# Patient Record
Sex: FEMALE | Race: WHITE | NOT HISPANIC OR LATINO | Employment: OTHER | URBAN - NONMETROPOLITAN AREA
[De-identification: names, ages, dates, MRNs, and addresses within clinical notes are randomized per-mention and may not be internally consistent; named-entity substitution may affect disease eponyms.]

---

## 2017-01-11 PROBLEM — F06.4 ANXIETY DISORDER DUE TO GENERAL MEDICAL CONDITION: Status: ACTIVE | Noted: 2017-01-11

## 2017-01-11 PROBLEM — I48.91 ATRIAL FIBRILLATION: Status: ACTIVE | Noted: 2017-01-11

## 2017-01-11 PROBLEM — F32.A DEPRESSION: Status: ACTIVE | Noted: 2017-01-11

## 2017-01-11 PROBLEM — E78.5 HYPERLIPIDEMIA: Status: ACTIVE | Noted: 2017-01-11

## 2017-01-11 PROBLEM — K44.9 HIATAL HERNIA: Status: ACTIVE | Noted: 2017-01-11

## 2017-01-11 PROBLEM — I10 HYPERTENSION: Status: ACTIVE | Noted: 2017-01-11

## 2017-01-11 PROBLEM — J42 CHRONIC BRONCHITIS: Status: ACTIVE | Noted: 2017-01-11

## 2017-01-11 PROBLEM — R00.2 PALPITATIONS: Status: ACTIVE | Noted: 2017-01-11

## 2017-01-11 PROBLEM — I07.9 TRICUSPID VALVE DISORDER: Status: ACTIVE | Noted: 2017-01-11

## 2017-01-11 PROBLEM — H26.9 CATARACT: Status: ACTIVE | Noted: 2017-01-11

## 2017-01-11 PROBLEM — R06.02 SHORTNESS OF BREATH: Status: ACTIVE | Noted: 2017-01-11

## 2017-01-11 PROBLEM — E66.9 OBESITY: Status: ACTIVE | Noted: 2017-01-11

## 2017-01-11 PROBLEM — K21.9 ESOPHAGEAL REFLUX: Status: ACTIVE | Noted: 2017-01-11

## 2017-01-11 PROBLEM — G47.30 SLEEP APNEA: Status: ACTIVE | Noted: 2017-01-11

## 2017-07-18 ENCOUNTER — TRANSCRIBE ORDERS (OUTPATIENT)
Dept: GENERAL RADIOLOGY | Facility: HOSPITAL | Age: 77
End: 2017-07-18

## 2017-07-18 DIAGNOSIS — Z78.0 POSTMENOPAUSAL: Primary | ICD-10-CM

## 2017-07-21 ENCOUNTER — TRANSCRIBE ORDERS (OUTPATIENT)
Dept: MAMMOGRAPHY | Facility: HOSPITAL | Age: 77
End: 2017-07-21

## 2017-07-21 ENCOUNTER — APPOINTMENT (OUTPATIENT)
Dept: BONE DENSITY | Facility: HOSPITAL | Age: 77
End: 2017-07-21

## 2017-07-21 DIAGNOSIS — Z13.9 SCREENING: Primary | ICD-10-CM

## 2017-07-21 DIAGNOSIS — Z78.0 POSTMENOPAUSAL: ICD-10-CM

## 2017-07-21 PROCEDURE — 77080 DXA BONE DENSITY AXIAL: CPT

## 2017-08-11 ENCOUNTER — HOSPITAL ENCOUNTER (OUTPATIENT)
Dept: MAMMOGRAPHY | Facility: HOSPITAL | Age: 77
Discharge: HOME | End: 2017-08-11
Admitting: FAMILY MEDICINE

## 2017-08-11 DIAGNOSIS — Z13.9 SCREENING: ICD-10-CM

## 2017-08-11 PROCEDURE — G0202 SCR MAMMO BI INCL CAD: HCPCS

## 2017-08-11 PROCEDURE — 77063 BREAST TOMOSYNTHESIS BI: CPT

## 2017-08-24 DIAGNOSIS — M25.562 LEFT KNEE PAIN, UNSPECIFIED CHRONICITY: Primary | ICD-10-CM

## 2017-08-28 ENCOUNTER — OFFICE VISIT (OUTPATIENT)
Dept: ORTHOPEDIC SURGERY | Facility: CLINIC | Age: 77
End: 2017-08-28

## 2017-08-28 VITALS — BODY MASS INDEX: 32.25 KG/M2 | RESPIRATION RATE: 16 BRPM | HEIGHT: 63 IN | WEIGHT: 182 LBS

## 2017-08-28 DIAGNOSIS — M25.562 ARTHRALGIA OF LEFT KNEE: Primary | ICD-10-CM

## 2017-08-28 DIAGNOSIS — M17.0 PRIMARY OSTEOARTHRITIS OF BOTH KNEES: ICD-10-CM

## 2017-08-28 PROCEDURE — 99202 OFFICE O/P NEW SF 15 MIN: CPT | Performed by: ORTHOPAEDIC SURGERY

## 2017-08-28 PROCEDURE — 73560 X-RAY EXAM OF KNEE 1 OR 2: CPT | Performed by: ORTHOPAEDIC SURGERY

## 2017-08-28 RX ORDER — PANTOPRAZOLE SODIUM 40 MG/1
40 TABLET, DELAYED RELEASE ORAL DAILY
COMMUNITY
End: 2018-04-27 | Stop reason: CLARIF

## 2017-08-28 NOTE — PROGRESS NOTES
Subjective   Patient ID: Elsa Alcaraz is a 77 y.o. right hand dominant female is being seen for orthopaedic evaluation today for left knee pain and is here today for a treatment planning discussion. Pain of the Left Knee and Pain of the Right Knee         Lower Extremity Issue   This is a chronic (left greater than right knee joint pain) problem. The current episode started more than 1 year ago (in terms of chronic knee pain, several years.  Also a falling episode 2 weeks ago aggravated the left knee.  She was seen in our clinic 5 years ago and had left knee Supartz viscosupplementation therapy with longstanding relief until recent months. ). The problem occurs intermittently. The problem has been gradually worsening. Associated symptoms include arthralgias. Pertinent negatives include no abdominal pain, chest pain, fever, joint swelling or rash. The symptoms are aggravated by walking (crouching). She has tried rest and ice (She also uses a left knee support sleeve on occasion.  Wants to avoid medication for condition.  Also noted is adverse reaction to prior cortisone.) for the symptoms. The treatment provided mild relief.     Pain Score: 1  Pain Location: Knee  Pain Orientation: Left, Right  Pain Descriptors: Aching  Pain Frequency: Intermittent  Pain Onset: Gradual  Date Pain First Started:  (a year)  Aggravating Factors: Walking, Squatting  Pain Intervention(s): Rest, Cold applied  Result of Injury: Yes (jumped off a fence and it started hurting afterwards)  Work-Related Injury: No     Patient noted pain is worse in left than right  Patient noted that she did fall 2 weeks ago and it feels like her left knee is hurting more since that time.      Past Medical History:   Diagnosis Date   • Anxiety disorder due to general medical condition 1/11/2017   • Arthritis    • Atrial fibrillation 1/11/2017   • Breast cancer 2003    right   • Cataract 1/11/2017   • Chronic bronchitis 1/11/2017   • Colon cancer    • COPD  (chronic obstructive pulmonary disease)    • Depression 2017   • Diverticulosis    • Esophageal reflux 2017   • Hiatal hernia 2017   • History of bladder infections    • Hyperlipidemia 2017   • Hypertension 2017   • Osteoporosis    • Palpitations 2017   • Shortness of breath 2017   • Sleep apnea 2017   • Tricuspid valve disorder 2017   • Ulcer         Past Surgical History:   Procedure Laterality Date   • ANAL FISTULOTOMY     • BREAST LUMPECTOMY  2006   •  SECTION  1981   • CHOLECYSTECTOMY  2009   • COLECTOMY PARTIAL / TOTAL  1998   • VENTRAL HERNIA REPAIR  10/28/2012       Family History   Problem Relation Age of Onset   • Hypertension Mother    • Asthma Mother    • COPD Mother    • Osteoarthritis Mother    • Cancer Father    • Cancer Sister    • Diabetes Maternal Grandmother        Social History     Social History   • Marital status:      Spouse name: N/A   • Number of children: N/A   • Years of education: N/A     Occupational History   • Not on file.     Social History Main Topics   • Smoking status: Never Smoker   • Smokeless tobacco: Not on file   • Alcohol use No   • Drug use: No   • Sexual activity: Defer     Other Topics Concern   • Not on file     Social History Narrative       No Known Allergies    Review of Systems   Constitutional: Negative for fever.   HENT: Negative for voice change.    Eyes: Negative for visual disturbance.   Respiratory: Negative for shortness of breath.    Cardiovascular: Negative for chest pain.   Gastrointestinal: Negative for abdominal distention and abdominal pain.   Genitourinary: Negative for dysuria.   Musculoskeletal: Positive for arthralgias. Negative for gait problem and joint swelling.   Skin: Negative for rash.   Neurological: Negative for speech difficulty.   Hematological: Does not bruise/bleed easily.   Psychiatric/Behavioral: Negative for confusion.   All other systems reviewed and  "are negative.      Objective   Resp 16  Ht 63\" (160 cm)  Wt 182 lb (82.6 kg)  BMI 32.24 kg/m2   Physical Exam   Constitutional: She appears well-developed. No distress.   Musculoskeletal: She exhibits deformity (mild varus both knees).        Right knee: She exhibits no effusion.        Left knee: She exhibits effusion (1+).   Neurological: She is alert.   Skin: Skin is warm and dry. No rash noted. No erythema.   Psychiatric: She has a normal mood and affect. Her speech is normal and behavior is normal. Judgment and thought content normal.   Vitals reviewed.    Right Knee Exam     Tenderness   The patient is experiencing tenderness in the medial retinaculum and patella.    Range of Motion   Extension: 0   Flexion: 120     Tests   El[place]:  Medial - negative   Drawer:       Anterior - 1+    Posterior - 1+  Varus: negative  Valgus: negative  Patellar Apprehension: negative    Other   Erythema: absent  Sensation: normal  Pulse: present  Swelling: none  Other tests: no effusion present      Left Knee Exam     Tenderness   The patient is experiencing tenderness in the medial retinaculum, patella and medial joint line.    Range of Motion   Extension: 0   Flexion: 120     Tests   [place]:  Medial - negative   Drawer:       Anterior - 1+     Posterior - 1+  Varus: negative  Valgus: negative  Patellar Apprehension: negative    Other   Erythema: absent  Sensation: normal  Pulse: present  Swelling: mild  Effusion: effusion (1+) present      Right Hip Exam     Tenderness   The patient is experiencing no tenderness.         Range of Motion   The patient has normal right hip ROM.    Muscle Strength   The patient has normal right hip strength.    Tests   MARGARET: negative      Left Hip Exam     Tenderness   The patient is experiencing no tenderness.         Range of Motion   The patient has normal left hip ROM.    Muscle Strength   The patient has normal left hip strength.     Tests   MARGARET: negative      Back Exam "     Tenderness   The patient is experiencing no tenderness (no symptoms today though she does report a 'herniated disc' in her back.).     Muscle Strength   Right Quadriceps:  5/5   Left Quadriceps:  5/5   Right Hamstrings:  5/5   Left Hamstrings:  5/5     Tests   Straight leg raise right: negative  Straight leg raise left: negative        Extremity DVT signs are Negative on physical exam with negative Ari sign, with no calf pain, with no palpable cords and with no skin tone change   Neurologic Exam     Mental Status   Attention: normal.   Speech: speech is normal   Level of consciousness: alert  Knowledge: good.     Motor Exam   Overall muscle tone: normal    Strength   Right quadriceps: 5/5  Left quadriceps: 5/5  Right hamstrin/5  Left hamstrin/5    Gait, Coordination, and Reflexes     Gait  Gait: (occasional left knee limp)     Left Hip Exam     Range of Motion   Normal left hip ROM    Muscle Strength   Normal left hip strength    Right Hip Exam     Range of Motion   Normal right hip ROM    Muscle Strength   Normal right hip strength        Assessment/Plan   Independent Review of Radiographic Studies:    Indication to evaluate left greater than right knee pain, no comparison views, shows no acute fracture or dislocation evident.  Indication to evaluate knee joint condition, no comparison views available, shows evident chronic moderate right and advanced left knee anteromedial osteoarthritis.  Near bone-on-bone medial compartment of left knee.  Mild varus both knees.  Laboratory and Other Studies:  No new results reviewed today.   Medical Decision Making:    Ongoing with residual symptoms.  Continue current management and any additional treatments and workup as outlined in plan.    Procedures  [x] No procedures were performed in office today.     Elsa was seen today for pain and pain.    Diagnoses and all orders for this visit:    Arthralgia of left knee    Primary osteoarthritis of both knees        Regular exercise as tolerated  Orthopedic activities reviewed and patient expressed appreciation  Discussion of orthopedic goals  Risk, benefits, and merits of treatment alternatives reviewed with the patient and questions answered  Ice, heat, and/or modalities as beneficial  Guided on proper techniques for mobility, strength, agility and/or conditioning exercises  Discussed injection therapy for left knee.  Advised on use of a cane for safety and support.    Orthopaedic knee osteoarthritis treatment options reviewed including:  Arthiritis exercises and activity modifications  Knee brace  Anti-inflammatory medication or topicals, if no contraindications (PATIENT PREFERS TO AVOID MEDICATION)  Corticosteroid injection (PATIENT NOT A CANDIDATE FOR CORTISONE THERAPY)  Visco supplementation injections  Elective knee arthroscopy or arthroplasty as appropriate  Orthopaedic surgery limitations and risks reviewed    Recommendations/Plan:  Exercise, medications, injections, other patient advice, and return appointment as noted.  Brace: No brace was given at today's visit  Referral: No referrals made at today's visit  Test/Studies: No additional studies ordered.  Surgery: No surgery proposed at this visit.  Work/Activity Status: May perform usual activities as tolerated  Patient is encouraged to call or return for any issues or concerns.    Return in about 2 weeks (around 9/11/2017) for For L knee viscosupplementation injections, Recheck.  Patient agreeable to call or return sooner for any concerns.

## 2017-09-15 ENCOUNTER — OFFICE VISIT (OUTPATIENT)
Dept: ORTHOPEDIC SURGERY | Facility: CLINIC | Age: 77
End: 2017-09-15

## 2017-09-15 VITALS — HEIGHT: 63 IN | WEIGHT: 182 LBS | BODY MASS INDEX: 32.25 KG/M2 | RESPIRATION RATE: 18 BRPM

## 2017-09-15 DIAGNOSIS — M25.562 ARTHRALGIA OF LEFT KNEE: Primary | ICD-10-CM

## 2017-09-15 DIAGNOSIS — M17.12 PRIMARY OSTEOARTHRITIS OF LEFT KNEE: ICD-10-CM

## 2017-09-15 PROCEDURE — 20610 DRAIN/INJ JOINT/BURSA W/O US: CPT | Performed by: PHYSICIAN ASSISTANT

## 2017-09-15 NOTE — PROGRESS NOTES
"Afua Alcaraz is a 77 y.o.  female is here today for injection therapy.  Follow-up, Pain, and Injections of the Left Knee      History of Present Illness   Patient is here for first gel one injection to the left knee secondary to OA.  Past Medical History:   Diagnosis Date   • Anxiety disorder due to general medical condition 2017   • Arthritis    • Atrial fibrillation 2017   • Breast cancer 2003    right   • Cataract 2017   • Chronic bronchitis 2017   • Colon cancer    • COPD (chronic obstructive pulmonary disease)    • Depression 2017   • Diverticulosis    • Esophageal reflux 2017   • Hiatal hernia 2017   • History of bladder infections    • Hyperlipidemia 2017   • Hypertension 2017   • Osteoporosis    • Palpitations 2017   • Shortness of breath 2017   • Sleep apnea 2017   • Tricuspid valve disorder 2017   • Ulcer         Past Surgical History:   Procedure Laterality Date   • ANAL FISTULOTOMY     • BREAST LUMPECTOMY  2006   •  SECTION  1981   • CHOLECYSTECTOMY  2009   • COLECTOMY PARTIAL / TOTAL  1998   • VENTRAL HERNIA REPAIR  10/28/2012       No Known Allergies    Review of Systems   Constitutional: Negative for fever.   HENT: Negative for voice change.    Eyes: Negative for visual disturbance.   Respiratory: Negative for shortness of breath.    Cardiovascular: Negative for chest pain.   Gastrointestinal: Negative for abdominal distention and abdominal pain.   Genitourinary: Negative for dysuria.   Musculoskeletal: Positive for arthralgias. Negative for gait problem and joint swelling.   Skin: Negative for rash.   Neurological: Negative for speech difficulty.   Hematological: Does not bruise/bleed easily.   Psychiatric/Behavioral: Negative for confusion.       Objective   Resp 18  Ht 63\" (160 cm)  Wt 182 lb (82.6 kg)  BMI 32.24 kg/m2   Physical Exam  Skin exam stable with no erythema, ecchymosis or " rash.  No new swelling.  No motor or sensory changes.  Distal pulse intact.    Large Joint Arthrocentesis  Date/Time: 9/15/2017 12:48 PM  Consent given by: patient  Site marked: site marked  Timeout: Immediately prior to procedure a time out was called to verify the correct patient, procedure, equipment, support staff and site/side marked as required   Supporting Documentation  Indications: pain   Procedure Details  Location: knee - L knee  Preparation: Patient was prepped and draped in the usual sterile fashion  Needle size: 22 G  Approach: anterolateral  Medication group details: Gel ONe NDC# [address]-12 Lot # [id number] EXP -2018-02-21.  Patient tolerance: patient tolerated the procedure well with no immediate complications          Assessment/Plan   Independent Review of Radiographic Studies:    No new imaging done today.  Laboratory and Other Studies:  No new results reviewed today.       Elsa was seen today for follow-up, pain and injections.    Diagnoses and all orders for this visit:    Arthralgia of left knee  -     Large Joint Arthrocentesis    Primary osteoarthritis of left knee      Orthopedic activities reviewed and patient expressed appreciation  Discussion of orthopedic goals  Risk, benefits, and merits of treatment alternatives reviewed with the patient and questions answered  Call or notify for any adverse effect from injection therapy    Recommendations/Plan:  Exercise, medications, injections, other patient advice, and return appointment as noted.  Patient is encouraged to call or return for any issues or concerns.    FU in 3 months or as needed  Patient agreeable to call or return sooner for any concerns.

## 2018-03-24 ENCOUNTER — APPOINTMENT (OUTPATIENT)
Dept: GENERAL RADIOLOGY | Facility: HOSPITAL | Age: 78
End: 2018-03-24

## 2018-03-24 ENCOUNTER — HOSPITAL ENCOUNTER (EMERGENCY)
Facility: HOSPITAL | Age: 78
Discharge: HOME | End: 2018-03-24
Attending: EMERGENCY MEDICINE | Admitting: EMERGENCY MEDICINE

## 2018-03-24 VITALS
HEIGHT: 63 IN | OXYGEN SATURATION: 94 % | RESPIRATION RATE: 18 BRPM | BODY MASS INDEX: 33.13 KG/M2 | DIASTOLIC BLOOD PRESSURE: 71 MMHG | TEMPERATURE: 99.3 F | WEIGHT: 187 LBS | SYSTOLIC BLOOD PRESSURE: 120 MMHG | HEART RATE: 113 BPM

## 2018-03-24 DIAGNOSIS — M79.601 PAIN OF RIGHT UPPER EXTREMITY: Primary | ICD-10-CM
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
## 2018-03-24 PROCEDURE — 99283 EMERGENCY DEPT VISIT LOW MDM: CPT

## 2018-03-24 PROCEDURE — 73110 X-RAY EXAM OF WRIST: CPT

## 2018-03-24 PROCEDURE — 73030 X-RAY EXAM OF SHOULDER: CPT

## 2018-03-24 PROCEDURE — 73060 X-RAY EXAM OF HUMERUS: CPT

## 2018-03-24 RX ORDER — AMOXICILLIN AND CLAVULANATE POTASSIUM 250; 125 MG/1; MG/1
TABLET, FILM COATED ORAL 3 TIMES DAILY
COMMUNITY
End: 2018-04-16 | Stop reason: HOSPADM

## 2018-03-24 RX ORDER — METOPROLOL TARTRATE AND HYDROCHLOROTHIAZIDE 100; 25 MG/1; MG/1
1 TABLET ORAL DAILY
COMMUNITY
End: 2019-12-16 | Stop reason: HOSPADM

## 2018-03-24 RX ORDER — MULTIVIT-MIN/IRON/FOLIC ACID/K 18-600-40
CAPSULE ORAL
COMMUNITY
End: 2019-07-29

## 2018-03-24 RX ORDER — TRIAMCINOLONE ACETONIDE 55 UG/1
2 SPRAY, METERED NASAL DAILY
COMMUNITY
End: 2019-07-29

## 2018-03-24 NOTE — ED PROVIDER NOTES
TRIAGE CHIEF COMPLAINT:     Nursing and triage notes reviewed    Chief Complaint   Patient presents with   • Fall      HPI: Elsa Alcaraz is a 77 y.o. female who presents to the emergency department complaining of Right upper extremity discomfort after suffering a fall early this morning.  Patient states she got up and accidentally tripped over a laundry basket that she had left in the floor.  She states she landed on her right arm.  She denies hitting her head or losing consciousness.  She denied any lightheadedness or dizziness prior and states she simply tripped on the basket.  She denies any numbness or tingling in her right upper extremity but does complain of discomfort at the right wrist as well as the right shoulder.  She states she can still move it around without difficulty but it feels very sore when she does so.     REVIEW OF SYSTEMS: All other systems reviewed and are negative     PAST MEDICAL HISTORY:   Past Medical History:   Diagnosis Date   • Anxiety disorder due to general medical condition 1/11/2017   • Arthritis    • Atrial fibrillation 1/11/2017   • Breast cancer 2003    right   • Cataract 1/11/2017   • Chronic bronchitis 1/11/2017   • Colon cancer    • COPD (chronic obstructive pulmonary disease)    • Depression 1/11/2017   • Diverticulosis    • Esophageal reflux 1/11/2017   • Hiatal hernia 1/11/2017   • History of bladder infections    • Hyperlipidemia 1/11/2017   • Hypertension 1/11/2017   • Osteoporosis    • Palpitations 1/11/2017   • Shortness of breath 1/11/2017   • Sleep apnea 1/11/2017   • Tricuspid valve disorder 1/11/2017   • Ulcer         FAMILY HISTORY:   Family History   Problem Relation Age of Onset   • Hypertension Mother    • Asthma Mother    • COPD Mother    • Osteoarthritis Mother    • Cancer Father    • Cancer Sister    • Diabetes Maternal Grandmother         SOCIAL HISTORY:   Social History     Social History   • Marital status:      Spouse name: N/A   • Number of  children: N/A   • Years of education: N/A     Occupational History   • Not on file.     Social History Main Topics   • Smoking status: Never Smoker   • Smokeless tobacco: Not on file   • Alcohol use No   • Drug use: No   • Sexual activity: Defer     Other Topics Concern   • Not on file     Social History Narrative   • No narrative on file        SURGICAL HISTORY:   Past Surgical History:   Procedure Laterality Date   • ANAL FISTULOTOMY     • BREAST LUMPECTOMY  2006   •  SECTION  1981   • CHOLECYSTECTOMY  2009   • COLECTOMY PARTIAL / TOTAL  1998   • VENTRAL HERNIA REPAIR  10/28/2012        CURRENT MEDICATIONS:      Medication List      ASK your doctor about these medications    amoxicillin-clavulanate 250-125 MG per tablet  Commonly known as:  AUGMENTIN     atenolol 25 MG tablet  Commonly known as:  TENORMIN     brompheniramine-pseudoephedrine 1-15 MG/5ML elixir  Commonly known as:  DIMETAPP     citalopram 20 MG tablet  Commonly known as:  CeleXA     losartan-hydrochlorothiazide 100-25 MG per tablet  Commonly known as:  HYZAAR     metoprolol-hydrochlorothiazide 100-25 MG per tablet  Commonly known as:  LOPRESSOR HCT     montelukast 10 MG tablet  Commonly known as:  SINGULAIR     pantoprazole 40 MG EC tablet  Commonly known as:  PROTONIX     SYMBICORT 80-4.5 MCG/ACT inhaler  Generic drug:  budesonide-formoterol     Triamcinolone Acetonide 55 MCG/ACT nasal inhaler  Commonly known as:  NASACORT     Vitamin D 2000 units capsule     warfarin 4 MG tablet  Commonly known as:  COUMADIN           ALLERGIES: Review of patient's allergies indicates no known allergies.     PHYSICAL EXAM:   VITAL SIGNS:   Vitals:    18 0910   BP: 120/71   Pulse: 113   Resp: 18   Temp: 99.3 °F (37.4 °C)   SpO2: 94%      CONSTITUTIONAL: Awake, oriented, appears non-toxic   HENT: Atraumatic, normocephalic, oral mucosa pink and moist, airway patent.   EYES: Conjunctiva clear  NECK: Trachea midline, non-tender,  supple   CARDIOVASCULAR: Normal heart rate, Normal rhythm, No murmurs, rubs, gallops   PULMONARY/CHEST: Clear to auscultation, no rhonchi, wheezes, or rales. Symmetrical breath sounds   ABDOMINAL: Non-distended, soft, non-tender - no rebound or guarding.  NEUROLOGIC: Non-focal, moving all four extremities, no gross sensory or motor deficits.   EXTREMITIES: No clubbing, cyanosis.  There is some mild swelling at the right wrist.  Patient has full range of motion with only minimal discomfort.  There is no obvious bony tenderness to palpation.  There is no discomfort with range of motion or palpation at the right elbow.  There is some mild discomfort with palpation and range of motion at the right shoulder.  There is no obvious deformity of the extremity.  Distal pulses and sensation are intact.  She has full sensory, motor function in the radial, ulnar, and median nerve distributions.   SKIN: Warm, Dry, No erythema, No rash     ED COURSE / MEDICAL DECISION MAKING:   Elsa Alcaraz is a 77 y.o. female who presents to the emergency department for evaluation of right upper extremity discomfort after suffering a fall.  Patient is nondistressed on arrival was stable vital signs.  Physical examination reveals mild swelling at the right wrist and some mild discomfort at the wrist and the shoulder.  The limb is neurovascularly intact.  I will obtain x-rays for further evaluation.  Patient declined any pain medication in the emergency department.  X-rays of the shoulder and humerus per my interpretation did not reveal any obvious acute fracture or dislocation, there were some degenerative changes.  Per the radiologist there were no acute fracture dislocation of the wrist.  Patient was placed in a wrist brace for comfort.  We'll continue symptomatic care.  Return precautions were discussed.    DECISION TO DISCHARGE/ADMIT: see ED care timeline     FINAL IMPRESSION:   1 -- arm pain   2 -- fall  3 --     Electronically signed by:  Brianna Rodriguez MD, 3/24/2018 9:26 AM       Brianna Rodriguez MD  03/24/18 [id number]

## 2018-03-24 NOTE — ED NOTES
Went into patient's room and was not in there for discharge.  Patient eloped.      Radha Samuel RN  03/24/18 [id number]

## 2018-04-13 PROBLEM — D75.839 THROMBOCYTOSIS: Status: ACTIVE | Noted: 2018-04-13

## 2018-04-13 PROBLEM — D50.9 MICROCYTIC HYPOCHROMIC ANEMIA: Status: ACTIVE | Noted: 2018-04-13

## 2018-04-16 ENCOUNTER — CONSULT (OUTPATIENT)
Dept: ONCOLOGY | Facility: CLINIC | Age: 78
End: 2018-04-16

## 2018-04-16 VITALS
WEIGHT: 183 LBS | HEART RATE: 75 BPM | RESPIRATION RATE: 18 BRPM | TEMPERATURE: 98.1 F | HEIGHT: 63 IN | DIASTOLIC BLOOD PRESSURE: 88 MMHG | BODY MASS INDEX: 32.43 KG/M2 | SYSTOLIC BLOOD PRESSURE: 134 MMHG

## 2018-04-16 DIAGNOSIS — D50.9 IRON DEFICIENCY ANEMIA, UNSPECIFIED IRON DEFICIENCY ANEMIA TYPE: Primary | ICD-10-CM

## 2018-04-16 PROCEDURE — 99205 OFFICE O/P NEW HI 60 MIN: CPT | Performed by: INTERNAL MEDICINE

## 2018-04-16 NOTE — PROGRESS NOTES
CHIEF COMPLAINT: Iron deficiency anemia    REASON FOR REFERRAL: Same      RECORDS OBTAINED  Records of the patients history including those obtained from  Kemi Hdz were reviewed and summarized in detail.       Iron deficiency anemia    4/13/2018 Initial Diagnosis     Iron deficiency anemia:  Labs done by primary care March 23, 2018 hemoglobin 8.6 MCV 70.  March 27 hemoglobin 9.2 MCV 71.  With atrial fib on Coumadin INR 2.8 with PTT 26.9.  Reticulocyte count 15%.  Iron saturation low at 4% with an iron low at 17 and a ferritin low at 13.  Transferrin of 369 and a normal total iron binding capacity of 448 at the upper limits of normal.  Fully casted 12 with a B12 1075 and a TSH of 1.34.              HISTORY OF PRESENT ILLNESS:  The patient is a 78 y.o.  female, referred for Iron deficiency anemia.  Previous EGD and colonoscopy 2016 at [facility].  Been seeing Dr. Radford in the past for history of colon cancer last decade.  No change in color caliber consistency of her stools.  Does not tolerate ferrous sulfate well and comes to me for IV iron.  She had IV iron without allergic incident us far..    REVIEW OF SYSTEMS:  A 14 point review of systems was performed and is negative except as noted above.    Past Medical History:   Diagnosis Date   • Anxiety disorder due to general medical condition 1/11/2017   • Arthritis    • Atrial fibrillation 1/11/2017   • Breast cancer 2003    right   • Cataract 1/11/2017   • Chronic bronchitis 1/11/2017   • Colon cancer    • COPD (chronic obstructive pulmonary disease)    • Depression 1/11/2017   • Diverticulosis    • Esophageal reflux 1/11/2017   • Hiatal hernia 1/11/2017   • History of bladder infections    • Hyperlipidemia 1/11/2017   • Hypertension 1/11/2017   • Osteoporosis    • Palpitations 1/11/2017   • Shortness of breath 1/11/2017   • Sleep apnea 1/11/2017   • Tricuspid valve disorder 1/11/2017   • Ulcer      Past Surgical History:   Procedure Laterality Date    • ANAL FISTULOTOMY     • BREAST LUMPECTOMY  2006   •  SECTION  1981   • CHOLECYSTECTOMY  2009   • COLECTOMY PARTIAL / TOTAL  1998   • VENTRAL HERNIA REPAIR  10/28/2012       Current Outpatient Prescriptions on File Prior to Visit   Medication Sig Dispense Refill   • atenolol (TENORMIN) 25 MG tablet Take  by mouth 2 (Two) Times a Day.     • budesonide-formoterol (SYMBICORT) 80-4.5 MCG/ACT inhaler 2 (Two) Times a Day.     • Cholecalciferol (VITAMIN D) 2000 units capsule Take  by mouth.     • citalopram (CeleXA) 20 MG tablet Take  by mouth Daily.     • losartan-hydrochlorothiazide (HYZAAR) 100-25 MG per tablet Take  by mouth Daily.     • metoprolol-hydrochlorothiazide (LOPRESSOR HCT) 100-25 MG per tablet Take 1 tablet by mouth Daily.     • montelukast (SINGULAIR) 10 MG tablet Take  by mouth Daily.     • pantoprazole (PROTONIX) 40 MG EC tablet Take 40 mg by mouth Daily.     • Triamcinolone Acetonide (NASACORT) 55 MCG/ACT nasal inhaler 2 sprays into each nostril Daily.     • warfarin (COUMADIN) 4 MG tablet Take  by mouth Daily.     • [DISCONTINUED] amoxicillin-clavulanate (AUGMENTIN) 250-125 MG per tablet 3 (Three) Times a Day. Pt unsure of dose at this time     • [DISCONTINUED] brompheniramine-pseudoephedrine (DIMETAPP) 1-15 MG/5ML elixir Take  by mouth Every 6 (Six) Hours As Needed for Allergies.       No current facility-administered medications on file prior to visit.        No Known Allergies    Social History     Social History   • Marital status:      Social History Main Topics   • Smoking status: Never Smoker   • Alcohol use No   • Drug use: No   • Sexual activity: Defer     Other Topics Concern   • Not on file       Family History   Problem Relation Age of Onset   • Hypertension Mother    • Asthma Mother    • COPD Mother    • Osteoarthritis Mother    • Cancer Father    • Cancer Sister    • Diabetes Maternal Grandmother        PHYSICAL EXAM:    /88   Pulse 75    "Temp 98.1 °F (36.7 °C) (Temporal Artery )   Resp 18   Ht 160 cm (63\")   Wt 83 kg (183 lb)   BMI 32.42 kg/m²     ECOG: (1) Restricted in physically strenuous activity, ambulatory and able to do work of light nature  General: well appearing female in no acute distress  HEENT: sclera anicteric, oropharynx clear  Lymphatics: no cervical, supraclavicular, inguinal, or axillary adenopathy  Cardiovascular: regular rate and rhythm, no murmurs  Neck: Supple; No thyromegaly  Lungs: clear to auscultation bilaterally. No respiratory distress.   Abdomen: soft, nontender, nondistended.  No palpable organomegaly  Extremities: no cyanosis, clubbing, edema, or cords  Skin: no rashes, lesions, bruising, or petechiae  Neuro: Alert and oriented x 4; Moving all extremities.  Psych: No anxiety or depression    No visits with results within 6 Week(s) from this visit.   Latest known visit with results is:   No results found for any previous visit.       Assessment/Plan     1. Iron deficiency anemia  2. History of colon cancer 1998  3. History of breast cancer 2006 treated by Dr. Fagan  4. Atrial fibrillation on Coumadin    Discussion: according to the laboratories that she had prior to coming to me she clearly has iron deficiency anemia.  We will repeat those indices today and I'm going to try her on ferrous gluconate which is a milder form of oral iron.  If she ends up on acid blocking medications and she will also take vitamin C with the oral iron to improve its absorption.  I'll see her back with my nurse practitioner in 3 months in the [facility] and if the oral iron was intolerable or not effective then we will give her IV iron.  In the meantime we'll get her back to Dr. Fagan for EGD and colonoscopy and ask her to get to her cardiologist to determine whether Coumadin on an ongoing basis is mandatory for her atrial fibrillation as I suspect that is complicating this picture and causing some subclinical but important GI " losses.  Discussed with patient 60 minutes face-to-face greater than 50% counseling regarding the complexity of this process.      Curtis Harding MD    4/16/2018

## 2018-04-19 RX ORDER — DOXYCYCLINE HYCLATE 50 MG/1
648 CAPSULE, GELATIN COATED ORAL 2 TIMES DAILY
Qty: 120 TABLET | Refills: 11 | Status: SHIPPED | OUTPATIENT
Start: 2018-04-19 | End: 2019-07-29

## 2018-04-27 ENCOUNTER — OFFICE VISIT (OUTPATIENT)
Dept: SURGERY | Facility: CLINIC | Age: 78
End: 2018-04-27

## 2018-04-27 ENCOUNTER — APPOINTMENT (OUTPATIENT)
Dept: PREADMISSION TESTING | Facility: HOSPITAL | Age: 78
End: 2018-04-27

## 2018-04-27 VITALS
TEMPERATURE: 98.2 F | OXYGEN SATURATION: 99 % | HEART RATE: 82 BPM | WEIGHT: 180 LBS | BODY MASS INDEX: 31.89 KG/M2 | SYSTOLIC BLOOD PRESSURE: 132 MMHG | DIASTOLIC BLOOD PRESSURE: 76 MMHG | HEIGHT: 63 IN

## 2018-04-27 VITALS — BODY MASS INDEX: 31.89 KG/M2 | WEIGHT: 180 LBS | HEIGHT: 63 IN

## 2018-04-27 DIAGNOSIS — K44.9 HIATAL HERNIA WITH GASTROESOPHAGEAL REFLUX: ICD-10-CM

## 2018-04-27 DIAGNOSIS — D50.8 IRON DEFICIENCY ANEMIA SECONDARY TO INADEQUATE DIETARY IRON INTAKE: ICD-10-CM

## 2018-04-27 DIAGNOSIS — K21.00 GASTROESOPHAGEAL REFLUX DISEASE WITH ESOPHAGITIS: Primary | ICD-10-CM

## 2018-04-27 DIAGNOSIS — K21.9 HIATAL HERNIA WITH GASTROESOPHAGEAL REFLUX: ICD-10-CM

## 2018-04-27 PROCEDURE — 99203 OFFICE O/P NEW LOW 30 MIN: CPT | Performed by: SURGERY

## 2018-04-27 RX ORDER — WARFARIN SODIUM 5 MG/1
5 TABLET ORAL
COMMUNITY
End: 2020-03-16

## 2018-04-27 RX ORDER — OMEPRAZOLE 40 MG/1
40 CAPSULE, DELAYED RELEASE ORAL DAILY
Qty: 30 CAPSULE | Refills: 11 | Status: SHIPPED | OUTPATIENT
Start: 2018-04-27 | End: 2019-04-27

## 2018-04-27 NOTE — PROGRESS NOTES
Patient: Elsa Alcaraz    YOB: 1940    Date: 04/27/2018    Primary Care Provider: Kemi Hdz MD    Chief Complaint   Patient presents with   • Anemia       Subjective .     History of present illness:  I saw the patient in the office today as a consultation for evaluation and treatment of anemia. Lab work from 03/27/18 showed a hematocrit of 31.5 and a hemoglobin of 9.2. She complains of feeling like food is getting stuck in her chest for the past two months. She complains of an ache in her LUQ pain, does not radiate, worse after meals. She complains of associated diarrhea, worse after meals. She denies constipation. She states she does have hemorrhoids and has associated rectal bleeding. Patient stopped taking her PPI medication for heartburn.  Patient has a large hiatal hernia and significant esophagitis.  She is recommended by gastroenterology 2 years ago to undergo a Nissen fundoplication.  Patient had a negative colonoscopy 2 years ago.    The following portions of the patient's history were reviewed and updated as appropriate: allergies, current medications, past family history, past medical history, past social history, past surgical history and problem list.      Review of Systems   Constitutional: Negative for chills, fever and unexpected weight change.   HENT: Positive for trouble swallowing. Negative for hearing loss and voice change.    Eyes: Negative for visual disturbance.   Respiratory: Negative for apnea, cough, chest tightness, shortness of breath and wheezing.    Cardiovascular: Negative for chest pain, palpitations and leg swelling.   Gastrointestinal: Positive for abdominal pain, anal bleeding and diarrhea. Negative for abdominal distention, blood in stool, constipation, nausea, rectal pain and vomiting.   Endocrine: Negative for cold intolerance and heat intolerance.   Genitourinary: Negative for difficulty urinating, dysuria and flank pain.   Musculoskeletal:  Negative for back pain and gait problem.   Skin: Negative for color change, rash and wound.   Neurological: Negative for dizziness, syncope, speech difficulty, weakness, light-headedness, numbness and headaches.   Hematological: Negative for adenopathy. Does not bruise/bleed easily.   Psychiatric/Behavioral: Negative for confusion. The patient is not nervous/anxious.        History:  Past Medical History:   Diagnosis Date   • Anxiety disorder due to general medical condition 2017   • Arthritis    • Atrial fibrillation 2017   • Breast cancer 2003    right   • Cataract 2017   • Chronic bronchitis 2017   • Colon cancer    • COPD (chronic obstructive pulmonary disease)    • Depression 2017   • Diverticulosis    • Esophageal reflux 2017   • Hiatal hernia 2017   • History of bladder infections    • Hyperlipidemia 2017   • Hypertension 2017   • Osteoporosis    • Palpitations 2017   • Shortness of breath 2017   • Sleep apnea 2017   • Tricuspid valve disorder 2017   • Ulcer        Past Surgical History:   Procedure Laterality Date   • ANAL FISTULOTOMY     • BREAST LUMPECTOMY  2006   •  SECTION  1981   • CHOLECYSTECTOMY  2009   • COLECTOMY PARTIAL / TOTAL  1998   • VENTRAL HERNIA REPAIR  10/28/2012       Family History   Problem Relation Age of Onset   • Hypertension Mother    • Asthma Mother    • COPD Mother    • Osteoarthritis Mother    • Cancer Father    • Cancer Sister    • Diabetes Maternal Grandmother        Social History   Substance Use Topics   • Smoking status: Never Smoker   • Smokeless tobacco: Not on file   • Alcohol use No       Allergies:  No Known Allergies    Medications:    Current Outpatient Prescriptions:   •  atenolol (TENORMIN) 25 MG tablet, Take  by mouth 2 (Two) Times a Day., Disp: , Rfl:   •  budesonide-formoterol (SYMBICORT) 80-4.5 MCG/ACT inhaler, 2 (Two) Times a Day., Disp: , Rfl:   •  Cholecalciferol  (VITAMIN D) 2000 units capsule, Take  by mouth., Disp: , Rfl:   •  citalopram (CeleXA) 20 MG tablet, Take  by mouth Daily., Disp: , Rfl:   •  ferrous gluconate (FERGON) 324 MG tablet, Take 2 tablets by mouth 2 (Two) Times a Day., Disp: 120 tablet, Rfl: 11  •  losartan-hydrochlorothiazide (HYZAAR) 100-25 MG per tablet, Take  by mouth Daily., Disp: , Rfl:   •  metoprolol-hydrochlorothiazide (LOPRESSOR HCT) 100-25 MG per tablet, Take 1 tablet by mouth Daily., Disp: , Rfl:   •  montelukast (SINGULAIR) 10 MG tablet, Take  by mouth Daily., Disp: , Rfl:   •  pantoprazole (PROTONIX) 40 MG EC tablet, Take 40 mg by mouth Daily., Disp: , Rfl:   •  Triamcinolone Acetonide (NASACORT) 55 MCG/ACT nasal inhaler, 2 sprays into each nostril Daily., Disp: , Rfl:   •  warfarin (COUMADIN) 4 MG tablet, Take  by mouth Daily., Disp: , Rfl:     Objective     Vital Signs:   Temp:  [98.2 °F (36.8 °C)] 98.2 °F (36.8 °C)  Heart Rate:  [82] 82  BP: (132)/(76) 132/76    Physical Exam:   General Appearance:    Alert, cooperative, in no acute distress   Head:    Normocephalic, without obvious abnormality, atraumatic   Eyes:            Lids and lashes normal, conjunctivae and sclerae normal, no   icterus, no pallor, corneas clear, PERRL   Ears:    Ears appear intact with no abnormalities noted   Throat:   No oral lesions, no thrush, oral mucosa moist   Neck:   No adenopathy, supple, trachea midline, no thyromegaly,  no JVD   Lungs:     Clear to auscultation,respirations regular, even and                  unlabored    Heart:    Regular rhythm and normal rate, normal S1 and S2, no            murmur   Abdomen:     no masses, no organomegaly, soft non-tender, non-distended, no guarding, there is no evidence of tenderness   Extremities:   Moves all extremities well, no edema, no cyanosis, no             redness   Pulses:   Pulses palpable and equal bilaterally   Skin:   No bleeding, bruising or rash   Lymph nodes:   No palpable adenopathy   Neurologic:    Cranial nerves 2 - 12 grossly intact, sensation intact      Results Review:   I reviewed the patient's new clinical results.    Review of Systems was reviewed and confirmed as accurate today.    Assessment/Plan :    1. Gastroesophageal reflux disease with esophagitis    2. Hiatal hernia with gastroesophageal reflux    3. Iron deficiency anemia secondary to inadequate dietary iron intake        I recommend an EGD for further evaluation. The procedure was explained as well as the risks which include but are not limited to bleeding, infection, perforation, abdominal pain etc. The patient understands these risks and the procedure and wishes to proceed.  Patient may require a laparoscopic Nissen fundoplication.  Patient also restarted on Prilosec daily.    Electronically signed by Vasquez Fagan MD  04/27/18 12:54 PM  Scribed for Vasquez Fagan MD by Elyssa Loyola. 4/27/2018  1:32 PM

## 2018-05-04 ENCOUNTER — ANESTHESIA (OUTPATIENT)
Dept: GASTROENTEROLOGY | Facility: HOSPITAL | Age: 78
End: 2018-05-04

## 2018-05-04 ENCOUNTER — ANESTHESIA EVENT (OUTPATIENT)
Dept: GASTROENTEROLOGY | Facility: HOSPITAL | Age: 78
End: 2018-05-04

## 2018-05-04 ENCOUNTER — HOSPITAL ENCOUNTER (OUTPATIENT)
Facility: HOSPITAL | Age: 78
Setting detail: HOSPITAL OUTPATIENT SURGERY
Discharge: HOME | End: 2018-05-04
Attending: SURGERY | Admitting: SURGERY

## 2018-05-04 VITALS
OXYGEN SATURATION: 95 % | TEMPERATURE: 97.6 F | DIASTOLIC BLOOD PRESSURE: 61 MMHG | HEART RATE: 88 BPM | RESPIRATION RATE: 18 BRPM | SYSTOLIC BLOOD PRESSURE: 126 MMHG

## 2018-05-04 DIAGNOSIS — K21.00 GASTROESOPHAGEAL REFLUX DISEASE WITH ESOPHAGITIS: ICD-10-CM

## 2018-05-04 DIAGNOSIS — D50.8 IRON DEFICIENCY ANEMIA SECONDARY TO INADEQUATE DIETARY IRON INTAKE: ICD-10-CM

## 2018-05-04 DIAGNOSIS — K44.9 HIATAL HERNIA WITH GASTROESOPHAGEAL REFLUX: ICD-10-CM

## 2018-05-04 DIAGNOSIS — K21.9 HIATAL HERNIA WITH GASTROESOPHAGEAL REFLUX: ICD-10-CM

## 2018-05-04 PROCEDURE — 25010000002 MIDAZOLAM PER 1 MG: Performed by: NURSE ANESTHETIST, CERTIFIED REGISTERED

## 2018-05-04 PROCEDURE — 25010000002 PROPOFOL 200 MG/20ML EMULSION: Performed by: NURSE ANESTHETIST, CERTIFIED REGISTERED

## 2018-05-04 RX ORDER — MIDAZOLAM HYDROCHLORIDE 1 MG/ML
1 INJECTION INTRAMUSCULAR; INTRAVENOUS
Status: DISCONTINUED | OUTPATIENT
Start: 2018-05-04 | End: 2018-05-04 | Stop reason: HOSPADM

## 2018-05-04 RX ORDER — ONDANSETRON 2 MG/ML
4 INJECTION INTRAMUSCULAR; INTRAVENOUS ONCE AS NEEDED
Status: DISCONTINUED | OUTPATIENT
Start: 2018-05-04 | End: 2018-05-04 | Stop reason: HOSPADM

## 2018-05-04 RX ORDER — MAGNESIUM HYDROXIDE 1200 MG/15ML
LIQUID ORAL AS NEEDED
Status: DISCONTINUED | OUTPATIENT
Start: 2018-05-04 | End: 2018-05-04 | Stop reason: HOSPADM

## 2018-05-04 RX ORDER — PROPOFOL 10 MG/ML
INJECTION, EMULSION INTRAVENOUS AS NEEDED
Status: DISCONTINUED | OUTPATIENT
Start: 2018-05-04 | End: 2018-05-04 | Stop reason: SURG

## 2018-05-04 RX ORDER — SODIUM CHLORIDE, SODIUM LACTATE, POTASSIUM CHLORIDE, CALCIUM CHLORIDE 600; 310; 30; 20 MG/100ML; MG/100ML; MG/100ML; MG/100ML
1000 INJECTION, SOLUTION INTRAVENOUS CONTINUOUS
Status: DISCONTINUED | OUTPATIENT
Start: 2018-05-04 | End: 2018-05-04 | Stop reason: HOSPADM

## 2018-05-04 RX ORDER — MIDAZOLAM HYDROCHLORIDE 1 MG/ML
2 INJECTION INTRAMUSCULAR; INTRAVENOUS
Status: DISCONTINUED | OUTPATIENT
Start: 2018-05-04 | End: 2018-05-04 | Stop reason: HOSPADM

## 2018-05-04 RX ADMIN — PROPOFOL 50 MG: 10 INJECTION, EMULSION INTRAVENOUS at 10:49

## 2018-05-04 RX ADMIN — PROPOFOL 50 MG: 10 INJECTION, EMULSION INTRAVENOUS at 10:37

## 2018-05-04 RX ADMIN — MIDAZOLAM HYDROCHLORIDE 1 MG: 1 INJECTION, SOLUTION INTRAMUSCULAR; INTRAVENOUS at 10:22

## 2018-05-04 RX ADMIN — PROPOFOL 50 MG: 10 INJECTION, EMULSION INTRAVENOUS at 10:43

## 2018-05-04 RX ADMIN — SODIUM CHLORIDE, POTASSIUM CHLORIDE, SODIUM LACTATE AND CALCIUM CHLORIDE 1000 ML: 600; 310; 30; 20 INJECTION, SOLUTION INTRAVENOUS at 10:19

## 2018-05-04 NOTE — ANESTHESIA POSTPROCEDURE EVALUATION
Patient: Elsa Alcaraz    Procedure Summary     Date:  05/04/18 Room / Location:  [facility] ENDOSCOPY 1 / [facility] ENDOSCOPY    Anesthesia Start:  1037 Anesthesia Stop:  1052    Procedure:  ESOPHAGOGASTRODUODENOSCOPY WITH COLD FORCEP BIOPSY (N/A Esophagus) Diagnosis:       Iron deficiency anemia secondary to inadequate dietary iron intake      Hiatal hernia with gastroesophageal reflux      Gastroesophageal reflux disease with esophagitis      (Iron deficiency anemia secondary to inadequate dietary iron intake [D50.8])      (Hiatal hernia with gastroesophageal reflux [K21.9, K44.9])      (Gastroesophageal reflux disease with esophagitis [K21.0])    Surgeon:  Vasquez Fagan MD Provider:  Rai Patterson CRNA    Anesthesia Type:  MAC ASA Status:  3          Anesthesia Type: MAC  Last vitals  BP   128/64 (05/04/18 1012)   Temp   98.1 °F (36.7 °C) (05/04/18 1012)   Pulse   100 (05/04/18 1012)   Resp   18 (05/04/18 1012)     SpO2   96 % (05/04/18 1012)     Post Anesthesia Care and Evaluation    Patient location during evaluation: bedside  Patient participation: complete - patient participated  Level of consciousness: awake and alert  Pain score: 0  Pain management: adequate  Airway patency: patent  Anesthetic complications: No anesthetic complications  PONV Status: none  Cardiovascular status: acceptable  Respiratory status: acceptable  Hydration status: acceptable

## 2018-05-04 NOTE — DISCHARGE INSTRUCTIONS
No pushing, pulling, tugging,  heavy lifting, or strenuous activity.  No major decision making, driving, or drinking alcoholic beverages for 24 hours. ( due to the medications you have  received)  Always use good hand hygiene/washing techniques.    NO driving while taking pain medications.To assist you in voiding:  Drink plenty of fluids  Listen to running water while attempting to void.    If you are unable to urinate and you have an uncomfortable urge to void or it has been   6 hours since you were discharged, return to the Emergency Room

## 2018-05-04 NOTE — ANESTHESIA POSTPROCEDURE EVALUATION
Patient: Elsa Alcaraz    Procedure Summary     Date:  05/04/18 Room / Location:  [facility] ENDOSCOPY 1 / [facility] ENDOSCOPY    Anesthesia Start:  1037 Anesthesia Stop:  1052    Procedure:  ESOPHAGOGASTRODUODENOSCOPY WITH COLD FORCEP BIOPSY (N/A Esophagus) Diagnosis:       Iron deficiency anemia secondary to inadequate dietary iron intake      Hiatal hernia with gastroesophageal reflux      Gastroesophageal reflux disease with esophagitis      (Iron deficiency anemia secondary to inadequate dietary iron intake [D50.8])      (Hiatal hernia with gastroesophageal reflux [K21.9, K44.9])      (Gastroesophageal reflux disease with esophagitis [K21.0])    Surgeon:  Vasquez Fagan MD Provider:  Rai Patterson CRNA    Anesthesia Type:  MAC ASA Status:  3          Anesthesia Type: MAC  Last vitals  BP   126/61 (05/04/18 1130)   Temp   97.6 °F (36.4 °C) (05/04/18 1056)   Pulse   88 (05/04/18 1130)   Resp   18 (05/04/18 1130)     SpO2   95 % (05/04/18 1130)     Anesthesia Post Evaluation

## 2018-05-04 NOTE — ANESTHESIA PREPROCEDURE EVALUATION
Anesthesia Evaluation     Patient summary reviewed and Nursing notes reviewed   NPO Solid Status: > 8 hours  NPO Liquid Status: > 8 hours           Airway   Mallampati: I  TM distance: >3 FB  Neck ROM: full  No difficulty expected  Dental    (+) poor dentation    Pulmonary - normal exam    breath sounds clear to auscultation  (+) COPD, shortness of breath, sleep apnea,   Cardiovascular - normal exam    Rhythm: regular  Rate: normal    (+) hypertension, dysrhythmias, hyperlipidemia,       Neuro/Psych  (+) psychiatric history Anxiety and Depression,     GI/Hepatic/Renal/Endo    (+) obesity,  hiatal hernia, GERD, PUD,      Musculoskeletal     Abdominal  - normal exam   Substance History      OB/GYN          Other   (+) arthritis   history of cancer                    Anesthesia Plan    ASA 3     MAC   (Versed 1mg)  intravenous induction   Anesthetic plan and risks discussed with patient.

## 2018-05-07 ENCOUNTER — OFFICE VISIT (OUTPATIENT)
Dept: CARDIOLOGY | Facility: CLINIC | Age: 78
End: 2018-05-07

## 2018-05-07 VITALS
SYSTOLIC BLOOD PRESSURE: 126 MMHG | HEIGHT: 63 IN | BODY MASS INDEX: 31.57 KG/M2 | WEIGHT: 178.2 LBS | DIASTOLIC BLOOD PRESSURE: 84 MMHG | HEART RATE: 86 BPM

## 2018-05-07 DIAGNOSIS — E78.2 MIXED HYPERLIPIDEMIA: ICD-10-CM

## 2018-05-07 DIAGNOSIS — I48.20 CHRONIC ATRIAL FIBRILLATION (HCC): Primary | ICD-10-CM

## 2018-05-07 DIAGNOSIS — I10 ESSENTIAL HYPERTENSION: ICD-10-CM

## 2018-05-07 PROCEDURE — 99204 OFFICE O/P NEW MOD 45 MIN: CPT | Performed by: INTERNAL MEDICINE

## 2018-05-07 PROCEDURE — 93000 ELECTROCARDIOGRAM COMPLETE: CPT | Performed by: INTERNAL MEDICINE

## 2018-05-08 LAB
LAB AP CASE REPORT: NORMAL
Lab: NORMAL
PATH REPORT.FINAL DX SPEC: NORMAL

## 2018-05-11 ENCOUNTER — OFFICE VISIT (OUTPATIENT)
Dept: SURGERY | Facility: CLINIC | Age: 78
End: 2018-05-11

## 2018-05-11 DIAGNOSIS — K44.9 HIATAL HERNIA WITH GASTROESOPHAGEAL REFLUX: ICD-10-CM

## 2018-05-11 DIAGNOSIS — K21.9 HIATAL HERNIA WITH GASTROESOPHAGEAL REFLUX: ICD-10-CM

## 2018-05-11 DIAGNOSIS — K21.00 GASTROESOPHAGEAL REFLUX DISEASE WITH ESOPHAGITIS: Primary | ICD-10-CM

## 2018-05-11 PROCEDURE — 99213 OFFICE O/P EST LOW 20 MIN: CPT | Performed by: SURGERY

## 2018-05-11 NOTE — PROGRESS NOTES
Patient: Elsa Alcaraz    YOB: 1940    Date: 05/11/2018    Primary Care Provider: Kemi Hdz MD    Reason for Consultation: Follow-up EGD    Chief Complaint   Patient presents with   • Follow-up     egd with biopsy.       History of present illness:  I saw the patient in the office today as a followup from their recent EGD with biopsy which was done on 05/04/2018, the pathology report did show reactive gastropathy with mild chronic gastritis and reflux esophagitis..  They state that they have done well.Patient has a very large hilar hernia and significant esophagitis.  She has started taking her PPI medication daily in the last week.    The following portions of the patient's history were reviewed and updated as appropriate: allergies, current medications, past family history, past medical history, past social history, past surgical history and problem list.      Review of Systems   Constitutional: Negative for chills, fever and unexpected weight change.   HENT: Negative for hearing loss, trouble swallowing and voice change.    Eyes: Negative for visual disturbance.   Respiratory: Negative for apnea, cough, chest tightness, shortness of breath and wheezing.    Cardiovascular: Negative for chest pain, palpitations and leg swelling.   Gastrointestinal: Negative for abdominal distention, abdominal pain, anal bleeding, blood in stool, constipation, diarrhea, nausea, rectal pain and vomiting.   Endocrine: Negative for cold intolerance and heat intolerance.   Genitourinary: Negative for difficulty urinating, dysuria and flank pain.   Musculoskeletal: Negative for back pain and gait problem.   Skin: Negative for color change, rash and wound.   Neurological: Negative for dizziness, syncope, speech difficulty, weakness, light-headedness, numbness and headaches.   Hematological: Negative for adenopathy. Does not bruise/bleed easily.   Psychiatric/Behavioral: Negative for confusion. The patient is  not nervous/anxious.        Vital Signs:        Allergies:  No Known Allergies    Medications:    Current Outpatient Prescriptions:   •  atenolol (TENORMIN) 25 MG tablet, Take 25 mg by mouth Daily., Disp: , Rfl:   •  budesonide-formoterol (SYMBICORT) 80-4.5 MCG/ACT inhaler, 2 (Two) Times a Day., Disp: , Rfl:   •  calcium citrate-vitamin d (CALCITRATE) 315-250 MG-UNIT tablet tablet, Take 1 tablet by mouth Daily., Disp: , Rfl:   •  Cholecalciferol (VITAMIN D) 2000 units capsule, Take  by mouth., Disp: , Rfl:   •  citalopram (CeleXA) 20 MG tablet, Take 20 mg by mouth Daily., Disp: , Rfl:   •  ferrous gluconate (FERGON) 324 MG tablet, Take 2 tablets by mouth 2 (Two) Times a Day., Disp: 120 tablet, Rfl: 11  •  losartan-hydrochlorothiazide (HYZAAR) 100-25 MG per tablet, Take  by mouth Daily., Disp: , Rfl:   •  metoprolol-hydrochlorothiazide (LOPRESSOR HCT) 100-25 MG per tablet, Take 1 tablet by mouth Daily., Disp: , Rfl:   •  omeprazole (PRILOSEC) 40 MG capsule, Take 1 capsule by mouth Daily., Disp: 30 capsule, Rfl: 11  •  Triamcinolone Acetonide (NASACORT) 55 MCG/ACT nasal inhaler, 2 sprays into each nostril Daily., Disp: , Rfl:   •  warfarin (COUMADIN) 5 MG tablet, Take 5 mg by mouth Daily., Disp: , Rfl:     Physical Exam:   General Appearance:    Alert, cooperative, in no acute distress   Abdomen:     no masses, no organomegaly, soft non-tender, non-distended, no guarding, wounds are well healed, no evidence of recurrent hernia   Chest:      Clear toausculation            Cor:  Regular rate and rhythm      Results Review:   I reviewed the patient's new clinical results.    Assessment / Plan:    1. Gastroesophageal reflux disease with esophagitis    2. Hiatal hernia with gastroesophageal reflux        I did discuss the situation with the patient today in the office and they have done well from their recent EGD with biopsy. I have told the patient She will need to continue daily PPI medication and repeat EGD in 1 year.   If no improvement, may consider a laparoscopic Nissen fundoplication.  Also instructed patient to avoid caffeine and late night meals.  She is agreeable and has no questions..    Electronically signed by Vasquez Fagan MD  05/11/18        Scribed for Vasquez Fagan MD by Alyssa Isaac. 5/11/2018  2:15 PM

## 2018-11-06 ENCOUNTER — HOSPITAL ENCOUNTER (EMERGENCY)
Facility: HOSPITAL | Age: 78
Discharge: HOME | End: 2018-11-06
Attending: EMERGENCY MEDICINE | Admitting: EMERGENCY MEDICINE

## 2018-11-06 ENCOUNTER — APPOINTMENT (OUTPATIENT)
Dept: GENERAL RADIOLOGY | Facility: HOSPITAL | Age: 78
End: 2018-11-06

## 2018-11-06 VITALS
TEMPERATURE: 98 F | DIASTOLIC BLOOD PRESSURE: 69 MMHG | HEART RATE: 78 BPM | SYSTOLIC BLOOD PRESSURE: 123 MMHG | WEIGHT: 179 LBS | OXYGEN SATURATION: 94 % | RESPIRATION RATE: 18 BRPM | BODY MASS INDEX: 31.71 KG/M2 | HEIGHT: 63 IN

## 2018-11-06 DIAGNOSIS — R42 EPISODIC LIGHTHEADEDNESS: Primary | ICD-10-CM

## 2018-11-06 LAB
ALBUMIN SERPL-MCNC: 3.9 G/DL (ref 3.5–5)
ALBUMIN/GLOB SERPL: 1.3 G/DL (ref 1–2)
ALP SERPL-CCNC: 98 U/L (ref 38–126)
ALT SERPL W P-5'-P-CCNC: 34 U/L (ref 13–69)
ANION GAP SERPL CALCULATED.3IONS-SCNC: 11.7 MMOL/L (ref 10–20)
ANISOCYTOSIS BLD QL: ABNORMAL
AST SERPL-CCNC: 23 U/L (ref 15–46)
BILIRUB SERPL-MCNC: 0.9 MG/DL (ref 0.2–1.3)
BUN BLD-MCNC: 16 MG/DL (ref 7–20)
BUN/CREAT SERPL: 22.9 (ref 7.1–23.5)
CALCIUM SPEC-SCNC: 8.9 MG/DL (ref 8.4–10.2)
CHLORIDE SERPL-SCNC: 99 MMOL/L (ref 98–107)
CO2 SERPL-SCNC: 29 MMOL/L (ref 26–30)
CREAT BLD-MCNC: 0.7 MG/DL (ref 0.6–1.3)
DEPRECATED RDW RBC AUTO: 43.8 FL (ref 37–54)
EOSINOPHIL # BLD MANUAL: 0.09 10*3/MM3 (ref 0–0.7)
EOSINOPHIL NFR BLD MANUAL: 1 % (ref 0–7)
ERYTHROCYTE [DISTWIDTH] IN BLOOD BY AUTOMATED COUNT: 15.9 % (ref 11.5–14.5)
GFR SERPL CREATININE-BSD FRML MDRD: 81 ML/MIN/1.73
GLOBULIN UR ELPH-MCNC: 3 GM/DL
GLUCOSE BLD-MCNC: 103 MG/DL (ref 74–98)
HCT VFR BLD AUTO: 34.6 % (ref 37–47)
HGB BLD-MCNC: 10.2 G/DL (ref 12–16)
HYPOCHROMIA BLD QL: ABNORMAL
INR PPP: 1.93 (ref 0.9–1.1)
LARGE PLATELETS: ABNORMAL
LYMPHOCYTES # BLD MANUAL: 1.3 10*3/MM3 (ref 0.6–3.4)
LYMPHOCYTES NFR BLD MANUAL: 14 % (ref 10–50)
LYMPHOCYTES NFR BLD MANUAL: 9 % (ref 0–12)
MCH RBC QN AUTO: 22.5 PG (ref 27–31)
MCHC RBC AUTO-ENTMCNC: 29.5 G/DL (ref 30–37)
MCV RBC AUTO: 76.2 FL (ref 81–99)
MICROCYTES BLD QL: ABNORMAL
MONOCYTES # BLD AUTO: 0.84 10*3/MM3 (ref 0–0.9)
NEUTROPHILS # BLD AUTO: 7.05 10*3/MM3 (ref 2–6.9)
NEUTROPHILS NFR BLD MANUAL: 74 % (ref 37–80)
NEUTS BAND NFR BLD MANUAL: 2 % (ref 0–6)
PLATELET # BLD AUTO: 359 10*3/MM3 (ref 130–400)
PMV BLD AUTO: 8.8 FL (ref 6–12)
POIKILOCYTOSIS BLD QL SMEAR: ABNORMAL
POTASSIUM BLD-SCNC: 3.7 MMOL/L (ref 3.5–5.1)
PROT SERPL-MCNC: 6.9 G/DL (ref 6.3–8.2)
PROTHROMBIN TIME: 21.3 SECONDS (ref 9.3–12.1)
RBC # BLD AUTO: 4.54 10*6/MM3 (ref 4.2–5.4)
SCAN SLIDE: NORMAL
SMALL PLATELETS BLD QL SMEAR: ADEQUATE
SODIUM BLD-SCNC: 136 MMOL/L (ref 137–145)
TROPONIN I SERPL-MCNC: 0.01 NG/ML (ref 0–0.03)
TROPONIN I SERPL-MCNC: <0.012 NG/ML (ref 0–0.03)
WBC MORPH BLD: NORMAL
WBC NRBC COR # BLD: 9.28 10*3/MM3 (ref 4.8–10.8)

## 2018-11-06 PROCEDURE — 85610 PROTHROMBIN TIME: CPT | Performed by: EMERGENCY MEDICINE

## 2018-11-06 PROCEDURE — 25010000002 ONDANSETRON PER 1 MG: Performed by: EMERGENCY MEDICINE

## 2018-11-06 PROCEDURE — 93005 ELECTROCARDIOGRAM TRACING: CPT | Performed by: EMERGENCY MEDICINE

## 2018-11-06 PROCEDURE — 80053 COMPREHEN METABOLIC PANEL: CPT | Performed by: EMERGENCY MEDICINE

## 2018-11-06 PROCEDURE — 71045 X-RAY EXAM CHEST 1 VIEW: CPT

## 2018-11-06 PROCEDURE — 99284 EMERGENCY DEPT VISIT MOD MDM: CPT

## 2018-11-06 PROCEDURE — 96374 THER/PROPH/DIAG INJ IV PUSH: CPT

## 2018-11-06 PROCEDURE — 84484 ASSAY OF TROPONIN QUANT: CPT | Performed by: EMERGENCY MEDICINE

## 2018-11-06 PROCEDURE — 85007 BL SMEAR W/DIFF WBC COUNT: CPT | Performed by: EMERGENCY MEDICINE

## 2018-11-06 PROCEDURE — 85025 COMPLETE CBC W/AUTO DIFF WBC: CPT | Performed by: EMERGENCY MEDICINE

## 2018-11-06 RX ORDER — ONDANSETRON 2 MG/ML
4 INJECTION INTRAMUSCULAR; INTRAVENOUS ONCE
Status: COMPLETED | OUTPATIENT
Start: 2018-11-06 | End: 2018-11-06

## 2018-11-06 RX ADMIN — ONDANSETRON 4 MG: 2 INJECTION INTRAMUSCULAR; INTRAVENOUS at 14:45

## 2018-11-06 NOTE — ED PROVIDER NOTES
"Subjective   History of Present Illness   78-year-old female with a history of A. fib on warfarin presenting after episode of nausea, lightheadedness, diaphoresis.  States it happened around 12:30 PM just before starting a meal.  It lasted about 45 minutes and has since improved.  Denies any current symptoms.  Denies any chest pain or other specific complaints.    Review of Systems   Constitutional: Positive for diaphoresis.   Gastrointestinal: Positive for nausea.   Neurological: Positive for light-headedness.   All other systems reviewed and are negative.      Past Medical History:   Diagnosis Date   • Anxiety disorder due to general medical condition 2017   • Arthritis    • Atrial fibrillation (CMS/HCC) 2017   • Body piercing     BOTH EARS   • Breast cancer (CMS/HCC) 2003    right   • Cataract 2017    BILATERAL   • Chronic bronchitis (CMS/HCC) 2017   • Colon cancer (CMS/HCC)    • COPD (chronic obstructive pulmonary disease) (CMS/[facility])    • Depression 2017   • Diverticulosis    • Esophageal reflux 2017   • Hiatal hernia 2017   • Hiatal hernia    • History of bladder infections    • Hx of exercise stress test     \"SEVERAL YEARS AGO\".     • Hyperlipidemia 2017    DENIES HAVING ANY NOW.  STATES HX.   • Hypertension 2017   • Osteoporosis    • Palpitations 2017   • Shortness of breath     WITH EXERTION    • Sleep apnea 2017    NO CPAP   • Tricuspid valve disorder 2017   • Ulcer    • Wears glasses        No Known Allergies    Past Surgical History:   Procedure Laterality Date   • ANAL FISTULOTOMY     • BREAST LUMPECTOMY Right 2006   •  SECTION  1981   • CHOLECYSTECTOMY  2009   • COLECTOMY PARTIAL / TOTAL  1998    COLON CANCER   • COLONOSCOPY  2016   • ENDOSCOPY  2016   • ENDOSCOPY N/A 2018    Procedure: ESOPHAGOGASTRODUODENOSCOPY WITH COLD FORCEP BIOPSY;  Surgeon: Vasquez Fagan MD;  Location: [facility] ENDOSCOPY;  Service: " Gastroenterology   • VENTRAL HERNIA REPAIR  10/28/2012       Family History   Problem Relation Age of Onset   • Hypertension Mother    • Asthma Mother    • COPD Mother    • Osteoarthritis Mother    • Cancer Father    • Cancer Sister    • Diabetes Maternal Grandmother        Social History     Social History   • Marital status:      Social History Main Topics   • Smoking status: Never Smoker   • Smokeless tobacco: Never Used   • Alcohol use 0.6 - 1.2 oz/week     1 - 2 Glasses of wine per week      Comment: occas   • Drug use: No   • Sexual activity: Defer     Other Topics Concern   • Not on file           Objective   Physical Exam   Constitutional: She is oriented to person, place, and time. She appears well-developed and well-nourished. No distress.   HENT:   Head: Normocephalic.   Mouth/Throat: Oropharynx is clear and moist. No oropharyngeal exudate.   Eyes: No scleral icterus.   Neck: Neck supple. No tracheal deviation present.   Cardiovascular: Normal heart sounds and intact distal pulses.  Exam reveals no gallop and no friction rub.    No murmur heard.  Irregularly irregular   Pulmonary/Chest: Effort normal and breath sounds normal. No stridor. No respiratory distress. She has no wheezes. She has no rales.   Abdominal: Soft. She exhibits no distension and no mass. There is no tenderness. There is no rebound and no guarding.   Musculoskeletal: She exhibits no edema or deformity.   Neurological: She is alert and oriented to person, place, and time.   Skin: Skin is warm and dry. She is not diaphoretic. No erythema. No pallor.   Psychiatric: She has a normal mood and affect. Her behavior is normal.   Nursing note and vitals reviewed.      Procedures           ED Course  ED Course as of Nov 06 1656   Tue Nov 06, 2018   1439 EKG interpreted by me: Atrial fibrillation, RBBB, left axis, no acute ST/T changes, this is an abnormal EKG, this appears similar to previous  [MP]      ED Course User Index  [MP] Perry  Luke Russo MD                  MDM  78F here after episode of nausea, lightheadedness and diaphoresis now resolved. AFVSS. Concern for atypical ACS, near-syncope, other. Plan for labs, further monitoring and reassessment.     4:56 PM Labs, ekg, cxr reassuring. Will discuss return to care precautions. Of note, O2 sat briefly low, but pt asymptomatic and this resolved.     Final diagnoses:   Episodic lightheadedness            Julien Jones MD  11/06/18 7009

## 2019-06-13 ENCOUNTER — TELEPHONE (OUTPATIENT)
Dept: SURGERY | Facility: CLINIC | Age: 79
End: 2019-06-13

## 2019-07-29 ENCOUNTER — OFFICE VISIT (OUTPATIENT)
Dept: CARDIOLOGY | Facility: CLINIC | Age: 79
End: 2019-07-29

## 2019-07-29 VITALS
SYSTOLIC BLOOD PRESSURE: 128 MMHG | HEIGHT: 63 IN | HEART RATE: 85 BPM | WEIGHT: 183.4 LBS | DIASTOLIC BLOOD PRESSURE: 78 MMHG | OXYGEN SATURATION: 96 % | BODY MASS INDEX: 32.5 KG/M2

## 2019-07-29 DIAGNOSIS — E78.2 MIXED HYPERLIPIDEMIA: ICD-10-CM

## 2019-07-29 DIAGNOSIS — I10 ESSENTIAL HYPERTENSION: ICD-10-CM

## 2019-07-29 DIAGNOSIS — I48.20 CHRONIC ATRIAL FIBRILLATION (HCC): Primary | ICD-10-CM

## 2019-07-29 PROCEDURE — 99213 OFFICE O/P EST LOW 20 MIN: CPT | Performed by: INTERNAL MEDICINE

## 2019-07-31 ENCOUNTER — OFFICE VISIT (OUTPATIENT)
Dept: SURGERY | Facility: CLINIC | Age: 79
End: 2019-07-31

## 2019-07-31 VITALS
WEIGHT: 177.2 LBS | BODY MASS INDEX: 31.4 KG/M2 | HEART RATE: 110 BPM | SYSTOLIC BLOOD PRESSURE: 138 MMHG | DIASTOLIC BLOOD PRESSURE: 90 MMHG | TEMPERATURE: 98.7 F | HEIGHT: 63 IN | OXYGEN SATURATION: 96 %

## 2019-07-31 DIAGNOSIS — K21.00 GASTROESOPHAGEAL REFLUX DISEASE WITH ESOPHAGITIS: Primary | ICD-10-CM

## 2019-07-31 DIAGNOSIS — R10.13 EPIGASTRIC PAIN: ICD-10-CM

## 2019-07-31 PROCEDURE — 99213 OFFICE O/P EST LOW 20 MIN: CPT | Performed by: SURGERY

## 2019-07-31 NOTE — PROGRESS NOTES
Patient: Elsa Alcaraz    YOB: 1940    Date: 07/31/2019    Primary Care Provider: Kemi Hdz MD    Reason for Consultation:Epigastric pain, GERD    Chief Complaint   Patient presents with   • Abdominal Pain       SUBJECTIVE:    History of present illness:  I saw the patient in the office  today as a consultation for evaluation and treatment of acid reflux.  She had an EGD last year that showed reactive gastritis.  She complains of epigastric pain that radiates into her chest.  She has nausea but denies vomiting.  She is unable to eat spicy foods because it makes the symptoms worse.  She has intermittent diarrhea but denies constipation or rectal bleeding.  Patient has been on iron therapy recently has developed increased pain and discomfort.  No relief despite being on PPI medication which she sometimes forgets to take.    The following portions of the patient's history were reviewed and updated as appropriate: allergies, current medications, past family history, past medical history, past social history, past surgical history and problem list.      Review of Systems   Constitutional: Negative for chills, fever and unexpected weight change.   HENT: Negative for hearing loss, trouble swallowing and voice change.    Eyes: Negative for visual disturbance.   Respiratory: Negative for apnea, cough, chest tightness, shortness of breath and wheezing.    Cardiovascular: Negative for chest pain, palpitations and leg swelling.   Gastrointestinal: Positive for abdominal pain, diarrhea and nausea. Negative for abdominal distention, anal bleeding, blood in stool, constipation, rectal pain and vomiting.   Endocrine: Negative for cold intolerance and heat intolerance.   Genitourinary: Negative for difficulty urinating, dysuria and flank pain.   Musculoskeletal: Negative for back pain and gait problem.   Skin: Negative for color change, rash and wound.   Neurological: Negative for dizziness, syncope,  "speech difficulty, weakness, light-headedness, numbness and headaches.   Hematological: Negative for adenopathy. Does not bruise/bleed easily.   Psychiatric/Behavioral: Negative for confusion. The patient is not nervous/anxious.        History:  Past Medical History:   Diagnosis Date   • Anxiety disorder due to general medical condition 2017   • Arthritis    • Atrial fibrillation (CMS/HCC) 2017   • Body piercing     BOTH EARS   • Breast cancer (CMS/HCC) 2003    right   • Cataract 2017    BILATERAL   • Chronic bronchitis (CMS/HCC) 2017   • Colon cancer (CMS/HCC)    • COPD (chronic obstructive pulmonary disease) (CMS/[facility])    • Depression 2017   • Diverticulosis    • Esophageal reflux 2017   • Hiatal hernia 2017   • Hiatal hernia    • History of bladder infections    • Hx of exercise stress test     \"SEVERAL YEARS AGO\".     • Hyperlipidemia 2017    DENIES HAVING ANY NOW.  STATES HX.   • Hypertension 2017   • Osteoporosis    • Palpitations 2017   • Shortness of breath     WITH EXERTION    • Sleep apnea 2017    NO CPAP   • Tricuspid valve disorder 2017   • Ulcer    • Wears glasses        Past Surgical History:   Procedure Laterality Date   • ANAL FISTULOTOMY     • BREAST LUMPECTOMY Right 2006   •  SECTION  1981   • CHOLECYSTECTOMY  2009   • COLECTOMY PARTIAL / TOTAL  1998    COLON CANCER   • COLONOSCOPY  2016   • ENDOSCOPY  2016   • ENDOSCOPY N/A 2018    Procedure: ESOPHAGOGASTRODUODENOSCOPY WITH COLD FORCEP BIOPSY;  Surgeon: Vasquez Fagan MD;  Location: [facility] ENDOSCOPY;  Service: Gastroenterology   • VENTRAL HERNIA REPAIR  10/28/2012       Family History   Problem Relation Age of Onset   • Hypertension Mother    • Asthma Mother    • COPD Mother    • Osteoarthritis Mother    • Cancer Father    • Cancer Sister    • Diabetes Maternal Grandmother        Social History     Tobacco Use   • Smoking status: Never Smoker   • " "Smokeless tobacco: Never Used   Substance Use Topics   • Alcohol use: Yes     Alcohol/week: 0.6 - 1.2 oz     Types: 1 - 2 Glasses of wine per week     Comment: occas   • Drug use: No       Allergies:  No Known Allergies    Medications:    Current Outpatient Medications:   •  citalopram (CeleXA) 20 MG tablet, Take 20 mg by mouth Daily., Disp: , Rfl:   •  losartan-hydrochlorothiazide (HYZAAR) 100-25 MG per tablet, Take  by mouth Daily., Disp: , Rfl:   •  metoprolol-hydrochlorothiazide (LOPRESSOR HCT) 100-25 MG per tablet, Take 1 tablet by mouth Daily., Disp: , Rfl:   •  warfarin (COUMADIN) 5 MG tablet, Take 5 mg by mouth Daily., Disp: , Rfl:     OBJECTIVE:    Vital Signs:   Vitals:    07/31/19 1032   BP: 138/90   Pulse: 110   Temp: 98.7 °F (37.1 °C)   TempSrc: Temporal   SpO2: 96%   Weight: 80.4 kg (177 lb 3.2 oz)   Height: 160 cm (63\")       Physical Exam:   General Appearance:    Alert, cooperative, in no acute distress   Head:    Normocephalic, without obvious abnormality, atraumatic   Eyes:            Lids and lashes normal, conjunctivae and sclerae normal, no   icterus, no pallor, corneas clear, PERRLA   Ears:    Ears appear intact with no abnormalities noted   Throat:   No oral lesions, no thrush, oral mucosa moist   Neck:   No adenopathy, supple, trachea midline, no thyromegaly, no   carotid bruit, no JVD   Lungs:     Clear to auscultation,respirations regular, even and                  unlabored    Heart:    Regular rhythm and normal rate, normal S1 and S2, no            murmur, no gallop, no rub, no click   Chest Wall:    No abnormalities observed   Abdomen:     Normal bowel sounds, no masses, no organomegaly, soft and tender in epigastric region with mild guarding   Extremities:   Moves all extremities well, no edema, no cyanosis, no             redness   Pulses:   Pulses palpable and equal bilaterally   Skin:   No bleeding, bruising or rash   Lymph nodes:   No palpable adenopathy   Neurologic:   Cranial " nerves 2 - 12 grossly intact, sensation intact     Results Review:   I reviewed the patient's new clinical results.    Review of Systems was reviewed and confirmed as accurate today.    ASSESSMENT/PLAN:    1. Gastroesophageal reflux disease with esophagitis    2. Epigastric pain        I did have a detailed and extensive discussion with the patient in the office and they understand that they need to undergo upper endoscopy. Full risks and benefits of operative versus nonoperative intervention were discussed with the patient and these include bleeding and esophageal injury. The patient understands, agrees, and wishes to proceed with the surgical treatment plan as mentioned above. The patient had no questions for me at the end of the discussion.  Patient also told to avoid nonsteroidals and Motrin.  Also limit caffeine intake.  Continue Prilosec daily and try to not miss doses.     I discussed the patients findings and my recommendations with patient.     Electronically signed by Vasquez Fagan MD  07/31/19 10:32 AM    Portions of this note have been scribed for Vasquez Fagan MD by Michelle Milligan. 7/31/2019  10:57 AM

## 2019-08-01 PROBLEM — R10.13 EPIGASTRIC PAIN: Status: ACTIVE | Noted: 2019-08-01

## 2019-08-07 ENCOUNTER — TRANSCRIBE ORDERS (OUTPATIENT)
Dept: ADMINISTRATIVE | Facility: HOSPITAL | Age: 79
End: 2019-08-07

## 2019-08-07 DIAGNOSIS — Z12.39 ENCOUNTER FOR SCREENING FOR MALIGNANT NEOPLASM OF BREAST: Primary | ICD-10-CM

## 2019-08-09 ENCOUNTER — TELEPHONE (OUTPATIENT)
Dept: SURGERY | Facility: CLINIC | Age: 79
End: 2019-08-09

## 2019-08-09 RX ORDER — OMEPRAZOLE 40 MG/1
40 CAPSULE, DELAYED RELEASE ORAL DAILY
COMMUNITY
End: 2019-12-16 | Stop reason: HOSPADM

## 2019-08-09 RX ORDER — METOPROLOL SUCCINATE 50 MG/1
50 TABLET, EXTENDED RELEASE ORAL DAILY
COMMUNITY

## 2019-08-09 RX ORDER — HYDROCHLOROTHIAZIDE 25 MG/1
25 TABLET ORAL DAILY
COMMUNITY
End: 2019-12-16 | Stop reason: HOSPADM

## 2019-08-09 RX ORDER — LOSARTAN POTASSIUM 100 MG/1
100 TABLET ORAL DAILY
COMMUNITY
End: 2021-06-05 | Stop reason: HOSPADM

## 2019-08-12 ENCOUNTER — ANESTHESIA EVENT (OUTPATIENT)
Dept: GASTROENTEROLOGY | Facility: HOSPITAL | Age: 79
End: 2019-08-12

## 2019-08-12 ENCOUNTER — HOSPITAL ENCOUNTER (OUTPATIENT)
Facility: HOSPITAL | Age: 79
Setting detail: HOSPITAL OUTPATIENT SURGERY
Discharge: HOME | End: 2019-08-12
Attending: SURGERY | Admitting: SURGERY

## 2019-08-12 ENCOUNTER — ANESTHESIA (OUTPATIENT)
Dept: GASTROENTEROLOGY | Facility: HOSPITAL | Age: 79
End: 2019-08-12

## 2019-08-12 VITALS
BODY MASS INDEX: 32.25 KG/M2 | DIASTOLIC BLOOD PRESSURE: 87 MMHG | SYSTOLIC BLOOD PRESSURE: 144 MMHG | TEMPERATURE: 97.2 F | HEART RATE: 84 BPM | HEIGHT: 63 IN | RESPIRATION RATE: 18 BRPM | OXYGEN SATURATION: 96 % | WEIGHT: 182 LBS

## 2019-08-12 DIAGNOSIS — R10.13 EPIGASTRIC PAIN: ICD-10-CM

## 2019-08-12 DIAGNOSIS — K21.00 GASTROESOPHAGEAL REFLUX DISEASE WITH ESOPHAGITIS: ICD-10-CM

## 2019-08-12 PROCEDURE — 25010000002 PROPOFOL 200 MG/20ML EMULSION: Performed by: NURSE ANESTHETIST, CERTIFIED REGISTERED

## 2019-08-12 RX ORDER — SODIUM CHLORIDE 0.9 % (FLUSH) 0.9 %
3 SYRINGE (ML) INJECTION AS NEEDED
Status: DISCONTINUED | OUTPATIENT
Start: 2019-08-12 | End: 2019-08-12 | Stop reason: HOSPADM

## 2019-08-12 RX ORDER — SODIUM CHLORIDE, SODIUM LACTATE, POTASSIUM CHLORIDE, CALCIUM CHLORIDE 600; 310; 30; 20 MG/100ML; MG/100ML; MG/100ML; MG/100ML
1000 INJECTION, SOLUTION INTRAVENOUS CONTINUOUS
Status: DISCONTINUED | OUTPATIENT
Start: 2019-08-12 | End: 2019-08-12 | Stop reason: HOSPADM

## 2019-08-12 RX ORDER — MAGNESIUM HYDROXIDE 1200 MG/15ML
LIQUID ORAL AS NEEDED
Status: DISCONTINUED | OUTPATIENT
Start: 2019-08-12 | End: 2019-08-12 | Stop reason: HOSPADM

## 2019-08-12 RX ORDER — LIDOCAINE HYDROCHLORIDE 20 MG/ML
INJECTION, SOLUTION INTRAVENOUS AS NEEDED
Status: DISCONTINUED | OUTPATIENT
Start: 2019-08-12 | End: 2019-08-12 | Stop reason: SURG

## 2019-08-12 RX ORDER — PROPOFOL 10 MG/ML
INJECTION, EMULSION INTRAVENOUS AS NEEDED
Status: DISCONTINUED | OUTPATIENT
Start: 2019-08-12 | End: 2019-08-12 | Stop reason: SURG

## 2019-08-12 RX ORDER — ONDANSETRON 2 MG/ML
4 INJECTION INTRAMUSCULAR; INTRAVENOUS ONCE AS NEEDED
Status: DISCONTINUED | OUTPATIENT
Start: 2019-08-12 | End: 2019-08-12 | Stop reason: HOSPADM

## 2019-08-12 RX ADMIN — PROPOFOL 100 MG: 10 INJECTION, EMULSION INTRAVENOUS at 07:23

## 2019-08-12 RX ADMIN — SODIUM CHLORIDE, POTASSIUM CHLORIDE, SODIUM LACTATE AND CALCIUM CHLORIDE 1000 ML: 600; 310; 30; 20 INJECTION, SOLUTION INTRAVENOUS at 06:53

## 2019-08-12 RX ADMIN — LIDOCAINE HYDROCHLORIDE 60 MG: 20 INJECTION, SOLUTION INTRAVENOUS at 07:23

## 2019-08-12 RX ADMIN — PROPOFOL 100 MG: 10 INJECTION, EMULSION INTRAVENOUS at 07:21

## 2019-08-12 NOTE — ANESTHESIA PREPROCEDURE EVALUATION
Anesthesia Evaluation     Patient summary reviewed and Nursing notes reviewed   NPO Solid Status: > 8 hours  NPO Liquid Status: > 8 hours           Airway   Mallampati: I  TM distance: >3 FB  Neck ROM: full  No difficulty expected  Dental - normal exam   (+) poor dentation    Pulmonary - normal exam    breath sounds clear to auscultation  (+) pneumonia resolved , COPD, shortness of breath, sleep apnea,   Cardiovascular - normal exam  Exercise tolerance: good (4-7 METS)    ECG reviewed  PT is on anticoagulation therapy  Patient on routine beta blocker and Beta blocker given within 24 hours of surgery  Rhythm: regular  Rate: normal    (+) hypertension well controlled 2 medications or greater, dysrhythmias Atrial Fib, hyperlipidemia,     ROS comment: ECG 2018 A-FIB with LAD  ECHO 2015  Conclusions  1. The estimated ejection fraction is 55-60%.   2. There is mild to moderate mitral regurgitation.   3. There is mild tricuspid regurgitation.  4. The right ventricular systolic pressure is calculated at 35 mmHg.   5. Biatrial enlargement.    Neuro/Psych  (+) psychiatric history Anxiety and Depression,     GI/Hepatic/Renal/Endo    (+) obesity,  hiatal hernia, GERD, PUD,      Musculoskeletal     Abdominal  - normal exam   Substance History - negative use     OB/GYN negative ob/gyn ROS         Other   (+) arthritis   history of cancer                      Anesthesia Plan    ASA 3     MAC   (Patient advised that intravenous sedation would be utilized as primary anesthetic technique. Every effort will be made to make sure patient is comfortable. Patient advised that they may experience recall of events of the procedure. Patient verbalized understanding and agreed to plan. )  intravenous induction   Anesthetic plan, all risks, benefits, and alternatives have been provided, discussed and informed consent has been obtained with: patient.

## 2019-08-12 NOTE — ANESTHESIA POSTPROCEDURE EVALUATION
Patient: Elsa Alcaraz    Procedure Summary     Date:  08/12/19 Room / Location:  [facility] ENDOSCOPY 3 / [facility] ENDOSCOPY    Anesthesia Start:  0718 Anesthesia Stop:  0726    Procedure:  ESOPHAGOGASTRODUODENOSCOPY WITH BIOPSY (N/A Esophagus) Diagnosis:       Gastroesophageal reflux disease with esophagitis      Epigastric pain      (Gastroesophageal reflux disease with esophagitis [K21.0])      (Epigastric pain [R10.13])    Surgeon:  Vasquez Fagan MD Provider:  Deep Dillard CRNA    Anesthesia Type:  MAC ASA Status:  3          Anesthesia Type: MAC  Last vitals  BP   144/87 (08/12/19 0756)   Temp   97.2 °F (36.2 °C) (08/12/19 0730)   Pulse   84 (08/12/19 0756)   Resp   18 (08/12/19 0756)     SpO2   96 % (08/12/19 0756)     Post Anesthesia Care and Evaluation    Patient location during evaluation: bedside  Patient participation: complete - patient participated  Level of consciousness: awake and sleepy but conscious  Pain management: adequate  Airway patency: patent  Anesthetic complications: No anesthetic complications  PONV Status: none  Cardiovascular status: acceptable and hemodynamically stable  Respiratory status: acceptable, room air, nonlabored ventilation and spontaneous ventilation  Hydration status: acceptable

## 2019-08-12 NOTE — ANESTHESIA POSTPROCEDURE EVALUATION
Patient: Elsa Alcaraz    Procedure Summary     Date:  08/12/19 Room / Location:  [facility] ENDOSCOPY 3 / [facility] ENDOSCOPY    Anesthesia Start:  0718 Anesthesia Stop:  0726    Procedure:  ESOPHAGOGASTRODUODENOSCOPY WITH BIOPSY (N/A Esophagus) Diagnosis:       Gastroesophageal reflux disease with esophagitis      Epigastric pain      (Gastroesophageal reflux disease with esophagitis [K21.0])      (Epigastric pain [R10.13])    Surgeon:  Vasquez Fagan MD Provider:  Deep Dillard CRNA    Anesthesia Type:  MAC ASA Status:  3          Anesthesia Type: MAC  Last vitals  BP   144/87 (08/12/19 0756)   Temp   97.2 °F (36.2 °C) (08/12/19 0730)   Pulse   84 (08/12/19 0756)   Resp   18 (08/12/19 0756)     SpO2   96 % (08/12/19 0756)     Post Anesthesia Care and Evaluation    Patient location during evaluation: bedside  Patient participation: complete - patient participated  Level of consciousness: awake  Pain score: 0  Pain management: adequate  Airway patency: patent  Anesthetic complications: No anesthetic complications  PONV Status: controlled  Cardiovascular status: acceptable and stable  Respiratory status: acceptable and room air  Hydration status: acceptable

## 2019-08-15 LAB
LAB AP CASE REPORT: NORMAL
PATH REPORT.FINAL DX SPEC: NORMAL

## 2019-08-26 ENCOUNTER — OFFICE VISIT (OUTPATIENT)
Dept: SURGERY | Facility: CLINIC | Age: 79
End: 2019-08-26

## 2019-08-26 VITALS
TEMPERATURE: 98.5 F | WEIGHT: 182 LBS | SYSTOLIC BLOOD PRESSURE: 136 MMHG | HEIGHT: 63 IN | HEART RATE: 99 BPM | DIASTOLIC BLOOD PRESSURE: 88 MMHG | BODY MASS INDEX: 32.25 KG/M2 | OXYGEN SATURATION: 99 %

## 2019-08-26 DIAGNOSIS — K21.00 GASTROESOPHAGEAL REFLUX DISEASE WITH ESOPHAGITIS: Primary | ICD-10-CM

## 2019-08-26 PROCEDURE — 99212 OFFICE O/P EST SF 10 MIN: CPT | Performed by: SURGERY

## 2019-08-26 NOTE — PROGRESS NOTES
"Patient: Elsa Alcaraz    YOB: 1940    Date: 08/26/2019    Primary Care Provider: Kemi Hdz MD    Reason for Consultation: Follow-up EGD    Chief Complaint   Patient presents with   • Post-op Follow-up     EGD       History of present illness:  I saw the patient in the office today as a followup from their recent EGD with biopsy, the pathology report did show reactive gastropathy.  They state that they have done well and have no complaints at this time.  Patient reflux symptoms are controlled with PPI medication.  No evidence of Moreland's esophagitis today.    The following portions of the patient's history were reviewed and updated as appropriate: allergies, current medications, past family history, past medical history, past social history, past surgical history and problem list.      Review of Systems    Vital Signs:   Vitals:    08/26/19 1232   BP: 136/88   Pulse: 99   Temp: 98.5 °F (36.9 °C)   SpO2: 99%   Weight: 82.6 kg (182 lb)   Height: 160 cm (63\")       Allergies:  No Known Allergies    Medications:    Current Outpatient Medications:   •  citalopram (CeleXA) 20 MG tablet, Take 20 mg by mouth Daily., Disp: , Rfl:   •  hydrochlorothiazide (HYDRODIURIL) 25 MG tablet, Take 25 mg by mouth Daily., Disp: , Rfl:   •  losartan (COZAAR) 100 MG tablet, Take 100 mg by mouth Daily., Disp: , Rfl:   •  losartan-hydrochlorothiazide (HYZAAR) 100-25 MG per tablet, Take  by mouth Daily., Disp: , Rfl:   •  metoprolol succinate XL (TOPROL-XL) 50 MG 24 hr tablet, Take 50 mg by mouth Daily., Disp: , Rfl:   •  metoprolol-hydrochlorothiazide (LOPRESSOR HCT) 100-25 MG per tablet, Take 1 tablet by mouth Daily., Disp: , Rfl:   •  omeprazole (priLOSEC) 40 MG capsule, Take 40 mg by mouth Daily., Disp: , Rfl:   •  VITAMINS B1 B6 B12 IJ, Inject 1 dose as directed Every 30 (Thirty) Days., Disp: , Rfl:   •  warfarin (COUMADIN) 5 MG tablet, Take 5 mg by mouth Daily., Disp: , Rfl:     Physical Exam:   General " Appearance:    Alert, cooperative, in no acute distress   Abdomen:     no masses, no organomegaly, soft and tender in epigastric region without rebound or guarding.   Chest:      Clear toausculation            Cor:  Regular rate and rhythm      Results Review:   I reviewed the patient's new clinical results.    Assessment / Plan:    1. Gastroesophageal reflux disease with esophagitis        I did discuss the situation with the patient today in the office and they have done well from their recent EGD with biopsy. I have told the patient to continue PPI medication daily.  Repeat EGD in 5 years..    Electronically signed by Vasquez Fagan MD  08/26/19

## 2019-10-07 ENCOUNTER — HOSPITAL ENCOUNTER (OUTPATIENT)
Dept: MAMMOGRAPHY | Facility: HOSPITAL | Age: 79
Discharge: HOME | End: 2019-10-07
Admitting: INTERNAL MEDICINE

## 2019-10-07 DIAGNOSIS — Z12.39 ENCOUNTER FOR SCREENING FOR MALIGNANT NEOPLASM OF BREAST: ICD-10-CM

## 2019-10-07 PROCEDURE — 77067 SCR MAMMO BI INCL CAD: CPT

## 2019-10-07 PROCEDURE — 77063 BREAST TOMOSYNTHESIS BI: CPT

## 2019-10-30 ENCOUNTER — TRANSCRIBE ORDERS (OUTPATIENT)
Dept: LAB | Facility: HOSPITAL | Age: 79
End: 2019-10-30

## 2019-10-30 ENCOUNTER — LAB (OUTPATIENT)
Dept: LAB | Facility: HOSPITAL | Age: 79
End: 2019-10-30

## 2019-10-30 DIAGNOSIS — D60.9 ERYTHROID APLASIA (HCC): Primary | ICD-10-CM

## 2019-10-30 DIAGNOSIS — D60.9 ERYTHROID APLASIA (HCC): ICD-10-CM

## 2019-10-30 LAB
ALBUMIN SERPL-MCNC: 3.5 G/DL (ref 3.5–5.2)
ALBUMIN/GLOB SERPL: 1.1 G/DL
ALP SERPL-CCNC: 108 U/L (ref 39–117)
ALT SERPL W P-5'-P-CCNC: 15 U/L (ref 1–33)
ANION GAP SERPL CALCULATED.3IONS-SCNC: 12.3 MMOL/L (ref 5–15)
AST SERPL-CCNC: 20 U/L (ref 1–32)
BASOPHILS # BLD AUTO: 0.06 10*3/MM3 (ref 0–0.2)
BASOPHILS NFR BLD AUTO: 0.6 % (ref 0–1.5)
BILIRUB SERPL-MCNC: 0.5 MG/DL (ref 0.2–1.2)
BUN BLD-MCNC: 13 MG/DL (ref 8–23)
BUN/CREAT SERPL: 19.7 (ref 7–25)
CALCIUM SPEC-SCNC: 9 MG/DL (ref 8.6–10.5)
CHLORIDE SERPL-SCNC: 99 MMOL/L (ref 98–107)
CO2 SERPL-SCNC: 26.7 MMOL/L (ref 22–29)
CREAT BLD-MCNC: 0.66 MG/DL (ref 0.57–1)
DEPRECATED RDW RBC AUTO: 71.3 FL (ref 37–54)
EOSINOPHIL # BLD AUTO: 0.23 10*3/MM3 (ref 0–0.4)
EOSINOPHIL NFR BLD AUTO: 2.1 % (ref 0.3–6.2)
ERYTHROCYTE [DISTWIDTH] IN BLOOD BY AUTOMATED COUNT: 23.1 % (ref 12.3–15.4)
GFR SERPL CREATININE-BSD FRML MDRD: 87 ML/MIN/1.73
GLOBULIN UR ELPH-MCNC: 3.3 GM/DL
GLUCOSE BLD-MCNC: 104 MG/DL (ref 65–99)
HCT VFR BLD AUTO: 43.2 % (ref 34–46.6)
HGB BLD-MCNC: 13.1 G/DL (ref 12–15.9)
IMM GRANULOCYTES # BLD AUTO: 0.03 10*3/MM3 (ref 0–0.05)
IMM GRANULOCYTES NFR BLD AUTO: 0.3 % (ref 0–0.5)
LYMPHOCYTES # BLD AUTO: 1.27 10*3/MM3 (ref 0.7–3.1)
LYMPHOCYTES NFR BLD AUTO: 11.9 % (ref 19.6–45.3)
MCH RBC QN AUTO: 25.9 PG (ref 26.6–33)
MCHC RBC AUTO-ENTMCNC: 30.3 G/DL (ref 31.5–35.7)
MCV RBC AUTO: 85.5 FL (ref 79–97)
MONOCYTES # BLD AUTO: 1.5 10*3/MM3 (ref 0.1–0.9)
MONOCYTES NFR BLD AUTO: 14 % (ref 5–12)
NEUTROPHILS # BLD AUTO: 7.62 10*3/MM3 (ref 1.7–7)
NEUTROPHILS NFR BLD AUTO: 71.1 % (ref 42.7–76)
NRBC BLD AUTO-RTO: 0 /100 WBC (ref 0–0.2)
PLATELET # BLD AUTO: 293 10*3/MM3 (ref 140–450)
PMV BLD AUTO: 9.7 FL (ref 6–12)
POTASSIUM BLD-SCNC: 3.4 MMOL/L (ref 3.5–5.2)
PROT SERPL-MCNC: 6.8 G/DL (ref 6–8.5)
RBC # BLD AUTO: 5.05 10*6/MM3 (ref 3.77–5.28)
SODIUM BLD-SCNC: 138 MMOL/L (ref 136–145)
VIT B12 BLD-MCNC: 559 PG/ML (ref 211–946)
WBC NRBC COR # BLD: 10.71 10*3/MM3 (ref 3.4–10.8)

## 2019-10-30 PROCEDURE — 80053 COMPREHEN METABOLIC PANEL: CPT

## 2019-10-30 PROCEDURE — 85025 COMPLETE CBC W/AUTO DIFF WBC: CPT

## 2019-10-30 PROCEDURE — 36415 COLL VENOUS BLD VENIPUNCTURE: CPT

## 2019-10-30 PROCEDURE — 82607 VITAMIN B-12: CPT

## 2019-11-15 RX ORDER — SODIUM CHLORIDE 0.9 % (FLUSH) 0.9 %
10 SYRINGE (ML) INJECTION AS NEEDED
Status: CANCELLED | OUTPATIENT
Start: 2019-11-18

## 2019-11-18 ENCOUNTER — ANESTHESIA EVENT (OUTPATIENT)
Dept: PERIOP | Facility: HOSPITAL | Age: 79
End: 2019-11-18

## 2019-11-18 ENCOUNTER — HOSPITAL ENCOUNTER (OUTPATIENT)
Facility: HOSPITAL | Age: 79
Setting detail: HOSPITAL OUTPATIENT SURGERY
Discharge: HOME | End: 2019-11-18
Attending: OPHTHALMOLOGY | Admitting: OPHTHALMOLOGY

## 2019-11-18 ENCOUNTER — ANESTHESIA (OUTPATIENT)
Dept: PERIOP | Facility: HOSPITAL | Age: 79
End: 2019-11-18

## 2019-11-18 VITALS
HEIGHT: 63 IN | OXYGEN SATURATION: 95 % | RESPIRATION RATE: 16 BRPM | HEART RATE: 83 BPM | BODY MASS INDEX: 32.25 KG/M2 | SYSTOLIC BLOOD PRESSURE: 145 MMHG | DIASTOLIC BLOOD PRESSURE: 91 MMHG | WEIGHT: 182 LBS | TEMPERATURE: 97.4 F

## 2019-11-18 PROBLEM — H25.12 AGE-RELATED NUCLEAR CATARACT OF LEFT EYE: Status: ACTIVE | Noted: 2019-11-18

## 2019-11-18 PROCEDURE — 25010000002 PROPOFOL 10 MG/ML EMULSION: Performed by: NURSE ANESTHETIST, CERTIFIED REGISTERED

## 2019-11-18 PROCEDURE — V2632 POST CHMBR INTRAOCULAR LENS: HCPCS | Performed by: OPHTHALMOLOGY

## 2019-11-18 DEVICE — LENS ACRYSOF IQ SA60WF W/ULTRASERT 6X13MM ACU0T0 19: Type: IMPLANTABLE DEVICE | Status: FUNCTIONAL

## 2019-11-18 RX ORDER — CYCLOPENTOLATE HYDROCHLORIDE 20 MG/ML
1 SOLUTION/ DROPS OPHTHALMIC
Status: COMPLETED | OUTPATIENT
Start: 2019-11-18 | End: 2019-11-18

## 2019-11-18 RX ORDER — KETAMINE HYDROCHLORIDE 50 MG/ML
INJECTION, SOLUTION, CONCENTRATE INTRAMUSCULAR; INTRAVENOUS AS NEEDED
Status: DISCONTINUED | OUTPATIENT
Start: 2019-11-18 | End: 2019-11-18 | Stop reason: SURG

## 2019-11-18 RX ORDER — PROPOFOL 10 MG/ML
VIAL (ML) INTRAVENOUS AS NEEDED
Status: DISCONTINUED | OUTPATIENT
Start: 2019-11-18 | End: 2019-11-18 | Stop reason: SURG

## 2019-11-18 RX ORDER — BALANCED SALT SOLUTION 6.4; .75; .48; .3; 3.9; 1.7 MG/ML; MG/ML; MG/ML; MG/ML; MG/ML; MG/ML
SOLUTION OPHTHALMIC AS NEEDED
Status: DISCONTINUED | OUTPATIENT
Start: 2019-11-18 | End: 2019-11-18 | Stop reason: HOSPADM

## 2019-11-18 RX ORDER — SODIUM CHLORIDE 0.9 % (FLUSH) 0.9 %
3 SYRINGE (ML) INJECTION EVERY 12 HOURS SCHEDULED
Status: DISCONTINUED | OUTPATIENT
Start: 2019-11-18 | End: 2019-11-18 | Stop reason: HOSPADM

## 2019-11-18 RX ORDER — TETRACAINE HYDROCHLORIDE 5 MG/ML
SOLUTION OPHTHALMIC AS NEEDED
Status: DISCONTINUED | OUTPATIENT
Start: 2019-11-18 | End: 2019-11-18 | Stop reason: HOSPADM

## 2019-11-18 RX ORDER — SODIUM CHLORIDE, SODIUM LACTATE, POTASSIUM CHLORIDE, CALCIUM CHLORIDE 600; 310; 30; 20 MG/100ML; MG/100ML; MG/100ML; MG/100ML
1000 INJECTION, SOLUTION INTRAVENOUS CONTINUOUS
Status: DISCONTINUED | OUTPATIENT
Start: 2019-11-18 | End: 2019-11-18 | Stop reason: HOSPADM

## 2019-11-18 RX ORDER — PREDNISOLONE ACETATE 10 MG/ML
SUSPENSION/ DROPS OPHTHALMIC AS NEEDED
Status: DISCONTINUED | OUTPATIENT
Start: 2019-11-18 | End: 2019-11-18 | Stop reason: HOSPADM

## 2019-11-18 RX ORDER — ACETAZOLAMIDE 500 MG/1
500 CAPSULE, EXTENDED RELEASE ORAL ONCE
Status: COMPLETED | OUTPATIENT
Start: 2019-11-18 | End: 2019-11-18

## 2019-11-18 RX ORDER — PREDNISOLONE ACETATE 10 MG/ML
1 SUSPENSION/ DROPS OPHTHALMIC SEE ADMIN INSTRUCTIONS
Status: DISCONTINUED | OUTPATIENT
Start: 2019-11-18 | End: 2019-11-18 | Stop reason: HOSPADM

## 2019-11-18 RX ORDER — LIDOCAINE HYDROCHLORIDE 40 MG/ML
INJECTION, SOLUTION RETROBULBAR; TOPICAL AS NEEDED
Status: DISCONTINUED | OUTPATIENT
Start: 2019-11-18 | End: 2019-11-18 | Stop reason: HOSPADM

## 2019-11-18 RX ORDER — TETRACAINE HYDROCHLORIDE 5 MG/ML
1 SOLUTION OPHTHALMIC SEE ADMIN INSTRUCTIONS
Status: COMPLETED | OUTPATIENT
Start: 2019-11-18 | End: 2019-11-18

## 2019-11-18 RX ORDER — PHENYLEPHRINE HYDROCHLORIDE 100 MG/ML
1 SOLUTION/ DROPS OPHTHALMIC
Status: COMPLETED | OUTPATIENT
Start: 2019-11-18 | End: 2019-11-18

## 2019-11-18 RX ORDER — LIDOCAINE HYDROCHLORIDE 20 MG/ML
INJECTION, SOLUTION INTRAVENOUS AS NEEDED
Status: DISCONTINUED | OUTPATIENT
Start: 2019-11-18 | End: 2019-11-18 | Stop reason: SURG

## 2019-11-18 RX ORDER — SODIUM CHLORIDE 0.9 % (FLUSH) 0.9 %
1-10 SYRINGE (ML) INJECTION AS NEEDED
Status: DISCONTINUED | OUTPATIENT
Start: 2019-11-18 | End: 2019-11-18 | Stop reason: HOSPADM

## 2019-11-18 RX ORDER — PREDNISOLONE ACETATE 10 MG/ML
SUSPENSION/ DROPS OPHTHALMIC
Qty: 2 ML | Refills: 0
Start: 2019-11-18 | End: 2019-12-16 | Stop reason: HOSPADM

## 2019-11-18 RX ADMIN — PHENYLEPHRINE HYDROCHLORIDE 1 DROP: 100 SOLUTION/ DROPS OPHTHALMIC at 12:29

## 2019-11-18 RX ADMIN — CYCLOPENTOLATE HYDROCHLORIDE 1 DROP: 20 SOLUTION/ DROPS OPHTHALMIC at 12:24

## 2019-11-18 RX ADMIN — ACETAZOLAMIDE 500 MG: 500 CAPSULE, EXTENDED RELEASE ORAL at 14:26

## 2019-11-18 RX ADMIN — PROPOFOL 50 MG: 10 INJECTION, EMULSION INTRAVENOUS at 13:52

## 2019-11-18 RX ADMIN — TETRACAINE HYDROCHLORIDE 1 DROP: 5 SOLUTION OPHTHALMIC at 12:23

## 2019-11-18 RX ADMIN — CYCLOPENTOLATE HYDROCHLORIDE 1 DROP: 20 SOLUTION/ DROPS OPHTHALMIC at 12:29

## 2019-11-18 RX ADMIN — PHENYLEPHRINE HYDROCHLORIDE 1 DROP: 100 SOLUTION/ DROPS OPHTHALMIC at 12:34

## 2019-11-18 RX ADMIN — PHENYLEPHRINE HYDROCHLORIDE 1 DROP: 100 SOLUTION/ DROPS OPHTHALMIC at 12:24

## 2019-11-18 RX ADMIN — PROPOFOL 100 MCG/KG/MIN: 10 INJECTION, EMULSION INTRAVENOUS at 13:52

## 2019-11-18 RX ADMIN — SODIUM CHLORIDE, POTASSIUM CHLORIDE, SODIUM LACTATE AND CALCIUM CHLORIDE 1000 ML: 600; 310; 30; 20 INJECTION, SOLUTION INTRAVENOUS at 12:30

## 2019-11-18 RX ADMIN — CYCLOPENTOLATE HYDROCHLORIDE 1 DROP: 20 SOLUTION/ DROPS OPHTHALMIC at 12:34

## 2019-11-18 RX ADMIN — KETAMINE HYDROCHLORIDE 20 MG: 50 INJECTION, SOLUTION INTRAMUSCULAR; INTRAVENOUS at 13:52

## 2019-11-18 RX ADMIN — LIDOCAINE HYDROCHLORIDE 60 MG: 20 INJECTION, SOLUTION INTRAVENOUS at 13:53

## 2019-11-18 RX ADMIN — TETRACAINE HYDROCHLORIDE 1 DROP: 5 SOLUTION OPHTHALMIC at 12:22

## 2019-11-18 NOTE — ANESTHESIA POSTPROCEDURE EVALUATION
Patient: Elsa Alcaraz    Procedure Summary     Date:  11/18/19 Room / Location:  [facility] OR 2 /  [facility]    Anesthesia Start:  1351 Anesthesia Stop:  1403    Procedure:  CATARACT PHACO EXTRACTION WITH INTRAOCULAR LENS IMPLANT LEFT (Left Eye) Diagnosis:       Age-related nuclear cataract of left eye      (Age-related nuclear cataract of left eye [H25.12])    Surgeon:  Masoud David MD Provider:  Deep Dillard CRNA    Anesthesia Type:  MAC ASA Status:  3          Anesthesia Type: MAC  Last vitals  BP   145/91 (11/18/19 1430)   Temp   97.4 °F (36.3 °C) (11/18/19 1409)   Pulse   83 (11/18/19 1430)   Resp   16 (11/18/19 1430)     SpO2   95 % (11/18/19 1430)     Post Anesthesia Care and Evaluation    Patient location during evaluation: bedside  Patient participation: complete - patient participated  Level of consciousness: awake  Pain score: 0  Pain management: adequate  Airway patency: patent  Anesthetic complications: No anesthetic complications  PONV Status: controlled  Cardiovascular status: acceptable and stable  Respiratory status: acceptable and room air  Hydration status: acceptable

## 2019-11-18 NOTE — ANESTHESIA PREPROCEDURE EVALUATION
Anesthesia Evaluation     Patient summary reviewed and Nursing notes reviewed   no history of anesthetic complications:  NPO Solid Status: > 8 hours  NPO Liquid Status: > 8 hours           Airway   Mallampati: I  TM distance: >3 FB  Neck ROM: full  no difficulty expected  Dental - normal exam     Pulmonary - normal exam   (+) COPD, sleep apnea,   Cardiovascular - normal exam    (+) hypertension, dysrhythmias, hyperlipidemia,       Neuro/Psych- negative ROS  GI/Hepatic/Renal/Endo    (+) obesity, morbid obesity, hiatal hernia, GERD, PUD,      Musculoskeletal     Abdominal    Substance History - negative use     OB/GYN negative ob/gyn ROS         Other   arthritis,                      Anesthesia Plan    ASA 3     MAC     intravenous induction     Anesthetic plan, all risks, benefits, and alternatives have been provided, discussed and informed consent has been obtained with: patient.

## 2019-12-13 RX ORDER — MELATONIN
1000 DAILY
COMMUNITY

## 2019-12-13 RX ORDER — BUDESONIDE AND FORMOTEROL FUMARATE DIHYDRATE 80; 4.5 UG/1; UG/1
2 AEROSOL RESPIRATORY (INHALATION)
COMMUNITY
End: 2022-08-22

## 2019-12-15 ENCOUNTER — ANESTHESIA EVENT (OUTPATIENT)
Dept: PERIOP | Facility: HOSPITAL | Age: 79
End: 2019-12-15

## 2019-12-16 ENCOUNTER — HOSPITAL ENCOUNTER (OUTPATIENT)
Facility: HOSPITAL | Age: 79
Setting detail: HOSPITAL OUTPATIENT SURGERY
Discharge: HOME | End: 2019-12-16
Attending: OPHTHALMOLOGY | Admitting: OPHTHALMOLOGY

## 2019-12-16 ENCOUNTER — ANESTHESIA (OUTPATIENT)
Dept: PERIOP | Facility: HOSPITAL | Age: 79
End: 2019-12-16

## 2019-12-16 VITALS
HEART RATE: 83 BPM | OXYGEN SATURATION: 96 % | BODY MASS INDEX: 32.94 KG/M2 | TEMPERATURE: 98 F | SYSTOLIC BLOOD PRESSURE: 146 MMHG | RESPIRATION RATE: 18 BRPM | HEIGHT: 62 IN | DIASTOLIC BLOOD PRESSURE: 83 MMHG | WEIGHT: 179 LBS

## 2019-12-16 PROBLEM — H25.11 AGE-RELATED NUCLEAR CATARACT OF RIGHT EYE: Status: ACTIVE | Noted: 2019-12-16

## 2019-12-16 PROCEDURE — 25010000002 MIDAZOLAM PER 1MG: Performed by: NURSE ANESTHETIST, CERTIFIED REGISTERED

## 2019-12-16 PROCEDURE — 94799 UNLISTED PULMONARY SVC/PX: CPT

## 2019-12-16 PROCEDURE — V2632 POST CHMBR INTRAOCULAR LENS: HCPCS | Performed by: OPHTHALMOLOGY

## 2019-12-16 PROCEDURE — 94640 AIRWAY INHALATION TREATMENT: CPT

## 2019-12-16 PROCEDURE — 25010000002 PROPOFOL 10 MG/ML EMULSION: Performed by: NURSE ANESTHETIST, CERTIFIED REGISTERED

## 2019-12-16 DEVICE — LENS ACRYSOF IQ SA60WF W/ULTRASERT 6X13MM ACU0T0 19.5: Type: IMPLANTABLE DEVICE | Site: POSTERIOR CHAMBER | Status: FUNCTIONAL

## 2019-12-16 RX ORDER — ACETAZOLAMIDE 500 MG/1
500 CAPSULE, EXTENDED RELEASE ORAL ONCE
Status: COMPLETED | OUTPATIENT
Start: 2019-12-16 | End: 2019-12-16

## 2019-12-16 RX ORDER — MIDAZOLAM HYDROCHLORIDE 2 MG/2ML
INJECTION, SOLUTION INTRAMUSCULAR; INTRAVENOUS AS NEEDED
Status: DISCONTINUED | OUTPATIENT
Start: 2019-12-16 | End: 2019-12-16 | Stop reason: SURG

## 2019-12-16 RX ORDER — LIDOCAINE HYDROCHLORIDE 40 MG/ML
INJECTION, SOLUTION RETROBULBAR; TOPICAL AS NEEDED
Status: DISCONTINUED | OUTPATIENT
Start: 2019-12-16 | End: 2019-12-16 | Stop reason: HOSPADM

## 2019-12-16 RX ORDER — PROPOFOL 10 MG/ML
VIAL (ML) INTRAVENOUS AS NEEDED
Status: DISCONTINUED | OUTPATIENT
Start: 2019-12-16 | End: 2019-12-16 | Stop reason: SURG

## 2019-12-16 RX ORDER — TETRACAINE HYDROCHLORIDE 5 MG/ML
1 SOLUTION OPHTHALMIC SEE ADMIN INSTRUCTIONS
Status: DISCONTINUED | OUTPATIENT
Start: 2019-12-16 | End: 2019-12-16 | Stop reason: HOSPADM

## 2019-12-16 RX ORDER — KETOROLAC TROMETHAMINE 30 MG/ML
INJECTION, SOLUTION INTRAMUSCULAR; INTRAVENOUS AS NEEDED
Status: DISCONTINUED | OUTPATIENT
Start: 2019-12-16 | End: 2019-12-16

## 2019-12-16 RX ORDER — KETAMINE HCL IN NACL, ISO-OSM 100MG/10ML
SYRINGE (ML) INJECTION AS NEEDED
Status: DISCONTINUED | OUTPATIENT
Start: 2019-12-16 | End: 2019-12-16 | Stop reason: SURG

## 2019-12-16 RX ORDER — PREDNISOLONE ACETATE 10 MG/ML
SUSPENSION/ DROPS OPHTHALMIC AS NEEDED
Status: DISCONTINUED | OUTPATIENT
Start: 2019-12-16 | End: 2019-12-16 | Stop reason: HOSPADM

## 2019-12-16 RX ORDER — PREDNISOLONE ACETATE 10 MG/ML
SUSPENSION/ DROPS OPHTHALMIC
Qty: 2 ML | Refills: 0
Start: 2019-12-16 | End: 2020-02-07

## 2019-12-16 RX ORDER — SODIUM CHLORIDE 0.9 % (FLUSH) 0.9 %
1-10 SYRINGE (ML) INJECTION AS NEEDED
Status: DISCONTINUED | OUTPATIENT
Start: 2019-12-16 | End: 2019-12-16 | Stop reason: HOSPADM

## 2019-12-16 RX ORDER — IPRATROPIUM BROMIDE AND ALBUTEROL SULFATE 2.5; .5 MG/3ML; MG/3ML
3 SOLUTION RESPIRATORY (INHALATION)
Status: DISCONTINUED | OUTPATIENT
Start: 2019-12-16 | End: 2019-12-16 | Stop reason: HOSPADM

## 2019-12-16 RX ORDER — BALANCED SALT SOLUTION 6.4; .75; .48; .3; 3.9; 1.7 MG/ML; MG/ML; MG/ML; MG/ML; MG/ML; MG/ML
SOLUTION OPHTHALMIC AS NEEDED
Status: DISCONTINUED | OUTPATIENT
Start: 2019-12-16 | End: 2019-12-16 | Stop reason: HOSPADM

## 2019-12-16 RX ORDER — TETRACAINE HYDROCHLORIDE 5 MG/ML
SOLUTION OPHTHALMIC AS NEEDED
Status: DISCONTINUED | OUTPATIENT
Start: 2019-12-16 | End: 2019-12-16 | Stop reason: HOSPADM

## 2019-12-16 RX ORDER — PHENYLEPHRINE HYDROCHLORIDE 100 MG/ML
1 SOLUTION/ DROPS OPHTHALMIC
Status: DISPENSED | OUTPATIENT
Start: 2019-12-16 | End: 2019-12-16

## 2019-12-16 RX ORDER — SODIUM CHLORIDE 0.9 % (FLUSH) 0.9 %
3 SYRINGE (ML) INJECTION EVERY 12 HOURS SCHEDULED
Status: DISCONTINUED | OUTPATIENT
Start: 2019-12-16 | End: 2019-12-16 | Stop reason: HOSPADM

## 2019-12-16 RX ORDER — CYCLOPENTOLATE HYDROCHLORIDE 20 MG/ML
1 SOLUTION/ DROPS OPHTHALMIC
Status: DISPENSED | OUTPATIENT
Start: 2019-12-16 | End: 2019-12-16

## 2019-12-16 RX ORDER — IPRATROPIUM BROMIDE AND ALBUTEROL SULFATE 2.5; .5 MG/3ML; MG/3ML
SOLUTION RESPIRATORY (INHALATION)
Status: COMPLETED
Start: 2019-12-16 | End: 2019-12-16

## 2019-12-16 RX ORDER — PREDNISOLONE ACETATE 10 MG/ML
1 SUSPENSION/ DROPS OPHTHALMIC SEE ADMIN INSTRUCTIONS
Status: DISCONTINUED | OUTPATIENT
Start: 2019-12-16 | End: 2019-12-16 | Stop reason: HOSPADM

## 2019-12-16 RX ORDER — SODIUM CHLORIDE, SODIUM LACTATE, POTASSIUM CHLORIDE, CALCIUM CHLORIDE 600; 310; 30; 20 MG/100ML; MG/100ML; MG/100ML; MG/100ML
1000 INJECTION, SOLUTION INTRAVENOUS CONTINUOUS
Status: DISCONTINUED | OUTPATIENT
Start: 2019-12-16 | End: 2019-12-16 | Stop reason: HOSPADM

## 2019-12-16 RX ADMIN — MIDAZOLAM HYDROCHLORIDE 1 MG: 1 INJECTION, SOLUTION INTRAMUSCULAR; INTRAVENOUS at 10:19

## 2019-12-16 RX ADMIN — PROPOFOL 10 MG: 10 INJECTION, EMULSION INTRAVENOUS at 10:33

## 2019-12-16 RX ADMIN — IPRATROPIUM BROMIDE AND ALBUTEROL SULFATE 3 ML: 2.5; .5 SOLUTION RESPIRATORY (INHALATION) at 09:12

## 2019-12-16 RX ADMIN — IPRATROPIUM BROMIDE AND ALBUTEROL SULFATE 3 ML: .5; 3 SOLUTION RESPIRATORY (INHALATION) at 09:48

## 2019-12-16 RX ADMIN — SODIUM CHLORIDE, POTASSIUM CHLORIDE, SODIUM LACTATE AND CALCIUM CHLORIDE 1000 ML: 600; 310; 30; 20 INJECTION, SOLUTION INTRAVENOUS at 10:04

## 2019-12-16 RX ADMIN — Medication 25 MG: at 10:29

## 2019-12-16 RX ADMIN — PROPOFOL 10 MG: 10 INJECTION, EMULSION INTRAVENOUS at 10:34

## 2019-12-16 RX ADMIN — IPRATROPIUM BROMIDE AND ALBUTEROL SULFATE 3 ML: .5; 3 SOLUTION RESPIRATORY (INHALATION) at 09:12

## 2019-12-16 RX ADMIN — PROPOFOL 10 MG: 10 INJECTION, EMULSION INTRAVENOUS at 10:29

## 2019-12-16 RX ADMIN — PROPOFOL 10 MG: 10 INJECTION, EMULSION INTRAVENOUS at 10:35

## 2019-12-16 RX ADMIN — PROPOFOL 10 MG: 10 INJECTION, EMULSION INTRAVENOUS at 10:30

## 2019-12-16 RX ADMIN — PROPOFOL 10 MG: 10 INJECTION, EMULSION INTRAVENOUS at 10:36

## 2019-12-16 RX ADMIN — PROPOFOL 10 MG: 10 INJECTION, EMULSION INTRAVENOUS at 10:31

## 2019-12-16 RX ADMIN — IPRATROPIUM BROMIDE AND ALBUTEROL SULFATE 3 ML: 2.5; .5 SOLUTION RESPIRATORY (INHALATION) at 09:48

## 2019-12-16 RX ADMIN — PROPOFOL 10 MG: 10 INJECTION, EMULSION INTRAVENOUS at 10:32

## 2019-12-16 RX ADMIN — PROPOFOL 10 MG: 10 INJECTION, EMULSION INTRAVENOUS at 10:28

## 2019-12-16 RX ADMIN — MIDAZOLAM HYDROCHLORIDE 1 MG: 1 INJECTION, SOLUTION INTRAMUSCULAR; INTRAVENOUS at 10:24

## 2019-12-16 RX ADMIN — ACETAZOLAMIDE 500 MG: 500 CAPSULE, EXTENDED RELEASE ORAL at 10:54

## 2019-12-16 NOTE — ANESTHESIA POSTPROCEDURE EVALUATION
Patient: Elsa Alcaraz    Procedure Summary     Date:  12/16/19 Room / Location:  [facility] OR 1 / [facility] OR    Anesthesia Start:  1026 Anesthesia Stop:      Procedure:  CATARACT PHACO EXTRACTION WITH INTRAOCULAR LENS IMPLANT RIGHT (Right Eye) Diagnosis:       Age-related nuclear cataract of right eye      (Age-related nuclear cataract of right eye [H25.11])    Surgeon:  Masoud David MD Provider:  Logan Arizmendi CRNA    Anesthesia Type:  MAC ASA Status:  3          Anesthesia Type: MAC    Vitals  No vitals data found for the desired time range.          Post Anesthesia Care and Evaluation    Patient location during evaluation: PHASE II  Patient participation: complete - patient participated  Level of consciousness: awake  Pain score: 0  Pain management: adequate  Airway patency: patent  Anesthetic complications: No anesthetic complications  PONV Status: none  Cardiovascular status: acceptable  Respiratory status: acceptable and nasal cannula  Hydration status: acceptable    Comments: vsss resp spont, reflexes intact, responsive, report given to pacu nurse

## 2019-12-16 NOTE — ANESTHESIA PREPROCEDURE EVALUATION
Anesthesia Evaluation     Patient summary reviewed and Nursing notes reviewed   no history of anesthetic complications:  NPO Solid Status: > 8 hours  NPO Liquid Status: > 8 hours           Airway   Mallampati: I  TM distance: >3 FB  Neck ROM: full  no difficulty expected  Dental - normal exam     Pulmonary - normal exam   (+) COPD, shortness of breath, sleep apnea,   (-) not a smoker  Cardiovascular - normal exam    (+) hypertension, valvular problems/murmurs TI, dysrhythmias Atrial Fib, LLAMAS, PVD, hyperlipidemia,       Neuro/Psych  (+) psychiatric history,     GI/Hepatic/Renal/Endo    (+) obesity, morbid obesity, hiatal hernia, GERD, PUD,      Musculoskeletal     (+) arthralgias, myalgias,   Abdominal    Substance History - negative use     OB/GYN negative ob/gyn ROS         Other   arthritis,    history of cancer                      Anesthesia Plan    ASA 3     MAC     intravenous induction     Anesthetic plan, all risks, benefits, and alternatives have been provided, discussed and informed consent has been obtained with: patient.

## 2020-01-06 ENCOUNTER — HOSPITAL ENCOUNTER (EMERGENCY)
Facility: HOSPITAL | Age: 80
Discharge: HOME | End: 2020-01-06
Attending: EMERGENCY MEDICINE | Admitting: EMERGENCY MEDICINE

## 2020-01-06 ENCOUNTER — APPOINTMENT (OUTPATIENT)
Dept: GENERAL RADIOLOGY | Facility: HOSPITAL | Age: 80
End: 2020-01-06

## 2020-01-06 VITALS
TEMPERATURE: 98.5 F | SYSTOLIC BLOOD PRESSURE: 130 MMHG | RESPIRATION RATE: 16 BRPM | HEART RATE: 99 BPM | DIASTOLIC BLOOD PRESSURE: 83 MMHG | BODY MASS INDEX: 33.17 KG/M2 | OXYGEN SATURATION: 95 % | WEIGHT: 187.2 LBS | HEIGHT: 63 IN

## 2020-01-06 DIAGNOSIS — I48.20 CHRONIC ATRIAL FIBRILLATION (HCC): ICD-10-CM

## 2020-01-06 DIAGNOSIS — I50.9 ACUTE CONGESTIVE HEART FAILURE, UNSPECIFIED HEART FAILURE TYPE (HCC): Primary | ICD-10-CM

## 2020-01-06 LAB
ALBUMIN SERPL-MCNC: 3.6 G/DL (ref 3.5–5.2)
ALBUMIN/GLOB SERPL: 1.2 G/DL
ALP SERPL-CCNC: 101 U/L (ref 39–117)
ALT SERPL W P-5'-P-CCNC: 11 U/L (ref 1–33)
ANION GAP SERPL CALCULATED.3IONS-SCNC: 13.1 MMOL/L (ref 5–15)
AST SERPL-CCNC: 13 U/L (ref 1–32)
BASOPHILS # BLD AUTO: 0.06 10*3/MM3 (ref 0–0.2)
BASOPHILS NFR BLD AUTO: 0.6 % (ref 0–1.5)
BILIRUB SERPL-MCNC: 1.6 MG/DL (ref 0.2–1.2)
BUN BLD-MCNC: 13 MG/DL (ref 8–23)
BUN/CREAT SERPL: 19.1 (ref 7–25)
CALCIUM SPEC-SCNC: 8.8 MG/DL (ref 8.6–10.5)
CHLORIDE SERPL-SCNC: 98 MMOL/L (ref 98–107)
CO2 SERPL-SCNC: 25.9 MMOL/L (ref 22–29)
CREAT BLD-MCNC: 0.68 MG/DL (ref 0.57–1)
DEPRECATED RDW RBC AUTO: 49.6 FL (ref 37–54)
EOSINOPHIL # BLD AUTO: 0.25 10*3/MM3 (ref 0–0.4)
EOSINOPHIL NFR BLD AUTO: 2.6 % (ref 0.3–6.2)
ERYTHROCYTE [DISTWIDTH] IN BLOOD BY AUTOMATED COUNT: 15.4 % (ref 12.3–15.4)
GFR SERPL CREATININE-BSD FRML MDRD: 84 ML/MIN/1.73
GLOBULIN UR ELPH-MCNC: 3 GM/DL
GLUCOSE BLD-MCNC: 122 MG/DL (ref 65–99)
HCT VFR BLD AUTO: 37.7 % (ref 34–46.6)
HGB BLD-MCNC: 11.8 G/DL (ref 12–15.9)
HOLD SPECIMEN: NORMAL
HOLD SPECIMEN: NORMAL
IMM GRANULOCYTES # BLD AUTO: 0.04 10*3/MM3 (ref 0–0.05)
IMM GRANULOCYTES NFR BLD AUTO: 0.4 % (ref 0–0.5)
INR PPP: 2.14 (ref 0.9–1.1)
LYMPHOCYTES # BLD AUTO: 0.91 10*3/MM3 (ref 0.7–3.1)
LYMPHOCYTES NFR BLD AUTO: 9.4 % (ref 19.6–45.3)
MCH RBC QN AUTO: 27.4 PG (ref 26.6–33)
MCHC RBC AUTO-ENTMCNC: 31.3 G/DL (ref 31.5–35.7)
MCV RBC AUTO: 87.5 FL (ref 79–97)
MONOCYTES # BLD AUTO: 1.02 10*3/MM3 (ref 0.1–0.9)
MONOCYTES NFR BLD AUTO: 10.5 % (ref 5–12)
NEUTROPHILS # BLD AUTO: 7.4 10*3/MM3 (ref 1.7–7)
NEUTROPHILS NFR BLD AUTO: 76.5 % (ref 42.7–76)
NRBC BLD AUTO-RTO: 0 /100 WBC (ref 0–0.2)
NT-PROBNP SERPL-MCNC: 1864 PG/ML (ref 5–1800)
PLATELET # BLD AUTO: 321 10*3/MM3 (ref 140–450)
PMV BLD AUTO: 9.2 FL (ref 6–12)
POTASSIUM BLD-SCNC: 3.7 MMOL/L (ref 3.5–5.2)
PROT SERPL-MCNC: 6.6 G/DL (ref 6–8.5)
PROTHROMBIN TIME: 24.5 SECONDS (ref 12–15.1)
RBC # BLD AUTO: 4.31 10*6/MM3 (ref 3.77–5.28)
SODIUM BLD-SCNC: 137 MMOL/L (ref 136–145)
TROPONIN T SERPL-MCNC: <0.01 NG/ML (ref 0–0.03)
WBC NRBC COR # BLD: 9.68 10*3/MM3 (ref 3.4–10.8)
WHOLE BLOOD HOLD SPECIMEN: NORMAL
WHOLE BLOOD HOLD SPECIMEN: NORMAL

## 2020-01-06 PROCEDURE — 96374 THER/PROPH/DIAG INJ IV PUSH: CPT

## 2020-01-06 PROCEDURE — 99284 EMERGENCY DEPT VISIT MOD MDM: CPT

## 2020-01-06 PROCEDURE — 84484 ASSAY OF TROPONIN QUANT: CPT | Performed by: EMERGENCY MEDICINE

## 2020-01-06 PROCEDURE — 93005 ELECTROCARDIOGRAM TRACING: CPT | Performed by: EMERGENCY MEDICINE

## 2020-01-06 PROCEDURE — 96375 TX/PRO/DX INJ NEW DRUG ADDON: CPT

## 2020-01-06 PROCEDURE — 25010000002 FUROSEMIDE PER 20 MG: Performed by: EMERGENCY MEDICINE

## 2020-01-06 PROCEDURE — 83880 ASSAY OF NATRIURETIC PEPTIDE: CPT | Performed by: EMERGENCY MEDICINE

## 2020-01-06 PROCEDURE — 85025 COMPLETE CBC W/AUTO DIFF WBC: CPT | Performed by: EMERGENCY MEDICINE

## 2020-01-06 PROCEDURE — 71046 X-RAY EXAM CHEST 2 VIEWS: CPT

## 2020-01-06 PROCEDURE — 25010000002 DEXAMETHASONE PER 1 MG: Performed by: EMERGENCY MEDICINE

## 2020-01-06 PROCEDURE — 80053 COMPREHEN METABOLIC PANEL: CPT | Performed by: EMERGENCY MEDICINE

## 2020-01-06 PROCEDURE — 94640 AIRWAY INHALATION TREATMENT: CPT

## 2020-01-06 PROCEDURE — 85610 PROTHROMBIN TIME: CPT | Performed by: EMERGENCY MEDICINE

## 2020-01-06 PROCEDURE — 94799 UNLISTED PULMONARY SVC/PX: CPT

## 2020-01-06 RX ORDER — FUROSEMIDE 10 MG/ML
40 INJECTION INTRAMUSCULAR; INTRAVENOUS ONCE
Status: COMPLETED | OUTPATIENT
Start: 2020-01-06 | End: 2020-01-06

## 2020-01-06 RX ORDER — DEXAMETHASONE SODIUM PHOSPHATE 10 MG/ML
10 INJECTION INTRAMUSCULAR; INTRAVENOUS ONCE
Status: COMPLETED | OUTPATIENT
Start: 2020-01-06 | End: 2020-01-06

## 2020-01-06 RX ORDER — FUROSEMIDE 20 MG/1
20 TABLET ORAL 2 TIMES DAILY
Qty: 6 TABLET | Refills: 0 | OUTPATIENT
Start: 2020-01-06 | End: 2020-12-06

## 2020-01-06 RX ORDER — METOPROLOL TARTRATE 5 MG/5ML
5 INJECTION INTRAVENOUS ONCE
Status: DISCONTINUED | OUTPATIENT
Start: 2020-01-06 | End: 2020-01-06 | Stop reason: HOSPADM

## 2020-01-06 RX ORDER — POTASSIUM CHLORIDE 750 MG/1
10 TABLET, FILM COATED, EXTENDED RELEASE ORAL 2 TIMES DAILY
Qty: 6 TABLET | Refills: 0 | OUTPATIENT
Start: 2020-01-06 | End: 2020-12-06

## 2020-01-06 RX ORDER — IPRATROPIUM BROMIDE AND ALBUTEROL SULFATE 2.5; .5 MG/3ML; MG/3ML
3 SOLUTION RESPIRATORY (INHALATION) ONCE
Status: COMPLETED | OUTPATIENT
Start: 2020-01-06 | End: 2020-01-06

## 2020-01-06 RX ORDER — SODIUM CHLORIDE 0.9 % (FLUSH) 0.9 %
10 SYRINGE (ML) INJECTION AS NEEDED
Status: DISCONTINUED | OUTPATIENT
Start: 2020-01-06 | End: 2020-01-06 | Stop reason: HOSPADM

## 2020-01-06 RX ADMIN — DEXAMETHASONE SODIUM PHOSPHATE 10 MG: 10 INJECTION INTRAMUSCULAR; INTRAVENOUS at 09:46

## 2020-01-06 RX ADMIN — IPRATROPIUM BROMIDE AND ALBUTEROL SULFATE 3 ML: .5; 3 SOLUTION RESPIRATORY (INHALATION) at 09:27

## 2020-01-06 RX ADMIN — FUROSEMIDE 40 MG: 10 INJECTION, SOLUTION INTRAMUSCULAR; INTRAVENOUS at 10:26

## 2020-01-06 NOTE — ED PROVIDER NOTES
"Subjective   History of Present Illness    Chief Complaint: Shortness of breath  History of Present Illness: 72-year-old female history of COPD atrial fibrillation on Coumadin, presents with shortness of breath and wheezing no edema.  Dry cough, states symptoms worse with lying flat  Onset: Last 2 to 3 days  Duration: Persistent  Exacerbating / Alleviating factors: Use of home medications some improvement  Associated symptoms: None      Nurses Notes reviewed and agree, including vitals, allergies, social history and prior medical history.     REVIEW OF SYSTEMS: All systems reviewed and not pertinent unless noted.    Positive for: Shortness of breath wheezing    Negative for: Fever hemoptysis syncope  Review of Systems    Past Medical History:   Diagnosis Date   • Anxiety disorder due to general medical condition 1/11/2017   • Arthritis    • Atrial fibrillation (CMS/HCC) 1/11/2017   • Body piercing     BOTH EARS   • Borderline diabetes    • Breast cancer (CMS/HCC) 2003    right-radiation and a lumpectomy   • Cataract 01/11/2017    Left eye surgery 11/19   • Chronic bronchitis (CMS/HCC) 1/11/2017   • Colon cancer (CMS/HCC) 11/30/1998    had surgery and chemo   • COPD (chronic obstructive pulmonary disease) (CMS/[facility])    • Cyanocobalamine deficiency (non anemic)    • Depression 1/11/2017   • Diverticulosis    • Esophageal reflux 1/11/2017   • Hiatal hernia 1/11/2017   • History of bladder infections    • Hx of exercise stress test     \"several years ago by Dr. Mercado\".     • Hx of radiation therapy    • Hypertension 1/11/2017   • Osteoporosis    • Palpitations 1/11/2017   • Problems with swallowing     meat at times per pt report   • Shortness of breath     WITH EXERTION    • Sleep apnea 01/11/2017    NO CPAP   • Tricuspid valve disorder 01/11/2017    Patient doesn't know anything about this   • Wears glasses        No Known Allergies    Past Surgical History:   Procedure Laterality Date   • ANAL FISTULOTOMY     • " APPENDECTOMY         • BREAST BIOPSY     • BREAST LUMPECTOMY Right 2006   • CATARACT EXTRACTION W/ INTRAOCULAR LENS IMPLANT Left 2019    Procedure: CATARACT PHACO EXTRACTION WITH INTRAOCULAR LENS IMPLANT LEFT;  Surgeon: Masoud David MD;  Location: [facility] OR;  Service: Ophthalmology   • CATARACT EXTRACTION W/ INTRAOCULAR LENS IMPLANT Right 2019    Procedure: CATARACT PHACO EXTRACTION WITH INTRAOCULAR LENS IMPLANT RIGHT;  Surgeon: Masoud David MD;  Location: [facility] OR;  Service: Ophthalmology   •  SECTION  1981   • CHOLECYSTECTOMY  2009   • COLECTOMY PARTIAL / TOTAL  1998    COLON CANCER   • COLONOSCOPY     • ENDOSCOPY     • ENDOSCOPY N/A 2018    Procedure: ESOPHAGOGASTRODUODENOSCOPY WITH COLD FORCEP BIOPSY;  Surgeon: Vasquez Fagan MD;  Location: [facility] ENDOSCOPY;  Service: Gastroenterology   • ENDOSCOPY N/A 2019    Procedure: ESOPHAGOGASTRODUODENOSCOPY WITH BIOPSY;  Surgeon: Vasquez Fagan MD;  Location: [facility] ENDOSCOPY;  Service: Gastroenterology   • HYSTERECTOMY         • VENTRAL HERNIA REPAIR  10/28/2012       Family History   Problem Relation Age of Onset   • Hypertension Mother    • Asthma Mother    • COPD Mother    • Osteoarthritis Mother    • Cancer Father    • Cancer Sister    • Diabetes Maternal Grandmother        Social History     Socioeconomic History   • Marital status:      Spouse name: Not on file   • Number of children: Not on file   • Years of education: Not on file   • Highest education level: Not on file   Tobacco Use   • Smoking status: Never Smoker   • Smokeless tobacco: Never Used   Substance and Sexual Activity   • Alcohol use: Yes     Alcohol/week: 1.0 - 2.0 standard drinks     Types: 1 - 2 Glasses of wine per week     Frequency: Never     Comment: occas   • Drug use: No           Objective   Physical Exam      GENERAL APPEARANCE: Well developed, well nourished, in no acute distress.  VITAL SIGNS:  per nursing, reviewed and noted  SKIN: no rashes, ulcerations or petechiae.  Head: Normocephalic, atraumatic.   EYES: perrla. EOMI.  ENT:  TM clear, posterior pharynx patent.  LUNGS:  normal breath sounds. No retractions.   CARDIOVASCULAR:  regular rate and rhythm, no murmurs.  Good Peripheral pulses.  ABDOMEN: Soft, nontender, normal bowel sounds. No hernia. No ascites.  MUSCULOSKELETAL:  No tenderness. Full ROM. Strength and tone normal.  NEUROLOGIC: Alert, oriented x 3. No gross deficits.   NECK: Supple, symmetric. No tenderness, no masses. Full ROM  Back: full rom, no paraspinal spasm. No CVA tenderness.   PSYCH: appropriate affect, no suicidal or homicidal ideation.  : no bladder tenderness or distention, no CVA tenderness      Procedures           ED Course  ED Course as of Jan 06 1256   Mon Jan 06, 2020   0952 FINDINGS: The heart is enlarged. There is a large hiatal hernia. There  is interstitial pulmonary edema with small bilateral pleural effusions.  There is no pneumothorax. There are no acute osseous abnormalities.     IMPRESSION:  Cardiomegaly with interstitial pulmonary edema and small  effusions.    [PF]   0957 proBNP(!): 1,864.0 [PF]   0957 WBC: 9.68 [PF]   0957 Hemoglobin(!): 11.8 [PF]   0957 Hematocrit: 37.7 [PF]   0957 Troponin T: <0.010 [PF]   0957 Glucose(!): 122 [PF]   0957 BUN: 13 [PF]   0957 Creatinine: 0.68 [PF]   0957 Sodium: 137 [PF]   0957 Potassium: 3.7 [PF]   0957 Chloride: 98 [PF]   0957 CO2: 25.9 [PF]   0957 Calcium: 8.8 [PF]   0957 Protime(!): 24.5 [PF]   0957 INR(!): 2.14 [PF]   0957 ALT (SGPT): 11 [PF]   0957 AST (SGOT): 13 [PF]   0957 Alkaline Phosphatase: 101 [PF]   0957 EKG interpreted by me revealed atrial fibrillation at a rate of 111.  Nonspecific T wave changes.   Total Bilirubin(!): 1.6 [PF]      ED Course User Index  [PF] Tariq Mora,       Patient arrived hypertensive and chronic atrial fibrillation with RVR.  Patient appears to have mild volume overload and  potential COPD exacerbation.  Treated both.  Suspect her hypertension and rapid rate contributed  to her heart failure.  Dosed with Lasix, Decadron, DuoNeb,                                         MDM  Patient resting room air sats are 94%,  ambulating at a brisk pace tolerated well.  We will add Lasix and potassium, advised outpatient follow-up this week and return sooner if progressive symptoms  Final diagnoses:   Acute congestive heart failure, unspecified heart failure type (CMS/[facility])   Chronic atrial fibrillation            Tariq Mora, DO  01/06/20 1258

## 2020-01-19 ENCOUNTER — HOSPITAL ENCOUNTER (EMERGENCY)
Facility: HOSPITAL | Age: 80
Discharge: HOME | End: 2020-01-19
Attending: EMERGENCY MEDICINE | Admitting: EMERGENCY MEDICINE

## 2020-01-19 ENCOUNTER — APPOINTMENT (OUTPATIENT)
Dept: GENERAL RADIOLOGY | Facility: HOSPITAL | Age: 80
End: 2020-01-19

## 2020-01-19 VITALS
WEIGHT: 186.8 LBS | DIASTOLIC BLOOD PRESSURE: 90 MMHG | SYSTOLIC BLOOD PRESSURE: 120 MMHG | TEMPERATURE: 98 F | BODY MASS INDEX: 33.1 KG/M2 | OXYGEN SATURATION: 93 % | HEART RATE: 77 BPM | HEIGHT: 63 IN | RESPIRATION RATE: 20 BRPM

## 2020-01-19 DIAGNOSIS — R06.02 SHORTNESS OF BREATH: ICD-10-CM

## 2020-01-19 DIAGNOSIS — I48.91 ATRIAL FIBRILLATION, UNSPECIFIED TYPE (HCC): ICD-10-CM

## 2020-01-19 DIAGNOSIS — I50.9 CONGESTIVE HEART FAILURE, UNSPECIFIED HF CHRONICITY, UNSPECIFIED HEART FAILURE TYPE (HCC): Primary | ICD-10-CM

## 2020-01-19 LAB
ALBUMIN SERPL-MCNC: 3.6 G/DL (ref 3.5–5.2)
ALBUMIN/GLOB SERPL: 1.3 G/DL
ALP SERPL-CCNC: 91 U/L (ref 39–117)
ALT SERPL W P-5'-P-CCNC: 15 U/L (ref 1–33)
ANION GAP SERPL CALCULATED.3IONS-SCNC: 13.1 MMOL/L (ref 5–15)
AST SERPL-CCNC: 21 U/L (ref 1–32)
BASOPHILS # BLD AUTO: 0.07 10*3/MM3 (ref 0–0.2)
BASOPHILS NFR BLD AUTO: 0.7 % (ref 0–1.5)
BILIRUB SERPL-MCNC: 1.8 MG/DL (ref 0.2–1.2)
BUN BLD-MCNC: 12 MG/DL (ref 8–23)
BUN/CREAT SERPL: 16.2 (ref 7–25)
CALCIUM SPEC-SCNC: 8.9 MG/DL (ref 8.6–10.5)
CHLORIDE SERPL-SCNC: 99 MMOL/L (ref 98–107)
CO2 SERPL-SCNC: 26.9 MMOL/L (ref 22–29)
CREAT BLD-MCNC: 0.74 MG/DL (ref 0.57–1)
DEPRECATED RDW RBC AUTO: 49.4 FL (ref 37–54)
EOSINOPHIL # BLD AUTO: 0.24 10*3/MM3 (ref 0–0.4)
EOSINOPHIL NFR BLD AUTO: 2.4 % (ref 0.3–6.2)
ERYTHROCYTE [DISTWIDTH] IN BLOOD BY AUTOMATED COUNT: 15.6 % (ref 12.3–15.4)
GFR SERPL CREATININE-BSD FRML MDRD: 76 ML/MIN/1.73
GLOBULIN UR ELPH-MCNC: 2.8 GM/DL
GLUCOSE BLD-MCNC: 110 MG/DL (ref 65–99)
HCT VFR BLD AUTO: 37.8 % (ref 34–46.6)
HGB BLD-MCNC: 11.7 G/DL (ref 12–15.9)
HOLD SPECIMEN: NORMAL
IMM GRANULOCYTES # BLD AUTO: 0.03 10*3/MM3 (ref 0–0.05)
IMM GRANULOCYTES NFR BLD AUTO: 0.3 % (ref 0–0.5)
INR PPP: 1.68 (ref 0.9–1.1)
LYMPHOCYTES # BLD AUTO: 1.13 10*3/MM3 (ref 0.7–3.1)
LYMPHOCYTES NFR BLD AUTO: 11.4 % (ref 19.6–45.3)
MCH RBC QN AUTO: 27.1 PG (ref 26.6–33)
MCHC RBC AUTO-ENTMCNC: 31 G/DL (ref 31.5–35.7)
MCV RBC AUTO: 87.5 FL (ref 79–97)
MONOCYTES # BLD AUTO: 1.15 10*3/MM3 (ref 0.1–0.9)
MONOCYTES NFR BLD AUTO: 11.6 % (ref 5–12)
NEUTROPHILS # BLD AUTO: 7.33 10*3/MM3 (ref 1.7–7)
NEUTROPHILS NFR BLD AUTO: 73.6 % (ref 42.7–76)
NRBC BLD AUTO-RTO: 0 /100 WBC (ref 0–0.2)
NT-PROBNP SERPL-MCNC: 1939 PG/ML (ref 5–1800)
PLATELET # BLD AUTO: 349 10*3/MM3 (ref 140–450)
PMV BLD AUTO: 9.6 FL (ref 6–12)
POTASSIUM BLD-SCNC: 4.1 MMOL/L (ref 3.5–5.2)
PROT SERPL-MCNC: 6.4 G/DL (ref 6–8.5)
PROTHROMBIN TIME: 20.2 SECONDS (ref 12–15.1)
RBC # BLD AUTO: 4.32 10*6/MM3 (ref 3.77–5.28)
SODIUM BLD-SCNC: 139 MMOL/L (ref 136–145)
TROPONIN T SERPL-MCNC: <0.01 NG/ML (ref 0–0.03)
WBC NRBC COR # BLD: 9.95 10*3/MM3 (ref 3.4–10.8)
WHOLE BLOOD HOLD SPECIMEN: NORMAL
WHOLE BLOOD HOLD SPECIMEN: NORMAL

## 2020-01-19 PROCEDURE — 84484 ASSAY OF TROPONIN QUANT: CPT | Performed by: EMERGENCY MEDICINE

## 2020-01-19 PROCEDURE — 25010000002 FUROSEMIDE PER 20 MG: Performed by: PHYSICIAN ASSISTANT

## 2020-01-19 PROCEDURE — 80053 COMPREHEN METABOLIC PANEL: CPT | Performed by: EMERGENCY MEDICINE

## 2020-01-19 PROCEDURE — 71046 X-RAY EXAM CHEST 2 VIEWS: CPT

## 2020-01-19 PROCEDURE — 83880 ASSAY OF NATRIURETIC PEPTIDE: CPT | Performed by: EMERGENCY MEDICINE

## 2020-01-19 PROCEDURE — 96374 THER/PROPH/DIAG INJ IV PUSH: CPT

## 2020-01-19 PROCEDURE — 93005 ELECTROCARDIOGRAM TRACING: CPT | Performed by: EMERGENCY MEDICINE

## 2020-01-19 PROCEDURE — 94640 AIRWAY INHALATION TREATMENT: CPT

## 2020-01-19 PROCEDURE — 99283 EMERGENCY DEPT VISIT LOW MDM: CPT

## 2020-01-19 PROCEDURE — 85610 PROTHROMBIN TIME: CPT | Performed by: PHYSICIAN ASSISTANT

## 2020-01-19 PROCEDURE — 85025 COMPLETE CBC W/AUTO DIFF WBC: CPT | Performed by: EMERGENCY MEDICINE

## 2020-01-19 PROCEDURE — 25010000002 METHYLPREDNISOLONE PER 125 MG: Performed by: PHYSICIAN ASSISTANT

## 2020-01-19 PROCEDURE — 96375 TX/PRO/DX INJ NEW DRUG ADDON: CPT

## 2020-01-19 RX ORDER — METHYLPREDNISOLONE SODIUM SUCCINATE 125 MG/2ML
125 INJECTION, POWDER, LYOPHILIZED, FOR SOLUTION INTRAMUSCULAR; INTRAVENOUS ONCE
Status: COMPLETED | OUTPATIENT
Start: 2020-01-19 | End: 2020-01-19

## 2020-01-19 RX ORDER — IPRATROPIUM BROMIDE AND ALBUTEROL SULFATE 2.5; .5 MG/3ML; MG/3ML
6 SOLUTION RESPIRATORY (INHALATION) ONCE
Status: COMPLETED | OUTPATIENT
Start: 2020-01-19 | End: 2020-01-19

## 2020-01-19 RX ORDER — FUROSEMIDE 10 MG/ML
40 INJECTION INTRAMUSCULAR; INTRAVENOUS ONCE
Status: COMPLETED | OUTPATIENT
Start: 2020-01-19 | End: 2020-01-19

## 2020-01-19 RX ORDER — FUROSEMIDE 20 MG/1
20 TABLET ORAL DAILY
Qty: 34 TABLET | Refills: 0 | Status: SHIPPED | OUTPATIENT
Start: 2020-01-19 | End: 2020-02-22

## 2020-01-19 RX ORDER — SODIUM CHLORIDE 0.9 % (FLUSH) 0.9 %
10 SYRINGE (ML) INJECTION AS NEEDED
Status: DISCONTINUED | OUTPATIENT
Start: 2020-01-19 | End: 2020-01-19 | Stop reason: HOSPADM

## 2020-01-19 RX ORDER — POTASSIUM CHLORIDE 750 MG/1
10 TABLET, FILM COATED, EXTENDED RELEASE ORAL DAILY
Qty: 20 TABLET | Refills: 0 | Status: SHIPPED | OUTPATIENT
Start: 2020-01-19 | End: 2020-02-07 | Stop reason: SDUPTHER

## 2020-01-19 RX ADMIN — IPRATROPIUM BROMIDE AND ALBUTEROL SULFATE 6 ML: .5; 3 SOLUTION RESPIRATORY (INHALATION) at 10:19

## 2020-01-19 RX ADMIN — FUROSEMIDE 40 MG: 10 INJECTION, SOLUTION INTRAMUSCULAR; INTRAVENOUS at 11:39

## 2020-01-19 RX ADMIN — METHYLPREDNISOLONE SODIUM SUCCINATE 125 MG: 125 INJECTION, POWDER, FOR SOLUTION INTRAMUSCULAR; INTRAVENOUS at 10:37

## 2020-01-19 NOTE — DISCHARGE INSTRUCTIONS
You likely have some congestive heart failure which is causing fluid overload and shortness of breath.  Take Lasix as directed-take 1 tablet twice daily for 3 days, then 1 tablet daily.  Take potassium pill with each dose of Lasix.  Take all of her home medications as directed.  You will need to call your cardiologist first thing tomorrow to establish earliest available appointment.  You will likely need a repeat echo and/or other evaluation.  Return to the ER for any change, worsening of symptoms, or any additional concerns include but limited to worsening shortness of breath, chest pain, dizziness or syncope.

## 2020-01-19 NOTE — ED PROVIDER NOTES
"Subjective   Patient is a 79-year-old female history of anxiety, arthritis, atrial fibrillation on metoprolol and Coumadin, breast cancer status post radiation and lumpectomy, chronic bronchitis, COPD anemia, hypertension, palpitations, osteoporosis, sleep apnea and tricuspid valve disorder presenting to the ER for evaluation of worsening shortness of breath.  Patient states she has been feeling more short of breath since Christmas 2019 she states she was seen here in early January and was \"even worse then\" per chart review, patient was treated as CHF exacerbation and was placed on Lasix.  She states that she felt better after taking this medicine but ran out of it.  She states for the past few days she feels as if she is gained some weight, approximately 3 pounds, has also noted some swelling in her lower extremities. She states that she is more short of breath and is having trouble sleeping.  She states that she does have a mild cough but it is better than it was, productive in nature.  She states that she has a follow-up scheduled with her cardiologist soon.  She denies any headache, dizziness, chest pain, nausea, emesis, abdominal pain, or any other symptoms          Review of Systems   Constitutional: Negative for chills and fever.   HENT: Negative.    Eyes: Negative.    Respiratory: Positive for cough and shortness of breath.    Cardiovascular: Positive for leg swelling. Negative for chest pain.   Gastrointestinal: Negative.    Genitourinary: Negative.    Musculoskeletal: Negative.    Skin: Negative.    Allergic/Immunologic: Negative for immunocompromised state.   Neurological: Negative.    Hematological: Bruises/bleeds easily.   Psychiatric/Behavioral: Negative.        Past Medical History:   Diagnosis Date   • Anxiety disorder due to general medical condition 1/11/2017   • Arthritis    • Atrial fibrillation (CMS/HCC) 1/11/2017   • Body piercing     BOTH EARS   • Borderline diabetes    • Breast cancer " "(CMS/[facility])     right-radiation and a lumpectomy   • Cataract 2017    Left eye surgery    • Chronic bronchitis (CMS/HCC) 2017   • Colon cancer (CMS/[facility]) 1998    had surgery and chemo   • COPD (chronic obstructive pulmonary disease) (CMS/[facility])    • Cyanocobalamine deficiency (non anemic)    • Depression 2017   • Diverticulosis    • Esophageal reflux 2017   • Hiatal hernia 2017   • History of bladder infections    • Hx of exercise stress test     \"several years ago by Dr. Mercado\".     • Hx of radiation therapy    • Hypertension 2017   • Osteoporosis    • Palpitations 2017   • Problems with swallowing     meat at times per pt report   • Shortness of breath     WITH EXERTION    • Sleep apnea 2017    NO CPAP   • Tricuspid valve disorder 2017    Patient doesn't know anything about this   • Wears glasses        No Known Allergies    Past Surgical History:   Procedure Laterality Date   • ANAL FISTULOTOMY     • APPENDECTOMY         • BREAST BIOPSY     • BREAST LUMPECTOMY Right 2006   • CATARACT EXTRACTION W/ INTRAOCULAR LENS IMPLANT Left 2019    Procedure: CATARACT PHACO EXTRACTION WITH INTRAOCULAR LENS IMPLANT LEFT;  Surgeon: Masoud David MD;  Location: [facility] OR;  Service: Ophthalmology   • CATARACT EXTRACTION W/ INTRAOCULAR LENS IMPLANT Right 2019    Procedure: CATARACT PHACO EXTRACTION WITH INTRAOCULAR LENS IMPLANT RIGHT;  Surgeon: Masoud David MD;  Location: [facility] OR;  Service: Ophthalmology   •  SECTION  1981   • CHOLECYSTECTOMY  2009   • COLECTOMY PARTIAL / TOTAL  1998    COLON CANCER   • COLONOSCOPY     • ENDOSCOPY  2016   • ENDOSCOPY N/A 2018    Procedure: ESOPHAGOGASTRODUODENOSCOPY WITH COLD FORCEP BIOPSY;  Surgeon: Vasquez Fagan MD;  Location: [facility] ENDOSCOPY;  Service: Gastroenterology   • ENDOSCOPY N/A 2019    Procedure: ESOPHAGOGASTRODUODENOSCOPY WITH BIOPSY;  Surgeon: " "Vasquez Fagan MD;  Location: [facility] ENDOSCOPY;  Service: Gastroenterology   • HYSTERECTOMY      1998   • VENTRAL HERNIA REPAIR  10/28/2012       Family History   Problem Relation Age of Onset   • Hypertension Mother    • Asthma Mother    • COPD Mother    • Osteoarthritis Mother    • Cancer Father    • Cancer Sister    • Diabetes Maternal Grandmother        Social History     Socioeconomic History   • Marital status:      Spouse name: Not on file   • Number of children: Not on file   • Years of education: Not on file   • Highest education level: Not on file   Tobacco Use   • Smoking status: Never Smoker   • Smokeless tobacco: Never Used   Substance and Sexual Activity   • Alcohol use: Yes     Alcohol/week: 1.0 - 2.0 standard drinks     Types: 1 - 2 Glasses of wine per week     Frequency: Never     Comment: occas   • Drug use: No           Objective   Physical Exam   Nursing note and vitals reviewed.  /90   Pulse 77   Temp 98 °F (36.7 °C) (Oral)   Resp 20   Ht 160 cm (63\")   Wt 84.7 kg (186 lb 12.8 oz)   SpO2 93%   BMI 33.09 kg/m²     GEN: No acute distress, sitting upright in stretcher. Awake and alert, speaking in full sentences.   Head: Normocephalic, atraumatic.  Eyes: Pupils equal round reactive to light, EOM intact  ENT: Posterior pharynx normal in appearance, oral mucosa is moist, tongue dry and midline.  Cardiovascular: Regular rate  Lungs: Breathing is even and unlabored.  Patient does have diffuse expiratory wheezes throughout with respirations, no obvious rales or crackles  Abdomen: Soft, nontender, nondistended, no peritoneal signs or guarding.   Extremities: Full range of motion.  Patient does have 1+ pitting edema in her lower extremities bilaterally.  Radial and dorsalis pedis pulses are 2+ and equal  Neuro: GCS 15  Psych: Mood and affect are appropriate    Procedures           ED Course  ED Course as of Jan 19 1734   Sun Jan 19, 2020   1010 EKG interpreted by me reveals atrial " fibrillation at a rate of 94.  Nonspecific T wave changes.    [PF]   1107 Troponin T: <0.010 [LA]   1107 Glucose(!): 110 [LA]   1107 Creatinine: 0.74 [LA]   1107 Potassium: 4.1 [LA]   1107 Sodium: 139 [LA]   1107 Chloride: 99 [LA]   1107 ALT (SGPT): 15 [LA]   1107 AST (SGOT): 21 [LA]   1107 Alkaline Phosphatase: 91 [LA]   1107 Total Bilirubin(!): 1.8 [LA]   1108 WBC: 9.95 [LA]   1108 Hemoglobin(!): 11.7 [LA]   1108 Platelets: 349 [LA]   1116 Discussed case with Dr. Mora. Will give her lasix here. She likely will need to be on it chronically and will need a repeat ECHO from her cardiologist.    [LA]   1143 EKG interpreted by me reveals atrial fibrillation rate of 94.  Nonspecific T wave changes.    [PF]   1153 INR(!): 1.68 [LA]   1203 PROCEDURE: XR CHEST 2 VW-   1/19/2020 10:49 AM     HISTORY: SOA Triage Protocol     COMPARISON:  01/06/2020     FINDINGS: The cardiac silhouette is mildly enlarged, stable. Aortic  mural calcifications noted. The mediastinal and hilar structures are  unremarkable. Stable, mild bilateral interstitial disease noted. There  is stable blunting of the costophrenic angles bilaterally. Stable, mild  scarring noted left lung base. There is no pneumothorax.     IMPRESSION:  Stable cardiomegaly and bilateral interstitial disease from  prior           This report was finalized on 1/19/2020 11:58 AM by Susan Obregon MD.    [LA]   1234 Discussed medications with Dr. Mora.  We will give her a supply of Lasix and potassium have her follow-up with her cardiologist    [LA]   1257 Patient is sitting on the bedside commode at this time.  Discussed everything with the patient she is comfortable with this plan.    [LA]      ED Course User Index  [LA] Susan Ac PA-C  [PF] Tariq Mora,                                                MDM  Number of Diagnoses or Management Options  Atrial fibrillation, unspecified type (CMS/HCC):   Congestive heart failure, unspecified HF chronicity, unspecified  heart failure type (CMS/[facility]):   Shortness of breath:   Diagnosis management comments: Arrival, patient is stable, afebrile, normotensive.  She is not tachycardic.  Her oxygen saturation is in the low 90s on room air.  She is in no respiratory distress.  Differential includes COPD exacerbation, CHF exacerbation, pulmonary edema, pneumonia, ACS, and other concerns.  Will obtain basic labs including a troponin and BNP, chest x-ray and EKG.  Will give DuoNeb's, steroids.  Will likely give a dose of IV Lasix.    Lab work stable.  Troponin was not elevated.  EKG was interpreted by Dr. Mora.  Patient did have a proBNP of 1939.  INR of 1.68.  Dr. Mora reviewed labs and medicines and spoke with the patient as well.  Chest x-ray revealed stable cardiomegaly and bilateral interstitial disease.  She felt better after the neb treatment and medications.  We did give her a dose of IV Lasix here.  Discussed that she likely needs to be on these long-term and will need a repeat echocardiogram with her cardiologist.  Her oxygen saturation stayed in the low to mid 90s, no distress.  We gave her Lasix and potassium to take at home with her other medications.  Discuss strict return precautions.  She verbalized understanding was in agreement with this plan of care.       Amount and/or Complexity of Data Reviewed  Clinical lab tests: reviewed and ordered  Tests in the radiology section of CPT®: reviewed and ordered  Discussion of test results with the performing providers: yes  Decide to obtain previous medical records or to obtain history from someone other than the patient: yes  Review and summarize past medical records: yes  Discuss the patient with other providers: yes    Risk of Complications, Morbidity, and/or Mortality  Presenting problems: moderate  Diagnostic procedures: moderate  Management options: low    Patient Progress  Patient progress: improved      Final diagnoses:   Congestive heart failure, unspecified HF chronicity,  unspecified heart failure type (CMS/HCC)   Shortness of breath   Atrial fibrillation, unspecified type (CMS/HCC)            Susan Ac PA-C  01/19/20 3502

## 2020-02-07 ENCOUNTER — OFFICE VISIT (OUTPATIENT)
Dept: CARDIOLOGY | Facility: CLINIC | Age: 80
End: 2020-02-07

## 2020-02-07 VITALS
SYSTOLIC BLOOD PRESSURE: 130 MMHG | HEIGHT: 63 IN | BODY MASS INDEX: 32.43 KG/M2 | OXYGEN SATURATION: 93 % | DIASTOLIC BLOOD PRESSURE: 70 MMHG | HEART RATE: 96 BPM | WEIGHT: 183 LBS

## 2020-02-07 DIAGNOSIS — I50.9 CONGESTIVE HEART FAILURE, UNSPECIFIED HF CHRONICITY, UNSPECIFIED HEART FAILURE TYPE (HCC): Primary | ICD-10-CM

## 2020-02-07 DIAGNOSIS — I10 ESSENTIAL HYPERTENSION: ICD-10-CM

## 2020-02-07 DIAGNOSIS — I48.20 CHRONIC ATRIAL FIBRILLATION (HCC): ICD-10-CM

## 2020-02-07 DIAGNOSIS — I34.0 NONRHEUMATIC MITRAL VALVE REGURGITATION: ICD-10-CM

## 2020-02-07 PROCEDURE — 99214 OFFICE O/P EST MOD 30 MIN: CPT | Performed by: INTERNAL MEDICINE

## 2020-02-07 RX ORDER — PANTOPRAZOLE SODIUM 40 MG/1
40 TABLET, DELAYED RELEASE ORAL DAILY
COMMUNITY
End: 2022-01-13

## 2020-02-07 NOTE — PROGRESS NOTES
[facility]  Office Progress Note  Elsa Alcaraz  1940      Visit Date: 20    PCP: Kemi Hdz MD  [id number] [facility] [id number]    IDENTIFICATION: A 79 y.o. female from [place], [place]  [employer]     PROBLEM LIST:  1. A. fib  1.  echo MVP, mild TR, normal LVEF  2. 15 echo EF 55-60%, mild to moderate MR w no evidence of MVP, mild TR, RVSP 35 mmHg, biatrial enlargement  3. [id number] 4, a/c w Coumadin   2. Dyspnea  1.  MPS WNL  3. HTN  4. HLD  1.   TG 1:30 HDL 60 LDL 17  2. 3/27/18  TG 80 HDL 49 LDL 73  5. DAVID  6. COPD  7. Iron deficiency anemia/bone marrow deficiency   1. Per Dr. Harding, Dr. Fagan  8. GERD  9. Hiatal hernia  10. Obesity  11. Depression/anxiety  12. Surgical history  1. Anal fistulotomy  2. Breast lumpectomy  3.  next line cholecystectomy  4. Partial colectomy  5. Ventral hernia repair      Chief Complaint   Patient presents with   • Atrial Fibrillation   • Shortness of Breath       Allergies  No Known Allergies    Current Medications    Current Outpatient Medications:   •  budesonide-formoterol (SYMBICORT) 80-4.5 MCG/ACT inhaler, Inhale 2 puffs 2 (Two) Times a Day., Disp: , Rfl:   •  cholecalciferol (VITAMIN D3) 25 MCG (1000 UT) tablet, Take 1,000 Units by mouth Daily., Disp: , Rfl:   •  Cyanocobalamin (VITAMIN B-12 IJ), Inject  as directed Every 30 (Thirty) Days., Disp: , Rfl:   •  furosemide (LASIX) 20 MG tablet, Take 1 tablet by mouth 2 (Two) Times a Day., Disp: 6 tablet, Rfl: 0  •  furosemide (LASIX) 20 MG tablet, Take 1 tablet by mouth Daily for 34 doses. Take 1 tablet twice daily for 3 days, then take 1 tablet once daily., Disp: 34 tablet, Rfl: 0  •  losartan (COZAAR) 100 MG tablet, Take 100 mg by mouth Daily., Disp: , Rfl:   •  metoprolol succinate XL (TOPROL-XL) 50 MG 24 hr tablet, Take 50 mg by mouth Daily., Disp: , Rfl:   •  pantoprazole (PROTONIX) 40 MG EC tablet, Take 40 mg by  "mouth Daily., Disp: , Rfl:   •  potassium chloride (K-DUR) 10 MEQ CR tablet, Take 1 tablet by mouth 2 (Two) Times a Day. (Patient taking differently: Take 10 mEq by mouth Daily.), Disp: 6 tablet, Rfl: 0  •  warfarin (COUMADIN) 5 MG tablet, Take 5 mg by mouth Daily., Disp: , Rfl:       History of Present Illness   Elsa Alcaraz is a 79 y.o. year old female here for follow up.  Recently worsened phipps and presentation to ED 1/20 w dyspnea , noted w elevated bnp.  Diuresed w lasix IV then po.  Some improvement since then .  Interested in transition from warfarin to NOAC        OBJECTIVE:  Vitals:    02/07/20 1526   BP: 130/70   BP Location: Right arm   Patient Position: Sitting   Pulse: 96   SpO2: 93%   Weight: 83 kg (183 lb)   Height: 160 cm (63\")     Physical Exam   Constitutional: She appears well-developed and well-nourished.   Neck: Normal range of motion. Neck supple. No hepatojugular reflux and no JVD present. Carotid bruit is not present. No tracheal deviation present. No thyromegaly present.   Cardiovascular: Normal rate, S1 normal, S2 normal, intact distal pulses and normal pulses. An irregularly irregular rhythm present. PMI is not displaced. Exam reveals no gallop, no distant heart sounds, no friction rub, no midsystolic click and no opening snap.   Murmur heard.  Pulses:       Radial pulses are 2+ on the right side, and 2+ on the left side.        Dorsalis pedis pulses are 2+ on the right side, and 2+ on the left side.        Posterior tibial pulses are 2+ on the right side, and 2+ on the left side.   Pulmonary/Chest: Effort normal and breath sounds normal. She has no wheezes. She has no rales.   Abdominal: Soft. Bowel sounds are normal. She exhibits no mass. There is no tenderness. There is no guarding.       Diagnostic Data:  Procedures      ASSESSMENT:   Diagnosis Plan   1. Congestive heart failure, unspecified HF chronicity, unspecified heart failure type (CMS/HCC)  Adult Transthoracic Echo Complete W/ " Cont if Necessary Per Protocol   2. Chronic atrial fibrillation  Adult Transthoracic Echo Complete W/ Cont if Necessary Per Protocol   3. Nonrheumatic mitral valve regurgitation     4. Essential hypertension         PLAN:  CHF/MR -reassess MV w echo Monday in [place]    Chronic afib- transition to Xarelto if not cost prohibitive    Htn controlled losartan    Kemi Hdz MD, thank you for referring Ms. Alcaraz for evaluation.  I have forwarded my electronically generated recommendations to you for review.  Please do not hesitate to call with any questions.      Fidencio Germain MD, FACC

## 2020-02-10 ENCOUNTER — HOSPITAL ENCOUNTER (OUTPATIENT)
Dept: CARDIOLOGY | Facility: HOSPITAL | Age: 80
Discharge: HOME | End: 2020-02-10
Admitting: INTERNAL MEDICINE

## 2020-02-10 DIAGNOSIS — I50.9 CONGESTIVE HEART FAILURE, UNSPECIFIED HF CHRONICITY, UNSPECIFIED HEART FAILURE TYPE (HCC): ICD-10-CM

## 2020-02-10 DIAGNOSIS — I48.20 CHRONIC ATRIAL FIBRILLATION (HCC): ICD-10-CM

## 2020-02-10 PROCEDURE — 93306 TTE W/DOPPLER COMPLETE: CPT

## 2020-02-10 PROCEDURE — 93306 TTE W/DOPPLER COMPLETE: CPT | Performed by: INTERNAL MEDICINE

## 2020-02-11 LAB
BH CV ECHO MEAS - AO MAX PG (FULL): 11.6 MMHG
BH CV ECHO MEAS - AO MAX PG: 14 MMHG
BH CV ECHO MEAS - AO MEAN PG (FULL): 6 MMHG
BH CV ECHO MEAS - AO MEAN PG: 7 MMHG
BH CV ECHO MEAS - AO ROOT AREA (BSA CORRECTED): 1.8
BH CV ECHO MEAS - AO ROOT AREA: 8.6 CM^2
BH CV ECHO MEAS - AO ROOT DIAM: 3.3 CM
BH CV ECHO MEAS - AO V2 MAX: 185 CM/SEC
BH CV ECHO MEAS - AO V2 MEAN: 125 CM/SEC
BH CV ECHO MEAS - AO V2 VTI: 37.2 CM
BH CV ECHO MEAS - ASC AORTA: 3.1 CM
BH CV ECHO MEAS - AVA(I,A): 1.2 CM^2
BH CV ECHO MEAS - AVA(I,D): 1.2 CM^2
BH CV ECHO MEAS - AVA(V,A): 1.5 CM^2
BH CV ECHO MEAS - AVA(V,D): 1.5 CM^2
BH CV ECHO MEAS - BSA(HAYCOCK): 2 M^2
BH CV ECHO MEAS - BSA: 1.9 M^2
BH CV ECHO MEAS - BZI_BMI: 32.4 KILOGRAMS/M^2
BH CV ECHO MEAS - BZI_METRIC_HEIGHT: 160 CM
BH CV ECHO MEAS - BZI_METRIC_WEIGHT: 83 KG
BH CV ECHO MEAS - EDV(CUBED): 251.2 ML
BH CV ECHO MEAS - EDV(TEICH): 201.9 ML
BH CV ECHO MEAS - EF(CUBED): 46.9 %
BH CV ECHO MEAS - EF(MOD-BP): 40 %
BH CV ECHO MEAS - EF(TEICH): 38.4 %
BH CV ECHO MEAS - ESV(CUBED): 133.4 ML
BH CV ECHO MEAS - ESV(TEICH): 124.4 ML
BH CV ECHO MEAS - FS: 19 %
BH CV ECHO MEAS - IVS/LVPW: 0.89
BH CV ECHO MEAS - IVSD: 1 CM
BH CV ECHO MEAS - LA DIMENSION: 5.1 CM
BH CV ECHO MEAS - LA/AO: 1.5
BH CV ECHO MEAS - LAD MAJOR: 6.8 CM
BH CV ECHO MEAS - LAT PEAK E' VEL: 9.8 CM/SEC
BH CV ECHO MEAS - LATERAL E/E' RATIO: 13.2
BH CV ECHO MEAS - LV MASS(C)D: 302.5 GRAMS
BH CV ECHO MEAS - LV MASS(C)DI: 162.5 GRAMS/M^2
BH CV ECHO MEAS - LV MAX PG: 2.4 MMHG
BH CV ECHO MEAS - LV MEAN PG: 1 MMHG
BH CV ECHO MEAS - LV V1 MAX: 78.2 CM/SEC
BH CV ECHO MEAS - LV V1 MEAN: 46.3 CM/SEC
BH CV ECHO MEAS - LV V1 VTI: 13.1 CM
BH CV ECHO MEAS - LVIDD: 6.3 CM
BH CV ECHO MEAS - LVIDS: 5.1 CM
BH CV ECHO MEAS - LVOT AREA (M): 3.5 CM^2
BH CV ECHO MEAS - LVOT AREA: 3.5 CM^2
BH CV ECHO MEAS - LVOT DIAM: 2.1 CM
BH CV ECHO MEAS - LVPWD: 1.2 CM
BH CV ECHO MEAS - MED PEAK E' VEL: 5.8 CM/SEC
BH CV ECHO MEAS - MEDIAL E/E' RATIO: 22.3
BH CV ECHO MEAS - MV DEC SLOPE: 369 CM/SEC^2
BH CV ECHO MEAS - MV DEC TIME: 0.26 SEC
BH CV ECHO MEAS - MV E MAX VEL: 129 CM/SEC
BH CV ECHO MEAS - MV P1/2T MAX VEL: 140 CM/SEC
BH CV ECHO MEAS - MV P1/2T: 111.1 MSEC
BH CV ECHO MEAS - MVA P1/2T LCG: 1.6 CM^2
BH CV ECHO MEAS - MVA(P1/2T): 2 CM^2
BH CV ECHO MEAS - PA ACC SLOPE: 764 CM/SEC^2
BH CV ECHO MEAS - PA ACC TIME: 0.12 SEC
BH CV ECHO MEAS - PA PR(ACCEL): 25.5 MMHG
BH CV ECHO MEAS - PI END-D VEL: 246 CM/SEC
BH CV ECHO MEAS - RAP SYSTOLE: 3 MMHG
BH CV ECHO MEAS - RVSP: 23.6 MMHG
BH CV ECHO MEAS - SI(AO): 170.9 ML/M^2
BH CV ECHO MEAS - SI(CUBED): 63.3 ML/M^2
BH CV ECHO MEAS - SI(LVOT): 24.4 ML/M^2
BH CV ECHO MEAS - SI(TEICH): 41.6 ML/M^2
BH CV ECHO MEAS - SV(AO): 318.2 ML
BH CV ECHO MEAS - SV(CUBED): 117.8 ML
BH CV ECHO MEAS - SV(LVOT): 45.4 ML
BH CV ECHO MEAS - SV(TEICH): 77.5 ML
BH CV ECHO MEAS - TR MAX PG: 20.6 MMHG
BH CV ECHO MEAS - TR MAX VEL: 227 CM/SEC
BH CV ECHO MEASUREMENTS AVERAGE E/E' RATIO: 16.54
LEFT ATRIUM VOLUME INDEX: 59.1 ML/M^2
LEFT ATRIUM VOLUME: 110 ML
LV EF 2D ECHO EST: 45 %

## 2020-02-12 ENCOUNTER — TELEPHONE (OUTPATIENT)
Dept: CARDIOLOGY | Facility: CLINIC | Age: 80
End: 2020-02-12

## 2020-02-12 NOTE — TELEPHONE ENCOUNTER
Spoke with pt regarding echo results. Per [facility] pumping function mildly decreased from echo in 2015. Pt requests I send results to her PCP.   Faxed echo results to Kemi Hdz [phone]    Advised pt to continue current medications and f/u with [facility] as scheduled. Reminded of apt date and time. Pt verbalized understanding and agreeable to plan.

## 2020-03-04 ENCOUNTER — TELEPHONE (OUTPATIENT)
Dept: CARDIOLOGY | Facility: CLINIC | Age: 80
End: 2020-03-04

## 2020-03-04 NOTE — TELEPHONE ENCOUNTER
Patient called wanting to know the results of her echo and that she is having issues with leg swelling SOA on exertion.  Informed the patient that we had spoke to her on 02/12/20 regarding her results.  RN asked patient if she had been checking daily weights patient states yes they keep going up. RN advised patient she could an extra lasix to equal 40 mg a day per 2.2lb weight gain and to follow up with [facility]. Pt verbalized understanding and will call us back with any further questions.

## 2020-03-11 ENCOUNTER — LAB (OUTPATIENT)
Dept: LAB | Facility: HOSPITAL | Age: 80
End: 2020-03-11

## 2020-03-11 ENCOUNTER — TRANSCRIBE ORDERS (OUTPATIENT)
Dept: LAB | Facility: HOSPITAL | Age: 80
End: 2020-03-11

## 2020-03-11 DIAGNOSIS — D50.9 IRON DEFICIENCY ANEMIA, UNSPECIFIED IRON DEFICIENCY ANEMIA TYPE: ICD-10-CM

## 2020-03-11 DIAGNOSIS — D50.9 IRON DEFICIENCY ANEMIA, UNSPECIFIED IRON DEFICIENCY ANEMIA TYPE: Primary | ICD-10-CM

## 2020-03-11 LAB
ALBUMIN SERPL-MCNC: 4 G/DL (ref 3.5–5.2)
ALBUMIN/GLOB SERPL: 1.4 G/DL
ALP SERPL-CCNC: 100 U/L (ref 39–117)
ALT SERPL W P-5'-P-CCNC: 11 U/L (ref 1–33)
ANION GAP SERPL CALCULATED.3IONS-SCNC: 10.5 MMOL/L (ref 5–15)
AST SERPL-CCNC: 16 U/L (ref 1–32)
BASOPHILS # BLD AUTO: 0.1 10*3/MM3 (ref 0–0.2)
BASOPHILS NFR BLD AUTO: 0.9 % (ref 0–1.5)
BILIRUB SERPL-MCNC: 1.1 MG/DL (ref 0.2–1.2)
BUN BLD-MCNC: 21 MG/DL (ref 8–23)
BUN/CREAT SERPL: 29.2 (ref 7–25)
CALCIUM SPEC-SCNC: 9 MG/DL (ref 8.6–10.5)
CHLORIDE SERPL-SCNC: 100 MMOL/L (ref 98–107)
CO2 SERPL-SCNC: 28.5 MMOL/L (ref 22–29)
CREAT BLD-MCNC: 0.72 MG/DL (ref 0.57–1)
DEPRECATED RDW RBC AUTO: 45.4 FL (ref 37–54)
EOSINOPHIL # BLD AUTO: 0.32 10*3/MM3 (ref 0–0.4)
EOSINOPHIL NFR BLD AUTO: 2.9 % (ref 0.3–6.2)
ERYTHROCYTE [DISTWIDTH] IN BLOOD BY AUTOMATED COUNT: 14.8 % (ref 12.3–15.4)
FERRITIN SERPL-MCNC: 23 NG/ML (ref 13–150)
GFR SERPL CREATININE-BSD FRML MDRD: 78 ML/MIN/1.73
GLOBULIN UR ELPH-MCNC: 2.8 GM/DL
GLUCOSE BLD-MCNC: 98 MG/DL (ref 65–99)
HCT VFR BLD AUTO: 36.5 % (ref 34–46.6)
HGB BLD-MCNC: 10.8 G/DL (ref 12–15.9)
HYPOCHROMIA BLD QL: NORMAL
IMM GRANULOCYTES # BLD AUTO: 0.05 10*3/MM3 (ref 0–0.05)
IMM GRANULOCYTES NFR BLD AUTO: 0.5 % (ref 0–0.5)
IRON 24H UR-MRATE: 30 MCG/DL (ref 37–145)
LYMPHOCYTES # BLD AUTO: 1.37 10*3/MM3 (ref 0.7–3.1)
LYMPHOCYTES NFR BLD AUTO: 12.6 % (ref 19.6–45.3)
MCH RBC QN AUTO: 25 PG (ref 26.6–33)
MCHC RBC AUTO-ENTMCNC: 29.6 G/DL (ref 31.5–35.7)
MCV RBC AUTO: 84.5 FL (ref 79–97)
MONOCYTES # BLD AUTO: 1.57 10*3/MM3 (ref 0.1–0.9)
MONOCYTES NFR BLD AUTO: 14.4 % (ref 5–12)
NEUTROPHILS # BLD AUTO: 7.48 10*3/MM3 (ref 1.7–7)
NEUTROPHILS NFR BLD AUTO: 68.7 % (ref 42.7–76)
NRBC BLD AUTO-RTO: 0 /100 WBC (ref 0–0.2)
PLATELET # BLD AUTO: 366 10*3/MM3 (ref 140–450)
PMV BLD AUTO: 9.7 FL (ref 6–12)
POTASSIUM BLD-SCNC: 3.8 MMOL/L (ref 3.5–5.2)
PROT SERPL-MCNC: 6.8 G/DL (ref 6–8.5)
RBC # BLD AUTO: 4.32 10*6/MM3 (ref 3.77–5.28)
SMALL PLATELETS BLD QL SMEAR: ADEQUATE
SODIUM BLD-SCNC: 139 MMOL/L (ref 136–145)
WBC MORPH BLD: NORMAL
WBC NRBC COR # BLD: 10.89 10*3/MM3 (ref 3.4–10.8)

## 2020-03-11 PROCEDURE — 85007 BL SMEAR W/DIFF WBC COUNT: CPT | Performed by: INTERNAL MEDICINE

## 2020-03-11 PROCEDURE — 82728 ASSAY OF FERRITIN: CPT

## 2020-03-11 PROCEDURE — 36415 COLL VENOUS BLD VENIPUNCTURE: CPT | Performed by: INTERNAL MEDICINE

## 2020-03-11 PROCEDURE — 83540 ASSAY OF IRON: CPT

## 2020-03-11 PROCEDURE — 85025 COMPLETE CBC W/AUTO DIFF WBC: CPT | Performed by: INTERNAL MEDICINE

## 2020-03-11 PROCEDURE — 80053 COMPREHEN METABOLIC PANEL: CPT | Performed by: INTERNAL MEDICINE

## 2020-03-12 ENCOUNTER — TRANSCRIBE ORDERS (OUTPATIENT)
Dept: ADMINISTRATIVE | Facility: HOSPITAL | Age: 80
End: 2020-03-12

## 2020-03-12 DIAGNOSIS — R06.02 SOB (SHORTNESS OF BREATH): Primary | ICD-10-CM

## 2020-03-12 DIAGNOSIS — I26.99 RECURRENT PULMONARY EMBOLI (HCC): ICD-10-CM

## 2020-03-12 NOTE — PROGRESS NOTES
[facility] at [facility]  Office Progress Note  Elsa Alcaraz  1940  [address] [place] [address]       Visit Date: 20    PCP: Kemi Hdz MD  [address] [facility] [place] [address]    IDENTIFICATION: A 79 y.o. female from [place], [place]  [employer]     PROBLEM LIST:  1. A. fib  1.  echo MVP, mild TR, normal LVEF  2. 15 echo EF 55-60%, mild to moderate MR w no evidence of MVP, mild TR, RVSP 35 mmHg, biatrial enlargement  3. [id number] 4, a/c w Coumadin   4. 2020 echo: EF 45%, AV calcification, AV max PG 14 mmHg, mod MR, mild TR, RVSP 23.6 mmHg, incidental afib noted  2. Dyspnea  1.  MPS WNL  3. HTN  4. HLD  1.   TG 1:30 HDL 60 LDL 17  2. 3/27/18  TG 80 HDL 49 LDL 73  5. DAVID  6. COPD  7. Iron deficiency anemia/bone marrow deficiency   1. Per Dr. Harding, Dr. Fagan  8. GERD  9. Hiatal hernia  10. Obesity  11. Depression/anxiety  12. Surgical history  1. Anal fistulotomy  2. Breast lumpectomy  3.  next line cholecystectomy  4. Partial colectomy  5. Ventral hernia repair        Chief Complaint   Patient presents with   • Congestive Heart Failure   • Shortness of Breath   • Edema     in ankles and feet       Allergies  No Known Allergies    Current Medications    Current Outpatient Medications:   •  budesonide-formoterol (SYMBICORT) 80-4.5 MCG/ACT inhaler, Inhale 2 puffs 2 (Two) Times a Day., Disp: , Rfl:   •  cholecalciferol (VITAMIN D3) 25 MCG (1000 UT) tablet, Take 1,000 Units by mouth Daily., Disp: , Rfl:   •  citalopram (CeleXA) 20 MG tablet, Take 20 mg by mouth Daily., Disp: , Rfl:   •  furosemide (LASIX) 20 MG tablet, Take 1 tablet by mouth 2 (Two) Times a Day. (Patient taking differently: Take 20 mg by mouth Daily.), Disp: 6 tablet, Rfl: 0  •  losartan (COZAAR) 100 MG tablet, Take 100 mg by mouth Daily., Disp: , Rfl:   •  metoprolol succinate XL (TOPROL-XL) 50 MG 24 hr tablet, Take 50 mg by mouth Daily., Disp: ,  "Rfl:   •  pantoprazole (PROTONIX) 40 MG EC tablet, Take 40 mg by mouth Daily., Disp: , Rfl:   •  XARELTO 20 MG tablet, Take 20 mg by mouth Daily., Disp: , Rfl:   •  Cyanocobalamin (VITAMIN B-12 IJ), Inject  as directed Every 30 (Thirty) Days., Disp: , Rfl:   •  potassium chloride (K-DUR) 10 MEQ CR tablet, Take 1 tablet by mouth 2 (Two) Times a Day. (Patient taking differently: Take 10 mEq by mouth Daily.), Disp: 6 tablet, Rfl: 0      History of Present Illness   Elsa Alcaraz is a 79 y.o. year old female here for follow up.  Mildly decreased functional capacity worsening shortness of breath and diffuse swelling has become somewhat more problematic for her        OBJECTIVE:  Vitals:    03/16/20 1302   BP: 132/78   BP Location: Right arm   Patient Position: Sitting   Pulse: 79   SpO2: 97%   Weight: 84.8 kg (187 lb)   Height: 160 cm (63\")     Physical Exam   Constitutional: She appears well-developed and well-nourished.   Neck: Normal range of motion. Neck supple. No hepatojugular reflux and no JVD present. Carotid bruit is not present. No tracheal deviation present. No thyromegaly present.   Cardiovascular: Normal rate, S1 normal, S2 normal, intact distal pulses and normal pulses. An irregularly irregular rhythm present. PMI is not displaced. Exam reveals no gallop, no distant heart sounds, no friction rub, no midsystolic click and no opening snap.   No murmur heard.  Pulses:       Radial pulses are 2+ on the right side, and 2+ on the left side.        Dorsalis pedis pulses are 2+ on the right side, and 2+ on the left side.        Posterior tibial pulses are 2+ on the right side, and 2+ on the left side.   Pulmonary/Chest: Effort normal and breath sounds normal. She has no wheezes. She has no rales.   Abdominal: Soft. Bowel sounds are normal. She exhibits no mass. There is no tenderness. There is no guarding.       Diagnostic Data:  Procedures      ASSESSMENT:   Diagnosis Plan   1. Chronic systolic congestive heart " failure (CMS/[facility])     2. Chronic atrial fibrillation     3. Essential hypertension         PLAN:  Chronic systolic congestive heart failure continued angiotensin blocker beta-blockade titration of diuretic as weight requires    Chronic A. fib rate controlled anticoagulated    Hypertension controlled on losartan metoprolol    Lower extremity edema secondary to venous insufficiency and CHF recommended zip up compression hose elevation and liberalization of diuretic as necessary      Kemi Hdz MD, thank you for referring Ms. Alcaraz for evaluation.  I have forwarded my electronically generated recommendations to you for review.  Please do not hesitate to call with any questions.      Fidencio Germain MD, FACC

## 2020-03-16 ENCOUNTER — OFFICE VISIT (OUTPATIENT)
Dept: CARDIOLOGY | Facility: CLINIC | Age: 80
End: 2020-03-16

## 2020-03-16 VITALS
SYSTOLIC BLOOD PRESSURE: 132 MMHG | HEART RATE: 79 BPM | DIASTOLIC BLOOD PRESSURE: 78 MMHG | HEIGHT: 63 IN | BODY MASS INDEX: 33.13 KG/M2 | WEIGHT: 187 LBS | OXYGEN SATURATION: 97 %

## 2020-03-16 DIAGNOSIS — I10 ESSENTIAL HYPERTENSION: ICD-10-CM

## 2020-03-16 DIAGNOSIS — I48.20 CHRONIC ATRIAL FIBRILLATION (HCC): ICD-10-CM

## 2020-03-16 DIAGNOSIS — I50.22 CHRONIC SYSTOLIC CONGESTIVE HEART FAILURE (HCC): Primary | ICD-10-CM

## 2020-03-16 PROCEDURE — 99214 OFFICE O/P EST MOD 30 MIN: CPT | Performed by: INTERNAL MEDICINE

## 2020-03-16 RX ORDER — CITALOPRAM 20 MG/1
20 TABLET ORAL DAILY
COMMUNITY
Start: 2020-02-14

## 2020-03-16 RX ORDER — RIVAROXABAN 20 MG/1
20 TABLET, FILM COATED ORAL DAILY
COMMUNITY
Start: 2020-02-24 | End: 2021-03-22 | Stop reason: SDUPTHER

## 2020-03-20 ENCOUNTER — HOSPITAL ENCOUNTER (OUTPATIENT)
Dept: GENERAL RADIOLOGY | Facility: HOSPITAL | Age: 80
Discharge: HOME | End: 2020-03-20
Admitting: INTERNAL MEDICINE

## 2020-03-20 ENCOUNTER — HOSPITAL ENCOUNTER (OUTPATIENT)
Dept: NUCLEAR MEDICINE | Facility: HOSPITAL | Age: 80
Discharge: HOME | End: 2020-03-20

## 2020-03-20 DIAGNOSIS — R06.02 SOB (SHORTNESS OF BREATH): ICD-10-CM

## 2020-03-20 DIAGNOSIS — I26.99 RECURRENT PULMONARY EMBOLI (HCC): ICD-10-CM

## 2020-03-20 PROCEDURE — A9540 TC99M MAA: HCPCS | Performed by: INTERNAL MEDICINE

## 2020-03-20 PROCEDURE — 0 TECHNETIUM ALBUMIN AGGREGATED: Performed by: INTERNAL MEDICINE

## 2020-03-20 PROCEDURE — A9539 TC99M PENTETATE: HCPCS | Performed by: INTERNAL MEDICINE

## 2020-03-20 PROCEDURE — 0 TECHNETIUM TC 99M PENTETATE KIT: Performed by: INTERNAL MEDICINE

## 2020-03-20 PROCEDURE — 78582 LUNG VENTILAT&PERFUS IMAGING: CPT

## 2020-03-20 PROCEDURE — 71046 X-RAY EXAM CHEST 2 VIEWS: CPT

## 2020-03-20 RX ADMIN — KIT FOR THE PREPARATION OF TECHNETIUM TC 99M PENTETATE 1 DOSE: 20 INJECTION, POWDER, LYOPHILIZED, FOR SOLUTION INTRAVENOUS; RESPIRATORY (INHALATION) at 09:35

## 2020-03-20 RX ADMIN — Medication 1 DOSE: at 09:55

## 2020-09-06 ENCOUNTER — APPOINTMENT (OUTPATIENT)
Dept: CT IMAGING | Facility: HOSPITAL | Age: 80
End: 2020-09-06

## 2020-09-06 ENCOUNTER — HOSPITAL ENCOUNTER (EMERGENCY)
Facility: HOSPITAL | Age: 80
Discharge: HOME | End: 2020-09-06
Attending: STUDENT IN AN ORGANIZED HEALTH CARE EDUCATION/TRAINING PROGRAM | Admitting: EMERGENCY MEDICINE

## 2020-09-06 ENCOUNTER — APPOINTMENT (OUTPATIENT)
Dept: GENERAL RADIOLOGY | Facility: HOSPITAL | Age: 80
End: 2020-09-06

## 2020-09-06 VITALS
TEMPERATURE: 98.1 F | HEIGHT: 63 IN | WEIGHT: 170 LBS | BODY MASS INDEX: 30.12 KG/M2 | SYSTOLIC BLOOD PRESSURE: 123 MMHG | OXYGEN SATURATION: 96 % | DIASTOLIC BLOOD PRESSURE: 79 MMHG | HEART RATE: 101 BPM | RESPIRATION RATE: 18 BRPM

## 2020-09-06 DIAGNOSIS — Z79.01 CHRONIC ANTICOAGULATION: ICD-10-CM

## 2020-09-06 DIAGNOSIS — I50.9 ACUTE ON CHRONIC CONGESTIVE HEART FAILURE, UNSPECIFIED HEART FAILURE TYPE (HCC): ICD-10-CM
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
## 2020-09-06 DIAGNOSIS — M25.551 HIP PAIN, RIGHT: ICD-10-CM

## 2020-09-06 DIAGNOSIS — I48.11 LONGSTANDING PERSISTENT ATRIAL FIBRILLATION (HCC): ICD-10-CM

## 2020-09-06 LAB
ALBUMIN SERPL-MCNC: 3.9 G/DL (ref 3.5–5.2)
ALBUMIN/GLOB SERPL: 1.3 G/DL
ALP SERPL-CCNC: 123 U/L (ref 39–117)
ALT SERPL W P-5'-P-CCNC: 22 U/L (ref 1–33)
ANION GAP SERPL CALCULATED.3IONS-SCNC: 12.2 MMOL/L (ref 5–15)
AST SERPL-CCNC: 25 U/L (ref 1–32)
BACTERIA UR QL AUTO: ABNORMAL /HPF
BASOPHILS # BLD AUTO: 0.08 10*3/MM3 (ref 0–0.2)
BASOPHILS NFR BLD AUTO: 0.5 % (ref 0–1.5)
BILIRUB SERPL-MCNC: 1.9 MG/DL (ref 0–1.2)
BILIRUB UR QL STRIP: NEGATIVE
BUN SERPL-MCNC: 15 MG/DL (ref 8–23)
BUN/CREAT SERPL: 17 (ref 7–25)
CALCIUM SPEC-SCNC: 8.9 MG/DL (ref 8.6–10.5)
CHLORIDE SERPL-SCNC: 96 MMOL/L (ref 98–107)
CLARITY UR: CLEAR
CO2 SERPL-SCNC: 26.8 MMOL/L (ref 22–29)
COLOR UR: YELLOW
CREAT SERPL-MCNC: 0.88 MG/DL (ref 0.57–1)
DEPRECATED RDW RBC AUTO: 45.4 FL (ref 37–54)
EOSINOPHIL # BLD AUTO: 0.05 10*3/MM3 (ref 0–0.4)
EOSINOPHIL NFR BLD AUTO: 0.3 % (ref 0.3–6.2)
ERYTHROCYTE [DISTWIDTH] IN BLOOD BY AUTOMATED COUNT: 17.7 % (ref 12.3–15.4)
GFR SERPL CREATININE-BSD FRML MDRD: 62 ML/MIN/1.73
GLOBULIN UR ELPH-MCNC: 2.9 GM/DL
GLUCOSE SERPL-MCNC: 131 MG/DL (ref 65–99)
GLUCOSE UR STRIP-MCNC: NEGATIVE MG/DL
HCT VFR BLD AUTO: 35.3 % (ref 34–46.6)
HGB BLD-MCNC: 10.6 G/DL (ref 12–15.9)
HGB UR QL STRIP.AUTO: ABNORMAL
IMM GRANULOCYTES # BLD AUTO: 0.11 10*3/MM3 (ref 0–0.05)
IMM GRANULOCYTES NFR BLD AUTO: 0.7 % (ref 0–0.5)
KETONES UR QL STRIP: NEGATIVE
LEUKOCYTE ESTERASE UR QL STRIP.AUTO: NEGATIVE
LYMPHOCYTES # BLD AUTO: 0.61 10*3/MM3 (ref 0.7–3.1)
LYMPHOCYTES NFR BLD AUTO: 4.1 % (ref 19.6–45.3)
MCH RBC QN AUTO: 21.7 PG (ref 26.6–33)
MCHC RBC AUTO-ENTMCNC: 30 G/DL (ref 31.5–35.7)
MCV RBC AUTO: 72.2 FL (ref 79–97)
MICROCYTES BLD QL: NORMAL
MONOCYTES # BLD AUTO: 1.3 10*3/MM3 (ref 0.1–0.9)
MONOCYTES NFR BLD AUTO: 8.7 % (ref 5–12)
NEUTROPHILS NFR BLD AUTO: 12.85 10*3/MM3 (ref 1.7–7)
NEUTROPHILS NFR BLD AUTO: 85.7 % (ref 42.7–76)
NITRITE UR QL STRIP: NEGATIVE
NRBC BLD AUTO-RTO: 0 /100 WBC (ref 0–0.2)
NT-PROBNP SERPL-MCNC: 4289 PG/ML (ref 0–1800)
PH UR STRIP.AUTO: <=5 [PH] (ref 5–8)
PLATELET # BLD AUTO: 418 10*3/MM3 (ref 140–450)
PMV BLD AUTO: 9.4 FL (ref 6–12)
POTASSIUM SERPL-SCNC: 3.3 MMOL/L (ref 3.5–5.2)
PROT SERPL-MCNC: 6.8 G/DL (ref 6–8.5)
PROT UR QL STRIP: NEGATIVE
RBC # BLD AUTO: 4.89 10*6/MM3 (ref 3.77–5.28)
RBC # UR: ABNORMAL /HPF
REF LAB TEST METHOD: ABNORMAL
SMALL PLATELETS BLD QL SMEAR: ADEQUATE
SODIUM SERPL-SCNC: 135 MMOL/L (ref 136–145)
SP GR UR STRIP: 1.01 (ref 1–1.03)
SQUAMOUS #/AREA URNS HPF: ABNORMAL /HPF
UROBILINOGEN UR QL STRIP: ABNORMAL
WBC # BLD AUTO: 15 10*3/MM3 (ref 3.4–10.8)
WBC MORPH BLD: NORMAL
WBC UR QL AUTO: ABNORMAL /HPF

## 2020-09-06 PROCEDURE — 25010000002 MORPHINE SULFATE (PF) 2 MG/ML SOLUTION: Performed by: EMERGENCY MEDICINE

## 2020-09-06 PROCEDURE — 83880 ASSAY OF NATRIURETIC PEPTIDE: CPT | Performed by: STUDENT IN AN ORGANIZED HEALTH CARE EDUCATION/TRAINING PROGRAM

## 2020-09-06 PROCEDURE — 25010000002 FUROSEMIDE PER 20 MG: Performed by: EMERGENCY MEDICINE

## 2020-09-06 PROCEDURE — 96374 THER/PROPH/DIAG INJ IV PUSH: CPT

## 2020-09-06 PROCEDURE — P9612 CATHETERIZE FOR URINE SPEC: HCPCS

## 2020-09-06 PROCEDURE — 72192 CT PELVIS W/O DYE: CPT

## 2020-09-06 PROCEDURE — 80053 COMPREHEN METABOLIC PANEL: CPT | Performed by: STUDENT IN AN ORGANIZED HEALTH CARE EDUCATION/TRAINING PROGRAM

## 2020-09-06 PROCEDURE — 81001 URINALYSIS AUTO W/SCOPE: CPT | Performed by: STUDENT IN AN ORGANIZED HEALTH CARE EDUCATION/TRAINING PROGRAM

## 2020-09-06 PROCEDURE — 85025 COMPLETE CBC W/AUTO DIFF WBC: CPT | Performed by: STUDENT IN AN ORGANIZED HEALTH CARE EDUCATION/TRAINING PROGRAM

## 2020-09-06 PROCEDURE — 96375 TX/PRO/DX INJ NEW DRUG ADDON: CPT

## 2020-09-06 PROCEDURE — 70450 CT HEAD/BRAIN W/O DYE: CPT

## 2020-09-06 PROCEDURE — 85007 BL SMEAR W/DIFF WBC COUNT: CPT | Performed by: STUDENT IN AN ORGANIZED HEALTH CARE EDUCATION/TRAINING PROGRAM

## 2020-09-06 PROCEDURE — 99284 EMERGENCY DEPT VISIT MOD MDM: CPT

## 2020-09-06 PROCEDURE — 72170 X-RAY EXAM OF PELVIS: CPT

## 2020-09-06 PROCEDURE — 93005 ELECTROCARDIOGRAM TRACING: CPT | Performed by: STUDENT IN AN ORGANIZED HEALTH CARE EDUCATION/TRAINING PROGRAM

## 2020-09-06 PROCEDURE — 71045 X-RAY EXAM CHEST 1 VIEW: CPT

## 2020-09-06 RX ORDER — TRAMADOL HYDROCHLORIDE 50 MG/1
50 TABLET ORAL EVERY 8 HOURS PRN
Qty: 9 TABLET | Refills: 0 | Status: SHIPPED | OUTPATIENT
Start: 2020-09-06 | End: 2021-04-05 | Stop reason: HOSPADM

## 2020-09-06 RX ORDER — HYDROCODONE BITARTRATE AND ACETAMINOPHEN 5; 325 MG/1; MG/1
1 TABLET ORAL ONCE
Status: DISCONTINUED | OUTPATIENT
Start: 2020-09-06 | End: 2020-09-06

## 2020-09-06 RX ORDER — MORPHINE SULFATE 2 MG/ML
2 INJECTION, SOLUTION INTRAMUSCULAR; INTRAVENOUS ONCE
Status: COMPLETED | OUTPATIENT
Start: 2020-09-06 | End: 2020-09-06

## 2020-09-06 RX ORDER — FUROSEMIDE 10 MG/ML
40 INJECTION INTRAMUSCULAR; INTRAVENOUS ONCE
Status: COMPLETED | OUTPATIENT
Start: 2020-09-06 | End: 2020-09-06

## 2020-09-06 RX ADMIN — MORPHINE SULFATE 2 MG: 2 INJECTION, SOLUTION INTRAMUSCULAR; INTRAVENOUS at 08:43

## 2020-09-06 RX ADMIN — FUROSEMIDE 40 MG: 10 INJECTION, SOLUTION INTRAMUSCULAR; INTRAVENOUS at 10:12

## 2020-09-06 NOTE — ED PROVIDER NOTES
"Subjective   80-year-old female who presents to the emergency department after having a fall while sitting on the toilet today.  The patient states she has been unsteady and had multiple falls over the past few months.  She reports that she was diagnosed with heart failure back in November and is been a water pill since.  She states that today she got to the toilet and when she stood up to sit back on the toilet she felt like she briefly blacked out.  She did strike her head and states that her right hip hurts.  Patient is able to ambulate at this time.          Review of Systems   All other systems reviewed and are negative.      Past Medical History:   Diagnosis Date   • Anxiety disorder due to general medical condition 1/11/2017   • Arthritis    • Atrial fibrillation (CMS/HCC) 1/11/2017   • Body piercing     BOTH EARS   • Borderline diabetes    • Breast cancer (CMS/HCC) 2003    right-radiation and a lumpectomy   • Cataract 01/11/2017    Left eye surgery 11/19   • Chronic bronchitis (CMS/HCC) 1/11/2017   • Colon cancer (CMS/HCC) 11/30/1998    had surgery and chemo   • COPD (chronic obstructive pulmonary disease) (CMS/[facility])    • Cyanocobalamine deficiency (non anemic)    • Depression 1/11/2017   • Diverticulosis    • Esophageal reflux 1/11/2017   • Hiatal hernia 1/11/2017   • History of bladder infections    • Hx of exercise stress test     \"several years ago by Dr. Mercado\".     • Hx of radiation therapy    • Hypertension 1/11/2017   • Osteoporosis    • Palpitations 1/11/2017   • Problems with swallowing     meat at times per pt report   • Shortness of breath     WITH EXERTION    • Sleep apnea 01/11/2017    NO CPAP   • Tricuspid valve disorder 01/11/2017    Patient doesn't know anything about this   • Wears glasses        No Known Allergies    Past Surgical History:   Procedure Laterality Date   • ANAL FISTULOTOMY     • APPENDECTOMY      1998   • BREAST BIOPSY     • BREAST LUMPECTOMY Right 03/06/2006   • CATARACT " EXTRACTION  2019    both eyes    • CATARACT EXTRACTION W/ INTRAOCULAR LENS IMPLANT Left 2019    Procedure: CATARACT PHACO EXTRACTION WITH INTRAOCULAR LENS IMPLANT LEFT;  Surgeon: Masoud David MD;  Location: [facility] OR;  Service: Ophthalmology   • CATARACT EXTRACTION W/ INTRAOCULAR LENS IMPLANT Right 2019    Procedure: CATARACT PHACO EXTRACTION WITH INTRAOCULAR LENS IMPLANT RIGHT;  Surgeon: Masoud David MD;  Location: [facility] OR;  Service: Ophthalmology   •  SECTION  1981   • CHOLECYSTECTOMY  2009   • COLECTOMY PARTIAL / TOTAL  1998    COLON CANCER   • COLONOSCOPY     • ENDOSCOPY     • ENDOSCOPY N/A 2018    Procedure: ESOPHAGOGASTRODUODENOSCOPY WITH COLD FORCEP BIOPSY;  Surgeon: Vasquez Fagan MD;  Location: [facility] ENDOSCOPY;  Service: Gastroenterology   • ENDOSCOPY N/A 2019    Procedure: ESOPHAGOGASTRODUODENOSCOPY WITH BIOPSY;  Surgeon: Vasquez Fagan MD;  Location: [facility] ENDOSCOPY;  Service: Gastroenterology   • HYSTERECTOMY         • VENTRAL HERNIA REPAIR  10/28/2012       Family History   Problem Relation Age of Onset   • Hypertension Mother    • Asthma Mother    • COPD Mother    • Osteoarthritis Mother    • Cancer Father    • Cancer Sister    • Diabetes Maternal Grandmother        Social History     Socioeconomic History   • Marital status:      Spouse name: Not on file   • Number of children: Not on file   • Years of education: Not on file   • Highest education level: Not on file   Tobacco Use   • Smoking status: Never Smoker   • Smokeless tobacco: Never Used   Substance and Sexual Activity   • Alcohol use: Yes     Alcohol/week: 1.0 - 2.0 standard drinks     Types: 1 - 2 Glasses of wine per week     Frequency: Never     Comment: occas   • Drug use: No           Objective   Physical Exam   HENT:   Head:       Nursing note and vitals reviewed.      GEN: No acute distress  Head: See graphical documentation  Eyes: Pupils equal round  reactive to light  ENT: Posterior pharynx normal in appearance, oral mucosa is moist  Chest: Nontender to palpation  Cardiovascular: Irregularly irregular   lungs: Clear to auscultation bilaterally  Abdomen: Soft, nontender, nondistended, no peritoneal signs  Extremities: Acceptable range of motion of bilateral hips with flexion, extension, inversion, and eversion.  No gross deformity  Neuro: GCS 15  Psych: Mood and affect are appropriate      Procedures           ED Course  ED Course as of Sep 06 1202   Sun Sep 06, 2020   0708 EKG shows atrial fibrillation with aberrant conduction.  Rate of 99.  Nonspecific ST segments.  Abnormal EKG.  Interpreted by me.    [DT]   0751 Narrative & Impression    PROCEDURE: XR PELVIS 1 OR 2 VW-     HISTORY: fall, trauma, pain left hip     COMPARISON: None.     FINDINGS: An AP view of the pelvis was obtained.  There is irregularity  of the left sacral ala. Age-indeterminate fracture not excluded. No  other fracture is visualized.  There is degenerative joint disease of  the hips bilaterally.  The soft tissues are without acute abnormality.      IMPRESSION:  Possible age-indeterminate left sacral ala fracture.  Consider CT if indicated.                 This report was finalized on 9/6/2020 7:41 AM by Mery Ruth MD.        [PF]   0751 INDICATION: head trauma     TECHNIQUE: Multiple axial CT images were performed from the foramen  magnum to the vertex without contrast.   Coronal reconstruction images  were obtained from the axial data.     COMPARISON: None.     FINDINGS: There is no mass effect or midline shift.  The ventricles are  symmetric in size and configuration.  There is no hydrocephalus.  There  are no extraaxial fluid collections.  There is no intraventricular or  intraparenchymal hemorrhage.  The posterior fossa has a normal CT  appearance.  There is soft tissue edema of the left frontal scalp.  Remaining soft tissues are without acute abnormality. No acute  osseous  abnormalities are present.     IMPRESSION:  No mass effect, midline shift, or intracranial hemorrhage.  Soft tissue edema of the left frontal scalp.                 This study was performed with techniques to keep radiation doses as low  as reasonably achievable (ALARA). Individualized dose reduction  techniques using automated exposure control or adjustment of mA and/or  kV according to the patient size were employed.      This report was finalized on 9/6/2020 7:02 AM by Mery Ruth MD.    [PF]   0904 PROCEDURE: XR CHEST 1 VW-     INDICATION:  fall.     FINDINGS:  A portable view of the chest was obtained.  Comparison is  made to a prior exam dated 03/20/2020.   The heart is enlarged.  Increased interstitial markings have worsened. Left basilar opacity may  represent atelectasis. The lungs are otherwise clear. A small left  pleural effusion is not excluded. There is no pneumothorax. Surgical  clips are noted in the right axilla.     IMPRESSION:  1. Worsening interstitial opacities. This may represent mild  interstitial edema.  2. Left basilar opacity, favor atelectasis.     This report was finalized on 9/6/2020 8:59 AM by Mery Ruth MD.    [PF]   0905    INDICATION:  pelvic pain fall. Abnormal x-ray.     TECHNIQUE:  Thin section axial images were obtained through the pelvis  without contrast.  Coronal and sagittal reconstruction images were  obtained from the axial data.     COMPARISON:  Plain radiographs dated earlier the same day     FINDINGS:  There is no acute fracture of the pelvis or either hip.  Specifically, no left sacral ala fracture is visualized.  There is  degenerative joint disease of the hips and SI joints bilaterally.  There  is no CT evidence of femoral head AVN.     No acute soft tissue abnormality is identified.       IMPRESSION:  No acute pelvic fracture.        This study was performed with techniques to keep radiation doses as low  as reasonably achievable (ALARA).  Individualized dose reduction  techniques using automated exposure control or adjustment of mA and/or  kV according to the patient size were employed.      This report was finalized on 9/6/2020 8:56 AM by Mery Ruth MD.          [PF]   1156 proBNP(!): 4,289.0 [PF]      ED Course User Index  [DT] Yunior Patricia MD  [PF] Tariq Mora W, DO                                           MDM  Number of Diagnoses or Management Options     Amount and/or Complexity of Data Reviewed  Clinical lab tests: reviewed  Tests in the radiology section of CPT®: reviewed  Decide to obtain previous medical records or to obtain history from someone other than the patient: yes  Obtain history from someone other than the patient: yes  Review and summarize past medical records: yes    12:02  I received care from Dr. Patricia, in regards to imaging and lab follow-up.  There was some concern for possible sacral oneida fracture on x-ray with recommendation for CT scan from radiologist.  CT scan revealed no evidence of fracture or dislocation.  Chest x-ray did show some mild CHF, patient has been using her Lasix 20 mg as needed, instead of daily.  Advised her to increase her Lasix and potassium to twice daily for the next 3 days.  Patient received 40 mg of IV Lasix here with significant diuresis.  Patient did have resting sat at 88% prior to diuresis but felt significant improvement after diuresis.  No evidence of UTI.  BNP is 4289.  Leukocytosis 15.  Labs otherwise unremarkable.  Patient is rate controlled A. fib with chronic anticoagulation.  No evidence of acute intracranial findings on head CT.  No evidence of pneumonia.  Patient will subsequently be discharged home in stable condition with outpatient follow-up return precautions were discussed.  Patient did request pain medication advised to start with Tylenol but will write for short-term tramadol advised of risk of falls and medication complications.  Patient understanding.  Final  diagnoses:   Fall, initial encounter   Hip pain, right   Acute on chronic congestive heart failure, unspecified heart failure type (CMS/[facility])   Chronic anticoagulation   Longstanding persistent atrial fibrillation (CMS/[facility])            Tariq Mora DO  09/06/20 6039

## 2020-09-06 NOTE — DISCHARGE INSTRUCTIONS
Double your potassium and Lasix for the next 2 to 3 days then resume your normal dosing.  If you find you do not have an off of the medication come back to the ER and I can call that into your pharmacy.  Try Tylenol first for your pain if that does not work then you can take the prescribed tramadol.

## 2020-09-09 ENCOUNTER — TRANSCRIBE ORDERS (OUTPATIENT)
Dept: LAB | Facility: HOSPITAL | Age: 80
End: 2020-09-09

## 2020-09-09 ENCOUNTER — LAB (OUTPATIENT)
Dept: LAB | Facility: HOSPITAL | Age: 80
End: 2020-09-09

## 2020-09-09 DIAGNOSIS — D50.9 IRON DEFICIENCY ANEMIA, UNSPECIFIED IRON DEFICIENCY ANEMIA TYPE: Primary | ICD-10-CM

## 2020-09-09 LAB
ALBUMIN SERPL-MCNC: 3.8 G/DL (ref 3.5–5.2)
ALBUMIN/GLOB SERPL: 1.4 G/DL
ALP SERPL-CCNC: 107 U/L (ref 39–117)
ALT SERPL W P-5'-P-CCNC: 19 U/L (ref 1–33)
ANION GAP SERPL CALCULATED.3IONS-SCNC: 9.9 MMOL/L (ref 5–15)
AST SERPL-CCNC: 18 U/L (ref 1–32)
BILIRUB SERPL-MCNC: 1.6 MG/DL (ref 0–1.2)
BUN SERPL-MCNC: 12 MG/DL (ref 8–23)
BUN/CREAT SERPL: 14.8 (ref 7–25)
CALCIUM SPEC-SCNC: 8.9 MG/DL (ref 8.6–10.5)
CHLORIDE SERPL-SCNC: 95 MMOL/L (ref 98–107)
CO2 SERPL-SCNC: 32.1 MMOL/L (ref 22–29)
CREAT SERPL-MCNC: 0.81 MG/DL (ref 0.57–1)
DEPRECATED RDW RBC AUTO: 47.2 FL (ref 37–54)
ERYTHROCYTE [DISTWIDTH] IN BLOOD BY AUTOMATED COUNT: 18.1 % (ref 12.3–15.4)
FERRITIN SERPL-MCNC: 28.78 NG/ML (ref 13–150)
GFR SERPL CREATININE-BSD FRML MDRD: 68 ML/MIN/1.73
GLOBULIN UR ELPH-MCNC: 2.8 GM/DL
GLUCOSE SERPL-MCNC: 105 MG/DL (ref 65–99)
HCT VFR BLD AUTO: 34.5 % (ref 34–46.6)
HGB BLD-MCNC: 10.1 G/DL (ref 12–15.9)
IRON 24H UR-MRATE: 21 MCG/DL (ref 37–145)
IRON 24H UR-MRATE: 22 MCG/DL (ref 37–145)
IRON SATN MFR SERPL: 4 % (ref 20–50)
MCH RBC QN AUTO: 21.5 PG (ref 26.6–33)
MCHC RBC AUTO-ENTMCNC: 29.3 G/DL (ref 31.5–35.7)
MCV RBC AUTO: 73.4 FL (ref 79–97)
PLATELET # BLD AUTO: 419 10*3/MM3 (ref 140–450)
PMV BLD AUTO: 8.9 FL (ref 6–12)
POTASSIUM SERPL-SCNC: 3.6 MMOL/L (ref 3.5–5.2)
PROT SERPL-MCNC: 6.6 G/DL (ref 6–8.5)
RBC # BLD AUTO: 4.7 10*6/MM3 (ref 3.77–5.28)
SODIUM SERPL-SCNC: 137 MMOL/L (ref 136–145)
TIBC SERPL-MCNC: 544 MCG/DL (ref 298–536)
TRANSFERRIN SERPL-MCNC: 365 MG/DL (ref 200–360)
WBC # BLD AUTO: 8.85 10*3/MM3 (ref 3.4–10.8)

## 2020-09-09 PROCEDURE — 80053 COMPREHEN METABOLIC PANEL: CPT

## 2020-09-09 PROCEDURE — 84466 ASSAY OF TRANSFERRIN: CPT

## 2020-09-09 PROCEDURE — 36415 COLL VENOUS BLD VENIPUNCTURE: CPT

## 2020-09-09 PROCEDURE — 82728 ASSAY OF FERRITIN: CPT

## 2020-09-09 PROCEDURE — 83540 ASSAY OF IRON: CPT

## 2020-09-09 PROCEDURE — 85027 COMPLETE CBC AUTOMATED: CPT

## 2020-11-23 ENCOUNTER — HOSPITAL ENCOUNTER (EMERGENCY)
Facility: HOSPITAL | Age: 80
Discharge: HOME | End: 2020-11-23
Attending: EMERGENCY MEDICINE | Admitting: EMERGENCY MEDICINE

## 2020-11-23 ENCOUNTER — APPOINTMENT (OUTPATIENT)
Dept: GENERAL RADIOLOGY | Facility: HOSPITAL | Age: 80
End: 2020-11-23

## 2020-11-23 VITALS
TEMPERATURE: 98.7 F | OXYGEN SATURATION: 97 % | HEIGHT: 63 IN | RESPIRATION RATE: 20 BRPM | HEART RATE: 81 BPM | BODY MASS INDEX: 27.82 KG/M2 | SYSTOLIC BLOOD PRESSURE: 112 MMHG | DIASTOLIC BLOOD PRESSURE: 87 MMHG | WEIGHT: 157 LBS

## 2020-11-23 DIAGNOSIS — J44.1 COPD EXACERBATION (HCC): Primary | ICD-10-CM

## 2020-11-23 LAB
ALBUMIN SERPL-MCNC: 3.6 G/DL (ref 3.5–5.2)
ALBUMIN/GLOB SERPL: 1.3 G/DL
ALP SERPL-CCNC: 111 U/L (ref 39–117)
ALT SERPL W P-5'-P-CCNC: 13 U/L (ref 1–33)
ANION GAP SERPL CALCULATED.3IONS-SCNC: 10.8 MMOL/L (ref 5–15)
ANISOCYTOSIS BLD QL: NORMAL
AST SERPL-CCNC: 20 U/L (ref 1–32)
BASOPHILS # BLD AUTO: 0.07 10*3/MM3 (ref 0–0.2)
BASOPHILS NFR BLD AUTO: 0.9 % (ref 0–1.5)
BILIRUB SERPL-MCNC: 1.4 MG/DL (ref 0–1.2)
BUN SERPL-MCNC: 16 MG/DL (ref 8–23)
BUN/CREAT SERPL: 18.4 (ref 7–25)
CALCIUM SPEC-SCNC: 9.1 MG/DL (ref 8.6–10.5)
CHLORIDE SERPL-SCNC: 98 MMOL/L (ref 98–107)
CO2 SERPL-SCNC: 29.2 MMOL/L (ref 22–29)
CREAT SERPL-MCNC: 0.87 MG/DL (ref 0.57–1)
DEPRECATED RDW RBC AUTO: 67.6 FL (ref 37–54)
EOSINOPHIL # BLD AUTO: 0.19 10*3/MM3 (ref 0–0.4)
EOSINOPHIL NFR BLD AUTO: 2.5 % (ref 0.3–6.2)
ERYTHROCYTE [DISTWIDTH] IN BLOOD BY AUTOMATED COUNT: 21.6 % (ref 12.3–15.4)
FLUAV AG NPH QL: NEGATIVE
FLUBV AG NPH QL IA: NEGATIVE
GFR SERPL CREATININE-BSD FRML MDRD: 63 ML/MIN/1.73
GLOBULIN UR ELPH-MCNC: 2.8 GM/DL
GLUCOSE SERPL-MCNC: 101 MG/DL (ref 65–99)
HCT VFR BLD AUTO: 45.2 % (ref 34–46.6)
HGB BLD-MCNC: 14.5 G/DL (ref 12–15.9)
HOLD SPECIMEN: NORMAL
HOLD SPECIMEN: NORMAL
IMM GRANULOCYTES # BLD AUTO: 0.01 10*3/MM3 (ref 0–0.05)
IMM GRANULOCYTES NFR BLD AUTO: 0.1 % (ref 0–0.5)
LYMPHOCYTES # BLD AUTO: 0.98 10*3/MM3 (ref 0.7–3.1)
LYMPHOCYTES NFR BLD AUTO: 12.9 % (ref 19.6–45.3)
MCH RBC QN AUTO: 27.8 PG (ref 26.6–33)
MCHC RBC AUTO-ENTMCNC: 32.1 G/DL (ref 31.5–35.7)
MCV RBC AUTO: 86.6 FL (ref 79–97)
MONOCYTES # BLD AUTO: 1.06 10*3/MM3 (ref 0.1–0.9)
MONOCYTES NFR BLD AUTO: 13.9 % (ref 5–12)
NEUTROPHILS NFR BLD AUTO: 5.31 10*3/MM3 (ref 1.7–7)
NEUTROPHILS NFR BLD AUTO: 69.7 % (ref 42.7–76)
NRBC BLD AUTO-RTO: 0 /100 WBC (ref 0–0.2)
NT-PROBNP SERPL-MCNC: 3495 PG/ML (ref 0–1800)
PLATELET # BLD AUTO: 279 10*3/MM3 (ref 140–450)
PMV BLD AUTO: 9.6 FL (ref 6–12)
POTASSIUM SERPL-SCNC: 3.8 MMOL/L (ref 3.5–5.2)
PROT SERPL-MCNC: 6.4 G/DL (ref 6–8.5)
RBC # BLD AUTO: 5.22 10*6/MM3 (ref 3.77–5.28)
SARS-COV-2 RNA PNL SPEC NAA+PROBE: NOT DETECTED
SMALL PLATELETS BLD QL SMEAR: ADEQUATE
SODIUM SERPL-SCNC: 138 MMOL/L (ref 136–145)
TROPONIN T SERPL-MCNC: <0.01 NG/ML (ref 0–0.03)
WBC # BLD AUTO: 7.62 10*3/MM3 (ref 3.4–10.8)
WBC MORPH BLD: NORMAL
WHOLE BLOOD HOLD SPECIMEN: NORMAL
WHOLE BLOOD HOLD SPECIMEN: NORMAL

## 2020-11-23 PROCEDURE — 87635 SARS-COV-2 COVID-19 AMP PRB: CPT | Performed by: EMERGENCY MEDICINE

## 2020-11-23 PROCEDURE — 93005 ELECTROCARDIOGRAM TRACING: CPT | Performed by: EMERGENCY MEDICINE

## 2020-11-23 PROCEDURE — 87804 INFLUENZA ASSAY W/OPTIC: CPT | Performed by: EMERGENCY MEDICINE

## 2020-11-23 PROCEDURE — 85025 COMPLETE CBC W/AUTO DIFF WBC: CPT | Performed by: EMERGENCY MEDICINE

## 2020-11-23 PROCEDURE — 71045 X-RAY EXAM CHEST 1 VIEW: CPT

## 2020-11-23 PROCEDURE — 85007 BL SMEAR W/DIFF WBC COUNT: CPT | Performed by: EMERGENCY MEDICINE

## 2020-11-23 PROCEDURE — 83880 ASSAY OF NATRIURETIC PEPTIDE: CPT | Performed by: EMERGENCY MEDICINE

## 2020-11-23 PROCEDURE — 99284 EMERGENCY DEPT VISIT MOD MDM: CPT

## 2020-11-23 PROCEDURE — 84484 ASSAY OF TROPONIN QUANT: CPT | Performed by: EMERGENCY MEDICINE

## 2020-11-23 PROCEDURE — 80053 COMPREHEN METABOLIC PANEL: CPT | Performed by: EMERGENCY MEDICINE

## 2020-11-23 RX ORDER — PREDNISONE 20 MG/1
20 TABLET ORAL 2 TIMES DAILY
Qty: 10 TABLET | Refills: 0 | Status: SHIPPED | OUTPATIENT
Start: 2020-11-23 | End: 2020-11-28

## 2020-11-23 RX ORDER — SODIUM CHLORIDE 0.9 % (FLUSH) 0.9 %
10 SYRINGE (ML) INJECTION AS NEEDED
Status: DISCONTINUED | OUTPATIENT
Start: 2020-11-23 | End: 2020-11-23 | Stop reason: HOSPADM

## 2020-11-23 RX ORDER — ALBUTEROL SULFATE 90 UG/1
2 AEROSOL, METERED RESPIRATORY (INHALATION)
Qty: 18 G | Refills: 0 | Status: SHIPPED | OUTPATIENT
Start: 2020-11-23

## 2020-11-24 ENCOUNTER — APPOINTMENT (OUTPATIENT)
Dept: GENERAL RADIOLOGY | Facility: HOSPITAL | Age: 80
End: 2020-11-24

## 2020-11-24 ENCOUNTER — HOSPITAL ENCOUNTER (EMERGENCY)
Facility: HOSPITAL | Age: 80
Discharge: HOME | End: 2020-11-24
Attending: EMERGENCY MEDICINE | Admitting: EMERGENCY MEDICINE

## 2020-11-24 VITALS
TEMPERATURE: 97.7 F | BODY MASS INDEX: 26.75 KG/M2 | WEIGHT: 151 LBS | HEIGHT: 63 IN | DIASTOLIC BLOOD PRESSURE: 96 MMHG | HEART RATE: 94 BPM | OXYGEN SATURATION: 93 % | SYSTOLIC BLOOD PRESSURE: 137 MMHG | RESPIRATION RATE: 18 BRPM

## 2020-11-24 DIAGNOSIS — I50.9 ACUTE ON CHRONIC CONGESTIVE HEART FAILURE, UNSPECIFIED HEART FAILURE TYPE (HCC): Primary | ICD-10-CM

## 2020-11-24 LAB
ALBUMIN SERPL-MCNC: 3.9 G/DL (ref 3.5–5.2)
ALBUMIN/GLOB SERPL: 1.4 G/DL
ALP SERPL-CCNC: 119 U/L (ref 39–117)
ALT SERPL W P-5'-P-CCNC: 18 U/L (ref 1–33)
ANION GAP SERPL CALCULATED.3IONS-SCNC: 10.7 MMOL/L (ref 5–15)
ANISOCYTOSIS BLD QL: NORMAL
AST SERPL-CCNC: 26 U/L (ref 1–32)
BASOPHILS # BLD AUTO: 0.02 10*3/MM3 (ref 0–0.2)
BASOPHILS NFR BLD AUTO: 0.2 % (ref 0–1.5)
BILIRUB SERPL-MCNC: 1.3 MG/DL (ref 0–1.2)
BUN SERPL-MCNC: 18 MG/DL (ref 8–23)
BUN/CREAT SERPL: 22.5 (ref 7–25)
CALCIUM SPEC-SCNC: 9.5 MG/DL (ref 8.6–10.5)
CHLORIDE SERPL-SCNC: 98 MMOL/L (ref 98–107)
CO2 SERPL-SCNC: 28.3 MMOL/L (ref 22–29)
CREAT SERPL-MCNC: 0.8 MG/DL (ref 0.57–1)
DEPRECATED RDW RBC AUTO: 67.6 FL (ref 37–54)
EOSINOPHIL # BLD AUTO: 0 10*3/MM3 (ref 0–0.4)
EOSINOPHIL NFR BLD AUTO: 0 % (ref 0.3–6.2)
ERYTHROCYTE [DISTWIDTH] IN BLOOD BY AUTOMATED COUNT: 21.2 % (ref 12.3–15.4)
GFR SERPL CREATININE-BSD FRML MDRD: 69 ML/MIN/1.73
GLOBULIN UR ELPH-MCNC: 2.8 GM/DL
GLUCOSE SERPL-MCNC: 141 MG/DL (ref 65–99)
HCT VFR BLD AUTO: 47.4 % (ref 34–46.6)
HGB BLD-MCNC: 15.2 G/DL (ref 12–15.9)
HOLD SPECIMEN: NORMAL
HOLD SPECIMEN: NORMAL
IMM GRANULOCYTES # BLD AUTO: 0.02 10*3/MM3 (ref 0–0.05)
IMM GRANULOCYTES NFR BLD AUTO: 0.2 % (ref 0–0.5)
LYMPHOCYTES # BLD AUTO: 0.56 10*3/MM3 (ref 0.7–3.1)
LYMPHOCYTES NFR BLD AUTO: 5.7 % (ref 19.6–45.3)
MCH RBC QN AUTO: 28 PG (ref 26.6–33)
MCHC RBC AUTO-ENTMCNC: 32.1 G/DL (ref 31.5–35.7)
MCV RBC AUTO: 87.3 FL (ref 79–97)
MONOCYTES # BLD AUTO: 0.31 10*3/MM3 (ref 0.1–0.9)
MONOCYTES NFR BLD AUTO: 3.1 % (ref 5–12)
NEUTROPHILS NFR BLD AUTO: 8.98 10*3/MM3 (ref 1.7–7)
NEUTROPHILS NFR BLD AUTO: 90.8 % (ref 42.7–76)
NRBC BLD AUTO-RTO: 0 /100 WBC (ref 0–0.2)
NT-PROBNP SERPL-MCNC: 4193 PG/ML (ref 0–1800)
PLATELET # BLD AUTO: 301 10*3/MM3 (ref 140–450)
PMV BLD AUTO: 9.8 FL (ref 6–12)
POTASSIUM SERPL-SCNC: 4.2 MMOL/L (ref 3.5–5.2)
PROT SERPL-MCNC: 6.7 G/DL (ref 6–8.5)
RBC # BLD AUTO: 5.43 10*6/MM3 (ref 3.77–5.28)
SMALL PLATELETS BLD QL SMEAR: ADEQUATE
SODIUM SERPL-SCNC: 137 MMOL/L (ref 136–145)
TROPONIN T SERPL-MCNC: <0.01 NG/ML (ref 0–0.03)
WBC # BLD AUTO: 9.89 10*3/MM3 (ref 3.4–10.8)
WBC MORPH BLD: NORMAL
WHOLE BLOOD HOLD SPECIMEN: NORMAL
WHOLE BLOOD HOLD SPECIMEN: NORMAL

## 2020-11-24 PROCEDURE — 25010000002 FUROSEMIDE PER 20 MG: Performed by: EMERGENCY MEDICINE

## 2020-11-24 PROCEDURE — 96374 THER/PROPH/DIAG INJ IV PUSH: CPT

## 2020-11-24 PROCEDURE — 93005 ELECTROCARDIOGRAM TRACING: CPT

## 2020-11-24 PROCEDURE — 85007 BL SMEAR W/DIFF WBC COUNT: CPT | Performed by: EMERGENCY MEDICINE

## 2020-11-24 PROCEDURE — 80053 COMPREHEN METABOLIC PANEL: CPT | Performed by: EMERGENCY MEDICINE

## 2020-11-24 PROCEDURE — 85025 COMPLETE CBC W/AUTO DIFF WBC: CPT | Performed by: EMERGENCY MEDICINE

## 2020-11-24 PROCEDURE — 99283 EMERGENCY DEPT VISIT LOW MDM: CPT

## 2020-11-24 PROCEDURE — 71045 X-RAY EXAM CHEST 1 VIEW: CPT

## 2020-11-24 PROCEDURE — 84484 ASSAY OF TROPONIN QUANT: CPT | Performed by: EMERGENCY MEDICINE

## 2020-11-24 PROCEDURE — 83880 ASSAY OF NATRIURETIC PEPTIDE: CPT | Performed by: EMERGENCY MEDICINE

## 2020-11-24 RX ORDER — FUROSEMIDE 10 MG/ML
40 INJECTION INTRAMUSCULAR; INTRAVENOUS ONCE
Status: COMPLETED | OUTPATIENT
Start: 2020-11-24 | End: 2020-11-24

## 2020-11-24 RX ORDER — SODIUM CHLORIDE 0.9 % (FLUSH) 0.9 %
10 SYRINGE (ML) INJECTION AS NEEDED
Status: DISCONTINUED | OUTPATIENT
Start: 2020-11-24 | End: 2020-11-24 | Stop reason: HOSPADM

## 2020-11-24 RX ORDER — FUROSEMIDE 20 MG/1
20 TABLET ORAL 2 TIMES DAILY
Qty: 60 TABLET | Refills: 0 | OUTPATIENT
Start: 2020-11-24 | End: 2020-12-06

## 2020-11-24 RX ADMIN — FUROSEMIDE 40 MG: 10 INJECTION, SOLUTION INTRAMUSCULAR; INTRAVENOUS at 16:30

## 2020-11-24 NOTE — ED PROVIDER NOTES
"Subjective   History of Present Illness    Chief Complaint: Shortness of breath  History of Present Illness: 80-year-old female presents with shortness of breath persistent symptoms after being evaluated yesterday in ER.  States that she was more short of breath overnight.  Had extensive work-up negative flu negative Covid negative chest x-ray discharged with corticosteroids.  History of A. fib  Onset: Ongoing issue, intermittent worse with exertion  Duration: Intermittent issues recurrent  Exacerbating / Alleviating factors: Use medications without relief  Associated symptoms: None      Nurses Notes reviewed and agree, including vitals, allergies, social history and prior medical history.     REVIEW OF SYSTEMS: All systems reviewed and not pertinent unless noted.    Positive for: Shortness of breath with exertion, abdominal fullness    Negative for: Hemoptysis syncope chest pain palpitations sick contacts travel  Review of Systems    Past Medical History:   Diagnosis Date   • Anxiety disorder due to general medical condition 1/11/2017   • Arthritis    • Atrial fibrillation (CMS/HCC) 1/11/2017   • Body piercing     BOTH EARS   • Borderline diabetes    • Breast cancer (CMS/HCC) 2003    right-radiation and a lumpectomy   • Cataract 01/11/2017    Left eye surgery 11/19   • Chronic bronchitis (CMS/HCC) 1/11/2017   • Colon cancer (CMS/HCC) 11/30/1998    had surgery and chemo   • COPD (chronic obstructive pulmonary disease) (CMS/HCC)    • Cyanocobalamine deficiency (non anemic)    • Depression 1/11/2017   • Diverticulosis    • Esophageal reflux 1/11/2017   • Hiatal hernia 1/11/2017   • History of bladder infections    • Hx of exercise stress test     \"several years ago by Dr. Mercado\".     • Hx of radiation therapy    • Hypertension 1/11/2017   • Osteoporosis    • Palpitations 1/11/2017   • Problems with swallowing     meat at times per pt report   • Shortness of breath     WITH EXERTION    • Sleep apnea 01/11/2017    " NO CPAP   • Tricuspid valve disorder 2017    Patient doesn't know anything about this   • Wears glasses        No Known Allergies    Past Surgical History:   Procedure Laterality Date   • ANAL FISTULOTOMY     • APPENDECTOMY         • BREAST BIOPSY     • BREAST LUMPECTOMY Right 2006   • CATARACT EXTRACTION  2019    both eyes    • CATARACT EXTRACTION W/ INTRAOCULAR LENS IMPLANT Left 2019    Procedure: CATARACT PHACO EXTRACTION WITH INTRAOCULAR LENS IMPLANT LEFT;  Surgeon: Masoud David MD;  Location: [facility] OR;  Service: Ophthalmology   • CATARACT EXTRACTION W/ INTRAOCULAR LENS IMPLANT Right 2019    Procedure: CATARACT PHACO EXTRACTION WITH INTRAOCULAR LENS IMPLANT RIGHT;  Surgeon: Masoud David MD;  Location: [facility] OR;  Service: Ophthalmology   •  SECTION  1981   • CHOLECYSTECTOMY  2009   • COLECTOMY PARTIAL / TOTAL  1998    COLON CANCER   • COLONOSCOPY     • ENDOSCOPY     • ENDOSCOPY N/A 2018    Procedure: ESOPHAGOGASTRODUODENOSCOPY WITH COLD FORCEP BIOPSY;  Surgeon: Vasquez Fagan MD;  Location: [facility] ENDOSCOPY;  Service: Gastroenterology   • ENDOSCOPY N/A 2019    Procedure: ESOPHAGOGASTRODUODENOSCOPY WITH BIOPSY;  Surgeon: Vasquez Fagan MD;  Location: [facility] ENDOSCOPY;  Service: Gastroenterology   • HYSTERECTOMY         • VENTRAL HERNIA REPAIR  10/28/2012       Family History   Problem Relation Age of Onset   • Hypertension Mother    • Asthma Mother    • COPD Mother    • Osteoarthritis Mother    • Cancer Father    • Cancer Sister    • Diabetes Maternal Grandmother        Social History     Socioeconomic History   • Marital status:      Spouse name: Not on file   • Number of children: Not on file   • Years of education: Not on file   • Highest education level: Not on file   Tobacco Use   • Smoking status: Never Smoker   • Smokeless tobacco: Never Used   Substance and Sexual Activity   • Alcohol use: Yes      Alcohol/week: 1.0 - 2.0 standard drinks     Types: 1 - 2 Glasses of wine per week     Frequency: Never     Comment: occas   • Drug use: No           Objective   Physical Exam    GENERAL APPEARANCE: Well developed, 80-year-old white female,  in no acute distress.  VITAL SIGNS: per nursing, reviewed and noted  SKIN: exposed skin with no rashes, ulcerations or petechiae.  Head: Normocephalic, atraumatic.   EYES: perrla. EOMI.  ENT: Normal voice.  Patient maintained wearing a mask throughout patient encounter due to coronavirus pandemic  LUNGS:  No increased work of breathing. No retractions.  Bilateral basal crackles  CARDIOVASCULAR:  regular rate and rhythm, no murmurs.  Good Peripheral pulses. Good cap refill to extremities.  Trace edema  ABDOMEN: Soft, nontender, normal bowel sounds. No hernia. No ascites.  MUSCULOSKELETAL:  No tenderness. Full ROM. Strength and tone normal.  NEUROLOGIC: Alert, oriented x 3. No gross deficits. GCS 15.   NECK: Supple, symmetric. No tenderness, no masses. Full ROM  Back: full rom, no paraspinal spasm. No CVA tenderness.   PSYCH: appropriate affect.  : no bladder tenderness or distention, no CVA tenderness      Procedures     No attending physician procedures were performed on this patient.      ED Course  ED Course as of Nov 24 1801   Tue Nov 24, 2020 1732 EKG interpreted by me reveals A. fib with a rate of 78.  Nonspecific diffuse T wave change.  No ectopy.    [PF]   1759 proBNP(!): 4,193.0 [PF]   1759 Glucose(!): 141 [PF]   1759 BUN: 18 [PF]   1759 Creatinine: 0.80 [PF]   1759 Potassium: 4.2 [PF]   1759 Sodium: 137 [PF]   1759 Chloride: 98 [PF]   1759 CO2: 28.3 [PF]   1759 Calcium: 9.5 [PF]   1759 Total Protein: 6.7 [PF]   1759 Albumin: 3.90 [PF]   1759 ALT (SGPT): 18 [PF]   1759 AST (SGOT): 26 [PF]   1759 Alkaline Phosphatase(!): 119 [PF]   1759 eGFR Non  Am: 69 [PF]   1759 Total Bilirubin(!): 1.3 [PF]   1759 Troponin T: <0.010 [PF]   1759 WBC: 9.89 [PF]   1759  Hemoglobin: 15.2 [PF]   1759 Hematocrit(!): 47.4 [PF]   1759 Platelets: 301 [PF]      ED Course User Index  [PF] Tariq Mora DO         Patient diuresed, room air sats 94%.  Feeling better.  MAR reviewed with patient shows patient is only been taking her Lasix 20 mg once daily instead of twice daily.  Advised to resume twice daily dosing.  Advised to keep outpatient follow-up with her cardiologist, follow-up with primary care physician.  Will discharge patient home stable condition with return precautions discussed.     Reviewed old records with negative flu negative Covid no indications for antibiotics at this time.  Continue other home medications                             MDM    Final diagnoses:   Acute on chronic congestive heart failure, unspecified heart failure type (CMS/[facility])            Tariq Mora,   11/24/20 1031

## 2020-12-05 ENCOUNTER — APPOINTMENT (OUTPATIENT)
Dept: GENERAL RADIOLOGY | Facility: HOSPITAL | Age: 80
End: 2020-12-05

## 2020-12-05 ENCOUNTER — HOSPITAL ENCOUNTER (EMERGENCY)
Facility: HOSPITAL | Age: 80
Discharge: HOME | End: 2020-12-06
Attending: EMERGENCY MEDICINE | Admitting: EMERGENCY MEDICINE

## 2020-12-05 DIAGNOSIS — I50.9 ACUTE ON CHRONIC CONGESTIVE HEART FAILURE, UNSPECIFIED HEART FAILURE TYPE (HCC): Primary | ICD-10-CM

## 2020-12-05 LAB
BASOPHILS # BLD AUTO: 0.07 10*3/MM3 (ref 0–0.2)
BASOPHILS NFR BLD AUTO: 0.7 % (ref 0–1.5)
DEPRECATED RDW RBC AUTO: 65.6 FL (ref 37–54)
EOSINOPHIL # BLD AUTO: 0.21 10*3/MM3 (ref 0–0.4)
EOSINOPHIL NFR BLD AUTO: 2 % (ref 0.3–6.2)
ERYTHROCYTE [DISTWIDTH] IN BLOOD BY AUTOMATED COUNT: 19.2 % (ref 12.3–15.4)
HCT VFR BLD AUTO: 47.6 % (ref 34–46.6)
HGB BLD-MCNC: 14.8 G/DL (ref 12–15.9)
IMM GRANULOCYTES # BLD AUTO: 0.02 10*3/MM3 (ref 0–0.05)
IMM GRANULOCYTES NFR BLD AUTO: 0.2 % (ref 0–0.5)
LYMPHOCYTES # BLD AUTO: 0.89 10*3/MM3 (ref 0.7–3.1)
LYMPHOCYTES NFR BLD AUTO: 8.4 % (ref 19.6–45.3)
MCH RBC QN AUTO: 28 PG (ref 26.6–33)
MCHC RBC AUTO-ENTMCNC: 31.1 G/DL (ref 31.5–35.7)
MCV RBC AUTO: 90.2 FL (ref 79–97)
MONOCYTES # BLD AUTO: 1.24 10*3/MM3 (ref 0.1–0.9)
MONOCYTES NFR BLD AUTO: 11.7 % (ref 5–12)
NEUTROPHILS NFR BLD AUTO: 77 % (ref 42.7–76)
NEUTROPHILS NFR BLD AUTO: 8.13 10*3/MM3 (ref 1.7–7)
NRBC BLD AUTO-RTO: 0 /100 WBC (ref 0–0.2)
PLATELET # BLD AUTO: 274 10*3/MM3 (ref 140–450)
PMV BLD AUTO: 9.8 FL (ref 6–12)
RBC # BLD AUTO: 5.28 10*6/MM3 (ref 3.77–5.28)
WBC # BLD AUTO: 10.56 10*3/MM3 (ref 3.4–10.8)

## 2020-12-05 PROCEDURE — 93005 ELECTROCARDIOGRAM TRACING: CPT | Performed by: EMERGENCY MEDICINE

## 2020-12-05 PROCEDURE — 71045 X-RAY EXAM CHEST 1 VIEW: CPT

## 2020-12-05 PROCEDURE — 84484 ASSAY OF TROPONIN QUANT: CPT | Performed by: EMERGENCY MEDICINE

## 2020-12-05 PROCEDURE — 83880 ASSAY OF NATRIURETIC PEPTIDE: CPT | Performed by: EMERGENCY MEDICINE

## 2020-12-05 PROCEDURE — 80053 COMPREHEN METABOLIC PANEL: CPT | Performed by: EMERGENCY MEDICINE

## 2020-12-05 PROCEDURE — 99284 EMERGENCY DEPT VISIT MOD MDM: CPT

## 2020-12-05 PROCEDURE — 96374 THER/PROPH/DIAG INJ IV PUSH: CPT

## 2020-12-05 PROCEDURE — 85025 COMPLETE CBC W/AUTO DIFF WBC: CPT | Performed by: EMERGENCY MEDICINE

## 2020-12-05 PROCEDURE — 25010000002 METHYLPREDNISOLONE PER 40 MG: Performed by: EMERGENCY MEDICINE

## 2020-12-05 RX ORDER — METHYLPREDNISOLONE SODIUM SUCCINATE 40 MG/ML
80 INJECTION, POWDER, LYOPHILIZED, FOR SOLUTION INTRAMUSCULAR; INTRAVENOUS ONCE
Status: COMPLETED | OUTPATIENT
Start: 2020-12-05 | End: 2020-12-05

## 2020-12-05 RX ORDER — SODIUM CHLORIDE 0.9 % (FLUSH) 0.9 %
10 SYRINGE (ML) INJECTION AS NEEDED
Status: DISCONTINUED | OUTPATIENT
Start: 2020-12-05 | End: 2020-12-06 | Stop reason: HOSPADM

## 2020-12-05 RX ADMIN — METHYLPREDNISOLONE SODIUM SUCCINATE 80 MG: 40 INJECTION, POWDER, FOR SOLUTION INTRAMUSCULAR; INTRAVENOUS at 23:37

## 2020-12-06 VITALS
HEART RATE: 103 BPM | OXYGEN SATURATION: 93 % | WEIGHT: 158 LBS | HEIGHT: 63 IN | SYSTOLIC BLOOD PRESSURE: 120 MMHG | DIASTOLIC BLOOD PRESSURE: 88 MMHG | TEMPERATURE: 98.5 F | BODY MASS INDEX: 28 KG/M2 | RESPIRATION RATE: 19 BRPM

## 2020-12-06 LAB
ALBUMIN SERPL-MCNC: 3.6 G/DL (ref 3.5–5.2)
ALBUMIN/GLOB SERPL: 1.3 G/DL
ALP SERPL-CCNC: 118 U/L (ref 39–117)
ALT SERPL W P-5'-P-CCNC: 20 U/L (ref 1–33)
ANION GAP SERPL CALCULATED.3IONS-SCNC: 8.6 MMOL/L (ref 5–15)
AST SERPL-CCNC: 22 U/L (ref 1–32)
BILIRUB SERPL-MCNC: 1.4 MG/DL (ref 0–1.2)
BUN SERPL-MCNC: 19 MG/DL (ref 8–23)
BUN/CREAT SERPL: 22.4 (ref 7–25)
CALCIUM SPEC-SCNC: 9.2 MG/DL (ref 8.6–10.5)
CHLORIDE SERPL-SCNC: 99 MMOL/L (ref 98–107)
CO2 SERPL-SCNC: 32.4 MMOL/L (ref 22–29)
CREAT SERPL-MCNC: 0.85 MG/DL (ref 0.57–1)
GFR SERPL CREATININE-BSD FRML MDRD: 65 ML/MIN/1.73
GLOBULIN UR ELPH-MCNC: 2.8 GM/DL
GLUCOSE SERPL-MCNC: 145 MG/DL (ref 65–99)
HOLD SPECIMEN: NORMAL
HOLD SPECIMEN: NORMAL
NT-PROBNP SERPL-MCNC: 4203 PG/ML (ref 0–1800)
POTASSIUM SERPL-SCNC: 3.8 MMOL/L (ref 3.5–5.2)
PROT SERPL-MCNC: 6.4 G/DL (ref 6–8.5)
SODIUM SERPL-SCNC: 140 MMOL/L (ref 136–145)
TROPONIN T SERPL-MCNC: <0.01 NG/ML (ref 0–0.03)
WHOLE BLOOD HOLD SPECIMEN: NORMAL
WHOLE BLOOD HOLD SPECIMEN: NORMAL

## 2020-12-06 PROCEDURE — 25010000002 FUROSEMIDE PER 20 MG: Performed by: EMERGENCY MEDICINE

## 2020-12-06 PROCEDURE — 96375 TX/PRO/DX INJ NEW DRUG ADDON: CPT

## 2020-12-06 PROCEDURE — 96376 TX/PRO/DX INJ SAME DRUG ADON: CPT

## 2020-12-06 RX ORDER — FUROSEMIDE 10 MG/ML
80 INJECTION INTRAMUSCULAR; INTRAVENOUS ONCE
Status: COMPLETED | OUTPATIENT
Start: 2020-12-06 | End: 2020-12-06

## 2020-12-06 RX ORDER — POTASSIUM CHLORIDE 750 MG/1
10 TABLET, FILM COATED, EXTENDED RELEASE ORAL 2 TIMES DAILY
Qty: 30 TABLET | Refills: 0 | Status: ON HOLD | OUTPATIENT
Start: 2020-12-06 | End: 2021-04-05 | Stop reason: SDUPTHER

## 2020-12-06 RX ORDER — FUROSEMIDE 40 MG/1
40 TABLET ORAL 2 TIMES DAILY
Qty: 30 TABLET | Refills: 0 | Status: ON HOLD | OUTPATIENT
Start: 2020-12-06 | End: 2021-04-05 | Stop reason: SDUPTHER

## 2020-12-06 RX ADMIN — FUROSEMIDE 80 MG: 10 INJECTION, SOLUTION INTRAMUSCULAR; INTRAVENOUS at 00:49

## 2020-12-06 NOTE — DISCHARGE INSTRUCTIONS
Increase your lasix to 40 mg 2 times daily for the next 3 days, then take it once daily. You can increase it back to 2 times daily as needed for shortness of breath / fluid retention. Take a potassium when you take your lasix.

## 2020-12-10 NOTE — ED PROVIDER NOTES
"Subjective   History of Present Illness    Chief Complaint: Shortness of breath  History of Present Illness: 80-year-old female returns to ER with shortness of breath, onset 3 days.  Reports lower extremity edema.  History of COPD and CHF.  Has been titrating her Lasix upward, and took additional Lasix today.  States that it does not seem to work as well as it does when she comes to the ER and gets it IV.  States that she did feel better after her last visit when she got steroids and Lasix.  Patient is requesting not to be tested for COVID-19 infection again but she has been tested several times and does not feel as though she is having Covid symptoms  Onset: 3 days  Duration: Persistent  Exacerbating / Alleviating factors: Took an extra dose of Lasix without improvement  Associated symptoms: Orthopnea      Nurses Notes reviewed and agree, including vitals, allergies, social history and prior medical history.     REVIEW OF SYSTEMS: All systems reviewed and not pertinent unless noted.    Positive for: Shortness of breath, edema,    Negative for: Fever cough hemoptysis syncope chest pain palpitations sick contact travel  Review of Systems    Past Medical History:   Diagnosis Date   • Anxiety disorder due to general medical condition 1/11/2017   • Arthritis    • Atrial fibrillation (CMS/HCC) 1/11/2017   • Body piercing     BOTH EARS   • Borderline diabetes    • Breast cancer (CMS/HCC) 2003    right-radiation and a lumpectomy   • Cataract 01/11/2017    Left eye surgery 11/19   • Chronic bronchitis (CMS/HCC) 1/11/2017   • Colon cancer (CMS/HCC) 11/30/1998    had surgery and chemo   • COPD (chronic obstructive pulmonary disease) (CMS/HCC)    • Cyanocobalamine deficiency (non anemic)    • Depression 1/11/2017   • Diverticulosis    • Esophageal reflux 1/11/2017   • Hiatal hernia 1/11/2017   • History of bladder infections    • Hx of exercise stress test     \"several years ago by Dr. Mercado\".     • Hx of radiation therapy "    • Hypertension 2017   • Osteoporosis    • Palpitations 2017   • Problems with swallowing     meat at times per pt report   • Shortness of breath     WITH EXERTION    • Sleep apnea 2017    NO CPAP   • Tricuspid valve disorder 2017    Patient doesn't know anything about this   • Wears glasses        No Known Allergies    Past Surgical History:   Procedure Laterality Date   • ANAL FISTULOTOMY     • APPENDECTOMY         • BREAST BIOPSY     • BREAST LUMPECTOMY Right 2006   • CATARACT EXTRACTION  2019    both eyes    • CATARACT EXTRACTION W/ INTRAOCULAR LENS IMPLANT Left 2019    Procedure: CATARACT PHACO EXTRACTION WITH INTRAOCULAR LENS IMPLANT LEFT;  Surgeon: Masoud David MD;  Location: [facility] OR;  Service: Ophthalmology   • CATARACT EXTRACTION W/ INTRAOCULAR LENS IMPLANT Right 2019    Procedure: CATARACT PHACO EXTRACTION WITH INTRAOCULAR LENS IMPLANT RIGHT;  Surgeon: Masoud David MD;  Location: [facility] OR;  Service: Ophthalmology   •  SECTION  1981   • CHOLECYSTECTOMY  2009   • COLECTOMY PARTIAL / TOTAL  1998    COLON CANCER   • COLONOSCOPY     • ENDOSCOPY     • ENDOSCOPY N/A 2018    Procedure: ESOPHAGOGASTRODUODENOSCOPY WITH COLD FORCEP BIOPSY;  Surgeon: Vasquez Fagan MD;  Location: [facility] ENDOSCOPY;  Service: Gastroenterology   • ENDOSCOPY N/A 2019    Procedure: ESOPHAGOGASTRODUODENOSCOPY WITH BIOPSY;  Surgeon: Vasquez Fagan MD;  Location: [facility] ENDOSCOPY;  Service: Gastroenterology   • HYSTERECTOMY         • VENTRAL HERNIA REPAIR  10/28/2012       Family History   Problem Relation Age of Onset   • Hypertension Mother    • Asthma Mother    • COPD Mother    • Osteoarthritis Mother    • Cancer Father    • Cancer Sister    • Diabetes Maternal Grandmother        Social History     Socioeconomic History   • Marital status:      Spouse name: Not on file   • Number of children: Not on file   • Years of  education: Not on file   • Highest education level: Not on file   Tobacco Use   • Smoking status: Never Smoker   • Smokeless tobacco: Never Used   Substance and Sexual Activity   • Alcohol use: Yes     Alcohol/week: 1.0 - 2.0 standard drinks     Types: 1 - 2 Glasses of wine per week     Frequency: Never     Comment: occas   • Drug use: No           Objective   Physical Exam    GENERAL APPEARANCE: Well developed, nontoxic 80-year-old white female,  in no acute distress.  VITAL SIGNS: per nursing, reviewed and noted  SKIN: exposed skin with no rashes, ulcerations or petechiae.  Head: Normocephalic, atraumatic.   EYES: perrla. EOMI.  ENT: Normal voice.  Patient maintained wearing a mask throughout patient encounter due to coronavirus pandemic  LUNGS:  No increased work of breathing. No retractions.   CARDIOVASCULAR:  regular rate and rhythm, no murmurs.  Good Peripheral pulses. Good cap refill to extremities.  1+ lower extremity pretibial edema.  ABDOMEN: Soft, nontender, normal bowel sounds. No hernia. No ascites.  MUSCULOSKELETAL:  No tenderness. Full ROM. Strength and tone normal.  NEUROLOGIC: Alert, oriented x 3. No gross deficits. GCS 15.   NECK: Supple, symmetric. No tenderness, no masses. Full ROM  Back: full rom, no paraspinal spasm. No CVA tenderness.   PSYCH: appropriate affect.  : no bladder tenderness or distention, no CVA tenderness      Procedures     No attending physician procedures were performed on this patient.      ED Course  ED Course as of Dec 10 0819   Sun Dec 06, 2020   0034 EKG interpreted by me reveals A. fib at a rate of 97.  Nonspecific T wave changes.    [PF]   Thu Dec 10, 2020   0816 proBNP(!): 4,203.0 [PF]   0817 Troponin T: <0.010 [PF]   0817 WBC: 10.56 [PF]   0817 Hemoglobin: 14.8 [PF]   0817 Hematocrit(!): 47.6 [PF]   0817 Platelets: 274 [PF]   0817 Glucose(!): 145 [PF]   0817 Creatinine: 0.85 [PF]   0817 BUN: 19 [PF]   0817 Sodium: 140 [PF]   0817 Potassium: 3.8 [PF]   0817  Chloride: 99 [PF]   0817 CO2(!): 32.4 [PF]   0817 Calcium: 9.2 [PF]   0817 Total Protein: 6.4 [PF]   0817 Albumin: 3.60 [PF]   0817 ALT (SGPT): 20 [PF]   0817 AST (SGOT): 22 [PF]   0817 Alkaline Phosphatase(!): 118 [PF]   0817 Total Bilirubin(!): 1.4 [PF]   0817 INDICATION:  soa     FINDINGS:  A portable view of the chest was obtained.  Comparison is  made to a prior exam dated 11/24/2020.   The heart is again noted to be  enlarged. Interstitial opacities have improved. Left basilar opacity is  unchanged. There is no significant pleural effusion or pneumothorax.  Axillary clips are again noted on the right.     IMPRESSION:  Improved interstitial opacities. Stable left basilar  opacity.     This report was finalized on 12/6/2020 8:32 AM by Mery Ruth    [PF]      ED Course User Index  [PF] Tariq Mora,                                            MDM  Patient had initial room air sats 89% otherwise patient was afebrile normotensive, diuresed well after 80 mg of IV Lasix and did receive Solu-Medrol 80 mg IV.  Sats improved to 93%.  No evidence of ACS.  Will increase patient's Lasix to 40 mg twice daily, in the short-term, advised to take potassium each time she takes Lasix.  Prescription for Lasix 40 mg p.o. twice daily as well as potassium chloride 10 mEq twice daily.  Advised outpatient follow-up return precautions were discussed.  Final diagnoses:   Acute on chronic congestive heart failure, unspecified heart failure type (CMS/HCC)            Tariq Mora DO  12/10/20 0819

## 2020-12-14 ENCOUNTER — OFFICE VISIT (OUTPATIENT)
Dept: CARDIOLOGY | Facility: CLINIC | Age: 80
End: 2020-12-14

## 2020-12-14 VITALS
HEIGHT: 63 IN | DIASTOLIC BLOOD PRESSURE: 78 MMHG | HEART RATE: 60 BPM | OXYGEN SATURATION: 94 % | WEIGHT: 157 LBS | BODY MASS INDEX: 27.82 KG/M2 | SYSTOLIC BLOOD PRESSURE: 130 MMHG

## 2020-12-14 DIAGNOSIS — E78.2 MIXED HYPERLIPIDEMIA: ICD-10-CM

## 2020-12-14 DIAGNOSIS — I50.23 ACUTE ON CHRONIC SYSTOLIC CHF (CONGESTIVE HEART FAILURE) (HCC): Primary | ICD-10-CM

## 2020-12-14 DIAGNOSIS — I10 ESSENTIAL HYPERTENSION: ICD-10-CM

## 2020-12-14 DIAGNOSIS — I48.20 CHRONIC ATRIAL FIBRILLATION (HCC): ICD-10-CM

## 2020-12-14 PROCEDURE — 99214 OFFICE O/P EST MOD 30 MIN: CPT | Performed by: INTERNAL MEDICINE

## 2020-12-14 NOTE — PROGRESS NOTES
[facility] at [facility]  Office Progress Note  Elsa Alcaraz  1940  [address] [place] [address]       Visit Date: 20    PCP: Kemi Hdz MD  [address] [place] [address]    IDENTIFICATION: A 80 y.o. female from [facility]  [employer]     PROBLEM LIST:  1. A. fib  1.  echo MVP, mild TR, normal LVEF  2. 15 echo EF 55-60%, mild to moderate MR w no evidence of MVP, mild TR, RVSP 35 mmHg, biatrial enlargement  3. [id number] 4, a/c w Coumadin   4. 2020 echo: EF 45%, AV calcification, AV max PG 14 mmHg, mod MR, mild TR, RVSP 23.6 mmHg, incidental afib noted  2. Dyspnea  1.  MPS WNL  2. proBNP 7013-7334 (<1800)  3. HTN  4. HLD  1.   TG 1:30 HDL 60 LDL 17  2. 3  TG 80 HDL 49 LDL 73  5. DAVID  6. COPD  7. Iron deficiency anemia/bone marrow deficiency   1. Per Dr. Harding, Dr. Fagan  8. GERD  9. Hiatal hernia  10. Obesity  11. Depression/anxiety  12. Surgical history  1. Anal fistulotomy  2. Breast lumpectomy  3.  next line cholecystectomy  4. Partial colectomy  5. Ventral hernia repair        Chief Complaint   Patient presents with   • Congestive Heart Failure   • Shortness of Breath       Allergies  No Known Allergies    Current Medications    Current Outpatient Medications:   •  albuterol sulfate  (90 Base) MCG/ACT inhaler, Inhale 2 puffs 4 (Four) Times a Day., Disp: 18 g, Rfl: 0  •  budesonide-formoterol (SYMBICORT) 80-4.5 MCG/ACT inhaler, Inhale 2 puffs 2 (Two) Times a Day., Disp: , Rfl:   •  cholecalciferol (VITAMIN D3) 25 MCG (1000 UT) tablet, Take 1,000 Units by mouth Daily., Disp: , Rfl:   •  citalopram (CeleXA) 20 MG tablet, Take 20 mg by mouth Daily., Disp: , Rfl:   •  Cyanocobalamin (VITAMIN B-12 IJ), Inject  as directed Every 30 (Thirty) Days., Disp: , Rfl:   •  furosemide (LASIX) 40 MG tablet, Take 1 tablet by mouth 2 (Two) Times a Day., Disp: 30 tablet, Rfl: 0  •  losartan (COZAAR) 100  "MG tablet, Take 100 mg by mouth Daily., Disp: , Rfl:   •  metoprolol succinate XL (TOPROL-XL) 50 MG 24 hr tablet, Take 50 mg by mouth Daily., Disp: , Rfl:   •  pantoprazole (PROTONIX) 40 MG EC tablet, Take 40 mg by mouth Daily., Disp: , Rfl:   •  potassium chloride 10 MEQ CR tablet, Take 1 tablet by mouth 2 (Two) Times a Day., Disp: 30 tablet, Rfl: 0  •  traMADol (ULTRAM) 50 MG tablet, Take 1 tablet by mouth Every 8 (Eight) Hours As Needed for Severe Pain ., Disp: 9 tablet, Rfl: 0  •  XARELTO 20 MG tablet, Take 20 mg by mouth Daily., Disp: , Rfl:       History of Present Illness   Elsa Alcaraz is a 80 y.o. year old female here for follow up.  Recent crescendo shortness of breath requiring ER evaluation for IV diuretics.  She is having some water blister formation over her feet on occasion.  She states that her dyspnea becomes more substantial intermittently.  She is becoming somewhat confused regarding her medications as well at times.          OBJECTIVE:  Vitals:    12/14/20 1253   BP: 130/78   BP Location: Right arm   Patient Position: Sitting   Pulse: 60   SpO2: 94%   Weight: 71.2 kg (157 lb)   Height: 160 cm (63\")     Physical Exam   Constitutional: She appears well-developed and well-nourished.   Neck: Normal range of motion. Neck supple. No hepatojugular reflux and no JVD present. Carotid bruit is not present. No tracheal deviation present. No thyromegaly present.   Cardiovascular: Normal rate, S1 normal, S2 normal, intact distal pulses and normal pulses. An irregularly irregular rhythm present. PMI is not displaced. Exam reveals no gallop, no distant heart sounds, no friction rub, no midsystolic click and no opening snap.   No murmur heard.  Pulses:       Radial pulses are 2+ on the right side and 2+ on the left side.        Dorsalis pedis pulses are 2+ on the right side and 2+ on the left side.        Posterior tibial pulses are 2+ on the right side and 2+ on the left side.   Pulmonary/Chest: Effort normal " and breath sounds normal. She has no wheezes. She has no rales.   Abdominal: Soft. Bowel sounds are normal. She exhibits no mass. There is no abdominal tenderness. There is no guarding.       Diagnostic Data:  Procedures      ASSESSMENT:   Diagnosis Plan   1. Acute on chronic systolic CHF (congestive heart failure) (CMS/[facility])  Basic Metabolic Panel    BNP   2. Chronic atrial fibrillation (CMS/[facility])     3. Essential hypertension     4. Mixed hyperlipidemia         PLAN:  Chronic systolic congestive heart failure continued angiotensin blocker beta-blockade titration of diuretic as weight requires.  Redocument BNP BMP at current as she is increased her diuretic dose.  Echocardiogram next spring    Chronic A. fib rate controlled anticoagulated    Hypertension controlled on losartan metoprolol    Lower extremity edema secondary to venous insufficiency and CHF recommended zip up compression hose elevation and liberalization of diuretic as necessary      Kemi Hdz MD, thank you for referring Ms. Alcaraz for evaluation.  I have forwarded my electronically generated recommendations to you for review.  Please do not hesitate to call with any questions.      Fidencio Germain MD, FACC

## 2020-12-16 LAB
BNP SERPL-MCNC: 1370.5 PG/ML (ref 0–100)
BUN SERPL-MCNC: 18 MG/DL (ref 8–27)
BUN/CREAT SERPL: 19 (ref 12–28)
CALCIUM SERPL-MCNC: 9.2 MG/DL (ref 8.7–10.3)
CHLORIDE SERPL-SCNC: 99 MMOL/L (ref 96–106)
CO2 SERPL-SCNC: 25 MMOL/L (ref 20–29)
CREAT SERPL-MCNC: 0.93 MG/DL (ref 0.57–1)
GLUCOSE SERPL-MCNC: 91 MG/DL (ref 65–99)
POTASSIUM SERPL-SCNC: 4.9 MMOL/L (ref 3.5–5.2)
SODIUM SERPL-SCNC: 140 MMOL/L (ref 134–144)

## 2021-01-21 ENCOUNTER — TRANSCRIBE ORDERS (OUTPATIENT)
Dept: ADMINISTRATIVE | Facility: HOSPITAL | Age: 81
End: 2021-01-21

## 2021-01-21 DIAGNOSIS — R79.89 ELEVATED LFTS: Primary | ICD-10-CM

## 2021-02-03 ENCOUNTER — HOSPITAL ENCOUNTER (OUTPATIENT)
Dept: ULTRASOUND IMAGING | Facility: HOSPITAL | Age: 81
Discharge: HOME | End: 2021-02-03
Admitting: FAMILY MEDICINE

## 2021-02-03 DIAGNOSIS — R79.89 ELEVATED LFTS: ICD-10-CM

## 2021-02-03 PROCEDURE — 76705 ECHO EXAM OF ABDOMEN: CPT

## 2021-02-19 ENCOUNTER — HOSPITAL ENCOUNTER (OUTPATIENT)
Dept: CARDIOLOGY | Facility: HOSPITAL | Age: 81
End: 2021-02-19

## 2021-03-08 ENCOUNTER — APPOINTMENT (OUTPATIENT)
Dept: CARDIOLOGY | Facility: HOSPITAL | Age: 81
End: 2021-03-08

## 2021-03-22 ENCOUNTER — HOSPITAL ENCOUNTER (OUTPATIENT)
Dept: CARDIOLOGY | Facility: HOSPITAL | Age: 81
Discharge: HOME | End: 2021-03-22
Admitting: INTERNAL MEDICINE

## 2021-03-22 DIAGNOSIS — I50.23 ACUTE ON CHRONIC SYSTOLIC CHF (CONGESTIVE HEART FAILURE) (HCC): ICD-10-CM

## 2021-03-22 LAB
BH CV ECHO MEAS - AO MAX PG (FULL): 15.8 MMHG
BH CV ECHO MEAS - AO MAX PG: 18 MMHG
BH CV ECHO MEAS - AO MEAN PG (FULL): 7 MMHG
BH CV ECHO MEAS - AO MEAN PG: 8 MMHG
BH CV ECHO MEAS - AO ROOT AREA (BSA CORRECTED): 1.8
BH CV ECHO MEAS - AO ROOT AREA: 8 CM^2
BH CV ECHO MEAS - AO ROOT DIAM: 3.2 CM
BH CV ECHO MEAS - AO V2 MAX: 214 CM/SEC
BH CV ECHO MEAS - AO V2 MEAN: 126 CM/SEC
BH CV ECHO MEAS - AO V2 VTI: 38.1 CM
BH CV ECHO MEAS - ASC AORTA: 3.2 CM
BH CV ECHO MEAS - AVA(I,A): 1.3 CM^2
BH CV ECHO MEAS - AVA(I,D): 1.3 CM^2
BH CV ECHO MEAS - AVA(V,A): 1.2 CM^2
BH CV ECHO MEAS - AVA(V,D): 1.2 CM^2
BH CV ECHO MEAS - BSA(HAYCOCK): 1.8 M^2
BH CV ECHO MEAS - BSA: 1.7 M^2
BH CV ECHO MEAS - BZI_BMI: 27.8 KILOGRAMS/M^2
BH CV ECHO MEAS - BZI_METRIC_HEIGHT: 160 CM
BH CV ECHO MEAS - BZI_METRIC_WEIGHT: 71.2 KG
BH CV ECHO MEAS - EDV(CUBED): 227 ML
BH CV ECHO MEAS - EDV(MOD-SP2): 86 ML
BH CV ECHO MEAS - EDV(MOD-SP4): 85 ML
BH CV ECHO MEAS - EDV(TEICH): 186.9 ML
BH CV ECHO MEAS - EF(CUBED): 37 %
BH CV ECHO MEAS - EF(MOD-BP): 39 %
BH CV ECHO MEAS - EF(MOD-SP2): 26.7 %
BH CV ECHO MEAS - EF(MOD-SP4): 36.5 %
BH CV ECHO MEAS - EF(TEICH): 29.8 %
BH CV ECHO MEAS - ESV(CUBED): 143.1 ML
BH CV ECHO MEAS - ESV(MOD-SP2): 63 ML
BH CV ECHO MEAS - ESV(MOD-SP4): 54 ML
BH CV ECHO MEAS - ESV(TEICH): 131.2 ML
BH CV ECHO MEAS - FS: 14.3 %
BH CV ECHO MEAS - IVS/LVPW: 0.93
BH CV ECHO MEAS - IVSD: 0.98 CM
BH CV ECHO MEAS - LA DIMENSION: 5.5 CM
BH CV ECHO MEAS - LA/AO: 1.7
BH CV ECHO MEAS - LAD MAJOR: 7.6 CM
BH CV ECHO MEAS - LAT PEAK E' VEL: 9.9 CM/SEC
BH CV ECHO MEAS - LATERAL E/E' RATIO: 10.6
BH CV ECHO MEAS - LV DIASTOLIC VOL/BSA (35-75): 48.7 ML/M^2
BH CV ECHO MEAS - LV MASS(C)D: 258.5 GRAMS
BH CV ECHO MEAS - LV MASS(C)DI: 148.2 GRAMS/M^2
BH CV ECHO MEAS - LV MAX PG: 2.2 MMHG
BH CV ECHO MEAS - LV MEAN PG: 1 MMHG
BH CV ECHO MEAS - LV SYSTOLIC VOL/BSA (12-30): 30.9 ML/M^2
BH CV ECHO MEAS - LV V1 MAX: 74.4 CM/SEC
BH CV ECHO MEAS - LV V1 MEAN: 48.2 CM/SEC
BH CV ECHO MEAS - LV V1 VTI: 14.5 CM
BH CV ECHO MEAS - LVIDD: 6.1 CM
BH CV ECHO MEAS - LVIDS: 5.2 CM
BH CV ECHO MEAS - LVLD AP2: 6.8 CM
BH CV ECHO MEAS - LVLD AP4: 6.3 CM
BH CV ECHO MEAS - LVLS AP2: 6.2 CM
BH CV ECHO MEAS - LVLS AP4: 5.6 CM
BH CV ECHO MEAS - LVOT AREA (M): 3.5 CM^2
BH CV ECHO MEAS - LVOT AREA: 3.5 CM^2
BH CV ECHO MEAS - LVOT DIAM: 2.1 CM
BH CV ECHO MEAS - LVPWD: 1.1 CM
BH CV ECHO MEAS - MED PEAK E' VEL: 5.1 CM/SEC
BH CV ECHO MEAS - MEDIAL E/E' RATIO: 20.5
BH CV ECHO MEAS - MV A MAX VEL: 42.4 CM/SEC
BH CV ECHO MEAS - MV DEC TIME: 0.2 SEC
BH CV ECHO MEAS - MV E MAX VEL: 105 CM/SEC
BH CV ECHO MEAS - MV E/A: 2.5
BH CV ECHO MEAS - PA ACC SLOPE: 791 CM/SEC^2
BH CV ECHO MEAS - PA ACC TIME: 0.11 SEC
BH CV ECHO MEAS - PA PR(ACCEL): 27.7 MMHG
BH CV ECHO MEAS - PI END-D VEL: 231 CM/SEC
BH CV ECHO MEAS - RAP SYSTOLE: 3 MMHG
BH CV ECHO MEAS - RVSP: 51.2 MMHG
BH CV ECHO MEAS - SI(AO): 175.6 ML/M^2
BH CV ECHO MEAS - SI(CUBED): 48.1 ML/M^2
BH CV ECHO MEAS - SI(LVOT): 28.8 ML/M^2
BH CV ECHO MEAS - SI(MOD-SP2): 13.2 ML/M^2
BH CV ECHO MEAS - SI(MOD-SP4): 17.8 ML/M^2
BH CV ECHO MEAS - SI(TEICH): 31.9 ML/M^2
BH CV ECHO MEAS - SV(AO): 306.4 ML
BH CV ECHO MEAS - SV(CUBED): 83.9 ML
BH CV ECHO MEAS - SV(LVOT): 50.2 ML
BH CV ECHO MEAS - SV(MOD-SP2): 23 ML
BH CV ECHO MEAS - SV(MOD-SP4): 31 ML
BH CV ECHO MEAS - SV(TEICH): 55.7 ML
BH CV ECHO MEAS - TAPSE (>1.6): 1 CM
BH CV ECHO MEAS - TR MAX PG: 48.2 MMHG
BH CV ECHO MEAS - TR MAX VEL: 347 CM/SEC
BH CV ECHO MEASUREMENTS AVERAGE E/E' RATIO: 14
BH CV XLRA - RV BASE: 4.2 CM
BH CV XLRA - RV LENGTH: 5.9 CM
BH CV XLRA - RV MID: 4 CM
LEFT ATRIUM VOLUME INDEX: 88.8 ML/M^2
LEFT ATRIUM VOLUME: 155 ML
LV EF 2D ECHO EST: 36 %

## 2021-03-22 PROCEDURE — 93306 TTE W/DOPPLER COMPLETE: CPT | Performed by: INTERNAL MEDICINE

## 2021-03-22 PROCEDURE — 93306 TTE W/DOPPLER COMPLETE: CPT

## 2021-03-22 RX ORDER — RIVAROXABAN 20 MG/1
20 TABLET, FILM COATED ORAL DAILY
Qty: 90 TABLET | Refills: 3 | Status: ON HOLD | OUTPATIENT
Start: 2021-03-22 | End: 2021-06-05 | Stop reason: SDUPTHER

## 2021-03-29 ENCOUNTER — APPOINTMENT (OUTPATIENT)
Dept: GENERAL RADIOLOGY | Facility: HOSPITAL | Age: 81
End: 2021-03-29

## 2021-03-29 ENCOUNTER — HOSPITAL ENCOUNTER (INPATIENT)
Facility: HOSPITAL | Age: 81
LOS: 7 days | Discharge: HOME | End: 2021-04-05
Attending: EMERGENCY MEDICINE | Admitting: INTERNAL MEDICINE

## 2021-03-29 ENCOUNTER — APPOINTMENT (OUTPATIENT)
Dept: CT IMAGING | Facility: HOSPITAL | Age: 81
End: 2021-03-29

## 2021-03-29 DIAGNOSIS — I48.11 LONGSTANDING PERSISTENT ATRIAL FIBRILLATION (HCC): ICD-10-CM

## 2021-03-29 DIAGNOSIS — R07.9 CHEST PAIN, UNSPECIFIED TYPE: Primary | ICD-10-CM

## 2021-03-29 DIAGNOSIS — K44.9 HIATAL HERNIA WITH GASTROESOPHAGEAL REFLUX: ICD-10-CM

## 2021-03-29 DIAGNOSIS — I50.42 CHRONIC COMBINED SYSTOLIC AND DIASTOLIC HEART FAILURE (HCC): ICD-10-CM

## 2021-03-29 DIAGNOSIS — R06.02 SHORTNESS OF BREATH: ICD-10-CM

## 2021-03-29 DIAGNOSIS — K21.9 HIATAL HERNIA WITH GASTROESOPHAGEAL REFLUX: ICD-10-CM

## 2021-03-29 DIAGNOSIS — I07.9 TRICUSPID VALVE DISORDER: ICD-10-CM

## 2021-03-29 PROBLEM — R10.12 LEFT UPPER QUADRANT ABDOMINAL PAIN: Status: ACTIVE | Noted: 2021-03-29

## 2021-03-29 LAB
ALBUMIN SERPL-MCNC: 3.8 G/DL (ref 3.5–5.2)
ALBUMIN/GLOB SERPL: 1.5 G/DL
ALP SERPL-CCNC: 103 U/L (ref 39–117)
ALT SERPL W P-5'-P-CCNC: 23 U/L (ref 1–33)
ANION GAP SERPL CALCULATED.3IONS-SCNC: 10.4 MMOL/L (ref 5–15)
AST SERPL-CCNC: 33 U/L (ref 1–32)
BACTERIA UR QL AUTO: ABNORMAL /HPF
BASOPHILS # BLD AUTO: 0.05 10*3/MM3 (ref 0–0.2)
BASOPHILS NFR BLD AUTO: 0.3 % (ref 0–1.5)
BILIRUB SERPL-MCNC: 2.2 MG/DL (ref 0–1.2)
BILIRUB UR QL STRIP: NEGATIVE
BUN SERPL-MCNC: 15 MG/DL (ref 8–23)
BUN/CREAT SERPL: 18.8 (ref 7–25)
CALCIUM SPEC-SCNC: 8.8 MG/DL (ref 8.6–10.5)
CHLORIDE SERPL-SCNC: 98 MMOL/L (ref 98–107)
CK SERPL-CCNC: 30 U/L (ref 20–180)
CLARITY UR: CLEAR
CO2 SERPL-SCNC: 28.6 MMOL/L (ref 22–29)
COLOR UR: YELLOW
CREAT SERPL-MCNC: 0.8 MG/DL (ref 0.57–1)
D-LACTATE SERPL-SCNC: 1.4 MMOL/L (ref 0.5–2)
DEPRECATED RDW RBC AUTO: 46.1 FL (ref 37–54)
EOSINOPHIL # BLD AUTO: 0.01 10*3/MM3 (ref 0–0.4)
EOSINOPHIL NFR BLD AUTO: 0.1 % (ref 0.3–6.2)
ERYTHROCYTE [DISTWIDTH] IN BLOOD BY AUTOMATED COUNT: 13.5 % (ref 12.3–15.4)
GFR SERPL CREATININE-BSD FRML MDRD: 69 ML/MIN/1.73
GLOBULIN UR ELPH-MCNC: 2.5 GM/DL
GLUCOSE SERPL-MCNC: 113 MG/DL (ref 65–99)
GLUCOSE UR STRIP-MCNC: NEGATIVE MG/DL
HCT VFR BLD AUTO: 49.1 % (ref 34–46.6)
HGB BLD-MCNC: 15.9 G/DL (ref 12–15.9)
HGB UR QL STRIP.AUTO: ABNORMAL
HYALINE CASTS UR QL AUTO: ABNORMAL /LPF
IMM GRANULOCYTES # BLD AUTO: 0.03 10*3/MM3 (ref 0–0.05)
IMM GRANULOCYTES NFR BLD AUTO: 0.2 % (ref 0–0.5)
KETONES UR QL STRIP: NEGATIVE
LEUKOCYTE ESTERASE UR QL STRIP.AUTO: NEGATIVE
LIPASE SERPL-CCNC: 18 U/L (ref 13–60)
LYMPHOCYTES # BLD AUTO: 0.79 10*3/MM3 (ref 0.7–3.1)
LYMPHOCYTES NFR BLD AUTO: 5.5 % (ref 19.6–45.3)
MAGNESIUM SERPL-MCNC: 1.7 MG/DL (ref 1.6–2.4)
MCH RBC QN AUTO: 30.1 PG (ref 26.6–33)
MCHC RBC AUTO-ENTMCNC: 32.4 G/DL (ref 31.5–35.7)
MCV RBC AUTO: 92.8 FL (ref 79–97)
MONOCYTES # BLD AUTO: 1.83 10*3/MM3 (ref 0.1–0.9)
MONOCYTES NFR BLD AUTO: 12.7 % (ref 5–12)
MUCOUS THREADS URNS QL MICRO: ABNORMAL /HPF
NEUTROPHILS NFR BLD AUTO: 11.71 10*3/MM3 (ref 1.7–7)
NEUTROPHILS NFR BLD AUTO: 81.2 % (ref 42.7–76)
NITRITE UR QL STRIP: NEGATIVE
NRBC BLD AUTO-RTO: 0 /100 WBC (ref 0–0.2)
NT-PROBNP SERPL-MCNC: 5159 PG/ML (ref 0–1800)
PH UR STRIP.AUTO: <=5 [PH] (ref 5–8)
PLATELET # BLD AUTO: 225 10*3/MM3 (ref 140–450)
PMV BLD AUTO: 9.3 FL (ref 6–12)
POTASSIUM SERPL-SCNC: 3.3 MMOL/L (ref 3.5–5.2)
PROT SERPL-MCNC: 6.3 G/DL (ref 6–8.5)
PROT UR QL STRIP: NEGATIVE
RBC # BLD AUTO: 5.29 10*6/MM3 (ref 3.77–5.28)
RBC # UR: ABNORMAL /HPF
REF LAB TEST METHOD: ABNORMAL
SARS-COV-2 RNA PNL SPEC NAA+PROBE: NOT DETECTED
SODIUM SERPL-SCNC: 137 MMOL/L (ref 136–145)
SP GR UR STRIP: 1.01 (ref 1–1.03)
SQUAMOUS #/AREA URNS HPF: ABNORMAL /HPF
TROPONIN T SERPL-MCNC: <0.01 NG/ML (ref 0–0.03)
UROBILINOGEN UR QL STRIP: ABNORMAL
WBC # BLD AUTO: 14.42 10*3/MM3 (ref 3.4–10.8)
WBC UR QL AUTO: ABNORMAL /HPF

## 2021-03-29 PROCEDURE — 81001 URINALYSIS AUTO W/SCOPE: CPT | Performed by: NURSE PRACTITIONER

## 2021-03-29 PROCEDURE — 85025 COMPLETE CBC W/AUTO DIFF WBC: CPT | Performed by: NURSE PRACTITIONER

## 2021-03-29 PROCEDURE — 71045 X-RAY EXAM CHEST 1 VIEW: CPT

## 2021-03-29 PROCEDURE — 84484 ASSAY OF TROPONIN QUANT: CPT | Performed by: NURSE PRACTITIONER

## 2021-03-29 PROCEDURE — 80053 COMPREHEN METABOLIC PANEL: CPT | Performed by: NURSE PRACTITIONER

## 2021-03-29 PROCEDURE — 99284 EMERGENCY DEPT VISIT MOD MDM: CPT

## 2021-03-29 PROCEDURE — 83605 ASSAY OF LACTIC ACID: CPT | Performed by: NURSE PRACTITIONER

## 2021-03-29 PROCEDURE — 74177 CT ABD & PELVIS W/CONTRAST: CPT

## 2021-03-29 PROCEDURE — 99222 1ST HOSP IP/OBS MODERATE 55: CPT | Performed by: NURSE PRACTITIONER

## 2021-03-29 PROCEDURE — 93005 ELECTROCARDIOGRAM TRACING: CPT | Performed by: NURSE PRACTITIONER

## 2021-03-29 PROCEDURE — 94799 UNLISTED PULMONARY SVC/PX: CPT

## 2021-03-29 PROCEDURE — 83880 ASSAY OF NATRIURETIC PEPTIDE: CPT | Performed by: NURSE PRACTITIONER

## 2021-03-29 PROCEDURE — 25010000002 ONDANSETRON PER 1 MG: Performed by: EMERGENCY MEDICINE

## 2021-03-29 PROCEDURE — 83735 ASSAY OF MAGNESIUM: CPT | Performed by: NURSE PRACTITIONER

## 2021-03-29 PROCEDURE — 25010000002 IOPAMIDOL 61 % SOLUTION: Performed by: EMERGENCY MEDICINE

## 2021-03-29 PROCEDURE — 83690 ASSAY OF LIPASE: CPT | Performed by: NURSE PRACTITIONER

## 2021-03-29 PROCEDURE — 25010000002 LEVOFLOXACIN PER 250 MG: Performed by: INTERNAL MEDICINE

## 2021-03-29 PROCEDURE — 82550 ASSAY OF CK (CPK): CPT | Performed by: NURSE PRACTITIONER

## 2021-03-29 PROCEDURE — 25010000002 MORPHINE SULFATE (PF) 2 MG/ML SOLUTION: Performed by: EMERGENCY MEDICINE

## 2021-03-29 PROCEDURE — 25010000002 MORPHINE SULFATE (PF) 2 MG/ML SOLUTION: Performed by: INTERNAL MEDICINE

## 2021-03-29 PROCEDURE — 87635 SARS-COV-2 COVID-19 AMP PRB: CPT | Performed by: NURSE PRACTITIONER

## 2021-03-29 PROCEDURE — 99222 1ST HOSP IP/OBS MODERATE 55: CPT | Performed by: INTERNAL MEDICINE

## 2021-03-29 RX ORDER — CHOLECALCIFEROL (VITAMIN D3) 125 MCG
5 CAPSULE ORAL NIGHTLY PRN
Status: DISCONTINUED | OUTPATIENT
Start: 2021-03-29 | End: 2021-04-05 | Stop reason: HOSPADM

## 2021-03-29 RX ORDER — ALBUTEROL SULFATE 2.5 MG/3ML
2.5 SOLUTION RESPIRATORY (INHALATION) EVERY 6 HOURS PRN
Status: DISCONTINUED | OUTPATIENT
Start: 2021-03-29 | End: 2021-04-05 | Stop reason: HOSPADM

## 2021-03-29 RX ORDER — NALOXONE HCL 0.4 MG/ML
0.4 VIAL (ML) INJECTION
Status: DISCONTINUED | OUTPATIENT
Start: 2021-03-29 | End: 2021-04-04

## 2021-03-29 RX ORDER — POTASSIUM CHLORIDE 750 MG/1
40 CAPSULE, EXTENDED RELEASE ORAL ONCE
Status: DISCONTINUED | OUTPATIENT
Start: 2021-03-29 | End: 2021-03-29

## 2021-03-29 RX ORDER — PANTOPRAZOLE SODIUM 40 MG/1
40 TABLET, DELAYED RELEASE ORAL DAILY
Status: DISCONTINUED | OUTPATIENT
Start: 2021-03-29 | End: 2021-04-05 | Stop reason: HOSPADM

## 2021-03-29 RX ORDER — LEVOFLOXACIN 5 MG/ML
500 INJECTION, SOLUTION INTRAVENOUS EVERY 24 HOURS
Status: DISCONTINUED | OUTPATIENT
Start: 2021-03-29 | End: 2021-03-31

## 2021-03-29 RX ORDER — MORPHINE SULFATE 2 MG/ML
2 INJECTION, SOLUTION INTRAMUSCULAR; INTRAVENOUS
Status: DISCONTINUED | OUTPATIENT
Start: 2021-03-29 | End: 2021-04-04

## 2021-03-29 RX ORDER — LOSARTAN POTASSIUM 50 MG/1
100 TABLET ORAL DAILY
Status: DISCONTINUED | OUTPATIENT
Start: 2021-03-29 | End: 2021-03-29

## 2021-03-29 RX ORDER — ONDANSETRON 4 MG/1
4 TABLET, FILM COATED ORAL EVERY 6 HOURS PRN
Status: DISCONTINUED | OUTPATIENT
Start: 2021-03-29 | End: 2021-04-05 | Stop reason: HOSPADM

## 2021-03-29 RX ORDER — ACETAMINOPHEN 160 MG/5ML
650 SOLUTION ORAL EVERY 4 HOURS PRN
Status: DISCONTINUED | OUTPATIENT
Start: 2021-03-29 | End: 2021-04-01

## 2021-03-29 RX ORDER — FUROSEMIDE 10 MG/ML
40 INJECTION INTRAMUSCULAR; INTRAVENOUS ONCE
Status: DISCONTINUED | OUTPATIENT
Start: 2021-03-29 | End: 2021-03-29

## 2021-03-29 RX ORDER — CITALOPRAM 20 MG/1
20 TABLET ORAL DAILY
Status: DISCONTINUED | OUTPATIENT
Start: 2021-03-29 | End: 2021-04-05 | Stop reason: HOSPADM

## 2021-03-29 RX ORDER — ALBUTEROL SULFATE 2.5 MG/3ML
2.5 SOLUTION RESPIRATORY (INHALATION)
Status: DISCONTINUED | OUTPATIENT
Start: 2021-03-29 | End: 2021-03-29

## 2021-03-29 RX ORDER — FUROSEMIDE 10 MG/ML
40 INJECTION INTRAMUSCULAR; INTRAVENOUS ONCE
Status: DISCONTINUED | OUTPATIENT
Start: 2021-03-30 | End: 2021-03-29

## 2021-03-29 RX ORDER — ONDANSETRON 2 MG/ML
4 INJECTION INTRAMUSCULAR; INTRAVENOUS ONCE
Status: COMPLETED | OUTPATIENT
Start: 2021-03-29 | End: 2021-03-29

## 2021-03-29 RX ORDER — MORPHINE SULFATE 2 MG/ML
2 INJECTION, SOLUTION INTRAMUSCULAR; INTRAVENOUS ONCE
Status: COMPLETED | OUTPATIENT
Start: 2021-03-29 | End: 2021-03-29

## 2021-03-29 RX ORDER — METOPROLOL SUCCINATE 50 MG/1
50 TABLET, EXTENDED RELEASE ORAL DAILY
Status: DISCONTINUED | OUTPATIENT
Start: 2021-03-30 | End: 2021-03-31

## 2021-03-29 RX ORDER — FUROSEMIDE 10 MG/ML
40 INJECTION INTRAMUSCULAR; INTRAVENOUS ONCE
Status: COMPLETED | OUTPATIENT
Start: 2021-03-30 | End: 2021-03-30

## 2021-03-29 RX ORDER — POTASSIUM CHLORIDE 1.5 G/1.77G
40 POWDER, FOR SOLUTION ORAL ONCE
Status: COMPLETED | OUTPATIENT
Start: 2021-03-29 | End: 2021-03-29

## 2021-03-29 RX ORDER — ONDANSETRON 2 MG/ML
4 INJECTION INTRAMUSCULAR; INTRAVENOUS EVERY 6 HOURS PRN
Status: DISCONTINUED | OUTPATIENT
Start: 2021-03-29 | End: 2021-04-05 | Stop reason: HOSPADM

## 2021-03-29 RX ORDER — LOSARTAN POTASSIUM 50 MG/1
100 TABLET ORAL DAILY
Status: DISCONTINUED | OUTPATIENT
Start: 2021-03-30 | End: 2021-03-31

## 2021-03-29 RX ORDER — METOPROLOL SUCCINATE 50 MG/1
50 TABLET, EXTENDED RELEASE ORAL DAILY
Status: DISCONTINUED | OUTPATIENT
Start: 2021-03-29 | End: 2021-03-29

## 2021-03-29 RX ORDER — TRAMADOL HYDROCHLORIDE 50 MG/1
50 TABLET ORAL EVERY 6 HOURS PRN
Status: DISCONTINUED | OUTPATIENT
Start: 2021-03-29 | End: 2021-04-04

## 2021-03-29 RX ORDER — BUDESONIDE AND FORMOTEROL FUMARATE DIHYDRATE 80; 4.5 UG/1; UG/1
2 AEROSOL RESPIRATORY (INHALATION)
Status: DISCONTINUED | OUTPATIENT
Start: 2021-03-29 | End: 2021-04-05 | Stop reason: HOSPADM

## 2021-03-29 RX ADMIN — POTASSIUM CHLORIDE 40 MEQ: 1.5 POWDER, FOR SOLUTION ORAL at 18:24

## 2021-03-29 RX ADMIN — RIVAROXABAN 20 MG: 10 TABLET, FILM COATED ORAL at 17:54

## 2021-03-29 RX ADMIN — BUDESONIDE AND FORMOTEROL FUMARATE DIHYDRATE 2 PUFF: 80; 4.5 AEROSOL RESPIRATORY (INHALATION) at 19:40

## 2021-03-29 RX ADMIN — MORPHINE SULFATE 2 MG: 2 INJECTION, SOLUTION INTRAMUSCULAR; INTRAVENOUS at 19:51

## 2021-03-29 RX ADMIN — CITALOPRAM HYDROBROMIDE 20 MG: 20 TABLET ORAL at 17:54

## 2021-03-29 RX ADMIN — PANTOPRAZOLE SODIUM 40 MG: 40 TABLET, DELAYED RELEASE ORAL at 17:54

## 2021-03-29 RX ADMIN — SODIUM CHLORIDE 500 ML: 9 INJECTION, SOLUTION INTRAVENOUS at 12:38

## 2021-03-29 RX ADMIN — LEVOFLOXACIN 500 MG: 5 INJECTION, SOLUTION INTRAVENOUS at 18:23

## 2021-03-29 RX ADMIN — METRONIDAZOLE 500 MG: 500 INJECTION, SOLUTION INTRAVENOUS at 17:52

## 2021-03-29 RX ADMIN — ONDANSETRON 4 MG: 2 INJECTION INTRAMUSCULAR; INTRAVENOUS at 13:46

## 2021-03-29 RX ADMIN — TRAMADOL HYDROCHLORIDE 50 MG: 50 TABLET, FILM COATED ORAL at 18:24

## 2021-03-29 RX ADMIN — MORPHINE SULFATE 2 MG: 2 INJECTION, SOLUTION INTRAMUSCULAR; INTRAVENOUS at 13:47

## 2021-03-29 RX ADMIN — IOPAMIDOL 100 ML: 612 INJECTION, SOLUTION INTRAVENOUS at 11:10

## 2021-03-30 ENCOUNTER — APPOINTMENT (OUTPATIENT)
Dept: MRI IMAGING | Facility: HOSPITAL | Age: 81
End: 2021-03-30

## 2021-03-30 ENCOUNTER — APPOINTMENT (OUTPATIENT)
Dept: ULTRASOUND IMAGING | Facility: HOSPITAL | Age: 81
End: 2021-03-30

## 2021-03-30 LAB
ALBUMIN SERPL-MCNC: 3.4 G/DL (ref 3.5–5.2)
ALBUMIN/GLOB SERPL: 1.4 G/DL
ALP SERPL-CCNC: 164 U/L (ref 39–117)
ALT SERPL W P-5'-P-CCNC: 92 U/L (ref 1–33)
ANION GAP SERPL CALCULATED.3IONS-SCNC: 8.5 MMOL/L (ref 5–15)
AST SERPL-CCNC: 104 U/L (ref 1–32)
BASOPHILS # BLD AUTO: 0.03 10*3/MM3 (ref 0–0.2)
BASOPHILS NFR BLD AUTO: 0.2 % (ref 0–1.5)
BILIRUB SERPL-MCNC: 6.9 MG/DL (ref 0–1.2)
BUN SERPL-MCNC: 17 MG/DL (ref 8–23)
BUN/CREAT SERPL: 21.3 (ref 7–25)
CALCIUM SPEC-SCNC: 9.4 MG/DL (ref 8.6–10.5)
CHLORIDE SERPL-SCNC: 97 MMOL/L (ref 98–107)
CO2 SERPL-SCNC: 29.5 MMOL/L (ref 22–29)
CREAT SERPL-MCNC: 0.8 MG/DL (ref 0.57–1)
DEPRECATED RDW RBC AUTO: 47 FL (ref 37–54)
EOSINOPHIL # BLD AUTO: 0 10*3/MM3 (ref 0–0.4)
EOSINOPHIL NFR BLD AUTO: 0 % (ref 0.3–6.2)
ERYTHROCYTE [DISTWIDTH] IN BLOOD BY AUTOMATED COUNT: 13.6 % (ref 12.3–15.4)
GFR SERPL CREATININE-BSD FRML MDRD: 69 ML/MIN/1.73
GLOBULIN UR ELPH-MCNC: 2.5 GM/DL
GLUCOSE SERPL-MCNC: 112 MG/DL (ref 65–99)
HCT VFR BLD AUTO: 49.1 % (ref 34–46.6)
HGB BLD-MCNC: 15.9 G/DL (ref 12–15.9)
IMM GRANULOCYTES # BLD AUTO: 0.12 10*3/MM3 (ref 0–0.05)
IMM GRANULOCYTES NFR BLD AUTO: 0.7 % (ref 0–0.5)
LYMPHOCYTES # BLD AUTO: 0.55 10*3/MM3 (ref 0.7–3.1)
LYMPHOCYTES NFR BLD AUTO: 3.2 % (ref 19.6–45.3)
MCH RBC QN AUTO: 30.5 PG (ref 26.6–33)
MCHC RBC AUTO-ENTMCNC: 32.4 G/DL (ref 31.5–35.7)
MCV RBC AUTO: 94.1 FL (ref 79–97)
MONOCYTES # BLD AUTO: 1.79 10*3/MM3 (ref 0.1–0.9)
MONOCYTES NFR BLD AUTO: 10.3 % (ref 5–12)
NEUTROPHILS NFR BLD AUTO: 14.86 10*3/MM3 (ref 1.7–7)
NEUTROPHILS NFR BLD AUTO: 85.6 % (ref 42.7–76)
NRBC BLD AUTO-RTO: 0 /100 WBC (ref 0–0.2)
PLATELET # BLD AUTO: 214 10*3/MM3 (ref 140–450)
PMV BLD AUTO: 10.1 FL (ref 6–12)
POTASSIUM SERPL-SCNC: 4.2 MMOL/L (ref 3.5–5.2)
PROT SERPL-MCNC: 5.9 G/DL (ref 6–8.5)
RBC # BLD AUTO: 5.22 10*6/MM3 (ref 3.77–5.28)
SODIUM SERPL-SCNC: 135 MMOL/L (ref 136–145)
WBC # BLD AUTO: 17.35 10*3/MM3 (ref 3.4–10.8)

## 2021-03-30 PROCEDURE — 99232 SBSQ HOSP IP/OBS MODERATE 35: CPT | Performed by: NURSE PRACTITIONER

## 2021-03-30 PROCEDURE — 94799 UNLISTED PULMONARY SVC/PX: CPT

## 2021-03-30 PROCEDURE — 74181 MRI ABDOMEN W/O CONTRAST: CPT

## 2021-03-30 PROCEDURE — 99222 1ST HOSP IP/OBS MODERATE 55: CPT | Performed by: INTERNAL MEDICINE

## 2021-03-30 PROCEDURE — 80053 COMPREHEN METABOLIC PANEL: CPT | Performed by: INTERNAL MEDICINE

## 2021-03-30 PROCEDURE — 93010 ELECTROCARDIOGRAM REPORT: CPT | Performed by: INTERNAL MEDICINE

## 2021-03-30 PROCEDURE — 25010000002 MORPHINE SULFATE (PF) 2 MG/ML SOLUTION: Performed by: INTERNAL MEDICINE

## 2021-03-30 PROCEDURE — 25010000002 FUROSEMIDE PER 20 MG: Performed by: NURSE PRACTITIONER

## 2021-03-30 PROCEDURE — 25010000002 LEVOFLOXACIN PER 250 MG: Performed by: INTERNAL MEDICINE

## 2021-03-30 PROCEDURE — 85025 COMPLETE CBC W/AUTO DIFF WBC: CPT | Performed by: INTERNAL MEDICINE

## 2021-03-30 RX ADMIN — METRONIDAZOLE 500 MG: 500 INJECTION, SOLUTION INTRAVENOUS at 00:54

## 2021-03-30 RX ADMIN — SODIUM CHLORIDE 500 ML: 9 INJECTION, SOLUTION INTRAVENOUS at 19:22

## 2021-03-30 RX ADMIN — LOSARTAN POTASSIUM 100 MG: 50 TABLET, FILM COATED ORAL at 09:01

## 2021-03-30 RX ADMIN — RIVAROXABAN 20 MG: 10 TABLET, FILM COATED ORAL at 09:00

## 2021-03-30 RX ADMIN — SODIUM CHLORIDE 500 ML: 9 INJECTION, SOLUTION INTRAVENOUS at 23:50

## 2021-03-30 RX ADMIN — CITALOPRAM HYDROBROMIDE 20 MG: 20 TABLET ORAL at 09:00

## 2021-03-30 RX ADMIN — METRONIDAZOLE 500 MG: 500 INJECTION, SOLUTION INTRAVENOUS at 16:13

## 2021-03-30 RX ADMIN — FUROSEMIDE 40 MG: 10 INJECTION, SOLUTION INTRAMUSCULAR; INTRAVENOUS at 09:00

## 2021-03-30 RX ADMIN — MORPHINE SULFATE 2 MG: 2 INJECTION, SOLUTION INTRAMUSCULAR; INTRAVENOUS at 06:22

## 2021-03-30 RX ADMIN — BUDESONIDE AND FORMOTEROL FUMARATE DIHYDRATE 2 PUFF: 80; 4.5 AEROSOL RESPIRATORY (INHALATION) at 09:30

## 2021-03-30 RX ADMIN — TRAMADOL HYDROCHLORIDE 50 MG: 50 TABLET, FILM COATED ORAL at 09:00

## 2021-03-30 RX ADMIN — METOPROLOL SUCCINATE 50 MG: 50 TABLET, EXTENDED RELEASE ORAL at 09:01

## 2021-03-30 RX ADMIN — PANTOPRAZOLE SODIUM 40 MG: 40 TABLET, DELAYED RELEASE ORAL at 09:00

## 2021-03-30 RX ADMIN — BUDESONIDE AND FORMOTEROL FUMARATE DIHYDRATE 2 PUFF: 80; 4.5 AEROSOL RESPIRATORY (INHALATION) at 19:33

## 2021-03-30 RX ADMIN — METRONIDAZOLE 500 MG: 500 INJECTION, SOLUTION INTRAVENOUS at 09:01

## 2021-03-30 RX ADMIN — LEVOFLOXACIN 500 MG: 5 INJECTION, SOLUTION INTRAVENOUS at 19:51

## 2021-03-30 RX ADMIN — MORPHINE SULFATE 2 MG: 2 INJECTION, SOLUTION INTRAMUSCULAR; INTRAVENOUS at 01:10

## 2021-03-31 ENCOUNTER — APPOINTMENT (OUTPATIENT)
Dept: CARDIOLOGY | Facility: HOSPITAL | Age: 81
End: 2021-03-31

## 2021-03-31 ENCOUNTER — APPOINTMENT (OUTPATIENT)
Dept: GENERAL RADIOLOGY | Facility: HOSPITAL | Age: 81
End: 2021-03-31

## 2021-03-31 ENCOUNTER — APPOINTMENT (OUTPATIENT)
Dept: CT IMAGING | Facility: HOSPITAL | Age: 81
End: 2021-03-31

## 2021-03-31 LAB
ALBUMIN SERPL-MCNC: 2.9 G/DL (ref 3.5–5.2)
ALBUMIN/GLOB SERPL: 1.2 G/DL
ALP SERPL-CCNC: 128 U/L (ref 39–117)
ALT SERPL W P-5'-P-CCNC: 48 U/L (ref 1–33)
ANION GAP SERPL CALCULATED.3IONS-SCNC: 7.6 MMOL/L (ref 5–15)
AST SERPL-CCNC: 28 U/L (ref 1–32)
BACTERIA UR QL AUTO: ABNORMAL /HPF
BASOPHILS # BLD AUTO: 0.02 10*3/MM3 (ref 0–0.2)
BASOPHILS NFR BLD AUTO: 0.1 % (ref 0–1.5)
BILIRUB SERPL-MCNC: 4 MG/DL (ref 0–1.2)
BILIRUB UR QL STRIP: ABNORMAL
BUN SERPL-MCNC: 33 MG/DL (ref 8–23)
BUN/CREAT SERPL: 19.3 (ref 7–25)
CALCIUM SPEC-SCNC: 8.8 MG/DL (ref 8.6–10.5)
CHLORIDE SERPL-SCNC: 96 MMOL/L (ref 98–107)
CLARITY UR: ABNORMAL
CO2 SERPL-SCNC: 26.4 MMOL/L (ref 22–29)
COLOR UR: ABNORMAL
CREAT SERPL-MCNC: 1.71 MG/DL (ref 0.57–1)
DEPRECATED RDW RBC AUTO: 48.9 FL (ref 37–54)
EOSINOPHIL # BLD AUTO: 0.01 10*3/MM3 (ref 0–0.4)
EOSINOPHIL NFR BLD AUTO: 0.1 % (ref 0.3–6.2)
ERYTHROCYTE [DISTWIDTH] IN BLOOD BY AUTOMATED COUNT: 13.6 % (ref 12.3–15.4)
GFR SERPL CREATININE-BSD FRML MDRD: 29 ML/MIN/1.73
GLOBULIN UR ELPH-MCNC: 2.5 GM/DL
GLUCOSE SERPL-MCNC: 96 MG/DL (ref 65–99)
GLUCOSE UR STRIP-MCNC: NEGATIVE MG/DL
HCT VFR BLD AUTO: 45.5 % (ref 34–46.6)
HGB BLD-MCNC: 14.4 G/DL (ref 12–15.9)
HGB UR QL STRIP.AUTO: ABNORMAL
HYALINE CASTS UR QL AUTO: ABNORMAL /LPF
IMM GRANULOCYTES # BLD AUTO: 0.1 10*3/MM3 (ref 0–0.05)
IMM GRANULOCYTES NFR BLD AUTO: 0.6 % (ref 0–0.5)
INR PPP: 4.94 (ref 0.9–1.1)
KETONES UR QL STRIP: NEGATIVE
LEUKOCYTE ESTERASE UR QL STRIP.AUTO: ABNORMAL
LYMPHOCYTES # BLD AUTO: 0.55 10*3/MM3 (ref 0.7–3.1)
LYMPHOCYTES NFR BLD AUTO: 3.3 % (ref 19.6–45.3)
MCH RBC QN AUTO: 30.2 PG (ref 26.6–33)
MCHC RBC AUTO-ENTMCNC: 31.6 G/DL (ref 31.5–35.7)
MCV RBC AUTO: 95.4 FL (ref 79–97)
MONOCYTES # BLD AUTO: 1.6 10*3/MM3 (ref 0.1–0.9)
MONOCYTES NFR BLD AUTO: 9.5 % (ref 5–12)
NEUTROPHILS NFR BLD AUTO: 14.51 10*3/MM3 (ref 1.7–7)
NEUTROPHILS NFR BLD AUTO: 86.4 % (ref 42.7–76)
NITRITE UR QL STRIP: POSITIVE
NRBC BLD AUTO-RTO: 0 /100 WBC (ref 0–0.2)
PH UR STRIP.AUTO: <=5 [PH] (ref 5–8)
PLATELET # BLD AUTO: 200 10*3/MM3 (ref 140–450)
PMV BLD AUTO: 10.3 FL (ref 6–12)
POTASSIUM SERPL-SCNC: 4.1 MMOL/L (ref 3.5–5.2)
PROT SERPL-MCNC: 5.4 G/DL (ref 6–8.5)
PROT UR QL STRIP: ABNORMAL
PROTHROMBIN TIME: 49.9 SECONDS (ref 12–15.1)
QT INTERVAL: 430 MS
QTC INTERVAL: 486 MS
RBC # BLD AUTO: 4.77 10*6/MM3 (ref 3.77–5.28)
RBC # UR: ABNORMAL /HPF
REF LAB TEST METHOD: ABNORMAL
SODIUM SERPL-SCNC: 130 MMOL/L (ref 136–145)
SP GR UR STRIP: 1.02 (ref 1–1.03)
SQUAMOUS #/AREA URNS HPF: ABNORMAL /HPF
TROPONIN T SERPL-MCNC: <0.01 NG/ML (ref 0–0.03)
TROPONIN T SERPL-MCNC: <0.01 NG/ML (ref 0–0.03)
UROBILINOGEN UR QL STRIP: ABNORMAL
WBC # BLD AUTO: 16.79 10*3/MM3 (ref 3.4–10.8)
WBC UR QL AUTO: ABNORMAL /HPF
YEAST URNS QL MICRO: ABNORMAL /HPF

## 2021-03-31 PROCEDURE — 87086 URINE CULTURE/COLONY COUNT: CPT | Performed by: INTERNAL MEDICINE

## 2021-03-31 PROCEDURE — 71045 X-RAY EXAM CHEST 1 VIEW: CPT

## 2021-03-31 PROCEDURE — 80053 COMPREHEN METABOLIC PANEL: CPT | Performed by: INTERNAL MEDICINE

## 2021-03-31 PROCEDURE — 71250 CT THORAX DX C-: CPT

## 2021-03-31 PROCEDURE — 93306 TTE W/DOPPLER COMPLETE: CPT | Performed by: INTERNAL MEDICINE

## 2021-03-31 PROCEDURE — 84484 ASSAY OF TROPONIN QUANT: CPT | Performed by: INTERNAL MEDICINE

## 2021-03-31 PROCEDURE — 99232 SBSQ HOSP IP/OBS MODERATE 35: CPT | Performed by: INTERNAL MEDICINE

## 2021-03-31 PROCEDURE — 25010000002 MORPHINE SULFATE (PF) 2 MG/ML SOLUTION: Performed by: INTERNAL MEDICINE

## 2021-03-31 PROCEDURE — 94799 UNLISTED PULMONARY SVC/PX: CPT

## 2021-03-31 PROCEDURE — 85610 PROTHROMBIN TIME: CPT | Performed by: INTERNAL MEDICINE

## 2021-03-31 PROCEDURE — 81001 URINALYSIS AUTO W/SCOPE: CPT | Performed by: INTERNAL MEDICINE

## 2021-03-31 PROCEDURE — 25010000002 ONDANSETRON PER 1 MG: Performed by: INTERNAL MEDICINE

## 2021-03-31 PROCEDURE — 85025 COMPLETE CBC W/AUTO DIFF WBC: CPT | Performed by: INTERNAL MEDICINE

## 2021-03-31 PROCEDURE — 93005 ELECTROCARDIOGRAM TRACING: CPT | Performed by: INTERNAL MEDICINE

## 2021-03-31 PROCEDURE — 99232 SBSQ HOSP IP/OBS MODERATE 35: CPT | Performed by: PHYSICIAN ASSISTANT

## 2021-03-31 PROCEDURE — 93306 TTE W/DOPPLER COMPLETE: CPT

## 2021-03-31 PROCEDURE — 99233 SBSQ HOSP IP/OBS HIGH 50: CPT | Performed by: INTERNAL MEDICINE

## 2021-03-31 PROCEDURE — 25010000002 CEFTRIAXONE SODIUM-DEXTROSE 1-3.74 GM-%(50ML) RECONSTITUTED SOLUTION: Performed by: INTERNAL MEDICINE

## 2021-03-31 RX ORDER — LEVOFLOXACIN 5 MG/ML
250 INJECTION, SOLUTION INTRAVENOUS EVERY 24 HOURS
Status: DISCONTINUED | OUTPATIENT
Start: 2021-03-31 | End: 2021-03-31

## 2021-03-31 RX ORDER — CEFTRIAXONE 1 G/50ML
1 INJECTION, SOLUTION INTRAVENOUS EVERY 24 HOURS
Status: DISCONTINUED | OUTPATIENT
Start: 2021-03-31 | End: 2021-04-01

## 2021-03-31 RX ORDER — ALUMINA, MAGNESIA, AND SIMETHICONE 2400; 2400; 240 MG/30ML; MG/30ML; MG/30ML
15 SUSPENSION ORAL EVERY 6 HOURS PRN
Status: DISCONTINUED | OUTPATIENT
Start: 2021-03-31 | End: 2021-04-05 | Stop reason: HOSPADM

## 2021-03-31 RX ORDER — NOREPINEPHRINE BIT/0.9 % NACL 8 MG/250ML
.02-.3 INFUSION BOTTLE (ML) INTRAVENOUS
Status: DISCONTINUED | OUTPATIENT
Start: 2021-03-31 | End: 2021-04-02

## 2021-03-31 RX ADMIN — ALUMINUM HYDROXIDE, MAGNESIUM HYDROXIDE, AND DIMETHICONE 15 ML: 400; 400; 40 SUSPENSION ORAL at 03:21

## 2021-03-31 RX ADMIN — METRONIDAZOLE 500 MG: 500 INJECTION, SOLUTION INTRAVENOUS at 01:25

## 2021-03-31 RX ADMIN — ACETAMINOPHEN 649.6 MG: 650 SOLUTION ORAL at 02:48

## 2021-03-31 RX ADMIN — PANTOPRAZOLE SODIUM 40 MG: 40 TABLET, DELAYED RELEASE ORAL at 09:50

## 2021-03-31 RX ADMIN — Medication 0.02 MCG/KG/MIN: at 09:17

## 2021-03-31 RX ADMIN — SODIUM CHLORIDE 500 ML: 9 INJECTION, SOLUTION INTRAVENOUS at 01:24

## 2021-03-31 RX ADMIN — METRONIDAZOLE 500 MG: 500 INJECTION, SOLUTION INTRAVENOUS at 17:28

## 2021-03-31 RX ADMIN — CITALOPRAM HYDROBROMIDE 20 MG: 20 TABLET ORAL at 09:50

## 2021-03-31 RX ADMIN — BUDESONIDE AND FORMOTEROL FUMARATE DIHYDRATE 2 PUFF: 80; 4.5 AEROSOL RESPIRATORY (INHALATION) at 19:30

## 2021-03-31 RX ADMIN — METRONIDAZOLE 500 MG: 500 INJECTION, SOLUTION INTRAVENOUS at 11:13

## 2021-03-31 RX ADMIN — SODIUM CHLORIDE 500 ML: 9 INJECTION, SOLUTION INTRAVENOUS at 06:50

## 2021-03-31 RX ADMIN — BUDESONIDE AND FORMOTEROL FUMARATE DIHYDRATE 2 PUFF: 80; 4.5 AEROSOL RESPIRATORY (INHALATION) at 07:19

## 2021-03-31 RX ADMIN — ONDANSETRON 4 MG: 2 INJECTION INTRAMUSCULAR; INTRAVENOUS at 19:47

## 2021-03-31 RX ADMIN — ONDANSETRON 4 MG: 2 INJECTION INTRAMUSCULAR; INTRAVENOUS at 02:52

## 2021-03-31 RX ADMIN — MORPHINE SULFATE 2 MG: 2 INJECTION, SOLUTION INTRAMUSCULAR; INTRAVENOUS at 17:28

## 2021-03-31 RX ADMIN — CEFTRIAXONE 1 G: 1 INJECTION, SOLUTION INTRAVENOUS at 09:58

## 2021-04-01 LAB
ALBUMIN SERPL-MCNC: 3 G/DL (ref 3.5–5.2)
ALBUMIN/GLOB SERPL: 1 G/DL
ALP SERPL-CCNC: 146 U/L (ref 39–117)
ALT SERPL W P-5'-P-CCNC: 34 U/L (ref 1–33)
ANION GAP SERPL CALCULATED.3IONS-SCNC: 7.7 MMOL/L (ref 5–15)
AST SERPL-CCNC: 18 U/L (ref 1–32)
BACTERIA SPEC AEROBE CULT: NORMAL
BASOPHILS # BLD AUTO: 0.03 10*3/MM3 (ref 0–0.2)
BASOPHILS NFR BLD AUTO: 0.2 % (ref 0–1.5)
BH CV ECHO MEAS - % IVS THICK: 2.1 %
BH CV ECHO MEAS - % LVPW THICK: 7 %
BH CV ECHO MEAS - AO MAX PG (FULL): 12.4 MMHG
BH CV ECHO MEAS - AO MAX PG: 14 MMHG
BH CV ECHO MEAS - AO MEAN PG (FULL): 7 MMHG
BH CV ECHO MEAS - AO MEAN PG: 8 MMHG
BH CV ECHO MEAS - AO ROOT AREA (BSA CORRECTED): 1.7
BH CV ECHO MEAS - AO ROOT AREA: 6.8 CM^2
BH CV ECHO MEAS - AO ROOT DIAM: 3 CM
BH CV ECHO MEAS - AO V2 MAX: 185 CM/SEC
BH CV ECHO MEAS - AO V2 MEAN: 132 CM/SEC
BH CV ECHO MEAS - AO V2 VTI: 30.4 CM
BH CV ECHO MEAS - AVA(I,A): 0.83 CM^2
BH CV ECHO MEAS - AVA(I,D): 0.83 CM^2
BH CV ECHO MEAS - AVA(V,A): 0.92 CM^2
BH CV ECHO MEAS - AVA(V,D): 0.92 CM^2
BH CV ECHO MEAS - BSA(HAYCOCK): 1.8 M^2
BH CV ECHO MEAS - BSA: 1.7 M^2
BH CV ECHO MEAS - BZI_BMI: 26.9 KILOGRAMS/M^2
BH CV ECHO MEAS - BZI_METRIC_HEIGHT: 160 CM
BH CV ECHO MEAS - BZI_METRIC_WEIGHT: 68.9 KG
BH CV ECHO MEAS - EDV(CUBED): 135 ML
BH CV ECHO MEAS - EDV(MOD-SP2): 196 ML
BH CV ECHO MEAS - EDV(MOD-SP4): 166 ML
BH CV ECHO MEAS - EDV(TEICH): 125.5 ML
BH CV ECHO MEAS - EF(CUBED): 40.3 %
BH CV ECHO MEAS - EF(MOD-BP): 34.6 %
BH CV ECHO MEAS - EF(MOD-SP2): 29.6 %
BH CV ECHO MEAS - EF(MOD-SP4): 38.6 %
BH CV ECHO MEAS - EF(TEICH): 33.1 %
BH CV ECHO MEAS - ESV(CUBED): 80.6 ML
BH CV ECHO MEAS - ESV(MOD-SP2): 138 ML
BH CV ECHO MEAS - ESV(MOD-SP4): 102 ML
BH CV ECHO MEAS - ESV(TEICH): 84 ML
BH CV ECHO MEAS - FS: 15.8 %
BH CV ECHO MEAS - IVS/LVPW: 1.1
BH CV ECHO MEAS - IVSD: 1.4 CM
BH CV ECHO MEAS - IVSS: 1.5 CM
BH CV ECHO MEAS - LA DIMENSION: 5.1 CM
BH CV ECHO MEAS - LA/AO: 1.7
BH CV ECHO MEAS - LAD MAJOR: 8.1 CM
BH CV ECHO MEAS - LAT PEAK E' VEL: 8.3 CM/SEC
BH CV ECHO MEAS - LATERAL E/E' RATIO: 16.3
BH CV ECHO MEAS - LV DIASTOLIC VOL/BSA (35-75): 96.5 ML/M^2
BH CV ECHO MEAS - LV MASS(C)D: 289.2 GRAMS
BH CV ECHO MEAS - LV MASS(C)DI: 168.1 GRAMS/M^2
BH CV ECHO MEAS - LV MASS(C)S: 237.6 GRAMS
BH CV ECHO MEAS - LV MASS(C)SI: 138.1 GRAMS/M^2
BH CV ECHO MEAS - LV MAX PG: 1.6 MMHG
BH CV ECHO MEAS - LV MEAN PG: 1 MMHG
BH CV ECHO MEAS - LV SYSTOLIC VOL/BSA (12-30): 59.3 ML/M^2
BH CV ECHO MEAS - LV V1 MAX: 62.5 CM/SEC
BH CV ECHO MEAS - LV V1 MEAN: 35.2 CM/SEC
BH CV ECHO MEAS - LV V1 VTI: 9.3 CM
BH CV ECHO MEAS - LVIDD: 5.1 CM
BH CV ECHO MEAS - LVIDS: 4.3 CM
BH CV ECHO MEAS - LVLD AP2: 8.8 CM
BH CV ECHO MEAS - LVLD AP4: 8.6 CM
BH CV ECHO MEAS - LVLS AP2: 7.5 CM
BH CV ECHO MEAS - LVLS AP4: 7.4 CM
BH CV ECHO MEAS - LVOT AREA (M): 2.7 CM^2
BH CV ECHO MEAS - LVOT AREA: 2.7 CM^2
BH CV ECHO MEAS - LVOT DIAM: 1.9 CM
BH CV ECHO MEAS - LVPWD: 1.3 CM
BH CV ECHO MEAS - LVPWS: 1.4 CM
BH CV ECHO MEAS - MED PEAK E' VEL: 4.7 CM/SEC
BH CV ECHO MEAS - MEDIAL E/E' RATIO: 28.8
BH CV ECHO MEAS - MR ALIAS VEL: 63.5 CM/SEC
BH CV ECHO MEAS - MR ERO: 0.29 CM^2
BH CV ECHO MEAS - MR FLOW RATE: 129.6 CM^3/SEC
BH CV ECHO MEAS - MR MAX PG: 82 MMHG
BH CV ECHO MEAS - MR MAX VEL: 453 CM/SEC
BH CV ECHO MEAS - MR MEAN PG: 43 MMHG
BH CV ECHO MEAS - MR MEAN VEL: 304 CM/SEC
BH CV ECHO MEAS - MR PISA RADIUS: 0.57 CM
BH CV ECHO MEAS - MR PISA: 2 CM^2
BH CV ECHO MEAS - MR VOLUME: 32.3 ML
BH CV ECHO MEAS - MR VTI: 113 CM
BH CV ECHO MEAS - MV A MAX VEL: 46.7 CM/SEC
BH CV ECHO MEAS - MV AREA (1 DIAM): 10.5 CM^2
BH CV ECHO MEAS - MV DEC TIME: 0.21 SEC
BH CV ECHO MEAS - MV DIAM: 3.7 CM
BH CV ECHO MEAS - MV E MAX VEL: 135 CM/SEC
BH CV ECHO MEAS - MV E/A: 2.9
BH CV ECHO MEAS - MV FLOW AREA(1DIAM): 10.5 CM^2
BH CV ECHO MEAS - MV MAX PG: 7.4 MMHG
BH CV ECHO MEAS - MV MEAN PG: 3 MMHG
BH CV ECHO MEAS - MV V2 MAX: 136 CM/SEC
BH CV ECHO MEAS - MV V2 MEAN: 71.3 CM/SEC
BH CV ECHO MEAS - MV V2 VTI: 34.1 CM
BH CV ECHO MEAS - MVA(VTI): 0.74 CM^2
BH CV ECHO MEAS - PA ACC TIME: 0.07 SEC
BH CV ECHO MEAS - PA MAX PG (FULL): 2.3 MMHG
BH CV ECHO MEAS - PA MAX PG: 3.3 MMHG
BH CV ECHO MEAS - PA MEAN PG (FULL): 0 MMHG
BH CV ECHO MEAS - PA MEAN PG: 1 MMHG
BH CV ECHO MEAS - PA PR(ACCEL): 47.5 MMHG
BH CV ECHO MEAS - PA V2 MAX: 90.6 CM/SEC
BH CV ECHO MEAS - PA V2 MEAN: 52.9 CM/SEC
BH CV ECHO MEAS - PA V2 VTI: 14.6 CM
BH CV ECHO MEAS - PI END-D VEL: 85.2 CM/SEC
BH CV ECHO MEAS - RAP SYSTOLE: 15 MMHG
BH CV ECHO MEAS - RF(MV,AO)(1 DIAM): 0.42
BH CV ECHO MEAS - RF(MV,LVOT)(1DIAM): 0.93
BH CV ECHO MEAS - RV MAX PG: 0.95 MMHG
BH CV ECHO MEAS - RV MEAN PG: 1 MMHG
BH CV ECHO MEAS - RV V1 MAX: 48.8 CM/SEC
BH CV ECHO MEAS - RV V1 MEAN: 32.4 CM/SEC
BH CV ECHO MEAS - RV V1 VTI: 9.1 CM
BH CV ECHO MEAS - RVSP: 44 MMHG
BH CV ECHO MEAS - SI(AO): 120.7 ML/M^2
BH CV ECHO MEAS - SI(CUBED): 31.6 ML/M^2
BH CV ECHO MEAS - SI(LVOT): 14.6 ML/M^2
BH CV ECHO MEAS - SI(MOD-SP2): 33.7 ML/M^2
BH CV ECHO MEAS - SI(MOD-SP4): 37.2 ML/M^2
BH CV ECHO MEAS - SI(MV 1 DIAM): 208.5 ML/M^2
BH CV ECHO MEAS - SI(TEICH): 24.1 ML/M^2
BH CV ECHO MEAS - SV(AO): 207.8 ML
BH CV ECHO MEAS - SV(CUBED): 54.4 ML
BH CV ECHO MEAS - SV(LVOT): 25.1 ML
BH CV ECHO MEAS - SV(MOD-SP2): 58 ML
BH CV ECHO MEAS - SV(MOD-SP4): 64 ML
BH CV ECHO MEAS - SV(MV 1 DIAM): 358.8 ML
BH CV ECHO MEAS - SV(TEICH): 41.5 ML
BH CV ECHO MEAS - TAPSE (>1.6): 1.6 CM
BH CV ECHO MEAS - TR MAX PG: 29 MMHG
BH CV ECHO MEAS - TR MAX VEL: 269.3 CM/SEC
BH CV ECHO MEASUREMENTS AVERAGE E/E' RATIO: 20.77
BH CV XLRA - RV BASE: 6.6 CM
BH CV XLRA - RV LENGTH: 7.7 CM
BH CV XLRA - RV MID: 5.1 CM
BH CV XLRA - TDI S': 8.9 CM/SEC
BILIRUB SERPL-MCNC: 3 MG/DL (ref 0–1.2)
BUN SERPL-MCNC: 30 MG/DL (ref 8–23)
BUN/CREAT SERPL: 25.9 (ref 7–25)
CALCIUM SPEC-SCNC: 9.2 MG/DL (ref 8.6–10.5)
CHLORIDE SERPL-SCNC: 101 MMOL/L (ref 98–107)
CO2 SERPL-SCNC: 25.3 MMOL/L (ref 22–29)
CREAT SERPL-MCNC: 1.16 MG/DL (ref 0.57–1)
DEPRECATED RDW RBC AUTO: 48.3 FL (ref 37–54)
EOSINOPHIL # BLD AUTO: 0.01 10*3/MM3 (ref 0–0.4)
EOSINOPHIL NFR BLD AUTO: 0.1 % (ref 0.3–6.2)
ERYTHROCYTE [DISTWIDTH] IN BLOOD BY AUTOMATED COUNT: 13.7 % (ref 12.3–15.4)
GFR SERPL CREATININE-BSD FRML MDRD: 45 ML/MIN/1.73
GLOBULIN UR ELPH-MCNC: 3 GM/DL
GLUCOSE BLDC GLUCOMTR-MCNC: 122 MG/DL (ref 70–130)
GLUCOSE SERPL-MCNC: 114 MG/DL (ref 65–99)
HCT VFR BLD AUTO: 49.6 % (ref 34–46.6)
HGB BLD-MCNC: 15.8 G/DL (ref 12–15.9)
IMM GRANULOCYTES # BLD AUTO: 0.11 10*3/MM3 (ref 0–0.05)
IMM GRANULOCYTES NFR BLD AUTO: 0.6 % (ref 0–0.5)
INR PPP: 2.9 (ref 0.9–1.1)
LEFT ATRIUM VOLUME INDEX: 104.6 ML/M^2
LEFT ATRIUM VOLUME: 180 ML
LYMPHOCYTES # BLD AUTO: 0.57 10*3/MM3 (ref 0.7–3.1)
LYMPHOCYTES NFR BLD AUTO: 3.2 % (ref 19.6–45.3)
MCH RBC QN AUTO: 30.3 PG (ref 26.6–33)
MCHC RBC AUTO-ENTMCNC: 31.9 G/DL (ref 31.5–35.7)
MCV RBC AUTO: 95 FL (ref 79–97)
MONOCYTES # BLD AUTO: 2.09 10*3/MM3 (ref 0.1–0.9)
MONOCYTES NFR BLD AUTO: 11.8 % (ref 5–12)
NEUTROPHILS NFR BLD AUTO: 14.87 10*3/MM3 (ref 1.7–7)
NEUTROPHILS NFR BLD AUTO: 84.1 % (ref 42.7–76)
NRBC BLD AUTO-RTO: 0 /100 WBC (ref 0–0.2)
PLATELET # BLD AUTO: 281 10*3/MM3 (ref 140–450)
PMV BLD AUTO: 10.2 FL (ref 6–12)
POTASSIUM SERPL-SCNC: 4.1 MMOL/L (ref 3.5–5.2)
PROT SERPL-MCNC: 6 G/DL (ref 6–8.5)
PROTHROMBIN TIME: 30.9 SECONDS (ref 12–15.1)
RBC # BLD AUTO: 5.22 10*6/MM3 (ref 3.77–5.28)
SODIUM SERPL-SCNC: 134 MMOL/L (ref 136–145)
WBC # BLD AUTO: 17.68 10*3/MM3 (ref 3.4–10.8)

## 2021-04-01 PROCEDURE — 85025 COMPLETE CBC W/AUTO DIFF WBC: CPT | Performed by: INTERNAL MEDICINE

## 2021-04-01 PROCEDURE — 25010000002 PIPERACILLIN SOD-TAZOBACTAM PER 1 G: Performed by: INTERNAL MEDICINE

## 2021-04-01 PROCEDURE — 87427 SHIGA-LIKE TOXIN AG IA: CPT | Performed by: INTERNAL MEDICINE

## 2021-04-01 PROCEDURE — 94799 UNLISTED PULMONARY SVC/PX: CPT

## 2021-04-01 PROCEDURE — 99232 SBSQ HOSP IP/OBS MODERATE 35: CPT | Performed by: INTERNAL MEDICINE

## 2021-04-01 PROCEDURE — 87046 STOOL CULTR AEROBIC BACT EA: CPT | Performed by: INTERNAL MEDICINE

## 2021-04-01 PROCEDURE — 85610 PROTHROMBIN TIME: CPT | Performed by: INTERNAL MEDICINE

## 2021-04-01 PROCEDURE — 87040 BLOOD CULTURE FOR BACTERIA: CPT | Performed by: INTERNAL MEDICINE

## 2021-04-01 PROCEDURE — 63710000001 PREDNISONE PER 1 MG: Performed by: INTERNAL MEDICINE

## 2021-04-01 PROCEDURE — 82962 GLUCOSE BLOOD TEST: CPT

## 2021-04-01 PROCEDURE — 87045 FECES CULTURE AEROBIC BACT: CPT | Performed by: INTERNAL MEDICINE

## 2021-04-01 PROCEDURE — 99232 SBSQ HOSP IP/OBS MODERATE 35: CPT | Performed by: PHYSICIAN ASSISTANT

## 2021-04-01 PROCEDURE — 80053 COMPREHEN METABOLIC PANEL: CPT | Performed by: INTERNAL MEDICINE

## 2021-04-01 PROCEDURE — 25010000002 CEFTRIAXONE SODIUM-DEXTROSE 1-3.74 GM-%(50ML) RECONSTITUTED SOLUTION: Performed by: INTERNAL MEDICINE

## 2021-04-01 RX ORDER — PREDNISONE 20 MG/1
40 TABLET ORAL DAILY
Status: DISCONTINUED | OUTPATIENT
Start: 2021-04-01 | End: 2021-04-05 | Stop reason: HOSPADM

## 2021-04-01 RX ORDER — MECLIZINE HYDROCHLORIDE 25 MG/1
12.5 TABLET ORAL 3 TIMES DAILY PRN
Status: DISCONTINUED | OUTPATIENT
Start: 2021-04-01 | End: 2021-04-05 | Stop reason: HOSPADM

## 2021-04-01 RX ORDER — L.ACID,PARA/B.BIFIDUM/S.THERM 8B CELL
1 CAPSULE ORAL DAILY
Status: DISCONTINUED | OUTPATIENT
Start: 2021-04-01 | End: 2021-04-05 | Stop reason: HOSPADM

## 2021-04-01 RX ORDER — ACETAMINOPHEN 325 MG/1
650 TABLET ORAL EVERY 4 HOURS PRN
Status: DISCONTINUED | OUTPATIENT
Start: 2021-04-01 | End: 2021-04-05 | Stop reason: HOSPADM

## 2021-04-01 RX ADMIN — PREDNISONE 40 MG: 20 TABLET ORAL at 09:48

## 2021-04-01 RX ADMIN — CITALOPRAM HYDROBROMIDE 20 MG: 20 TABLET ORAL at 08:05

## 2021-04-01 RX ADMIN — TAZOBACTAM SODIUM AND PIPERACILLIN SODIUM 4.5 G: 500; 4 INJECTION, SOLUTION INTRAVENOUS at 13:39

## 2021-04-01 RX ADMIN — CEFTRIAXONE 1 G: 1 INJECTION, SOLUTION INTRAVENOUS at 08:39

## 2021-04-01 RX ADMIN — METRONIDAZOLE 500 MG: 500 INJECTION, SOLUTION INTRAVENOUS at 08:05

## 2021-04-01 RX ADMIN — METRONIDAZOLE 500 MG: 500 INJECTION, SOLUTION INTRAVENOUS at 01:03

## 2021-04-01 RX ADMIN — Medication 1 CAPSULE: at 14:32

## 2021-04-01 RX ADMIN — BUDESONIDE AND FORMOTEROL FUMARATE DIHYDRATE 2 PUFF: 80; 4.5 AEROSOL RESPIRATORY (INHALATION) at 19:42

## 2021-04-01 RX ADMIN — ACETAMINOPHEN 650 MG: 325 TABLET ORAL at 09:48

## 2021-04-01 RX ADMIN — MECLIZINE HYDROCHLORIDE 12.5 MG: 25 TABLET ORAL at 04:01

## 2021-04-01 RX ADMIN — PANTOPRAZOLE SODIUM 40 MG: 40 TABLET, DELAYED RELEASE ORAL at 08:05

## 2021-04-01 RX ADMIN — BUDESONIDE AND FORMOTEROL FUMARATE DIHYDRATE 2 PUFF: 80; 4.5 AEROSOL RESPIRATORY (INHALATION) at 07:11

## 2021-04-01 RX ADMIN — Medication 0.03 MCG/KG/MIN: at 16:33

## 2021-04-01 RX ADMIN — TAZOBACTAM SODIUM AND PIPERACILLIN SODIUM 4.5 G: 500; 4 INJECTION, SOLUTION INTRAVENOUS at 20:23

## 2021-04-01 NOTE — PROGRESS NOTES
[facility]    Name:  Elsa Alcaraz   Age:  81 y.o.  Sex:  female  :  1940  MRN:  [id number]   Visit Number:  [id number]  Admission Date:  3/29/2021  Date Of Service:  21  Primary Care Physician:  Kemi Hdz MD     LOS: 3 days :  Patient Care Team:  Kemi Hdz MD as PCP - General  [facility] as Consulting Physician (General Surgery):    Chief Complaint:      Abdominal pain    Subjective / Interval History:     Patient seen and evaluated in the ICU this morning.  Resting comfortably in bed. Patient noted to have vertigo overnight for which she was prescribed meclizine with some benefit. This morning, she was still nauseous with movement but seem to be in better spirits. She was examined again later in the afternoon and appeared much better. I have personally had conversation with 2 of her daughters regarding her care. They were very pleased with patient's status and time was allotted for details of plan.    Review of Systems: Reviewed again     General ROS: Patient denies any fevers, chills or loss of consciousness.  Respiratory ROS: Denies cough or shortness of breath.  Cardiovascular ROS: Denies chest pain or palpitations. No history of exertional chest pain.  Gastrointestinal ROS: Denies nausea and vomiting.  Complains of left-sided abdominal pain.  No diarrhea.  Neurological ROS: Denies any focal weakness. No loss of consciousness. Denies any numbness.  Dermatological ROS: Denies any redness or pruritis.    Vital Signs:    Temp:  [97.5 °F (36.4 °C)-98.4 °F (36.9 °C)] 97.5 °F (36.4 °C)  Heart Rate:  [] 81  Resp:  [17-24] 22  BP: ()/(37-90) 111/74    Intake and output:    I/O last 3 completed shifts:  In: 340 [P.O.:340]  Out: 975 [Urine:325; Stool:650]  I/O this shift:  In: 998 [I.V.:998]  Out: 300 [Urine:300]    Physical Examination: Examined in     General Appearance:  Alert and cooperative, not  in any acute distress.   Head:  Atraumatic and normocephalic, without obvious abnormality.   Eyes:          PERRLA, conjunctivae and sclerae normal, no Icterus. No pallor. Extraocular movements are within normal limits.   Neck: Supple, trachea midline, no thyromegaly, no carotid bruit.   Lungs:   Chest shape is normal. Breath sounds heard bilaterally equally.  No crackles or wheezing. No Pleural rub or bronchial breathing.   Heart:  Normal S1 and S2, no murmur, no gallop, no rub. No JVD   Abdomen:   Normal bowel sounds, no masses, no organomegaly. Soft, nontender, nondistended, no guarding, no rebound tenderness.   Extremities: Moves all extremities well, no edema, no cyanosis, no clubbing.   Skin: No bleeding, bruising or rash.   Neurologic: Awake, alert and oriented times 3. Moves all 4 extremities equally.     Laboratory results:    Results from last 7 days   Lab Units 04/01/21 0410 03/31/21  0612 03/30/21  0555   SODIUM mmol/L 134* 130* 135*   POTASSIUM mmol/L 4.1 4.1 4.2   CHLORIDE mmol/L 101 96* 97*   CO2 mmol/L 25.3 26.4 29.5*   BUN mg/dL 30* 33* 17   CREATININE mg/dL 1.16* 1.71* 0.80   CALCIUM mg/dL 9.2 8.8 9.4   BILIRUBIN mg/dL 3.0* 4.0* 6.9*   ALK PHOS U/L 146* 128* 164*   ALT (SGPT) U/L 34* 48* 92*   AST (SGOT) U/L 18 28 104*   GLUCOSE mg/dL 114* 96 112*     Results from last 7 days   Lab Units 04/01/21 0410 03/31/21  0612 03/30/21  0555   WBC 10*3/mm3 17.68* 16.79* 17.35*   HEMOGLOBIN g/dL 15.8 14.4 15.9   HEMATOCRIT % 49.6* 45.5 49.1*   PLATELETS 10*3/mm3 281 200 214     Results from last 7 days   Lab Units 04/01/21 0410 03/31/21  0612   INR  2.90* 4.94*     Results from last 7 days   Lab Units 03/31/21  1411 03/31/21  0612 03/29/21  0955   CK TOTAL U/L  --   --  30   TROPONIN T ng/mL <0.010 <0.010 <0.010     Results from last 7 days   Lab Units 03/31/21  1104   URINECX  50,000 CFU/mL Mixed Kitty Isolated           I have reviewed the patient's laboratory results.    Radiology results:    Imaging  Results (Last 24 Hours)     ** No results found for the last 24 hours. **          I have reviewed the patient's radiology reports.    Medication Review:     I have reviewed the patient's active and prn medications.       Left upper quadrant abdominal pain    Atrial fibrillation (CMS/HCC)    Hypertension    Hiatal hernia    Shortness of breath    Tricuspid valve disorder    Gastroesophageal reflux disease with esophagitis    Chest pain      Assessment:    Septic shock  Acute hepatitis  Acute on Chronic Systolic Heart Failure  Acute renal failure  Atrial Fibrillation-Chronic, anticoagulated on Xarelto  COPD  Hypertension  Elevated Bilirubin    Plan:    Continue to monitor patient in the ICU. She is still requiring Levophed. Suspect that patient has developed septic shock due to colitis versus UTI. Have broaden patient's antibiotics out to Zosyn. MRSA screen pending. Also obtain blood cultures.  INR elevated at 4.94.  Will hold anticoagulation.  Bilirubin and liver enzymes are trending down.  Appreciate GIs assistance.  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  We will continue to monitor closely.  Dr. Gordillo also consulted and appreciate recommendations.  Low suspicion for ACS.  Troponins have been negative. CT of the chest without contrast was reviewed. Noted pleural effusion. Will attempt to diurese. Hold on further IV fluids.  Avoid nephrotoxic medications. Renal function is improving. We will continue with blood pressure support. Suspect DREW was due to hypotension.  Patient developed vertigo overnight. Supportive care. Does seem to improve with meclizine.  Patient is high risk and remains in the ICU.  There are orders as clinical course dictates.    Sarwat Mtz DO  04/01/21  17:36 EDT    Dictated utilizing Dragon dictation.

## 2021-04-01 NOTE — PLAN OF CARE
Goal Outcome Evaluation: pt. Has c/o nausea, dizziness with ringing in ear with movement. Has had poor appetite. She Has also slept often throughout the day. Levophed at .03 vitals monitored. Pt. This evening has been more alert and states she feels much better. Will continue to monitor.

## 2021-04-01 NOTE — NURSING NOTE
Dr. Mckinney at bedside. Pt states she has had shortness of breath, ear pain, abdominal pan and dizziness. Dr. Mckinney assessed inner ears and pain. Meclizine ordered PRN per MD. MD discussed CT and X-ray results due to pt complaints of shortness of breath. Pt states understanding and no further questions.

## 2021-04-01 NOTE — PLAN OF CARE
Goal Outcome Evaluation:   Pt c/o SOB, abdominal pain, ear pain, and dizziness overnight. MD made aware of complaints, as well as CT result, saw pt at bedside, and ordered meclizine TID PRN for dizziness. Pt more comfortable with meclizine, but anxious to be able to get home. VS stable and levophed weaned to 0.04 and on 4L NC. Will continue to monitor

## 2021-04-01 NOTE — PROGRESS NOTES
Continued Stay Note  [facility]     Patient Name: Elsa Alcaraz  MRN: [id number]  Today's Date: 4/1/2021    Admit Date: 3/29/2021    Discharge Plan     Row Name 04/01/21 1214       Plan    Plan  Discharge planning, patient prefers home with family. Following to assist as needed.        Discharge Codes    No documentation.       Expected Discharge Date and Time     Expected Discharge Date Expected Discharge Time    Mar 31, 2021             Jerrica Goldberg LCSW     Health Maintenance Due   Topic Date Due   • Shingles Vaccine (1 of 2) 03/14/1996   • Pneumococcal Vaccine 65+ (1 of 2 - PCV13) 03/14/2011       Patient is due for topics as listed above but is not proceeding with Immunization(s) Pneumococcal and Shingles at this time.

## 2021-04-01 NOTE — PROGRESS NOTES
In Patient Consult      Date of Consultation: 2021  Patient Name: Elsa Alcaraz  MRN: [id number]  : 1940     Referring provider: Sarwat Mtz DO    Primary care provider:  Kemi Hdz MD    Reason for consultation: Elevated liver enzymes, abdominal pain     Interval history:  2021  She is not feeling well today, reports worsening fatigue. She was moved to ICU yesterday due to hypotension and last BP was normotensive. She still has LUQ abdominal pain but reports this is mild today. No vomiting or jaundice. Last BM today.    3/31/2021  She is feeling tired and forgetful today per her report. She was able to eat breakfast but has noticed development of some sore throat since yesterday evening. Does have dysphagia which has been present for a while now. Still having mid chest pain, LUQ abdominal pain. She was changed from coumadin to xarelto sometime last year per her report. Had MRI yesterday. BP still low today, moving to ICU now.      3/30/2021  This is a 81-year-old elderly female patient with a prior history of COPD, hypertension, hyperlipidemia, chronic atrial fibrillation on anticoagulation, history of congestive heart failure, severe valvular heart disease, prior history of colon cancer status post right colectomy, breast cancer status post lumpectomy on right side was brought into emergency room on 3/29/2021 with complaints of left side abdominal pain in the left side chest pain.  GI was consulted for evaluation of elevated liver enzymes and ongoing abdominal pain.     Patient states that she was having diarrhea for almost a week that improved late last week until then she was having at least 3-4 loose bowel movements without any blood or black stool.  She had a birthday on  and had a outside food following which she developed significant abdominal pain mainly in the left side upper abdomen and also radiating to left chest and the shoulder.  Her  "symptoms did not get better for which she came to emergency room for further evaluation.  She states that her diarrhea improved and has not had any bowel movement for the past 2 days.  He denies any fever chills.  She did have some nausea but no vomiting.     She had a prior history of colon cancer and had a right hemicolectomy and as per patient she is due for surveillance colonoscopy now.  She had a an EGD done in August 2019 as per report she had hiatal hernia.  Her father had some type of stomach cancer.     She has a history of chronic atrial fibrillation and was on Coumadin before and there has been changed to Xarelto now.     Patient had a intermittent episodes of elevated total bilirubin since she was a child and she was getting yellow sclera intermittently.  Since the admission her bilirubin shoot up to 6 and hence GI was consulted for further evaluation.  Patient herself denies any other liver history in the past other than fatty liver disease     She had a work-up done in the emergency room which revealed a mildly distended distal small bowel loops with signs of possible enteritis.  He also showed a cardiomegaly as well as a left-sided pleural effusion.     Subjective     Past Medical History:   Diagnosis Date   • Anxiety disorder due to general medical condition 1/11/2017   • Arthritis    • Atrial fibrillation (CMS/HCC) 1/11/2017   • Body piercing     BOTH EARS   • Borderline diabetes    • Breast cancer (CMS/HCC) 2003    right-radiation and a lumpectomy   • Cataract 01/11/2017    Left eye surgery 11/19   • Chronic bronchitis (CMS/HCC) 1/11/2017   • Colon cancer (CMS/HCC) 11/30/1998    had surgery and chemo   • COPD (chronic obstructive pulmonary disease) (CMS/HCC)    • Cyanocobalamine deficiency (non anemic)    • Depression 1/11/2017   • Diverticulosis    • Esophageal reflux 1/11/2017   • Hiatal hernia 1/11/2017   • History of bladder infections    • Hx of exercise stress test     \"several years ago by " "Dr. Mercado\".     • Hx of radiation therapy    • Hyperlipidemia    • Hypertension 2017   • Osteoporosis    • Palpitations 2017   • Prediabetes    • Problems with swallowing     meat at times per pt report   • Shortness of breath     WITH EXERTION    • Sleep apnea 2017    NO CPAP   • Tricuspid valve disorder 2017    Patient doesn't know anything about this   • Vitamin D deficiency    • Wears glasses        Past Surgical History:   Procedure Laterality Date   • ANAL FISTULOTOMY     • APPENDECTOMY         • BREAST BIOPSY     • BREAST LUMPECTOMY Right 2006   • CATARACT EXTRACTION  2019    both eyes    • CATARACT EXTRACTION W/ INTRAOCULAR LENS IMPLANT Left 2019    Procedure: CATARACT PHACO EXTRACTION WITH INTRAOCULAR LENS IMPLANT LEFT;  Surgeon: Masoud David MD;  Location: [facility] OR;  Service: Ophthalmology   • CATARACT EXTRACTION W/ INTRAOCULAR LENS IMPLANT Right 2019    Procedure: CATARACT PHACO EXTRACTION WITH INTRAOCULAR LENS IMPLANT RIGHT;  Surgeon: Masoud David MD;  Location: [facility] OR;  Service: Ophthalmology   •  SECTION  1981   • CHOLECYSTECTOMY  2009   • COLECTOMY PARTIAL / TOTAL  1998    COLON CANCER   • COLONOSCOPY  2016   • ENDOSCOPY  2016   • ENDOSCOPY N/A 2018    Procedure: ESOPHAGOGASTRODUODENOSCOPY WITH COLD FORCEP BIOPSY;  Surgeon: Vasquez Fagan MD;  Location: [facility] ENDOSCOPY;  Service: Gastroenterology   • ENDOSCOPY N/A 2019    Procedure: ESOPHAGOGASTRODUODENOSCOPY WITH BIOPSY;  Surgeon: Vasquez Fagan MD;  Location: [facility] ENDOSCOPY;  Service: Gastroenterology   • HYSTERECTOMY         • VENTRAL HERNIA REPAIR  10/28/2012       Family History   Problem Relation Age of Onset   • Hypertension Mother    • Asthma Mother    • COPD Mother    • Osteoarthritis Mother    • Cancer Father    • Cancer Sister    • Diabetes Maternal Grandmother        Social History     Socioeconomic History   • Marital status: "      Spouse name: Not on file   • Number of children: Not on file   • Years of education: Not on file   • Highest education level: Not on file   Tobacco Use   • Smoking status: Never Smoker   • Smokeless tobacco: Never Used   Substance and Sexual Activity   • Alcohol use: Yes     Alcohol/week: 1.0 - 2.0 standard drinks     Types: 1 - 2 Glasses of wine per week     Comment: occas   • Drug use: No   • Sexual activity: Defer         Current Facility-Administered Medications:   •  acetaminophen (TYLENOL) 160 MG/5ML solution 650 mg, 650 mg, Oral, Q4H PRN, Sarwat Mtz DO, 649.6 mg at 03/31/21 0248  •  albuterol (PROVENTIL) nebulizer solution 0.083% 2.5 mg/3mL, 2.5 mg, Nebulization, Q6H PRN, Sarwat Mtz DO  •  aluminum-magnesium hydroxide-simethicone (MAALOX MAX) 400-400-40 MG/5ML suspension 15 mL, 15 mL, Oral, Q6H PRN, Sarwat Mtz DO, 15 mL at 03/31/21 0321  •  budesonide-formoterol (SYMBICORT) 80-4.5 MCG/ACT inhaler 2 puff, 2 puff, Inhalation, BID - RT, Sarwat Mtz DO, 2 puff at 04/01/21 0711  •  cefTRIAXone (ROCEPHIN) IVPB 1 g/50ml dextrose (premix), 1 g, Intravenous, Q24H, Sarwat Mtz DO, Last Rate: 100 mL/hr at 03/31/21 0958, 1 g at 03/31/21 0958  •  citalopram (CeleXA) tablet 20 mg, 20 mg, Oral, Daily, Sarwat Mtz DO, 20 mg at 04/01/21 0805  •  meclizine (ANTIVERT) tablet 12.5 mg, 12.5 mg, Oral, TID PRN, Ortega Mckinney MD, 12.5 mg at 04/01/21 0401  •  melatonin tablet 5 mg, 5 mg, Oral, Nightly PRN, Sarwat Mtz DO  •  metroNIDAZOLE (FLAGYL) 500 mg/100mL IVPB, 500 mg, Intravenous, Q8H, Sarwat Mtz DO, Last Rate: 200 mL/hr at 04/01/21 0805, 500 mg at 04/01/21 0805  •  Morphine sulfate (PF) injection 2 mg, 2 mg, Intravenous, Q3H PRN, 2 mg at 03/31/21 1728 **AND** naloxone (NARCAN) injection 0.4 mg, 0.4 mg, Intravenous, Q5 Min PRN, Sarwat Mtz DO  •  norepinephrine (LEVOPHED) 8 mg in 250 mL NS  infusion (premix), 0.02-0.3 mcg/kg/min, Intravenous, Titrated, Sarwat Mtz, , Last Rate: 5.17 mL/hr at 04/01/21 0602, 0.04 mcg/kg/min at 04/01/21 0602  •  ondansetron (ZOFRAN) tablet 4 mg, 4 mg, Oral, Q6H PRN **OR** ondansetron (ZOFRAN) injection 4 mg, 4 mg, Intravenous, Q6H PRN, Sarwat Mtz, DO, 4 mg at 03/31/21 1947  •  pantoprazole (PROTONIX) EC tablet 40 mg, 40 mg, Oral, Daily, Sarwat Mtz, , 40 mg at 04/01/21 0805  •  traMADol (ULTRAM) tablet 50 mg, 50 mg, Oral, Q6H PRN, Sarwat Mtz, , 50 mg at 03/30/21 0900    No Known Allergies    Review of Systems   Constitutional: Positive for fatigue.   Gastrointestinal: Positive for abdominal pain. Negative for vomiting.       The following portions of the patient's history were reviewed and updated as appropriate: allergies, current medications, past family history, past medical history, past social history, past surgical history and problem list.    Objective     Vitals:    04/01/21 0500 04/01/21 0530 04/01/21 0600 04/01/21 0711   BP: 114/70 108/62 122/90    BP Location:   Left arm    Patient Position:   Lying    Pulse: 86 96 82 85   Resp:   22 19   Temp:       TempSrc:       SpO2: 95% 95% 94% 97%   Weight:       Height:           Physical Exam  Vitals reviewed.   Constitutional:       General: She is not in acute distress.     Appearance: Normal appearance. She is well-developed. She is not ill-appearing or diaphoretic.      Comments: Lying supine with HOB elevated   HENT:      Head: Normocephalic and atraumatic.      Right Ear: External ear normal.      Left Ear: External ear normal.      Nose: Nose normal.      Mouth/Throat:      Mouth: Mucous membranes are dry.   Eyes:      General:         Right eye: No discharge.         Left eye: No discharge.      Conjunctiva/sclera: Conjunctivae normal.      Comments: Wearing glasses   Neck:      Vascular: No JVD.   Cardiovascular:      Rate and Rhythm: Normal rate and  regular rhythm.      Heart sounds: Normal heart sounds. No murmur heard.   No friction rub. No gallop.    Pulmonary:      Effort: Pulmonary effort is normal. No respiratory distress.      Breath sounds: Normal breath sounds. No wheezing or rales.   Chest:      Chest wall: No tenderness.   Abdominal:      General: Bowel sounds are normal. There is no distension.      Palpations: Abdomen is soft. There is no mass.      Tenderness: There is abdominal tenderness (mild LUQ). There is no guarding.   Musculoskeletal:         General: No deformity. Normal range of motion.      Cervical back: Normal range of motion.   Skin:     General: Skin is warm and dry.      Findings: No erythema or rash.   Neurological:      Mental Status: She is alert and oriented to person, place, and time.      Coordination: Coordination normal.   Psychiatric:         Behavior: Behavior normal.         Thought Content: Thought content normal.         Judgment: Judgment normal.      Comments: Depressed affect         Results from last 7 days   Lab Units 04/01/21  0410 03/31/21  0612 03/30/21  0555   SODIUM mmol/L 134* 130* 135*   POTASSIUM mmol/L 4.1 4.1 4.2   CHLORIDE mmol/L 101 96* 97*   CO2 mmol/L 25.3 26.4 29.5*   BUN mg/dL 30* 33* 17   CREATININE mg/dL 1.16* 1.71* 0.80   CALCIUM mg/dL 9.2 8.8 9.4   ALBUMIN g/dL 3.00* 2.90* 3.40*   BILIRUBIN mg/dL 3.0* 4.0* 6.9*   ALK PHOS U/L 146* 128* 164*   ALT (SGPT) U/L 34* 48* 92*   AST (SGOT) U/L 18 28 104*   GLUCOSE mg/dL 114* 96 112*   WBC 10*3/mm3 17.68* 16.79* 17.35*   HEMOGLOBIN g/dL 15.8 14.4 15.9   PLATELETS 10*3/mm3 281 200 214   INR  2.90* 4.94*  --        Imaging Results (Last 24 Hours)     Procedure Component Value Units Date/Time    CT Chest Without Contrast Diagnostic [[id number]] Collected: 03/31/21 2015     Updated: 03/31/21 2016    Narrative:      FINAL REPORT    CLINICAL HISTORY:  Interstitial lung disease    FINDINGS:  Multiple contiguous transaxial slices through the chest were  obtained  without the intravenous ministration of contrast with  coronal reformatted images.  The heart is enlarged.  There are  calcifications of the coronary arteries and valves.  There is a  small pericardial effusion.  There is consolidative lingular and  bilateral lower lobe airspace disease with small to moderate  bilateral pleural effusions, left greater than right.  Underlying interstitial prominence suggests chronic lung  disease.  There is irregular nodularity right breast with coarse  calcifications.  There are surgical clips the right axilla and  findings may be secondary to postlumpectomy scar.  Recommend  correlation with any recent breast imaging to exclude  recurrent/residual mass.      Impression:      Bilateral airspace disease and effusions  pericardial effusion  Chronic changes  likely postlumpectomy changes right breast but  recommend appropriate correlation    Authenticated by Dov Sanderson MD on 03/31/2021 08:15:45 PM    XR Chest 1 View [[id number]] Collected: 03/31/21 1528     Updated: 03/31/21 1531    Narrative:      PROCEDURE: XR CHEST 1 VW-     HISTORY: sob; R07.9-Chest pain, unspecified; I07.9-Rheumatic tricuspid  valve disease, unspecified; R06.02-Shortness of breath;  I48.11-Longstanding persistent atrial fibrillation;  K21.1-Ljpayw-kqqezcgmwr reflux disease without esophagitis;  K44.9-Diaphragmatic hernia without obstruction or gangrene     COMPARISON: March 29, 2021.     FINDINGS: The heart is enlarged. The mediastinum is unremarkable. There  is mild interstitial pulmonary edema. There has been interval increase  in size of the patient's left effusion. A small right effusion persists.  There is no pneumothorax.  There are no acute osseous abnormalities.       Impression:      Findings consistent with pulmonary edema with interval  increase in size of the patient's left pleural effusion.     Continued followup is recommended.     This report was finalized on 3/31/2021 3:29 PM by Frederick  RICK Eastman.             Assessment / Plan    Assessment/Recommendations:  4/1/2021  1. Elevated liver enzymes  2. Ischemic hepatitis  3. Hypotension  4. Elevated INR  Has a hepatocellular pattern of elevated liver enzymes with elevated total bilirubin (still at 3 but improving).  This is multifactorial in etiology including current infection, ischemic hepatitis, underlying nonalcoholic fatty liver disease and possible chronic passive congestion of the liver associated with her severe valvular heart disease including the severe tricuspid regurgitation and cardiomyopathy with low ejection fraction. INR was significantly elevated at 4.94 yesterday, improved today to 2.90 after holding Xarelto for the past 24 hours.     Repeat INR in the AM.   AST now normal, ALT mildly elevated 34.  MRCP showed mild CBD dilatation but no stones, Bilirubin improving.  No changes in abdominal exam today.  Avoid hypotensive episodes, managed by primary.     5. LUQ abdominal pain  6. Gastroenteritis  Patient's history, imaging and labs more suggestive of gastroenteritis.  Had diarrhea for several days until the past 2-3 days. This had resolved for the past 2 days. Last BM today.  Her nausea has improved and abdominal pain also improved.      Continue Levaquin for duration of 7 days.  Analgesics and antiemetic as needed.  PPI Protonix 40 mg p.o. daily.  No immediate indication for any endoscopy, will monitor.        Thank you very much for letting me participate in the care of this patient.  Please do not hesitate to call me if you have any questions.    Beth Robertson PA-C  [facility]  4/1/2021  08:15 EDT    Please note that portions of this note may have been completed with a voice recognition program.

## 2021-04-02 LAB
ALBUMIN SERPL-MCNC: 2.7 G/DL (ref 3.5–5.2)
ALBUMIN/GLOB SERPL: 1 G/DL
ALP SERPL-CCNC: 113 U/L (ref 39–117)
ALT SERPL W P-5'-P-CCNC: 22 U/L (ref 1–33)
ANION GAP SERPL CALCULATED.3IONS-SCNC: 6.8 MMOL/L (ref 5–15)
AST SERPL-CCNC: 11 U/L (ref 1–32)
BASOPHILS # BLD AUTO: 0.01 10*3/MM3 (ref 0–0.2)
BASOPHILS NFR BLD AUTO: 0.1 % (ref 0–1.5)
BILIRUB SERPL-MCNC: 1.8 MG/DL (ref 0–1.2)
BUN SERPL-MCNC: 22 MG/DL (ref 8–23)
BUN/CREAT SERPL: 28.2 (ref 7–25)
CALCIUM SPEC-SCNC: 9.2 MG/DL (ref 8.6–10.5)
CHLORIDE SERPL-SCNC: 105 MMOL/L (ref 98–107)
CO2 SERPL-SCNC: 28.2 MMOL/L (ref 22–29)
CREAT SERPL-MCNC: 0.78 MG/DL (ref 0.57–1)
DEPRECATED RDW RBC AUTO: 48.4 FL (ref 37–54)
EOSINOPHIL # BLD AUTO: 0 10*3/MM3 (ref 0–0.4)
EOSINOPHIL NFR BLD AUTO: 0 % (ref 0.3–6.2)
ERYTHROCYTE [DISTWIDTH] IN BLOOD BY AUTOMATED COUNT: 13.5 % (ref 12.3–15.4)
GFR SERPL CREATININE-BSD FRML MDRD: 71 ML/MIN/1.73
GLOBULIN UR ELPH-MCNC: 2.8 GM/DL
GLUCOSE SERPL-MCNC: 119 MG/DL (ref 65–99)
HCT VFR BLD AUTO: 46.3 % (ref 34–46.6)
HGB BLD-MCNC: 14.7 G/DL (ref 12–15.9)
IMM GRANULOCYTES # BLD AUTO: 0.08 10*3/MM3 (ref 0–0.05)
IMM GRANULOCYTES NFR BLD AUTO: 0.6 % (ref 0–0.5)
INR PPP: 1.73 (ref 0.9–1.1)
LYMPHOCYTES # BLD AUTO: 0.34 10*3/MM3 (ref 0.7–3.1)
LYMPHOCYTES NFR BLD AUTO: 2.5 % (ref 19.6–45.3)
MCH RBC QN AUTO: 30.8 PG (ref 26.6–33)
MCHC RBC AUTO-ENTMCNC: 31.7 G/DL (ref 31.5–35.7)
MCV RBC AUTO: 96.9 FL (ref 79–97)
MONOCYTES # BLD AUTO: 1.46 10*3/MM3 (ref 0.1–0.9)
MONOCYTES NFR BLD AUTO: 10.9 % (ref 5–12)
MRSA DNA SPEC QL NAA+PROBE: NORMAL
NEUTROPHILS NFR BLD AUTO: 11.52 10*3/MM3 (ref 1.7–7)
NEUTROPHILS NFR BLD AUTO: 85.9 % (ref 42.7–76)
NRBC BLD AUTO-RTO: 0 /100 WBC (ref 0–0.2)
PLATELET # BLD AUTO: 240 10*3/MM3 (ref 140–450)
PMV BLD AUTO: 9.9 FL (ref 6–12)
POTASSIUM SERPL-SCNC: 4.1 MMOL/L (ref 3.5–5.2)
PROT SERPL-MCNC: 5.5 G/DL (ref 6–8.5)
PROTHROMBIN TIME: 20.8 SECONDS (ref 12–15.1)
RBC # BLD AUTO: 4.78 10*6/MM3 (ref 3.77–5.28)
SODIUM SERPL-SCNC: 140 MMOL/L (ref 136–145)
WBC # BLD AUTO: 13.41 10*3/MM3 (ref 3.4–10.8)

## 2021-04-02 PROCEDURE — 25010000002 PIPERACILLIN SOD-TAZOBACTAM PER 1 G: Performed by: INTERNAL MEDICINE

## 2021-04-02 PROCEDURE — 25010000002 FUROSEMIDE PER 20 MG: Performed by: INTERNAL MEDICINE

## 2021-04-02 PROCEDURE — 94799 UNLISTED PULMONARY SVC/PX: CPT

## 2021-04-02 PROCEDURE — 92610 EVALUATE SWALLOWING FUNCTION: CPT

## 2021-04-02 PROCEDURE — 85610 PROTHROMBIN TIME: CPT | Performed by: INTERNAL MEDICINE

## 2021-04-02 PROCEDURE — 80053 COMPREHEN METABOLIC PANEL: CPT | Performed by: INTERNAL MEDICINE

## 2021-04-02 PROCEDURE — 63710000001 PREDNISONE PER 1 MG: Performed by: INTERNAL MEDICINE

## 2021-04-02 PROCEDURE — 97161 PT EVAL LOW COMPLEX 20 MIN: CPT

## 2021-04-02 PROCEDURE — 85025 COMPLETE CBC W/AUTO DIFF WBC: CPT | Performed by: INTERNAL MEDICINE

## 2021-04-02 PROCEDURE — 25010000002 ONDANSETRON PER 1 MG: Performed by: INTERNAL MEDICINE

## 2021-04-02 PROCEDURE — 99232 SBSQ HOSP IP/OBS MODERATE 35: CPT | Performed by: PHYSICIAN ASSISTANT

## 2021-04-02 PROCEDURE — 99232 SBSQ HOSP IP/OBS MODERATE 35: CPT | Performed by: INTERNAL MEDICINE

## 2021-04-02 PROCEDURE — 87641 MR-STAPH DNA AMP PROBE: CPT | Performed by: INTERNAL MEDICINE

## 2021-04-02 RX ORDER — FUROSEMIDE 10 MG/ML
40 INJECTION INTRAMUSCULAR; INTRAVENOUS ONCE
Status: COMPLETED | OUTPATIENT
Start: 2021-04-02 | End: 2021-04-02

## 2021-04-02 RX ADMIN — FUROSEMIDE 40 MG: 10 INJECTION, SOLUTION INTRAMUSCULAR; INTRAVENOUS at 16:40

## 2021-04-02 RX ADMIN — TAZOBACTAM SODIUM AND PIPERACILLIN SODIUM 4.5 G: 500; 4 INJECTION, SOLUTION INTRAVENOUS at 03:58

## 2021-04-02 RX ADMIN — PREDNISONE 40 MG: 20 TABLET ORAL at 08:00

## 2021-04-02 RX ADMIN — TAZOBACTAM SODIUM AND PIPERACILLIN SODIUM 4.5 G: 500; 4 INJECTION, SOLUTION INTRAVENOUS at 12:19

## 2021-04-02 RX ADMIN — BUDESONIDE AND FORMOTEROL FUMARATE DIHYDRATE 2 PUFF: 80; 4.5 AEROSOL RESPIRATORY (INHALATION) at 19:27

## 2021-04-02 RX ADMIN — CITALOPRAM HYDROBROMIDE 20 MG: 20 TABLET ORAL at 08:00

## 2021-04-02 RX ADMIN — NYSTATIN 500000 UNITS: 100000 SUSPENSION ORAL at 17:58

## 2021-04-02 RX ADMIN — NYSTATIN 500000 UNITS: 100000 SUSPENSION ORAL at 14:31

## 2021-04-02 RX ADMIN — ONDANSETRON 4 MG: 2 INJECTION INTRAMUSCULAR; INTRAVENOUS at 01:59

## 2021-04-02 RX ADMIN — NYSTATIN 500000 UNITS: 100000 SUSPENSION ORAL at 20:48

## 2021-04-02 RX ADMIN — BUDESONIDE AND FORMOTEROL FUMARATE DIHYDRATE 2 PUFF: 80; 4.5 AEROSOL RESPIRATORY (INHALATION) at 08:00

## 2021-04-02 RX ADMIN — Medication 1 CAPSULE: at 08:00

## 2021-04-02 RX ADMIN — TAZOBACTAM SODIUM AND PIPERACILLIN SODIUM 4.5 G: 500; 4 INJECTION, SOLUTION INTRAVENOUS at 20:48

## 2021-04-02 RX ADMIN — PANTOPRAZOLE SODIUM 40 MG: 40 TABLET, DELAYED RELEASE ORAL at 08:01

## 2021-04-02 NOTE — PROGRESS NOTES
In Patient Consult      Date of Consultation: 2021  Patient Name: Elsa Alcaraz  MRN: [id number]  : 1940     Referring provider: Sarwat Mtz DO    Primary care provider:  Kemi Hdz MD    Reason for consultation: Elevated liver enzymes, abdominal pain     Interval history:  2021  She reports that she is feeling some better today with weakness. Has mild abdominal pain, still in LUQ but no vomiting. Nausea is only occurring after coughing per her report. She has severe cough with some sputum production.     2021  She is not feeling well today, reports worsening fatigue. She was moved to ICU yesterday due to hypotension and last BP was normotensive. She still has LUQ abdominal pain but reports this is mild today. No vomiting or jaundice. Last BM today.     3/31/2021  She is feeling tired and forgetful today per her report. She was able to eat breakfast but has noticed development of some sore throat since yesterday evening. Does have dysphagia which has been present for a while now. Still having mid chest pain, LUQ abdominal pain. She was changed from coumadin to xarelto sometime last year per her report. Had MRI yesterday. BP still low today, moving to ICU now.      3/30/2021  This is a 81-year-old elderly female patient with a prior history of COPD, hypertension, hyperlipidemia, chronic atrial fibrillation on anticoagulation, history of congestive heart failure, severe valvular heart disease, prior history of colon cancer status post right colectomy, breast cancer status post lumpectomy on right side was brought into emergency room on 3/29/2021 with complaints of left side abdominal pain in the left side chest pain.  GI was consulted for evaluation of elevated liver enzymes and ongoing abdominal pain.     Patient states that she was having diarrhea for almost a week that improved late last week until then she was having at least 3-4 loose bowel movements  without any blood or black stool.  She had a birthday on [birth year] and had a outside food following which she developed significant abdominal pain mainly in the left side upper abdomen and also radiating to left chest and the shoulder.  Her symptoms did not get better for which she came to emergency room for further evaluation.  She states that her diarrhea improved and has not had any bowel movement for the past 2 days.  He denies any fever chills.  She did have some nausea but no vomiting.     She had a prior history of colon cancer and had a right hemicolectomy and as per patient she is due for surveillance colonoscopy now.  She had a an EGD done in August 2019 as per report she had hiatal hernia.  Her father had some type of stomach cancer.     She has a history of chronic atrial fibrillation and was on Coumadin before and there has been changed to Xarelto now.     Patient had a intermittent episodes of elevated total bilirubin since she was a child and she was getting yellow sclera intermittently.  Since the admission her bilirubin shoot up to 6 and hence GI was consulted for further evaluation.  Patient herself denies any other liver history in the past other than fatty liver disease     She had a work-up done in the emergency room which revealed a mildly distended distal small bowel loops with signs of possible enteritis.  He also showed a cardiomegaly as well as a left-sided pleural effusion.     Subjective     Past Medical History:   Diagnosis Date   • Anxiety disorder due to general medical condition 1/11/2017   • Arthritis    • Atrial fibrillation (CMS/HCC) 1/11/2017   • Body piercing     BOTH EARS   • Borderline diabetes    • Breast cancer (CMS/HCC) 2003    right-radiation and a lumpectomy   • Cataract 01/11/2017    Left eye surgery 11/19   • Chronic bronchitis (CMS/HCC) 1/11/2017   • Colon cancer (CMS/HCC) 11/30/1998    had surgery and chemo   • COPD (chronic obstructive pulmonary disease) (CMS/HCC)   "  • Cyanocobalamine deficiency (non anemic)    • Depression 2017   • Diverticulosis    • Esophageal reflux 2017   • Hiatal hernia 2017   • History of bladder infections    • Hx of exercise stress test     \"several years ago by Dr. Mercado\".     • Hx of radiation therapy    • Hyperlipidemia    • Hypertension 2017   • Osteoporosis    • Palpitations 2017   • Prediabetes    • Problems with swallowing     meat at times per pt report   • Shortness of breath     WITH EXERTION    • Sleep apnea 2017    NO CPAP   • Tricuspid valve disorder 2017    Patient doesn't know anything about this   • Vitamin D deficiency    • Wears glasses        Past Surgical History:   Procedure Laterality Date   • ANAL FISTULOTOMY     • APPENDECTOMY         • BREAST BIOPSY     • BREAST LUMPECTOMY Right 2006   • CATARACT EXTRACTION  2019    both eyes    • CATARACT EXTRACTION W/ INTRAOCULAR LENS IMPLANT Left 2019    Procedure: CATARACT PHACO EXTRACTION WITH INTRAOCULAR LENS IMPLANT LEFT;  Surgeon: Masoud David MD;  Location: [facility] OR;  Service: Ophthalmology   • CATARACT EXTRACTION W/ INTRAOCULAR LENS IMPLANT Right 2019    Procedure: CATARACT PHACO EXTRACTION WITH INTRAOCULAR LENS IMPLANT RIGHT;  Surgeon: Masoud David MD;  Location: [facility] OR;  Service: Ophthalmology   •  SECTION  1981   • CHOLECYSTECTOMY  2009   • COLECTOMY PARTIAL / TOTAL  1998    COLON CANCER   • COLONOSCOPY     • ENDOSCOPY  2016   • ENDOSCOPY N/A 2018    Procedure: ESOPHAGOGASTRODUODENOSCOPY WITH COLD FORCEP BIOPSY;  Surgeon: Vasquez Fagan MD;  Location: [facility] ENDOSCOPY;  Service: Gastroenterology   • ENDOSCOPY N/A 2019    Procedure: ESOPHAGOGASTRODUODENOSCOPY WITH BIOPSY;  Surgeon: Vasquez Fagan MD;  Location: [facility] ENDOSCOPY;  Service: Gastroenterology   • HYSTERECTOMY         • VENTRAL HERNIA REPAIR  10/28/2012       Family History   Problem Relation " Age of Onset   • Hypertension Mother    • Asthma Mother    • COPD Mother    • Osteoarthritis Mother    • Cancer Father    • Cancer Sister    • Diabetes Maternal Grandmother        Social History     Socioeconomic History   • Marital status:      Spouse name: Not on file   • Number of children: Not on file   • Years of education: Not on file   • Highest education level: Not on file   Tobacco Use   • Smoking status: Never Smoker   • Smokeless tobacco: Never Used   Substance and Sexual Activity   • Alcohol use: Yes     Alcohol/week: 1.0 - 2.0 standard drinks     Types: 1 - 2 Glasses of wine per week     Comment: occas   • Drug use: No   • Sexual activity: Defer         Current Facility-Administered Medications:   •  acetaminophen (TYLENOL) tablet 650 mg, 650 mg, Oral, Q4H PRN, Sarwat Mtz DO, 650 mg at 04/01/21 0948  •  albuterol (PROVENTIL) nebulizer solution 0.083% 2.5 mg/3mL, 2.5 mg, Nebulization, Q6H PRN, Sarwat Mtz DO  •  aluminum-magnesium hydroxide-simethicone (MAALOX MAX) 400-400-40 MG/5ML suspension 15 mL, 15 mL, Oral, Q6H PRN, Sarwat Mtz DO, 15 mL at 03/31/21 0321  •  budesonide-formoterol (SYMBICORT) 80-4.5 MCG/ACT inhaler 2 puff, 2 puff, Inhalation, BID - RT, Sarwat Mtz DO, 2 puff at 04/01/21 1942  •  citalopram (CeleXA) tablet 20 mg, 20 mg, Oral, Daily, Sarwat Mtz DO, 20 mg at 04/01/21 0805  •  lactobacillus acidophilus (RISAQUAD) capsule 1 capsule, 1 capsule, Oral, Daily, Sarwat Mtz DO, 1 capsule at 04/01/21 1432  •  meclizine (ANTIVERT) tablet 12.5 mg, 12.5 mg, Oral, TID PRN, Ortega Mckinney MD, 12.5 mg at 04/01/21 0401  •  melatonin tablet 5 mg, 5 mg, Oral, Nightly PRN, Sarwat Mtz DO  •  Morphine sulfate (PF) injection 2 mg, 2 mg, Intravenous, Q3H PRN, 2 mg at 03/31/21 1728 **AND** naloxone (NARCAN) injection 0.4 mg, 0.4 mg, Intravenous, Q5 Min PRN, Sarwat Mtz,   •  norepinephrine  (LEVOPHED) 8 mg in 250 mL NS infusion (premix), 0.02-0.3 mcg/kg/min, Intravenous, Titrated, Sarwat Mtz DO, Stopped at 04/02/21 0057  •  ondansetron (ZOFRAN) tablet 4 mg, 4 mg, Oral, Q6H PRN **OR** ondansetron (ZOFRAN) injection 4 mg, 4 mg, Intravenous, Q6H PRN, Sarwat Mtz DO, 4 mg at 04/02/21 0159  •  pantoprazole (PROTONIX) EC tablet 40 mg, 40 mg, Oral, Daily, Sarwat Mtz DO, 40 mg at 04/01/21 0805  •  piperacillin-tazobactam (ZOSYN) 4.5 g in iso-osmotic dextrose 100 mL IVPB (premix), 4.5 g, Intravenous, Q8H, Sarwat Mtz DO, 4.5 g at 04/02/21 0358  •  predniSONE (DELTASONE) tablet 40 mg, 40 mg, Oral, Daily, Sarwat Mtz DO, 40 mg at 04/01/21 0948  •  traMADol (ULTRAM) tablet 50 mg, 50 mg, Oral, Q6H PRN, Sarwat Mtz DO, 50 mg at 03/30/21 0900    No Known Allergies    Review of Systems    The following portions of the patient's history were reviewed and updated as appropriate: allergies, current medications, past family history, past medical history, past social history, past surgical history and problem list.    Objective     Vitals:    04/02/21 0300 04/02/21 0400 04/02/21 0500 04/02/21 0600   BP: 113/75 107/70 108/66 107/60   BP Location:  Right arm Right arm Right arm   Patient Position:  Lying Lying Lying   Pulse: 101 100 96 120   Resp:  20 21 17   Temp:  98 °F (36.7 °C)     TempSrc:  Oral     SpO2: 95% 93% 95% 93%   Weight:  71.5 kg (157 lb 11.2 oz)     Height:           Physical Exam    Results from last 7 days   Lab Units 04/02/21  0405 04/01/21  0410 03/31/21  0612   SODIUM mmol/L 140 134* 130*   POTASSIUM mmol/L 4.1 4.1 4.1   CHLORIDE mmol/L 105 101 96*   CO2 mmol/L 28.2 25.3 26.4   BUN mg/dL 22 30* 33*   CREATININE mg/dL 0.78 1.16* 1.71*   CALCIUM mg/dL 9.2 9.2 8.8   ALBUMIN g/dL 2.70* 3.00* 2.90*   BILIRUBIN mg/dL 1.8* 3.0* 4.0*   ALK PHOS U/L 113 146* 128*   ALT (SGPT) U/L 22 34* 48*   AST (SGOT) U/L 11 18 28   GLUCOSE mg/dL  119* 114* 96   WBC 10*3/mm3 13.41* 17.68* 16.79*   HEMOGLOBIN g/dL 14.7 15.8 14.4   PLATELETS 10*3/mm3 240 281 200   INR   --  2.90* 4.94*       Imaging Results (Last 24 Hours)     ** No results found for the last 24 hours. **             Assessment / Plan    Assessment/Recommendations:  1. Elevated liver enzymes  2. Ischemic hepatitis  3. Elevated INR  Has a hepatocellular pattern of elevated liver enzymes with elevated total bilirubin (from 6 to now 1.8).  This is multifactorial in etiology including current infection, ischemic hepatitis, underlying nonalcoholic fatty liver disease and possible chronic passive congestion of the liver associated with her severe valvular heart disease including the severe tricuspid regurgitation and cardiomyopathy with low ejection fraction.     INR was significantly elevated at 4.94 a couple of days ago, improved now to 1.73 after holding Xarelto for the past 48 hours.     AST and ALT now normal, had ischemic hepatitis related to hypotension, improved now and moving out of ICU today.     4. LUQ abdominal pain  5. Gastroenteritis  Abdominal pain and diarrhea still the same over past several days. Has not had frequent or loose stools since 3-4 days ago. Primary changed antibiotic to zosyn for better coverage.      Analgesics and antiemetic as needed.  PPI Protonix 40 mg p.o. daily.  No immediate indication for any endoscopy, will monitor.      GI will sign off, please call if needed.        Thank you very much for letting me participate in the care of this patient.  Please do not hesitate to call me if you have any questions.    Beth Robertson PA-C  [facility]  4/2/2021  07:51 EDT    Please note that portions of this note may have been completed with a voice recognition program.

## 2021-04-02 NOTE — PLAN OF CARE
Goal Outcome Evaluation:  Plan of Care Reviewed With: patient     Outcome Summary: Bedside eval of swallow completed with pt. seated upright in chair for eval.  Pt. voiced complaints of belching often with po and some soreness of her throat.  Oral mech exam remarkable only for light white lingual coating suggestive of candida.  Pt. was given trials of regular solid, puree, and thin liquids.  Oral phase was WFL with all trials.  No overt s/s aspiration with any trial.  Pt. did exhibit belching with thin liquid.  She has a history of third stage dysphagia including GERD with esophagitis, HH, and esophageal dilitation x2.  Pt.'s complaints and findings during exam are consistent with third stage dysphagia.  Recommend:  1. continue reg/GI soft diet with thin liq as lisa,  2. meds whole with thin liq as lisa,  3. aspiration precautions,  4. reflux precautions.  MD may wish to consider nystatin and also reassess PPI for optimal dosage with potential for GI consult.

## 2021-04-02 NOTE — PLAN OF CARE
Goal Outcome Evaluation:  Plan of Care Reviewed With: patient  Progress: no change  Outcome Summary: Patient participates well in skilled PT evaluation and vestibular tests are performed.  Tests included saccaded, smooth pursuit, VORs, Thad-Hallpike and roll test.  All tests were negative--no dizziness and no nystagmus.  She is able to perform mobility tasks with no more than minimal assistance.  She walked 5 feet with HHA and gait belt, she usually uses a RW.  She is expected to benefit from additional PT services while hospitalized and upon discharge to home with home health care.

## 2021-04-02 NOTE — THERAPY EVALUATION
Acute Care - Speech Language Pathology   Swallow Initial Evaluation  Vipul     Patient Name: Elsa Alcaraz  : 1940  MRN: [id number]  Today's Date: 2021               Admit Date: 3/29/2021    Visit Dx:     ICD-10-CM ICD-9-CM   1. Chest pain, unspecified type  R07.9 786.50   2. Tricuspid valve disorder  I07.9 397.0   3. Shortness of breath  R06.02 786.05   4. Longstanding persistent atrial fibrillation (CMS/HCC)  I48.11 427.31   5. Hiatal hernia with gastroesophageal reflux  K21.9 530.81    K44.9 553.3     Patient Active Problem List   Diagnosis   • Atrial fibrillation (CMS/HCC)   • Hyperlipidemia   • Hypertension   • Anxiety disorder due to general medical condition   • Cataract   • Chronic bronchitis (CMS/HCC)   • Depression   • Esophageal reflux   • Hiatal hernia   • Obesity   • Palpitations   • Shortness of breath   • Sleep apnea   • Tricuspid valve disorder   • Iron deficiency anemia   • Thrombocytosis (CMS/HCC)   • Iron deficiency anemia secondary to inadequate dietary iron intake   • Hiatal hernia with gastroesophageal reflux   • Gastroesophageal reflux disease with esophagitis   • Epigastric pain   • Age-related nuclear cataract of left eye   • Age-related nuclear cataract of right eye   • Chest pain   • Left upper quadrant abdominal pain     Past Medical History:   Diagnosis Date   • Anxiety disorder due to general medical condition 2017   • Arthritis    • Atrial fibrillation (CMS/HCC) 2017   • Body piercing     BOTH EARS   • Borderline diabetes    • Breast cancer (CMS/HCC) 2003    right-radiation and a lumpectomy   • Cataract 2017    Left eye surgery    • Chronic bronchitis (CMS/HCC) 2017   • Colon cancer (CMS/HCC) 1998    had surgery and chemo   • COPD (chronic obstructive pulmonary disease) (CMS/HCC)    • Cyanocobalamine deficiency (non anemic)    • Depression 2017   • Diverticulosis    • Esophageal reflux 2017   • Hiatal hernia 2017   • History  "of bladder infections    • Hx of exercise stress test     \"several years ago by Dr. Mercado\".     • Hx of radiation therapy    • Hyperlipidemia    • Hypertension 2017   • Osteoporosis    • Palpitations 2017   • Prediabetes    • Problems with swallowing     meat at times per pt report   • Shortness of breath     WITH EXERTION    • Sleep apnea 2017    NO CPAP   • Tricuspid valve disorder 2017    Patient doesn't know anything about this   • Vitamin D deficiency    • Wears glasses      Past Surgical History:   Procedure Laterality Date   • ANAL FISTULOTOMY     • APPENDECTOMY         • BREAST BIOPSY     • BREAST LUMPECTOMY Right 2006   • CATARACT EXTRACTION  2019    both eyes    • CATARACT EXTRACTION W/ INTRAOCULAR LENS IMPLANT Left 2019    Procedure: CATARACT PHACO EXTRACTION WITH INTRAOCULAR LENS IMPLANT LEFT;  Surgeon: Masoud David MD;  Location: [facility] OR;  Service: Ophthalmology   • CATARACT EXTRACTION W/ INTRAOCULAR LENS IMPLANT Right 2019    Procedure: CATARACT PHACO EXTRACTION WITH INTRAOCULAR LENS IMPLANT RIGHT;  Surgeon: Masoud David MD;  Location: [facility] OR;  Service: Ophthalmology   •  SECTION  1981   • CHOLECYSTECTOMY  2009   • COLECTOMY PARTIAL / TOTAL  1998    COLON CANCER   • COLONOSCOPY  2016   • ENDOSCOPY  2016   • ENDOSCOPY N/A 2018    Procedure: ESOPHAGOGASTRODUODENOSCOPY WITH COLD FORCEP BIOPSY;  Surgeon: Vasquez Fagan MD;  Location: [facility] ENDOSCOPY;  Service: Gastroenterology   • ENDOSCOPY N/A 2019    Procedure: ESOPHAGOGASTRODUODENOSCOPY WITH BIOPSY;  Surgeon: Vasquez Fagan MD;  Location: [facility] ENDOSCOPY;  Service: Gastroenterology   • HYSTERECTOMY         • VENTRAL HERNIA REPAIR  10/28/2012        SWALLOW EVALUATION (last 72 hours)      SLP Adult Swallow Evaluation     Row Name 21 1220                   Rehab Evaluation    Document Type  evaluation  -TM        Subjective Information  " complains of belching frequently when eating/drinking; sore throat  -TM        Patient Observations  alert;cooperative  -TM        Patient/Family/Caregiver Comments/Observations  no family present  -TM        Patient Effort  good  -TM           General Information    Patient Profile Reviewed  yes  -TM        Pertinent History Of Current Problem  GERD with esophagitis, COPD, cardiac, HH, esophageal dilitation x2  -TM        Current Method of Nutrition  regular textures;thin liquids  -TM        Precautions/Limitations, Vision  WFL with corrective lenses  -TM        Prior Level of Function-Communication  WFL  -TM        Prior Level of Function-Swallowing  no diet consistency restrictions;esophageal concerns  -TM        Plans/Goals Discussed with  patient;other (see comments) RN  -TM        Barriers to Rehab  medically complex;previous functional deficit  -TM        Patient's Goals for Discharge  patient did not state  -TM           Pain    Additional Documentation  Pain Scale: Numbers Pre/Post-Treatment (Group)  -TM           Pain Scale: Numbers Pre/Post-Treatment    Pretreatment Pain Rating  0/10 - no pain  -TM        Posttreatment Pain Rating  0/10 - no pain  -TM           Oral Motor Structure and Function    Oral Lesions or Structural Abnormalities and/or variants  light white lingual coating suggestive of candida  -TM        Dentition Assessment  natural, present and adequate  -TM        Secretion Management  WNL/WFL  -TM        Mucosal Quality  moist, healthy  -TM        Volitional Cough  WFL  -TM           Oral Musculature and Cranial Nerve Assessment    Oral Motor General Assessment  WFL  -TM           General Eating/Swallowing Observations    Respiratory Support Currently in Use  nasal cannula  -TM        Eating/Swallowing Skills  fed by SLP  -TM        Positioning During Eating  upright in chair  -TM        Utensils Used  spoon;straw  -TM        Consistencies Trialed  regular textures;pureed;thin liquids  -TM         Pre SpO2 (%)  92  -TM        Post SpO2 (%)  92  -TM           Respiratory    Respiratory Status  WFL  -TM           Clinical Swallow Eval    Oral Prep Phase  WFL  -TM        Oral Transit  WFL  -TM        Oral Residue  WFL  -TM        Pharyngeal Phase  no overt signs/symptoms of pharyngeal impairment  -TM        Esophageal Phase  suspected esophageal impairment  -TM        Clinical Swallow Evaluation Summary  Bedside eval of swallow completed with pt. seated upright in chair for eval.  Pt. voiced complaints of belching often with po and some soreness of her throat.  Oral mech exam remarkable only for light white lingual coating suggestive of candida.  Pt. was given trials of regular solid, puree, and thin liquids.  Oral phase was WFL with all trials.  No overt s/s aspiration with any trial.  Pt. did exhibit belching with thin liquid.  She has a history of third stage dysphagia including GERD with esophagitis, HH, and esophageal dilitation x2.  Pt.'s complaints and findings during exam are consistent with third stage dysphagia.  Recommend:  1. continue reg/GI soft diet with thin liq as lisa,  2. meds whole with thin liq as lisa,  3. aspiration precautions,  4. reflux precautions.  MD may wish to consider nystatin and also reassess PPI for optimal dosage with potential for GI consult.    -TM           Esophageal Phase Concerns    Esophageal Phase Concerns  belching  -TM        Belching  thin  -TM           Clinical Impression    Barriers to Overall Progress (SLP)  previous third stage dysphagia  -TM        SLP Swallowing Diagnosis  functional oral phase;functional pharyngeal phase;esophageal dysphagia  -TM        Functional Impact  risk of aspiration/pneumonia  -TM        Swallow Criteria for Skilled Therapeutic Interventions Met  no problems identified which require skilled intervention  -TM           Recommendations    Therapy Frequency (Swallow)  evaluation only  -TM        SLP Diet Recommendation  regular  textures;soft textures;thin liquids  -        Recommended Diagnostics  No further SLP services recommended  -        Recommended Precautions and Strategies  upright posture during/after eating;reflux precautions;general aspiration precautions  -        Oral Care Recommendations  Toothbrush  -        SLP Rec. for Method of Medication Administration  meds whole;as tolerated;with thin liquids  -        Monitor for Signs of Aspiration  notify SLP if any concerns  -        Anticipated Discharge Disposition (SLP)  unknown  -TM        Demonstrates Need for Referral to Another Service  dedicated esophageal assessment;gastroenterology  -          User Key  (r) = Recorded By, (t) = Taken By, (c) = Cosigned By    Initials Name Effective Dates     Alis Mancilla 03/07/18 -           EDUCATION  The patient has been educated in the following areas:   Dysphagia (Swallowing Impairment).    SLP Recommendation and Plan  SLP Swallowing Diagnosis: functional oral phase, functional pharyngeal phase, esophageal dysphagia  SLP Diet Recommendation: regular textures, soft textures, thin liquids  Recommended Precautions and Strategies: upright posture during/after eating, reflux precautions, general aspiration precautions  SLP Rec. for Method of Medication Administration: meds whole, as tolerated, with thin liquids     Monitor for Signs of Aspiration: notify SLP if any concerns  Recommended Diagnostics: No further SLP services recommended  Swallow Criteria for Skilled Therapeutic Interventions Met: no problems identified which require skilled intervention  Anticipated Discharge Disposition (SLP): unknown     Therapy Frequency (Swallow): evaluation only     Demonstrates Need for Referral to Another Service: dedicated esophageal assessment, gastroenterology                      Plan of Care Reviewed With: patient  Outcome Summary: Bedside eval of swallow completed with pt. seated upright in chair for eval.  Pt. voiced  complaints of belching often with po and some soreness of her throat.  Oral [facility] exam remarkable only for light white lingual coating suggestive of candida.  Pt. was given trials of regular solid, puree, and thin liquids.  Oral phase was WFL with all trials.  No overt s/s aspiration with any trial.  Pt. did exhibit belching with thin liquid.  She has a history of third stage dysphagia including GERD with esophagitis, HH, and esophageal dilitation x2.  Pt.'s complaints and findings during exam are consistent with third stage dysphagia.  Recommend:  1. continue reg/GI soft diet with thin liq as lisa,  2. meds whole with thin liq as lisa,  3. aspiration precautions,  4. reflux precautions.  MD may wish to consider nystatin and also reassess PPI for optimal dosage with potential for GI consult.           Time Calculation:   Time Calculation- SLP     Row Name 04/02/21 1252             Time Calculation- SLP    SLP Start Time  1220  -TM      SLP Stop Time  1235  -TM      SLP Time Calculation (min)  15 min  -      SLP Received On  04/02/21  -        User Key  (r) = Recorded By, (t) = Taken By, (c) = Cosigned By    Initials Name Provider Type    TM Alis Mancilla Speech and Language Pathologist          Therapy Charges for Today     Code Description Service Date Service Provider Modifiers Qty    [id number]  ST EVAL ORAL PHARYNG SWALLOW 4 4/2/2021 Alis Mancilla GN 1               Alis Mancilla  4/2/2021

## 2021-04-02 NOTE — PROGRESS NOTES
[facility]    Name:  Elsa Alcaraz   Age:  81 y.o.  Sex:  female  :  1940  MRN:  [id number]   Visit Number:  [id number]  Admission Date:  3/29/2021  Date Of Service:  21  Primary Care Physician:  Kemi Hdz MD     LOS: 4 days :  Patient Care Team:  Kemi Hdz MD as PCP - General  [facility] (General Surgery):    Chief Complaint:      Abdominal pain    Subjective / Interval History:     Patient seen and evaluated in the ICU this morning.  Resting comfortably in bed.  Has been is at bedside.  Patient stated that she is feeling well just sleepy this morning.  Tolerating diet.  Still with slight nausea no vomiting.  No acute events overnight per nursing staff.  Maintaining normotension off of Levophed.    Review of Systems: Reviewed again     General ROS: Patient denies any fevers, chills or loss of consciousness.  Respiratory ROS: Denies cough or shortness of breath.  Cardiovascular ROS: Denies chest pain or palpitations. No history of exertional chest pain.  Gastrointestinal ROS: Denies nausea and vomiting.  Complains of left-sided abdominal pain.  No diarrhea.  Neurological ROS: Denies any focal weakness. No loss of consciousness. Denies any numbness.  Dermatological ROS: Denies any redness or pruritis.    Vital Signs:    Temp:  [97.5 °F (36.4 °C)-98.2 °F (36.8 °C)] 97.6 °F (36.4 °C)  Heart Rate:  [] 90  Resp:  [12-24] 14  BP: ()/(55-92) 89/56    Intake and output:    I/O last 3 completed shifts:  In: 1252 [P.O.:60; I.V.:1192]  Out: 1525 [Urine:875; Stool:650]  I/O this shift:  In: 480 [P.O.:480]  Out: 250 [Urine:250]    Physical Examination: Examined in     General Appearance:  Alert and cooperative, not in any acute distress.   Head:  Atraumatic and normocephalic, without obvious abnormality.   Eyes:          PERRLA, conjunctivae and sclerae normal, no Icterus. No pallor.  Extraocular movements are within normal limits.   Neck: Supple, trachea midline, no thyromegaly, no carotid bruit.   Lungs:   Chest shape is normal. Breath sounds heard bilaterally equally.  No crackles or wheezing. No Pleural rub or bronchial breathing.   Heart:  Normal S1 and S2, no murmur, no gallop, no rub. No JVD   Abdomen:   Normal bowel sounds, no masses, no organomegaly. Soft, nontender, nondistended, no guarding, no rebound tenderness.   Extremities: Moves all extremities well, no edema, no cyanosis, no clubbing.   Skin: No bleeding, bruising or rash.   Neurologic: Awake, alert and oriented times 3. Moves all 4 extremities equally.     Laboratory results:    Results from last 7 days   Lab Units 04/02/21  0405 04/01/21  0410 03/31/21  0612   SODIUM mmol/L 140 134* 130*   POTASSIUM mmol/L 4.1 4.1 4.1   CHLORIDE mmol/L 105 101 96*   CO2 mmol/L 28.2 25.3 26.4   BUN mg/dL 22 30* 33*   CREATININE mg/dL 0.78 1.16* 1.71*   CALCIUM mg/dL 9.2 9.2 8.8   BILIRUBIN mg/dL 1.8* 3.0* 4.0*   ALK PHOS U/L 113 146* 128*   ALT (SGPT) U/L 22 34* 48*   AST (SGOT) U/L 11 18 28   GLUCOSE mg/dL 119* 114* 96     Results from last 7 days   Lab Units 04/02/21  0405 04/01/21  0410 03/31/21  0612   WBC 10*3/mm3 13.41* 17.68* 16.79*   HEMOGLOBIN g/dL 14.7 15.8 14.4   HEMATOCRIT % 46.3 49.6* 45.5   PLATELETS 10*3/mm3 240 281 200     Results from last 7 days   Lab Units 04/02/21  0810 04/01/21  0410 03/31/21  0612   INR  1.73* 2.90* 4.94*     Results from last 7 days   Lab Units 03/31/21  1411 03/31/21  0612 03/29/21  0955   CK TOTAL U/L  --   --  30   TROPONIN T ng/mL <0.010 <0.010 <0.010     Results from last 7 days   Lab Units 04/01/21  1316 04/01/21  1312 03/31/21  1104   BLOODCX  No growth at 24 hours No growth at 24 hours  --    URINECX   --   --  50,000 CFU/mL Mixed Kitty Isolated           I have reviewed the patient's laboratory results.    Radiology results:    Imaging Results (Last 24 Hours)     ** No results found for the last  24 hours. **          I have reviewed the patient's radiology reports.    Medication Review:     I have reviewed the patient's active and prn medications.       Left upper quadrant abdominal pain    Atrial fibrillation (CMS/HCC)    Hypertension    Hiatal hernia    Shortness of breath    Tricuspid valve disorder    Gastroesophageal reflux disease with esophagitis    Chest pain      Assessment:    Septic shock  Acute on Chronic Systolic Heart Failure  Acute renal failure  Atrial Fibrillation-Chronic, anticoagulated on Xarelto  COPD  Hypertension  Elevated Bilirubin    Plan:    Patient has been stable off of Levophed now.  Will transfer out of the ICU.  Suspect that patient has developed septic shock due to colitis versus UTI. Have broaden patient's antibiotics out to Zosyn. MRSA screen pending. Also obtain blood cultures.  INR elevated at 4.94.  Will hold anticoagulation.  Bilirubin and liver enzymes are trending down.  Appreciate GIs assistance.  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  We will continue to monitor closely.  Dr. Gordillo also consulted and appreciate recommendations.  Low suspicion for ACS.  Troponins have been negative. CT of the chest without contrast was reviewed. Noted pleural effusion. Will attempt to diurese. Hold on further IV fluids.  Renal function has normalized. We will continue with blood pressure support. Suspect DREW was due to hypotension.  Patient developed vertigo overnight. Supportive care. Does seem to improve with meclizine.  Physical therapy consulted for [place]-Hallpike maneuvers.  Patient is high risk and remains in the ICU.  There are orders as clinical course dictates.    Sarwat Mtz DO  04/02/21  14:48 EDT    Dictated utilizing Dragon dictation.

## 2021-04-02 NOTE — THERAPY EVALUATION
Patient Name: Elsa Alcaraz  : 1940    MRN: [id number]                              Today's Date: 2021       Admit Date: 3/29/2021    Visit Dx:     ICD-10-CM ICD-9-CM   1. Chest pain, unspecified type  R07.9 786.50   2. Tricuspid valve disorder  I07.9 397.0   3. Shortness of breath  R06.02 786.05   4. Longstanding persistent atrial fibrillation (CMS/HCC)  I48.11 427.31   5. Hiatal hernia with gastroesophageal reflux  K21.9 530.81    K44.9 553.3     Patient Active Problem List   Diagnosis   • Atrial fibrillation (CMS/HCC)   • Hyperlipidemia   • Hypertension   • Anxiety disorder due to general medical condition   • Cataract   • Chronic bronchitis (CMS/HCC)   • Depression   • Esophageal reflux   • Hiatal hernia   • Obesity   • Palpitations   • Shortness of breath   • Sleep apnea   • Tricuspid valve disorder   • Iron deficiency anemia   • Thrombocytosis (CMS/HCC)   • Iron deficiency anemia secondary to inadequate dietary iron intake   • Hiatal hernia with gastroesophageal reflux   • Gastroesophageal reflux disease with esophagitis   • Epigastric pain   • Age-related nuclear cataract of left eye   • Age-related nuclear cataract of right eye   • Chest pain   • Left upper quadrant abdominal pain     Past Medical History:   Diagnosis Date   • Anxiety disorder due to general medical condition 2017   • Arthritis    • Atrial fibrillation (CMS/HCC) 2017   • Body piercing     BOTH EARS   • Borderline diabetes    • Breast cancer (CMS/HCC) 2003    right-radiation and a lumpectomy   • Cataract 2017    Left eye surgery    • Chronic bronchitis (CMS/HCC) 2017   • Colon cancer (CMS/HCC) 1998    had surgery and chemo   • COPD (chronic obstructive pulmonary disease) (CMS/HCC)    • Cyanocobalamine deficiency (non anemic)    • Depression 2017   • Diverticulosis    • Esophageal reflux 2017   • Hiatal hernia 2017   • History of bladder infections    • Hx of exercise stress test      "\"several years ago by Dr. Mercado\".     • Hx of radiation therapy    • Hyperlipidemia    • Hypertension 2017   • Impaired functional mobility and activity tolerance    • Osteoporosis    • Palpitations 2017   • Prediabetes    • Problems with swallowing     meat at times per pt report   • Shortness of breath     WITH EXERTION    • Sleep apnea 2017    NO CPAP   • Tricuspid valve disorder 2017    Patient doesn't know anything about this   • Vitamin D deficiency    • Wears glasses      Past Surgical History:   Procedure Laterality Date   • ANAL FISTULOTOMY     • APPENDECTOMY         • BREAST BIOPSY     • BREAST LUMPECTOMY Right 2006   • CATARACT EXTRACTION  2019    both eyes    • CATARACT EXTRACTION W/ INTRAOCULAR LENS IMPLANT Left 2019    Procedure: CATARACT PHACO EXTRACTION WITH INTRAOCULAR LENS IMPLANT LEFT;  Surgeon: Masoud David MD;  Location: [facility] OR;  Service: Ophthalmology   • CATARACT EXTRACTION W/ INTRAOCULAR LENS IMPLANT Right 2019    Procedure: CATARACT PHACO EXTRACTION WITH INTRAOCULAR LENS IMPLANT RIGHT;  Surgeon: Masoud David MD;  Location: [facility] OR;  Service: Ophthalmology   •  SECTION  1981   • CHOLECYSTECTOMY  2009   • COLECTOMY PARTIAL / TOTAL  1998    COLON CANCER   • COLONOSCOPY     • ENDOSCOPY  2016   • ENDOSCOPY N/A 2018    Procedure: ESOPHAGOGASTRODUODENOSCOPY WITH COLD FORCEP BIOPSY;  Surgeon: Vasquez Fagan MD;  Location: [facility] ENDOSCOPY;  Service: Gastroenterology   • ENDOSCOPY N/A 2019    Procedure: ESOPHAGOGASTRODUODENOSCOPY WITH BIOPSY;  Surgeon: Vasquez Fagan MD;  Location: [facility] ENDOSCOPY;  Service: Gastroenterology   • HYSTERECTOMY         • VENTRAL HERNIA REPAIR  10/28/2012     General Information     Row Name 21 1107          Physical Therapy Time and Intention    Document Type  evaluation  -JR     Mode of Treatment  physical therapy  -JR     Row Name 21 1107    "       General Information    Patient Profile Reviewed  yes  -JR     Prior Level of Function  independent:;all household mobility  -     Existing Precautions/Restrictions  fall  -JR     Barriers to Rehab  medically complex;previous functional deficit  -     Row Name 04/02/21 1107          Living Environment    Lives With  spouse;grandchild(michael)  -     Row Name 04/02/21 1107          Cognition    Orientation Status (Cognition)  oriented x 4  -     Row Name 04/02/21 1107          Safety Issues, Functional Mobility    Safety Issues Affecting Function (Mobility)  safety precautions follow-through/compliance;awareness of need for assistance;insight into deficits/self-awareness  -JR     Impairments Affecting Function (Mobility)  strength;balance;endurance/activity tolerance;postural/trunk control  -     Comment, Safety Issues/Impairments (Mobility)  vestibular evaluation due to woozy, light-headed feeling  -       User Key  (r) = Recorded By, (t) = Taken By, (c) = Cosigned By    Initials Name Provider Type    JR Adenike Randall, PT Physical Therapist        Mobility     Row Name 04/02/21 1107          Bed Mobility    Bed Mobility  supine-sit  -     Supine-Sit [place] (Bed Mobility)  minimum assist (75% patient effort)  -     Assistive Device (Bed Mobility)  head of bed elevated;bed rails  -     Row Name 04/02/21 1107          Bed-Chair Transfer    Bed-Chair [place] (Transfers)  minimum assist (75% patient effort)  -     Assistive Device (Bed-Chair Transfers)  walker, front-wheeled HHA and gait belt, should use RW  -     Row Name 04/02/21 1107          Sit-Stand Transfer    Sit-Stand [place] (Transfers)  minimum assist (75% patient effort)  -     Assistive Device (Sit-Stand Transfers)  walker, front-wheeled HHA and gait belt, should use RW  -     Row Name 04/02/21 1107          Gait/Stairs (Locomotion)    [place] Level (Gait)  minimum assist (75% patient effort)  -     Assistive  Device (Gait)  walker, front-wheeled HHA and gait belt, should use RW  -JR     Distance in Feet (Gait)  5  -JR     Deviations/Abnormal Patterns (Gait)  bilateral deviations;antalgic  -JR     Bilateral Gait Deviations  forward flexed posture;heel strike decreased  -JR       User Key  (r) = Recorded By, (t) = Taken By, (c) = Cosigned By    Initials Name Provider Type    Adenike Kuhn PT Physical Therapist        Obj/Interventions     Row Name 04/02/21 1107          Range of Motion Comprehensive    General Range of Motion  no range of motion deficits identified  -[facility] Name 04/02/21 1107          Strength Comprehensive (MMT)    Comment, General Manual Muscle Testing (MMT) Assessment  Grossly 4/5  -[facility] Name 04/02/21 1107          Balance    Comment, Balance  vestibular tests are performed including smooth pursuit, saccades, VOR, [place]-Hallpike and roll test.  She has no vertigo and no nystagmus  -       User Key  (r) = Recorded By, (t) = Taken By, (c) = Cosigned By    Initials Name Provider Type    Adenike Kuhn, PT Physical Therapist        Goals/Plan     [facility] Name 04/02/21 1107          Bed Mobility Goal 1 (PT)    Activity/Assistive Device (Bed Mobility Goal 1, PT)  bed mobility activities, all  -JR     [place] Level/Cues Needed (Bed Mobility Goal 1, PT)  modified independence  -JR     Time Frame (Bed Mobility Goal 1, PT)  short term goal (STG)  -JR     Progress/Outcomes (Bed Mobility Goal 1, PT)  goal ongoing  -[facility] Name 04/02/21 1107          Transfer Goal 1 (PT)    Activity/Assistive Device (Transfer Goal 1, PT)  sit-to-stand/stand-to-sit;bed-to-chair/chair-to-bed  -JR     [place] Level/Cues Needed (Transfer Goal 1, PT)  supervision required  -JR     Time Frame (Transfer Goal 1, PT)  short term goal (STG)  -JR     Progress/Outcome (Transfer Goal 1, PT)  goal ongoing  -[facility] Name 04/02/21 1107          Gait Training Goal 1 (PT)    Activity/Assistive Device (Gait Training Goal 1,  PT)  gait (walking locomotion);assistive device use;walker, rolling  -JR     [place] Level (Gait Training Goal 1, PT)  supervision required  -JR     Distance (Gait Training Goal 1, PT)  250  -JR     Time Frame (Gait Training Goal 1, PT)  1 week  -JR     Progress/Outcome (Gait Training Goal 1, PT)  goal ongoing  -JR     Row Name 04/02/21 1107          Patient Education Goal (PT)    Activity (Patient Education Goal, PT)  Perform HEP x 15  -JR     [place]/Cues/Accuracy (Memory Goal 2, PT)  demonstrates adequately  -JR     Time Frame (Patient Education Goal, PT)  5 days  -JR     Progress/Outcome (Patient Education Goal, PT)  goal ongoing  -JR       User Key  (r) = Recorded By, (t) = Taken By, (c) = Cosigned By    Initials Name Provider Type    Adenike Kuhn, PT Physical Therapist        Clinical Impression     Row Name 04/02/21 1100          Pain    Additional Documentation  Pain Scale: Numbers Pre/Post-Treatment (Group)  -     Row Name 04/02/21 1107          Pain Scale: Numbers Pre/Post-Treatment    Pretreatment Pain Rating  0/10 - no pain  -JR     Posttreatment Pain Rating  0/10 - no pain  -     Row Name 04/02/21 1104          Plan of Care Review    Plan of Care Reviewed With  patient  -JR     Progress  no change  -JR     Outcome Summary  Patient participates well in skilled PT evaluation and vestibular tests are performed.  Tests included saccaded, smooth pursuit, VORs, [place]-Hallpike and roll test.  All tests were negative--no dizziness and no nystagmus.  She is able to perform mobility tasks with no more than minimal assistance.  She walked 5 feet with HHA and gait belt, she usually uses a RW.  She is expected to benefit from additional PT services while hospitalized and upon discharge to home with home health care.  -     Row Name 04/02/21 110          Therapy Assessment/Plan (PT)    Patient/Family Therapy Goals Statement (PT)  Patient is eager to go home  -JR     Rehab Potential (PT)  good, to  achieve stated therapy goals  -     Criteria for Skilled Interventions Met (PT)  yes;meets criteria;skilled treatment is necessary  -     Row Name 04/02/21 1107          Positioning and Restraints    Pre-Treatment Position  in bed  -JR     Post Treatment Position  chair  -JR     In Chair  sitting;call light within reach;encouraged to call for assist;with family/caregiver  -       User Key  (r) = Recorded By, (t) = Taken By, (c) = Cosigned By    Initials Name Provider Type    Adenike Kuhn, PRANEETH Physical Therapist        Outcome Measures     Row Name 04/02/21 1107          How much help from another person do you currently need...    Turning from your back to your side while in flat bed without using bedrails?  3  -JR     Moving from lying on back to sitting on the side of a flat bed without bedrails?  3  -JR     Moving to and from a bed to a chair (including a wheelchair)?  3  -JR     Standing up from a chair using your arms (e.g., wheelchair, bedside chair)?  3  -JR     Climbing 3-5 steps with a railing?  2  -JR     To walk in hospital room?  3  -JR     AM-PAC 6 Clicks Score (PT)  17  -JR     Row Name 04/02/21 1107          Functional Assessment    Outcome Measure Options  AM-PAC 6 Clicks Basic Mobility (PT)  -       User Key  (r) = Recorded By, (t) = Taken By, (c) = Cosigned By    Initials Name Provider Type    Adenike Kuhn, PRANEETH Physical Therapist        Physical Therapy Education                 Title: PT OT SLP Therapies (In Progress)     Topic: Physical Therapy (In Progress)     Point: Mobility training (Done)     Learning Progress Summary           Patient Acceptance, E,TB, VU by  at 4/2/2021 0228    Comment: Role of POC                   Point: Home exercise program (Not Started)     Learner Progress:  Not documented in this visit.          Point: Body mechanics (Not Started)     Learner Progress:  Not documented in this visit.          Point: Precautions (Not Started)     Learner Progress:  Not  documented in this visit.                      User Key     Initials Effective Dates Name Provider Type Discipline     04/03/18 -  Adenike Randall PT Physical Therapist PT              PT Recommendation and Plan  Planned Therapy Interventions (PT): strengthening, balance training, bed mobility training, transfer training, gait training, home exercise program, patient/family education  Plan of Care Reviewed With: patient  Progress: no change  Outcome Summary: Patient participates well in skilled PT evaluation and vestibular tests are performed.  Tests included saccaded, smooth pursuit, VORs, [place]-Hallpike and roll test.  All tests were negative--no dizziness and no nystagmus.  She is able to perform mobility tasks with no more than minimal assistance.  She walked 5 feet with HHA and gait belt, she usually uses a RW.  She is expected to benefit from additional PT services while hospitalized and upon discharge to home with home health care.     Time Calculation:   PT Charges     Row Name 04/02/21 1741             Time Calculation    Start Time  1107  -      PT Received On  04/02/21  -      PT Goal Re-Cert Due Date  04/12/21  -        User Key  (r) = Recorded By, (t) = Taken By, (c) = Cosigned By    Initials Name Provider Type     Adenike Randall PT Physical Therapist        Therapy Charges for Today     Code Description Service Date Service Provider Modifiers Qty    22933740309 HC PT EVAL LOW COMPLEXITY 4 4/2/2021 Adenike Randall, PT GP 1          PT G-Codes  Outcome Measure Options: AM-PAC 6 Clicks Basic Mobility (PT)  AM-PAC 6 Clicks Score (PT): 17    Adenike Randall PT  4/2/2021

## 2021-04-02 NOTE — PLAN OF CARE
Goal Outcome Evaluation:  Elsa transferred out from ICU. She is alert but forgetful. Pleasant. She screened positive for sepsis due to HR and WBC with diarrhea and cough.

## 2021-04-02 NOTE — PLAN OF CARE
Goal Outcome Evaluation:  Patients vitals stable, levo drip off with stable BP, patient taking Zofran for nausea all meds and antibiotic given as ordered. Patient progressing well.

## 2021-04-02 NOTE — PROGRESS NOTES
Continued Stay Note  [facility]     Patient Name: Elsa Alcaraz  MRN: [id number]  Today's Date: 4/2/2021    Admit Date: 3/29/2021    Discharge Plan     Row Name 04/02/21 1416       Plan    Plan Comments SW met with pt at bedside to review the IMM form. Pt voiced verbal understanding and reports no questions. Pt was resting before SW arrived. No CM needs have been identified at this time. SW will continue to follow and assist as needed.              JAVIER Rivera

## 2021-04-02 NOTE — PROGRESS NOTES
Adult Nutrition  Assessment/PES    Patient Name:  Elsa Alcaraz  YOB: 1940  MRN: [id number]  Admit Date:  3/29/2021    Assessment Date:  4/2/2021    Comments:    RD notes SLP eval possible today.    Recommend:  1. Continue current diet order unless contraindicated per SLP eval findings.  2. Encourage PO intake. PO intake average ~43% x past 8 meals.  3. RD ordered Prosat BID and Boost Plus daily. Continue with Boost Pudding BID.  4. Consider a multivitamin with minerals daily.     RD to follow pt and available PRN.      Reason for Assessment     Row Name 04/02/21 1026          Reason for Assessment    Reason For Assessment  follow-up protocol     Diagnosis  gastrointestinal disease;liver disease;cardiac disease Ischemic hepatitis, NAFLD, gastroenteritis, dysphagia, AFIB, CHF, GERD, HTN, hx colon and breast cancer           Anthropometrics     Row Name 04/02/21 0400          Anthropometrics    Weight  71.5 kg (157 lb 11.2 oz)         Labs/Tests/Procedures/Meds     Row Name 04/02/21 1027          Labs/Procedures/Meds    Lab Results Reviewed  reviewed, pertinent     Lab Results Comments  Low: Alb High: Gluc, Total Bilirubin        Medications    Pertinent Medications Reviewed  reviewed, pertinent     Pertinent Medications Comments  Protonix             Nutrition Prescription Ordered     Row Name 04/02/21 1027          Nutrition Prescription PO    Current PO Diet  Regular     Supplement  Boost Pudding (Ensure Pudding)     Supplement Frequency  2 times a day     Common Modifiers  Cardiac         Evaluation of Received Nutrient/Fluid Intake     Row Name 04/02/21 1030          PO Evaluation    Number of Days PO Intake Evaluated  3 days     Number of Meals  8     % PO Intake  43               Problem/Interventions:  Problem 1     Row Name 04/02/21 1030          Nutrition Diagnoses Problem 1    Problem 1  Swallowing Difficulty     Etiology (related to)  Medical Diagnosis     Gastrointestinal  Hiatal hernia      Signs/Symptoms (evidenced by)  Report/Observation     Reported/Observed By  MD         Problem 2     Row Name 04/02/21 1030          Nutrition Diagnoses Problem 2    Problem 2  Predicted Suboptimal Intake     Etiology (related to)  Factors Affecting Nutrition     Reported GI Symptoms  N & V     Signs/Symptoms (evidenced by)  Report/Observation     Reported/Observed By  Patient;RN         Problem 3     Row Name 04/02/21 1032          Nutrition Diagnoses Problem 3    Problem 3  Increased Nutrient Needs     Macronutrient  Kcal;Protein;Fluid     Etiology (related to)  Medical Diagnosis     Infectious Disease  Septic shock     Signs/Symptoms (evidenced by)  -- Suspected infection noted           Intervention Goal     Row Name 04/02/21 1032          Intervention Goal    General  Meet nutritional needs for age/condition;Improved nutrition related lab(s)     PO  Meet estimated needs;Increase intake;PO intake (%)     PO Intake %  50 %     Weight  Maintain weight         Nutrition Intervention     Row Name 04/02/21 1032          Nutrition Intervention    RD/Tech Action  Follow Tx progress;Encourage intake;Recommend/ordered     Recommended/Ordered  Supplement         Nutrition Prescription     Row Name 04/02/21 1032          Nutrition Prescription PO    PO Prescription  Begin/change supplement;Other (comment) Continue current diet order as medically appropriate and tolerated     Supplement  Boost Plus;PRO liquid     Supplement Frequency  2 times a day;Daily     New PO Prescription Ordered?  Yes Supplement ordered        Other Orders    Obtain Weight  Daily     Obtain Weight Ordered?  No, recommended     Supplement  Vitamin mineral supplement     Supplement Ordered?  No, recommended         Education/Evaluation     Row Name 04/02/21 1033          Education    Education  Education not appropriate at this time     Please explain  Defer until post ICU        Monitor/Evaluation    Monitor  Per protocol;I&O;PO intake;Supplement  intake;Pertinent labs;Weight;Skin status           Electronically signed by:  Iris Shane RD  04/02/21 10:34 EDT

## 2021-04-03 LAB
ALBUMIN SERPL-MCNC: 2.8 G/DL (ref 3.5–5.2)
ALBUMIN/GLOB SERPL: 0.9 G/DL
ALP SERPL-CCNC: 101 U/L (ref 39–117)
ALT SERPL W P-5'-P-CCNC: 16 U/L (ref 1–33)
ANION GAP SERPL CALCULATED.3IONS-SCNC: 7.1 MMOL/L (ref 5–15)
AST SERPL-CCNC: 11 U/L (ref 1–32)
BASOPHILS # BLD AUTO: 0.02 10*3/MM3 (ref 0–0.2)
BASOPHILS NFR BLD AUTO: 0.2 % (ref 0–1.5)
BILIRUB SERPL-MCNC: 1.7 MG/DL (ref 0–1.2)
BUN SERPL-MCNC: 26 MG/DL (ref 8–23)
BUN/CREAT SERPL: 35.6 (ref 7–25)
CALCIUM SPEC-SCNC: 9.1 MG/DL (ref 8.6–10.5)
CHLORIDE SERPL-SCNC: 100 MMOL/L (ref 98–107)
CO2 SERPL-SCNC: 35.9 MMOL/L (ref 22–29)
CREAT SERPL-MCNC: 0.73 MG/DL (ref 0.57–1)
DEPRECATED RDW RBC AUTO: 47.9 FL (ref 37–54)
EOSINOPHIL # BLD AUTO: 0.05 10*3/MM3 (ref 0–0.4)
EOSINOPHIL NFR BLD AUTO: 0.4 % (ref 0.3–6.2)
ERYTHROCYTE [DISTWIDTH] IN BLOOD BY AUTOMATED COUNT: 13.5 % (ref 12.3–15.4)
GFR SERPL CREATININE-BSD FRML MDRD: 77 ML/MIN/1.73
GLOBULIN UR ELPH-MCNC: 3 GM/DL
GLUCOSE SERPL-MCNC: 98 MG/DL (ref 65–99)
HCT VFR BLD AUTO: 48.3 % (ref 34–46.6)
HGB BLD-MCNC: 15.2 G/DL (ref 12–15.9)
IMM GRANULOCYTES # BLD AUTO: 0.06 10*3/MM3 (ref 0–0.05)
IMM GRANULOCYTES NFR BLD AUTO: 0.5 % (ref 0–0.5)
INR PPP: 1.21 (ref 0.9–1.1)
LYMPHOCYTES # BLD AUTO: 0.7 10*3/MM3 (ref 0.7–3.1)
LYMPHOCYTES NFR BLD AUTO: 5.6 % (ref 19.6–45.3)
MCH RBC QN AUTO: 30.2 PG (ref 26.6–33)
MCHC RBC AUTO-ENTMCNC: 31.5 G/DL (ref 31.5–35.7)
MCV RBC AUTO: 95.8 FL (ref 79–97)
MONOCYTES # BLD AUTO: 1.9 10*3/MM3 (ref 0.1–0.9)
MONOCYTES NFR BLD AUTO: 15.1 % (ref 5–12)
NEUTROPHILS NFR BLD AUTO: 78.2 % (ref 42.7–76)
NEUTROPHILS NFR BLD AUTO: 9.82 10*3/MM3 (ref 1.7–7)
NRBC BLD AUTO-RTO: 0 /100 WBC (ref 0–0.2)
PLATELET # BLD AUTO: 286 10*3/MM3 (ref 140–450)
PMV BLD AUTO: 9.8 FL (ref 6–12)
POTASSIUM SERPL-SCNC: 3.5 MMOL/L (ref 3.5–5.2)
PROT SERPL-MCNC: 5.8 G/DL (ref 6–8.5)
PROTHROMBIN TIME: 15.8 SECONDS (ref 12–15.1)
RBC # BLD AUTO: 5.04 10*6/MM3 (ref 3.77–5.28)
SODIUM SERPL-SCNC: 143 MMOL/L (ref 136–145)
WBC # BLD AUTO: 12.55 10*3/MM3 (ref 3.4–10.8)

## 2021-04-03 PROCEDURE — 25010000002 FUROSEMIDE PER 20 MG: Performed by: INTERNAL MEDICINE

## 2021-04-03 PROCEDURE — 93010 ELECTROCARDIOGRAM REPORT: CPT | Performed by: INTERNAL MEDICINE

## 2021-04-03 PROCEDURE — 97530 THERAPEUTIC ACTIVITIES: CPT

## 2021-04-03 PROCEDURE — 63710000001 PREDNISONE PER 1 MG: Performed by: INTERNAL MEDICINE

## 2021-04-03 PROCEDURE — 85610 PROTHROMBIN TIME: CPT | Performed by: INTERNAL MEDICINE

## 2021-04-03 PROCEDURE — 93005 ELECTROCARDIOGRAM TRACING: CPT | Performed by: INTERNAL MEDICINE

## 2021-04-03 PROCEDURE — 80053 COMPREHEN METABOLIC PANEL: CPT | Performed by: INTERNAL MEDICINE

## 2021-04-03 PROCEDURE — 85025 COMPLETE CBC W/AUTO DIFF WBC: CPT | Performed by: INTERNAL MEDICINE

## 2021-04-03 PROCEDURE — 94799 UNLISTED PULMONARY SVC/PX: CPT

## 2021-04-03 PROCEDURE — 25010000002 PIPERACILLIN SOD-TAZOBACTAM PER 1 G: Performed by: INTERNAL MEDICINE

## 2021-04-03 PROCEDURE — 99232 SBSQ HOSP IP/OBS MODERATE 35: CPT | Performed by: INTERNAL MEDICINE

## 2021-04-03 PROCEDURE — 97116 GAIT TRAINING THERAPY: CPT

## 2021-04-03 RX ORDER — METOPROLOL SUCCINATE 50 MG/1
50 TABLET, EXTENDED RELEASE ORAL DAILY
Status: DISCONTINUED | OUTPATIENT
Start: 2021-04-03 | End: 2021-04-05 | Stop reason: HOSPADM

## 2021-04-03 RX ORDER — FUROSEMIDE 10 MG/ML
40 INJECTION INTRAMUSCULAR; INTRAVENOUS ONCE
Status: COMPLETED | OUTPATIENT
Start: 2021-04-03 | End: 2021-04-03

## 2021-04-03 RX ADMIN — BUDESONIDE AND FORMOTEROL FUMARATE DIHYDRATE 2 PUFF: 80; 4.5 AEROSOL RESPIRATORY (INHALATION) at 07:25

## 2021-04-03 RX ADMIN — PREDNISONE 40 MG: 20 TABLET ORAL at 09:39

## 2021-04-03 RX ADMIN — FUROSEMIDE 40 MG: 10 INJECTION, SOLUTION INTRAMUSCULAR; INTRAVENOUS at 09:42

## 2021-04-03 RX ADMIN — TAZOBACTAM SODIUM AND PIPERACILLIN SODIUM 4.5 G: 500; 4 INJECTION, SOLUTION INTRAVENOUS at 20:44

## 2021-04-03 RX ADMIN — NYSTATIN 500000 UNITS: 100000 SUSPENSION ORAL at 09:39

## 2021-04-03 RX ADMIN — PANTOPRAZOLE SODIUM 40 MG: 40 TABLET, DELAYED RELEASE ORAL at 09:39

## 2021-04-03 RX ADMIN — Medication 1 CAPSULE: at 09:38

## 2021-04-03 RX ADMIN — TAZOBACTAM SODIUM AND PIPERACILLIN SODIUM 4.5 G: 500; 4 INJECTION, SOLUTION INTRAVENOUS at 12:22

## 2021-04-03 RX ADMIN — BUDESONIDE AND FORMOTEROL FUMARATE DIHYDRATE 2 PUFF: 80; 4.5 AEROSOL RESPIRATORY (INHALATION) at 19:20

## 2021-04-03 RX ADMIN — CITALOPRAM HYDROBROMIDE 20 MG: 20 TABLET ORAL at 09:39

## 2021-04-03 RX ADMIN — METOPROLOL SUCCINATE 50 MG: 50 TABLET, EXTENDED RELEASE ORAL at 14:18

## 2021-04-03 RX ADMIN — TAZOBACTAM SODIUM AND PIPERACILLIN SODIUM 4.5 G: 500; 4 INJECTION, SOLUTION INTRAVENOUS at 03:34

## 2021-04-03 NOTE — PROGRESS NOTES
[facility]    Name:  Elsa Alcaraz   Age:  81 y.o.  Sex:  female  :  1940  MRN:  [id number]   Visit Number:  [id number]  Admission Date:  3/29/2021  Date Of Service:  21  Primary Care Physician:  Kemi Hdz MD     LOS: 5 days :  Patient Care Team:  Kemi Hdz MD as PCP -   [facility] (General Surgery):    Chief Complaint:      Abdominal pain    Subjective / Interval History:     Patient seen and evaluated in the ICU this morning.  Resting comfortably in bed.  Did develop A. fib with RVR with physical therapy while ambulating in the brock.  Has known history of A. fib.  Antihypertensives and rate lowering medications were held due to hypotension requiring pressors.  Have restarted metoprolol for rate lowering control.  Patient asymptomatic.    Review of Systems: Reviewed again 4/3    General ROS: Patient denies any fevers, chills or loss of consciousness.  Respiratory ROS: Denies cough or shortness of breath.  Cardiovascular ROS: Denies chest pain or palpitations. No history of exertional chest pain.  Gastrointestinal ROS: Denies nausea and vomiting.  Complains of left-sided abdominal pain.  No diarrhea.  Neurological ROS: Denies any focal weakness. No loss of consciousness. Denies any numbness.  Dermatological ROS: Denies any redness or pruritis.    Vital Signs:    Temp:  [97.3 °F (36.3 °C)-97.7 °F (36.5 °C)] 97.7 °F (36.5 °C)  Heart Rate:  [] 116  Resp:  [16-20] 20  BP: ()/(60-92) 143/86    Intake and output:    I/O last 3 completed shifts:  In: 1326.3 [P.O.:820; I.V.:506.3]  Out: 3025 [Urine:3025]  I/O this shift:  In: 120 [P.O.:120]  Out: -     Physical Examination: Examined in 4/3    General Appearance:  Alert and cooperative, not in any acute distress.   Head:  Atraumatic and normocephalic, without obvious abnormality.   Eyes:          PERRLA, conjunctivae and sclerae  normal, no Icterus. No pallor. Extraocular movements are within normal limits.   Neck: Supple, trachea midline, no thyromegaly, no carotid bruit.   Lungs:   Chest shape is normal. Breath sounds heard bilaterally equally.  No crackles or wheezing. No Pleural rub or bronchial breathing.   Heart:  Normal S1 and S2, no murmur, no gallop, no rub. No JVD   Abdomen:   Normal bowel sounds, no masses, no organomegaly. Soft, nontender, nondistended, no guarding, no rebound tenderness.   Extremities: Moves all extremities well, no edema, no cyanosis, no clubbing.   Skin: No bleeding, bruising or rash.   Neurologic: Awake, alert and oriented times 3. Moves all 4 extremities equally.     Laboratory results:    Results from last 7 days   Lab Units 04/03/21  0544 04/02/21  0405 04/01/21  0410   SODIUM mmol/L 143 140 134*   POTASSIUM mmol/L 3.5 4.1 4.1   CHLORIDE mmol/L 100 105 101   CO2 mmol/L 35.9* 28.2 25.3   BUN mg/dL 26* 22 30*   CREATININE mg/dL 0.73 0.78 1.16*   CALCIUM mg/dL 9.1 9.2 9.2   BILIRUBIN mg/dL 1.7* 1.8* 3.0*   ALK PHOS U/L 101 113 146*   ALT (SGPT) U/L 16 22 34*   AST (SGOT) U/L 11 11 18   GLUCOSE mg/dL 98 119* 114*     Results from last 7 days   Lab Units 04/03/21  0544 04/02/21  0405 04/01/21  0410   WBC 10*3/mm3 12.55* 13.41* 17.68*   HEMOGLOBIN g/dL 15.2 14.7 15.8   HEMATOCRIT % 48.3* 46.3 49.6*   PLATELETS 10*3/mm3 286 240 281     Results from last 7 days   Lab Units 04/03/21  0918 04/02/21  0810 04/01/21  0410   INR  1.21* 1.73* 2.90*     Results from last 7 days   Lab Units 03/31/21  1411 03/31/21  0612 03/29/21  0955   CK TOTAL U/L  --   --  30   TROPONIN T ng/mL <0.010 <0.010 <0.010     Results from last 7 days   Lab Units 04/01/21  1316 04/01/21  1312 03/31/21  1104   BLOODCX  No growth at 2 days No growth at 2 days  --    URINECX   --   --  50,000 CFU/mL Mixed Kitty Isolated           I have reviewed the patient's laboratory results.    Radiology results:    Imaging Results (Last 24 Hours)     ** No  results found for the last 24 hours. **          I have reviewed the patient's radiology reports.    Medication Review:     I have reviewed the patient's active and prn medications.       Left upper quadrant abdominal pain    Atrial fibrillation (CMS/HCC)    Hypertension    Hiatal hernia    Shortness of breath    Tricuspid valve disorder    Gastroesophageal reflux disease with esophagitis    Chest pain      Assessment:    Septic shock  Acute on Chronic Systolic Heart Failure  Acute renal failure  Atrial Fibrillation-Chronic, anticoagulated on Xarelto  COPD  Hypertension  Elevated Bilirubin    Plan:    Patient has been stable off of Levophed now.  Will transfer out of the ICU.  Suspect that patient has developed septic shock due to colitis versus UTI. Have broaden patient's antibiotics out to Zosyn. MRSA screen pending.  Blood cultures negative to date.  INR elevated at 4.94.  Will hold anticoagulation.  Bilirubin and liver enzymes are trending down.  Appreciate GIs assistance.  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  We will continue to monitor closely.  Dr. Gordillo also consulted and appreciate recommendations.  Low suspicion for ACS.  Troponins have been negative. CT of the chest without contrast was reviewed. Noted pleural effusion. Will attempt to diurese. Hold on further IV fluids.  Will diurese with IV Lasix.  Renal function has normalized. We will continue with blood pressure support. Suspect DREW was due to hypotension.   Patient has known history of atrial fibrillation.  Heart rate was elevated today.  Have reviewed home medications and restarted her rate lowering metoprolol.  Physical therapy consulted for evaluation of possible vertigo.  Patient underwent Thad-Hallpike roll test and others with no dizziness and no nystagmus noted.  Patient has not been dizzy since that evaluation.  Further orders as clinical course dictates.    Anticipate being able to restart Xarelto tomorrow once INR is corrected.   Continues to downtrend favorably.    Sarwat Mtz DO  04/03/21  13:54 EDT    Dictated utilizing Dragon dictation.

## 2021-04-03 NOTE — THERAPY TREATMENT NOTE
Patient Name: Elsa Alcaraz  : 1940    MRN: [id number]                              Today's Date: 4/3/2021       Admit Date: 3/29/2021    Visit Dx:     ICD-10-CM ICD-9-CM   1. Chest pain, unspecified type  R07.9 786.50   2. Tricuspid valve disorder  I07.9 397.0   3. Shortness of breath  R06.02 786.05   4. Longstanding persistent atrial fibrillation (CMS/HCC)  I48.11 427.31   5. Hiatal hernia with gastroesophageal reflux  K21.9 530.81    K44.9 553.3     Patient Active Problem List   Diagnosis   • Atrial fibrillation (CMS/HCC)   • Hyperlipidemia   • Hypertension   • Anxiety disorder due to general medical condition   • Cataract   • Chronic bronchitis (CMS/HCC)   • Depression   • Esophageal reflux   • Hiatal hernia   • Obesity   • Palpitations   • Shortness of breath   • Sleep apnea   • Tricuspid valve disorder   • Iron deficiency anemia   • Thrombocytosis (CMS/HCC)   • Iron deficiency anemia secondary to inadequate dietary iron intake   • Hiatal hernia with gastroesophageal reflux   • Gastroesophageal reflux disease with esophagitis   • Epigastric pain   • Age-related nuclear cataract of left eye   • Age-related nuclear cataract of right eye   • Chest pain   • Left upper quadrant abdominal pain     Past Medical History:   Diagnosis Date   • Anxiety disorder due to general medical condition 2017   • Arthritis    • Atrial fibrillation (CMS/HCC) 2017   • Body piercing     BOTH EARS   • Borderline diabetes    • Breast cancer (CMS/HCC) 2003    right-radiation and a lumpectomy   • Cataract 2017    Left eye surgery    • Chronic bronchitis (CMS/HCC) 2017   • Colon cancer (CMS/HCC) 1998    had surgery and chemo   • COPD (chronic obstructive pulmonary disease) (CMS/HCC)    • Cyanocobalamine deficiency (non anemic)    • Depression 2017   • Diverticulosis    • Esophageal reflux 2017   • Hiatal hernia 2017   • History of bladder infections    • Hx of exercise stress test      "\"several years ago by Dr. Mercado\".     • Hx of radiation therapy    • Hyperlipidemia    • Hypertension 2017   • Impaired functional mobility and activity tolerance    • Osteoporosis    • Palpitations 2017   • Prediabetes    • Problems with swallowing     meat at times per pt report   • Shortness of breath     WITH EXERTION    • Sleep apnea 2017    NO CPAP   • Tricuspid valve disorder 2017    Patient doesn't know anything about this   • Vitamin D deficiency    • Wears glasses      Past Surgical History:   Procedure Laterality Date   • ANAL FISTULOTOMY     • APPENDECTOMY         • BREAST BIOPSY     • BREAST LUMPECTOMY Right 2006   • CATARACT EXTRACTION  2019    both eyes    • CATARACT EXTRACTION W/ INTRAOCULAR LENS IMPLANT Left 2019    Procedure: CATARACT PHACO EXTRACTION WITH INTRAOCULAR LENS IMPLANT LEFT;  Surgeon: Masoud David MD;  Location: [facility] OR;  Service: Ophthalmology   • CATARACT EXTRACTION W/ INTRAOCULAR LENS IMPLANT Right 2019    Procedure: CATARACT PHACO EXTRACTION WITH INTRAOCULAR LENS IMPLANT RIGHT;  Surgeon: Masoud David MD;  Location: [facility] OR;  Service: Ophthalmology   •  SECTION  1981   • CHOLECYSTECTOMY  2009   • COLECTOMY PARTIAL / TOTAL  1998    COLON CANCER   • COLONOSCOPY     • ENDOSCOPY  2016   • ENDOSCOPY N/A 2018    Procedure: ESOPHAGOGASTRODUODENOSCOPY WITH COLD FORCEP BIOPSY;  Surgeon: Vasquez Fagan MD;  Location: [facility] ENDOSCOPY;  Service: Gastroenterology   • ENDOSCOPY N/A 2019    Procedure: ESOPHAGOGASTRODUODENOSCOPY WITH BIOPSY;  Surgeon: Vasquez Fagan MD;  Location: [facility] ENDOSCOPY;  Service: Gastroenterology   • HYSTERECTOMY         • VENTRAL HERNIA REPAIR  10/28/2012     General Information     Row Name 21 1135          Physical Therapy Time and Intention    Document Type  therapy note (daily note)  -TW     Mode of Treatment  physical therapy  -TW     Row Name " 04/03/21 1135          General Information    Patient Profile Reviewed  yes  -TW     Existing Precautions/Restrictions  fall;oxygen therapy device and L/min  -TW     Row Name 04/03/21 1135          Cognition    Orientation Status (Cognition)  oriented x 4  -TW     Row Name 04/03/21 1135          Safety Issues, Functional Mobility    Safety Issues Affecting Function (Mobility)  safety precaution awareness;safety precautions follow-through/compliance;insight into deficits/self-awareness  -TW     Impairments Affecting Function (Mobility)  balance;endurance/activity tolerance;shortness of breath;strength;pain  -TW     Comment, Safety Issues/Impairments (Mobility)  Pt did state she lives to take things slow due to her history of dizziness but reported no active dizziness during todays PT treatment.  -TW       User Key  (r) = Recorded By, (t) = Taken By, (c) = Cosigned By    Initials Name Provider Type    TW Mireille Velez, PT Physical Therapist        Mobility     Row Name 04/03/21 1135          Bed Mobility    Bed Mobility  supine-sit  -TW     Supine-Sit Conway (Bed Mobility)  contact guard;verbal cues  -TW     Assistive Device (Bed Mobility)  head of bed elevated  -TW     Comment (Bed Mobility)  Extended time to complete but pt able to do so independently.  -     Row Name 04/03/21 1135          Transfers    Comment (Transfers)  Verbal cues for hand placment and safety.  -     Row Name 04/03/21 1135          Bed-Chair Transfer    Bed-Chair Conway (Transfers)  contact guard;verbal cues  -TW     Assistive Device (Bed-Chair Transfers)  walker, front-wheeled  -     Row Name 04/03/21 1135          Sit-Stand Transfer    Sit-Stand Conway (Transfers)  contact guard;verbal cues  -TW     Assistive Device (Sit-Stand Transfers)  walker, front-wheeled  -     Row Name 04/03/21 1135          Gait/Stairs (Locomotion)    Conway Level (Gait)  contact guard;verbal cues  -TW     Assistive Device (Gait)   walker, front-wheeled  -TW     Distance in Feet (Gait)  30 ft  -TW     Deviations/Abnormal Patterns (Gait)  stride length decreased;gait speed decreased;base of support, narrow  -TW     Bilateral Gait Deviations  forward flexed posture;heel strike decreased  -TW     Comment (Gait/Stairs)  Pt's HR did increase to the 150's during ambulation. Nursing aware  -TW       User Key  (r) = Recorded By, (t) = Taken By, (c) = Cosigned By    Initials Name Provider Type    Mireille Pelayo PT Physical Therapist        Obj/Interventions     Row Name 04/03/21 1135          Balance    Balance Assessment  sitting static balance;sitting dynamic balance;standing static balance;standing dynamic balance  -TW     Static Sitting Balance  WFL;unsupported;sitting, edge of bed  -TW     Dynamic Sitting Balance  WFL;unsupported;sitting, edge of bed  -TW     Static Standing Balance  mild impairment;supported  -TW     Dynamic Standing Balance  mild impairment;supported  -TW     Comment, Balance  No c/o dizziness during today's treatment session.  -TW       User Key  (r) = Recorded By, (t) = Taken By, (c) = Cosigned By    Initials Name Provider Type    Mireille Pelayo, PT Physical Therapist        Goals/Plan    No documentation.       Clinical Impression     Row Name 04/03/21 1135          Pain Scale: Numbers Pre/Post-Treatment    Pretreatment Pain Rating  3/10  -TW     Posttreatment Pain Rating  3/10  -TW     Pain Location - Side  Left  -TW     Pain Location - Orientation  anterior  -TW     Pain Location  chest  -TW     Pre/Posttreatment Pain Comment  No varela with c/o chest pain durin treatment.  -TW     Pain Intervention(s)  Repositioned;Ambulation/increased activity  -TW     Row Name 04/03/21 1134          Plan of Care Review    Plan of Care Reviewed With  patient  -TW     Progress  improving  -TW     Outcome Summary  PT treatment completed this am with pt showing improvement with her standing and dynamic balance and no c/o dizziness with  mobility. Pt amb 30 ft with CGA using rolling walker. She was assisted to BS and during her mobility has increased HR into the 150's. Pt's chest pain remained 3/10 throughout treatment. Cont current PT POC.  -TW     Row Name 04/03/21 1135          Vital Signs    Pretreatment Heart Rate (beats/min)  108  -TW     Intratreatment Heart Rate (beats/min)  150  -TW     Posttreatment Heart Rate (beats/min)  109  -TW     Pre SpO2 (%)  92  -TW     O2 Delivery Pre Treatment  supplemental O2 2 L  -TW     Intra SpO2 (%)  90  -TW     O2 Delivery Intra Treatment  supplemental O2 2 L  -TW     Post SpO2 (%)  93  -TW     O2 Delivery Post Treatment  supplemental O2 2 L  -TW     Pre Patient Position  Supine  -TW     Intra Patient Position  Standing  -TW     Post Patient Position  Sitting  -TW     Row Name 04/03/21 1135          Positioning and Restraints    Pre-Treatment Position  in bed  -TW     Post Treatment Position  chair  -TW     In Chair  reclined;call light within reach;encouraged to call for assist;with nsg  -TW       User Key  (r) = Recorded By, (t) = Taken By, (c) = Cosigned By    Initials Name Provider Type    TW Mireille Velez, PT Physical Therapist        Outcome Measures     Row Name 04/03/21 1135          How much help from another person do you currently need...    Turning from your back to your side while in flat bed without using bedrails?  4  -TW     Moving from lying on back to sitting on the side of a flat bed without bedrails?  3  -TW     Moving to and from a bed to a chair (including a wheelchair)?  3  -TW     Standing up from a chair using your arms (e.g., wheelchair, bedside chair)?  3  -TW     Climbing 3-5 steps with a railing?  2  -TW     To walk in hospital room?  3  -TW     AM-PAC 6 Clicks Score (PT)  18  -TW     Row Name 04/03/21 1135          Functional Assessment    Outcome Measure Options  AM-PAC 6 Clicks Basic Mobility (PT)  -TW       User Key  (r) = Recorded By, (t) = Taken By, (c) = Cosigned By     Initials Name Provider Type    TW Mireille Velez, PRANEETH Physical Therapist        Physical Therapy Education                 Title: PT OT SLP Therapies (In Progress)     Topic: Physical Therapy (In Progress)     Point: Mobility training (Done)     Learning Progress Summary           Patient Acceptance, E,D, VU,DU by  at 4/3/2021 1135    Comment: Pt education on use of rolling walker for safe transfers and gait. Pt education also on waiting prior to standing up or walking due to history of dizziness.    Acceptance, E,TB, VU by  at 4/2/2021 1740    Comment: Role of POC                   Point: Home exercise program (Not Started)     Learner Progress:  Not documented in this visit.          Point: Body mechanics (Not Started)     Learner Progress:  Not documented in this visit.          Point: Precautions (Done)     Learning Progress Summary           Patient Acceptance, E,D, VU,DU by  at 4/3/2021 1135    Comment: Pt education on use of rolling walker for safe transfers and gait. Pt education also on waiting prior to standing up or walking due to history of dizziness.                               User Key     Initials Effective Dates Name Provider Type [facility] 04/03/18 -  Adenike Randall, PT Physical Therapist PT     03/26/19 -  Mireille Velez PT Physical Therapist PT              PT Recommendation and Plan     Plan of Care Reviewed With: patient  Progress: improving  Outcome Summary: PT treatment completed this am with pt showing improvement with her standing and dynamic balance and no c/o dizziness with mobility. Pt amb 30 ft with CGA using rolling walker. She was assisted to BSC and during her mobility has increased HR into the 150's. Pt's chest pain remained 3/10 throughout treatment. Cont current PT POC.     Time Calculation:   PT Charges     Row Name 04/03/21 1135             Time Calculation    Start Time  1104  -TW      Stop Time  1135  -TW      Time Calculation (min)  31 min  -TW      PT Received On   04/03/21  -TW         Timed Charges    81905 - Gait Training Minutes   11  -TW      60926 - PT Therapeutic Activity Minutes  20  -TW        User Key  (r) = Recorded By, (t) = Taken By, (c) = Cosigned By    Initials Name Provider Type    Mireille Pelayo PT Physical Therapist        Therapy Charges for Today     Code Description Service Date Service Provider Modifiers Qty    09964243102  GAIT TRAINING EA 15 MIN 4/3/2021 Mireille Velez, PT GP 1    [id number]  PT THERAPEUTIC ACT EA 15 MIN 4/3/2021 Mireille Velez PT GP 1          PT G-Codes  Outcome Measure Options: AM-PAC 6 Clicks Basic Mobility (PT)  AM-PAC 6 Clicks Score (PT): 18    Mireille Velez PT  4/3/2021

## 2021-04-03 NOTE — PLAN OF CARE
Goal Outcome Evaluation:         VSS, PT ALERT, ORIENTED,  DENIES ANY DISCOMFORT,  NO SOA.  O2 AT 3 LITERS PER N/C.

## 2021-04-03 NOTE — PLAN OF CARE
Goal Outcome Evaluation:  Plan of Care Reviewed With: patient  Progress: improving  Outcome Summary: PT treatment completed this am with pt showing improvement with her standing and dynamic balance and no c/o dizziness with mobility. Pt amb 30 ft with CGA using rolling walker. She was assisted to BSC and during her mobility has increased HR into the 150's. Pt's chest pain remained 3/10 throughout treatment. Cont current PT POC.

## 2021-04-03 NOTE — PLAN OF CARE
Goal Outcome Evaluation:         Elsa is A-Fin on telemetry. Metoprolol restarted today to lower heart rate. Up with 1 assist, gait belt, and walker. Compliant with PT. No vertigo or dizziness reported. Diuresis continues. Possible discharge home tomorrow.

## 2021-04-04 ENCOUNTER — APPOINTMENT (OUTPATIENT)
Dept: GENERAL RADIOLOGY | Facility: HOSPITAL | Age: 81
End: 2021-04-04

## 2021-04-04 LAB
ALBUMIN SERPL-MCNC: 2.7 G/DL (ref 3.5–5.2)
ALBUMIN/GLOB SERPL: 0.9 G/DL
ALP SERPL-CCNC: 88 U/L (ref 39–117)
ALT SERPL W P-5'-P-CCNC: 12 U/L (ref 1–33)
ANION GAP SERPL CALCULATED.3IONS-SCNC: 6 MMOL/L (ref 5–15)
AST SERPL-CCNC: 11 U/L (ref 1–32)
BASOPHILS # BLD AUTO: 0.01 10*3/MM3 (ref 0–0.2)
BASOPHILS NFR BLD AUTO: 0.1 % (ref 0–1.5)
BILIRUB SERPL-MCNC: 1.5 MG/DL (ref 0–1.2)
BUN SERPL-MCNC: 28 MG/DL (ref 8–23)
BUN/CREAT SERPL: 40 (ref 7–25)
CALCIUM SPEC-SCNC: 8.9 MG/DL (ref 8.6–10.5)
CHLORIDE SERPL-SCNC: 95 MMOL/L (ref 98–107)
CO2 SERPL-SCNC: 39 MMOL/L (ref 22–29)
CREAT SERPL-MCNC: 0.7 MG/DL (ref 0.57–1)
DEPRECATED RDW RBC AUTO: 47.5 FL (ref 37–54)
EOSINOPHIL # BLD AUTO: 0.03 10*3/MM3 (ref 0–0.4)
EOSINOPHIL NFR BLD AUTO: 0.3 % (ref 0.3–6.2)
ERYTHROCYTE [DISTWIDTH] IN BLOOD BY AUTOMATED COUNT: 13.4 % (ref 12.3–15.4)
GFR SERPL CREATININE-BSD FRML MDRD: 81 ML/MIN/1.73
GLOBULIN UR ELPH-MCNC: 2.9 GM/DL
GLUCOSE SERPL-MCNC: 101 MG/DL (ref 65–99)
HCT VFR BLD AUTO: 47.4 % (ref 34–46.6)
HGB BLD-MCNC: 15.1 G/DL (ref 12–15.9)
IMM GRANULOCYTES # BLD AUTO: 0.04 10*3/MM3 (ref 0–0.05)
IMM GRANULOCYTES NFR BLD AUTO: 0.4 % (ref 0–0.5)
INR PPP: 1.16 (ref 0.9–1.1)
LYMPHOCYTES # BLD AUTO: 0.7 10*3/MM3 (ref 0.7–3.1)
LYMPHOCYTES NFR BLD AUTO: 6.3 % (ref 19.6–45.3)
MCH RBC QN AUTO: 30.3 PG (ref 26.6–33)
MCHC RBC AUTO-ENTMCNC: 31.9 G/DL (ref 31.5–35.7)
MCV RBC AUTO: 95 FL (ref 79–97)
MONOCYTES # BLD AUTO: 1.66 10*3/MM3 (ref 0.1–0.9)
MONOCYTES NFR BLD AUTO: 15 % (ref 5–12)
NEUTROPHILS NFR BLD AUTO: 77.9 % (ref 42.7–76)
NEUTROPHILS NFR BLD AUTO: 8.61 10*3/MM3 (ref 1.7–7)
NRBC BLD AUTO-RTO: 0 /100 WBC (ref 0–0.2)
PLATELET # BLD AUTO: 302 10*3/MM3 (ref 140–450)
PMV BLD AUTO: 9.9 FL (ref 6–12)
POTASSIUM SERPL-SCNC: 3.5 MMOL/L (ref 3.5–5.2)
PROT SERPL-MCNC: 5.6 G/DL (ref 6–8.5)
PROTHROMBIN TIME: 15.3 SECONDS (ref 12–15.1)
RBC # BLD AUTO: 4.99 10*6/MM3 (ref 3.77–5.28)
SODIUM SERPL-SCNC: 140 MMOL/L (ref 136–145)
WBC # BLD AUTO: 11.05 10*3/MM3 (ref 3.4–10.8)

## 2021-04-04 PROCEDURE — 25010000002 PIPERACILLIN SOD-TAZOBACTAM PER 1 G: Performed by: INTERNAL MEDICINE

## 2021-04-04 PROCEDURE — 97530 THERAPEUTIC ACTIVITIES: CPT

## 2021-04-04 PROCEDURE — 25010000002 ONDANSETRON PER 1 MG: Performed by: INTERNAL MEDICINE

## 2021-04-04 PROCEDURE — 99232 SBSQ HOSP IP/OBS MODERATE 35: CPT | Performed by: INTERNAL MEDICINE

## 2021-04-04 PROCEDURE — 25010000002 FUROSEMIDE PER 20 MG: Performed by: INTERNAL MEDICINE

## 2021-04-04 PROCEDURE — 94799 UNLISTED PULMONARY SVC/PX: CPT

## 2021-04-04 PROCEDURE — 85610 PROTHROMBIN TIME: CPT | Performed by: INTERNAL MEDICINE

## 2021-04-04 PROCEDURE — 80053 COMPREHEN METABOLIC PANEL: CPT | Performed by: INTERNAL MEDICINE

## 2021-04-04 PROCEDURE — 71045 X-RAY EXAM CHEST 1 VIEW: CPT

## 2021-04-04 PROCEDURE — 85025 COMPLETE CBC W/AUTO DIFF WBC: CPT | Performed by: INTERNAL MEDICINE

## 2021-04-04 PROCEDURE — 97116 GAIT TRAINING THERAPY: CPT

## 2021-04-04 PROCEDURE — 94640 AIRWAY INHALATION TREATMENT: CPT

## 2021-04-04 PROCEDURE — 63710000001 PREDNISONE PER 1 MG: Performed by: INTERNAL MEDICINE

## 2021-04-04 RX ORDER — POTASSIUM CHLORIDE 750 MG/1
10 CAPSULE, EXTENDED RELEASE ORAL DAILY
Status: DISCONTINUED | OUTPATIENT
Start: 2021-04-05 | End: 2021-04-05 | Stop reason: HOSPADM

## 2021-04-04 RX ORDER — FUROSEMIDE 10 MG/ML
40 INJECTION INTRAMUSCULAR; INTRAVENOUS ONCE
Status: COMPLETED | OUTPATIENT
Start: 2021-04-04 | End: 2021-04-04

## 2021-04-04 RX ORDER — FUROSEMIDE 20 MG/1
20 TABLET ORAL DAILY
Status: DISCONTINUED | OUTPATIENT
Start: 2021-04-04 | End: 2021-04-05 | Stop reason: HOSPADM

## 2021-04-04 RX ADMIN — METOPROLOL SUCCINATE 50 MG: 50 TABLET, EXTENDED RELEASE ORAL at 08:33

## 2021-04-04 RX ADMIN — Medication 1 CAPSULE: at 08:33

## 2021-04-04 RX ADMIN — TAZOBACTAM SODIUM AND PIPERACILLIN SODIUM 4.5 G: 500; 4 INJECTION, SOLUTION INTRAVENOUS at 03:42

## 2021-04-04 RX ADMIN — CITALOPRAM HYDROBROMIDE 20 MG: 20 TABLET ORAL at 08:33

## 2021-04-04 RX ADMIN — PANTOPRAZOLE SODIUM 40 MG: 40 TABLET, DELAYED RELEASE ORAL at 08:33

## 2021-04-04 RX ADMIN — FUROSEMIDE 40 MG: 10 INJECTION, SOLUTION INTRAMUSCULAR; INTRAVENOUS at 15:46

## 2021-04-04 RX ADMIN — PREDNISONE 40 MG: 20 TABLET ORAL at 08:32

## 2021-04-04 RX ADMIN — BUDESONIDE AND FORMOTEROL FUMARATE DIHYDRATE 2 PUFF: 80; 4.5 AEROSOL RESPIRATORY (INHALATION) at 19:22

## 2021-04-04 RX ADMIN — TAZOBACTAM SODIUM AND PIPERACILLIN SODIUM 4.5 G: 500; 4 INJECTION, SOLUTION INTRAVENOUS at 12:29

## 2021-04-04 RX ADMIN — TAZOBACTAM SODIUM AND PIPERACILLIN SODIUM 4.5 G: 500; 4 INJECTION, SOLUTION INTRAVENOUS at 20:54

## 2021-04-04 RX ADMIN — BUDESONIDE AND FORMOTEROL FUMARATE DIHYDRATE 2 PUFF: 80; 4.5 AEROSOL RESPIRATORY (INHALATION) at 07:06

## 2021-04-04 RX ADMIN — FUROSEMIDE 20 MG: 20 TABLET ORAL at 15:45

## 2021-04-04 RX ADMIN — ONDANSETRON 4 MG: 2 INJECTION INTRAMUSCULAR; INTRAVENOUS at 12:38

## 2021-04-04 NOTE — PLAN OF CARE
Goal Outcome Evaluation:  Plan of Care Reviewed With: patient, spouse  Progress: improving  Outcome Summary: Pt treatment completed this date with pt showing improvement with standing balance and gait endurance. Pt was able to amb 115 ft with rolling walker and CGA. Pt did remain on 2 L NC O2 with sats above 90% throughout sesison,  b/min during ambulation. Pt to continue with current PT POC.

## 2021-04-04 NOTE — THERAPY TREATMENT NOTE
Patient Name: Elsa Alcaraz  : 1940    MRN: [id number]                              Today's Date: 2021       Admit Date: 3/29/2021    Visit Dx:     ICD-10-CM ICD-9-CM   1. Chest pain, unspecified type  R07.9 786.50   2. Tricuspid valve disorder  I07.9 397.0   3. Shortness of breath  R06.02 786.05   4. Longstanding persistent atrial fibrillation (CMS/HCC)  I48.11 427.31   5. Hiatal hernia with gastroesophageal reflux  K21.9 530.81    K44.9 553.3     Patient Active Problem List   Diagnosis   • Atrial fibrillation (CMS/HCC)   • Hyperlipidemia   • Hypertension   • Anxiety disorder due to general medical condition   • Cataract   • Chronic bronchitis (CMS/HCC)   • Depression   • Esophageal reflux   • Hiatal hernia   • Obesity   • Palpitations   • Shortness of breath   • Sleep apnea   • Tricuspid valve disorder   • Iron deficiency anemia   • Thrombocytosis (CMS/HCC)   • Iron deficiency anemia secondary to inadequate dietary iron intake   • Hiatal hernia with gastroesophageal reflux   • Gastroesophageal reflux disease with esophagitis   • Epigastric pain   • Age-related nuclear cataract of left eye   • Age-related nuclear cataract of right eye   • Chest pain   • Left upper quadrant abdominal pain     Past Medical History:   Diagnosis Date   • Anxiety disorder due to general medical condition 2017   • Arthritis    • Atrial fibrillation (CMS/HCC) 2017   • Body piercing     BOTH EARS   • Borderline diabetes    • Breast cancer (CMS/HCC) 2003    right-radiation and a lumpectomy   • Cataract 2017    Left eye surgery    • Chronic bronchitis (CMS/HCC) 2017   • Colon cancer (CMS/HCC) 1998    had surgery and chemo   • COPD (chronic obstructive pulmonary disease) (CMS/HCC)    • Cyanocobalamine deficiency (non anemic)    • Depression 2017   • Diverticulosis    • Esophageal reflux 2017   • Hiatal hernia 2017   • History of bladder infections    • Hx of exercise stress test      "\"several years ago by Dr. Mercado\".     • Hx of radiation therapy    • Hyperlipidemia    • Hypertension 2017   • Impaired functional mobility and activity tolerance    • Osteoporosis    • Palpitations 2017   • Prediabetes    • Problems with swallowing     meat at times per pt report   • Shortness of breath     WITH EXERTION    • Sleep apnea 2017    NO CPAP   • Tricuspid valve disorder 2017    Patient doesn't know anything about this   • Vitamin D deficiency    • Wears glasses      Past Surgical History:   Procedure Laterality Date   • ANAL FISTULOTOMY     • APPENDECTOMY         • BREAST BIOPSY     • BREAST LUMPECTOMY Right 2006   • CATARACT EXTRACTION  2019    both eyes    • CATARACT EXTRACTION W/ INTRAOCULAR LENS IMPLANT Left 2019    Procedure: CATARACT PHACO EXTRACTION WITH INTRAOCULAR LENS IMPLANT LEFT;  Surgeon: Masoud David MD;  Location: [facility] OR;  Service: Ophthalmology   • CATARACT EXTRACTION W/ INTRAOCULAR LENS IMPLANT Right 2019    Procedure: CATARACT PHACO EXTRACTION WITH INTRAOCULAR LENS IMPLANT RIGHT;  Surgeon: Masoud David MD;  Location: [facility] OR;  Service: Ophthalmology   •  SECTION  1981   • CHOLECYSTECTOMY  2009   • COLECTOMY PARTIAL / TOTAL  1998    COLON CANCER   • COLONOSCOPY     • ENDOSCOPY  2016   • ENDOSCOPY N/A 2018    Procedure: ESOPHAGOGASTRODUODENOSCOPY WITH COLD FORCEP BIOPSY;  Surgeon: Vasquez Fagan MD;  Location: [facility] ENDOSCOPY;  Service: Gastroenterology   • ENDOSCOPY N/A 2019    Procedure: ESOPHAGOGASTRODUODENOSCOPY WITH BIOPSY;  Surgeon: Vasquez Fagan MD;  Location: [facility] ENDOSCOPY;  Service: Gastroenterology   • HYSTERECTOMY         • VENTRAL HERNIA REPAIR  10/28/2012     General Information     Row Name 21 1203          Physical Therapy Time and Intention    Document Type  therapy note (daily note)  -TW     Mode of Treatment  physical therapy  -TW     Row Name " 04/04/21 1203          General Information    Existing Precautions/Restrictions  fall;oxygen therapy device and L/min  -TW     Barriers to Rehab  medically complex;previous functional deficit  -TW     Row Name 04/04/21 1203          Cognition    Orientation Status (Cognition)  oriented x 4  -TW     Row Name 04/04/21 1203          Safety Issues, Functional Mobility    Safety Issues Affecting Function (Mobility)  safety precaution awareness;safety precautions follow-through/compliance;insight into deficits/self-awareness  -TW     Impairments Affecting Function (Mobility)  balance;endurance/activity tolerance;pain;strength  -TW     Comment, Safety Issues/Impairments (Mobility)  No dizziness reported with any of her mobility this date.  -TW       User Key  (r) = Recorded By, (t) = Taken By, (c) = Cosigned By    Initials Name Provider Type    TW Mireille Velez, PT Physical Therapist        Mobility     Row Name 04/04/21 1203          Bed Mobility    Bed Mobility  supine-sit  -TW     Supine-Sit [place] (Bed Mobility)  verbal cues;standby assist  -TW     Assistive Device (Bed Mobility)  head of bed elevated  -TW     Comment (Bed Mobility)  Pt reached for therapist to help pull her to sitting but therapist cued pt on how to assist herself by pushing up on bed to complete the task on her own.  -     Row Name 04/04/21 1203          Transfers    Comment (Transfers)  Cues for handplacement and not pulling on walker to stand.  -     Row Name 04/04/21 1203          Bed-Chair Transfer    Bed-Chair [place] (Transfers)  contact guard;verbal cues  -TW     Assistive Device (Bed-Chair Transfers)  walker, front-wheeled  -     Row Name 04/04/21 1203          Sit-Stand Transfer    Sit-Stand [place] (Transfers)  standby assist;verbal cues  -TW     Assistive Device (Sit-Stand Transfers)  walker, front-wheeled  -     Row Name 04/04/21 1203          Gait/Stairs (Locomotion)    [place] Level (Gait)  contact  guard;verbal cues  -TW     Assistive Device (Gait)  walker, front-wheeled  -TW     Distance in Feet (Gait)  115  -TW     Deviations/Abnormal Patterns (Gait)  stride length decreased;base of support, narrow  -TW     Bilateral Gait Deviations  heel strike decreased  -TW     [place] Level (Stairs)  not tested  -TW     Comment (Gait/Stairs)  Pt stood at 55 ft to rest x 30 sec before continuing to amb back to room  -TW       User Key  (r) = Recorded By, (t) = Taken By, (c) = Cosigned By    Initials Name Provider Type    TW Mireille Velez, PT Physical Therapist        Obj/Interventions     Row Name 04/04/21 1203          Balance    Balance Assessment  sitting static balance;sitting dynamic balance;standing static balance;standing dynamic balance  -TW     Static Sitting Balance  WFL;unsupported;sitting, edge of bed  -TW     Dynamic Sitting Balance  WFL;unsupported;sitting, edge of bed  -TW     Static Standing Balance  WFL;supported  -TW     Dynamic Standing Balance  mild impairment;supported  -TW       User Key  (r) = Recorded By, (t) = Taken By, (c) = Cosigned By    Initials Name Provider Type    TW Mireille Velez, PT Physical Therapist        Goals/Plan    No documentation.       Clinical Impression     Row Name 04/04/21 1203          Pain Scale: Numbers Pre/Post-Treatment    Pretreatment Pain Rating  2/10  -TW     Posttreatment Pain Rating  2/10  -TW     Pain Location - Side  Left  -TW     Pain Location - Orientation  anterior  -TW     Pain Location  chest  -TW     Pre/Posttreatment Pain Comment  No change with chest pain at rest or ambulating.  -TW     Pain Intervention(s)  Ambulation/increased activity;Repositioned  -TW     Row Name 04/04/21 1203          Plan of Care Review    Plan of Care Reviewed With  patient;spouse  -TW     Progress  improving  -TW     Outcome Summary  Pt treatment completed this date with pt showing improvement with standing balance and gait endurance. Pt was able to amb 115 ft with rolling  walker and CGA. Pt did remain on 2 L NC O2 with sats above 90% throughout sesison,  b/min during ambulation. Pt to continue with current PT POC.  -TW     Row Name 04/04/21 1203          Vital Signs    Pretreatment Heart Rate (beats/min)  100  -TW     Intratreatment Heart Rate (beats/min)  123  -TW     Posttreatment Heart Rate (beats/min)  98  -TW     Pre SpO2 (%)  96  -TW     O2 Delivery Pre Treatment  supplemental O2 2 L  -TW     Intra SpO2 (%)  92  -TW     O2 Delivery Intra Treatment  supplemental O2 2 L  -TW     Post SpO2 (%)  92  -TW     O2 Delivery Post Treatment  supplemental O2 2 L  -TW     Pre Patient Position  Supine  -TW     Intra Patient Position  Standing  -TW     Post Patient Position  Sitting  -TW     Row Name 04/04/21 1203          Positioning and Restraints    Pre-Treatment Position  in bed  -TW     Post Treatment Position  chair  -TW     In Chair  sitting;call light within reach;encouraged to call for assist;with family/caregiver  -TW       User Key  (r) = Recorded By, (t) = Taken By, (c) = Cosigned By    Initials Name Provider Type    Mireille Pelayo, PT Physical Therapist        Outcome Measures     Row Name 04/04/21 1203          How much help from another person do you currently need...    Turning from your back to your side while in flat bed without using bedrails?  4  -TW     Moving from lying on back to sitting on the side of a flat bed without bedrails?  3  -TW     Moving to and from a bed to a chair (including a wheelchair)?  3  -TW     Standing up from a chair using your arms (e.g., wheelchair, bedside chair)?  4  -TW     Climbing 3-5 steps with a railing?  2  -TW     To walk in hospital room?  3  -TW     AM-PAC 6 Clicks Score (PT)  19  -TW     Row Name 04/04/21 1203          Functional Assessment    Outcome Measure Options  AM-PAC 6 Clicks Basic Mobility (PT)  -TW       User Key  (r) = Recorded By, (t) = Taken By, (c) = Cosigned By    Initials Name Provider Type    JOSIE Velez  Mireille PT Physical Therapist        Physical Therapy Education                 Title: PT OT SLP Therapies (In Progress)     Topic: Physical Therapy (In Progress)     Point: Mobility training (Done)     Learning Progress Summary           Patient Acceptance, E,D, DU by  at 4/4/2021 1203    Comment: Pt education for safe sequencing and hand placement for transfers and gait using rolling walker.    Acceptance, E,D, VU,DU by TW at 4/3/2021 1135    Comment: Pt education on use of rolling walker for safe transfers and gait. Pt education also on waiting prior to standing up or walking due to history of dizziness.    Acceptance, E,TB, VU by  at 4/2/2021 1740    Comment: Role of POC                   Point: Home exercise program (Not Started)     Learner Progress:  Not documented in this visit.          Point: Body mechanics (Done)     Learning Progress Summary           Patient Acceptance, E,D, DU by TW at 4/4/2021 1203    Comment: Pt education for safe sequencing and hand placement for transfers and gait using rolling walker.                   Point: Precautions (Done)     Learning Progress Summary           Patient Acceptance, E,D, VU,DU by TW at 4/3/2021 1135    Comment: Pt education on use of rolling walker for safe transfers and gait. Pt education also on waiting prior to standing up or walking due to history of dizziness.                               User Key     Initials Effective Dates Name Provider Type Discipline     04/03/18 -  Adenike Randall, PT Physical Therapist PT     03/26/19 -  Mireille Velez, PT Physical Therapist PT              PT Recommendation and Plan     Plan of Care Reviewed With: patient, spouse  Progress: improving  Outcome Summary: Pt treatment completed this date with pt showing improvement with standing balance and gait endurance. Pt was able to amb 115 ft with rolling walker and CGA. Pt did remain on 2 L NC O2 with sats above 90% throughout sesison,  b/min during ambulation. Pt  to continue with current PT POC.     Time Calculation:   PT Charges     Row Name 04/04/21 1203             Time Calculation    Start Time  1126  -TW      Stop Time  1203  -TW      Time Calculation (min)  37 min  -TW      PT Received On  04/04/21  -TW         Timed Charges    88993 - Gait Training Minutes   13  -TW      35743 - PT Therapeutic Activity Minutes  25  -TW        User Key  (r) = Recorded By, (t) = Taken By, (c) = Cosigned By    Initials Name Provider Type    TW Mireille Velez, PT Physical Therapist        Therapy Charges for Today     Code Description Service Date Service Provider Modifiers Qty    39504615076 HC GAIT TRAINING EA 15 MIN 4/3/2021 Frederick, Mireille, PT GP 1    56851911669 HC PT THERAPEUTIC ACT EA 15 MIN 4/3/2021 Mireille Velez, PT GP 1    92883418737 HC GAIT TRAINING EA 15 MIN 4/4/2021 Mireille Velez, PT GP 1    06321809209 HC PT THERAPEUTIC ACT EA 15 MIN 4/4/2021 Mireille Velez, PT GP 2          PT G-Codes  Outcome Measure Options: AM-PAC 6 Clicks Basic Mobility (PT)  AM-PAC 6 Clicks Score (PT): 19    Mireille Frederick, PT  4/4/2021

## 2021-04-05 VITALS
OXYGEN SATURATION: 92 % | HEIGHT: 63 IN | DIASTOLIC BLOOD PRESSURE: 69 MMHG | RESPIRATION RATE: 19 BRPM | BODY MASS INDEX: 25.51 KG/M2 | SYSTOLIC BLOOD PRESSURE: 95 MMHG | WEIGHT: 143.96 LBS | HEART RATE: 91 BPM | TEMPERATURE: 97.8 F

## 2021-04-05 PROBLEM — R65.21 SEPTIC SHOCK: Status: ACTIVE | Noted: 2021-04-05

## 2021-04-05 PROBLEM — I50.42 CHRONIC COMBINED SYSTOLIC AND DIASTOLIC HEART FAILURE (HCC): Status: ACTIVE | Noted: 2021-04-05

## 2021-04-05 PROBLEM — A04.9 BACTERIAL ENTERITIS: Status: ACTIVE | Noted: 2021-04-05

## 2021-04-05 PROBLEM — I34.0 MODERATE MITRAL REGURGITATION: Status: ACTIVE | Noted: 2021-04-05

## 2021-04-05 PROBLEM — A41.9 SEPTIC SHOCK: Status: RESOLVED | Noted: 2021-04-05 | Resolved: 2021-04-05

## 2021-04-05 PROBLEM — I48.0 PAROXYSMAL ATRIAL FIBRILLATION (HCC): Status: ACTIVE | Noted: 2017-01-11

## 2021-04-05 PROBLEM — R10.12 LEFT UPPER QUADRANT ABDOMINAL PAIN: Status: RESOLVED | Noted: 2021-03-29 | Resolved: 2021-04-05

## 2021-04-05 PROBLEM — A41.9 SEPTIC SHOCK (HCC): Status: ACTIVE | Noted: 2021-04-05

## 2021-04-05 PROBLEM — I07.1 MODERATE TRICUSPID REGURGITATION: Status: ACTIVE | Noted: 2021-04-05

## 2021-04-05 PROBLEM — R65.21 SEPTIC SHOCK (HCC): Status: RESOLVED | Noted: 2021-04-05 | Resolved: 2021-04-05

## 2021-04-05 LAB
ALBUMIN SERPL-MCNC: 2.8 G/DL (ref 3.5–5.2)
ALBUMIN/GLOB SERPL: 1.1 G/DL
ALP SERPL-CCNC: 87 U/L (ref 39–117)
ALT SERPL W P-5'-P-CCNC: 11 U/L (ref 1–33)
ANION GAP SERPL CALCULATED.3IONS-SCNC: 7.2 MMOL/L (ref 5–15)
AST SERPL-CCNC: 12 U/L (ref 1–32)
BACTERIA SPEC CULT: NORMAL
BACTERIA SPEC CULT: NORMAL
BASOPHILS # BLD AUTO: 0.01 10*3/MM3 (ref 0–0.2)
BASOPHILS NFR BLD AUTO: 0.1 % (ref 0–1.5)
BILIRUB SERPL-MCNC: 1.3 MG/DL (ref 0–1.2)
BUN SERPL-MCNC: 30 MG/DL (ref 8–23)
BUN/CREAT SERPL: 45.5 (ref 7–25)
CALCIUM SPEC-SCNC: 8.6 MG/DL (ref 8.6–10.5)
CAMPYLOBACTER STL CULT: NORMAL
CHLORIDE SERPL-SCNC: 92 MMOL/L (ref 98–107)
CO2 SERPL-SCNC: 42.8 MMOL/L (ref 22–29)
CREAT SERPL-MCNC: 0.66 MG/DL (ref 0.57–1)
DEPRECATED RDW RBC AUTO: 47 FL (ref 37–54)
E COLI SXT STL QL IA: NEGATIVE
EOSINOPHIL # BLD AUTO: 0.1 10*3/MM3 (ref 0–0.4)
EOSINOPHIL NFR BLD AUTO: 0.9 % (ref 0.3–6.2)
ERYTHROCYTE [DISTWIDTH] IN BLOOD BY AUTOMATED COUNT: 13.4 % (ref 12.3–15.4)
GFR SERPL CREATININE-BSD FRML MDRD: 86 ML/MIN/1.73
GLOBULIN UR ELPH-MCNC: 2.5 GM/DL
GLUCOSE SERPL-MCNC: 90 MG/DL (ref 65–99)
HCT VFR BLD AUTO: 48.3 % (ref 34–46.6)
HGB BLD-MCNC: 15.3 G/DL (ref 12–15.9)
IMM GRANULOCYTES # BLD AUTO: 0.05 10*3/MM3 (ref 0–0.05)
IMM GRANULOCYTES NFR BLD AUTO: 0.4 % (ref 0–0.5)
INR PPP: 1.1 (ref 0.9–1.1)
LYMPHOCYTES # BLD AUTO: 0.91 10*3/MM3 (ref 0.7–3.1)
LYMPHOCYTES NFR BLD AUTO: 8 % (ref 19.6–45.3)
MCH RBC QN AUTO: 30 PG (ref 26.6–33)
MCHC RBC AUTO-ENTMCNC: 31.7 G/DL (ref 31.5–35.7)
MCV RBC AUTO: 94.7 FL (ref 79–97)
MONOCYTES # BLD AUTO: 1.57 10*3/MM3 (ref 0.1–0.9)
MONOCYTES NFR BLD AUTO: 13.8 % (ref 5–12)
NEUTROPHILS NFR BLD AUTO: 76.8 % (ref 42.7–76)
NEUTROPHILS NFR BLD AUTO: 8.73 10*3/MM3 (ref 1.7–7)
NRBC BLD AUTO-RTO: 0 /100 WBC (ref 0–0.2)
PLATELET # BLD AUTO: 310 10*3/MM3 (ref 140–450)
PMV BLD AUTO: 9.6 FL (ref 6–12)
POTASSIUM SERPL-SCNC: 3.1 MMOL/L (ref 3.5–5.2)
PROT SERPL-MCNC: 5.3 G/DL (ref 6–8.5)
PROTHROMBIN TIME: 14.7 SECONDS (ref 12–15.1)
RBC # BLD AUTO: 5.1 10*6/MM3 (ref 3.77–5.28)
SALM + SHIG STL CULT: NORMAL
SODIUM SERPL-SCNC: 142 MMOL/L (ref 136–145)
WBC # BLD AUTO: 11.37 10*3/MM3 (ref 3.4–10.8)

## 2021-04-05 PROCEDURE — 63710000001 PREDNISONE PER 1 MG: Performed by: INTERNAL MEDICINE

## 2021-04-05 PROCEDURE — 80053 COMPREHEN METABOLIC PANEL: CPT | Performed by: INTERNAL MEDICINE

## 2021-04-05 PROCEDURE — 25010000002 PIPERACILLIN SOD-TAZOBACTAM PER 1 G: Performed by: INTERNAL MEDICINE

## 2021-04-05 PROCEDURE — 94799 UNLISTED PULMONARY SVC/PX: CPT

## 2021-04-05 PROCEDURE — 97116 GAIT TRAINING THERAPY: CPT

## 2021-04-05 PROCEDURE — 99239 HOSP IP/OBS DSCHRG MGMT >30: CPT | Performed by: INTERNAL MEDICINE

## 2021-04-05 PROCEDURE — 97530 THERAPEUTIC ACTIVITIES: CPT

## 2021-04-05 PROCEDURE — 25010000002 ONDANSETRON PER 1 MG: Performed by: INTERNAL MEDICINE

## 2021-04-05 PROCEDURE — 85610 PROTHROMBIN TIME: CPT | Performed by: INTERNAL MEDICINE

## 2021-04-05 PROCEDURE — 94618 PULMONARY STRESS TESTING: CPT

## 2021-04-05 PROCEDURE — 85025 COMPLETE CBC W/AUTO DIFF WBC: CPT | Performed by: INTERNAL MEDICINE

## 2021-04-05 RX ORDER — POTASSIUM CHLORIDE 750 MG/1
10 TABLET, FILM COATED, EXTENDED RELEASE ORAL DAILY
Status: ON HOLD
Start: 2021-04-05 | End: 2021-05-14 | Stop reason: SDUPTHER

## 2021-04-05 RX ORDER — AMOXICILLIN AND CLAVULANATE POTASSIUM 875; 125 MG/1; MG/1
1 TABLET, FILM COATED ORAL 2 TIMES DAILY
Qty: 6 TABLET | Refills: 0 | Status: SHIPPED | OUTPATIENT
Start: 2021-04-05 | End: 2021-04-08

## 2021-04-05 RX ORDER — FUROSEMIDE 40 MG/1
20 TABLET ORAL DAILY
Status: ON HOLD
Start: 2021-04-05 | End: 2021-05-14 | Stop reason: SDUPTHER

## 2021-04-05 RX ADMIN — TAZOBACTAM SODIUM AND PIPERACILLIN SODIUM 4.5 G: 500; 4 INJECTION, SOLUTION INTRAVENOUS at 11:32

## 2021-04-05 RX ADMIN — PANTOPRAZOLE SODIUM 40 MG: 40 TABLET, DELAYED RELEASE ORAL at 08:27

## 2021-04-05 RX ADMIN — Medication 1 CAPSULE: at 08:27

## 2021-04-05 RX ADMIN — FUROSEMIDE 20 MG: 20 TABLET ORAL at 08:27

## 2021-04-05 RX ADMIN — CITALOPRAM HYDROBROMIDE 20 MG: 20 TABLET ORAL at 08:27

## 2021-04-05 RX ADMIN — POTASSIUM CHLORIDE 10 MEQ: 10 CAPSULE, COATED, EXTENDED RELEASE ORAL at 08:27

## 2021-04-05 RX ADMIN — ONDANSETRON 4 MG: 2 INJECTION INTRAMUSCULAR; INTRAVENOUS at 00:20

## 2021-04-05 RX ADMIN — TAZOBACTAM SODIUM AND PIPERACILLIN SODIUM 4.5 G: 500; 4 INJECTION, SOLUTION INTRAVENOUS at 04:46

## 2021-04-05 RX ADMIN — METOPROLOL SUCCINATE 50 MG: 50 TABLET, EXTENDED RELEASE ORAL at 08:27

## 2021-04-05 RX ADMIN — BUDESONIDE AND FORMOTEROL FUMARATE DIHYDRATE 2 PUFF: 80; 4.5 AEROSOL RESPIRATORY (INHALATION) at 07:13

## 2021-04-05 RX ADMIN — RIVAROXABAN 15 MG: 15 TABLET, FILM COATED ORAL at 08:27

## 2021-04-05 RX ADMIN — PREDNISONE 40 MG: 20 TABLET ORAL at 08:27

## 2021-04-05 NOTE — PLAN OF CARE
Goal Outcome Evaluation:  Plan of Care Reviewed With: patient, spouse  Progress: improving  Outcome Summary: Patient is able to demonstrate improvements in strength, balance and gait.  She requires 4 LPM of oxygen to maintain oxygen saturation levels in the 90s.  She is given a written vestibular exercise program and she and her  demonstrate a good understanding of how to perform exercises.  Patient is expected to discharge to home today.

## 2021-04-05 NOTE — NURSING NOTE
Patient discharged at this time per MD order. Patient given discharge teaching and follow up appointments. Home O2 delivered to patient's room prior to discharge. Patient denies questions/concerns at this time. Patient left facility via wheelchair with . No acute distress noted.

## 2021-04-05 NOTE — DISCHARGE SUMMARY
[facility]   DISCHARGE SUMMARY      Name:  Elsa Alcaraz   Age:  81 y.o.  Sex:  female  :  1940  MRN:  [id number]   Visit Number:  [id number]    Admission Date:  3/29/2021  Date of Discharge:  2021  Primary Care Physician:  Kemi Hdz MD    Important issues to note:    1.  Continue Augmentin for 3 more days.  2.  No significant change has been made to her home medication regimen.  3.  Follow-up with cardiologist Dr. Germain as scheduled this week.  4.  Follow-up with Dr. Sanders in 8 weeks.  5.  Follow-up with primary care physician in 1 week.  6.  Patient had walking oximetry prior to discharge and will be arranged 3 L of nasal cannula oxygen at rest and 4 L on exertion.    Discharge Diagnoses:     1.  Septic shock secondary to #2, present on admission, resolved.  2.  Acute bacterial enteritis, present on admission, resolved.  3.  Acute on chronic systolic and diastolic heart failure, improved.  4.  Acute hypoxic respiratory failure secondary to #3.  5.  Acute renal failure, present on admission, improved.  6.  Paroxysmal atrial fibrillation on Xarelto.  7.  COPD, no evidence of exacerbation.  8.  Moderate mitral and tricuspid valve regurgitation.  9.  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  10.  Essential hypertension.    Problem List:     Active Hospital Problems    Diagnosis  POA   • **Bacterial enteritis [A04.9]  Yes   • Moderate mitral regurgitation [I34.0]  Yes   • Moderate tricuspid regurgitation [I07.1]  Yes   • Chronic combined systolic and diastolic heart failure (CMS/HCC) [I50.42]  Yes   • Gastroesophageal reflux disease with esophagitis [K21.00]  Yes   • Paroxysmal atrial fibrillation (CMS/HCC) [I48.0]  Yes   • Essential hypertension [I10]  Yes   • Hiatal hernia [K44.9]  Yes      Resolved Hospital Problems    Diagnosis Date Resolved POA   • Septic shock (CMS/HCC) [A41.9, R65.21] 2021 Yes     Priority: High   • Left upper  quadrant abdominal pain [R10.12] 04/05/2021 Yes     Priority: High     Presenting Problem:    Chief Complaint   Patient presents with   • Abdominal Pain     Consults:     Consulting Physician(s)  Chat With All Active Members    Provider Relationship Specialty    Oskar Gordillo MD  Consulting Physician Cardiology    Iona Sanders MD  Consulting Physician Gastroenterology        Procedures Performed:    None.    History of presenting illness/Hospital Course:    Ms. Alcaraz is an 81-year-old pleasant female who lives with her , who is independent in daily activities was admitted from the emergency room with left upper quadrant abdominal pain radiating into her chest.  Patient does have history of chronic systolic and diastolic heart failure, atrial fibrillation on Xarelto, valvular heart disease and hiatal hernia.  She also had colon cancer approximately 20 years ago status post surgery.  Patient follows up with Dr. Germain from cardiology for her atrial fibrillation.  Patient was initially thought to have urinary tract infection and possible coronary artery disease and was admitted to the medical floor with telemetry.  She was evaluated by Dr. Gordillo from cardiology who did not think patient had acute coronary syndrome and did not recommend any further cardiac work-up.  Patient was placed on IV antibiotics therapy with Rocephin and Flagyl for suspected urinary tract infection and colitis.    Unfortunately, patient started developing hypotension and increasing shortness of breath.  She was transferred to the ICU and placed on Levophed drip and her antibiotics therapy was broadened to Zosyn.  She was also placed on empiric steroids and diuretics.  Patient was subsequently able to come off of Levophed drip and was transferred back to the floor.  During this time, she did have acute renal injury but it improved back to baseline after a few days.  Her urine culture grew mixed tresa.  Blood cultures remain  negative.  Initially, she was also noted to have elevated INR level and her Xarelto was placed on hold.  Patient also had atrial fibrillation and rapid ventricular rate during the hospital stay and was continued on her home dose of metoprolol.  Her Xarelto has been restarted at discharge.    During the hospital stay, patient was noted to have elevated bilirubin levels as well as mildly elevated LFTs.  She was seen by Dr. Sanders from gastroenterology who felt that the patient likely has Gilbert's syndrome.  MRCP done during the hospital stay did not show any evidence of cholangitis or CBD obstruction.  Dr. Sanders felt that the patient likely has underlying fatty liver disease and recommended outpatient follow-up.  Patient's transaminase levels have significantly improved.    Patient also developed episodes of dizziness and was evaluated by physical therapy.  She did not have any significant evidence of BPV but was treated symptomatically with meclizine.  She is currently feeling better and does not have any episodes of dizziness.  She has been working with physical therapy and has been able to walk in the hallway.  She denies any abdominal or chest pain at this time.  She has completed 7 days of IV antibiotics therapy in the hospital and will be discharged home on Augmentin for 3 more days.  Patient will follow up with her primary care physician in 1 week.  She also has an appointment with her cardiologist on 4/7/2021.  She is advised to follow-up with Dr. Sanders in 8 to 12 weeks.  Patient lives with her  and she declined any home needs or home health at this time.  Prior to discharge, patient had walking oximetry and required 3 L of nasal cannula oxygen at rest and 4 L on exertion.  This could be likely related to her underlying COPD as well as heart failure.    Vital Signs:    Temp:  [97.6 °F (36.4 °C)-97.9 °F (36.6 °C)] 97.8 °F (36.6 °C)  Heart Rate:  [] 91  Resp:  [18-19] 19  BP:  ()/(66-87) 95/69    Physical Exam:    General Appearance:  Alert and cooperative.  Comfortable at rest.   Head:  Atraumatic and normocephalic.   Eyes: Conjunctivae and sclerae normal, no icterus. No pallor.   Ears:  Ears with no abnormalities noted.   Throat: No oral lesions, no thrush, oral mucosa moist.   Neck: Supple, trachea midline, no thyromegaly.   Back:   No kyphoscoliosis present. No tenderness to palpation.   Lungs:   Breath sounds heard bilaterally equally.  No crackles or wheezing. No Pleural rub or bronchial breathing.   Heart:  Normal S1 and S2, no murmur, no gallop, no rub. No JVD.   Abdomen:   Normal bowel sounds, no masses, no organomegaly. Soft, nontender, nondistended, no rebound tenderness.  Surgical scars noted.   Extremities: Supple, no edema, no cyanosis, no clubbing.   Pulses: Pulses palpable bilaterally.   Skin: No bleeding or rash.   Neurologic: Alert and oriented x 3. No facial asymmetry. Moves all four limbs. No tremors.     Pertinent Lab Results:     Results from last 7 days   Lab Units 04/05/21 0721 04/04/21 0507 04/03/21  0544   SODIUM mmol/L 142 140 143   POTASSIUM mmol/L 3.1* 3.5 3.5   CHLORIDE mmol/L 92* 95* 100   CO2 mmol/L 42.8* 39.0* 35.9*   BUN mg/dL 30* 28* 26*   CREATININE mg/dL 0.66 0.70 0.73   CALCIUM mg/dL 8.6 8.9 9.1   BILIRUBIN mg/dL 1.3* 1.5* 1.7*   ALK PHOS U/L 87 88 101   ALT (SGPT) U/L 11 12 16   AST (SGOT) U/L 12 11 11   GLUCOSE mg/dL 90 101* 98     Results from last 7 days   Lab Units 04/05/21  0721 04/04/21  0507 04/03/21  0544   WBC 10*3/mm3 11.37* 11.05* 12.55*   HEMOGLOBIN g/dL 15.3 15.1 15.2   HEMATOCRIT % 48.3* 47.4* 48.3*   PLATELETS 10*3/mm3 310 302 286     Results from last 7 days   Lab Units 04/05/21  0721 04/04/21 0507 04/03/21  0918   INR  1.10 1.16* 1.21*     Results from last 7 days   Lab Units 03/31/21  1411 03/31/21  0612   TROPONIN T ng/mL <0.010 <0.010       Results from last 7 days   Lab Units 03/31/21  1104   COLOR UA  Dark Yellow*    GLUCOSE UA  Negative   KETONES UA  Negative   LEUKOCYTES UA  Trace*   PH, URINE  <=5.0   BILIRUBIN UA  Moderate (2+)*   UROBILINOGEN UA  1.0 E.U./dL     Results from last 7 days   Lab Units 04/01/21  1316 04/01/21  1312 03/31/21  1104   BLOODCX  No growth at 3 days No growth at 3 days  --    URINECX   --   --  50,000 CFU/mL Mixed Kitty Isolated     Pertinent Radiology Results:    Imaging Results (All)     Procedure Component Value Units Date/Time    XR Chest 1 View [[id number]] Collected: 04/04/21 0906     Updated: 04/04/21 0908    Narrative:      PROCEDURE: XR CHEST 1 VW-     HISTORY: effusion f/u; R07.9-Chest pain, unspecified; I07.9-Rheumatic  tricuspid valve disease, unspecified; R06.02-Shortness of breath;  I48.11-Longstanding persistent atrial fibrillation;  K21.1-Vgbgsd-yhbshkctfu reflux disease without esophagitis;  K44.9-Diaphragmatic hernia without obstruction or gangrene     COMPARISON: 03/31/2021.     FINDINGS: Atherosclerosis is noted. The heart is normal in size. The  mediastinum is unremarkable. Left basilar opacity and probable effusion,  not significantly changed from previous.. There is no pneumothorax.   There are no acute osseous abnormalities.       Impression:      Left basilar opacity and probable effusion, not  significantly changed from previous.     Continued followup is recommended.     This report was finalized on 4/4/2021 9:06 AM by Billy Stahl DO.    CT Chest Without Contrast Diagnostic [[id number]] Collected: 03/31/21 2015     Updated: 03/31/21 2016    Narrative:      FINAL REPORT    CLINICAL HISTORY:  Interstitial lung disease    FINDINGS:  Multiple contiguous transaxial slices through the chest were  obtained without the intravenous ministration of contrast with  coronal reformatted images.  The heart is enlarged.  There are  calcifications of the coronary arteries and valves.  There is a  small pericardial effusion.  There is consolidative lingular and  bilateral lower lobe  airspace disease with small to moderate  bilateral pleural effusions, left greater than right.  Underlying interstitial prominence suggests chronic lung  disease.  There is irregular nodularity right breast with coarse  calcifications.  There are surgical clips the right axilla and  findings may be secondary to postlumpectomy scar.  Recommend  correlation with any recent breast imaging to exclude  recurrent/residual mass.      Impression:      Bilateral airspace disease and effusions  pericardial effusion  Chronic changes  likely postlumpectomy changes right breast but  recommend appropriate correlation    Authenticated by Dov Sanderson MD on 03/31/2021 08:15:45 PM    XR Chest 1 View [[id number]] Collected: 03/31/21 1528     Updated: 03/31/21 1531    Narrative:      PROCEDURE: XR CHEST 1 VW-     HISTORY: sob; R07.9-Chest pain, unspecified; I07.9-Rheumatic tricuspid  valve disease, unspecified; R06.02-Shortness of breath;  I48.11-Longstanding persistent atrial fibrillation;  K21.4-Spapzt-hynfdpzebm reflux disease without esophagitis;  K44.9-Diaphragmatic hernia without obstruction or gangrene     COMPARISON: March 29, 2021.     FINDINGS: The heart is enlarged. The mediastinum is unremarkable. There  is mild interstitial pulmonary edema. There has been interval increase  in size of the patient's left effusion. A small right effusion persists.  There is no pneumothorax.  There are no acute osseous abnormalities.       Impression:      Findings consistent with pulmonary edema with interval  increase in size of the patient's left pleural effusion.     Continued followup is recommended.     This report was finalized on 3/31/2021 3:29 PM by Frederick Eastman M.D..    MRI abdomen wo contrast mrcp [[id number]] Collected: 03/31/21 0813     Updated: 03/31/21 0818    Narrative:      PROCEDURE: MRI ABDOMEN WO CONTRAST MRCP-     HISTORY: Elevated LFTS with Jaundice; R07.9-Chest pain, unspecified;  I07.9-Rheumatic tricuspid  valve disease, unspecified; R06.02-Shortness  of breath; I48.11-Longstanding persistent atrial fibrillation;  K21.3-Fawdgc-ytovgjpsqs reflux disease without esophagitis;  K44.9-Diaphragmatic hernia without obstruction or gangrene     PROCEDURE: Multiplanar multisequence imaging of the abdomen was  performed without the use of intravenous contrast. 3-D MRCP images were  obtained.     COMPARISON: CT dated March 29, 2021.     FINDINGS: MRCP images demonstrates prior cholecystectomy.. The common  duct is dilated measuring 12 mm. No filling defects are seen within the  common duct which tapers normally towards the ampulla. There is mild  intrahepatic biliary dilatation.     The liver is homogeneous with no focal lesions identified. The kidneys,  spleen, pancreas and adrenals are unremarkable. There is a small amount  of ascites in the abdomen. The heart is enlarged. There are small  bilateral pleural effusions.       Impression:      1. Mild dilatation of the common bile duct with no evidence of  choledocholithiasis.  2. Small amount of ascites within the abdomen.  3. Cardiomegaly and small pleural effusions           This report was finalized on 3/31/2021 8:16 AM by Frederick Eastman M.D..    CT Abdomen Pelvis With Contrast [[id number]] Collected: 03/29/21 1104     Updated: 03/29/21 1120    Narrative:      PROCEDURE: CT ABDOMEN PELVIS W CONTRAST-     HISTORY:  chest pain and abdominal pain     COMPARISON:  None .     TECHNIQUE: Multiple axial CT images were obtained from the lung bases  through the pubic symphysis following the administration of Isovue 300  contrast.      FINDINGS:      ABDOMEN: There is a small left pleural effusion and bibasilar  atelectasis. The heart is is enlarged. The liver is diffusely hypodense  consistent with fatty infiltration. The patient is status post  cholecystectomy. The spleen is unremarkable. No adrenal mass is present.   The pancreas is normal. The kidneys are normal. The aorta is  normal in  caliber. The mesenteric vasculature is widely patent. There is no free  fluid or adenopathy. There are postsurgical changes from right  hemicolectomy. There is mildly dilated loops of distal small bowel with  some small bowel wall thickening. In the infectious or inflammatory in  nature. There is no evidence of a small bowel obstruction.     PELVIS: There are colonic diverticula without CT evidence of  diverticulitis. The urinary bladder is unremarkable. There is no  significant free fluid or adenopathy. Evaluation of the extraperitoneal  soft tissues reveals a possible stellate shaped mass in the inferior  right breast seen on axial image 8 consistent with the patient's known  prior lumpectomy.  Bone windows reveal no lytic or destructive bone lesions.    Impression:      1. Mildly dilated loops of distal small bowel with small bowel wall  thickening felt to be infectious or inflammatory in nature. There is no  evidence of an obstruction.  2. Cardiomegaly and small left pleural effusion.        624.71 mGy.cm        This study was performed with techniques to keep radiation doses as low  as reasonably achievable (ALARA). Individualized dose reduction  techniques using automated exposure control or adjustment of mA and/or  kV according to the patient size were employed.      This report was finalized on 3/29/2021 11:18 AM by Frederick Easmtan M.D..    XR Chest 1 View [[id number]] Collected: 03/29/21 0940     Updated: 03/29/21 0943    Narrative:      PROCEDURE: XR CHEST 1 VW-     HISTORY: sob, CP     COMPARISON: 12/05/2020.     FINDINGS: The heart is mildly enlarged. The mediastinum is unremarkable.  A left basilar opacity is unchanged and may reflect scarring or  atelectasis. The right lung is clear. There is no pneumothorax.  There  are no acute osseous abnormalities.       Impression:      Chronic left basilar opacity which may reflect atelectasis  or scarring.     Continued followup is recommended.      This report was finalized on 3/29/2021 9:41 AM by Frederick Eastman M.D..        Echo:    Results for orders placed during the hospital encounter of 03/29/21    Adult Transthoracic Echo Complete W/ Cont if Necessary Per Protocol    Interpretation Summary  1.  Normal left ventricular size with moderately reduced LV systolic function, LVEF 35-40%.  2.  Mild concentric LVH.  3.  Grade 3 diastolic dysfunction.  4.  Mild right ventricular dilation with normal RV systolic function.  5.  Moderate right atrial dilation.  6.  Severely increased left atrial volume index.  7.  Calcification of the aortic valve without significant stenosis.  8.  Moderate mitral regurgitation.  9.  Moderate tricuspid vegetation, RVSP 44 mmHg.  10.  Small pericardial effusion without tamponade physiology.  11.  Moderate pleural effusion.    Condition on Discharge:      Stable.    Code status during the hospital stay:    Code Status and Medical Interventions:   Ordered at: 03/29/21 1600     Level Of Support Discussed With:    Patient     Code Status:    CPR     Medical Interventions (Level of Support Prior to Arrest):    Full     Discharge Disposition:    Home or Self Care    Discharge Medications:       Discharge Medications      New Medications      Instructions Start Date   amoxicillin-clavulanate 875-125 MG per tablet  Commonly known as: Augmentin   1 tablet, Oral, 2 Times Daily         Changes to Medications      Instructions Start Date   albuterol sulfate  (90 Base) MCG/ACT inhaler  Commonly known as: PROVENTIL HFA;VENTOLIN HFA;PROAIR HFA  What changed:   · when to take this  · reasons to take this   2 puffs, Inhalation, 4 Times Daily - RT         Continue These Medications      Instructions Start Date   budesonide-formoterol 80-4.5 MCG/ACT inhaler  Commonly known as: SYMBICORT   2 puffs, Inhalation, 2 Times Daily - RT      cholecalciferol 25 MCG (1000 UT) tablet  Commonly known as: VITAMIN D3   1,000 Units, Oral, Daily       citalopram 20 MG tablet  Commonly known as: CeleXA   20 mg, Oral, Daily      furosemide 40 MG tablet  Commonly known as: LASIX   20 mg, Oral, Daily      losartan 100 MG tablet  Commonly known as: COZAAR   100 mg, Oral, Daily      metoprolol succinate XL 50 MG 24 hr tablet  Commonly known as: TOPROL-XL   50 mg, Oral, Daily      pantoprazole 40 MG EC tablet  Commonly known as: PROTONIX   40 mg, Oral, Daily      potassium chloride 10 MEQ CR tablet   10 mEq, Oral, Daily, Take 1 tablet daily with furosemide      Xarelto 20 MG tablet  Generic drug: rivaroxaban   20 mg, Oral, Daily         Stop These Medications    traMADol 50 MG tablet  Commonly known as: ULTRAM     VITAMIN B-12 IJ          Discharge Diet:     Diet Instructions     Diet: Cardiac; Thin      Discharge Diet: Cardiac    Fluid Consistency: Thin        Activity at Discharge:     Activity Instructions     Activity as Tolerated          Follow-up Appointments:    Follow-up Information     Kemi Hdz MD Follow up in 1 week(s).    Specialty: Family Medicine  Contact information:  [address] [facility] [address]  [phone]             Iona Sanders MD Follow up in 8 week(s).    Specialty: Gastroenterology  Contact information:  [address]  [facility] [address]  [phone]                 Future Appointments   Date Time Provider Department Center   4/7/2021 10:15 AM Fidencio Germain MD [facility] None     Test Results Pending at Discharge:    Pending Labs     Order Current Status    Blood Culture With JOSE - Blood, Arm, Left Preliminary result    Blood Culture With JOSE - Blood, Hand, Left Preliminary result    Stool Culture (Reference Lab) - Stool, Per Rectum Preliminary result        Blood and stool cultures have been negative so far.     Nik Hicks MD  04/05/21  12:54 EDT    Time: I spent 35 minutes on this discharge activity which included: face-to-face encounter with the patient, reviewing the  data in the system, coordination of the care with the nursing staff as well as consultants, documentation, and entering orders.     Dictated utilizing Dragon dictation.

## 2021-04-05 NOTE — PROGRESS NOTES
Case Management Discharge Note    Met with patient in room to make sure she had everything she needed to go home.  Her  is in the room, she was short of air talking to me and she was on oxygen at 2L in the room.  She does not have home oxygen, so I called Dr. Hicks and he ordered walking oximetry.  She does require home oxygen, so that has been ordered. Her  is her ride home.        Provided Post Acute Provider List?: N/A  Provided Post Acute Provider Quality & Resource List?: N/A    Selected Continued Care - Admitted Since 3/29/2021              Durable Medical Equipment Coordination complete    Service Provider Selected Services Address Phone Fax Patient Preferred    [facility] [place]  [facility] [address] [place] [address] [phone] --           Transportation Services  Private: Car    Final Discharge Disposition Code: 01 - home or self-care

## 2021-04-05 NOTE — THERAPY TREATMENT NOTE
Patient Name: Elsa Alcaraz  : 1940    MRN: [id number]                              Today's Date: 2021       Admit Date: 3/29/2021    Visit Dx:     ICD-10-CM ICD-9-CM   1. Chest pain, unspecified type  R07.9 786.50   2. Tricuspid valve disorder  I07.9 397.0   3. Shortness of breath  R06.02 786.05   4. Longstanding persistent atrial fibrillation (CMS/HCC)  I48.11 427.31   5. Hiatal hernia with gastroesophageal reflux  K21.9 530.81    K44.9 553.3   6. Chronic combined systolic and diastolic heart failure (CMS/HCC)  I50.42 428.42     Patient Active Problem List   Diagnosis   • Paroxysmal atrial fibrillation (CMS/HCC)   • Hyperlipidemia   • Essential hypertension   • Anxiety disorder due to general medical condition   • Cataract   • Chronic bronchitis (CMS/HCC)   • Depression   • Esophageal reflux   • Hiatal hernia   • Obesity   • Palpitations   • Sleep apnea   • Iron deficiency anemia   • Thrombocytosis (CMS/HCC)   • Iron deficiency anemia secondary to inadequate dietary iron intake   • Hiatal hernia with gastroesophageal reflux   • Gastroesophageal reflux disease with esophagitis   • Epigastric pain   • Age-related nuclear cataract of left eye   • Age-related nuclear cataract of right eye   • Bacterial enteritis   • Moderate mitral regurgitation   • Moderate tricuspid regurgitation   • Chronic combined systolic and diastolic heart failure (CMS/HCC)     Past Medical History:   Diagnosis Date   • Anxiety disorder due to general medical condition 2017   • Arthritis    • Atrial fibrillation (CMS/HCC) 2017   • Body piercing     BOTH EARS   • Borderline diabetes    • Breast cancer (CMS/[facility]) 2003    right-radiation and a lumpectomy   • Cataract 2017    Left eye surgery    • Chronic bronchitis (CMS/HCC) 2017   • Colon cancer (CMS/HCC) 1998    had surgery and chemo   • COPD (chronic obstructive pulmonary disease) (CMS/HCC)    • Cyanocobalamine deficiency (non anemic)    • Depression  "2017   • Diverticulosis    • Esophageal reflux 2017   • Hiatal hernia 2017   • History of bladder infections    • Hx of exercise stress test     \"several years ago by Dr. Mercado\".     • Hx of radiation therapy    • Hyperlipidemia    • Hypertension 2017   • Impaired functional mobility and activity tolerance    • Osteoporosis    • Palpitations 2017   • Prediabetes    • Problems with swallowing     meat at times per pt report   • Shortness of breath     WITH EXERTION    • Sleep apnea 2017    NO CPAP   • Tricuspid valve disorder 2017    Patient doesn't know anything about this   • Vitamin D deficiency    • Wears glasses      Past Surgical History:   Procedure Laterality Date   • ANAL FISTULOTOMY     • APPENDECTOMY         • BREAST BIOPSY     • BREAST LUMPECTOMY Right 2006   • CATARACT EXTRACTION  2019    both eyes    • CATARACT EXTRACTION W/ INTRAOCULAR LENS IMPLANT Left 2019    Procedure: CATARACT PHACO EXTRACTION WITH INTRAOCULAR LENS IMPLANT LEFT;  Surgeon: Masoud David MD;  Location: [facility] OR;  Service: Ophthalmology   • CATARACT EXTRACTION W/ INTRAOCULAR LENS IMPLANT Right 2019    Procedure: CATARACT PHACO EXTRACTION WITH INTRAOCULAR LENS IMPLANT RIGHT;  Surgeon: Masoud David MD;  Location: [facility] OR;  Service: Ophthalmology   •  SECTION  1981   • CHOLECYSTECTOMY  2009   • COLECTOMY PARTIAL / TOTAL  1998    COLON CANCER   • COLONOSCOPY     • ENDOSCOPY     • ENDOSCOPY N/A 2018    Procedure: ESOPHAGOGASTRODUODENOSCOPY WITH COLD FORCEP BIOPSY;  Surgeon: Vasquez Fagan MD;  Location: [facility] ENDOSCOPY;  Service: Gastroenterology   • ENDOSCOPY N/A 2019    Procedure: ESOPHAGOGASTRODUODENOSCOPY WITH BIOPSY;  Surgeon: Vasquez Fagan MD;  Location: [facility] ENDOSCOPY;  Service: Gastroenterology   • HYSTERECTOMY         • VENTRAL HERNIA REPAIR  10/28/2012     General Information     Row Name 21 " 1351          Physical Therapy Time and Intention    Document Type  therapy note (daily note)  -JR     Mode of Treatment  physical therapy  -     Row Name 04/05/21 1351          General Information    Patient Profile Reviewed  yes  -JR       User Key  (r) = Recorded By, (t) = Taken By, (c) = Cosigned By    Initials Name Provider Type    Adenike Kuhn PT Physical Therapist        Mobility     Row Name 04/05/21 1351          Bed Mobility    Comment (Bed Mobility)  Patient was up in the chair when PT arrived  -     Row Name 04/05/21 1351          Sit-Stand Transfer    Sit-Stand [place] (Transfers)  verbal cues;supervision  -JR     Assistive Device (Sit-Stand Transfers)  walker, front-wheeled  -JR     Row Name 04/05/21 1351          Gait/Stairs (Locomotion)    Fleming Level (Gait)  verbal cues;supervision  -JR     Assistive Device (Gait)  walker, front-wheeled  -JR     Distance in Feet (Gait)  200  -JR     Deviations/Abnormal Patterns (Gait)  stride length decreased;base of support, narrow  -       User Key  (r) = Recorded By, (t) = Taken By, (c) = Cosigned By    Initials Name Provider Type    Adenike Kuhn PT Physical Therapist        Obj/Interventions     Row Name 04/05/21 1351          Motor Skills    Motor Skills  other (see comments) vestibular exercises--written program given  -     Row Name 04/05/21 1351          Balance    Balance Interventions  sitting;combined head and eye movements  -       User Key  (r) = Recorded By, (t) = Taken By, (c) = Cosigned By    Initials Name Provider Type    Adenike Kuhn PT Physical Therapist        Goals/Plan     Row Name 04/05/21 1351          Bed Mobility Goal 1 (PT)    Activity/Assistive Device (Bed Mobility Goal 1, PT)  bed mobility activities, all  -JR     [place]leming Level/Cues Needed (Bed Mobility Goal 1, PT)  modified independence  -JR     Time Frame (Bed Mobility Goal 1, PT)  short term goal (STG)  -JR     Progress/Outcomes (Bed Mobility  Goal 1, PT)  goal met  -JR     Row Name 04/05/21 1351          Transfer Goal 1 (PT)    Activity/Assistive Device (Transfer Goal 1, PT)  sit-to-stand/stand-to-sit;bed-to-chair/chair-to-bed  -JR     [place] Level/Cues Needed (Transfer Goal 1, PT)  supervision required  -JR     Time Frame (Transfer Goal 1, PT)  short term goal (STG)  -JR     Progress/Outcome (Transfer Goal 1, PT)  goal met  -JR     Row Name 04/05/21 1351          Gait Training Goal 1 (PT)    Activity/Assistive Device (Gait Training Goal 1, PT)  gait (walking locomotion);assistive device use;walker, rolling  -JR     [place] Level (Gait Training Goal 1, PT)  supervision required  -JR     Distance (Gait Training Goal 1, PT)  200  -JR     Time Frame (Gait Training Goal 1, PT)  1 week  -JR     Progress/Outcome (Gait Training Goal 1, PT)  goal met  -     Row Name 04/05/21 1351          Patient Education Goal (PT)    Activity (Patient Education Goal, PT)  Perform HEP x 15  -JR     [place]/Cues/Accuracy (Memory Goal 2, PT)  demonstrates adequately  -JR     Time Frame (Patient Education Goal, PT)  5 days  -JR     Progress/Outcome (Patient Education Goal, PT)  goal met  -JR       User Key  (r) = Recorded By, (t) = Taken By, (c) = Cosigned By    Initials Name Provider Type    Adenike Kuhn, PT Physical Therapist        Clinical Impression     Row Name 04/05/21 1351          Pain    Additional Documentation  Pain Scale: Numbers Pre/Post-Treatment (Group)  -[facility] Name 04/05/21 1351          Pain Scale: Numbers Pre/Post-Treatment    Pretreatment Pain Rating  0/10 - no pain  -JR     Posttreatment Pain Rating  0/10 - no pain  -     Row Name 04/05/21 1351          Plan of Care Review    Plan of Care Reviewed With  patient;spouse  -JR     Progress  improving  -JR     Outcome Summary  Patient is able to demonstrate improvements in strength, balance and gait.  She requires 4 LPM of oxygen to maintain oxygen saturation levels in the 90s.  She is  given a written vestibular exercise program and she and her  demonstrate a good understanding of how to perform exercises.  Patient is expected to discharge to home today.  -     Row Name 04/05/21 1351          Positioning and Restraints    Pre-Treatment Position  sitting in chair/recliner  -JR     Post Treatment Position  chair  -JR     In Chair  reclined;call light within reach;encouraged to call for assist;with family/caregiver  -       User Key  (r) = Recorded By, (t) = Taken By, (c) = Cosigned By    Initials Name Provider Type    Adenike Kuhn, PRNAEETH Physical Therapist        Outcome Measures     Row Name 04/05/21 1351          How much help from another person do you currently need...    Turning from your back to your side while in flat bed without using bedrails?  4  -JR     Moving from lying on back to sitting on the side of a flat bed without bedrails?  4  -JR     Moving to and from a bed to a chair (including a wheelchair)?  4  -JR     Standing up from a chair using your arms (e.g., wheelchair, bedside chair)?  4  -JR     Climbing 3-5 steps with a railing?  3  -JR     To walk in hospital room?  3  -JR     AM-PAC 6 Clicks Score (PT)  22  -     Row Name 04/05/21 1351          Functional Assessment    Outcome Measure Options  AM-PAC 6 Clicks Basic Mobility (PT)  -       User Key  (r) = Recorded By, (t) = Taken By, (c) = Cosigned By    Initials Name Provider Type    Adenike Kuhn, PT Physical Therapist        Physical Therapy Education                 Title: PT OT SLP Therapies (Resolved)     Topic: Physical Therapy (Resolved)     Point: Mobility training (Resolved)     Learning Progress Summary           Patient Acceptance, E,D, DU by TW at 4/4/2021 1203    Comment: Pt education for safe sequencing and hand placement for transfers and gait using rolling walker.    Acceptance, E,D, VU,DU by TW at 4/3/2021 1135    Comment: Pt education on use of rolling walker for safe transfers and gait. Pt  education also on waiting prior to standing up or walking due to history of dizziness.    Acceptance, E,TB, VU by  at 4/2/2021 1740    Comment: Role of POC                   Point: Home exercise program (Resolved)     Learner Progress:  Not documented in this visit.          Point: Body mechanics (Resolved)     Learning Progress Summary           Patient Acceptance, E,D, DU by  at 4/4/2021 1203    Comment: Pt education for safe sequencing and hand placement for transfers and gait using rolling walker.                   Point: Precautions (Resolved)     Learning Progress Summary           Patient Acceptance, E,D, VU,DU by  at 4/3/2021 1135    Comment: Pt education on use of rolling walker for safe transfers and gait. Pt education also on waiting prior to standing up or walking due to history of dizziness.                               User Key     Initials Effective Dates Name Provider Type Discipline     04/03/18 -  Adenike Randall, PT Physical Therapist PT     03/26/19 -  Mireille Velez, PT Physical Therapist PT              PT Recommendation and Plan  Planned Therapy Interventions (PT): strengthening, balance training, bed mobility training, transfer training, gait training, home exercise program, patient/family education  Plan of Care Reviewed With: patient, spouse  Progress: improving  Outcome Summary: Patient is able to demonstrate improvements in strength, balance and gait.  She requires 4 LPM of oxygen to maintain oxygen saturation levels in the 90s.  She is given a written vestibular exercise program and she and her  demonstrate a good understanding of how to perform exercises.  Patient is expected to discharge to home today.     Time Calculation:   PT Charges     Row Name 04/05/21 0673             Time Calculation    Start Time  1351  -      Stop Time  1434  -      Time Calculation (min)  43 min  -      PT Received On  04/05/21  -      PT Goal Re-Cert Due Date  04/12/21  -        User  Key  (r) = Recorded By, (t) = Taken By, (c) = Cosigned By    Initials Name Provider Type     Adenike Randall, PT Physical Therapist        Therapy Charges for Today     Code Description Service Date Service Provider Modifiers Qty    59269037508 HC PT THERAPEUTIC ACT EA 15 MIN 4/5/2021 Adenike Randall, PT GP 2    58315756153 HC GAIT TRAINING EA 15 MIN 4/5/2021 Adenike Randall, PT GP 1          PT G-Codes  Outcome Measure Options: AM-PAC 6 Clicks Basic Mobility (PT)  AM-PAC 6 Clicks Score (PT): 22    Adenike Randall, PT  4/5/2021

## 2021-04-05 NOTE — PROGRESS NOTES
Exercise Oximetry    Patient Name:Elsa Alcaraz   MRN: [id number]   Date: 04/05/21             ROOM AIR BASELINE   SpO2% 87   Heart Rate 92   Blood Pressure      EXERCISE ON ROOM AIR SpO2% EXERCISE ON O2 @ 4 LPM SpO2%   1 MINUTE  1 MINUTE 94   2 MINUTES  2 MINUTES 93   3 MINUTES  3 MINUTES 93   4 MINUTES  4 MINUTES 92   5 MINUTES  5 MINUTES 92   6 MINUTES  6 MINUTES 91               Distance Walked  56 Distance Walked   Dyspnea (Nolberto Scale)  2 Dyspnea (Nolberto Scale)   Fatigue (Nolberto Scale)  2 Fatigue (Nolberto Scale)   SpO2% Post Exercise  91 SpO2% Post Exercise   HR Post Exercise  109 HR Post Exercise   Time to Recovery  2 Time to Recovery     Comments: pt needs 3 lpm NC at rest, and 4LPM on exertion.

## 2021-04-06 ENCOUNTER — READMISSION MANAGEMENT (OUTPATIENT)
Dept: CALL CENTER | Facility: HOSPITAL | Age: 81
End: 2021-04-06

## 2021-04-06 LAB
BACTERIA SPEC AEROBE CULT: NORMAL
BACTERIA SPEC AEROBE CULT: NORMAL

## 2021-04-06 NOTE — OUTREACH NOTE
Prep Survey      Responses   [facility]?  [facility]   Is LACE score < 7 ?  No   Emergency Room discharge w/ pulse ox?  No   Eligibility  Readm Mgmt   Discharge diagnosis   Septic shock secondary to #2, present on admission, resolved, Acute bacterial enteritis, present on admission, resolved, Acute on chronic systolic and diastolic heart failure, improved   Does the patient have one of the following disease processes/diagnoses(primary or secondary)?  Sepsis   Does the patient have Home health ordered?  No   Is there a DME ordered?  Yes   What DME was ordered?  [employer] listed    Prep survey completed?  Yes          Yadira Dejesus RN

## 2021-04-07 ENCOUNTER — OFFICE VISIT (OUTPATIENT)
Dept: CARDIOLOGY | Facility: CLINIC | Age: 81
End: 2021-04-07

## 2021-04-07 ENCOUNTER — READMISSION MANAGEMENT (OUTPATIENT)
Dept: CALL CENTER | Facility: HOSPITAL | Age: 81
End: 2021-04-07

## 2021-04-07 VITALS — SYSTOLIC BLOOD PRESSURE: 101 MMHG | DIASTOLIC BLOOD PRESSURE: 57 MMHG | WEIGHT: 143 LBS | BODY MASS INDEX: 25.33 KG/M2

## 2021-04-07 DIAGNOSIS — I50.23 ACUTE ON CHRONIC SYSTOLIC CONGESTIVE HEART FAILURE (HCC): Primary | ICD-10-CM

## 2021-04-07 DIAGNOSIS — I48.20 CHRONIC ATRIAL FIBRILLATION (HCC): ICD-10-CM

## 2021-04-07 DIAGNOSIS — I10 ESSENTIAL HYPERTENSION: ICD-10-CM

## 2021-04-07 LAB
QT INTERVAL: 370 MS
QTC INTERVAL: 503 MS

## 2021-04-07 PROCEDURE — 99442 PR PHYS/QHP TELEPHONE EVALUATION 11-20 MIN: CPT | Performed by: INTERNAL MEDICINE

## 2021-04-07 RX ORDER — HYDROCHLOROTHIAZIDE 25 MG/1
25 TABLET ORAL DAILY
COMMUNITY
End: 2021-06-05 | Stop reason: HOSPADM

## 2021-04-07 NOTE — OUTREACH NOTE
Sepsis Week 1 Survey      Responses   [facility] patient discharged from?  [facility]   Does the patient have one of the following disease processes/diagnoses(primary or secondary)?  Sepsis   Week 1 attempt successful?  No   Unsuccessful attempts  Attempt 1          Selena Keen LPN

## 2021-04-07 NOTE — PROGRESS NOTES
[facility]  Office Progress Note  Elsa Alcaraz  1940  [address] [place] [address]       Visit Date: 21    PCP: Kemi Hdz MD  [address] [place] [address]    IDENTIFICATION: A 81 y.o. female from [facility]  [employer]     PROBLEM LIST:  1. A. fib  1.  echo MVP, mild TR, normal LVEF  2. 15 echo EF 55-60%, mild to moderate MR w no evidence of MVP, mild TR, RVSP 35 mmHg, biatrial enlargement  3. [id number] 4, a/c w Coumadin   4. 2020 echo: EF 45%, AV calcification, AV max PG 14 mmHg, mod MR, mild TR, RVSP 23.6 mmHg, incidental afib noted  5. 3/21 echo EF 40%  Mod MR rvsp 50  2. Dyspnea  1.  MPS WNL  2. proBNP 2503-9658 (<1800)  3. HTN  4. HLD  1. 9  TG 1:30 HDL 60 LDL 17  2. 3/27/18  TG 80 HDL 49 LDL 73  5. DAVID  6. COPD  7. Iron deficiency anemia/bone marrow deficiency   1. Per Dr. Harding, Dr. Fagan  8. GERD  9. Hiatal hernia  10. Obesity  11. Depression/anxiety  12. Surgical history  1. Anal fistulotomy  2. Breast lumpectomy  3.  next line cholecystectomy  4. Partial colectomy  5. Ventral hernia repair        Chief Complaint   Patient presents with   • Atrial Fibrillation       Allergies  No Known Allergies    Current Medications    Current Outpatient Medications:   •  albuterol sulfate  (90 Base) MCG/ACT inhaler, Inhale 2 puffs 4 (Four) Times a Day. (Patient taking differently: Inhale 2 puffs Every 6 (Six) Hours As Needed.), Disp: 18 g, Rfl: 0  •  amoxicillin-clavulanate (Augmentin) 875-125 MG per tablet, Take 1 tablet by mouth 2 (Two) Times a Day for 3 days., Disp: 6 tablet, Rfl: 0  •  budesonide-formoterol (SYMBICORT) 80-4.5 MCG/ACT inhaler, Inhale 2 puffs 2 (Two) Times a Day., Disp: , Rfl:   •  cholecalciferol (VITAMIN D3) 25 MCG (1000 UT) tablet, Take 1,000 Units by mouth Daily., Disp: , Rfl:   •  citalopram (CeleXA) 20 MG tablet, Take 20 mg by mouth Daily., Disp: , Rfl:   •   furosemide (LASIX) 40 MG tablet, Take 0.5 tablets by mouth Daily., Disp: , Rfl:   •  hydroCHLOROthiazide (HYDRODIURIL) 25 MG tablet, hydrochlorothiazide 25 mg tablet  1 daily, Disp: , Rfl:   •  losartan (COZAAR) 100 MG tablet, Take 100 mg by mouth Daily., Disp: , Rfl:   •  metoprolol succinate XL (TOPROL-XL) 50 MG 24 hr tablet, Take 50 mg by mouth Daily., Disp: , Rfl:   •  pantoprazole (PROTONIX) 40 MG EC tablet, Take 40 mg by mouth Daily., Disp: , Rfl:   •  potassium chloride 10 MEQ CR tablet, Take 1 tablet by mouth Daily. Take 1 tablet daily with furosemide, Disp: , Rfl:   •  Xarelto 20 MG tablet, Take 1 tablet by mouth Daily., Disp: 90 tablet, Rfl: 3      History of Present Illness   Elsa Alcaraz is a 81 y.o. year old female here for telehealth follow up.  She was hospitalized 3 weeks prior at [facility].  She was noted with decompensated CHF and pneumonitis.  She was placed on antibiotics and diuresed and discharged home on home O2.  She states that she is rallying slowly she is trying to wean her oxygen at home.  She has had low blood pressures with systolic less than 100        OBJECTIVE:  Vitals:    04/07/21 1009   BP: 101/57   BP Location: Right arm   Patient Position: Sitting   Weight: 64.9 kg (143 lb)     Physical Exam    Diagnostic Data:  Procedures      ASSESSMENT:   Diagnosis Plan   1. Acute on chronic systolic congestive heart failure (CMS/HCC)     2. Chronic atrial fibrillation (CMS/HCC)     3. Essential hypertension         PLAN:  Chronic systolic congestive heart failure hold angiotensin blocker beta-blockade titration of diuretic as weight requires.  Redocument BMP at current as she is increased her diuretic dose and had hypokalemia on last evaluation    Chronic A. fib rate controlled anticoagulated    Hypertension hold losartan if systolic pressure less than 110    I will see her back in 4-6 weeks    This patient has consented to a telehealth visit via phone. The visit was scheduled as  a telephone visit to comply with patient safety concerns in accordance with CDC recommendations.  All vitals recorded within this visit are reported by the patient.  I spent  15  minutes in total including but not limited to the 11  minutes spent in direct conversation with this patient.   Kemi Hdz MD, thank you for referring Ms. Alcaraz for evaluation.  I have forwarded my electronically generated recommendations to you for review.  Please do not hesitate to call with any questions.      Fidencio Germain MD, FACC

## 2021-04-08 ENCOUNTER — READMISSION MANAGEMENT (OUTPATIENT)
Dept: CALL CENTER | Facility: HOSPITAL | Age: 81
End: 2021-04-08

## 2021-04-08 NOTE — PAYOR COMM NOTE
"Elsa Caraballo (81 y.o. Female)     Date of Birth Social Security Number Address Home Phone MRN    1940  [address]  [place] [address] [phone] [id number]    Episcopalian Marital Status          Other        Admission Date Admission Type Admitting Provider Attending Provider Department, Room/Bed    3/29/21 Emergency Nik Hicks MD  [facility] MED SURG  /    Discharge Date Discharge Disposition Discharge Destination        2021 Home or Self Care              Attending Provider: (none)   Allergies: No Known Allergies    Isolation: None   Infection: None   Code Status: Prior    Ht: 160 cm (63\")   Wt: 65.3 kg (143 lb 15.4 oz)    Admission Cmt: None   Principal Problem: Bacterial enteritis [A04.9]                 Active Insurance as of 3/29/2021     Primary Coverage     Payor Plan Insurance Group Employer/Plan Group    ANTHEM MEDICARE REPLACEMENT ANTHEM MEDICARE ADVANTAGE [id number]     Payor Plan Address Payor Plan Phone Number Payor Plan Fax Number Effective Dates    [address] [phone]  2020 - None Entered    [facility] [id number]       Subscriber Name Subscriber Birth Date Member ID       ELSA CARABALLO 1940 [id number]                 Emergency Contacts      (Rel.) Home Phone Work Phone Mobile Phone    Logan Caraballo (Spouse) [phone] -- [phone]               Discharge Summary      Nik Hicks MD at 21 1254              [facility] HOSPITALIST   DISCHARGE SUMMARY      Name:  Elsa Caraballo   Age:  81 y.o.  Sex:  female  :  1940  MRN:  [id number]   Visit Number:  [id number]    Admission Date:  3/29/2021  Date of Discharge:  2021  Primary Care Physician:  Kemi Hdz MD    Important issues to note:    1.  Continue Augmentin for 3 more days.  2.  No significant change has been made to her home medication regimen.  3.  Follow-up with cardiologist Dr. Germain as scheduled this week.  4.  " Follow-up with Dr. Sanders in 8 weeks.  5.  Follow-up with primary care physician in 1 week.  6.  Patient had walking oximetry prior to discharge and will be arranged 3 L of nasal cannula oxygen at rest and 4 L on exertion.    Discharge Diagnoses:     1.  Septic shock secondary to #2, present on admission, resolved.  2.  Acute bacterial enteritis, present on admission, resolved.  3.  Acute on chronic systolic and diastolic heart failure, improved.  4.  Acute hypoxic respiratory failure secondary to #3.  5.  Acute renal failure, present on admission, improved.  6.  Paroxysmal atrial fibrillation on Xarelto.  7.  COPD, no evidence of exacerbation.  8.  Moderate mitral and tricuspid valve regurgitation.  9.  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  10.  Essential hypertension.    Problem List:     Active Hospital Problems    Diagnosis  POA   • **Bacterial enteritis [A04.9]  Yes   • Moderate mitral regurgitation [I34.0]  Yes   • Moderate tricuspid regurgitation [I07.1]  Yes   • Chronic combined systolic and diastolic heart failure (CMS/HCC) [I50.42]  Yes   • Gastroesophageal reflux disease with esophagitis [K21.00]  Yes   • Paroxysmal atrial fibrillation (CMS/HCC) [I48.0]  Yes   • Essential hypertension [I10]  Yes   • Hiatal hernia [K44.9]  Yes      Resolved Hospital Problems    Diagnosis Date Resolved POA   • Septic shock (CMS/HCC) [A41.9, R65.21] 04/05/2021 Yes     Priority: High   • Left upper quadrant abdominal pain [R10.12] 04/05/2021 Yes     Priority: High     Presenting Problem:    Chief Complaint   Patient presents with   • Abdominal Pain     Consults:     Consulting Physician(s)  Chat With All Active Members    Provider Relationship Specialty    Oskar Gordillo MD  Consulting Physician Cardiology    Iona Sanders MD  Consulting Physician Gastroenterology        Procedures Performed:    None.    History of presenting illness/Hospital Course:    Ms. Alcaraz is an 81-year-old pleasant  female who lives with her , who is independent in daily activities was admitted from the emergency room with left upper quadrant abdominal pain radiating into her chest.  Patient does have history of chronic systolic and diastolic heart failure, atrial fibrillation on Xarelto, valvular heart disease and hiatal hernia.  She also had colon cancer approximately 20 years ago status post surgery.  Patient follows up with Dr. Germain from cardiology for her atrial fibrillation.  Patient was initially thought to have urinary tract infection and possible coronary artery disease and was admitted to the medical floor with telemetry.  She was evaluated by Dr. Gordillo from cardiology who did not think patient had acute coronary syndrome and did not recommend any further cardiac work-up.  Patient was placed on IV antibiotics therapy with Rocephin and Flagyl for suspected urinary tract infection and colitis.    Unfortunately, patient started developing hypotension and increasing shortness of breath.  She was transferred to the ICU and placed on Levophed drip and her antibiotics therapy was broadened to Zosyn.  She was also placed on empiric steroids and diuretics.  Patient was subsequently able to come off of Levophed drip and was transferred back to the floor.  During this time, she did have acute renal injury but it improved back to baseline after a few days.  Her urine culture grew mixed tresa.  Blood cultures remain negative.  Initially, she was also noted to have elevated INR level and her Xarelto was placed on hold.  Patient also had atrial fibrillation and rapid ventricular rate during the hospital stay and was continued on her home dose of metoprolol.  Her Xarelto has been restarted at discharge.    During the hospital stay, patient was noted to have elevated bilirubin levels as well as mildly elevated LFTs.  She was seen by Dr. Sanders from gastroenterology who felt that the patient likely has Gilbert's syndrome.   MRCP done during the hospital stay did not show any evidence of cholangitis or CBD obstruction.  Dr. Sanders felt that the patient likely has underlying fatty liver disease and recommended outpatient follow-up.  Patient's transaminase levels have significantly improved.    Patient also developed episodes of dizziness and was evaluated by physical therapy.  She did not have any significant evidence of BPV but was treated symptomatically with meclizine.  She is currently feeling better and does not have any episodes of dizziness.  She has been working with physical therapy and has been able to walk in the hallway.  She denies any abdominal or chest pain at this time.  She has completed 7 days of IV antibiotics therapy in the hospital and will be discharged home on Augmentin for 3 more days.  Patient will follow up with her primary care physician in 1 week.  She also has an appointment with her cardiologist on 4/7/2021.  She is advised to follow-up with Dr. Sanders in 8 to 12 weeks.  Patient lives with her  and she declined any home needs or home health at this time.  Prior to discharge, patient had walking oximetry and required 3 L of nasal cannula oxygen at rest and 4 L on exertion.  This could be likely related to her underlying COPD as well as heart failure.    Vital Signs:    Temp:  [97.6 °F (36.4 °C)-97.9 °F (36.6 °C)] 97.8 °F (36.6 °C)  Heart Rate:  [] 91  Resp:  [18-19] 19  BP: ()/(66-87) 95/69    Physical Exam:    General Appearance:  Alert and cooperative.  Comfortable at rest.   Head:  Atraumatic and normocephalic.   Eyes: Conjunctivae and sclerae normal, no icterus. No pallor.   Ears:  Ears with no abnormalities noted.   Throat: No oral lesions, no thrush, oral mucosa moist.   Neck: Supple, trachea midline, no thyromegaly.   Back:   No kyphoscoliosis present. No tenderness to palpation.   Lungs:   Breath sounds heard bilaterally equally.  No crackles or wheezing. No Pleural rub or  bronchial breathing.   Heart:  Normal S1 and S2, no murmur, no gallop, no rub. No JVD.   Abdomen:   Normal bowel sounds, no masses, no organomegaly. Soft, nontender, nondistended, no rebound tenderness.  Surgical scars noted.   Extremities: Supple, no edema, no cyanosis, no clubbing.   Pulses: Pulses palpable bilaterally.   Skin: No bleeding or rash.   Neurologic: Alert and oriented x 3. No facial asymmetry. Moves all four limbs. No tremors.     Pertinent Lab Results:     Results from last 7 days   Lab Units 04/05/21  0721 04/04/21  0507 04/03/21  0544   SODIUM mmol/L 142 140 143   POTASSIUM mmol/L 3.1* 3.5 3.5   CHLORIDE mmol/L 92* 95* 100   CO2 mmol/L 42.8* 39.0* 35.9*   BUN mg/dL 30* 28* 26*   CREATININE mg/dL 0.66 0.70 0.73   CALCIUM mg/dL 8.6 8.9 9.1   BILIRUBIN mg/dL 1.3* 1.5* 1.7*   ALK PHOS U/L 87 88 101   ALT (SGPT) U/L 11 12 16   AST (SGOT) U/L 12 11 11   GLUCOSE mg/dL 90 101* 98     Results from last 7 days   Lab Units 04/05/21  0721 04/04/21  0507 04/03/21  0544   WBC 10*3/mm3 11.37* 11.05* 12.55*   HEMOGLOBIN g/dL 15.3 15.1 15.2   HEMATOCRIT % 48.3* 47.4* 48.3*   PLATELETS 10*3/mm3 310 302 286     Results from last 7 days   Lab Units 04/05/21  0721 04/04/21  0507 04/03/21  0918   INR  1.10 1.16* 1.21*     Results from last 7 days   Lab Units 03/31/21  1411 03/31/21  0612   TROPONIN T ng/mL <0.010 <0.010       Results from last 7 days   Lab Units 03/31/21  1104   COLOR UA  Dark Yellow*   GLUCOSE UA  Negative   KETONES UA  Negative   LEUKOCYTES UA  Trace*   PH, URINE  <=5.0   BILIRUBIN UA  Moderate (2+)*   UROBILINOGEN UA  1.0 E.U./dL     Results from last 7 days   Lab Units 04/01/21  1316 04/01/21  1312 03/31/21  1104   BLOODCX  No growth at 3 days No growth at 3 days  --    URINECX   --   --  50,000 CFU/mL Mixed Kitty Isolated     Pertinent Radiology Results:    Imaging Results (All)     Procedure Component Value Units Date/Time    XR Chest 1 View [[id number]] Collected: 04/04/21 0906     Updated:  04/04/21 0908    Narrative:      PROCEDURE: XR CHEST 1 VW-     HISTORY: effusion f/u; R07.9-Chest pain, unspecified; I07.9-Rheumatic  tricuspid valve disease, unspecified; R06.02-Shortness of breath;  I48.11-Longstanding persistent atrial fibrillation;  K21.3-Zuzfzu-kcnwgfbtqj reflux disease without esophagitis;  K44.9-Diaphragmatic hernia without obstruction or gangrene     COMPARISON: 03/31/2021.     FINDINGS: Atherosclerosis is noted. The heart is normal in size. The  mediastinum is unremarkable. Left basilar opacity and probable effusion,  not significantly changed from previous.. There is no pneumothorax.   There are no acute osseous abnormalities.       Impression:      Left basilar opacity and probable effusion, not  significantly changed from previous.     Continued followup is recommended.     This report was finalized on 4/4/2021 9:06 AM by Billy Stahl DO.    CT Chest Without Contrast Diagnostic [[id number]] Collected: 03/31/21 2015     Updated: 03/31/21 2016    Narrative:      FINAL REPORT    CLINICAL HISTORY:  Interstitial lung disease    FINDINGS:  Multiple contiguous transaxial slices through the chest were  obtained without the intravenous ministration of contrast with  coronal reformatted images.  The heart is enlarged.  There are  calcifications of the coronary arteries and valves.  There is a  small pericardial effusion.  There is consolidative lingular and  bilateral lower lobe airspace disease with small to moderate  bilateral pleural effusions, left greater than right.  Underlying interstitial prominence suggests chronic lung  disease.  There is irregular nodularity right breast with coarse  calcifications.  There are surgical clips the right axilla and  findings may be secondary to postlumpectomy scar.  Recommend  correlation with any recent breast imaging to exclude  recurrent/residual mass.      Impression:      Bilateral airspace disease and effusions  pericardial effusion  Chronic changes   likely postlumpectomy changes right breast but  recommend appropriate correlation    Authenticated by Dov Sanderson MD on 03/31/2021 08:15:45 PM    XR Chest 1 View [[id number]] Collected: 03/31/21 1528     Updated: 03/31/21 1531    Narrative:      PROCEDURE: XR CHEST 1 VW-     HISTORY: sob; R07.9-Chest pain, unspecified; I07.9-Rheumatic tricuspid  valve disease, unspecified; R06.02-Shortness of breath;  I48.11-Longstanding persistent atrial fibrillation;  K21.3-Taiakz-xtgfqibnzk reflux disease without esophagitis;  K44.9-Diaphragmatic hernia without obstruction or gangrene     COMPARISON: March 29, 2021.     FINDINGS: The heart is enlarged. The mediastinum is unremarkable. There  is mild interstitial pulmonary edema. There has been interval increase  in size of the patient's left effusion. A small right effusion persists.  There is no pneumothorax.  There are no acute osseous abnormalities.       Impression:      Findings consistent with pulmonary edema with interval  increase in size of the patient's left pleural effusion.     Continued followup is recommended.     This report was finalized on 3/31/2021 3:29 PM by Frederick Eastman M.D..    MRI abdomen wo contrast mrcp [[id number]] Collected: 03/31/21 0813     Updated: 03/31/21 0818    Narrative:      PROCEDURE: MRI ABDOMEN WO CONTRAST MRCP-     HISTORY: Elevated LFTS with Jaundice; R07.9-Chest pain, unspecified;  I07.9-Rheumatic tricuspid valve disease, unspecified; R06.02-Shortness  of breath; I48.11-Longstanding persistent atrial fibrillation;  K21.1-Unqagu-czjjgxacna reflux disease without esophagitis;  K44.9-Diaphragmatic hernia without obstruction or gangrene     PROCEDURE: Multiplanar multisequence imaging of the abdomen was  performed without the use of intravenous contrast. 3-D MRCP images were  obtained.     COMPARISON: CT dated March 29, 2021.     FINDINGS: MRCP images demonstrates prior cholecystectomy.. The common  duct is dilated measuring 12 mm.  No filling defects are seen within the  common duct which tapers normally towards the ampulla. There is mild  intrahepatic biliary dilatation.     The liver is homogeneous with no focal lesions identified. The kidneys,  spleen, pancreas and adrenals are unremarkable. There is a small amount  of ascites in the abdomen. The heart is enlarged. There are small  bilateral pleural effusions.       Impression:      1. Mild dilatation of the common bile duct with no evidence of  choledocholithiasis.  2. Small amount of ascites within the abdomen.  3. Cardiomegaly and small pleural effusions           This report was finalized on 3/31/2021 8:16 AM by Frederick Eastman M.D..    CT Abdomen Pelvis With Contrast [[id number]] Collected: 03/29/21 1104     Updated: 03/29/21 1120    Narrative:      PROCEDURE: CT ABDOMEN PELVIS W CONTRAST-     HISTORY:  chest pain and abdominal pain     COMPARISON:  None .     TECHNIQUE: Multiple axial CT images were obtained from the lung bases  through the pubic symphysis following the administration of Isovue 300  contrast.      FINDINGS:      ABDOMEN: There is a small left pleural effusion and bibasilar  atelectasis. The heart is is enlarged. The liver is diffusely hypodense  consistent with fatty infiltration. The patient is status post  cholecystectomy. The spleen is unremarkable. No adrenal mass is present.   The pancreas is normal. The kidneys are normal. The aorta is normal in  caliber. The mesenteric vasculature is widely patent. There is no free  fluid or adenopathy. There are postsurgical changes from right  hemicolectomy. There is mildly dilated loops of distal small bowel with  some small bowel wall thickening. In the infectious or inflammatory in  nature. There is no evidence of a small bowel obstruction.     PELVIS: There are colonic diverticula without CT evidence of  diverticulitis. The urinary bladder is unremarkable. There is no  significant free fluid or adenopathy. Evaluation  of the extraperitoneal  soft tissues reveals a possible stellate shaped mass in the inferior  right breast seen on axial image 8 consistent with the patient's known  prior lumpectomy.  Bone windows reveal no lytic or destructive bone lesions.    Impression:      1. Mildly dilated loops of distal small bowel with small bowel wall  thickening felt to be infectious or inflammatory in nature. There is no  evidence of an obstruction.  2. Cardiomegaly and small left pleural effusion.        624.71 mGy.cm        This study was performed with techniques to keep radiation doses as low  as reasonably achievable (ALARA). Individualized dose reduction  techniques using automated exposure control or adjustment of mA and/or  kV according to the patient size were employed.      This report was finalized on 3/29/2021 11:18 AM by Frederick Eastman M.D..    XR Chest 1 View [[id number]] Collected: 03/29/21 0940     Updated: 03/29/21 0943    Narrative:      PROCEDURE: XR CHEST 1 VW-     HISTORY: sob, CP     COMPARISON: 12/05/2020.     FINDINGS: The heart is mildly enlarged. The mediastinum is unremarkable.  A left basilar opacity is unchanged and may reflect scarring or  atelectasis. The right lung is clear. There is no pneumothorax.  There  are no acute osseous abnormalities.       Impression:      Chronic left basilar opacity which may reflect atelectasis  or scarring.     Continued followup is recommended.     This report was finalized on 3/29/2021 9:41 AM by Frederick Eastman M.D..        Echo:    Results for orders placed during the hospital encounter of 03/29/21    Adult Transthoracic Echo Complete W/ Cont if Necessary Per Protocol    Interpretation Summary  1.  Normal left ventricular size with moderately reduced LV systolic function, LVEF 35-40%.  2.  Mild concentric LVH.  3.  Grade 3 diastolic dysfunction.  4.  Mild right ventricular dilation with normal RV systolic function.  5.  Moderate right atrial dilation.  6.   Severely increased left atrial volume index.  7.  Calcification of the aortic valve without significant stenosis.  8.  Moderate mitral regurgitation.  9.  Moderate tricuspid vegetation, RVSP 44 mmHg.  10.  Small pericardial effusion without tamponade physiology.  11.  Moderate pleural effusion.    Condition on Discharge:      Stable.    Code status during the hospital stay:    Code Status and Medical Interventions:   Ordered at: 03/29/21 1600     Level Of Support Discussed With:    Patient     Code Status:    CPR     Medical Interventions (Level of Support Prior to Arrest):    Full     Discharge Disposition:    Home or Self Care    Discharge Medications:       Discharge Medications      New Medications      Instructions Start Date   amoxicillin-clavulanate 875-125 MG per tablet  Commonly known as: Augmentin   1 tablet, Oral, 2 Times Daily         Changes to Medications      Instructions Start Date   albuterol sulfate  (90 Base) MCG/ACT inhaler  Commonly known as: PROVENTIL HFA;VENTOLIN HFA;PROAIR HFA  What changed:   · when to take this  · reasons to take this   2 puffs, Inhalation, 4 Times Daily - RT         Continue These Medications      Instructions Start Date   budesonide-formoterol 80-4.5 MCG/ACT inhaler  Commonly known as: SYMBICORT   2 puffs, Inhalation, 2 Times Daily - RT      cholecalciferol 25 MCG (1000 UT) tablet  Commonly known as: VITAMIN D3   1,000 Units, Oral, Daily      citalopram 20 MG tablet  Commonly known as: CeleXA   20 mg, Oral, Daily      furosemide 40 MG tablet  Commonly known as: LASIX   20 mg, Oral, Daily      losartan 100 MG tablet  Commonly known as: COZAAR   100 mg, Oral, Daily      metoprolol succinate XL 50 MG 24 hr tablet  Commonly known as: TOPROL-XL   50 mg, Oral, Daily      pantoprazole 40 MG EC tablet  Commonly known as: PROTONIX   40 mg, Oral, Daily      potassium chloride 10 MEQ CR tablet   10 mEq, Oral, Daily, Take 1 tablet daily with furosemide      Xarelto 20 MG  tablet  Generic drug: rivaroxaban   20 mg, Oral, Daily         Stop These Medications    traMADol 50 MG tablet  Commonly known as: ULTRAM     VITAMIN B-12 IJ          Discharge Diet:     Diet Instructions     Diet: Cardiac; Thin      Discharge Diet: Cardiac    Fluid Consistency: Thin        Activity at Discharge:     Activity Instructions     Activity as Tolerated          Follow-up Appointments:    Follow-up Information     Kemi Hdz MD Follow up in 1 week(s).    Specialty: Family Medicine  Contact information:  [address] [facility] [address]  [phone]             Iona Sanders MD Follow up in 8 week(s).    Specialty: Gastroenterology  Contact information:  [address]  [facility] [address]  [phone]                 Future Appointments   Date Time Provider Department Center   4/7/2021 10:15 AM Fidencio Germain MD [facility] None     Test Results Pending at Discharge:    Pending Labs     Order Current Status    Blood Culture With JOSE - Blood, Arm, Left Preliminary result    Blood Culture With JOSE - Blood, Hand, Left Preliminary result    Stool Culture (Reference Lab) - Stool, Per Rectum Preliminary result        Blood and stool cultures have been negative so far.     Nik Hicks MD  04/05/21  12:54 EDT    Time: I spent 35 minutes on this discharge activity which included: face-to-face encounter with the patient, reviewing the data in the system, coordination of the care with the nursing staff as well as consultants, documentation, and entering orders.     Dictated utilizing Dragon dictation.      Electronically signed by Nik Hicks MD at 04/05/21 4092

## 2021-04-08 NOTE — OUTREACH NOTE
Sepsis Week 1 Survey      Responses   [facility] patient discharged from?  [facility]   Does the patient have one of the following disease processes/diagnoses(primary or secondary)?  Sepsis   Week 1 attempt successful?  Yes   Call start time  0952   Call end time  1001   Discharge diagnosis   Septic shock secondary to #2, present on admission, resolved, Acute bacterial enteritis, present on admission, resolved, Acute on chronic systolic and diastolic heart failure, improved   Meds reviewed with patient/caregiver?  Yes   Is the patient having any side effects they believe may be caused by any medication additions or changes?  No   Does the patient have all medications related to this admission filled (includes all antibiotics, inhalers, nebulizers,steroids,etc.)  Yes   Is the patient taking all medications as directed (includes completed medication regime)?  Yes   Does the patient have a primary care provider?   Yes   Comments regarding PCP  PCP APPOINTMENT IS MONDAY 4/12/21   Does the patient have an appointment with their PCP within 7 days of discharge?  Yes   Has the patient kept scheduled appointments due by today?  Yes   Comments  PATIENT HAD A VIRTUAL VISIT WITH HER CARDIOLOGIST YESTERDAY   Has home health visited the patient within 72 hours of discharge?  N/A   What DME was ordered?  O2   Has all DME been delivered?  Yes   Did the patient receive a copy of their discharge instructions?  Yes   Nursing interventions  Reviewed instructions with patient   What is the patient's perception of their health status since discharge?  Improving   Nursing interventions  Nurse provided patient education   Is the patient/caregiver able to teach back Sepsis?  S - Shivering,fever or very cold, E - Extreme pain or generalized discomfort (worst ever,especially abdomen), P - Pale or discolored skin, S - Sleepy, difficult to arouse,confused, I -   I feel like I might die-a feeling of hopelessness, S - Short of breath   Is  patient/caregiver able to teach back steps to recovery at home?  Set small, achievable goals for return to baseline health, Rest and regain strength, Make a list of questions for PCP appoinment   Is the patient/caregiver able to teach back signs and symptoms of worsening condition:  Fever, Hyperthermia, Rapid heart rate (>90), Shortness of breath/rapid respiratory rate, Altered mental status(confusion/coma), Edema, High blood glucose without diabetes   If the patient is a current smoker, are they able to teach back resources for cessation?  Not a smoker   Is the patient/caregiver able to teach back the hierarchy of who to call/visit for symptoms/problems? PCP, Specialist, Home health nurse, Urgent Care, ED, 911  Yes   Week 1 call completed?  Yes          Michelle Simmons LPN

## 2021-04-15 ENCOUNTER — READMISSION MANAGEMENT (OUTPATIENT)
Dept: CALL CENTER | Facility: HOSPITAL | Age: 81
End: 2021-04-15

## 2021-04-15 NOTE — OUTREACH NOTE
Sepsis Week 2 Survey      Responses   [facility] patient discharged from?  [facility]   Does the patient have one of the following disease processes/diagnoses(primary or secondary)?  Sepsis   Week 2 attempt successful?  Yes   Call start time  1100   Call end time  1116   Discharge diagnosis   Septic shock secondary to #2, present on admission, resolved, Acute bacterial enteritis, present on admission, resolved, Acute on chronic systolic and diastolic heart failure, improved   Meds reviewed with patient/caregiver?  Yes   Is the patient having any side effects they believe may be caused by any medication additions or changes?  No   Does the patient have all medications related to this admission filled (includes all antibiotics, inhalers, nebulizers,steroids,etc.)  Yes   Is the patient taking all medications as directed (includes completed medication regime)?  Yes   Does the patient have a primary care provider?   Yes   Does the patient have an appointment with their PCP within 7 days of discharge?  Yes   Has the patient kept scheduled appointments due by today?  Yes   Has home health visited the patient within 72 hours of discharge?  N/A   What DME was ordered?  O2   Has all DME been delivered?  Yes   DME comments  Wear 02 at 3 to 4 L [facility]   Psychosocial issues?  Yes   Comments  Vital signs are doing well. She is monitoring her blood pressure and 02 levels. She can come of the 02 some. With activity she has to wear 02 and at night.    Did the patient receive a copy of their discharge instructions?  Yes   Nursing interventions  Reviewed instructions with patient   What is the patient's perception of their health status since discharge?  Improving   Nursing interventions  Nurse provided patient education   Is the patient/caregiver able to teach back Sepsis?  S - Shivering,fever or very cold, E - Extreme pain or generalized discomfort (worst ever,especially abdomen), P - Pale or discolored skin, S - Sleepy, difficult  to arouse,confused, I -   I feel like I might die-a feeling of hopelessness, S - Short of breath   Nursing interventions  Nurse provided reassurance to patient   Is patient/caregiver able to teach back steps to recovery at home?  Set small, achievable goals for return to baseline health, Rest and regain strength, Make a list of questions for PCP appoinment   Is the patient/caregiver able to teach back signs and symptoms of worsening condition:  Fever, Hyperthermia, Rapid heart rate (>90), Shortness of breath/rapid respiratory rate, Altered mental status(confusion/coma), Edema, High blood glucose without diabetes   If the patient is a current smoker, are they able to teach back resources for cessation?  Not a smoker   Is the patient/caregiver able to teach back the hierarchy of who to call/visit for symptoms/problems? PCP, Specialist, Home health nurse, Urgent Care, ED, 911  Yes   Week 2 call completed?  Yes          Qamar Shine RN

## 2021-04-21 ENCOUNTER — READMISSION MANAGEMENT (OUTPATIENT)
Dept: CALL CENTER | Facility: HOSPITAL | Age: 81
End: 2021-04-21

## 2021-04-21 NOTE — OUTREACH NOTE
"Sepsis Week 3 Survey      Responses   [facility] patient discharged from?  [facility]   Does the patient have one of the following disease processes/diagnoses(primary or secondary)?  Sepsis   Week 3 attempt successful?  Yes   Call start time  1517   Call end time  1525   Discharge diagnosis   Septic shock secondary to #2, present on admission, resolved, Acute bacterial enteritis, present on admission, resolved, Acute on chronic systolic and diastolic heart failure, improved   Meds reviewed with patient/caregiver?  Yes   Is the patient taking all medications as directed (includes completed medication regime)?  Yes   Comments regarding appointments  PCP referred patient to pulmonary in [place] however patient is not \"traveling well right now\"   Has the patient kept scheduled appointments due by today?  Yes   What is the patient's perception of their health status since discharge?  Improving   Is patient/caregiver able to teach back steps to recovery at home?  Eat a balanced diet, Rest and regain strength   Week 3 call completed?  Yes          Rosenda Leon RN  "

## 2021-04-28 ENCOUNTER — READMISSION MANAGEMENT (OUTPATIENT)
Dept: CALL CENTER | Facility: HOSPITAL | Age: 81
End: 2021-04-28

## 2021-04-28 NOTE — OUTREACH NOTE
Sepsis Week 4 Survey      Responses   [facility] patient discharged from?  [facility]   Does the patient have one of the following disease processes/diagnoses(primary or secondary)?  Sepsis   Week 4 attempt successful?  Yes   Call start time  1135   Call end time  1145   Discharge diagnosis   Septic shock secondary to #2, present on admission, resolved, Acute bacterial enteritis, present on admission, resolved, Acute on chronic systolic and diastolic heart failure, improved   Meds reviewed with patient/caregiver?  Yes   Is the patient taking all medications as directed (includes completed medication regime)?  Yes   Has the patient kept scheduled appointments due by today?  Yes   Is the patient still receiving Home Health Services?  N/A   What is the patient's perception of their health status since discharge?  Improving   Week 4 call completed?  Yes   Would the patient like one additional call?  No   Graduated  Yes   Is the patient interested in additional calls from an ambulatory ?  NOTE:  applies to high risk patients requiring additional follow-up.  No   Did the patient feel the follow up calls were helpful during their recovery period?  Yes          Karyn Metcalf RN

## 2021-05-12 ENCOUNTER — HOSPITAL ENCOUNTER (INPATIENT)
Facility: HOSPITAL | Age: 81
LOS: 2 days | Discharge: HOME | End: 2021-05-14
Attending: EMERGENCY MEDICINE | Admitting: INTERNAL MEDICINE

## 2021-05-12 ENCOUNTER — APPOINTMENT (OUTPATIENT)
Dept: CT IMAGING | Facility: HOSPITAL | Age: 81
End: 2021-05-12

## 2021-05-12 ENCOUNTER — APPOINTMENT (OUTPATIENT)
Dept: GENERAL RADIOLOGY | Facility: HOSPITAL | Age: 81
End: 2021-05-12

## 2021-05-12 DIAGNOSIS — J90 PLEURAL EFFUSION: ICD-10-CM

## 2021-05-12 DIAGNOSIS — J96.21 ACUTE ON CHRONIC RESPIRATORY FAILURE WITH HYPOXIA (HCC): Primary | ICD-10-CM

## 2021-05-12 DIAGNOSIS — I50.9 ACUTE ON CHRONIC CONGESTIVE HEART FAILURE, UNSPECIFIED HEART FAILURE TYPE (HCC): ICD-10-CM

## 2021-05-12 PROBLEM — R10.9 ABDOMINAL PAIN: Status: ACTIVE | Noted: 2021-05-12

## 2021-05-12 PROBLEM — I50.23 ACUTE ON CHRONIC SYSTOLIC CHF (CONGESTIVE HEART FAILURE): Status: ACTIVE | Noted: 2021-05-12

## 2021-05-12 PROBLEM — J96.22 ACUTE ON CHRONIC RESPIRATORY FAILURE WITH HYPERCAPNIA (HCC): Status: ACTIVE | Noted: 2021-05-12

## 2021-05-12 LAB
ALBUMIN SERPL-MCNC: 3.3 G/DL (ref 3.5–5.2)
ALBUMIN/GLOB SERPL: 1.1 G/DL
ALP SERPL-CCNC: 114 U/L (ref 39–117)
ALT SERPL W P-5'-P-CCNC: 16 U/L (ref 1–33)
ANION GAP SERPL CALCULATED.3IONS-SCNC: 6.3 MMOL/L (ref 5–15)
AST SERPL-CCNC: 29 U/L (ref 1–32)
BACTERIA UR QL AUTO: ABNORMAL /HPF
BASOPHILS # BLD AUTO: 0.08 10*3/MM3 (ref 0–0.2)
BASOPHILS NFR BLD AUTO: 0.8 % (ref 0–1.5)
BILIRUB SERPL-MCNC: 1.4 MG/DL (ref 0–1.2)
BILIRUB UR QL STRIP: ABNORMAL
BUN SERPL-MCNC: 9 MG/DL (ref 8–23)
BUN/CREAT SERPL: 17 (ref 7–25)
CALCIUM SPEC-SCNC: 9 MG/DL (ref 8.6–10.5)
CHLORIDE SERPL-SCNC: 92 MMOL/L (ref 98–107)
CHOLEST SERPL-MCNC: 128 MG/DL (ref 0–200)
CLARITY UR: ABNORMAL
CO2 SERPL-SCNC: 36.7 MMOL/L (ref 22–29)
COD CRY URNS QL: ABNORMAL /HPF
COLOR UR: ABNORMAL
CREAT SERPL-MCNC: 0.53 MG/DL (ref 0.57–1)
DEPRECATED RDW RBC AUTO: 48.3 FL (ref 37–54)
EOSINOPHIL # BLD AUTO: 0.27 10*3/MM3 (ref 0–0.4)
EOSINOPHIL NFR BLD AUTO: 2.7 % (ref 0.3–6.2)
ERYTHROCYTE [DISTWIDTH] IN BLOOD BY AUTOMATED COUNT: 13.7 % (ref 12.3–15.4)
GFR SERPL CREATININE-BSD FRML MDRD: 111 ML/MIN/1.73
GLOBULIN UR ELPH-MCNC: 3.1 GM/DL
GLUCOSE SERPL-MCNC: 98 MG/DL (ref 65–99)
GLUCOSE UR STRIP-MCNC: NEGATIVE MG/DL
HCT VFR BLD AUTO: 44.6 % (ref 34–46.6)
HDLC SERPL-MCNC: 49 MG/DL (ref 40–60)
HGB BLD-MCNC: 14 G/DL (ref 12–15.9)
HGB UR QL STRIP.AUTO: ABNORMAL
HOLD SPECIMEN: NORMAL
HYALINE CASTS UR QL AUTO: ABNORMAL /LPF
IMM GRANULOCYTES # BLD AUTO: 0.04 10*3/MM3 (ref 0–0.05)
IMM GRANULOCYTES NFR BLD AUTO: 0.4 % (ref 0–0.5)
KETONES UR QL STRIP: ABNORMAL
LDLC SERPL CALC-MCNC: 66 MG/DL (ref 0–100)
LDLC/HDLC SERPL: 1.36 {RATIO}
LEUKOCYTE ESTERASE UR QL STRIP.AUTO: ABNORMAL
LIPASE SERPL-CCNC: 14 U/L (ref 13–60)
LYMPHOCYTES # BLD AUTO: 0.73 10*3/MM3 (ref 0.7–3.1)
LYMPHOCYTES NFR BLD AUTO: 7.3 % (ref 19.6–45.3)
MAGNESIUM SERPL-MCNC: 1.9 MG/DL (ref 1.6–2.4)
MCH RBC QN AUTO: 29.8 PG (ref 26.6–33)
MCHC RBC AUTO-ENTMCNC: 31.4 G/DL (ref 31.5–35.7)
MCV RBC AUTO: 94.9 FL (ref 79–97)
MONOCYTES # BLD AUTO: 1.2 10*3/MM3 (ref 0.1–0.9)
MONOCYTES NFR BLD AUTO: 12 % (ref 5–12)
MUCOUS THREADS URNS QL MICRO: ABNORMAL /HPF
NEUTROPHILS NFR BLD AUTO: 7.66 10*3/MM3 (ref 1.7–7)
NEUTROPHILS NFR BLD AUTO: 76.8 % (ref 42.7–76)
NITRITE UR QL STRIP: NEGATIVE
NRBC BLD AUTO-RTO: 0 /100 WBC (ref 0–0.2)
NT-PROBNP SERPL-MCNC: 4031 PG/ML (ref 0–1800)
PH UR STRIP.AUTO: <=5 [PH] (ref 5–8)
PHOSPHATE SERPL-MCNC: 3.3 MG/DL (ref 2.5–4.5)
PLATELET # BLD AUTO: 334 10*3/MM3 (ref 140–450)
PMV BLD AUTO: 9.5 FL (ref 6–12)
POTASSIUM SERPL-SCNC: 4.3 MMOL/L (ref 3.5–5.2)
PROT SERPL-MCNC: 6.4 G/DL (ref 6–8.5)
PROT UR QL STRIP: ABNORMAL
RBC # BLD AUTO: 4.7 10*6/MM3 (ref 3.77–5.28)
RBC # UR: ABNORMAL /HPF
REF LAB TEST METHOD: ABNORMAL
SODIUM SERPL-SCNC: 135 MMOL/L (ref 136–145)
SP GR UR STRIP: 1.02 (ref 1–1.03)
SQUAMOUS #/AREA URNS HPF: ABNORMAL /HPF
TRIGL SERPL-MCNC: 62 MG/DL (ref 0–150)
TROPONIN T SERPL-MCNC: <0.01 NG/ML (ref 0–0.03)
UROBILINOGEN UR QL STRIP: ABNORMAL
VLDLC SERPL-MCNC: 13 MG/DL (ref 5–40)
WBC # BLD AUTO: 9.98 10*3/MM3 (ref 3.4–10.8)
WBC UR QL AUTO: ABNORMAL /HPF
WHOLE BLOOD HOLD SPECIMEN: NORMAL
WHOLE BLOOD HOLD SPECIMEN: NORMAL

## 2021-05-12 PROCEDURE — 25010000002 FUROSEMIDE PER 20 MG: Performed by: NURSE PRACTITIONER

## 2021-05-12 PROCEDURE — 93005 ELECTROCARDIOGRAM TRACING: CPT

## 2021-05-12 PROCEDURE — 84484 ASSAY OF TROPONIN QUANT: CPT | Performed by: INTERNAL MEDICINE

## 2021-05-12 PROCEDURE — 84100 ASSAY OF PHOSPHORUS: CPT | Performed by: INTERNAL MEDICINE

## 2021-05-12 PROCEDURE — 83690 ASSAY OF LIPASE: CPT | Performed by: NURSE PRACTITIONER

## 2021-05-12 PROCEDURE — 25010000002 IOPAMIDOL 61 % SOLUTION: Performed by: EMERGENCY MEDICINE

## 2021-05-12 PROCEDURE — 80061 LIPID PANEL: CPT | Performed by: INTERNAL MEDICINE

## 2021-05-12 PROCEDURE — 74177 CT ABD & PELVIS W/CONTRAST: CPT

## 2021-05-12 PROCEDURE — 83880 ASSAY OF NATRIURETIC PEPTIDE: CPT

## 2021-05-12 PROCEDURE — 83735 ASSAY OF MAGNESIUM: CPT | Performed by: INTERNAL MEDICINE

## 2021-05-12 PROCEDURE — 84484 ASSAY OF TROPONIN QUANT: CPT

## 2021-05-12 PROCEDURE — 85025 COMPLETE CBC W/AUTO DIFF WBC: CPT

## 2021-05-12 PROCEDURE — 71045 X-RAY EXAM CHEST 1 VIEW: CPT

## 2021-05-12 PROCEDURE — 80053 COMPREHEN METABOLIC PANEL: CPT

## 2021-05-12 PROCEDURE — 81001 URINALYSIS AUTO W/SCOPE: CPT | Performed by: NURSE PRACTITIONER

## 2021-05-12 PROCEDURE — 99284 EMERGENCY DEPT VISIT MOD MDM: CPT

## 2021-05-12 PROCEDURE — 99223 1ST HOSP IP/OBS HIGH 75: CPT | Performed by: INTERNAL MEDICINE

## 2021-05-12 RX ORDER — SODIUM CHLORIDE 0.9 % (FLUSH) 0.9 %
10 SYRINGE (ML) INJECTION AS NEEDED
Status: DISCONTINUED | OUTPATIENT
Start: 2021-05-12 | End: 2021-05-14 | Stop reason: HOSPADM

## 2021-05-12 RX ORDER — SODIUM CHLORIDE 0.9 % (FLUSH) 0.9 %
10 SYRINGE (ML) INJECTION EVERY 12 HOURS SCHEDULED
Status: DISCONTINUED | OUTPATIENT
Start: 2021-05-13 | End: 2021-05-14 | Stop reason: HOSPADM

## 2021-05-12 RX ORDER — SODIUM CHLORIDE 0.9 % (FLUSH) 0.9 %
10 SYRINGE (ML) INJECTION EVERY 12 HOURS SCHEDULED
Status: CANCELLED | OUTPATIENT
Start: 2021-05-12

## 2021-05-12 RX ORDER — ONDANSETRON 2 MG/ML
4 INJECTION INTRAMUSCULAR; INTRAVENOUS EVERY 6 HOURS PRN
Status: DISCONTINUED | OUTPATIENT
Start: 2021-05-12 | End: 2021-05-14 | Stop reason: HOSPADM

## 2021-05-12 RX ORDER — TRAMADOL HYDROCHLORIDE 50 MG/1
50 TABLET ORAL EVERY 6 HOURS PRN
Status: DISCONTINUED | OUTPATIENT
Start: 2021-05-12 | End: 2021-05-14 | Stop reason: HOSPADM

## 2021-05-12 RX ORDER — IPRATROPIUM BROMIDE AND ALBUTEROL SULFATE 2.5; .5 MG/3ML; MG/3ML
3 SOLUTION RESPIRATORY (INHALATION)
Status: CANCELLED | OUTPATIENT
Start: 2021-05-12

## 2021-05-12 RX ORDER — PANTOPRAZOLE SODIUM 40 MG/10ML
40 INJECTION, POWDER, LYOPHILIZED, FOR SOLUTION INTRAVENOUS
Status: DISCONTINUED | OUTPATIENT
Start: 2021-05-13 | End: 2021-05-14 | Stop reason: HOSPADM

## 2021-05-12 RX ORDER — LOSARTAN POTASSIUM 50 MG/1
100 TABLET ORAL DAILY
Status: DISCONTINUED | OUTPATIENT
Start: 2021-05-13 | End: 2021-05-14 | Stop reason: HOSPADM

## 2021-05-12 RX ORDER — LABETALOL HYDROCHLORIDE 5 MG/ML
10 INJECTION, SOLUTION INTRAVENOUS EVERY 6 HOURS PRN
Status: DISCONTINUED | OUTPATIENT
Start: 2021-05-12 | End: 2021-05-14 | Stop reason: HOSPADM

## 2021-05-12 RX ORDER — IPRATROPIUM BROMIDE AND ALBUTEROL SULFATE 2.5; .5 MG/3ML; MG/3ML
3 SOLUTION RESPIRATORY (INHALATION) EVERY 4 HOURS PRN
Status: DISCONTINUED | OUTPATIENT
Start: 2021-05-12 | End: 2021-05-14 | Stop reason: HOSPADM

## 2021-05-12 RX ORDER — METOPROLOL SUCCINATE 50 MG/1
50 TABLET, EXTENDED RELEASE ORAL DAILY
Status: DISCONTINUED | OUTPATIENT
Start: 2021-05-13 | End: 2021-05-14 | Stop reason: HOSPADM

## 2021-05-12 RX ORDER — ACETAMINOPHEN 325 MG/1
650 TABLET ORAL EVERY 6 HOURS PRN
Status: DISCONTINUED | OUTPATIENT
Start: 2021-05-12 | End: 2021-05-14 | Stop reason: HOSPADM

## 2021-05-12 RX ORDER — HEPARIN SODIUM 5000 [USP'U]/ML
5000 INJECTION, SOLUTION INTRAVENOUS; SUBCUTANEOUS EVERY 8 HOURS SCHEDULED
Status: CANCELLED | OUTPATIENT
Start: 2021-05-12

## 2021-05-12 RX ORDER — FUROSEMIDE 10 MG/ML
40 INJECTION INTRAMUSCULAR; INTRAVENOUS ONCE
Status: COMPLETED | OUTPATIENT
Start: 2021-05-12 | End: 2021-05-12

## 2021-05-12 RX ORDER — SODIUM CHLORIDE 0.9 % (FLUSH) 0.9 %
10 SYRINGE (ML) INJECTION AS NEEDED
Status: CANCELLED | OUTPATIENT
Start: 2021-05-12

## 2021-05-12 RX ORDER — BUDESONIDE AND FORMOTEROL FUMARATE DIHYDRATE 80; 4.5 UG/1; UG/1
2 AEROSOL RESPIRATORY (INHALATION)
Status: DISCONTINUED | OUTPATIENT
Start: 2021-05-12 | End: 2021-05-14 | Stop reason: HOSPADM

## 2021-05-12 RX ADMIN — IOPAMIDOL 100 ML: 612 INJECTION, SOLUTION INTRAVENOUS at 20:15

## 2021-05-12 RX ADMIN — FUROSEMIDE 40 MG: 10 INJECTION, SOLUTION INTRAMUSCULAR; INTRAVENOUS at 19:58

## 2021-05-13 ENCOUNTER — APPOINTMENT (OUTPATIENT)
Dept: CARDIOLOGY | Facility: HOSPITAL | Age: 81
End: 2021-05-13

## 2021-05-13 LAB
ANION GAP SERPL CALCULATED.3IONS-SCNC: 10.7 MMOL/L (ref 5–15)
BASOPHILS # BLD AUTO: 0.07 10*3/MM3 (ref 0–0.2)
BASOPHILS NFR BLD AUTO: 0.8 % (ref 0–1.5)
BH CV ECHO MEAS - % IVS THICK: 18.9 %
BH CV ECHO MEAS - % LVPW THICK: 38.3 %
BH CV ECHO MEAS - AO MAX PG (FULL): 10.9 MMHG
BH CV ECHO MEAS - AO MAX PG: 13 MMHG
BH CV ECHO MEAS - AO MEAN PG (FULL): 5 MMHG
BH CV ECHO MEAS - AO MEAN PG: 6 MMHG
BH CV ECHO MEAS - AO ROOT AREA (BSA CORRECTED): 1.8
BH CV ECHO MEAS - AO ROOT AREA: 7 CM^2
BH CV ECHO MEAS - AO ROOT DIAM: 3 CM
BH CV ECHO MEAS - AO V2 MAX: 177 CM/SEC
BH CV ECHO MEAS - AO V2 MEAN: 113 CM/SEC
BH CV ECHO MEAS - AO V2 VTI: 25 CM
BH CV ECHO MEAS - AVA(I,A): 1.8 CM^2
BH CV ECHO MEAS - AVA(I,D): 1.8 CM^2
BH CV ECHO MEAS - AVA(V,A): 1.4 CM^2
BH CV ECHO MEAS - AVA(V,D): 1.4 CM^2
BH CV ECHO MEAS - BSA(HAYCOCK): 1.7 M^2
BH CV ECHO MEAS - BSA: 1.7 M^2
BH CV ECHO MEAS - BZI_BMI: 24.8 KILOGRAMS/M^2
BH CV ECHO MEAS - BZI_METRIC_HEIGHT: 160 CM
BH CV ECHO MEAS - BZI_METRIC_WEIGHT: 63.5 KG
BH CV ECHO MEAS - EDV(CUBED): 179.4 ML
BH CV ECHO MEAS - EDV(MOD-SP2): 180 ML
BH CV ECHO MEAS - EDV(MOD-SP4): 133 ML
BH CV ECHO MEAS - EDV(TEICH): 156.2 ML
BH CV ECHO MEAS - EF(CUBED): 43.2 %
BH CV ECHO MEAS - EF(MOD-BP): 32.1 %
BH CV ECHO MEAS - EF(MOD-SP2): 31.1 %
BH CV ECHO MEAS - EF(MOD-SP4): 34.7 %
BH CV ECHO MEAS - EF(TEICH): 35.4 %
BH CV ECHO MEAS - ESV(CUBED): 101.8 ML
BH CV ECHO MEAS - ESV(MOD-SP2): 124 ML
BH CV ECHO MEAS - ESV(MOD-SP4): 86.9 ML
BH CV ECHO MEAS - ESV(TEICH): 100.8 ML
BH CV ECHO MEAS - FS: 17.2 %
BH CV ECHO MEAS - IVS/LVPW: 0.97
BH CV ECHO MEAS - IVSD: 1.1 CM
BH CV ECHO MEAS - IVSS: 1.3 CM
BH CV ECHO MEAS - LA DIMENSION: 5.2 CM
BH CV ECHO MEAS - LA/AO: 1.7
BH CV ECHO MEAS - LAD MAJOR: 7.9 CM
BH CV ECHO MEAS - LAT PEAK E' VEL: 7.9 CM/SEC
BH CV ECHO MEAS - LATERAL E/E' RATIO: 14.1
BH CV ECHO MEAS - LV DIASTOLIC VOL/BSA (35-75): 80 ML/M^2
BH CV ECHO MEAS - LV MASS(C)D: 261.5 GRAMS
BH CV ECHO MEAS - LV MASS(C)DI: 157.4 GRAMS/M^2
BH CV ECHO MEAS - LV MASS(C)S: 278.2 GRAMS
BH CV ECHO MEAS - LV MASS(C)SI: 167.4 GRAMS/M^2
BH CV ECHO MEAS - LV MAX PG: 2.1 MMHG
BH CV ECHO MEAS - LV MEAN PG: 1 MMHG
BH CV ECHO MEAS - LV SYSTOLIC VOL/BSA (12-30): 52.3 ML/M^2
BH CV ECHO MEAS - LV V1 MAX: 73.2 CM/SEC
BH CV ECHO MEAS - LV V1 MEAN: 49.5 CM/SEC
BH CV ECHO MEAS - LV V1 VTI: 13.4 CM
BH CV ECHO MEAS - LVIDD: 5.6 CM
BH CV ECHO MEAS - LVIDS: 4.7 CM
BH CV ECHO MEAS - LVLD AP2: 7.7 CM
BH CV ECHO MEAS - LVLD AP4: 7.9 CM
BH CV ECHO MEAS - LVLS AP2: 7.1 CM
BH CV ECHO MEAS - LVLS AP4: 6.8 CM
BH CV ECHO MEAS - LVOT AREA (M): 3.4 CM^2
BH CV ECHO MEAS - LVOT AREA: 3.4 CM^2
BH CV ECHO MEAS - LVOT DIAM: 2.1 CM
BH CV ECHO MEAS - LVPWD: 1.2 CM
BH CV ECHO MEAS - LVPWS: 1.6 CM
BH CV ECHO MEAS - MED PEAK E' VEL: 4.1 CM/SEC
BH CV ECHO MEAS - MEDIAL E/E' RATIO: 27.1
BH CV ECHO MEAS - MR MAX PG: 72 MMHG
BH CV ECHO MEAS - MR MAX VEL: 424 CM/SEC
BH CV ECHO MEAS - MR MEAN PG: 43 MMHG
BH CV ECHO MEAS - MR MEAN VEL: 302 CM/SEC
BH CV ECHO MEAS - MR VTI: 117 CM
BH CV ECHO MEAS - MV DEC TIME: 0.2 SEC
BH CV ECHO MEAS - MV E MAX VEL: 112 CM/SEC
BH CV ECHO MEAS - MV MAX PG: 6 MMHG
BH CV ECHO MEAS - MV MEAN PG: 4 MMHG
BH CV ECHO MEAS - MV V2 MAX: 122 CM/SEC
BH CV ECHO MEAS - MV V2 MEAN: 86 CM/SEC
BH CV ECHO MEAS - MV V2 VTI: 20.5 CM
BH CV ECHO MEAS - MVA(VTI): 2.2 CM^2
BH CV ECHO MEAS - PA ACC TIME: 0.08 SEC
BH CV ECHO MEAS - PA MAX PG (FULL): 1.4 MMHG
BH CV ECHO MEAS - PA MAX PG: 3 MMHG
BH CV ECHO MEAS - PA MEAN PG (FULL): 0 MMHG
BH CV ECHO MEAS - PA MEAN PG: 1 MMHG
BH CV ECHO MEAS - PA PR(ACCEL): 44.4 MMHG
BH CV ECHO MEAS - PA V2 MAX: 86.1 CM/SEC
BH CV ECHO MEAS - PA V2 MEAN: 50.6 CM/SEC
BH CV ECHO MEAS - PA V2 VTI: 11.5 CM
BH CV ECHO MEAS - PI END-D VEL: 168 CM/SEC
BH CV ECHO MEAS - RAP SYSTOLE: 3 MMHG
BH CV ECHO MEAS - RV MAX PG: 1.5 MMHG
BH CV ECHO MEAS - RV MEAN PG: 1 MMHG
BH CV ECHO MEAS - RV V1 MAX: 62.1 CM/SEC
BH CV ECHO MEAS - RV V1 MEAN: 39 CM/SEC
BH CV ECHO MEAS - RV V1 VTI: 9.3 CM
BH CV ECHO MEAS - RVSP: 22 MMHG
BH CV ECHO MEAS - SI(AO): 104.9 ML/M^2
BH CV ECHO MEAS - SI(CUBED): 46.7 ML/M^2
BH CV ECHO MEAS - SI(LVOT): 27.4 ML/M^2
BH CV ECHO MEAS - SI(MOD-SP2): 33.7 ML/M^2
BH CV ECHO MEAS - SI(MOD-SP4): 27.7 ML/M^2
BH CV ECHO MEAS - SI(TEICH): 33.3 ML/M^2
BH CV ECHO MEAS - SV(AO): 174.4 ML
BH CV ECHO MEAS - SV(CUBED): 77.6 ML
BH CV ECHO MEAS - SV(LVOT): 45.5 ML
BH CV ECHO MEAS - SV(MOD-SP2): 56 ML
BH CV ECHO MEAS - SV(MOD-SP4): 46.1 ML
BH CV ECHO MEAS - SV(TEICH): 55.4 ML
BH CV ECHO MEAS - TAPSE (>1.6): 1 CM
BH CV ECHO MEAS - TR MAX PG: 19 MMHG
BH CV ECHO MEAS - TR MAX VEL: 219 CM/SEC
BH CV ECHO MEASUREMENTS AVERAGE E/E' RATIO: 18.67
BH CV XLRA - RV BASE: 4.6 CM
BH CV XLRA - RV LENGTH: 7.3 CM
BH CV XLRA - RV MID: 2.6 CM
BH CV XLRA - TDI S': 5 CM/SEC
BUN SERPL-MCNC: 9 MG/DL (ref 8–23)
BUN/CREAT SERPL: 20 (ref 7–25)
CALCIUM SPEC-SCNC: 9.1 MG/DL (ref 8.6–10.5)
CHLORIDE SERPL-SCNC: 91 MMOL/L (ref 98–107)
CO2 SERPL-SCNC: 36.3 MMOL/L (ref 22–29)
CREAT SERPL-MCNC: 0.45 MG/DL (ref 0.57–1)
DEPRECATED RDW RBC AUTO: 46.5 FL (ref 37–54)
EOSINOPHIL # BLD AUTO: 0.32 10*3/MM3 (ref 0–0.4)
EOSINOPHIL NFR BLD AUTO: 3.7 % (ref 0.3–6.2)
ERYTHROCYTE [DISTWIDTH] IN BLOOD BY AUTOMATED COUNT: 13.5 % (ref 12.3–15.4)
GFR SERPL CREATININE-BSD FRML MDRD: 134 ML/MIN/1.73
GLUCOSE SERPL-MCNC: 85 MG/DL (ref 65–99)
HBA1C MFR BLD: 5.2 % (ref 4.8–5.6)
HCT VFR BLD AUTO: 44.1 % (ref 34–46.6)
HGB BLD-MCNC: 14.5 G/DL (ref 12–15.9)
IMM GRANULOCYTES # BLD AUTO: 0.03 10*3/MM3 (ref 0–0.05)
IMM GRANULOCYTES NFR BLD AUTO: 0.3 % (ref 0–0.5)
LEFT ATRIUM VOLUME INDEX: 87.9 ML/M^2
LEFT ATRIUM VOLUME: 146 ML
LV EF 2D ECHO EST: 32 %
LYMPHOCYTES # BLD AUTO: 0.9 10*3/MM3 (ref 0.7–3.1)
LYMPHOCYTES NFR BLD AUTO: 10.3 % (ref 19.6–45.3)
MAXIMAL PREDICTED HEART RATE: 140 BPM
MCH RBC QN AUTO: 30.3 PG (ref 26.6–33)
MCHC RBC AUTO-ENTMCNC: 32.9 G/DL (ref 31.5–35.7)
MCV RBC AUTO: 92.1 FL (ref 79–97)
MONOCYTES # BLD AUTO: 1.06 10*3/MM3 (ref 0.1–0.9)
MONOCYTES NFR BLD AUTO: 12.2 % (ref 5–12)
NEUTROPHILS NFR BLD AUTO: 6.33 10*3/MM3 (ref 1.7–7)
NEUTROPHILS NFR BLD AUTO: 72.7 % (ref 42.7–76)
NRBC BLD AUTO-RTO: 0 /100 WBC (ref 0–0.2)
PLATELET # BLD AUTO: 313 10*3/MM3 (ref 140–450)
PMV BLD AUTO: 10.3 FL (ref 6–12)
POTASSIUM SERPL-SCNC: 3.6 MMOL/L (ref 3.5–5.2)
RBC # BLD AUTO: 4.79 10*6/MM3 (ref 3.77–5.28)
SODIUM SERPL-SCNC: 138 MMOL/L (ref 136–145)
STRESS TARGET HR: 119 BPM
TROPONIN T SERPL-MCNC: <0.01 NG/ML (ref 0–0.03)
TROPONIN T SERPL-MCNC: <0.01 NG/ML (ref 0–0.03)
WBC # BLD AUTO: 8.71 10*3/MM3 (ref 3.4–10.8)

## 2021-05-13 PROCEDURE — 93306 TTE W/DOPPLER COMPLETE: CPT | Performed by: INTERNAL MEDICINE

## 2021-05-13 PROCEDURE — 85025 COMPLETE CBC W/AUTO DIFF WBC: CPT | Performed by: INTERNAL MEDICINE

## 2021-05-13 PROCEDURE — 83036 HEMOGLOBIN GLYCOSYLATED A1C: CPT | Performed by: INTERNAL MEDICINE

## 2021-05-13 PROCEDURE — 99232 SBSQ HOSP IP/OBS MODERATE 35: CPT | Performed by: HOSPITALIST

## 2021-05-13 PROCEDURE — 97161 PT EVAL LOW COMPLEX 20 MIN: CPT

## 2021-05-13 PROCEDURE — 94660 CPAP INITIATION&MGMT: CPT

## 2021-05-13 PROCEDURE — 25010000002 FUROSEMIDE PER 20 MG: Performed by: HOSPITALIST

## 2021-05-13 PROCEDURE — 93306 TTE W/DOPPLER COMPLETE: CPT

## 2021-05-13 PROCEDURE — 94640 AIRWAY INHALATION TREATMENT: CPT

## 2021-05-13 PROCEDURE — 94799 UNLISTED PULMONARY SVC/PX: CPT

## 2021-05-13 PROCEDURE — 80048 BASIC METABOLIC PNL TOTAL CA: CPT | Performed by: INTERNAL MEDICINE

## 2021-05-13 PROCEDURE — 84484 ASSAY OF TROPONIN QUANT: CPT | Performed by: INTERNAL MEDICINE

## 2021-05-13 RX ORDER — FUROSEMIDE 10 MG/ML
40 INJECTION INTRAMUSCULAR; INTRAVENOUS ONCE
Status: COMPLETED | OUTPATIENT
Start: 2021-05-13 | End: 2021-05-13

## 2021-05-13 RX ORDER — CHOLECALCIFEROL (VITAMIN D3) 125 MCG
5 CAPSULE ORAL NIGHTLY PRN
Status: DISCONTINUED | OUTPATIENT
Start: 2021-05-13 | End: 2021-05-14 | Stop reason: HOSPADM

## 2021-05-13 RX ORDER — FUROSEMIDE 10 MG/ML
40 INJECTION INTRAMUSCULAR; INTRAVENOUS EVERY 12 HOURS
Status: DISCONTINUED | OUTPATIENT
Start: 2021-05-13 | End: 2021-05-13

## 2021-05-13 RX ORDER — POTASSIUM CHLORIDE 750 MG/1
40 CAPSULE, EXTENDED RELEASE ORAL DAILY
Status: COMPLETED | OUTPATIENT
Start: 2021-05-13 | End: 2021-05-14

## 2021-05-13 RX ADMIN — RIVAROXABAN 20 MG: 10 TABLET, FILM COATED ORAL at 08:37

## 2021-05-13 RX ADMIN — LOSARTAN POTASSIUM 100 MG: 50 TABLET, FILM COATED ORAL at 08:37

## 2021-05-13 RX ADMIN — BUDESONIDE AND FORMOTEROL FUMARATE DIHYDRATE 2 PUFF: 80; 4.5 AEROSOL RESPIRATORY (INHALATION) at 19:26

## 2021-05-13 RX ADMIN — Medication 5 MG: at 02:16

## 2021-05-13 RX ADMIN — PANTOPRAZOLE SODIUM 40 MG: 40 INJECTION, POWDER, FOR SOLUTION INTRAVENOUS at 05:51

## 2021-05-13 RX ADMIN — METOPROLOL SUCCINATE 50 MG: 50 TABLET, EXTENDED RELEASE ORAL at 08:37

## 2021-05-13 RX ADMIN — POTASSIUM CHLORIDE 40 MEQ: 10 CAPSULE, COATED, EXTENDED RELEASE ORAL at 17:05

## 2021-05-13 RX ADMIN — SODIUM CHLORIDE, PRESERVATIVE FREE 10 ML: 5 INJECTION INTRAVENOUS at 21:33

## 2021-05-13 RX ADMIN — SODIUM CHLORIDE, PRESERVATIVE FREE 10 ML: 5 INJECTION INTRAVENOUS at 00:01

## 2021-05-13 RX ADMIN — BUDESONIDE AND FORMOTEROL FUMARATE DIHYDRATE 2 PUFF: 80; 4.5 AEROSOL RESPIRATORY (INHALATION) at 07:44

## 2021-05-13 RX ADMIN — SODIUM CHLORIDE, PRESERVATIVE FREE 10 ML: 5 INJECTION INTRAVENOUS at 08:22

## 2021-05-13 RX ADMIN — FUROSEMIDE 40 MG: 10 INJECTION, SOLUTION INTRAMUSCULAR; INTRAVENOUS at 21:33

## 2021-05-13 RX ADMIN — FUROSEMIDE 40 MG: 10 INJECTION, SOLUTION INTRAMUSCULAR; INTRAVENOUS at 17:05

## 2021-05-14 VITALS
HEART RATE: 104 BPM | RESPIRATION RATE: 16 BRPM | HEIGHT: 63 IN | BODY MASS INDEX: 23.63 KG/M2 | WEIGHT: 133.38 LBS | OXYGEN SATURATION: 97 % | TEMPERATURE: 98.2 F | SYSTOLIC BLOOD PRESSURE: 100 MMHG | DIASTOLIC BLOOD PRESSURE: 54 MMHG

## 2021-05-14 LAB
ANION GAP SERPL CALCULATED.3IONS-SCNC: 3.7 MMOL/L (ref 5–15)
BUN SERPL-MCNC: 12 MG/DL (ref 8–23)
BUN/CREAT SERPL: 19.4 (ref 7–25)
CALCIUM SPEC-SCNC: 9.3 MG/DL (ref 8.6–10.5)
CHLORIDE SERPL-SCNC: 95 MMOL/L (ref 98–107)
CO2 SERPL-SCNC: 40.3 MMOL/L (ref 22–29)
CREAT SERPL-MCNC: 0.62 MG/DL (ref 0.57–1)
GFR SERPL CREATININE-BSD FRML MDRD: 93 ML/MIN/1.73
GLUCOSE SERPL-MCNC: 114 MG/DL (ref 65–99)
POTASSIUM SERPL-SCNC: 3.8 MMOL/L (ref 3.5–5.2)
SODIUM SERPL-SCNC: 139 MMOL/L (ref 136–145)

## 2021-05-14 PROCEDURE — 99238 HOSP IP/OBS DSCHRG MGMT 30/<: CPT | Performed by: HOSPITALIST

## 2021-05-14 PROCEDURE — 94799 UNLISTED PULMONARY SVC/PX: CPT

## 2021-05-14 PROCEDURE — 80048 BASIC METABOLIC PNL TOTAL CA: CPT | Performed by: HOSPITALIST

## 2021-05-14 PROCEDURE — 94660 CPAP INITIATION&MGMT: CPT

## 2021-05-14 RX ORDER — POTASSIUM CHLORIDE 750 MG/1
20 TABLET, FILM COATED, EXTENDED RELEASE ORAL DAILY
Start: 2021-05-14 | End: 2022-04-06 | Stop reason: SDUPTHER

## 2021-05-14 RX ORDER — FUROSEMIDE 40 MG/1
40 TABLET ORAL DAILY
Start: 2021-05-14 | End: 2021-06-05 | Stop reason: HOSPADM

## 2021-05-14 RX ORDER — FUROSEMIDE 20 MG/1
20 TABLET ORAL DAILY
Qty: 30 TABLET | Refills: 0 | Status: SHIPPED | OUTPATIENT
Start: 2021-05-20

## 2021-05-14 RX ADMIN — RIVAROXABAN 20 MG: 10 TABLET, FILM COATED ORAL at 09:43

## 2021-05-14 RX ADMIN — BUDESONIDE AND FORMOTEROL FUMARATE DIHYDRATE 2 PUFF: 80; 4.5 AEROSOL RESPIRATORY (INHALATION) at 06:56

## 2021-05-14 RX ADMIN — PANTOPRAZOLE SODIUM 40 MG: 40 INJECTION, POWDER, FOR SOLUTION INTRAVENOUS at 06:02

## 2021-05-14 RX ADMIN — SODIUM CHLORIDE, PRESERVATIVE FREE 10 ML: 5 INJECTION INTRAVENOUS at 09:47

## 2021-05-14 RX ADMIN — POTASSIUM CHLORIDE 40 MEQ: 10 CAPSULE, COATED, EXTENDED RELEASE ORAL at 09:43

## 2021-05-14 RX ADMIN — LOSARTAN POTASSIUM 100 MG: 50 TABLET, FILM COATED ORAL at 09:43

## 2021-05-15 ENCOUNTER — READMISSION MANAGEMENT (OUTPATIENT)
Dept: CALL CENTER | Facility: HOSPITAL | Age: 81
End: 2021-05-15

## 2021-05-17 ENCOUNTER — TELEPHONE (OUTPATIENT)
Dept: TELEMETRY | Facility: HOSPITAL | Age: 81
End: 2021-05-17

## 2021-05-17 NOTE — PAYOR COMM NOTE
"Elsa Caraballo (81 y.o. Female)     Date of Birth Social Security Number Address Home Phone MRN    1940  [id number]  [place] [address] [phone] [id number]    [employer] Marital Status          Other        Admission Date Admission Type Admitting Provider Attending Provider Department, Room/Bed    21 Emergency Del Guy MD  [facility] MED SURG  4, 409/    Discharge Date Discharge Disposition Discharge Destination        2021 Home or Self Care              Attending Provider: (none)   Allergies: No Known Allergies    Isolation: None   Infection: None   Code Status: Prior    Ht: 160 cm (63\")   Wt: 60.5 kg (133 lb 6.1 oz)    Admission Cmt: None   Principal Problem: Acute on chronic respiratory failure with hypercapnia (CMS/HCC) [J96.22]                 Active Insurance as of 2021     Primary Coverage     Payor Plan Insurance Group Employer/Plan Group    ANTHEM MEDICARE REPLACEMENT ANTHEM MEDICARE ADVANTAGE [id number]     Payor Plan Address Payor Plan Phone Number Payor Plan Fax Number Effective Dates    [address] [phone]  2020 - None Entered    [facility] [id number]       Subscriber Name Subscriber Birth Date Member ID       ELSA CARABALLO 1940 [id number]                 Emergency Contacts      (Rel.) Home Phone Work Phone Mobile Phone    Logan Caraballo (Spouse) [phone] -- [phone]               Discharge Summary      Maribell Carmichael APRN at 21 1302     Attestation signed by Sarwat Morley DO at 21 1448    I have reviewed this documentation and agree.                        [facility] HOSPITALIST   DISCHARGE SUMMARY      Name:  Elsa Caraballo   Age:  81 y.o.  Sex:  female  :  1940  MRN:  [id number]   Visit Number:  [id number]    Admission Date:  2021  Date of Discharge:  2021  Primary Care Physician:  Kemi Hdz MD    Important issues to note:    1.  " Patient admitted with abdominal pain    2.  CT revealed constipation and diverticulosis, chronic changes, and moderate bilateral effusions and atelectasis.     3.  Patient received IV lasix with adequate output.    4.  Discharge home today on lasix 40 mg daily for 5 days then resume lasix 20 mg daily.  Also home with potassium 20 mEq daily.    5.  Follow up with PCP in one week.    Discharge Diagnoses:     Acute on chronic respiratory failure with hypercapnia  Paroxysmal atrial fibrillation  Hyperlipidemia  Essential hypertension  Sleep apnea  Thrombocytosis  Acute on chronic systolic CHF  Abdominal pain    Problem List:     Active Hospital Problems    Diagnosis  POA   • **Acute on chronic respiratory failure with hypercapnia (CMS/HCC) [J96.22]  Yes   • Acute on chronic systolic CHF (congestive heart failure) (CMS/HCC) [I50.23]  Yes   • Abdominal pain [R10.9]  Yes   • Acute CHF (CMS/HCC) [I50.9]  Yes   • Thrombocytosis (CMS/HCC) [D47.3]  Yes   • Paroxysmal atrial fibrillation (CMS/HCC) [I48.0]  Yes   • Hyperlipidemia [E78.5]  Yes   • Essential hypertension [I10]  Yes   • Sleep apnea [G47.30]  Yes      Resolved Hospital Problems   No resolved problems to display.     Presenting Problem:    Chief Complaint   Patient presents with   • Shortness of Breath      Consults:     Consulting Physician(s)             None          Procedures Performed:    None    History of presenting illness/Hospital Course:    Patient is an 81 year old female with PMH including chronic systolic and diastolic heart failure, atrial fibrilliation on Xarelto, valvular heart disease and hiatal hernia.  She has also had colon cancer approximately 20 years ago and is s/p surgery for that.  She presented to the ED with abdominal pain and distention for several weeks and shortness of breath x 2 days.  Her abdominal pain is dull, moderate and not associated with any activity or movement.  Patient denied fever, chills, sore throat and chest pain.       Patient was admitted to the hospitalists for treatment.  CT of abdomen and pelvis revealed moderate bilateral effusions and atelectasis, constipation and diverticulosis along with chronic changes.  Her troponins were negative. Echocardiogram revealed EF 32% which is just slightly lower than her previous echo earlier this year of 35-40%.  Patient received IV lasix in ED and additional 80 mg IV lasix yesterday.  Had adequate output, improvement in shortness of air and stable renal function, creatinine 0.62 and BUN 12 on day of discharge.  Will discharge home today with increased dose of lasix for 5 days.  She will take lasix 40 mg daily for 5 days then resume 20 mg daily. Pt also to be discharged on 20 mEq of potassium daily.  Patient to follow up with PCP in one week.         Vital Signs:    Temp:  [97.6 °F (36.4 °C)-98.4 °F (36.9 °C)] 98.2 °F (36.8 °C)  Heart Rate:  [] 89  Resp:  [16] 16  BP: ()/(50-68) 100/54    Physical Exam:    General Appearance:  Alert and cooperative. Patient resting comfortably in bed at time of exam.    Head:  Atraumatic and normocephalic.   Eyes: Conjunctivae and sclerae normal, no icterus. No pallor.   Ears:  Ears with no abnormalities noted.   Throat: No oral lesions, no thrush, oral mucosa moist.   Neck: Supple, trachea midline.   Back:   No kyphoscoliosis present. No tenderness to palpation.   Lungs:   Breath sounds heard bilaterally equally.  No crackles or wheezing. No Pleural rub or bronchial breathing.   Heart:  Normal S1 and S2, no murmur, no gallop, no rub. No JVD.   Abdomen:   Normal bowel sounds, no masses, no organomegaly. Soft, nontender, nondistended, no rebound tenderness.   Extremities: Supple, no edema, no cyanosis, no clubbing.   Pulses: Pulses palpable bilaterally.   Skin: No bleeding or rash.   Neurologic: Alert and oriented x 3. No facial asymmetry. Moves all four limbs. No tremors.     Pertinent Lab Results:     Results from last 7 days   Lab Units  05/14/21  1315 05/13/21  0828 05/12/21  1827   SODIUM mmol/L 139 138 135*   POTASSIUM mmol/L 3.8 3.6 4.3   CHLORIDE mmol/L 95* 91* 92*   CO2 mmol/L 40.3* 36.3* 36.7*   BUN mg/dL 12 9 9   CREATININE mg/dL 0.62 0.45* 0.53*   CALCIUM mg/dL 9.3 9.1 9.0   BILIRUBIN mg/dL  --   --  1.4*   ALK PHOS U/L  --   --  114   ALT (SGPT) U/L  --   --  16   AST (SGOT) U/L  --   --  29   GLUCOSE mg/dL 114* 85 98     Results from last 7 days   Lab Units 05/13/21  0828 05/12/21  1827   WBC 10*3/mm3 8.71 9.98   HEMOGLOBIN g/dL 14.5 14.0   HEMATOCRIT % 44.1 44.6   PLATELETS 10*3/mm3 313 334         Results from last 7 days   Lab Units 05/13/21  0828 05/12/21  2351 05/12/21  1827   TROPONIN T ng/mL <0.010 <0.010 <0.010     Results from last 7 days   Lab Units 05/12/21  1827   PROBNP pg/mL 4,031.0*         Results from last 7 days   Lab Units 05/12/21  1827   LIPASE U/L 14               Pertinent Radiology Results:    Imaging Results (All)     Procedure Component Value Units Date/Time    XR Chest 1 View [[id number]] Collected: 05/13/21 1004     Updated: 05/13/21 1008    Narrative:      PROCEDURE: XR CHEST 1 VW-        HISTORY: SOA Triage Protocol     COMPARISON: 04/04/2021.     FINDINGS: The heart is enlarged. The mediastinum is unremarkable. There  are worsening left greater than right basilar opacities. There is a new  right pleural effusion and worsening left effusion. There is no  pneumothorax. There are no acute osseous abnormalities.       Impression:      Worsening left greater than right basilar opacities and  pleural effusions are favored to be due to pulmonary edema.                       Images were reviewed, interpreted, and dictated by Dr. Mery Ruth MD  Transcribed by Nafisa Rincon PA-C.     This report was finalized on 5/13/2021 10:06 AM by Mery Ruth MD.    CT Abdomen Pelvis With Contrast [[id number]] Collected: 05/12/21 2034     Updated: 05/12/21 2035    Narrative:      FINAL REPORT    CLINICAL HISTORY:  abdominal  pain, distention.    FINDINGS:  Multiple contiguous transaxial slices through the abdomen and  pelvis were obtained after the intravenous ministration of  contrast.  There are moderate bilateral pleural effusions with  adjacent airspace disease, left greater than right.  The heart  is enlarged.  The liver, pancreas, spleen and right adrenal  gland are unremarkable.  There is a 1.5 cm left adrenal nodule,  nonspecific.  Kidneys enhance normally without hydronephrosis.  The bowel gas pattern is nonobstructive.  There is  diverticulosis without diverticulitis.  Moderate stool is  present.  Postsurgical changes.  The bladder is normal in  contour.  The appendix is not visualized.  Aorta and iliac  arteries are calcified and ectatic.  There are degenerative  changes of the spine.      Impression:      Moderate bilateral effusions and atelectasis  Constipation and  diverticulosis  Chronic changes    Authenticated by Dov Sanderson MD on 05/12/2021 08:34:02 PM          Echo:    Results for orders placed during the hospital encounter of 05/12/21    Adult Transthoracic Echo Complete W/ Cont if Necessary Per Protocol    Interpretation Summary  · The left ventricular cavity is moderately dilated.  · Left ventricular wall thickness is consistent with mild concentric hypertrophy.  · Estimated left ventricular EF = 32% Left ventricular systolic function is moderately decreased.  · Left ventricular diastolic function is consistent with (grade III w/high LAP) reversible restrictive pattern.  · The right ventricular cavity is mild to moderately dilated.  · Moderately reduced right ventricular systolic function noted.  · Left atrial volume is severely increased.  · The right atrial cavity is moderate to severely dilated.  · There is moderate calcification of the aortic valve mainly affecting the non-coronary, left coronary and right coronary cusp(s).  · Estimated right ventricular systolic pressure from tricuspid regurgitation is  "normal (<35 mmHg).  · There is a moderate sized left pleural effusion. There is a moderate sized right pleural effusion.    Condition on Discharge:      Stable.    Code status during the hospital stay:    Code Status and Medical Interventions:   Ordered at: 05/12/21 2024     Code Status:    CPR     Medical Interventions (Level of Support Prior to Arrest):    Full     Discharge Disposition:    Home or Self Care    Discharge Medications:       Discharge Medications      Changes to Medications      Instructions Start Date   albuterol sulfate  (90 Base) MCG/ACT inhaler  Commonly known as: PROVENTIL HFA;VENTOLIN HFA;PROAIR HFA  What changed:   · when to take this  · reasons to take this   2 puffs, Inhalation, 4 Times Daily - RT      furosemide 40 MG tablet  Commonly known as: LASIX  What changed: how much to take   40 mg, Oral, Daily      furosemide 20 MG tablet  Commonly known as: Lasix  What changed: You were already taking a medication with the same name, and this prescription was added. Make sure you understand how and when to take each.   20 mg, Oral, Daily   Start Date: May 20, 2021     potassium chloride 10 MEQ CR tablet  What changed: how much to take   20 mEq, Oral, Daily, Take 1 tablet daily with furosemide         Continue These Medications      Instructions Start Date   budesonide-formoterol 80-4.5 MCG/ACT inhaler  Commonly known as: SYMBICORT   2 puffs, Inhalation, 2 Times Daily - RT      cholecalciferol 25 MCG (1000 UT) tablet  Commonly known as: VITAMIN D3   1,000 Units, Oral, Daily      citalopram 20 MG tablet  Commonly known as: CeleXA   20 mg, Oral, Daily      hydroCHLOROthiazide 25 MG tablet  Commonly known as: HYDRODIURIL   25 mg, Oral, Daily, Pt not taking at this time because she states \"her BP has been to low.\"      losartan 100 MG tablet  Commonly known as: COZAAR   100 mg, Oral, Daily, Pt not taking at this time because she states \"her BP has been to low.\"      metoprolol succinate XL 50 " MG 24 hr tablet  Commonly known as: TOPROL-XL   50 mg, Oral, Daily      pantoprazole 40 MG EC tablet  Commonly known as: PROTONIX   40 mg, Oral, Daily      Xarelto 20 MG tablet  Generic drug: rivaroxaban   20 mg, Oral, Daily           Discharge Diet:     Diet Instructions     Advance Diet As Tolerated          Activity at Discharge:     Activity Instructions     Activity as Tolerated      Activity as Tolerated          Follow-up Appointments:     Contact information for follow-up providers     Kemi Hdz MD Follow up in 1 week(s).    Specialty: Family Medicine  Why: Patient will need one week follow up with PCP.    [facility] information:  [address] [facility] [place] [address]  [phone]                   Contact information for after-discharge care     Durable Medical Equipment     [facility] .    Service: Durable Medical Equipment  Contact information:  [address]  [address] [address]  [phone]                           Future Appointments   Date Time Provider Department Center   6/2/2021 11:00 AM Fidencio Germain MD [facility]   6/2/2021  1:30 PM Iona Sanders MD [facility]     Test Results Pending at Discharge:           Maribell Carmichael, BALDOMERO  05/14/21  14:10 EDT    Time: I spent 29 minutes on this discharge activity which included: face-to-face encounter with the patient, reviewing the data in the system, coordination of the care with the nursing staff as well as consultants, documentation, and entering orders.     Dictated utilizing Dragon dictation.      Electronically signed by Sarwat Morley DO at 05/14/21 9104

## 2021-05-18 ENCOUNTER — READMISSION MANAGEMENT (OUTPATIENT)
Dept: CALL CENTER | Facility: HOSPITAL | Age: 81
End: 2021-05-18

## 2021-05-18 NOTE — OUTREACH NOTE
CHF Week 1 Survey      Responses   [facility] patient discharged from?  [facility]   Does the patient have one of the following disease processes/diagnoses(primary or secondary)?  CHF   CHF Week 1 attempt successful?  Yes   Call start time  1226   Call end time  1245   Discharge diagnosis   constipation and diverticulosis, chronic changes, and moderate bilateral effusions and atelectasis. Acute on chronic respiratory failure with hypercapniaAcute on chronic systolic CHF     Is patient permission given to speak with other caregiver?  Yes   List who call center can speak with     Meds reviewed with patient/caregiver?  Yes   Is the patient having any side effects they believe may be caused by any medication additions or changes?  No   Does the patient have all medications ordered at discharge?  Yes   Is the patient taking all medications as directed (includes completed medication regime)?  Yes   Does the patient have a primary care provider?   Yes   Does the patient have an appointment with their PCP within 7 days of discharge?  No   What is preventing the patient from scheduling follow up appointments within 7 days of discharge?  Waiting on return call   Nursing Interventions  Educated patient on importance of making appointment, Advised patient to make appointment   Has the patient kept scheduled appointments due by today?  N/A   Comments  .   What DME was ordered?  O2   DME comments  Wear 02 at 3 to 4 L [facility]   Pulse Ox monitoring  Intermittent   Pulse Ox device source  Patient   O2 Sat comments  RA- 80's with O2 in place 96%   O2 Sat: education provided  Sat levels   Psychosocial issues?  No   Comments  Pt doing deep breathing exercises. She reports her SOA is back to her baseline SOA. She does have increased fatigue. Less stamina than previously. Pt reports having stitches in right foot from having sores between toes to help keep toes apart and heal sores that she had on foot from toes overlapping.    Did  the patient receive a copy of their discharge instructions?  Yes   Nursing interventions  Reviewed instructions with patient   What is the patient's perception of their health status since discharge?  Improving   Nursing interventions  Nurse provided patient education   Is the patient weighing daily?  Yes   Does the patient have scales?  Yes   Daily weight interventions  Education provided on importance of daily weight   Is the patient able to teach back Heart Failure diet management?  Yes   Is the patient able to teach back Heart Failure Zones?  Yes   Is the patient able to teach back signs and symptoms of worsening condition? (i.e. weight gain, shortness of air, etc.)  Yes   If the patient is a current smoker, are they able to teach back resources for cessation?  Not a smoker   Is the patient/caregiver able to teach back the hierarchy of who to call/visit for symptoms/problems? PCP, Specialist, Home health nurse, Urgent Care, ED, 911  Yes    CHF Week 1 call completed?  Yes          Vane Walls RN

## 2021-05-19 ENCOUNTER — TRANSCRIBE ORDERS (OUTPATIENT)
Dept: LAB | Facility: HOSPITAL | Age: 81
End: 2021-05-19

## 2021-05-19 DIAGNOSIS — D50.9 IRON DEFICIENCY ANEMIA, UNSPECIFIED IRON DEFICIENCY ANEMIA TYPE: Primary | ICD-10-CM

## 2021-05-27 ENCOUNTER — READMISSION MANAGEMENT (OUTPATIENT)
Dept: CALL CENTER | Facility: HOSPITAL | Age: 81
End: 2021-05-27

## 2021-05-27 NOTE — OUTREACH NOTE
CHF Week 2 Survey      Responses   [facility] patient discharged from?  [facility]   Does the patient have one of the following disease processes/diagnoses(primary or secondary)?  CHF   Week 2 attempt successful?  No   Unsuccessful attempts  Attempt 1          Yamilex Parr RN

## 2021-06-01 ENCOUNTER — READMISSION MANAGEMENT (OUTPATIENT)
Dept: CALL CENTER | Facility: HOSPITAL | Age: 81
End: 2021-06-01

## 2021-06-01 ENCOUNTER — APPOINTMENT (OUTPATIENT)
Dept: CT IMAGING | Facility: HOSPITAL | Age: 81
End: 2021-06-01

## 2021-06-01 ENCOUNTER — HOSPITAL ENCOUNTER (INPATIENT)
Facility: HOSPITAL | Age: 81
LOS: 4 days | Discharge: HOME HEALTH | End: 2021-06-05
Attending: EMERGENCY MEDICINE | Admitting: INTERNAL MEDICINE

## 2021-06-01 DIAGNOSIS — J96.22 ACUTE ON CHRONIC RESPIRATORY FAILURE WITH HYPERCAPNIA (HCC): ICD-10-CM

## 2021-06-01 DIAGNOSIS — I50.23 ACUTE ON CHRONIC SYSTOLIC CHF (CONGESTIVE HEART FAILURE) (HCC): ICD-10-CM

## 2021-06-01 DIAGNOSIS — K92.2 GASTROINTESTINAL HEMORRHAGE, UNSPECIFIED GASTROINTESTINAL HEMORRHAGE TYPE: Primary | ICD-10-CM

## 2021-06-01 DIAGNOSIS — I48.0 PAROXYSMAL ATRIAL FIBRILLATION (HCC): ICD-10-CM

## 2021-06-01 LAB
ALBUMIN SERPL-MCNC: 2.9 G/DL (ref 3.5–5.2)
ALBUMIN/GLOB SERPL: 1 G/DL
ALP SERPL-CCNC: 94 U/L (ref 39–117)
ALT SERPL W P-5'-P-CCNC: 8 U/L (ref 1–33)
ANION GAP SERPL CALCULATED.3IONS-SCNC: 6.9 MMOL/L (ref 5–15)
AST SERPL-CCNC: 15 U/L (ref 1–32)
BACTERIA UR QL AUTO: ABNORMAL /HPF
BASOPHILS # BLD AUTO: 0.07 10*3/MM3 (ref 0–0.2)
BASOPHILS NFR BLD AUTO: 0.6 % (ref 0–1.5)
BILIRUB SERPL-MCNC: 1.1 MG/DL (ref 0–1.2)
BILIRUB UR QL STRIP: ABNORMAL
BUN SERPL-MCNC: 15 MG/DL (ref 8–23)
BUN/CREAT SERPL: 26.8 (ref 7–25)
CALCIUM SPEC-SCNC: 8.8 MG/DL (ref 8.6–10.5)
CHLORIDE SERPL-SCNC: 98 MMOL/L (ref 98–107)
CLARITY UR: ABNORMAL
CO2 SERPL-SCNC: 33.1 MMOL/L (ref 22–29)
COLOR UR: ABNORMAL
CREAT SERPL-MCNC: 0.56 MG/DL (ref 0.57–1)
DEPRECATED RDW RBC AUTO: 48.1 FL (ref 37–54)
EOSINOPHIL # BLD AUTO: 0.27 10*3/MM3 (ref 0–0.4)
EOSINOPHIL NFR BLD AUTO: 2.5 % (ref 0.3–6.2)
ERYTHROCYTE [DISTWIDTH] IN BLOOD BY AUTOMATED COUNT: 13.8 % (ref 12.3–15.4)
GFR SERPL CREATININE-BSD FRML MDRD: 104 ML/MIN/1.73
GLOBULIN UR ELPH-MCNC: 3 GM/DL
GLUCOSE SERPL-MCNC: 94 MG/DL (ref 65–99)
GLUCOSE UR STRIP-MCNC: NEGATIVE MG/DL
HCT VFR BLD AUTO: 33.2 % (ref 34–46.6)
HEMOCCULT STL QL: POSITIVE
HGB BLD-MCNC: 10.6 G/DL (ref 12–15.9)
HGB UR QL STRIP.AUTO: ABNORMAL
HOLD SPECIMEN: NORMAL
HYALINE CASTS UR QL AUTO: ABNORMAL /LPF
IMM GRANULOCYTES # BLD AUTO: 0.06 10*3/MM3 (ref 0–0.05)
IMM GRANULOCYTES NFR BLD AUTO: 0.6 % (ref 0–0.5)
KETONES UR QL STRIP: ABNORMAL
LEUKOCYTE ESTERASE UR QL STRIP.AUTO: ABNORMAL
LIPASE SERPL-CCNC: 13 U/L (ref 13–60)
LYMPHOCYTES # BLD AUTO: 0.84 10*3/MM3 (ref 0.7–3.1)
LYMPHOCYTES NFR BLD AUTO: 7.7 % (ref 19.6–45.3)
MCH RBC QN AUTO: 30.5 PG (ref 26.6–33)
MCHC RBC AUTO-ENTMCNC: 31.9 G/DL (ref 31.5–35.7)
MCV RBC AUTO: 95.4 FL (ref 79–97)
MONOCYTES # BLD AUTO: 1.23 10*3/MM3 (ref 0.1–0.9)
MONOCYTES NFR BLD AUTO: 11.3 % (ref 5–12)
MUCOUS THREADS URNS QL MICRO: ABNORMAL /HPF
NEUTROPHILS NFR BLD AUTO: 77.3 % (ref 42.7–76)
NEUTROPHILS NFR BLD AUTO: 8.37 10*3/MM3 (ref 1.7–7)
NITRITE UR QL STRIP: NEGATIVE
NRBC BLD AUTO-RTO: 0 /100 WBC (ref 0–0.2)
PH UR STRIP.AUTO: <=5 [PH] (ref 5–8)
PLATELET # BLD AUTO: 369 10*3/MM3 (ref 140–450)
PMV BLD AUTO: 9.6 FL (ref 6–12)
POTASSIUM SERPL-SCNC: 4.2 MMOL/L (ref 3.5–5.2)
PROT SERPL-MCNC: 5.9 G/DL (ref 6–8.5)
PROT UR QL STRIP: ABNORMAL
RBC # BLD AUTO: 3.48 10*6/MM3 (ref 3.77–5.28)
RBC # UR: ABNORMAL /HPF
REF LAB TEST METHOD: ABNORMAL
SODIUM SERPL-SCNC: 138 MMOL/L (ref 136–145)
SP GR UR STRIP: >=1.03 (ref 1–1.03)
SQUAMOUS #/AREA URNS HPF: ABNORMAL /HPF
UROBILINOGEN UR QL STRIP: ABNORMAL
WBC # BLD AUTO: 10.84 10*3/MM3 (ref 3.4–10.8)
WBC UR QL AUTO: ABNORMAL /HPF
WHOLE BLOOD HOLD SPECIMEN: NORMAL
WHOLE BLOOD HOLD SPECIMEN: NORMAL

## 2021-06-01 PROCEDURE — 99222 1ST HOSP IP/OBS MODERATE 55: CPT | Performed by: INTERNAL MEDICINE

## 2021-06-01 PROCEDURE — 99284 EMERGENCY DEPT VISIT MOD MDM: CPT

## 2021-06-01 PROCEDURE — 81001 URINALYSIS AUTO W/SCOPE: CPT | Performed by: PHYSICIAN ASSISTANT

## 2021-06-01 PROCEDURE — 86900 BLOOD TYPING SEROLOGIC ABO: CPT

## 2021-06-01 PROCEDURE — 99223 1ST HOSP IP/OBS HIGH 75: CPT | Performed by: EMERGENCY MEDICINE

## 2021-06-01 PROCEDURE — 85025 COMPLETE CBC W/AUTO DIFF WBC: CPT | Performed by: PHYSICIAN ASSISTANT

## 2021-06-01 PROCEDURE — 83690 ASSAY OF LIPASE: CPT | Performed by: PHYSICIAN ASSISTANT

## 2021-06-01 PROCEDURE — 86901 BLOOD TYPING SEROLOGIC RH(D): CPT

## 2021-06-01 PROCEDURE — 25010000002 IOPAMIDOL 61 % SOLUTION: Performed by: EMERGENCY MEDICINE

## 2021-06-01 PROCEDURE — 82272 OCCULT BLD FECES 1-3 TESTS: CPT | Performed by: PHYSICIAN ASSISTANT

## 2021-06-01 PROCEDURE — 74177 CT ABD & PELVIS W/CONTRAST: CPT

## 2021-06-01 PROCEDURE — 25010000002 ONDANSETRON PER 1 MG: Performed by: INTERNAL MEDICINE

## 2021-06-01 PROCEDURE — 80053 COMPREHEN METABOLIC PANEL: CPT | Performed by: PHYSICIAN ASSISTANT

## 2021-06-01 RX ORDER — POLYETHYLENE GLYCOL 3350, SODIUM CHLORIDE, POTASSIUM CHLORIDE, SODIUM BICARBONATE, AND SODIUM SULFATE 240; 5.84; 2.98; 6.72; 22.72 G/4L; G/4L; G/4L; G/4L; G/4L
4000 POWDER, FOR SOLUTION ORAL SEE ADMIN INSTRUCTIONS
Status: DISCONTINUED | OUTPATIENT
Start: 2021-06-01 | End: 2021-06-02

## 2021-06-01 RX ORDER — METOPROLOL SUCCINATE 50 MG/1
50 TABLET, EXTENDED RELEASE ORAL DAILY
Status: DISCONTINUED | OUTPATIENT
Start: 2021-06-02 | End: 2021-06-05 | Stop reason: HOSPADM

## 2021-06-01 RX ORDER — ONDANSETRON 2 MG/ML
4 INJECTION INTRAMUSCULAR; INTRAVENOUS EVERY 6 HOURS PRN
Status: DISCONTINUED | OUTPATIENT
Start: 2021-06-01 | End: 2021-06-02

## 2021-06-01 RX ORDER — IPRATROPIUM BROMIDE AND ALBUTEROL SULFATE 2.5; .5 MG/3ML; MG/3ML
3 SOLUTION RESPIRATORY (INHALATION) EVERY 4 HOURS PRN
Status: DISCONTINUED | OUTPATIENT
Start: 2021-06-01 | End: 2021-06-05 | Stop reason: HOSPADM

## 2021-06-01 RX ORDER — PANTOPRAZOLE SODIUM 40 MG/10ML
40 INJECTION, POWDER, LYOPHILIZED, FOR SOLUTION INTRAVENOUS EVERY 12 HOURS SCHEDULED
Status: DISCONTINUED | OUTPATIENT
Start: 2021-06-01 | End: 2021-06-02

## 2021-06-01 RX ORDER — SODIUM CHLORIDE 0.9 % (FLUSH) 0.9 %
10 SYRINGE (ML) INJECTION AS NEEDED
Status: DISCONTINUED | OUTPATIENT
Start: 2021-06-01 | End: 2021-06-05 | Stop reason: HOSPADM

## 2021-06-01 RX ORDER — BUDESONIDE AND FORMOTEROL FUMARATE DIHYDRATE 80; 4.5 UG/1; UG/1
2 AEROSOL RESPIRATORY (INHALATION)
Status: DISCONTINUED | OUTPATIENT
Start: 2021-06-01 | End: 2021-06-03

## 2021-06-01 RX ADMIN — PANTOPRAZOLE SODIUM 40 MG: 40 INJECTION, POWDER, FOR SOLUTION INTRAVENOUS at 22:43

## 2021-06-01 RX ADMIN — BUDESONIDE AND FORMOTEROL FUMARATE DIHYDRATE 2 PUFF: 80; 4.5 AEROSOL RESPIRATORY (INHALATION) at 22:42

## 2021-06-01 RX ADMIN — SODIUM CHLORIDE 500 ML: 9 INJECTION, SOLUTION INTRAVENOUS at 14:54

## 2021-06-01 RX ADMIN — IOPAMIDOL 100 ML: 612 INJECTION, SOLUTION INTRAVENOUS at 17:26

## 2021-06-01 RX ADMIN — ONDANSETRON 4 MG: 2 INJECTION INTRAMUSCULAR; INTRAVENOUS at 22:43

## 2021-06-01 NOTE — ED PROVIDER NOTES
"Subjective   Patient is an 81-year-old female with a past medical history of a fib, diverticulosis, colon cancer, and hiatal hernia presenting to ED with painless bright red rectal bleeding. Patient reports that two days ago she had a stool of normal quality but was bright red in color. Patient denies pain with bowel movements. Patient denies abdominal pain. Patient denies fever. Patient denies nausea/vomiting. Patient denies diarrhea/constipation. Patient reports being on 20mg Xarelto.        History provided by:  Patient   used: No        Review of Systems   Constitutional: Negative for chills, fatigue and fever.   Gastrointestinal: Positive for blood in stool. Negative for abdominal distention, abdominal pain, constipation, diarrhea, nausea and vomiting.   Skin: Negative for pallor.   All other systems reviewed and are negative.      Past Medical History:   Diagnosis Date   • Anxiety disorder due to general medical condition 1/11/2017   • Arthritis    • Atrial fibrillation (CMS/HCC) 1/11/2017   • Body piercing     BOTH EARS   • Borderline diabetes    • Breast cancer (CMS/HCC) 2003    right-radiation and a lumpectomy   • Cataract 01/11/2017    Left eye surgery 11/19   • Chronic bronchitis (CMS/HCC) 1/11/2017   • Colon cancer (CMS/HCC) 11/30/1998    had surgery and chemo   • COPD (chronic obstructive pulmonary disease) (CMS/HCC)    • Cyanocobalamine deficiency (non anemic)    • Depression 1/11/2017   • Diverticulosis    • Esophageal reflux 1/11/2017   • Hiatal hernia 1/11/2017   • History of bladder infections    • Hx of exercise stress test     \"several years ago by Dr. Mercado\".     • Hx of radiation therapy    • Hyperlipidemia    • Hypertension 1/11/2017   • Impaired functional mobility and activity tolerance    • Osteoporosis    • Palpitations 1/11/2017   • Prediabetes    • Problems with swallowing     meat at times per pt report   • Shortness of breath     WITH EXERTION    • Sleep apnea " 2017    NO CPAP   • Tricuspid valve disorder 2017    Patient doesn't know anything about this   • Vitamin D deficiency    • Wears glasses        No Known Allergies    Past Surgical History:   Procedure Laterality Date   • ANAL FISTULOTOMY     • APPENDECTOMY         • BREAST BIOPSY     • BREAST LUMPECTOMY Right 2006   • CATARACT EXTRACTION  2019    both eyes    • CATARACT EXTRACTION W/ INTRAOCULAR LENS IMPLANT Left 2019    Procedure: CATARACT PHACO EXTRACTION WITH INTRAOCULAR LENS IMPLANT LEFT;  Surgeon: Masoud David MD;  Location: [facility] OR;  Service: Ophthalmology   • CATARACT EXTRACTION W/ INTRAOCULAR LENS IMPLANT Right 2019    Procedure: CATARACT PHACO EXTRACTION WITH INTRAOCULAR LENS IMPLANT RIGHT;  Surgeon: Masoud David MD;  Location: [facility] OR;  Service: Ophthalmology   •  SECTION  1981   • CHOLECYSTECTOMY  2009   • COLECTOMY PARTIAL / TOTAL  1998    COLON CANCER   • COLONOSCOPY     • ENDOSCOPY     • ENDOSCOPY N/A 2018    Procedure: ESOPHAGOGASTRODUODENOSCOPY WITH COLD FORCEP BIOPSY;  Surgeon: Vasquez Fagan MD;  Location: [facility] ENDOSCOPY;  Service: Gastroenterology   • ENDOSCOPY N/A 2019    Procedure: ESOPHAGOGASTRODUODENOSCOPY WITH BIOPSY;  Surgeon: Vasquez Fagan MD;  Location: [facility] ENDOSCOPY;  Service: Gastroenterology   • HYSTERECTOMY         • VENTRAL HERNIA REPAIR  10/28/2012       Family History   Problem Relation Age of Onset   • Hypertension Mother    • Asthma Mother    • COPD Mother    • Osteoarthritis Mother    • Cancer Father    • Cancer Sister    • Diabetes Maternal Grandmother        Social History     Socioeconomic History   • Marital status:      Spouse name: Not on file   • Number of children: Not on file   • Years of education: Not on file   • Highest education level: Not on file   Tobacco Use   • Smoking status: Never Smoker   • Smokeless tobacco: Never Used   Substance and Sexual  Activity   • Alcohol use: Yes     Alcohol/week: 1.0 - 2.0 standard drinks     Types: 1 - 2 Glasses of wine per week     Comment: occas   • Drug use: No   • Sexual activity: Defer           Objective   Physical Exam  Constitutional:       General: She is not in acute distress.     Appearance: Normal appearance. She is normal weight. She is not ill-appearing, toxic-appearing or diaphoretic.   HENT:      Head: Normocephalic and atraumatic.      Right Ear: External ear normal.      Left Ear: External ear normal.      Nose: Nose normal.   Eyes:      General: No scleral icterus.        Right eye: No discharge.         Left eye: No discharge.      Extraocular Movements: Extraocular movements intact.      Conjunctiva/sclera: Conjunctivae normal.   Pulmonary:      Effort: Pulmonary effort is normal. No respiratory distress.   Abdominal:      General: Abdomen is flat. There is no distension.      Palpations: There is no mass.      Tenderness: There is no abdominal tenderness. There is no guarding.   Genitourinary:     Comments: External hemorrhoids not thrombosed  Neurological:      General: No focal deficit present.      Mental Status: She is alert and oriented to person, place, and time.   Psychiatric:         Mood and Affect: Mood normal.         Behavior: Behavior normal.         Thought Content: Thought content normal.         Judgment: Judgment normal.         Procedures           ED Course  ED Course as of Jun 01 2012 Tue Jun 01, 2021 1915 Discussed with Dr. Sanders, he wants the patient admitted, bowel prep, scope in the morning    [CS]      ED Course User Index  [CS] Aldo Shen Jr., PATONE                                           MDM  Number of Diagnoses or Management Options  Gastrointestinal hemorrhage, unspecified gastrointestinal hemorrhage type: new and requires workup     Amount and/or Complexity of Data Reviewed  Clinical lab tests: reviewed  Tests in the radiology section of CPT®: reviewed  Decide  to obtain previous medical records or to obtain history from someone other than the patient: yes  Discuss the patient with other providers: yes    Risk of Complications, Morbidity, and/or Mortality  Presenting problems: minimal  Diagnostic procedures: minimal  Management options: minimal    Patient Progress  Patient progress: stable      Final diagnoses:   Gastrointestinal hemorrhage, unspecified gastrointestinal hemorrhage type       ED Disposition  ED Disposition     ED Disposition Condition Comment    Decision to Admit  Level of Care: Telemetry [5]   Diagnosis: Gastrointestinal hemorrhage, unspecified gastrointestinal hemorrhage type [[id number]]   Admitting Physician: FARRUKH MENENDEZ [[id number]]   Attending Physician: FARRUKH MENENDEZ [[id number]]   Certification: I Certify That Inpatient Hospital Services Are Medically Necessary For Greater Than 2 Midnights            No follow-up provider specified.       Medication List      No changes were made to your prescriptions during this visit.          Aldo Shen Jr., PA-C  06/01/21 2012

## 2021-06-01 NOTE — ED NOTES
Called Dr. Domingo back, attempting to reach him for Aldo. Stated he would call back, he was performing a procedure. Aldo persaud.     Sarina Bright  06/01/21 1933

## 2021-06-01 NOTE — OUTREACH NOTE
CHF Week 2 Survey      Responses   [facility] patient discharged from?  [facility]   Does the patient have one of the following disease processes/diagnoses(primary or secondary)?  CHF   Week 2 attempt successful?  No   Unsuccessful attempts  Attempt 2          Vane Walls RN

## 2021-06-02 ENCOUNTER — READMISSION MANAGEMENT (OUTPATIENT)
Dept: CALL CENTER | Facility: HOSPITAL | Age: 81
End: 2021-06-02

## 2021-06-02 ENCOUNTER — ANESTHESIA (OUTPATIENT)
Dept: GASTROENTEROLOGY | Facility: HOSPITAL | Age: 81
End: 2021-06-02

## 2021-06-02 ENCOUNTER — ANESTHESIA EVENT (OUTPATIENT)
Dept: GASTROENTEROLOGY | Facility: HOSPITAL | Age: 81
End: 2021-06-02

## 2021-06-02 PROBLEM — K57.30 DIVERTICULOSIS OF COLON: Status: ACTIVE | Noted: 2021-06-02

## 2021-06-02 PROBLEM — D62 ACUTE BLOOD LOSS ANEMIA: Status: ACTIVE | Noted: 2021-06-02

## 2021-06-02 LAB
ABO GROUP BLD: NORMAL
ABO GROUP BLD: NORMAL
APTT PPP: 31.8 SECONDS (ref 24.5–37.2)
BLD GP AB SCN SERPL QL: NEGATIVE
HGB BLD-MCNC: 10.7 G/DL (ref 12–15.9)
HGB BLD-MCNC: 11.8 G/DL (ref 12–15.9)
HGB BLD-MCNC: 9.9 G/DL (ref 12–15.9)
INR PPP: 1.22 (ref 0.9–1.1)
PROTHROMBIN TIME: 16 SECONDS (ref 12–15.1)
RH BLD: POSITIVE
RH BLD: POSITIVE
T&S EXPIRATION DATE: NORMAL

## 2021-06-02 PROCEDURE — 85018 HEMOGLOBIN: CPT | Performed by: EMERGENCY MEDICINE

## 2021-06-02 PROCEDURE — 94799 UNLISTED PULMONARY SVC/PX: CPT

## 2021-06-02 PROCEDURE — 86901 BLOOD TYPING SEROLOGIC RH(D): CPT | Performed by: EMERGENCY MEDICINE

## 2021-06-02 PROCEDURE — 45380 COLONOSCOPY AND BIOPSY: CPT | Performed by: INTERNAL MEDICINE

## 2021-06-02 PROCEDURE — 88305 TISSUE EXAM BY PATHOLOGIST: CPT | Performed by: INTERNAL MEDICINE

## 2021-06-02 PROCEDURE — 85610 PROTHROMBIN TIME: CPT | Performed by: EMERGENCY MEDICINE

## 2021-06-02 PROCEDURE — 0DBE8ZX EXCISION OF LARGE INTESTINE, VIA NATURAL OR ARTIFICIAL OPENING ENDOSCOPIC, DIAGNOSTIC: ICD-10-PCS | Performed by: INTERNAL MEDICINE

## 2021-06-02 PROCEDURE — 94640 AIRWAY INHALATION TREATMENT: CPT

## 2021-06-02 PROCEDURE — 86850 RBC ANTIBODY SCREEN: CPT | Performed by: EMERGENCY MEDICINE

## 2021-06-02 PROCEDURE — 25010000002 PROPOFOL 10 MG/ML EMULSION: Performed by: NURSE ANESTHETIST, CERTIFIED REGISTERED

## 2021-06-02 PROCEDURE — 85730 THROMBOPLASTIN TIME PARTIAL: CPT | Performed by: EMERGENCY MEDICINE

## 2021-06-02 PROCEDURE — 86900 BLOOD TYPING SEROLOGIC ABO: CPT | Performed by: EMERGENCY MEDICINE

## 2021-06-02 PROCEDURE — 99232 SBSQ HOSP IP/OBS MODERATE 35: CPT | Performed by: INTERNAL MEDICINE

## 2021-06-02 RX ORDER — ONDANSETRON 2 MG/ML
4 INJECTION INTRAMUSCULAR; INTRAVENOUS EVERY 6 HOURS PRN
Status: DISCONTINUED | OUTPATIENT
Start: 2021-06-02 | End: 2021-06-05 | Stop reason: HOSPADM

## 2021-06-02 RX ORDER — PANTOPRAZOLE SODIUM 40 MG/1
40 TABLET, DELAYED RELEASE ORAL DAILY
Status: DISCONTINUED | OUTPATIENT
Start: 2021-06-03 | End: 2021-06-05 | Stop reason: HOSPADM

## 2021-06-02 RX ORDER — CITALOPRAM 20 MG/1
20 TABLET ORAL DAILY
Status: DISCONTINUED | OUTPATIENT
Start: 2021-06-03 | End: 2021-06-03

## 2021-06-02 RX ORDER — KETAMINE HYDROCHLORIDE 50 MG/ML
INJECTION, SOLUTION, CONCENTRATE INTRAMUSCULAR; INTRAVENOUS AS NEEDED
Status: DISCONTINUED | OUTPATIENT
Start: 2021-06-02 | End: 2021-06-02 | Stop reason: SURG

## 2021-06-02 RX ORDER — FUROSEMIDE 20 MG/1
20 TABLET ORAL DAILY
Status: DISCONTINUED | OUTPATIENT
Start: 2021-06-03 | End: 2021-06-03

## 2021-06-02 RX ORDER — SODIUM CHLORIDE 9 MG/ML
70 INJECTION, SOLUTION INTRAVENOUS CONTINUOUS PRN
Status: DISCONTINUED | OUTPATIENT
Start: 2021-06-02 | End: 2021-06-05 | Stop reason: HOSPADM

## 2021-06-02 RX ORDER — LIDOCAINE HYDROCHLORIDE 20 MG/ML
INJECTION, SOLUTION INTRAVENOUS AS NEEDED
Status: DISCONTINUED | OUTPATIENT
Start: 2021-06-02 | End: 2021-06-02 | Stop reason: SURG

## 2021-06-02 RX ORDER — PROPOFOL 10 MG/ML
VIAL (ML) INTRAVENOUS AS NEEDED
Status: DISCONTINUED | OUTPATIENT
Start: 2021-06-02 | End: 2021-06-02 | Stop reason: SURG

## 2021-06-02 RX ORDER — ONDANSETRON 2 MG/ML
4 INJECTION INTRAMUSCULAR; INTRAVENOUS ONCE AS NEEDED
Status: DISCONTINUED | OUTPATIENT
Start: 2021-06-02 | End: 2021-06-02 | Stop reason: HOSPADM

## 2021-06-02 RX ADMIN — LIDOCAINE HYDROCHLORIDE 80 MG: 20 INJECTION, SOLUTION INTRAVENOUS at 12:46

## 2021-06-02 RX ADMIN — SODIUM CHLORIDE 70 ML/HR: 9 INJECTION, SOLUTION INTRAVENOUS at 11:20

## 2021-06-02 RX ADMIN — PROPOFOL 80 MG: 10 INJECTION, EMULSION INTRAVENOUS at 13:00

## 2021-06-02 RX ADMIN — KETAMINE HYDROCHLORIDE 25 MG: 50 INJECTION, SOLUTION INTRAMUSCULAR; INTRAVENOUS at 12:46

## 2021-06-02 RX ADMIN — PANTOPRAZOLE SODIUM 40 MG: 40 INJECTION, POWDER, FOR SOLUTION INTRAVENOUS at 09:25

## 2021-06-02 RX ADMIN — BUDESONIDE AND FORMOTEROL FUMARATE DIHYDRATE 2 PUFF: 80; 4.5 AEROSOL RESPIRATORY (INHALATION) at 22:32

## 2021-06-02 RX ADMIN — BUDESONIDE AND FORMOTEROL FUMARATE DIHYDRATE 2 PUFF: 80; 4.5 AEROSOL RESPIRATORY (INHALATION) at 07:24

## 2021-06-02 RX ADMIN — METOPROLOL SUCCINATE 50 MG: 50 TABLET, EXTENDED RELEASE ORAL at 09:24

## 2021-06-02 NOTE — CONSULTS
"Adult Nutrition  Assessment/PES    Patient Name:  Elsa Alcaraz  YOB: 1940  MRN: [id number]  Admit Date:  6/1/2021    Assessment Date:  6/2/2021    Comments:    Recommend:  1. Consider advancing diet order with cardiac and GI soft modifications once medically appropriate and tolerated.  2. Establish and encourage PO intake once diet is advanced.  3. Consider a MVI with minerals daily.     RD to follow pt and available PRN.      Reason for Assessment     Row Name 06/02/21 0917          Reason for Assessment    Reason For Assessment  nurse/nurse practitioner consult;identified at risk by screening criteria     Diagnosis  cardiac disease;cancer diagnosis/related complications;gastrointestinal disease;hematological/related complications;pulmonary disease GI bleed, AFIB, COPD, anemia, CHF, chronic resp failure, diverticulosis, hx breast and colon cancer     Identified At Risk by Screening Criteria  unintentional loss of 10 lbs or more in the past 2 mos;reduced oral intake over the last month;MST SCORE 2+           Anthropometrics     Row Name 06/02/21 0919          Anthropometrics    Height  160 cm (63\")        Ideal Body Weight (IBW)    Ideal Body Weight (IBW) (kg)  52.72         Labs/Tests/Procedures/Meds     Row Name 06/02/21 0918          Labs/Procedures/Meds    Lab Results Reviewed  reviewed, pertinent     Lab Results Comments  Low: Cr, Alb        Medications    Pertinent Medications Reviewed  reviewed, pertinent     Pertinent Medications Comments  Protonix         Physical Findings     Row Name 06/02/21 0918          Physical Findings    Skin  other (see comments) right anterior third toe         Estimated/Assessed Needs     Row Name 06/02/21 0919          Calculation Measurements    Weight Used For Calculations  61.5 kg (135 lb 9.3 oz)     Height  160 cm (63\")        Estimated/Assessed Needs    Additional Documentation  Fluid Requirements (Group);Protein Requirements (Group);Calorie Requirements " (Group);[place]-St. Jeor Equation (Group)        Calorie Requirements    Estimated Calorie Requirement Comment  [id number]        [place]-St. Jeor Equation    RMR ([place]-St. Jeor Equation)  1054.125     [place]-St. Jeor Activity Factors  -- 1.3 AF        Protein Requirements    Weight Used For Protein Calculations  61.5 kg (135 lb 9.3 oz)     Est Protein Requirement Amount (gms/kg)  1.0 gm protein 49-61 grams     Estimated Protein Requirements (gms/day)  61.5        Fluid Requirements    Fluid Requirements (mL/day)  1500     RDA Method (mL)  1500         Nutrition Prescription Ordered     Row Name 06/02/21 0919          Nutrition Prescription PO    Current PO Diet  NPO         Evaluation of Received Nutrient/Fluid Intake     Row Name 06/02/21 0920          PO Evaluation    Number of Days PO Intake Evaluated  Insufficient Data               Problem/Interventions:  Problem 1     Row Name 06/02/21 0920          Nutrition Diagnoses Problem 1    Problem 1  Altered GI Function     Etiology (related to)  Medical Diagnosis     Gastrointestinal  Diverticulosis;Gastrointestinal bleed     Signs/Symptoms (evidenced by)  Report/Observation     Reported/Observed By  MD         Problem 2     Row Name 06/02/21 0920          Nutrition Diagnoses Problem 2    Problem 2  Inadequate Nutrient Intake     Etiology (related to)  MNT for Treatment/Condition     Signs/Symptoms (evidenced by)  NPO             Intervention Goal     Row Name 06/02/21 0921          Intervention Goal    General  Meet nutritional needs for age/condition;Improved nutrition related lab(s)     PO  Meet estimated needs;Advance diet;Establish PO     Weight  Maintain weight         Nutrition Intervention     Row Name 06/02/21 0921          Nutrition Intervention    RD/Tech Action  Follow Tx progress;Await begin PO;Recommend/ordered     Recommended/Ordered  Diet         Nutrition Prescription     Row Name 06/02/21 0921          Nutrition Prescription PO    PO  Prescription  Begin/change diet     Common Modifiers  Cardiac;GI Soft/[place]     New PO Prescription Ordered?  No, recommended        Other Orders    Obtain Weight  Daily     Obtain Weight Ordered?  No, recommended     Supplement  Vitamin mineral supplement     Supplement Ordered?  No, recommended         Education/Evaluation     Row Name 06/02/21 0921          Education    Education  Will Instruct as appropriate        Monitor/Evaluation    Monitor  Per protocol;I&O;Pertinent labs;Weight;Skin status           Electronically signed by:  Joyce Siddiqi RD  06/02/21 09:21 EDT

## 2021-06-02 NOTE — NURSING NOTE
"Pt continues to have bloody bowel movements. Pt voices concerns about having \"procedure\" stating concerns about her heart. Pt repeatedly states \"I don't want to die in the hospital\". Pt states she has an appointment today with Dr. Mosley (cardiologist) and is concerned about missing this appointment. Pt's feelings acknowledged. Will continue plan of care.   "

## 2021-06-02 NOTE — PLAN OF CARE
Goal Outcome Evaluation:  Plan of Care Reviewed With: patient  Progress: improving  Outcome Summary: patient went for a procedure. patient sat up in chair post procedure. family came to visit. vitals remained stable during shift. will continue to monitor.

## 2021-06-02 NOTE — CONSULTS
In Patient Consult      Date of Consultation: 2021  Patient Name: Elsa Alcaraz  MRN: [id number]  : 1940     Referring provider: Kenia Domingo DO    Primary care provider:  Kemi Hdz MD    Reason for consultation: GI bleeding    History of Present Illness: This is 81-year-old female patient with a prior history of hypertension, hyperlipidemia, chronic atrial fibrillation on Eliquis, history of breast cancer status post lumpectomy and radiation therapy, history of colon cancer status post right hemicolectomy more than 20 years ago, CHF, COPD/obstructive sleep apnea on home oxygen, colonic diverticulosis, was brought to the emergency room today on 2021 with complaints of rectal bleeding for the past 2 days.    Patient states that she was doing fine until day before yesterday, then had a bowel movement with blood in the stool.  Subsequently she had multiple bowel movements at least 4 times every day.  She continued to have a red blood with clots even today for which she came to emergency room.. She had at least 3 times bowel movement with dark blood and clots.  She does have associated left-sided abdominal cramps.  Denies any nausea vomiting, no fever chills.    Patient denies any prior history of GI bleed.  She has been taking Xarelto and last dose was day before yesterday 2 days ago.  Denies any NSAID use.    She states that she had an EGD done in 2019 Dr. Daniel.  Last colonoscopy was about 5 years ago and was been told normal no reports available.  Her father had some type of stomach cancer details unknown.    She was evaluated emergency room and noted to have a drop in her H&H from 14 to 10 g/dL.  CT scan abdomen pelvis done which showed only colonic diverticulosis otherwise unremarkable.  GI was consulted for further evaluation and management.    Subjective     Past Medical History:   Diagnosis Date   • Anxiety disorder due to general medical condition 2017   •  "Arthritis    • Atrial fibrillation (CMS/HCC) 2017   • Body piercing     BOTH EARS   • Borderline diabetes    • Breast cancer (CMS/HCC)     right-radiation and a lumpectomy   • Cataract 2017    Left eye surgery    • Chronic bronchitis (CMS/HCC) 2017   • Colon cancer (CMS/HCC) 1998    had surgery and chemo   • COPD (chronic obstructive pulmonary disease) (CMS/HCC)    • Cyanocobalamine deficiency (non anemic)    • Depression 2017   • Diverticulosis    • Esophageal reflux 2017   • Hiatal hernia 2017   • History of bladder infections    • Hx of exercise stress test     \"several years ago by Dr. Mercado\".     • Hx of radiation therapy    • Hyperlipidemia    • Hypertension 2017   • Impaired functional mobility and activity tolerance    • Osteoporosis    • Palpitations 2017   • Prediabetes    • Problems with swallowing     meat at times per pt report   • Shortness of breath     WITH EXERTION    • Sleep apnea 2017    NO CPAP   • Tricuspid valve disorder 2017    Patient doesn't know anything about this   • Vitamin D deficiency    • Wears glasses        Past Surgical History:   Procedure Laterality Date   • ANAL FISTULOTOMY     • APPENDECTOMY         • BREAST BIOPSY     • BREAST LUMPECTOMY Right 2006   • CATARACT EXTRACTION  2019    both eyes    • CATARACT EXTRACTION W/ INTRAOCULAR LENS IMPLANT Left 2019    Procedure: CATARACT PHACO EXTRACTION WITH INTRAOCULAR LENS IMPLANT LEFT;  Surgeon: Masoud David MD;  Location: [facility] OR;  Service: Ophthalmology   • CATARACT EXTRACTION W/ INTRAOCULAR LENS IMPLANT Right 2019    Procedure: CATARACT PHACO EXTRACTION WITH INTRAOCULAR LENS IMPLANT RIGHT;  Surgeon: Masoud David MD;  Location: [facility] OR;  Service: Ophthalmology   •  SECTION  1981   • CHOLECYSTECTOMY  2009   • COLECTOMY PARTIAL / TOTAL  1998    COLON CANCER   • COLONOSCOPY     • ENDOSCOPY  2016 "   • ENDOSCOPY N/A 5/4/2018    Procedure: ESOPHAGOGASTRODUODENOSCOPY WITH COLD FORCEP BIOPSY;  Surgeon: Vasquez Fagan MD;  Location: [facility] ENDOSCOPY;  Service: Gastroenterology   • ENDOSCOPY N/A 8/12/2019    Procedure: ESOPHAGOGASTRODUODENOSCOPY WITH BIOPSY;  Surgeon: Vasquez Fagan MD;  Location: [facility] ENDOSCOPY;  Service: Gastroenterology   • HYSTERECTOMY      1998   • VENTRAL HERNIA REPAIR  10/28/2012       Family History   Problem Relation Age of Onset   • Hypertension Mother    • Asthma Mother    • COPD Mother    • Osteoarthritis Mother    • Cancer Father    • Cancer Sister    • Diabetes Maternal Grandmother        Social History     Socioeconomic History   • Marital status:      Spouse name: Not on file   • Number of children: Not on file   • Years of education: Not on file   • Highest education level: Not on file   Tobacco Use   • Smoking status: Never Smoker   • Smokeless tobacco: Never Used   Substance and Sexual Activity   • Alcohol use: Yes     Alcohol/week: 1.0 - 2.0 standard drinks     Types: 1 - 2 Glasses of wine per week     Comment: occas   • Drug use: No   • Sexual activity: Defer         Current Facility-Administered Medications:   •  polyethylene glycol with electrolytes (GOLYTELY) solution 4,000 mL, 4,000 mL, Oral, See Admin Instructions, Iona Sanders MD  •  sodium chloride 0.9 % flush 10 mL, 10 mL, Intravenous, PRN, Aldo Shen Jr., PA-C    No Known Allergies    Review of Systems   Constitutional: Negative for chills, fever, unexpected weight gain and unexpected weight loss.   HENT: Negative for congestion, ear pain, postnasal drip, sinus pressure and sore throat.    Eyes: Negative for blurred vision and visual disturbance.   Respiratory: Negative for cough, chest tightness and shortness of breath.    Cardiovascular: Negative for chest pain, palpitations and leg swelling.   Gastrointestinal: Positive for abdominal pain and blood in stool. Negative for constipation,  diarrhea, nausea, vomiting and indigestion.   Endocrine: Negative for polyphagia.   Genitourinary: Negative for dysuria and hematuria.   Musculoskeletal: Negative for back pain, joint swelling and neck pain.   Skin: Negative for rash, skin lesions and bruise.   Neurological: Negative for dizziness, seizures, speech difficulty, weakness, numbness and confusion.   Hematological: Negative for adenopathy. Does not bruise/bleed easily.   Psychiatric/Behavioral: Negative for hallucinations, suicidal ideas and depressed mood.        The following portions of the patient's history were reviewed and updated as appropriate: allergies, current medications, past family history, past medical history, past social history, past surgical history and problem list.    Objective     Vitals:    06/01/21 1800 06/01/21 1930 06/01/21 1942 06/01/21 2008   BP: 98/58 92/78  108/60   BP Location:       Patient Position:       Pulse:   97    Resp:   16    Temp:   98.4 °F (36.9 °C)    TempSrc:   Oral    SpO2: 99% 98%  98%   Weight:       Height:           Physical Exam  Vitals and nursing note reviewed.   Constitutional:       Appearance: She is well-developed.   HENT:      Head: Normocephalic and atraumatic.      Right Ear: External ear normal.      Left Ear: External ear normal.   Eyes:      Comments: Mild pallor noted   Neck:      Thyroid: No thyromegaly.      Trachea: No tracheal deviation.   Cardiovascular:      Rate and Rhythm: Normal rate and regular rhythm.      Heart sounds: No murmur heard.     Pulmonary:      Effort: Pulmonary effort is normal. No respiratory distress.      Breath sounds: Normal breath sounds.   Abdominal:      General: Bowel sounds are normal. There is no distension.      Palpations: Abdomen is soft. There is no mass.      Tenderness: There is no abdominal tenderness. There is no guarding or rebound.      Hernia: No hernia is present.      Comments: Rectal examination reveals a dark blood with clots    Musculoskeletal:         General: Normal range of motion.      Cervical back: Normal range of motion and neck supple.   Skin:     General: Skin is warm and dry.   Neurological:      Mental Status: She is alert and oriented to person, place, and time.      Cranial Nerves: No cranial nerve deficit.      Sensory: No sensory deficit.   Psychiatric:         Mood and Affect: Mood normal.         Behavior: Behavior normal.         Thought Content: Thought content normal.         Judgment: Judgment normal.         Results from last 7 days   Lab Units 06/01/21  1453   SODIUM mmol/L 138   POTASSIUM mmol/L 4.2   CHLORIDE mmol/L 98   CO2 mmol/L 33.1*   BUN mg/dL 15   CREATININE mg/dL 0.56*   CALCIUM mg/dL 8.8   ALBUMIN g/dL 2.90*   BILIRUBIN mg/dL 1.1   ALK PHOS U/L 94   ALT (SGPT) U/L 8   AST (SGOT) U/L 15   GLUCOSE mg/dL 94   WBC 10*3/mm3 10.84*   HEMOGLOBIN g/dL 10.6*   PLATELETS 10*3/mm3 369       Imaging Results (Last 24 Hours)     Procedure Component Value Units Date/Time    CT Abdomen Pelvis With Contrast [[id number]] Collected: 06/01/21 1915     Updated: 06/01/21 1916    Narrative:      FINAL REPORT    TECHNIQUE:  Axial CT images were performed from the lung bases through the  symphysis pubis after the administration of intravenous  contrast.  This study was performed with techniques to keep  radiation doses as low as reasonably achievable (ALARA).  Individualized dose reduction techniques using automated  exposure control or adjustment of mA and/or kV according to the  patient's size were employed.    CLINICAL HISTORY:  rectal bleeding    COMPARISON:  5/12/2021    FINDINGS:  LOWER CHEST: There is cardiomegaly.  There are moderate bilateral pleural effusions.  There is lower lobe atelectasis.    ABDOMEN/PELVIS:    Liver, gallbladder and bile ducts: The liver enhances homogeneously without suspicious focal hepatic lesion.  There is been prior cholecystectomy.  There is no definite biliary duct dilatation.    Adrenal  glands: The adrenal glands are morphologically unremarkable without suspicious lesion.    Kidneys, ureter and urinary bladder: No suspicious renal lesion.  No hydronephrosis.  Urinary bladder is unremarkable.    Spleen: The spleen is normal size.    Pancreas: The pancreas is grossly unremarkable.    GI systems and mesentery: No evidence of bowel obstruction.  The appendix is not visualized.  There is colonic diverticulosis without evidence of diverticulitis.  There are postoperative changes of the cecum.    Lymph nodes: No definite pathologically enlarged abdominal or pelvic lymph nodes present.    Vessels: The aorta and abdominal arteries are grossly patent.  The IVC and portal vein are patent and grossly unremarkable.    Peritoneum: No free intraperitoneal fluid or pneumoperitoneum.    Pelvic viscera: No acute findings.    Body wall: No body wall contusion. No significant body wall hernias.    Bones: No acute fracture.      Impression:      No acute findings in the abdomen or pelvis to account for patient's symptoms.    Colonic diverticulosis without diverticulitis.    Moderate bilateral pleural effusions and cardiomegaly.    Authenticated by Rafael Limon MD on 06/01/2021 07:15:08 PM          Assessment / Plan      Assessment/Recommendations:   1.  Suspected lower GI bleeding  2.  Long-term anticoagulation use  3.  History of diverticulosis with suspected diverticular bleed  4.  Remote history of colon cancer status post right hemicolectomy and chemotherapy 1998  5.  Acute blood loss anemia    Patient appears to have a diverticular bleeding in the setting of anticoagulation use.  No signs of any current bleeding.  Rectal examination reveals minimal dark stool with minimal clots.  Last dose of Xarelto was 2 days ago.  She is hemodynamically stable.    She needs a colonoscopy for further evaluation.  I have discussed the risk and benefits involved including high risk of perforation.  Patient agreeable to proceed  with a colonoscopy.     Keep n.p.o. after midnight  Okay for clear liquids until midnight  GoLYTELY bowel prep 2 L tonight and 2 L early in the morning  Keep n.p.o. after 8 AM tomorrow on 6/2/2021  Hold all anticoagulation antiplatelet medication  PT/INR in a.m.  H&H every 6 hours for 24 hours  Transfuse to keep the Hgb more than 7 g/dL  She has been scheduled for colonoscopy tomorrow noon on 6/2/2021    6.  Chronic atrial fibrillation  7.  Chronic hypoxic respiratory failure on home oxygen  8.  CHF  On Xarelto.  To hold anticoagulation for now      Thank you very much for letting me participate in the care of this patient.  Please do not hesitate to call me if you have any questions.    Iona Sanders MD  [facility] [place]  6/1/2021  20:54 EDT    Please note that portions of this note may have been completed with a voice recognition program.

## 2021-06-02 NOTE — PROGRESS NOTES
[facility]    Name:  Elsa Alcaraz   Age:  81 y.o.  Sex:  female  :  1940  MRN:  [id number]   Visit Number:  [id number]  Admission Date:  2021  Date Of Service:  21  Primary Care Physician:  Kemi Hdz MD     LOS: 1 day :    Chief Complaint:      Generalized weakness.    Subjective:    Ms. Alcaraz was seen and examined this morning. She is currently undergoing bowel prep for her colonoscopy to be done this morning. She is having bright red blood per rectum but denies any dizziness at this time. She is complaining of left lower quadrant pain which has improved. Denies any chest pain or fevers. Previous physician documentation, laboratory and imaging data have been reviewed. She was admitted last night from the emergency room with suspected lower GI bleeding. She states that she has stopped her Xarelto 2 days ago when her symptoms started. She does have a remote history of colon cancer and breast cancer. She states that she lives with her  and her grandson is living with them.    Review of Systems:     All systems were reviewed and negative except as mentioned in subjective, assessment and plan.    Vital Signs:    Temp:  [97.9 °F (36.6 °C)-98.7 °F (37.1 °C)] 98.3 °F (36.8 °C)  Heart Rate:  [] 94  Resp:  [16-26] 26  BP: ()/(54-78) 109/66    Intake and output:    I/O last 3 completed shifts:  In: 500 [IV Piggyback:500]  Out: -   I/O this shift:  In: 350 [I.V.:350]  Out: -     Physical Examination:    General Appearance:  Alert and cooperative. Comfortable at rest.   Head:  Atraumatic and normocephalic.   Eyes: Conjunctivae and sclerae normal, no icterus. No pallor.   Throat: No oral lesions, no thrush, oral mucosa moist.   Neck: Supple, trachea midline, no thyromegaly.   Lungs:   Breath sounds heard bilaterally equally.  No crackles or wheezing. No Pleural rub or bronchial breathing.   Heart:  Normal S1 and S2, no murmur, no  gallop, no rub. No JVD.   Abdomen:   Normal bowel sounds, no masses, no organomegaly. Soft, nontender, nondistended, no rebound tenderness. Surgical scar noted in the midline.   Extremities: Supple, no edema, no cyanosis, no clubbing.   Skin: No bleeding or rash.   Neurologic: Alert and oriented x 3. No facial asymmetry. Moves all four limbs. No tremors.      Laboratory results:    Results from last 7 days   Lab Units 06/01/21  1453   SODIUM mmol/L 138   POTASSIUM mmol/L 4.2   CHLORIDE mmol/L 98   CO2 mmol/L 33.1*   BUN mg/dL 15   CREATININE mg/dL 0.56*   CALCIUM mg/dL 8.8   BILIRUBIN mg/dL 1.1   ALK PHOS U/L 94   ALT (SGPT) U/L 8   AST (SGOT) U/L 15   GLUCOSE mg/dL 94     Results from last 7 days   Lab Units 06/02/21  1230 06/02/21  0611 06/02/21  0006 06/01/21  1453   WBC 10*3/mm3  --   --   --  10.84*   HEMOGLOBIN g/dL 10.7* 9.9* 11.8* 10.6*   HEMATOCRIT %  --   --   --  33.2*   PLATELETS 10*3/mm3  --   --   --  369     Results from last 7 days   Lab Units 06/02/21  0611   INR  1.22*     I have reviewed the patient's laboratory results.    Radiology results:    CT Abdomen Pelvis With Contrast    Result Date: 6/1/2021  FINAL REPORT TECHNIQUE: Axial CT images were performed from the lung bases through the symphysis pubis after the administration of intravenous contrast.  This study was performed with techniques to keep radiation doses as low as reasonably achievable (ALARA). Individualized dose reduction techniques using automated exposure control or adjustment of mA and/or kV according to the patient's size were employed. CLINICAL HISTORY: rectal bleeding COMPARISON: 5/12/2021 FINDINGS: LOWER CHEST: There is cardiomegaly.  There are moderate bilateral pleural effusions.  There is lower lobe atelectasis. ABDOMEN/PELVIS: Liver, gallbladder and bile ducts: The liver enhances homogeneously without suspicious focal hepatic lesion.  There is been prior cholecystectomy.  There is no definite biliary duct dilatation.  Adrenal glands: The adrenal glands are morphologically unremarkable without suspicious lesion. Kidneys, ureter and urinary bladder: No suspicious renal lesion.  No hydronephrosis.  Urinary bladder is unremarkable. Spleen: The spleen is normal size. Pancreas: The pancreas is grossly unremarkable. GI systems and mesentery: No evidence of bowel obstruction.  The appendix is not visualized.  There is colonic diverticulosis without evidence of diverticulitis.  There are postoperative changes of the cecum. Lymph nodes: No definite pathologically enlarged abdominal or pelvic lymph nodes present. Vessels: The aorta and abdominal arteries are grossly patent.  The IVC and portal vein are patent and grossly unremarkable. Peritoneum: No free intraperitoneal fluid or pneumoperitoneum. Pelvic viscera: No acute findings. Body wall: No body wall contusion. No significant body wall hernias. Bones: No acute fracture.     Impression: No acute findings in the abdomen or pelvis to account for patient's symptoms. Colonic diverticulosis without diverticulitis. Moderate bilateral pleural effusions and cardiomegaly. Authenticated by Rafael Limon MD on 06/01/2021 07:15:08 PM    I have reviewed the patient's radiology reports.    Medication Review:     I have reviewed the patient's active and prn medications.     Problem List:      Lower GI bleeding    Acute blood loss anemia    Paroxysmal atrial fibrillation (CMS/HCC)    Essential hypertension    Chronic combined systolic and diastolic heart failure (CMS/HCC)    Diverticulosis of colon    Assessment:    1. Acute blood loss anemia, has not required blood transfusions, POA.  2. Lower GI bleeding secondary to colonic anastomotic ulcer, POA.  3. Pandiverticulosis, without evidence of bleeding.  4. Chronic systolic and diastolic heart failure with ejection fraction of 32%, no evidence of exacerbation.  5. Paroxysmal atrial fibrillation, holding Xarelto for 2 weeks.  6. COPD, no evidence of  exacerbation.  7. Essential hypertension.    Plan:    Ms. Alcaraz is currently hemodynamically stable and her hemoglobin has remained stable so far even though she did drop by 2 points.  Her Xarelto is currently on hold.  She is currently on IV Protonix.    Patient did have colonoscopy done by Dr. Sanders and I have discussed the patient's colonoscopy findings with him.  She was noted to have an anastomotic ulcer in the right colon from her previous hemicolectomy.  Dr. Sanders feels that the patient may have bled from there.  Small biopsy was taken.  No active bleeding was noted but she was noted to have pandiverticulosis.  Dr. Sanders recommended to hold the Xarelto for 2 weeks.    Patient will be continued on her home medications from tomorrow.  We will repeat her blood work tomorrow.  Hopefully, she should be able to go home tomorrow.  Discussed with nursing staff and multidisciplinary team.    DVT Prophylaxis: SCDs.  Code Status: Full code.  Diet: Cardiac.  Discharge Plan: Home tomorrow.    Nik Hicks MD  06/02/21  15:26 EDT    Dictated utilizing Dragon dictation.

## 2021-06-02 NOTE — OUTREACH NOTE
CHF Week 3 Survey      Responses   [facility] patient discharged from?  [facility]   Does the patient have one of the following disease processes/diagnoses(primary or secondary)?  CHF   Week 3 attempt successful?  No   Unsuccessful attempts  Attempt 1   Revoke  Readmitted          Re Medrano RN

## 2021-06-02 NOTE — ANESTHESIA PREPROCEDURE EVALUATION
Anesthesia Evaluation     Patient summary reviewed and Nursing notes reviewed   no history of anesthetic complications:  NPO Solid Status: > 8 hours  NPO Liquid Status: > 8 hours           Airway   Mallampati: II  TM distance: >3 FB  Neck ROM: full  no difficulty expected  Dental - normal exam     Pulmonary - normal exam   (+) COPD, shortness of breath, sleep apnea,   Cardiovascular - normal exam    Rhythm: regular  Rate: normal    (+) hypertension, valvular problems/murmurs, dysrhythmias, CHF , hyperlipidemia,       Neuro/Psych  (+) psychiatric history,     GI/Hepatic/Renal/Endo    (+)  hiatal hernia, GERD, GI bleeding ,     Musculoskeletal     Abdominal    Substance History - negative use     OB/GYN negative ob/gyn ROS         Other   arthritis,    history of cancer    ROS/Med Hx Other: · The left ventricular cavity is moderately dilated.  · Left ventricular wall thickness is consistent with mild concentric hypertrophy.  · Estimated left ventricular EF = 32% Left ventricular systolic function is moderately decreased                Anesthesia Plan    ASA 3     MAC   (Pt told that intravenous sedation will be used as the primary anesthetic along with local anesthesia if necessary. Every effort will be made to make sure the patient is comfortable.     The patient was told they may or may not have recall for the procedure. It was further explained that if the MAC was not adequate that a general anesthetic with either an LMA or endotracheal tube would be required.     Will proceed with the plan of care.)  intravenous induction     Anesthetic plan, all risks, benefits, and alternatives have been provided, discussed and informed consent has been obtained with: patient.

## 2021-06-02 NOTE — PAYOR COMM NOTE
"  PRE AUTH  UR MANAGER; SRAVANI MEDINA PHONE [phone] AND [phone]      Elsa Caraballo (81 y.o. Female)     Date of Birth Social Security Number Address Home Phone MRN    1940  [address]  [place] [address] [phone] [id number]    Church Marital Status          Other        Admission Date Admission Type Admitting Provider Attending Provider Department, Room/Bed    21 Emergency Nik Hicks MD Pais, Roshan, MD [facility] OR, NANO OR/MAIN OR    Discharge Date Discharge Disposition Discharge Destination                       Attending Provider: Nik Hicks MD    Allergies: No Known Allergies    Isolation: None   Infection: None   Code Status: CPR    Ht: 160 cm (63\")   Wt: 61.5 kg (135 lb 9.3 oz)    Admission Cmt: None   Principal Problem: None                Active Insurance as of 2021     Primary Coverage     Payor Plan Insurance Group Employer/Plan Group    ANTHEM MEDICARE REPLACEMENT ANTHEM MEDICARE ADVANTAGE [id number]     Payor Plan Address Payor Plan Phone Number Payor Plan Fax Number Effective Dates    [address] [phone]  2020 - None Entered    [facility] [id number]       Subscriber Name Subscriber Birth Date Member ID       ELSA CARABALLO 1940 [id number]                 Emergency Contacts      (Rel.) Home Phone Work Phone Mobile Phone    Logan Caraballo (Spouse) [phone] -- [phone]               History & Physical      Kenia Domingo DO at 21 2013              [facility]   HISTORY AND PHYSICAL      Name:  Elsa Caraballo   Age:  81 y.o.  Sex:  female  :  1940  MRN:  [id number]   Visit Number:  [id number]  Admission Date:  2021  Date Of Service:  21  Primary Care Physician:  Kemi Hdz MD    Chief Complaint: Rectal bleeding    History Of Presenting Illness:  81 F remote h/o breast and colon cancer, A. fib on Xarelto, COPD on 4 LNC who presented to " the ED with painless rectal bleeding.  This started about 2 days ago, she has been in touch with Dr. Danielle Sullivan and had an appointment tomorrow, but when she called today with her symptoms, she was told to come to the ER.  She is noted to have a Hgb dropped from 14-10, ED clinician was instructed to administer bowel prep for colonoscopy in the morning.  Patient denies any weakness, dizziness, CP, dyspnea, or other acute issues.  She admits that sometimes the stool is black, and in total may have had 5 stools for some bleeding started.  Rectal exam in the ER revealed no hemorrhoids.  CT abdomen/pelvis revealed diverticulosis.  Her last colonoscopy was about 4 years ago, she denies NSAID usage.    Review Of Systems: A full 14 point review of systems was performed and all pertinent positives and negatives are noted in the HPI.    Past Medical History: Patient  has a past medical history of Anxiety disorder due to general medical condition (2017), Arthritis, Atrial fibrillation (CMS/[facility]) (2017), Body piercing, Borderline diabetes, Breast cancer (CMS/[facility]) (), Cataract (2017), Chronic bronchitis (CMS/[facility]) (2017), Colon cancer (CMS/[facility]) (1998), COPD (chronic obstructive pulmonary disease) (CMS/[facility]), Cyanocobalamine deficiency (non anemic), Depression (2017), Diverticulosis, Esophageal reflux (2017), Hiatal hernia (2017), History of bladder infections, exercise stress test, radiation therapy, Hyperlipidemia, Hypertension (2017), Impaired functional mobility and activity tolerance, Osteoporosis, Palpitations (2017), Prediabetes, Problems with swallowing, Shortness of breath, Sleep apnea (2017), Tricuspid valve disorder (2017), Vitamin D deficiency, and Wears glasses.    Past Surgical History: Patient  has a past surgical history that includes Anal fistulotomy;  section (1981); Cholecystectomy (2009); Colectomy partial / total  "(11/30/1998); Ventral hernia repair (10/28/2012); Colonoscopy (2016); Esophagogastroduodenoscopy (2016); Esophagogastroduodenoscopy (N/A, 5/4/2018); Breast lumpectomy (Right, 03/06/2006); Esophagogastroduodenoscopy (N/A, 8/12/2019); Breast biopsy; Cataract extraction w/ intraocular lens implant (Left, 11/18/2019); Hysterectomy; Appendectomy; Cataract extraction w/ intraocular lens implant (Right, 12/16/2019); and Cataract extraction (2019).    Social History: Patient  reports that she has never smoked. She has never used smokeless tobacco. She reports current alcohol use of about 1.0 - 2.0 standard drinks of alcohol per week. She reports that she does not use drugs.    Family History: Patient's family history includes Asthma in her mother; COPD in her mother; Cancer in her father and sister; Diabetes in her maternal grandmother; Hypertension in her mother; Osteoarthritis in her mother.    Allergies:  Patient has no known allergies.    Home Medications:  Prior to Admission Medications     Prescriptions Last Dose Informant Patient Reported? Taking?    albuterol sulfate  (90 Base) MCG/ACT inhaler   No No    Inhale 2 puffs 4 (Four) Times a Day.    Patient taking differently:  Inhale 2 puffs Every 6 (Six) Hours As Needed.    budesonide-formoterol (SYMBICORT) 80-4.5 MCG/ACT inhaler  Self Yes No    Inhale 2 puffs 2 (Two) Times a Day.    cholecalciferol (VITAMIN D3) 25 MCG (1000 UT) tablet  Self Yes No    Take 1,000 Units by mouth Daily.    citalopram (CeleXA) 20 MG tablet   Yes No    Take 20 mg by mouth Daily.    furosemide (Lasix) 20 MG tablet   No No    Take 1 tablet by mouth Daily.    furosemide (LASIX) 40 MG tablet   No No    Take 1 tablet by mouth Daily for 5 days.    hydroCHLOROthiazide (HYDRODIURIL) 25 MG tablet  Self Yes No    Take 25 mg by mouth Daily. Pt not taking at this time because she states \"her BP has been to low.\"    losartan (COZAAR) 100 MG tablet  Self Yes No    Take 100 mg by mouth Daily. Pt not " "taking at this time because she states \"her BP has been to low.\"    metoprolol succinate XL (TOPROL-XL) 50 MG 24 hr tablet  Pharmacy Yes No    Take 50 mg by mouth Daily.    pantoprazole (PROTONIX) 40 MG EC tablet   Yes No    Take 40 mg by mouth Daily.    potassium chloride 10 MEQ CR tablet   No No    Take 2 tablets by mouth Daily. Take 1 tablet daily with furosemide    Xarelto 20 MG tablet   No No    Take 1 tablet by mouth Daily.        ED Medications:  Medications   sodium chloride 0.9 % flush 10 mL (has no administration in time range)   polyethylene glycol with electrolytes (GOLYTELY) solution 4,000 mL (has no administration in time range)   sodium chloride 0.9 % bolus 500 mL (0 mL Intravenous Stopped 6/1/21 1557)   iopamidol (ISOVUE-300) 61 % injection 100 mL (100 mL Intravenous Given 6/1/21 1726)     Vital Signs:  Temp:  [98.2 °F (36.8 °C)-98.4 °F (36.9 °C)] 98.4 °F (36.9 °C)  Heart Rate:  [] 97  Resp:  [16] 16  BP: ()/(58-78) 92/78        06/01/21  1404   Weight: 60.9 kg (134 lb 3.2 oz)     Body mass index is 23.77 kg/m².    Physical Exam:   General: NAD, resting in bed  HEENT: EOMI, NC/AT, nasal cannula  Heart: regular  Lungs: Mildly labored  Abdomen: Soft, nondistended, nontender  Extremities: No edema  Neurological: A&O x3, moves all extremities  Psychological: Mood and affect appropriate, speech is coherent  Skin: warm, dry    Laboratory data: I have reviewed the labs done in the emergency room.  Results from last 7 days   Lab Units 06/01/21  1453   SODIUM mmol/L 138   POTASSIUM mmol/L 4.2   CHLORIDE mmol/L 98   CO2 mmol/L 33.1*   BUN mg/dL 15   CREATININE mg/dL 0.56*   CALCIUM mg/dL 8.8   BILIRUBIN mg/dL 1.1   ALK PHOS U/L 94   ALT (SGPT) U/L 8   AST (SGOT) U/L 15   GLUCOSE mg/dL 94     Results from last 7 days   Lab Units 06/01/21  1453   WBC 10*3/mm3 10.84*   HEMOGLOBIN g/dL 10.6*   HEMATOCRIT % 33.2*   PLATELETS 10*3/mm3 369     Results from last 7 days   Lab Units 06/01/21  1453   LIPASE " U/L 13         Results from last 7 days   Lab Units 06/01/21  1623   COLOR UA  Dark Yellow*   GLUCOSE UA  Negative   KETONES UA  Trace*   LEUKOCYTES UA  Moderate (2+)*   PH, URINE  <=5.0   BILIRUBIN UA  Moderate (2+)*   UROBILINOGEN UA  1.0 E.U./dL   RBC UA /HPF 0-2*   WBC UA /HPF 3-5*     Pain Management Panel    There is no flowsheet data to display.       Radiology:CT Abdomen Pelvis With Contrast    Result Date: 6/1/2021  FINAL REPORT TECHNIQUE: Axial CT images were performed from the lung bases through the symphysis pubis after the administration of intravenous contrast.  This study was performed with techniques to keep radiation doses as low as reasonably achievable (ALARA). Individualized dose reduction techniques using automated exposure control or adjustment of mA and/or kV according to the patient's size were employed. CLINICAL HISTORY: rectal bleeding COMPARISON: 5/12/2021 FINDINGS: LOWER CHEST: There is cardiomegaly.  There are moderate bilateral pleural effusions.  There is lower lobe atelectasis. ABDOMEN/PELVIS: Liver, gallbladder and bile ducts: The liver enhances homogeneously without suspicious focal hepatic lesion.  There is been prior cholecystectomy.  There is no definite biliary duct dilatation. Adrenal glands: The adrenal glands are morphologically unremarkable without suspicious lesion. Kidneys, ureter and urinary bladder: No suspicious renal lesion.  No hydronephrosis.  Urinary bladder is unremarkable. Spleen: The spleen is normal size. Pancreas: The pancreas is grossly unremarkable. GI systems and mesentery: No evidence of bowel obstruction.  The appendix is not visualized.  There is colonic diverticulosis without evidence of diverticulitis.  There are postoperative changes of the cecum. Lymph nodes: No definite pathologically enlarged abdominal or pelvic lymph nodes present. Vessels: The aorta and abdominal arteries are grossly patent.  The IVC and portal vein are patent and grossly  unremarkable. Peritoneum: No free intraperitoneal fluid or pneumoperitoneum. Pelvic viscera: No acute findings. Body wall: No body wall contusion. No significant body wall hernias. Bones: No acute fracture.     No acute findings in the abdomen or pelvis to account for patient's symptoms. Colonic diverticulosis without diverticulitis. Moderate bilateral pleural effusions and cardiomegaly. Authenticated by Rafael Limon MD on 06/01/2021 07:15:08 PM  Echo from last month reviewed which reveals EF 0.32, diastolic dysfunction,    Assessment:  Acute blood loss anemia  GI bleed  Remote h/o colon cancer and breast cancer  Paroxysmal atrial fibrillation on Xarelto  Bilateral pleural effusions  Chronic systolic and diastolic heart failure  Chronic hypoxic respiratory failure 4 LNC    Plan:  -trend H&H, type & cross, IV PPI empirically, bowel prep, GI evaluation  -consider Kcentra if she becomes unstable  -Hold on IV fluids unless she becomes hemodynamically unstable given her h/o heart failure    Risk Assessment: High secondary to age  DVT Prophylaxis: Hold Xarelto  Code Status: Full  Diet: NPO    Kenia Domingo DO  06/01/21  20:14 EDT    Dictated utilizing Dragon dictation.      Electronically signed by Kenia Domingo DO at 06/01/21 2030          Emergency Department Notes      Aldo Shen Jr., PA-C at 06/01/21 1444     Attestation signed by Tariq Mora DO at 06/02/21 0235          For this patient encounter, I reviewed the NP or PA documentation, treatment plan, and medical decision making. Tariq Mora DO 6/2/2021 02:35 EDT                  Subjective   Patient is an 81-year-old female with a past medical history of a fib, diverticulosis, colon cancer, and hiatal hernia presenting to ED with painless bright red rectal bleeding. Patient reports that two days ago she had a stool of normal quality but was bright red in color. Patient denies pain with bowel movements. Patient denies abdominal pain. Patient denies  "fever. Patient denies nausea/vomiting. Patient denies diarrhea/constipation. Patient reports being on 20mg Xarelto.        History provided by:  Patient   used: No        Review of Systems   Constitutional: Negative for chills, fatigue and fever.   Gastrointestinal: Positive for blood in stool. Negative for abdominal distention, abdominal pain, constipation, diarrhea, nausea and vomiting.   Skin: Negative for pallor.   All other systems reviewed and are negative.      Past Medical History:   Diagnosis Date   • Anxiety disorder due to general medical condition 1/11/2017   • Arthritis    • Atrial fibrillation (CMS/HCC) 1/11/2017   • Body piercing     BOTH EARS   • Borderline diabetes    • Breast cancer (CMS/HCC) 2003    right-radiation and a lumpectomy   • Cataract 01/11/2017    Left eye surgery 11/19   • Chronic bronchitis (CMS/HCC) 1/11/2017   • Colon cancer (CMS/HCC) 11/30/1998    had surgery and chemo   • COPD (chronic obstructive pulmonary disease) (CMS/[facility])    • Cyanocobalamine deficiency (non anemic)    • Depression 1/11/2017   • Diverticulosis    • Esophageal reflux 1/11/2017   • Hiatal hernia 1/11/2017   • History of bladder infections    • Hx of exercise stress test     \"several years ago by Dr. Mercado\".     • Hx of radiation therapy    • Hyperlipidemia    • Hypertension 1/11/2017   • Impaired functional mobility and activity tolerance    • Osteoporosis    • Palpitations 1/11/2017   • Prediabetes    • Problems with swallowing     meat at times per pt report   • Shortness of breath     WITH EXERTION    • Sleep apnea 01/11/2017    NO CPAP   • Tricuspid valve disorder 01/11/2017    Patient doesn't know anything about this   • Vitamin D deficiency    • Wears glasses        No Known Allergies    Past Surgical History:   Procedure Laterality Date   • ANAL FISTULOTOMY     • APPENDECTOMY      1998   • BREAST BIOPSY     • BREAST LUMPECTOMY Right 03/06/2006   • CATARACT EXTRACTION  2019    both " eyes    • CATARACT EXTRACTION W/ INTRAOCULAR LENS IMPLANT Left 2019    Procedure: CATARACT PHACO EXTRACTION WITH INTRAOCULAR LENS IMPLANT LEFT;  Surgeon: Masoud David MD;  Location: [facility] OR;  Service: Ophthalmology   • CATARACT EXTRACTION W/ INTRAOCULAR LENS IMPLANT Right 2019    Procedure: CATARACT PHACO EXTRACTION WITH INTRAOCULAR LENS IMPLANT RIGHT;  Surgeon: Masoud David MD;  Location: [facility] OR;  Service: Ophthalmology   •  SECTION  1981   • CHOLECYSTECTOMY  2009   • COLECTOMY PARTIAL / TOTAL  1998    COLON CANCER   • COLONOSCOPY     • ENDOSCOPY     • ENDOSCOPY N/A 2018    Procedure: ESOPHAGOGASTRODUODENOSCOPY WITH COLD FORCEP BIOPSY;  Surgeon: Vasquez Fagan MD;  Location: [facility] ENDOSCOPY;  Service: Gastroenterology   • ENDOSCOPY N/A 2019    Procedure: ESOPHAGOGASTRODUODENOSCOPY WITH BIOPSY;  Surgeon: Vasquez Fagan MD;  Location: [facility] ENDOSCOPY;  Service: Gastroenterology   • HYSTERECTOMY         • VENTRAL HERNIA REPAIR  10/28/2012       Family History   Problem Relation Age of Onset   • Hypertension Mother    • Asthma Mother    • COPD Mother    • Osteoarthritis Mother    • Cancer Father    • Cancer Sister    • Diabetes Maternal Grandmother        Social History     Socioeconomic History   • Marital status:      Spouse name: Not on file   • Number of children: Not on file   • Years of education: Not on file   • Highest education level: Not on file   Tobacco Use   • Smoking status: Never Smoker   • Smokeless tobacco: Never Used   Substance and Sexual Activity   • Alcohol use: Yes     Alcohol/week: 1.0 - 2.0 standard drinks     Types: 1 - 2 Glasses of wine per week     Comment: occas   • Drug use: No   • Sexual activity: Defer           Objective   Physical Exam  Constitutional:       General: She is not in acute distress.     Appearance: Normal appearance. She is normal weight. She is not ill-appearing, toxic-appearing or  diaphoretic.   HENT:      Head: Normocephalic and atraumatic.      Right Ear: External ear normal.      Left Ear: External ear normal.      Nose: Nose normal.   Eyes:      General: No scleral icterus.        Right eye: No discharge.         Left eye: No discharge.      Extraocular Movements: Extraocular movements intact.      Conjunctiva/sclera: Conjunctivae normal.   Pulmonary:      Effort: Pulmonary effort is normal. No respiratory distress.   Abdominal:      General: Abdomen is flat. There is no distension.      Palpations: There is no mass.      Tenderness: There is no abdominal tenderness. There is no guarding.   Genitourinary:     Comments: External hemorrhoids not thrombosed  Neurological:      General: No focal deficit present.      Mental Status: She is alert and oriented to person, place, and time.   Psychiatric:         Mood and Affect: Mood normal.         Behavior: Behavior normal.         Thought Content: Thought content normal.         Judgment: Judgment normal.         Procedures          ED Course  ED Course as of Jun 01 2012 Tue Jun 01, 2021 1915 Discussed with Dr. Sanders, he wants the patient admitted, bowel prep, scope in the morning    [CS]      ED Course User Index  [CS] Aldo Shen Jr., SPENCER                                           MDM  Number of Diagnoses or Management Options  Gastrointestinal hemorrhage, unspecified gastrointestinal hemorrhage type: new and requires workup     Amount and/or Complexity of Data Reviewed  Clinical lab tests: reviewed  Tests in the radiology section of CPT®: reviewed  Decide to obtain previous medical records or to obtain history from someone other than the patient: yes  Discuss the patient with other providers: yes    Risk of Complications, Morbidity, and/or Mortality  Presenting problems: minimal  Diagnostic procedures: minimal  Management options: minimal    Patient Progress  Patient progress: stable      Final diagnoses:   Gastrointestinal  hemorrhage, unspecified gastrointestinal hemorrhage type       ED Disposition  ED Disposition     ED Disposition Condition Comment    Decision to Admit  Level of Care: Telemetry [5]   Diagnosis: Gastrointestinal hemorrhage, unspecified gastrointestinal hemorrhage type [[id number]]   Admitting Physician: FARRUKH DOMINGO [[id number]]   Attending Physician: FARRUKH DOMINGO [[id number]]   Certification: I Certify That Inpatient Hospital Services Are Medically Necessary For Greater Than 2 Midnights            No follow-up provider specified.       Medication List      No changes were made to your prescriptions during this visit.          Aldo Shen Jr., PA-C  06/01/21 2012      Electronically signed by Tarqi Mora DO at 06/02/21 0235     Oralia Mott at 06/01/21 1906        Dr. Sanders was called per SPENCER Carlson.     Oralia Mott  06/01/21 1906      Electronically signed by Oralia Mott at 06/01/21 1906     Sarina Bright at 06/01/21 1916        Called Dr. Domingo per SPENCER Carlson. Voicemail left.      Sarina Bright  06/01/21 1917      Electronically signed by Sarina Bright at 06/01/21 1917     Sarina Bright at 06/01/21 1931        Called Dr. Domingo back, attempting to reach him for Aldo. Stated he would call back, he was performing a procedure. Aldo updated.     Sarina Bright  06/01/21 1933      Electronically signed by Sarina Bright at 06/01/21 1933     Sarina Bright at 06/01/21 2014        Patient will be going to [facility]. [place] RIBEIRO notified.      Sarina Bright  06/01/21 2014      Electronically signed by Sarina Bright at 06/01/21 2014         Current Facility-Administered Medications   Medication Dose Route Frequency Provider Last Rate Last Admin   • budesonide-formoterol (SYMBICORT) 80-4.5 MCG/ACT inhaler 2 puff  2 puff Inhalation BID - RT Farrukh Domingo DO   2 puff at 06/02/21 0724   • ipratropium-albuterol (DUO-NEB) nebulizer solution 3 mL  3 mL Nebulization Q4H PRN Farrukh Domingo DO       • metoprolol succinate XL  (TOPROL-XL) 24 hr tablet 50 mg  50 mg Oral Daily Kenia Domingo DO   50 mg at 21 0924   • [MAR Hold] ondansetron (ZOFRAN) injection 4 mg  4 mg Intravenous Q6H PRN Iona Sanders MD   4 mg at 21 2243   • pantoprazole (PROTONIX) injection 40 mg  40 mg Intravenous Q12H Kenia Domingo DO   40 mg at 21 0925   • polyethylene glycol with electrolytes (GOLYTELY) solution 4,000 mL  4,000 mL Oral See Admin Instructions Kenia Domingo DO       • sodium chloride 0.9 % flush 10 mL  10 mL Intravenous PRN Kenia Domingo DO       • sodium chloride 0.9 % infusion  70 mL/hr Intravenous Continuous PRN Iona Sanders MD 70 mL/hr at 21 1120 70 mL/hr at 21 1120       Physician Progress Notes (last 48 hours) (Notes from 21 1236 through 21 1236)    No notes of this type exist for this encounter.          Consult Notes (last 48 hours) (Notes from 21 1236 through 21 1236)      Iona Sanders MD at 21               In Patient Consult      Date of Consultation: 2021  Patient Name: Elsa Alcaraz  MRN: [id number]  : 1940     Referring provider: Kenia Domingo DO    Primary care provider:  Kemi Hdz MD    Reason for consultation: GI bleeding    History of Present Illness: This is 81-year-old female patient with a prior history of hypertension, hyperlipidemia, chronic atrial fibrillation on Eliquis, history of breast cancer status post lumpectomy and radiation therapy, history of colon cancer status post right hemicolectomy more than 20 years ago, CHF, COPD/obstructive sleep apnea on home oxygen, colonic diverticulosis, was brought to the emergency room today on 2021 with complaints of rectal bleeding for the past 2 days.    Patient states that she was doing fine until day before yesterday, then had a bowel movement with blood in the stool.  Subsequently she had multiple bowel movements at least 4 times every day.  She continued  "to have a red blood with clots even today for which she came to emergency room.. She had at least 3 times bowel movement with dark blood and clots.  She does have associated left-sided abdominal cramps.  Denies any nausea vomiting, no fever chills.    Patient denies any prior history of GI bleed.  She has been taking Xarelto and last dose was day before yesterday 2 days ago.  Denies any NSAID use.    She states that she had an EGD done in 2019 Dr. Daniel.  Last colonoscopy was about 5 years ago and was been told normal no reports available.  Her father had some type of stomach cancer details unknown.    She was evaluated emergency room and noted to have a drop in her H&H from 14 to 10 g/dL.  CT scan abdomen pelvis done which showed only colonic diverticulosis otherwise unremarkable.  GI was consulted for further evaluation and management.    Subjective     Past Medical History:   Diagnosis Date   • Anxiety disorder due to general medical condition 1/11/2017   • Arthritis    • Atrial fibrillation (CMS/HCC) 1/11/2017   • Body piercing     BOTH EARS   • Borderline diabetes    • Breast cancer (CMS/HCC) 2003    right-radiation and a lumpectomy   • Cataract 01/11/2017    Left eye surgery 11/19   • Chronic bronchitis (CMS/HCC) 1/11/2017   • Colon cancer (CMS/HCC) 11/30/1998    had surgery and chemo   • COPD (chronic obstructive pulmonary disease) (CMS/[facility])    • Cyanocobalamine deficiency (non anemic)    • Depression 1/11/2017   • Diverticulosis    • Esophageal reflux 1/11/2017   • Hiatal hernia 1/11/2017   • History of bladder infections    • Hx of exercise stress test     \"several years ago by Dr. Mercado\".     • Hx of radiation therapy    • Hyperlipidemia    • Hypertension 1/11/2017   • Impaired functional mobility and activity tolerance    • Osteoporosis    • Palpitations 1/11/2017   • Prediabetes    • Problems with swallowing     meat at times per pt report   • Shortness of breath     WITH EXERTION    • Sleep apnea " 2017    NO CPAP   • Tricuspid valve disorder 2017    Patient doesn't know anything about this   • Vitamin D deficiency    • Wears glasses        Past Surgical History:   Procedure Laterality Date   • ANAL FISTULOTOMY     • APPENDECTOMY         • BREAST BIOPSY     • BREAST LUMPECTOMY Right 2006   • CATARACT EXTRACTION  2019    both eyes    • CATARACT EXTRACTION W/ INTRAOCULAR LENS IMPLANT Left 2019    Procedure: CATARACT PHACO EXTRACTION WITH INTRAOCULAR LENS IMPLANT LEFT;  Surgeon: Masoud David MD;  Location: [facility] OR;  Service: Ophthalmology   • CATARACT EXTRACTION W/ INTRAOCULAR LENS IMPLANT Right 2019    Procedure: CATARACT PHACO EXTRACTION WITH INTRAOCULAR LENS IMPLANT RIGHT;  Surgeon: Masoud David MD;  [facility] [facility] OR;  Service: Ophthalmology   •  SECTION  1981   • CHOLECYSTECTOMY  2009   • COLECTOMY PARTIAL / TOTAL  1998    COLON CANCER   • COLONOSCOPY     • ENDOSCOPY     • ENDOSCOPY N/A 2018    Procedure: ESOPHAGOGASTRODUODENOSCOPY WITH COLD FORCEP BIOPSY;  Surgeon: Vasquez Fagan MD;  Location: [facility] ENDOSCOPY;  Service: Gastroenterology   • ENDOSCOPY N/A 2019    Procedure: ESOPHAGOGASTRODUODENOSCOPY WITH BIOPSY;  Surgeon: Vasquez Fagan MD;  Location: [facility] ENDOSCOPY;  Service: Gastroenterology   • HYSTERECTOMY         • VENTRAL HERNIA REPAIR  10/28/2012       Family History   Problem Relation Age of Onset   • Hypertension Mother    • Asthma Mother    • COPD Mother    • Osteoarthritis Mother    • Cancer Father    • Cancer Sister    • Diabetes Maternal Grandmother        Social History     Socioeconomic History   • Marital status:      Spouse name: Not on file   • Number of children: Not on file   • Years of education: Not on file   • Highest education level: Not on file   Tobacco Use   • Smoking status: Never Smoker   • Smokeless tobacco: Never Used   Substance and Sexual Activity   • Alcohol  use: Yes     Alcohol/week: 1.0 - 2.0 standard drinks     Types: 1 - 2 Glasses of wine per week     Comment: occas   • Drug use: No   • Sexual activity: Defer         Current Facility-Administered Medications:   •  polyethylene glycol with electrolytes (GOLYTELY) solution 4,000 mL, 4,000 mL, Oral, See Admin Instructions, Iona Sanders MD  •  sodium chloride 0.9 % flush 10 mL, 10 mL, Intravenous, PRN, Aldo Shen Jr., SPENCER    No Known Allergies    Review of Systems   Constitutional: Negative for chills, fever, unexpected weight gain and unexpected weight loss.   HENT: Negative for congestion, ear pain, postnasal drip, sinus pressure and sore throat.    Eyes: Negative for blurred vision and visual disturbance.   Respiratory: Negative for cough, chest tightness and shortness of breath.    Cardiovascular: Negative for chest pain, palpitations and leg swelling.   Gastrointestinal: Positive for abdominal pain and blood in stool. Negative for constipation, diarrhea, nausea, vomiting and indigestion.   Endocrine: Negative for polyphagia.   Genitourinary: Negative for dysuria and hematuria.   Musculoskeletal: Negative for back pain, joint swelling and neck pain.   Skin: Negative for rash, skin lesions and bruise.   Neurological: Negative for dizziness, seizures, speech difficulty, weakness, numbness and confusion.   Hematological: Negative for adenopathy. Does not bruise/bleed easily.   Psychiatric/Behavioral: Negative for hallucinations, suicidal ideas and depressed mood.        The following portions of the patient's history were reviewed and updated as appropriate: allergies, current medications, past family history, past medical history, past social history, past surgical history and problem list.    Objective     Vitals:    06/01/21 1800 06/01/21 1930 06/01/21 1942 06/01/21 2008   BP: 98/58 92/78  108/60   BP Location:       Patient Position:       Pulse:   97    Resp:   16    Temp:   98.4 °F (36.9 °C)     TempSrc:   Oral    SpO2: 99% 98%  98%   Weight:       Height:           Physical Exam  Vitals and nursing note reviewed.   Constitutional:       Appearance: She is well-developed.   HENT:      Head: Normocephalic and atraumatic.      Right Ear: External ear normal.      Left Ear: External ear normal.   Eyes:      Comments: Mild pallor noted   Neck:      Thyroid: No thyromegaly.      Trachea: No tracheal deviation.   Cardiovascular:      Rate and Rhythm: Normal rate and regular rhythm.      Heart sounds: No murmur heard.     Pulmonary:      Effort: Pulmonary effort is normal. No respiratory distress.      Breath sounds: Normal breath sounds.   Abdominal:      General: Bowel sounds are normal. There is no distension.      Palpations: Abdomen is soft. There is no mass.      Tenderness: There is no abdominal tenderness. There is no guarding or rebound.      Hernia: No hernia is present.      Comments: Rectal examination reveals a dark blood with clots   Musculoskeletal:         General: Normal range of motion.      Cervical back: Normal range of motion and neck supple.   Skin:     General: Skin is warm and dry.   Neurological:      Mental Status: She is alert and oriented to person, place, and time.      Cranial Nerves: No cranial nerve deficit.      Sensory: No sensory deficit.   Psychiatric:         Mood and Affect: Mood normal.         Behavior: Behavior normal.         Thought Content: Thought content normal.         Judgment: Judgment normal.         Results from last 7 days   Lab Units 06/01/21  1453   SODIUM mmol/L 138   POTASSIUM mmol/L 4.2   CHLORIDE mmol/L 98   CO2 mmol/L 33.1*   BUN mg/dL 15   CREATININE mg/dL 0.56*   CALCIUM mg/dL 8.8   ALBUMIN g/dL 2.90*   BILIRUBIN mg/dL 1.1   ALK PHOS U/L 94   ALT (SGPT) U/L 8   AST (SGOT) U/L 15   GLUCOSE mg/dL 94   WBC 10*3/mm3 10.84*   HEMOGLOBIN g/dL 10.6*   PLATELETS 10*3/mm3 369       Imaging Results (Last 24 Hours)     Procedure Component Value Units Date/Time     CT Abdomen Pelvis With Contrast [[id number]] Collected: 06/01/21 1915     Updated: 06/01/21 1916    Narrative:      FINAL REPORT    TECHNIQUE:  Axial CT images were performed from the lung bases through the  symphysis pubis after the administration of intravenous  contrast.  This study was performed with techniques to keep  radiation doses as low as reasonably achievable (ALARA).  Individualized dose reduction techniques using automated  exposure control or adjustment of mA and/or kV according to the  patient's size were employed.    CLINICAL HISTORY:  rectal bleeding    COMPARISON:  5/12/2021    FINDINGS:  LOWER CHEST: There is cardiomegaly.  There are moderate bilateral pleural effusions.  There is lower lobe atelectasis.    ABDOMEN/PELVIS:    Liver, gallbladder and bile ducts: The liver enhances homogeneously without suspicious focal hepatic lesion.  There is been prior cholecystectomy.  There is no definite biliary duct dilatation.    Adrenal glands: The adrenal glands are morphologically unremarkable without suspicious lesion.    Kidneys, ureter and urinary bladder: No suspicious renal lesion.  No hydronephrosis.  Urinary bladder is unremarkable.    Spleen: The spleen is normal size.    Pancreas: The pancreas is grossly unremarkable.    GI systems and mesentery: No evidence of bowel obstruction.  The appendix is not visualized.  There is colonic diverticulosis without evidence of diverticulitis.  There are postoperative changes of the cecum.    Lymph nodes: No definite pathologically enlarged abdominal or pelvic lymph nodes present.    Vessels: The aorta and abdominal arteries are grossly patent.  The IVC and portal vein are patent and grossly unremarkable.    Peritoneum: No free intraperitoneal fluid or pneumoperitoneum.    Pelvic viscera: No acute findings.    Body wall: No body wall contusion. No significant body wall hernias.    Bones: No acute fracture.      Impression:      No acute findings in the abdomen  or pelvis to account for patient's symptoms.    Colonic diverticulosis without diverticulitis.    Moderate bilateral pleural effusions and cardiomegaly.    Authenticated by Rafael Limon MD on 06/01/2021 07:15:08 PM          Assessment / Plan      Assessment/Recommendations:   1.  Suspected lower GI bleeding  2.  Long-term anticoagulation use  3.  History of diverticulosis with suspected diverticular bleed  4.  Remote history of colon cancer status post right hemicolectomy and chemotherapy 1998  5.  Acute blood loss anemia    Patient appears to have a diverticular bleeding in the setting of anticoagulation use.  No signs of any current bleeding.  Rectal examination reveals minimal dark stool with minimal clots.  Last dose of Xarelto was 2 days ago.  She is hemodynamically stable.    She needs a colonoscopy for further evaluation.  I have discussed the risk and benefits involved including high risk of perforation.  Patient agreeable to proceed with a colonoscopy.     Keep n.p.o. after midnight  Okay for clear liquids until midnight  GoLYTELY bowel prep 2 L tonight and 2 L early in the morning  Keep n.p.o. after 8 AM tomorrow on 6/2/2021  Hold all anticoagulation antiplatelet medication  PT/INR in a.m.  H&H every 6 hours for 24 hours  Transfuse to keep the Hgb more than 7 g/dL  She has been scheduled for colonoscopy tomorrow noon on 6/2/2021    6.  Chronic atrial fibrillation  7.  Chronic hypoxic respiratory failure on home oxygen  8.  CHF  On Xarelto.  To hold anticoagulation for now      Thank you very much for letting me participate in the care of this patient.  Please do not hesitate to call me if you have any questions.    Iona Sanders MD  [facility]  6/1/2021  20:54 EDT    Please note that portions of this note may have been completed with a voice recognition program.     Electronically signed by Iona Sanders MD at 06/01/21 4811          Nutrition Notes (last 24 hours) (Notes  "from 06/01/21 1236 through 06/02/21 1236)      Joyce Fowler RD at 06/02/21 0921      Consult Orders    1. Inpatient Nutrition Consult [[id number]] ordered by Kenia Domingo DO at 06/01/21 2127               Adult Nutrition  Assessment/PES    Patient Name:  Elsa Alcaraz  YOB: 1940  MRN: [id number]  Admit Date:  6/1/2021    Assessment Date:  6/2/2021    Comments:    Recommend:  1. Consider advancing diet order with cardiac and GI soft modifications once medically appropriate and tolerated.  2. Establish and encourage PO intake once diet is advanced.  3. Consider a MVI with minerals daily.     RD to follow pt and available PRN.      Reason for Assessment     Row Name 06/02/21 0917          Reason for Assessment    Reason For Assessment  nurse/nurse practitioner consult;identified at risk by screening criteria     Diagnosis  cardiac disease;cancer diagnosis/related complications;gastrointestinal disease;hematological/related complications;pulmonary disease GI bleed, AFIB, COPD, anemia, CHF, chronic resp failure, diverticulosis, hx breast and colon cancer     Identified At Risk by Screening Criteria  unintentional loss of 10 lbs or more in the past 2 mos;reduced oral intake over the last month;MST SCORE 2+           Anthropometrics     Row Name 06/02/21 0919          Anthropometrics    Height  160 cm (63\")        Ideal Body Weight (IBW)    Ideal Body Weight (IBW) (kg)  52.72         Labs/Tests/Procedures/Meds     Row Name 06/02/21 0918          Labs/Procedures/Meds    Lab Results Reviewed  reviewed, pertinent     Lab Results Comments  Low: Cr, Alb        Medications    Pertinent Medications Reviewed  reviewed, pertinent     Pertinent Medications Comments  Protonix         Physical Findings     Row Name 06/02/21 0918          Physical Findings    Skin  other (see comments) right anterior third toe         Estimated/Assessed Needs     Row Name 06/02/21 0919          Calculation Measurements    Weight " "Used For Calculations  61.5 kg (135 lb 9.3 oz)     Height  160 cm (63\")        Estimated/Assessed Needs    Additional Documentation  Fluid Requirements (Group);Protein Requirements (Group);Calorie Requirements (Group);[place]-St. Jeor Equation (Group)        Calorie Requirements    Estimated Calorie Requirement Comment  [id number]        [place]-St. Jeor Equation    RMR ([place]-St. Jeor Equation)  1054.125     [place]-St. Jeor Activity Factors  -- 1.3 AF        Protein Requirements    Weight Used For Protein Calculations  61.5 kg (135 lb 9.3 oz)     Est Protein Requirement Amount (gms/kg)  1.0 gm protein 49-61 grams     Estimated Protein Requirements (gms/day)  61.5        Fluid Requirements    Fluid Requirements (mL/day)  1500     RDA Method (mL)  1500         Nutrition Prescription Ordered     Row Name 06/02/21 0919          Nutrition Prescription PO    Current PO Diet  NPO         Evaluation of Received Nutrient/Fluid Intake     Row Name 06/02/21 0920          PO Evaluation    Number of Days PO Intake Evaluated  Insufficient Data               Problem/Interventions:  Problem 1     Row Name 06/02/21 0920          Nutrition Diagnoses Problem 1    Problem 1  Altered GI Function     Etiology (related to)  Medical Diagnosis     Gastrointestinal  Diverticulosis;Gastrointestinal bleed     Signs/Symptoms (evidenced by)  Report/Observation     Reported/Observed By  MD         Problem 2     Row Name 06/02/21 0920          Nutrition Diagnoses Problem 2    Problem 2  Inadequate Nutrient Intake     Etiology (related to)  MNT for Treatment/Condition     Signs/Symptoms (evidenced by)  NPO             Intervention Goal     Row Name 06/02/21 0921          Intervention Goal    General  Meet nutritional needs for age/condition;Improved nutrition related lab(s)     PO  Meet estimated needs;Advance diet;Establish PO     Weight  Maintain weight         Nutrition Intervention     Row Name 06/02/21 0921          Nutrition Intervention "    RD/Tech Action  Follow Tx progress;Await begin PO;Recommend/ordered     Recommended/Ordered  Diet         Nutrition Prescription     Row Name 06/02/21 0921          Nutrition Prescription PO    PO Prescription  Begin/change diet     Common Modifiers  Cardiac;GI Soft/Moffat     New PO Prescription Ordered?  No, recommended        Other Orders    Obtain Weight  Daily     Obtain Weight Ordered?  No, recommended     Supplement  Vitamin mineral supplement     Supplement Ordered?  No, recommended         Education/Evaluation     Row Name 06/02/21 0921          Education    Education  Will Instruct as appropriate        Monitor/Evaluation    Monitor  Per protocol;I&O;Pertinent labs;Weight;Skin status           Electronically signed by:  Joyce Siddiqi RD  06/02/21 09:21 EDT    Electronically signed by Joyce Fowler RD at 06/02/21 0922       Physical Therapy Notes (last 24 hours) (Notes from 06/01/21 1236 through 06/02/21 1236)    No notes exist for this encounter.         Occupational Therapy Notes (last 24 hours) (Notes from 06/01/21 1236 through 06/02/21 1236)    No notes exist for this encounter.         Speech Language Pathology Notes (last 24 hours) (Notes from 06/01/21 1236 through 06/02/21 1236)    No notes exist for this encounter.         Respiratory Therapy Notes (last 24 hours) (Notes from 06/01/21 1236 through 06/02/21 1236)    No notes exist for this encounter.         Discharge Summary    No notes of this type exist for this encounter.

## 2021-06-02 NOTE — ANESTHESIA POSTPROCEDURE EVALUATION
Patient: Elsa Alcaraz    Procedure Summary     Date: 06/02/21 Room / Location: [facility] ENDOSCOPY 1 / [facility] ENDOSCOPY    Anesthesia Start: 1242 Anesthesia Stop: 1305    Procedure: COLONOSCOPY WITH BIOPSY (N/A Anus) Diagnosis:       Gastrointestinal hemorrhage, unspecified gastrointestinal hemorrhage type      (Gastrointestinal hemorrhage, unspecified gastrointestinal hemorrhage type [K92.2])    Surgeons: Iona Sanders MD Provider: Masoud Steen CRNA    Anesthesia Type: MAC ASA Status: 3          Anesthesia Type: MAC    Vitals  Vitals Value Taken Time   /66 06/02/21 1342   Temp 98.3 °F (36.8 °C) 06/02/21 1342   Pulse 96 06/02/21 1404   Resp 26 06/02/21 1342   SpO2 93 % 06/02/21 1344   Vitals shown include unvalidated device data.          Post Anesthesia Care and Evaluation    Patient location during evaluation: PACU  Patient participation: complete - patient participated  Level of consciousness: awake  Pain score: 1  Pain management: adequate  Airway patency: patent  Anesthetic complications: No anesthetic complications  PONV Status: controlled  Cardiovascular status: acceptable and stable  Respiratory status: acceptable  Hydration status: acceptable

## 2021-06-02 NOTE — PLAN OF CARE
Goal Outcome Evaluation:   PT rested between care. Pt started prep for colonoscopy. Will continue plan of care.

## 2021-06-02 NOTE — H&P
[facility]   HISTORY AND PHYSICAL      Name:  Elsa Alcaraz   Age:  81 y.o.  Sex:  female  :  1940  MRN:  [id number]   Visit Number:  [id number]  Admission Date:  2021  Date Of Service:  21  Primary Care Physician:  Kemi Hdz MD    Chief Complaint: Rectal bleeding    History Of Presenting Illness:  81 F remote h/o breast and colon cancer, A. fib on Xarelto, COPD on 4 LNC who presented to the ED with painless rectal bleeding.  This started about 2 days ago, she has been in touch with Dr. Danielle Sullivan and had an appointment tomorrow, but when she called today with her symptoms, she was told to come to the ER.  She is noted to have a Hgb dropped from 14-10, ED clinician was instructed to administer bowel prep for colonoscopy in the morning.  Patient denies any weakness, dizziness, CP, dyspnea, or other acute issues.  She admits that sometimes the stool is black, and in total may have had 5 stools for some bleeding started.  Rectal exam in the ER revealed no hemorrhoids.  CT abdomen/pelvis revealed diverticulosis.  Her last colonoscopy was about 4 years ago, she denies NSAID usage.    Review Of Systems: A full 14 point review of systems was performed and all pertinent positives and negatives are noted in the HPI.    Past Medical History: Patient  has a past medical history of Anxiety disorder due to general medical condition (2017), Arthritis, Atrial fibrillation (CMS/HCC) (2017), Body piercing, Borderline diabetes, Breast cancer (CMS/HCC) (), Cataract (2017), Chronic bronchitis (CMS/HCC) (2017), Colon cancer (CMS/HCC) (1998), COPD (chronic obstructive pulmonary disease) (CMS/HCC), Cyanocobalamine deficiency (non anemic), Depression (2017), Diverticulosis, Esophageal reflux (2017), Hiatal hernia (2017), History of bladder infections, exercise stress test, radiation therapy, Hyperlipidemia, Hypertension  (2017), Impaired functional mobility and activity tolerance, Osteoporosis, Palpitations (2017), Prediabetes, Problems with swallowing, Shortness of breath, Sleep apnea (2017), Tricuspid valve disorder (2017), Vitamin D deficiency, and Wears glasses.    Past Surgical History: Patient  has a past surgical history that includes Anal fistulotomy;  section (1981); Cholecystectomy (2009); Colectomy partial / total (1998); Ventral hernia repair (10/28/2012); Colonoscopy (); Esophagogastroduodenoscopy (); Esophagogastroduodenoscopy (N/A, 2018); Breast lumpectomy (Right, 2006); Esophagogastroduodenoscopy (N/A, 2019); Breast biopsy; Cataract extraction w/ intraocular lens implant (Left, 2019); Hysterectomy; Appendectomy; Cataract extraction w/ intraocular lens implant (Right, 2019); and Cataract extraction ().    Social History: Patient  reports that she has never smoked. She has never used smokeless tobacco. She reports current alcohol use of about 1.0 - 2.0 standard drinks of alcohol per week. She reports that she does not use drugs.    Family History: Patient's family history includes Asthma in her mother; COPD in her mother; Cancer in her father and sister; Diabetes in her maternal grandmother; Hypertension in her mother; Osteoarthritis in her mother.    Allergies:  Patient has no known allergies.    Home Medications:  Prior to Admission Medications     Prescriptions Last Dose Informant Patient Reported? Taking?    albuterol sulfate  (90 Base) MCG/ACT inhaler   No No    Inhale 2 puffs 4 (Four) Times a Day.    Patient taking differently:  Inhale 2 puffs Every 6 (Six) Hours As Needed.    budesonide-formoterol (SYMBICORT) 80-4.5 MCG/ACT inhaler  Self Yes No    Inhale 2 puffs 2 (Two) Times a Day.    cholecalciferol (VITAMIN D3) 25 MCG (1000 UT) tablet  Self Yes No    Take 1,000 Units by mouth Daily.    citalopram (CeleXA) 20 MG  "tablet   Yes No    Take 20 mg by mouth Daily.    furosemide (Lasix) 20 MG tablet   No No    Take 1 tablet by mouth Daily.    furosemide (LASIX) 40 MG tablet   No No    Take 1 tablet by mouth Daily for 5 days.    hydroCHLOROthiazide (HYDRODIURIL) 25 MG tablet  Self Yes No    Take 25 mg by mouth Daily. Pt not taking at this time because she states \"her BP has been to low.\"    losartan (COZAAR) 100 MG tablet  Self Yes No    Take 100 mg by mouth Daily. Pt not taking at this time because she states \"her BP has been to low.\"    metoprolol succinate XL (TOPROL-XL) 50 MG 24 hr tablet  Pharmacy Yes No    Take 50 mg by mouth Daily.    pantoprazole (PROTONIX) 40 MG EC tablet   Yes No    Take 40 mg by mouth Daily.    potassium chloride 10 MEQ CR tablet   No No    Take 2 tablets by mouth Daily. Take 1 tablet daily with furosemide    Xarelto 20 MG tablet   No No    Take 1 tablet by mouth Daily.        ED Medications:  Medications   sodium chloride 0.9 % flush 10 mL (has no administration in time range)   polyethylene glycol with electrolytes (GOLYTELY) solution 4,000 mL (has no administration in time range)   sodium chloride 0.9 % bolus 500 mL (0 mL Intravenous Stopped 6/1/21 1557)   iopamidol (ISOVUE-300) 61 % injection 100 mL (100 mL Intravenous Given 6/1/21 1726)     Vital Signs:  Temp:  [98.2 °F (36.8 °C)-98.4 °F (36.9 °C)] 98.4 °F (36.9 °C)  Heart Rate:  [] 97  Resp:  [16] 16  BP: ()/(58-78) 92/78        06/01/21  1404   Weight: 60.9 kg (134 lb 3.2 oz)     Body mass index is 23.77 kg/m².    Physical Exam:   General: NAD, resting in bed  HEENT: EOMI, NC/AT, nasal cannula  Heart: regular  Lungs: Mildly labored  Abdomen: Soft, nondistended, nontender  Extremities: No edema  Neurological: A&O x3, moves all extremities  Psychological: Mood and affect appropriate, speech is coherent  Skin: warm, dry    Laboratory data: I have reviewed the labs done in the emergency room.  Results from last 7 days   Lab Units " 06/01/21  1453   SODIUM mmol/L 138   POTASSIUM mmol/L 4.2   CHLORIDE mmol/L 98   CO2 mmol/L 33.1*   BUN mg/dL 15   CREATININE mg/dL 0.56*   CALCIUM mg/dL 8.8   BILIRUBIN mg/dL 1.1   ALK PHOS U/L 94   ALT (SGPT) U/L 8   AST (SGOT) U/L 15   GLUCOSE mg/dL 94     Results from last 7 days   Lab Units 06/01/21  1453   WBC 10*3/mm3 10.84*   HEMOGLOBIN g/dL 10.6*   HEMATOCRIT % 33.2*   PLATELETS 10*3/mm3 369     Results from last 7 days   Lab Units 06/01/21  1453   LIPASE U/L 13         Results from last 7 days   Lab Units 06/01/21  1623   COLOR UA  Dark Yellow*   GLUCOSE UA  Negative   KETONES UA  Trace*   LEUKOCYTES UA  Moderate (2+)*   PH, URINE  <=5.0   BILIRUBIN UA  Moderate (2+)*   UROBILINOGEN UA  1.0 E.U./dL   RBC UA /HPF 0-2*   WBC UA /HPF 3-5*     Pain Management Panel    There is no flowsheet data to display.       Radiology:CT Abdomen Pelvis With Contrast    Result Date: 6/1/2021  FINAL REPORT TECHNIQUE: Axial CT images were performed from the lung bases through the symphysis pubis after the administration of intravenous contrast.  This study was performed with techniques to keep radiation doses as low as reasonably achievable (ALARA). Individualized dose reduction techniques using automated exposure control or adjustment of mA and/or kV according to the patient's size were employed. CLINICAL HISTORY: rectal bleeding COMPARISON: 5/12/2021 FINDINGS: LOWER CHEST: There is cardiomegaly.  There are moderate bilateral pleural effusions.  There is lower lobe atelectasis. ABDOMEN/PELVIS: Liver, gallbladder and bile ducts: The liver enhances homogeneously without suspicious focal hepatic lesion.  There is been prior cholecystectomy.  There is no definite biliary duct dilatation. Adrenal glands: The adrenal glands are morphologically unremarkable without suspicious lesion. Kidneys, ureter and urinary bladder: No suspicious renal lesion.  No hydronephrosis.  Urinary bladder is unremarkable. Spleen: The spleen is normal  size. Pancreas: The pancreas is grossly unremarkable. GI systems and mesentery: No evidence of bowel obstruction.  The appendix is not visualized.  There is colonic diverticulosis without evidence of diverticulitis.  There are postoperative changes of the cecum. Lymph nodes: No definite pathologically enlarged abdominal or pelvic lymph nodes present. Vessels: The aorta and abdominal arteries are grossly patent.  The IVC and portal vein are patent and grossly unremarkable. Peritoneum: No free intraperitoneal fluid or pneumoperitoneum. Pelvic viscera: No acute findings. Body wall: No body wall contusion. No significant body wall hernias. Bones: No acute fracture.     No acute findings in the abdomen or pelvis to account for patient's symptoms. Colonic diverticulosis without diverticulitis. Moderate bilateral pleural effusions and cardiomegaly. Authenticated by Rafael Limon MD on 06/01/2021 07:15:08 PM  Echo from last month reviewed which reveals EF 0.32, diastolic dysfunction,    Assessment:  Acute blood loss anemia  GI bleed  Remote h/o colon cancer and breast cancer  Paroxysmal atrial fibrillation on Xarelto  Bilateral pleural effusions  Chronic systolic and diastolic heart failure  Chronic hypoxic respiratory failure 4 LNC    Plan:  -trend H&H, type & cross, IV PPI empirically, bowel prep, GI evaluation  -consider Kcentra if she becomes unstable  -Hold on IV fluids unless she becomes hemodynamically unstable given her h/o heart failure    Risk Assessment: High secondary to age  DVT Prophylaxis: Hold Xarelto  Code Status: Full  Diet: NPO    Kenia Domingo DO  06/01/21  20:14 EDT    Dictated utilizing Dragon dictation.

## 2021-06-02 NOTE — CASE MANAGEMENT/SOCIAL WORK
Discharge Planning Assessment  [facility]     Patient Name: Elsa Alcaraz  MRN: [id number]  Today's Date: 6/2/2021    Admit Date: 6/1/2021    Discharge Needs Assessment     Row Name 06/02/21 1613       Living Environment    Lives With  grandchild(michael);spouse    Name(s) of Who Lives With Patient  Logan Alcaraz (Spouse)    Current Living Arrangements  home/apartment/condo    Primary Care Provided by  self    Provides Primary Care For  no one    Family Caregiver if Needed  grandchild(michael), adult;spouse    Family Caregiver Names  Logan Alcaraz (Spouse)    Quality of Family Relationships  supportive;involved       Resource/Environmental Concerns    Resource/Environmental Concerns  none       Transition Planning    Patient/Family Anticipates Transition to  home with family    Patient/Family Anticipated Services at Transition  none    Transportation Anticipated  family or friend will provide       Discharge Needs Assessment    Equipment Currently Used at Home  walker, standard;oxygen 4L O2 Continuously through [employer]    Concerns to be Addressed  no discharge needs identified    Equipment Needed After Discharge  none        Discharge Plan     Row Name 06/02/21 1616       Plan    Plan Comments SW met with pt at bedside to complete discharge planning. Pt confirmed her address as listed in the chart. Pt states that she lives with her spouse, Logan and her grandson stays with them as well. Pt reports that her  does the grocery shopping and helps out a lot. Pt reports that she bathes herself and does laundry. Pt states that she has 4L O2 continuously through [employer]. Pt states that she has a walker and they just remodeled her bathroom to ensure it was safe for her. Pt also states that she can get around easily at her house. Pt confirmed her PCP as listed in the chart. Pt reports no other needs at this time. Pt reports that this hospitalization does not relate to her last hospitalization. Pt states that she is admitted this  time due to bleeding and needing a colonoscopy. No other CM needs have been identified. SW will continue to follow and assist as needed.      Case Management Readmission Assessment Note       Case Management Readmission Assessment (all recorded)      Readmission Interview     Row Name 06/02/21 1611             Readmission Indications    Is this hospitalization related to the prior hospital diagnosis?  No      What was the reason you were admitted?  Pt states that she was bleeding. Pt states she had a colonscopy.      Row Name 06/02/21 1611             Recommendation for rehospitalization    Did you speak with your physician prior to coming to the hospital  No      Did you seek care elsewhere prior to coming to the hospital?  No      [facility] Name 06/02/21 1611             Follow up appointment    Do you have a PCP?  Yes      Did you have an appointment with PCP/specialist after hospitalization within 7 days?  Yes      Did you keep your appointment?  No      If you did not keep appointment, why?  Other (comment) Pt states that her appointment was supposed to be today (6/2/21)      Row Name 06/02/21 1611             Medications    Did you have newly prescribed medications at discharge?  Yes      Did you understand the reasons for your medications at discharge and how to take them?  Yes      Did you understand the side effects of your medications?  Yes      Are you taking all of you prescribed medications?  Yes      [facility] Name 06/02/21 1611             Discharge Instructions    Did you understand your discharge instructions?  Yes      Did your family/caregiver hear your instructions?  Yes      Were you given a number of someone to call if you had questions or concerns?  Yes      [facility] Name 06/02/21 1611             Index discharge location/services    Do you have supportive family or friends in the home?  Yes          Continued Care and Services - Admitted Since 6/1/2021    Coordination has not been started for this  encounter.     Selected Continued Care - Prior Encounters Includes selections from prior encounters from 3/3/2021 to 6/2/2021    Discharged on 5/14/2021 Admission date: 5/12/2021 - Discharge disposition: Home or Self Care    Durable Medical Equipment     Service Provider Selected Services Address Phone Fax Patient Preferred    [employer] [address], [facility] [address] [phone] --       Internal Comment last updated by Josette Garcia RN 5/13/2021 1629    Current oxygen provider.                            Discharged on 4/5/2021 Admission date: 3/29/2021 - Discharge disposition: Home or Self Care    Durable Medical Equipment     Service Provider Selected Services Address Phone Fax Patient Preferred    [employer] [address], [facility] [address] [phone] --                      Demographic Summary     Row Name 06/02/21 1608       General Information    Admission Type  inpatient    Arrived From  emergency department    Referral Source  admission list    Reason for Consult  discharge planning    Preferred Language  English        Functional Status     Row Name 06/02/21 1609       Functional Status, IADL    Medications  assistive equipment    Meal Preparation  assistive equipment    Housekeeping  assistive equipment    Laundry  assistive equipment    Shopping  assistive equipment       Employment/    Employment Status  retired        Psychosocial     Row Name 06/02/21 1610       Coping/Stress    Patient Personal Strengths  strong support system;positive attitude    Sources of Support  other (see comments);spouse grandson       Developmental Stage ([place])    Developmental Stage  Stage 8 (66 years-death/Late Adulthood) Integrity vs. Despair        Abuse/Neglect    No documentation.       Legal    No documentation.       Substance Abuse    No documentation.       Patient Forms    No  documentation.           Valarie Kim MSW

## 2021-06-03 ENCOUNTER — APPOINTMENT (OUTPATIENT)
Dept: GENERAL RADIOLOGY | Facility: HOSPITAL | Age: 81
End: 2021-06-03

## 2021-06-03 PROBLEM — I50.43 ACUTE ON CHRONIC COMBINED SYSTOLIC AND DIASTOLIC CHF (CONGESTIVE HEART FAILURE): Status: ACTIVE | Noted: 2021-04-05

## 2021-06-03 PROBLEM — J96.11 CHRONIC RESPIRATORY FAILURE WITH HYPOXIA (HCC): Status: ACTIVE | Noted: 2021-06-03

## 2021-06-03 LAB
ANION GAP SERPL CALCULATED.3IONS-SCNC: 4.7 MMOL/L (ref 5–15)
BUN SERPL-MCNC: 8 MG/DL (ref 8–23)
BUN/CREAT SERPL: 16.7 (ref 7–25)
CALCIUM SPEC-SCNC: 8.8 MG/DL (ref 8.6–10.5)
CHLORIDE SERPL-SCNC: 103 MMOL/L (ref 98–107)
CO2 SERPL-SCNC: 34.3 MMOL/L (ref 22–29)
CREAT SERPL-MCNC: 0.48 MG/DL (ref 0.57–1)
DEPRECATED RDW RBC AUTO: 49.5 FL (ref 37–54)
ERYTHROCYTE [DISTWIDTH] IN BLOOD BY AUTOMATED COUNT: 13.8 % (ref 12.3–15.4)
GFR SERPL CREATININE-BSD FRML MDRD: 124 ML/MIN/1.73
GLUCOSE SERPL-MCNC: 82 MG/DL (ref 65–99)
HCT VFR BLD AUTO: 33 % (ref 34–46.6)
HGB BLD-MCNC: 10.2 G/DL (ref 12–15.9)
LAB AP CASE REPORT: NORMAL
MCH RBC QN AUTO: 30.4 PG (ref 26.6–33)
MCHC RBC AUTO-ENTMCNC: 30.9 G/DL (ref 31.5–35.7)
MCV RBC AUTO: 98.2 FL (ref 79–97)
PATH REPORT.FINAL DX SPEC: NORMAL
PLATELET # BLD AUTO: 326 10*3/MM3 (ref 140–450)
PMV BLD AUTO: 9.4 FL (ref 6–12)
POTASSIUM SERPL-SCNC: 4.3 MMOL/L (ref 3.5–5.2)
RBC # BLD AUTO: 3.36 10*6/MM3 (ref 3.77–5.28)
SODIUM SERPL-SCNC: 142 MMOL/L (ref 136–145)
TSH SERPL DL<=0.05 MIU/L-ACNC: 0.44 UIU/ML (ref 0.27–4.2)
WBC # BLD AUTO: 8.67 10*3/MM3 (ref 3.4–10.8)

## 2021-06-03 PROCEDURE — 71045 X-RAY EXAM CHEST 1 VIEW: CPT

## 2021-06-03 PROCEDURE — 80048 BASIC METABOLIC PNL TOTAL CA: CPT | Performed by: INTERNAL MEDICINE

## 2021-06-03 PROCEDURE — 25010000002 FUROSEMIDE PER 20 MG: Performed by: INTERNAL MEDICINE

## 2021-06-03 PROCEDURE — 94799 UNLISTED PULMONARY SVC/PX: CPT

## 2021-06-03 PROCEDURE — 94640 AIRWAY INHALATION TREATMENT: CPT

## 2021-06-03 PROCEDURE — 99233 SBSQ HOSP IP/OBS HIGH 50: CPT | Performed by: INTERNAL MEDICINE

## 2021-06-03 PROCEDURE — 99222 1ST HOSP IP/OBS MODERATE 55: CPT | Performed by: INTERNAL MEDICINE

## 2021-06-03 PROCEDURE — 84443 ASSAY THYROID STIM HORMONE: CPT | Performed by: INTERNAL MEDICINE

## 2021-06-03 PROCEDURE — 99232 SBSQ HOSP IP/OBS MODERATE 35: CPT | Performed by: PHYSICIAN ASSISTANT

## 2021-06-03 PROCEDURE — 85027 COMPLETE CBC AUTOMATED: CPT | Performed by: INTERNAL MEDICINE

## 2021-06-03 RX ORDER — IPRATROPIUM BROMIDE AND ALBUTEROL SULFATE 2.5; .5 MG/3ML; MG/3ML
3 SOLUTION RESPIRATORY (INHALATION)
Status: DISCONTINUED | OUTPATIENT
Start: 2021-06-03 | End: 2021-06-05 | Stop reason: HOSPADM

## 2021-06-03 RX ORDER — FUROSEMIDE 10 MG/ML
20 INJECTION INTRAMUSCULAR; INTRAVENOUS ONCE
Status: COMPLETED | OUTPATIENT
Start: 2021-06-03 | End: 2021-06-03

## 2021-06-03 RX ORDER — FUROSEMIDE 10 MG/ML
20 INJECTION INTRAMUSCULAR; INTRAVENOUS EVERY 12 HOURS
Status: DISCONTINUED | OUTPATIENT
Start: 2021-06-03 | End: 2021-06-03

## 2021-06-03 RX ORDER — CITALOPRAM 20 MG/1
20 TABLET ORAL DAILY
Status: DISCONTINUED | OUTPATIENT
Start: 2021-06-04 | End: 2021-06-05 | Stop reason: HOSPADM

## 2021-06-03 RX ORDER — GUAIFENESIN 600 MG/1
600 TABLET, EXTENDED RELEASE ORAL EVERY 12 HOURS SCHEDULED
Status: DISCONTINUED | OUTPATIENT
Start: 2021-06-03 | End: 2021-06-05 | Stop reason: HOSPADM

## 2021-06-03 RX ORDER — SPIRONOLACTONE 25 MG/1
12.5 TABLET ORAL DAILY
Status: DISCONTINUED | OUTPATIENT
Start: 2021-06-03 | End: 2021-06-05 | Stop reason: HOSPADM

## 2021-06-03 RX ORDER — ALPRAZOLAM 0.5 MG/1
0.5 TABLET ORAL ONCE
Status: COMPLETED | OUTPATIENT
Start: 2021-06-03 | End: 2021-06-03

## 2021-06-03 RX ORDER — BUDESONIDE 0.5 MG/2ML
0.5 INHALANT ORAL
Status: DISCONTINUED | OUTPATIENT
Start: 2021-06-03 | End: 2021-06-05 | Stop reason: HOSPADM

## 2021-06-03 RX ORDER — METOPROLOL TARTRATE 5 MG/5ML
2.5 INJECTION INTRAVENOUS ONCE
Status: COMPLETED | OUTPATIENT
Start: 2021-06-03 | End: 2021-06-03

## 2021-06-03 RX ORDER — FUROSEMIDE 10 MG/ML
60 INJECTION INTRAMUSCULAR; INTRAVENOUS EVERY 12 HOURS
Status: COMPLETED | OUTPATIENT
Start: 2021-06-03 | End: 2021-06-05

## 2021-06-03 RX ADMIN — GUAIFENESIN 600 MG: 600 TABLET, EXTENDED RELEASE ORAL at 12:02

## 2021-06-03 RX ADMIN — FUROSEMIDE 60 MG: 10 INJECTION, SOLUTION INTRAMUSCULAR; INTRAVENOUS at 16:16

## 2021-06-03 RX ADMIN — IPRATROPIUM BROMIDE AND ALBUTEROL SULFATE 3 ML: .5; 3 SOLUTION RESPIRATORY (INHALATION) at 13:00

## 2021-06-03 RX ADMIN — FUROSEMIDE 20 MG: 10 INJECTION, SOLUTION INTRAMUSCULAR; INTRAVENOUS at 12:02

## 2021-06-03 RX ADMIN — PANTOPRAZOLE SODIUM 40 MG: 40 TABLET, DELAYED RELEASE ORAL at 09:21

## 2021-06-03 RX ADMIN — BUDESONIDE 0.5 MG: 0.5 INHALANT RESPIRATORY (INHALATION) at 19:57

## 2021-06-03 RX ADMIN — FUROSEMIDE 20 MG: 20 TABLET ORAL at 09:21

## 2021-06-03 RX ADMIN — CITALOPRAM HYDROBROMIDE 20 MG: 20 TABLET ORAL at 09:21

## 2021-06-03 RX ADMIN — GUAIFENESIN 600 MG: 600 TABLET, EXTENDED RELEASE ORAL at 21:42

## 2021-06-03 RX ADMIN — ALPRAZOLAM 0.5 MG: 0.5 TABLET ORAL at 12:33

## 2021-06-03 RX ADMIN — METOPROLOL SUCCINATE 50 MG: 50 TABLET, EXTENDED RELEASE ORAL at 09:21

## 2021-06-03 RX ADMIN — IPRATROPIUM BROMIDE AND ALBUTEROL SULFATE 3 ML: .5; 3 SOLUTION RESPIRATORY (INHALATION) at 19:56

## 2021-06-03 RX ADMIN — SPIRONOLACTONE 12.5 MG: 25 TABLET, FILM COATED ORAL at 16:17

## 2021-06-03 RX ADMIN — METOPROLOL TARTRATE 2.5 MG: 1 INJECTION, SOLUTION INTRAVENOUS at 12:33

## 2021-06-03 RX ADMIN — IPRATROPIUM BROMIDE AND ALBUTEROL SULFATE 3 ML: .5; 3 SOLUTION RESPIRATORY (INHALATION) at 04:25

## 2021-06-03 RX ADMIN — BUDESONIDE AND FORMOTEROL FUMARATE DIHYDRATE 2 PUFF: 80; 4.5 AEROSOL RESPIRATORY (INHALATION) at 07:03

## 2021-06-03 RX ADMIN — SACUBITRIL AND VALSARTAN 1 TABLET: 24; 26 TABLET, FILM COATED ORAL at 21:42

## 2021-06-03 NOTE — PLAN OF CARE
Goal Outcome Evaluation:  Plan of Care Reviewed With: patient  Progress: no change  Outcome Summary: patient began a fast irregular rate of A-fib RVR. medications given and cards consulted. spent time talking to patient. patient expressed concerns about health condition and age. will continue to monitor.

## 2021-06-03 NOTE — PLAN OF CARE
Goal Outcome Evaluation:        Outcome Summary: VSS.  Oxygenation maintained on 3L-4L O2 via nasal cannula.  Respiratory called for PRN nebulizer treatment (see MAR).  No acute events noted during shift.  Patient up in chair at beginning of shift.  No acute events noted during shift.  Will continue to monitor patient.

## 2021-06-03 NOTE — PROGRESS NOTES
Adult Nutrition  Assessment/PES    Patient Name:  Elsa Alcaraz  YOB: 1940  MRN: [id number]  Admit Date:  6/1/2021    Assessment Date:  6/3/2021    Comments:    Recommend:  1. Consider adding cardiac modification to current diet order as medically appropriate and tolerated.  2. Encourage PO intake. PO intake average ~25% x 3 meals.  3. RD ordered Boost Pudding BID.  4. Consider a multivitamin with minerals daily.     RD to follow pt and available PRN.      Reason for Assessment     Row Name 06/03/21 1539          Reason for Assessment    Reason For Assessment  follow-up protocol     Diagnosis  cardiac disease;cancer diagnosis/related complications;gastrointestinal disease;hematological/related complications;pulmonary disease GI bleed, AFIB, COPD, anemia, CHF, chronic resp failure, diverticulosis, hx breast and colon cancer     Identified At Risk by Screening Criteria  unintentional loss of 10 lbs or more in the past 2 mos;reduced oral intake over the last month;MST SCORE 2+             Labs/Tests/Procedures/Meds     Row Name 06/03/21 1536          Labs/Procedures/Meds    Lab Results Reviewed  reviewed, pertinent     Lab Results Comments  Low: Alb, Cr        Medications    Pertinent Medications Reviewed  reviewed, pertinent     Pertinent Medications Comments  Lasix, Protonix         Physical Findings     Row Name 06/03/21 1536          Physical Findings    Skin  other (see comments) right anterior third toe           Nutrition Prescription Ordered     Row Name 06/03/21 1536          Nutrition Prescription PO    Current PO Diet  Regular     Common Modifiers  GI Soft/[place]         Evaluation of Received Nutrient/Fluid Intake     Row Name 06/03/21 1537          PO Evaluation    Number of Days PO Intake Evaluated  2 days     Number of Meals  3     % PO Intake  25               Problem/Interventions:  Problem 1     Row Name 06/03/21 1539          Nutrition Diagnoses Problem 1    Problem 1  Altered GI  Function     Etiology (related to)  Medical Diagnosis     Gastrointestinal  Diverticulosis;Gastrointestinal bleed     Signs/Symptoms (evidenced by)  Report/Observation     Reported/Observed By  MD         Problem 2     Row Name 06/03/21 1537          Nutrition Diagnoses Problem 2    Problem 2  Inadequate Nutrient Intake     Etiology (related to)  MNT for Treatment/Condition     Signs/Symptoms (evidenced by)  NPO     Resolved?  Yes Diet order advanced             Intervention Goal     Row Name 06/03/21 1537          Intervention Goal    General  Meet nutritional needs for age/condition;Improved nutrition related lab(s);Nutrition support treatment     PO  Meet estimated needs;Increase intake;PO intake (%)     PO Intake %  50 %     Weight  Maintain weight         Nutrition Intervention     Row Name 06/03/21 1541          Nutrition Intervention    RD/Tech Action  Follow Tx progress;Encourage intake;Recommend/ordered     Recommended/Ordered  Supplement;Diet         Nutrition Prescription     Row Name 06/03/21 1541          Nutrition Prescription PO    PO Prescription  Begin/change diet;Begin/change supplement     Begin/Change Diet to  Regular     Supplement  Boost Pudding     Supplement Frequency  2 times a day     Common Modifiers  Cardiac;GI Soft/[place]     New PO Prescription Ordered?  No, recommended        Other Orders    Obtain Weight  Daily     Obtain Weight Ordered?  No, recommended     Supplement  Vitamin mineral supplement     Supplement Ordered?  No, recommended         Education/Evaluation     Row Name 06/03/21 1541          Education    Education  Will Instruct as appropriate        Monitor/Evaluation    Monitor  Per protocol;I&O;PO intake;Supplement intake;Pertinent labs;Weight;Skin status           Electronically signed by:  Iris Shane RD  06/03/21 15:42 EDT

## 2021-06-03 NOTE — PROGRESS NOTES
[facility]    [facility] NOTE    Name:  Elsa Alcaraz   Age:  81 y.o.  Sex:  female  :  1940  MRN:  [id number]   Visit Number:  [id number]  Admission Date:  2021  Date Of Service:  21  Primary Care Physician:  Kemi Hdz MD     LOS: 2 days :    Chief Complaint:      Shortness of breath.    Subjective:    Ms. Alcaraz was seen and examined this morning and again in the afternoon.  She is complaining of increasing shortness of breath since yesterday.  She states that she uses 4 L of oxygen at home.  She denies any history of COPD or lung issues.  She does have systolic heart failure due to nonischemic cardiomyopathy.  Chest x-ray done today shows worsening bilateral pleural effusions and cardiomegaly.  She was seen by Dr. Rebolledo in consultation today and he has recommended increasing her diuretic therapy and adding Entresto and spironolactone.  Patient follows up with Dr. Germain who is her primary cardiologist.    Hospital course:     Ms. Alcaraz is an 81-year-old pleasant female who lives with her , with history of nonischemic cardiomyopathy followed by Dr. Germain, paroxysmal atrial fibrillation on Xarelto was admitted from the emergency room on 2021 with bright red blood per rectum.  She does have history of colon cancer several years ago status post right hemicolectomy.  Patient had stable hemoglobin and was admitted to the medical floor with telemetry.  Serial blood work did not show any significant drop in hemoglobin and she did not require any blood transfusions.  CT of the abdomen done on admission showed colonic diverticulosis without any diverticulitis.  Moderate bilateral pleural effusions with cardiomegaly noted.    Patient was evaluated by Dr. Sanders from gastroenterology.  Patient did undergo colonoscopy on 2021 and was noted to have pandiverticulosis without any bleeding.  However bleeding anastomotic ulcer noted on the right colon  which was biopsied.  Dr. Sanders recommended to hold Xarelto for 2 weeks.    Patient developed worsening shortness of breath the next day.  Repeat chest x-ray showed worsening pleural effusions and cardiomegaly.  Dr. Rebolledo was consulted from cardiology and he increased her diuretic therapy and started on Entresto and spironolactone for her systolic heart failure.    Review of Systems:     All systems were reviewed and negative except as mentioned in subjective, assessment and plan.    Vital Signs:    Temp:  [97.4 °F (36.3 °C)-98.2 °F (36.8 °C)] 97.4 °F (36.3 °C)  Heart Rate:  [] 118  Resp:  [18-20] 18  BP: (101-119)/(60-89) 119/89    Intake and output:    I/O last 3 completed shifts:  In: 790 [P.O.:440; I.V.:350]  Out: -   I/O this shift:  In: 360 [P.O.:360]  Out: -     Physical Examination:    General Appearance:  Alert and cooperative. Comfortable at rest.   Head:  Atraumatic and normocephalic.   Eyes: Conjunctivae and sclerae normal, no icterus. No pallor.   Throat: No oral lesions, no thrush, oral mucosa moist.   Neck: Supple, trachea midline, no thyromegaly.   Lungs:   Breath sounds heard bilaterally equally.  No crackles or wheezing. No Pleural rub or bronchial breathing.   Heart:  Normal S1 and S2, no murmur, no gallop, no rub. No JVD.   Abdomen:   Normal bowel sounds, no masses, no organomegaly. Soft, nontender, nondistended, no rebound tenderness. Surgical scar noted in the midline.   Extremities: Supple, no edema, no cyanosis, no clubbing.   Skin: No bleeding or rash.   Neurologic: Alert and oriented x 3. No facial asymmetry. Moves all four limbs. No tremors.      Laboratory results:    Results from last 7 days   Lab Units 06/03/21  0523 06/01/21  1453   SODIUM mmol/L 142 138   POTASSIUM mmol/L 4.3 4.2   CHLORIDE mmol/L 103 98   CO2 mmol/L 34.3* 33.1*   BUN mg/dL 8 15   CREATININE mg/dL 0.48* 0.56*   CALCIUM mg/dL 8.8 8.8   BILIRUBIN mg/dL  --  1.1   ALK PHOS U/L  --  94   ALT (SGPT) U/L  --  8    AST (SGOT) U/L  --  15   GLUCOSE mg/dL 82 94     Results from last 7 days   Lab Units 06/03/21  0523 06/02/21  1230 06/02/21  0611 06/01/21  1453   WBC 10*3/mm3 8.67  --   --  10.84*   HEMOGLOBIN g/dL 10.2* 10.7* 9.9* 10.6*   HEMATOCRIT % 33.0*  --   --  33.2*   PLATELETS 10*3/mm3 326  --   --  369     Results from last 7 days   Lab Units 06/02/21  0611   INR  1.22*     I have reviewed the patient's laboratory results.    Radiology results:    CT Abdomen Pelvis With Contrast    Result Date: 6/1/2021  FINAL REPORT TECHNIQUE: Axial CT images were performed from the lung bases through the symphysis pubis after the administration of intravenous contrast.  This study was performed with techniques to keep radiation doses as low as reasonably achievable (ALARA). Individualized dose reduction techniques using automated exposure control or adjustment of mA and/or kV according to the patient's size were employed. CLINICAL HISTORY: rectal bleeding COMPARISON: 5/12/2021 FINDINGS: LOWER CHEST: There is cardiomegaly.  There are moderate bilateral pleural effusions.  There is lower lobe atelectasis. ABDOMEN/PELVIS: Liver, gallbladder and bile ducts: The liver enhances homogeneously without suspicious focal hepatic lesion.  There is been prior cholecystectomy.  There is no definite biliary duct dilatation. Adrenal glands: The adrenal glands are morphologically unremarkable without suspicious lesion. Kidneys, ureter and urinary bladder: No suspicious renal lesion.  No hydronephrosis.  Urinary bladder is unremarkable. Spleen: The spleen is normal size. Pancreas: The pancreas is grossly unremarkable. GI systems and mesentery: No evidence of bowel obstruction.  The appendix is not visualized.  There is colonic diverticulosis without evidence of diverticulitis.  There are postoperative changes of the cecum. Lymph nodes: No definite pathologically enlarged abdominal or pelvic lymph nodes present. Vessels: The aorta and abdominal  arteries are grossly patent.  The IVC and portal vein are patent and grossly unremarkable. Peritoneum: No free intraperitoneal fluid or pneumoperitoneum. Pelvic viscera: No acute findings. Body wall: No body wall contusion. No significant body wall hernias. Bones: No acute fracture.     Impression: No acute findings in the abdomen or pelvis to account for patient's symptoms. Colonic diverticulosis without diverticulitis. Moderate bilateral pleural effusions and cardiomegaly. Authenticated by Rafael Limon MD on 06/01/2021 07:15:08 PM    XR Chest 1 View    Result Date: 6/3/2021  PROCEDURE: XR CHEST 1 VW-  HISTORY: Dyspnea; K92.2-Gastrointestinal hemorrhage, unspecified , shortness of breath  COMPARISON:  5/12/2021  FINDINGS:  Portable view of the chest demonstrates bibasilar densities compatible with vascular congestion, interstitial edema and compressive atelectasis. Moderate to large left pleural effusion has slightly increased. Moderate size right pleural effusion has mildly increased. The mediastinum is unremarkable.  The heart size is normal.      Impression: Findings compatible with worsening CHF  This report was finalized on 6/3/2021 12:24 PM by Schuyler Thomas MD.    I have reviewed the patient's radiology reports.    Medication Review:     I have reviewed the patient's active and prn medications.     Problem List:      Lower GI bleeding    Acute on chronic combined systolic and diastolic CHF (congestive heart failure) (CMS/HCC)    Acute blood loss anemia    Paroxysmal atrial fibrillation (CMS/HCC)    Essential hypertension    Diverticulosis of colon    Chronic respiratory failure with hypoxia (CMS/HCC)    Assessment:    1.  Acute blood loss anemia, has not required blood transfusions, POA.  2.  Lower GI bleeding secondary to colonic anastomotic ulcer, POA.  3.  Pandiverticulosis, without evidence of bleeding.  4.  Acute on chronic chronic systolic and diastolic heart failure with ejection fraction of 32%,  POA.  5.  Paroxysmal atrial fibrillation, holding Xarelto for 2 weeks.  6.  COPD, no evidence of exacerbation.  7.  Essential hypertension.  8.  Chronic hypoxic respiratory failure on home oxygen.    Plan:    Ms. Alcaraz is currently hemodynamically stable but does seem to have worsening of her heart failure.  I have discussed the patient's condition with Dr. Rebolledo and his consult is greatly appreciated.  She will be given higher doses of IV Lasix today and tomorrow.  He has also started her on Entresto and spironolactone.  We will repeat her blood work and continue to monitor her renal function and H&H.    Patient will be continued on Protonix.  We will continue to hold Xarelto until 6/14/2021.  Her hemoglobin is stable around 10.    Patient will be continued on her home medications.  We will repeat her blood work tomorrow.  We will consult physical therapy.  Hopefully, she should be able to go home in the next 2 to 3 days.  Discussed with nursing staff and multidisciplinary team.    DVT Prophylaxis: SCDs.  Code Status: Full code.  Diet: Cardiac.  Discharge Plan: Home in 2 to 3 days.    Nik Hicks MD  06/03/21  16:36 EDT    Dictated utilizing Dragon dictation.

## 2021-06-03 NOTE — PAYOR COMM NOTE
"Elsa Caraballo (81 y.o. Female)     Date of Birth Social Security Number Address Home Phone MRN    1940  [address]  [place] [address] [phone] [id number]    Uats[place] Marital Status          Other        Admission Date Admission Type Admitting Provider Attending Provider Department, Room/Bed    21 Emergency Nik Hicks MD Pais, Roshan, MD [facility] MED SURG  4, 431/    Discharge Date Discharge Disposition Discharge Destination                       Attending Provider: Nik Hicks MD    Allergies: No Known Allergies    Isolation: None   Infection: None   Code Status: CPR    Ht: 160 cm (63\")   Wt: 61.5 kg (135 lb 9.3 oz)    Admission Cmt: None   Principal Problem: Lower GI bleeding [K92.2]                 Active Insurance as of 2021     Primary Coverage     Payor Plan Insurance Group Employer/Plan Group    ANTHEM MEDICARE REPLACEMENT ANTHEM MEDICARE ADVANTAGE [id number]     Payor Plan Address Payor Plan Phone Number Payor Plan Fax Number Effective Dates    [address] [phone]  2020 - None Entered    [facility] [id number]       Subscriber Name Subscriber Birth Date Member ID       ELSA CARABALLO 1940 [id number]                 Emergency Contacts      (Rel.) Home Phone Work Phone Mobile Phone    Logan Caraballo (Spouse) [phone] -- [phone]             History & Physical      Kenia Domingo DO at 21              [facility] HOSPITALIST   HISTORY AND PHYSICAL      Name:  Elsa Caraballo   Age:  81 y.o.  Sex:  female  :  1940  MRN:  [id number]   Visit Number:  [id number]  Admission Date:  2021  Date Of Service:  21  Primary Care Physician:  Kemi Hdz MD    Chief Complaint: Rectal bleeding    History Of Presenting Illness:  81 F remote h/o breast and colon cancer, A. fib on Xarelto, COPD on 4 LNC who presented to the ED with painless rectal bleeding.  This started about 2 days " ago, she has been in touch with Dr. Danielle Sullivan and had an appointment tomorrow, but when she called today with her symptoms, she was told to come to the ER.  She is noted to have a Hgb dropped from 14-10, ED clinician was instructed to administer bowel prep for colonoscopy in the morning.  Patient denies any weakness, dizziness, CP, dyspnea, or other acute issues.  She admits that sometimes the stool is black, and in total may have had 5 stools for some bleeding started.  Rectal exam in the ER revealed no hemorrhoids.  CT abdomen/pelvis revealed diverticulosis.  Her last colonoscopy was about 4 years ago, she denies NSAID usage.    Review Of Systems: A full 14 point review of systems was performed and all pertinent positives and negatives are noted in the HPI.    Past Medical History: Patient  has a past medical history of Anxiety disorder due to general medical condition (2017), Arthritis, Atrial fibrillation (CMS/[facility]) (2017), Body piercing, Borderline diabetes, Breast cancer (CMS/[facility]) (), Cataract (2017), Chronic bronchitis (CMS/[facility]) (2017), Colon cancer (CMS/[facility]) (1998), COPD (chronic obstructive pulmonary disease) (CMS/[facility]), Cyanocobalamine deficiency (non anemic), Depression (2017), Diverticulosis, Esophageal reflux (2017), Hiatal hernia (2017), History of bladder infections, exercise stress test, radiation therapy, Hyperlipidemia, Hypertension (2017), Impaired functional mobility and activity tolerance, Osteoporosis, Palpitations (2017), Prediabetes, Problems with swallowing, Shortness of breath, Sleep apnea (2017), Tricuspid valve disorder (2017), Vitamin D deficiency, and Wears glasses.    Past Surgical History: Patient  has a past surgical history that includes Anal fistulotomy;  section (1981); Cholecystectomy (2009); Colectomy partial / total (1998); Ventral hernia repair (10/28/2012); Colonoscopy ();  "Esophagogastroduodenoscopy (2016); Esophagogastroduodenoscopy (N/A, 5/4/2018); Breast lumpectomy (Right, 03/06/2006); Esophagogastroduodenoscopy (N/A, 8/12/2019); Breast biopsy; Cataract extraction w/ intraocular lens implant (Left, 11/18/2019); Hysterectomy; Appendectomy; Cataract extraction w/ intraocular lens implant (Right, 12/16/2019); and Cataract extraction (2019).    Social History: Patient  reports that she has never smoked. She has never used smokeless tobacco. She reports current alcohol use of about 1.0 - 2.0 standard drinks of alcohol per week. She reports that she does not use drugs.    Family History: Patient's family history includes Asthma in her mother; COPD in her mother; Cancer in her father and sister; Diabetes in her maternal grandmother; Hypertension in her mother; Osteoarthritis in her mother.    Allergies:  Patient has no known allergies.    Home Medications:  Prior to Admission Medications     Prescriptions Last Dose Informant Patient Reported? Taking?    albuterol sulfate  (90 Base) MCG/ACT inhaler   No No    Inhale 2 puffs 4 (Four) Times a Day.    Patient taking differently:  Inhale 2 puffs Every 6 (Six) Hours As Needed.    budesonide-formoterol (SYMBICORT) 80-4.5 MCG/ACT inhaler  Self Yes No    Inhale 2 puffs 2 (Two) Times a Day.    cholecalciferol (VITAMIN D3) 25 MCG (1000 UT) tablet  Self Yes No    Take 1,000 Units by mouth Daily.    citalopram (CeleXA) 20 MG tablet   Yes No    Take 20 mg by mouth Daily.    furosemide (Lasix) 20 MG tablet   No No    Take 1 tablet by mouth Daily.    furosemide (LASIX) 40 MG tablet   No No    Take 1 tablet by mouth Daily for 5 days.    hydroCHLOROthiazide (HYDRODIURIL) 25 MG tablet  Self Yes No    Take 25 mg by mouth Daily. Pt not taking at this time because she states \"her BP has been to low.\"    losartan (COZAAR) 100 MG tablet  Self Yes No    Take 100 mg by mouth Daily. Pt not taking at this time because she states \"her BP has been to low.\"    " metoprolol succinate XL (TOPROL-XL) 50 MG 24 hr tablet  Pharmacy Yes No    Take 50 mg by mouth Daily.    pantoprazole (PROTONIX) 40 MG EC tablet   Yes No    Take 40 mg by mouth Daily.    potassium chloride 10 MEQ CR tablet   No No    Take 2 tablets by mouth Daily. Take 1 tablet daily with furosemide    Xarelto 20 MG tablet   No No    Take 1 tablet by mouth Daily.        ED Medications:  Medications   sodium chloride 0.9 % flush 10 mL (has no administration in time range)   polyethylene glycol with electrolytes (GOLYTELY) solution 4,000 mL (has no administration in time range)   sodium chloride 0.9 % bolus 500 mL (0 mL Intravenous Stopped 6/1/21 1557)   iopamidol (ISOVUE-300) 61 % injection 100 mL (100 mL Intravenous Given 6/1/21 1726)     Vital Signs:  Temp:  [98.2 °F (36.8 °C)-98.4 °F (36.9 °C)] 98.4 °F (36.9 °C)  Heart Rate:  [] 97  Resp:  [16] 16  BP: ()/(58-78) 92/78        06/01/21  1404   Weight: 60.9 kg (134 lb 3.2 oz)     Body mass index is 23.77 kg/m².    Physical Exam:   General: NAD, resting in bed  HEENT: EOMI, NC/AT, nasal cannula  Heart: regular  Lungs: Mildly labored  Abdomen: Soft, nondistended, nontender  Extremities: No edema  Neurological: A&O x3, moves all extremities  Psychological: Mood and affect appropriate, speech is coherent  Skin: warm, dry    Laboratory data: I have reviewed the labs done in the emergency room.  Results from last 7 days   Lab Units 06/01/21  1453   SODIUM mmol/L 138   POTASSIUM mmol/L 4.2   CHLORIDE mmol/L 98   CO2 mmol/L 33.1*   BUN mg/dL 15   CREATININE mg/dL 0.56*   CALCIUM mg/dL 8.8   BILIRUBIN mg/dL 1.1   ALK PHOS U/L 94   ALT (SGPT) U/L 8   AST (SGOT) U/L 15   GLUCOSE mg/dL 94     Results from last 7 days   Lab Units 06/01/21  1453   WBC 10*3/mm3 10.84*   HEMOGLOBIN g/dL 10.6*   HEMATOCRIT % 33.2*   PLATELETS 10*3/mm3 369     Results from last 7 days   Lab Units 06/01/21  1453   LIPASE U/L 13         Results from last 7 days   Lab Units 06/01/21  1623    COLOR UA  Dark Yellow*   GLUCOSE UA  Negative   KETONES UA  Trace*   LEUKOCYTES UA  Moderate (2+)*   PH, URINE  <=5.0   BILIRUBIN UA  Moderate (2+)*   UROBILINOGEN UA  1.0 E.U./dL   RBC UA /HPF 0-2*   WBC UA /HPF 3-5*     Pain Management Panel    There is no flowsheet data to display.       Radiology:CT Abdomen Pelvis With Contrast    Result Date: 6/1/2021  FINAL REPORT TECHNIQUE: Axial CT images were performed from the lung bases through the symphysis pubis after the administration of intravenous contrast.  This study was performed with techniques to keep radiation doses as low as reasonably achievable (ALARA). Individualized dose reduction techniques using automated exposure control or adjustment of mA and/or kV according to the patient's size were employed. CLINICAL HISTORY: rectal bleeding COMPARISON: 5/12/2021 FINDINGS: LOWER CHEST: There is cardiomegaly.  There are moderate bilateral pleural effusions.  There is lower lobe atelectasis. ABDOMEN/PELVIS: Liver, gallbladder and bile ducts: The liver enhances homogeneously without suspicious focal hepatic lesion.  There is been prior cholecystectomy.  There is no definite biliary duct dilatation. Adrenal glands: The adrenal glands are morphologically unremarkable without suspicious lesion. Kidneys, ureter and urinary bladder: No suspicious renal lesion.  No hydronephrosis.  Urinary bladder is unremarkable. Spleen: The spleen is normal size. Pancreas: The pancreas is grossly unremarkable. GI systems and mesentery: No evidence of bowel obstruction.  The appendix is not visualized.  There is colonic diverticulosis without evidence of diverticulitis.  There are postoperative changes of the cecum. Lymph nodes: No definite pathologically enlarged abdominal or pelvic lymph nodes present. Vessels: The aorta and abdominal arteries are grossly patent.  The IVC and portal vein are patent and grossly unremarkable. Peritoneum: No free intraperitoneal fluid or  pneumoperitoneum. Pelvic viscera: No acute findings. Body wall: No body wall contusion. No significant body wall hernias. Bones: No acute fracture.     No acute findings in the abdomen or pelvis to account for patient's symptoms. Colonic diverticulosis without diverticulitis. Moderate bilateral pleural effusions and cardiomegaly. Authenticated by Rafael Limon MD on 06/01/2021 07:15:08 PM  Echo from last month reviewed which reveals EF 0.32, diastolic dysfunction,    Assessment:  Acute blood loss anemia  GI bleed  Remote h/o colon cancer and breast cancer  Paroxysmal atrial fibrillation on Xarelto  Bilateral pleural effusions  Chronic systolic and diastolic heart failure  Chronic hypoxic respiratory failure 4 LNC    Plan:  -trend H&H, type & cross, IV PPI empirically, bowel prep, GI evaluation  -consider Kcentra if she becomes unstable  -Hold on IV fluids unless she becomes hemodynamically unstable given her h/o heart failure    Risk Assessment: High secondary to age  DVT Prophylaxis: Hold Xarelto  Code Status: Full  Diet: NPO    Kenia Domingo DO  06/01/21  20:14 EDT    Dictated utilizing Dragon dictation.      Electronically signed by Kenia Domingo DO at 06/01/21 2030          Emergency Department Notes      Aldo Shen Jr., PA-C at 06/01/21 1444     Attestation signed by Tariq Mora DO at 06/02/21 0235          For this patient encounter, I reviewed the NP or PA documentation, treatment plan, and medical decision making. Tariq Mora DO 6/2/2021 02:35 EDT                  Subjective   Patient is an 81-year-old female with a past medical history of a fib, diverticulosis, colon cancer, and hiatal hernia presenting to ED with painless bright red rectal bleeding. Patient reports that two days ago she had a stool of normal quality but was bright red in color. Patient denies pain with bowel movements. Patient denies abdominal pain. Patient denies fever. Patient denies nausea/vomiting. Patient denies  "diarrhea/constipation. Patient reports being on 20mg Xarelto.        History provided by:  Patient   used: No        Review of Systems   Constitutional: Negative for chills, fatigue and fever.   Gastrointestinal: Positive for blood in stool. Negative for abdominal distention, abdominal pain, constipation, diarrhea, nausea and vomiting.   Skin: Negative for pallor.   All other systems reviewed and are negative.      Past Medical History:   Diagnosis Date   • Anxiety disorder due to general medical condition 1/11/2017   • Arthritis    • Atrial fibrillation (CMS/HCC) 1/11/2017   • Body piercing     BOTH EARS   • Borderline diabetes    • Breast cancer (CMS/HCC) 2003    right-radiation and a lumpectomy   • Cataract 01/11/2017    Left eye surgery 11/19   • Chronic bronchitis (CMS/HCC) 1/11/2017   • Colon cancer (CMS/HCC) 11/30/1998    had surgery and chemo   • COPD (chronic obstructive pulmonary disease) (CMS/[facility])    • Cyanocobalamine deficiency (non anemic)    • Depression 1/11/2017   • Diverticulosis    • Esophageal reflux 1/11/2017   • Hiatal hernia 1/11/2017   • History of bladder infections    • Hx of exercise stress test     \"several years ago by Dr. Mercado\".     • Hx of radiation therapy    • Hyperlipidemia    • Hypertension 1/11/2017   • Impaired functional mobility and activity tolerance    • Osteoporosis    • Palpitations 1/11/2017   • Prediabetes    • Problems with swallowing     meat at times per pt report   • Shortness of breath     WITH EXERTION    • Sleep apnea 01/11/2017    NO CPAP   • Tricuspid valve disorder 01/11/2017    Patient doesn't know anything about this   • Vitamin D deficiency    • Wears glasses        No Known Allergies    Past Surgical History:   Procedure Laterality Date   • ANAL FISTULOTOMY     • APPENDECTOMY      1998   • BREAST BIOPSY     • BREAST LUMPECTOMY Right 03/06/2006   • CATARACT EXTRACTION  2019    both eyes    • CATARACT EXTRACTION W/ INTRAOCULAR LENS " IMPLANT Left 2019    Procedure: CATARACT PHACO EXTRACTION WITH INTRAOCULAR LENS IMPLANT LEFT;  Surgeon: Masoud David MD;  Location: [facility] OR;  Service: Ophthalmology   • CATARACT EXTRACTION W/ INTRAOCULAR LENS IMPLANT Right 2019    Procedure: CATARACT PHACO EXTRACTION WITH INTRAOCULAR LENS IMPLANT RIGHT;  Surgeon: Masoud David MD;  Location: [facility] OR;  Service: Ophthalmology   •  SECTION  1981   • CHOLECYSTECTOMY  2009   • COLECTOMY PARTIAL / TOTAL  1998    COLON CANCER   • COLONOSCOPY     • ENDOSCOPY     • ENDOSCOPY N/A 2018    Procedure: ESOPHAGOGASTRODUODENOSCOPY WITH COLD FORCEP BIOPSY;  Surgeon: Vasquez Fagan MD;  Location: [facility] ENDOSCOPY;  Service: Gastroenterology   • ENDOSCOPY N/A 2019    Procedure: ESOPHAGOGASTRODUODENOSCOPY WITH BIOPSY;  Surgeon: Vasquez Fagan MD;  Location: [facility] ENDOSCOPY;  Service: Gastroenterology   • HYSTERECTOMY         • VENTRAL HERNIA REPAIR  10/28/2012       Family History   Problem Relation Age of Onset   • Hypertension Mother    • Asthma Mother    • COPD Mother    • Osteoarthritis Mother    • Cancer Father    • Cancer Sister    • Diabetes Maternal Grandmother        Social History     Socioeconomic History   • Marital status:      Spouse name: Not on file   • Number of children: Not on file   • Years of education: Not on file   • Highest education level: Not on file   Tobacco Use   • Smoking status: Never Smoker   • Smokeless tobacco: Never Used   Substance and Sexual Activity   • Alcohol use: Yes     Alcohol/week: 1.0 - 2.0 standard drinks     Types: 1 - 2 Glasses of wine per week     Comment: occas   • Drug use: No   • Sexual activity: Defer           Objective   Physical Exam  Constitutional:       General: She is not in acute distress.     Appearance: Normal appearance. She is normal weight. She is not ill-appearing, toxic-appearing or diaphoretic.   HENT:      Head: Normocephalic and  atraumatic.      Right Ear: External ear normal.      Left Ear: External ear normal.      Nose: Nose normal.   Eyes:      General: No scleral icterus.        Right eye: No discharge.         Left eye: No discharge.      Extraocular Movements: Extraocular movements intact.      Conjunctiva/sclera: Conjunctivae normal.   Pulmonary:      Effort: Pulmonary effort is normal. No respiratory distress.   Abdominal:      General: Abdomen is flat. There is no distension.      Palpations: There is no mass.      Tenderness: There is no abdominal tenderness. There is no guarding.   Genitourinary:     Comments: External hemorrhoids not thrombosed  Neurological:      General: No focal deficit present.      Mental Status: She is alert and oriented to person, place, and time.   Psychiatric:         Mood and Affect: Mood normal.         Behavior: Behavior normal.         Thought Content: Thought content normal.         Judgment: Judgment normal.         Procedures          ED Course  ED Course as of Jun 01 2012 Tue Jun 01, 2021 1915 Discussed with Dr. Sanders, he wants the patient admitted, bowel prep, scope in the morning    [CS]      ED Course User Index  [CS] Aldo Shen Jr., SPENCER                                           MDM  Number of Diagnoses or Management Options  Gastrointestinal hemorrhage, unspecified gastrointestinal hemorrhage type: new and requires workup     Amount and/or Complexity of Data Reviewed  Clinical lab tests: reviewed  Tests in the radiology section of CPT®: reviewed  Decide to obtain previous medical records or to obtain history from someone other than the patient: yes  Discuss the patient with other providers: yes    Risk of Complications, Morbidity, and/or Mortality  Presenting problems: minimal  Diagnostic procedures: minimal  Management options: minimal    Patient Progress  Patient progress: stable      Final diagnoses:   Gastrointestinal hemorrhage, unspecified gastrointestinal hemorrhage  type       ED Disposition  ED Disposition     ED Disposition Condition Comment    Decision to Admit  Level of Care: Telemetry [5]   Diagnosis: Gastrointestinal hemorrhage, unspecified gastrointestinal hemorrhage type [[id number]]   Admitting Physician: FARRUKH DOMINGO [[id number]]   Attending Physician: FARRUKH DOMINGO [[id number]]   Certification: I Certify That Inpatient Hospital Services Are Medically Necessary For Greater Than 2 Midnights            No follow-up provider specified.       Medication List      No changes were made to your prescriptions during this visit.          Aldo Shen Jr., PA-C  06/01/21 2012      Electronically signed by Tariq Mora DO at 06/02/21 0235     Oralia Mott at 06/01/21 1906        Dr. Sanders was called per MARILEE Carlson [place]  06/01/21 1906      Electronically signed by Oralia Mott at 06/01/21 1906     Sarina Bright at 06/01/21 1916        Called Dr. Domingo per SPENCER Carlson. Voicemail left.      Sarina Bright  06/01/21 1917      Electronically signed by Sarina Bright at 06/01/21 1917     Sarina Bright at 06/01/21 1931        Called Dr. Domingo back, attempting to reach him for Aldo. Stated he would call back, he was performing a procedure. Aldo updated.     Sarina Bright  06/01/21 1933      Electronically signed by Sarina Bright at 06/01/21 1933     Sarina Bright at 06/01/21 2014        Patient will be going to [facility]. [place] RIBEIRO notified.      Sarina Bright  06/01/21 2014      Electronically signed by Sarina Bright at 06/01/21 2014         Current Facility-Administered Medications   Medication Dose Route Frequency Provider Last Rate Last Admin   • budesonide (PULMICORT) nebulizer solution 0.5 mg  0.5 mg Nebulization BID - RT Nik Hicks MD       • citalopram (CeleXA) tablet 20 mg  20 mg Oral Daily Nik Hicks MD   20 mg at 06/03/21 0921   • furosemide (LASIX) tablet 20 mg  20 mg Oral Daily Nik Hicks MD   20 mg at 06/03/21 0921   • guaiFENesin (MUCINEX) 12 hr  tablet 600 mg  600 mg Oral Q12H Nik Hicks MD   600 mg at 06/03/21 1202   • ipratropium-albuterol (DUO-NEB) nebulizer solution 3 mL  3 mL Nebulization Q4H PRN Iona Sanders MD   3 mL at 06/03/21 0425   • ipratropium-albuterol (DUO-NEB) nebulizer solution 3 mL  3 mL Nebulization Q6H - RT Nik Hicks MD   3 mL at 06/03/21 1300   • metoprolol succinate XL (TOPROL-XL) 24 hr tablet 50 mg  50 mg Oral Daily Iona Sanders MD   50 mg at 06/03/21 0921   • ondansetron (ZOFRAN) injection 4 mg  4 mg Intravenous Q6H PRN Nik Hicks MD       • pantoprazole (PROTONIX) EC tablet 40 mg  40 mg Oral Daily Nik Hicks MD   40 mg at 06/03/21 0921   • sodium chloride 0.9 % flush 10 mL  10 mL Intravenous PRN Iona Sanders MD       • sodium chloride 0.9 % infusion  70 mL/hr Intravenous Continuous PRN Iona Sanders MD 70 mL/hr at 06/02/21 1242 Currently Infusing at 06/02/21 1242       Lab Results (last 24 hours)     Procedure Component Value Units Date/Time    TSH [[id number]]  (Normal) Collected: 06/03/21 0523    Specimen: Blood Updated: 06/03/21 1206     TSH 0.440 uIU/mL     Basic Metabolic Panel [[id number]]  (Abnormal) Collected: 06/03/21 0523    Specimen: Blood Updated: 06/03/21 0601     Glucose 82 mg/dL      BUN 8 mg/dL      Creatinine 0.48 mg/dL      Sodium 142 mmol/L      Potassium 4.3 mmol/L      Chloride 103 mmol/L      CO2 34.3 mmol/L      Calcium 8.8 mg/dL      eGFR Non African Amer 124 mL/min/1.73      BUN/Creatinine Ratio 16.7     Anion Gap 4.7 mmol/L     Narrative:      GFR Normal >60  Chronic Kidney Disease <60  Kidney Failure <15      CBC (No Diff) [[id number]]  (Abnormal) Collected: 06/03/21 0523    Specimen: Blood Updated: 06/03/21 0543     WBC 8.67 10*3/mm3      RBC 3.36 10*6/mm3      Hemoglobin 10.2 g/dL      Hematocrit 33.0 %      MCV 98.2 fL      MCH 30.4 pg      MCHC 30.9 g/dL      RDW 13.8 %      RDW-SD 49.5 fl      MPV 9.4 fL      Platelets 326 10*3/mm3         Imaging  Results (Last 24 Hours)     Procedure Component Value Units Date/Time    XR Chest 1 View [[id number]] Collected: 21 1224     Updated: 21 1228    Narrative:      PROCEDURE: XR CHEST 1 VW-     HISTORY: Dyspnea; K92.2-Gastrointestinal hemorrhage, unspecified ,  shortness of breath     COMPARISON:  2021     FINDINGS:  Portable view of the chest demonstrates bibasilar densities  compatible with vascular congestion, interstitial edema and compressive  atelectasis. Moderate to large left pleural effusion has slightly  increased. Moderate size right pleural effusion has mildly increased.  The mediastinum is unremarkable.     The heart size is normal.       Impression:      Findings compatible with worsening CHF      This report was finalized on 6/3/2021 12:24 PM by Schuyler Thomas MD.           Operative/Procedure Notes (last 72 hours) (Notes from 21 151 through 21)      Procedures signed by Iona Sanders MD at 21 1316       Procedure Orders    1. COLONOSCOPY [[id number]] ordered by Iona Sanders MD at 21 1243          Scan on 2021 1243 by Iona Sanders MD: COLON          Electronically signed by Iona Sanders MD at 21 1316       Physician Progress Notes (last 24 hours) (Notes from 21 151 through 21)    No notes of this type exist for this encounter.            Consult Notes (last 48 hours) (Notes from 21 151 through 21)      Iona Sanders MD at 21               In Patient Consult      Date of Consultation: 2021  Patient Name: Elsa Alcaraz  MRN: [id number]  : 1940     Referring provider: Kenia Domingo DO    Primary care provider:  Kemi Hdz MD    Reason for consultation: GI bleeding    History of Present Illness: This is 81-year-old female patient with a prior history of hypertension, hyperlipidemia, chronic atrial fibrillation on Eliquis, history of  breast cancer status post lumpectomy and radiation therapy, history of colon cancer status post right hemicolectomy more than 20 years ago, CHF, COPD/obstructive sleep apnea on home oxygen, colonic diverticulosis, was brought to the emergency room today on 6/1/2021 with complaints of rectal bleeding for the past 2 days.    Patient states that she was doing fine until day before yesterday, then had a bowel movement with blood in the stool.  Subsequently she had multiple bowel movements at least 4 times every day.  She continued to have a red blood with clots even today for which she came to emergency room.. She had at least 3 times bowel movement with dark blood and clots.  She does have associated left-sided abdominal cramps.  Denies any nausea vomiting, no fever chills.    Patient denies any prior history of GI bleed.  She has been taking Xarelto and last dose was day before yesterday 2 days ago.  Denies any NSAID use.    She states that she had an EGD done in 2019 Dr. Daniel.  Last colonoscopy was about 5 years ago and was been told normal no reports available.  Her father had some type of stomach cancer details unknown.    She was evaluated emergency room and noted to have a drop in her H&H from 14 to 10 g/dL.  CT scan abdomen pelvis done which showed only colonic diverticulosis otherwise unremarkable.  GI was consulted for further evaluation and management.    Subjective     Past Medical History:   Diagnosis Date   • Anxiety disorder due to general medical condition 1/11/2017   • Arthritis    • Atrial fibrillation (CMS/HCC) 1/11/2017   • Body piercing     BOTH EARS   • Borderline diabetes    • Breast cancer (CMS/HCC) 2003    right-radiation and a lumpectomy   • Cataract 01/11/2017    Left eye surgery 11/19   • Chronic bronchitis (CMS/HCC) 1/11/2017   • Colon cancer (CMS/HCC) 11/30/1998    had surgery and chemo   • COPD (chronic obstructive pulmonary disease) (CMS/HCC)    • Cyanocobalamine deficiency (non anemic)   "  • Depression 2017   • Diverticulosis    • Esophageal reflux 2017   • Hiatal hernia 2017   • History of bladder infections    • Hx of exercise stress test     \"several years ago by Dr. Mercado\".     • Hx of radiation therapy    • Hyperlipidemia    • Hypertension 2017   • Impaired functional mobility and activity tolerance    • Osteoporosis    • Palpitations 2017   • Prediabetes    • Problems with swallowing     meat at times per pt report   • Shortness of breath     WITH EXERTION    • Sleep apnea 2017    NO CPAP   • Tricuspid valve disorder 2017    Patient doesn't know anything about this   • Vitamin D deficiency    • Wears glasses        Past Surgical History:   Procedure Laterality Date   • ANAL FISTULOTOMY     • APPENDECTOMY         • BREAST BIOPSY     • BREAST LUMPECTOMY Right 2006   • CATARACT EXTRACTION  2019    both eyes    • CATARACT EXTRACTION W/ INTRAOCULAR LENS IMPLANT Left 2019    Procedure: CATARACT PHACO EXTRACTION WITH INTRAOCULAR LENS IMPLANT LEFT;  Surgeon: Masoud David MD;  Location: [facility] OR;  Service: Ophthalmology   • CATARACT EXTRACTION W/ INTRAOCULAR LENS IMPLANT Right 2019    Procedure: CATARACT PHACO EXTRACTION WITH INTRAOCULAR LENS IMPLANT RIGHT;  Surgeon: Masoud David MD;  Location: [facility] OR;  Service: Ophthalmology   •  SECTION  1981   • CHOLECYSTECTOMY  2009   • COLECTOMY PARTIAL / TOTAL  1998    COLON CANCER   • COLONOSCOPY     • ENDOSCOPY     • ENDOSCOPY N/A 2018    Procedure: ESOPHAGOGASTRODUODENOSCOPY WITH COLD FORCEP BIOPSY;  Surgeon: Vasquez Fagan MD;  Location: [facility] ENDOSCOPY;  Service: Gastroenterology   • ENDOSCOPY N/A 2019    Procedure: ESOPHAGOGASTRODUODENOSCOPY WITH BIOPSY;  Surgeon: Vasquez Fagan MD;  Location: [facility] ENDOSCOPY;  Service: Gastroenterology   • HYSTERECTOMY         • VENTRAL HERNIA REPAIR  10/28/2012       Family History "   Problem Relation Age of Onset   • Hypertension Mother    • Asthma Mother    • COPD Mother    • Osteoarthritis Mother    • Cancer Father    • Cancer Sister    • Diabetes Maternal Grandmother        Social History     Socioeconomic History   • Marital status:      Spouse name: Not on file   • Number of children: Not on file   • Years of education: Not on file   • Highest education level: Not on file   Tobacco Use   • Smoking status: Never Smoker   • Smokeless tobacco: Never Used   Substance and Sexual Activity   • Alcohol use: Yes     Alcohol/week: 1.0 - 2.0 standard drinks     Types: 1 - 2 Glasses of wine per week     Comment: occas   • Drug use: No   • Sexual activity: Defer         Current Facility-Administered Medications:   •  polyethylene glycol with electrolytes (GOLYTELY) solution 4,000 mL, 4,000 mL, Oral, See Admin Instructions, Iona Sanders MD  •  sodium chloride 0.9 % flush 10 mL, 10 mL, Intravenous, PRN, Aldo Shen Jr., SPENCER    No Known Allergies    Review of Systems   Constitutional: Negative for chills, fever, unexpected weight gain and unexpected weight loss.   HENT: Negative for congestion, ear pain, postnasal drip, sinus pressure and sore throat.    Eyes: Negative for blurred vision and visual disturbance.   Respiratory: Negative for cough, chest tightness and shortness of breath.    Cardiovascular: Negative for chest pain, palpitations and leg swelling.   Gastrointestinal: Positive for abdominal pain and blood in stool. Negative for constipation, diarrhea, nausea, vomiting and indigestion.   Endocrine: Negative for polyphagia.   Genitourinary: Negative for dysuria and hematuria.   Musculoskeletal: Negative for back pain, joint swelling and neck pain.   Skin: Negative for rash, skin lesions and bruise.   Neurological: Negative for dizziness, seizures, speech difficulty, weakness, numbness and confusion.   Hematological: Negative for adenopathy. Does not bruise/bleed easily.    Psychiatric/Behavioral: Negative for hallucinations, suicidal ideas and depressed mood.        The following portions of the patient's history were reviewed and updated as appropriate: allergies, current medications, past family history, past medical history, past social history, past surgical history and problem list.    Objective     Vitals:    06/01/21 1800 06/01/21 1930 06/01/21 1942 06/01/21 2008   BP: 98/58 92/78  108/60   BP Location:       Patient Position:       Pulse:   97    Resp:   16    Temp:   98.4 °F (36.9 °C)    TempSrc:   Oral    SpO2: 99% 98%  98%   Weight:       Height:           Physical Exam  Vitals and nursing note reviewed.   Constitutional:       Appearance: She is well-developed.   HENT:      Head: Normocephalic and atraumatic.      Right Ear: External ear normal.      Left Ear: External ear normal.   Eyes:      Comments: Mild pallor noted   Neck:      Thyroid: No thyromegaly.      Trachea: No tracheal deviation.   Cardiovascular:      Rate and Rhythm: Normal rate and regular rhythm.      Heart sounds: No murmur heard.     Pulmonary:      Effort: Pulmonary effort is normal. No respiratory distress.      Breath sounds: Normal breath sounds.   Abdominal:      General: Bowel sounds are normal. There is no distension.      Palpations: Abdomen is soft. There is no mass.      Tenderness: There is no abdominal tenderness. There is no guarding or rebound.      Hernia: No hernia is present.      Comments: Rectal examination reveals a dark blood with clots   Musculoskeletal:         General: Normal range of motion.      Cervical back: Normal range of motion and neck supple.   Skin:     General: Skin is warm and dry.   Neurological:      Mental Status: She is alert and oriented to person, place, and time.      Cranial Nerves: No cranial nerve deficit.      Sensory: No sensory deficit.   Psychiatric:         Mood and Affect: Mood normal.         Behavior: Behavior normal.         Thought Content:  Thought content normal.         Judgment: Judgment normal.         Results from last 7 days   Lab Units 06/01/21  1453   SODIUM mmol/L 138   POTASSIUM mmol/L 4.2   CHLORIDE mmol/L 98   CO2 mmol/L 33.1*   BUN mg/dL 15   CREATININE mg/dL 0.56*   CALCIUM mg/dL 8.8   ALBUMIN g/dL 2.90*   BILIRUBIN mg/dL 1.1   ALK PHOS U/L 94   ALT (SGPT) U/L 8   AST (SGOT) U/L 15   GLUCOSE mg/dL 94   WBC 10*3/mm3 10.84*   HEMOGLOBIN g/dL 10.6*   PLATELETS 10*3/mm3 369       Imaging Results (Last 24 Hours)     Procedure Component Value Units Date/Time    CT Abdomen Pelvis With Contrast [[id number]] Collected: 06/01/21 1915     Updated: 06/01/21 1916    Narrative:      FINAL REPORT    TECHNIQUE:  Axial CT images were performed from the lung bases through the  symphysis pubis after the administration of intravenous  contrast.  This study was performed with techniques to keep  radiation doses as low as reasonably achievable (ALARA).  Individualized dose reduction techniques using automated  exposure control or adjustment of mA and/or kV according to the  patient's size were employed.    CLINICAL HISTORY:  rectal bleeding    COMPARISON:  5/12/2021    FINDINGS:  LOWER CHEST: There is cardiomegaly.  There are moderate bilateral pleural effusions.  There is lower lobe atelectasis.    ABDOMEN/PELVIS:    Liver, gallbladder and bile ducts: The liver enhances homogeneously without suspicious focal hepatic lesion.  There is been prior cholecystectomy.  There is no definite biliary duct dilatation.    Adrenal glands: The adrenal glands are morphologically unremarkable without suspicious lesion.    Kidneys, ureter and urinary bladder: No suspicious renal lesion.  No hydronephrosis.  Urinary bladder is unremarkable.    Spleen: The spleen is normal size.    Pancreas: The pancreas is grossly unremarkable.    GI systems and mesentery: No evidence of bowel obstruction.  The appendix is not visualized.  There is colonic diverticulosis without evidence of  diverticulitis.  There are postoperative changes of the cecum.    Lymph nodes: No definite pathologically enlarged abdominal or pelvic lymph nodes present.    Vessels: The aorta and abdominal arteries are grossly patent.  The IVC and portal vein are patent and grossly unremarkable.    Peritoneum: No free intraperitoneal fluid or pneumoperitoneum.    Pelvic viscera: No acute findings.    Body wall: No body wall contusion. No significant body wall hernias.    Bones: No acute fracture.      Impression:      No acute findings in the abdomen or pelvis to account for patient's symptoms.    Colonic diverticulosis without diverticulitis.    Moderate bilateral pleural effusions and cardiomegaly.    Authenticated by Rafael Limon MD on 06/01/2021 07:15:08 PM          Assessment / Plan      Assessment/Recommendations:   1.  Suspected lower GI bleeding  2.  Long-term anticoagulation use  3.  History of diverticulosis with suspected diverticular bleed  4.  Remote history of colon cancer status post right hemicolectomy and chemotherapy 1998  5.  Acute blood loss anemia    Patient appears to have a diverticular bleeding in the setting of anticoagulation use.  No signs of any current bleeding.  Rectal examination reveals minimal dark stool with minimal clots.  Last dose of Xarelto was 2 days ago.  She is hemodynamically stable.    She needs a colonoscopy for further evaluation.  I have discussed the risk and benefits involved including high risk of perforation.  Patient agreeable to proceed with a colonoscopy.     Keep n.p.o. after midnight  Okay for clear liquids until midnight  GoLYTELY bowel prep 2 L tonight and 2 L early in the morning  Keep n.p.o. after 8 AM tomorrow on 6/2/2021  Hold all anticoagulation antiplatelet medication  PT/INR in a.m.  H&H every 6 hours for 24 hours  Transfuse to keep the Hgb more than 7 g/dL  She has been scheduled for colonoscopy tomorrow noon on 6/2/2021    6.  Chronic atrial fibrillation  7.   Chronic hypoxic respiratory failure on home oxygen  8.  CHF  On Xarelto.  To hold anticoagulation for now      Thank you very much for letting me participate in the care of this patient.  Please do not hesitate to call me if you have any questions.    Iona Sanders MD  [facility] [place]  6/1/2021  20:54 EDT    Please note that portions of this note may have been completed with a voice recognition program.     Electronically signed by Iona Sanders MD at 06/01/21 9797

## 2021-06-03 NOTE — SIGNIFICANT NOTE
Respiratory therapy department called due to patient requesting PRN nebulizer treatment for shortness of breath.

## 2021-06-03 NOTE — PROGRESS NOTES
Patient: Elsa Alcaraz  Procedure(s):  COLONOSCOPY WITH BIOPSY  Anesthesia type: MAC    Patient location: [facility] Surgical Floor  Last vitals:   Vitals:    06/03/21 0719   BP: 101/63   Pulse: 101   Resp: 18   Temp: 97.8 °F (36.6 °C)   SpO2: 96%     Level of consciousness: awake, alert and oriented    Post-anesthesia pain: adequate analgesia  Airway patency: patent  Respiratory: unassisted  Cardiovascular: stable and blood pressure at baseline  Hydration: euvolemic    Anesthetic complications: no

## 2021-06-03 NOTE — PROGRESS NOTES
In Patient Consult      Date of Consultation: Josy 3, 2021  Patient Name: Elsa Alcaraz  MRN: [id number]  : 1940     Referring provider: Nik Hicks MD    Primary care provider:  Kemi Hdz MD    Reason for consultation: GI bleeding    Interval history:   6/3/2021  She is feeling ok today, appetite is good. She has not had any further rectal bleeding. She would like to discuss results of colonoscopy completed yesterday.     2021  This is 81-year-old female patient with a prior history of hypertension, hyperlipidemia, chronic atrial fibrillation on Eliquis, history of breast cancer status post lumpectomy and radiation therapy, history of colon cancer status post right hemicolectomy more than 20 years ago, CHF, COPD/obstructive sleep apnea on home oxygen, colonic diverticulosis, was brought to the emergency room today on 2021 with complaints of rectal bleeding for the past 2 days.     Patient states that she was doing fine until day before yesterday, then had a bowel movement with blood in the stool.  Subsequently she had multiple bowel movements at least 4 times every day.  She continued to have a red blood with clots even today for which she came to emergency room.. She had at least 3 times bowel movement with dark blood and clots.  She does have associated left-sided abdominal cramps.  Denies any nausea vomiting, no fever chills.     Patient denies any prior history of GI bleed.  She has been taking Xarelto and last dose was day before yesterday 2 days ago.  Denies any NSAID use.     She states that she had an EGD done in 2019 Dr. Daniel.  Last colonoscopy was about 5 years ago and was been told normal no reports available.  Her father had some type of stomach cancer details unknown.     She was evaluated emergency room and noted to have a drop in her H&H from 14 to 10 g/dL.  CT scan abdomen pelvis done which showed only colonic diverticulosis otherwise unremarkable.  GI was  "consulted for further evaluation and management.    Subjective     Past Medical History:   Diagnosis Date   • Anxiety disorder due to general medical condition 1/11/2017   • Arthritis    • Atrial fibrillation (CMS/HCC) 1/11/2017   • Body piercing     BOTH EARS   • Borderline diabetes    • Breast cancer (CMS/HCC) 2003    right-radiation and a lumpectomy   • Cataract 01/11/2017    Left eye surgery 11/19   • Chronic bronchitis (CMS/HCC) 1/11/2017   • Colon cancer (CMS/HCC) 11/30/1998    had surgery and chemo   • COPD (chronic obstructive pulmonary disease) (CMS/HCC)    • Cyanocobalamine deficiency (non anemic)    • Depression 1/11/2017   • Diverticulosis    • Esophageal reflux 1/11/2017   • Hiatal hernia 1/11/2017   • History of bladder infections    • Hx of exercise stress test     \"several years ago by Dr. Mercado\".     • Hx of radiation therapy    • Hyperlipidemia    • Hypertension 1/11/2017   • Impaired functional mobility and activity tolerance    • Osteoporosis    • Palpitations 1/11/2017   • Prediabetes    • Problems with swallowing     meat at times per pt report   • Shortness of breath     WITH EXERTION    • Sleep apnea 01/11/2017    NO CPAP   • Tricuspid valve disorder 01/11/2017    Patient doesn't know anything about this   • Vitamin D deficiency    • Wears glasses        Past Surgical History:   Procedure Laterality Date   • ANAL FISTULOTOMY     • APPENDECTOMY      1998   • BREAST BIOPSY     • BREAST LUMPECTOMY Right 03/06/2006   • CATARACT EXTRACTION  2019    both eyes    • CATARACT EXTRACTION W/ INTRAOCULAR LENS IMPLANT Left 11/18/2019    Procedure: CATARACT PHACO EXTRACTION WITH INTRAOCULAR LENS IMPLANT LEFT;  Surgeon: Masoud David MD;  Location: [facility] OR;  Service: Ophthalmology   • CATARACT EXTRACTION W/ INTRAOCULAR LENS IMPLANT Right 12/16/2019    Procedure: CATARACT PHACO EXTRACTION WITH INTRAOCULAR LENS IMPLANT RIGHT;  Surgeon: Masoud David MD;  Location: [facility] OR;  Service: " Ophthalmology   •  SECTION  1981   • CHOLECYSTECTOMY  2009   • COLECTOMY PARTIAL / TOTAL  1998    COLON CANCER   • COLONOSCOPY     • COLONOSCOPY N/A 2021    Procedure: COLONOSCOPY WITH BIOPSY;  Surgeon: Iona Sanders MD;  Location: [facility] ENDOSCOPY;  Service: Gastroenterology;  Laterality: N/A;   • ENDOSCOPY     • ENDOSCOPY N/A 2018    Procedure: ESOPHAGOGASTRODUODENOSCOPY WITH COLD FORCEP BIOPSY;  Surgeon: Vasquez Fagan MD;  Location: [facility] ENDOSCOPY;  Service: Gastroenterology   • ENDOSCOPY N/A 2019    Procedure: ESOPHAGOGASTRODUODENOSCOPY WITH BIOPSY;  Surgeon: Vasquez Fagan MD;  Location: [facility] ENDOSCOPY;  Service: Gastroenterology   • HYSTERECTOMY         • VENTRAL HERNIA REPAIR  10/28/2012       Family History   Problem Relation Age of Onset   • Hypertension Mother    • Asthma Mother    • COPD Mother    • Osteoarthritis Mother    • Cancer Father    • Cancer Sister    • Diabetes Maternal Grandmother        Social History     Socioeconomic History   • Marital status:      Spouse name: Not on file   • Number of children: Not on file   • Years of education: Not on file   • Highest education level: Not on file   Tobacco Use   • Smoking status: Never Smoker   • Smokeless tobacco: Never Used   Substance and Sexual Activity   • Alcohol use: Yes     Alcohol/week: 1.0 - 2.0 standard drinks     Types: 1 - 2 Glasses of wine per week     Comment: occas   • Drug use: No   • Sexual activity: Defer         Current Facility-Administered Medications:   •  budesonide-formoterol (SYMBICORT) 80-4.5 MCG/ACT inhaler 2 puff, 2 puff, Inhalation, BID - RT, Iona Sanders MD, 2 puff at 21 0703  •  citalopram (CeleXA) tablet 20 mg, 20 mg, Oral, Daily, Nik Hicks MD  •  furosemide (LASIX) tablet 20 mg, 20 mg, Oral, Daily, Nik Hicks MD  •  ipratropium-albuterol (DUO-NEB) nebulizer solution 3 mL, 3 mL, Nebulization, Q4H PRN, Iona Sanders,  MD, 3 mL at 06/03/21 0425  •  metoprolol succinate XL (TOPROL-XL) 24 hr tablet 50 mg, 50 mg, Oral, Daily, Iona Sanders MD, 50 mg at 06/02/21 0924  •  ondansetron (ZOFRAN) injection 4 mg, 4 mg, Intravenous, Q6H PRN, Nik Hicks MD  •  pantoprazole (PROTONIX) EC tablet 40 mg, 40 mg, Oral, Daily, Nik Hicks MD  •  sodium chloride 0.9 % flush 10 mL, 10 mL, Intravenous, PRN, Iona Sanders MD  •  sodium chloride 0.9 % infusion, 70 mL/hr, Intravenous, Continuous PRN, Iona Sanders MD, Last Rate: 70 mL/hr at 06/02/21 1242, Currently Infusing at 06/02/21 1242    No Known Allergies    Review of Systems   HENT: Negative for trouble swallowing.    Gastrointestinal: Negative for abdominal pain, constipation, diarrhea, nausea and vomiting.   Musculoskeletal: Positive for arthralgias.       The following portions of the patient's history were reviewed and updated as appropriate: allergies, current medications, past family history, past medical history, past social history, past surgical history and problem list.    Objective     Vitals:    06/03/21 0425 06/03/21 0700 06/03/21 0703 06/03/21 0719   BP:    101/63   BP Location:    Left arm   Patient Position:    Lying   Pulse: 113 109 115 101   Resp: 18  18 18   Temp:    97.8 °F (36.6 °C)   TempSrc:    Oral   SpO2: 99%  95% 96%   Weight:       Height:           Physical Exam  Constitutional:       General: She is not in acute distress.     Appearance: Normal appearance. She is not ill-appearing.      Comments: Sitting up in bed having dinner   HENT:      Head: Normocephalic.      Right Ear: External ear normal.      Left Ear: External ear normal.      Nose: Nose normal.      Mouth/Throat:      Mouth: Mucous membranes are moist.   Eyes:      General: No scleral icterus.        Right eye: No discharge.         Left eye: No discharge.      Conjunctiva/sclera: Conjunctivae normal.   Cardiovascular:      Rate and Rhythm: Normal rate and regular rhythm.       Heart sounds: Normal heart sounds. No murmur heard.     Pulmonary:      Effort: Pulmonary effort is normal. No respiratory distress.   Abdominal:      General: Bowel sounds are normal. There is no distension.      Palpations: There is no mass.      Tenderness: There is no abdominal tenderness. There is no guarding or rebound.      Hernia: No hernia is present.   Musculoskeletal:         General: No deformity.      Cervical back: Normal range of motion.   Skin:     General: Skin is warm and dry.      Coloration: Skin is not jaundiced or pale.   Neurological:      Mental Status: She is alert and oriented to person, place, and time.   Psychiatric:         Mood and Affect: Mood normal.         Behavior: Behavior normal.         Thought Content: Thought content normal.         Judgment: Judgment normal.         Results from last 7 days   Lab Units 06/03/21  0523 06/02/21  1230 06/02/21  0611 06/01/21  1453   SODIUM mmol/L 142  --   --  138   POTASSIUM mmol/L 4.3  --   --  4.2   CHLORIDE mmol/L 103  --   --  98   CO2 mmol/L 34.3*  --   --  33.1*   BUN mg/dL 8  --   --  15   CREATININE mg/dL 0.48*  --   --  0.56*   CALCIUM mg/dL 8.8  --   --  8.8   ALBUMIN g/dL  --   --   --  2.90*   BILIRUBIN mg/dL  --   --   --  1.1   ALK PHOS U/L  --   --   --  94   ALT (SGPT) U/L  --   --   --  8   AST (SGOT) U/L  --   --   --  15   GLUCOSE mg/dL 82  --   --  94   WBC 10*3/mm3 8.67  --   --  10.84*   HEMOGLOBIN g/dL 10.2* 10.7* 9.9* 10.6*   PLATELETS 10*3/mm3 326  --   --  369   INR   --   --  1.22*  --        Imaging Results (Last 24 Hours)     ** No results found for the last 24 hours. **         Colonoscopy was completed Dr. Sanders on 6/2/2021:  - The perianal and digital rectal examinations were normal.  - Multiple small and large-mouthed diverticula were found in the sigmoid colon, descending colon, transverse colon and  ascending colon.  - Non-bleeding internal hemorrhoids were found. The hemorrhoids were moderate and  medium-sized.  - There was evidence of a patent end-to-side ileo-colonic anastomosis found in the alanna-terminal Ileum. The anastomotic site was characterized by an ulceration. This was traversed. Biopsies were taken with a cold forceps for histology.  - Neoterminal ileum was normal  Pathology pending.    Assessment / Plan    Assessment/Recommendations:  1. Acute blood loss anemia  2. Lower GI bleeding due to anastomotic ulcer   She had colonoscopy yesterday buy Dr. Sanders and those results were discussed with her today. She had an ulcer at the anastomotic site from previous colon surgery. The anastomotic ulceration is likley NSAID induced but ischemia can not be ruled out. Patient likely bled from this ulceration and no current bleeding was noted at the time of the procedure. Hgb has been followed since yesterday at 11.8 now 10.2 g/dL. INR yesterday was 1.22 which is above normal but improved from earlier this year.     Continue to hold Xarelto for 2 weeks minimum  Avoid NSAIDS  Continue PPI daily  She needs op GI follow up in 8 weeks. Await pathology results. Will need repeat colonoscopy in 6 months for surveillance.           Thank you very much for letting me participate in the care of this patient.  Please do not hesitate to call me if you have any questions.    Beth Robertson PA-C  [facility] [place]  6/3/2021  08:36 EDT    Please note that portions of this note may have been completed with a voice recognition program.

## 2021-06-03 NOTE — CONSULTS
[facility] Consult Note    Referring Provider: Kurt  Reason for Consultation: CHF    Patient Care Team:  Kemi Hdz MD as PCP - General  Vasquez Fagan MD as Consulting Physician (General Surgery)    Chief complaint : Bright red blood per rectum    Subjective:    History of present illness: This is an 81-year-old female patient who has been having progressively worsening generalized weakness and dyspnea over the last several days.  2 days ago the patient noted bright red blood per rectum.  On presentation here the patient was noted to be significantly anemic with guaiac positive stools.  The patient has a history of colon cancer many years ago.  She underwent colonoscopy yesterday with findings described under the report of her gastroenterologist.  The patient's chest x-ray was demonstrated to have bilateral pleural effusions with evidence of worsening congestive heart failure.  The patient has a history of nonischemic cardiomyopathy with ejection fractions ranging from 30-40%.  She is known to have aortic valve sclerosis without significant stenosis as well as mild mitral regurgitation.  Cardiac troponins have been serially normal.  The patient denies chest discomfort at rest or with activity.  She has a history of paroxysmal atrial fibrillation and is on chronic anticoagulation.  She has previously tolerated Xarelto well without bleeding complications.  She has had no signs or symptoms to suggest stroke, TIA or other cardioembolic event.  As an outpatient the patient's congestive heart failure has been managed with a combination of Toprol-XL, losartan and Lasix.  She is currently able to lay flat in bed comfortably without supplemental oxygen.  Please refer to my partner-Dr. Gordillo's consult note from March of this year which also addressed her congestive heart failure and atrial fibrillation.    Review of Systems   Review of Systems   Constitutional: Negative for chills, diaphoresis, fever,  malaise/fatigue, weight gain and weight loss.   HENT: Negative for ear discharge, hearing loss, hoarse voice and nosebleeds.    Eyes: Negative for discharge, double vision, pain and photophobia.   Cardiovascular: Positive for dyspnea on exertion. Negative for chest pain, claudication, cyanosis, irregular heartbeat, leg swelling, near-syncope, orthopnea, palpitations, paroxysmal nocturnal dyspnea and syncope.   Respiratory: Positive for shortness of breath. Negative for cough, hemoptysis, sputum production and wheezing.    Endocrine: Negative for cold intolerance, heat intolerance, polydipsia, polyphagia and polyuria.   Hematologic/Lymphatic: Negative for adenopathy and bleeding problem. Does not bruise/bleed easily.   Skin: Negative for color change, flushing, itching and rash.   Musculoskeletal: Negative for muscle cramps, muscle weakness, myalgias and stiffness.   Gastrointestinal: Positive for hematochezia. Negative for abdominal pain, diarrhea, hematemesis, nausea and vomiting.   Genitourinary: Negative for dysuria, frequency and nocturia.   Neurological: Negative for focal weakness, loss of balance, numbness, paresthesias and seizures.   Psychiatric/Behavioral: Negative for altered mental status, hallucinations and suicidal ideas.   Allergic/Immunologic: Negative for HIV exposure, hives and persistent infections.       History  Past Medical History:   Diagnosis Date   • Anxiety disorder due to general medical condition 1/11/2017   • Arthritis    • Atrial fibrillation (CMS/HCC) 1/11/2017   • Body piercing     BOTH EARS   • Borderline diabetes    • Breast cancer (CMS/HCC) 2003    right-radiation and a lumpectomy   • Cataract 01/11/2017    Left eye surgery 11/19   • Chronic bronchitis (CMS/HCC) 1/11/2017   • Colon cancer (CMS/HCC) 11/30/1998    had surgery and chemo   • COPD (chronic obstructive pulmonary disease) (CMS/HCC)    • Cyanocobalamine deficiency (non anemic)    • Depression 1/11/2017   • Diverticulosis   "  • Esophageal reflux 2017   • Hiatal hernia 2017   • History of bladder infections    • Hx of exercise stress test     \"several years ago by Dr. Mercado\".     • Hx of radiation therapy    • Hyperlipidemia    • Hypertension 2017   • Impaired functional mobility and activity tolerance    • Osteoporosis    • Palpitations 2017   • Prediabetes    • Problems with swallowing     meat at times per pt report   • Shortness of breath     WITH EXERTION    • Sleep apnea 2017    NO CPAP   • Tricuspid valve disorder 2017    Patient doesn't know anything about this   • Vitamin D deficiency    • Wears glasses    ,   Past Surgical History:   Procedure Laterality Date   • ANAL FISTULOTOMY     • APPENDECTOMY         • BREAST BIOPSY     • BREAST LUMPECTOMY Right 2006   • CATARACT EXTRACTION  2019    both eyes    • CATARACT EXTRACTION W/ INTRAOCULAR LENS IMPLANT Left 2019    Procedure: CATARACT PHACO EXTRACTION WITH INTRAOCULAR LENS IMPLANT LEFT;  Surgeon: Masoud David MD;  Location: [facility] OR;  Service: Ophthalmology   • CATARACT EXTRACTION W/ INTRAOCULAR LENS IMPLANT Right 2019    Procedure: CATARACT PHACO EXTRACTION WITH INTRAOCULAR LENS IMPLANT RIGHT;  Surgeon: Masoud David MD;  Location: [facility] OR;  Service: Ophthalmology   •  SECTION  1981   • CHOLECYSTECTOMY  2009   • COLECTOMY PARTIAL / TOTAL  1998    COLON CANCER   • COLONOSCOPY     • COLONOSCOPY N/A 2021    Procedure: COLONOSCOPY WITH BIOPSY;  Surgeon: Iona Sanders MD;  Location: [facility] ENDOSCOPY;  Service: Gastroenterology;  Laterality: N/A;   • ENDOSCOPY     • ENDOSCOPY N/A 2018    Procedure: ESOPHAGOGASTRODUODENOSCOPY WITH COLD FORCEP BIOPSY;  Surgeon: Vasquez Fagan MD;  Location: [facility] ENDOSCOPY;  Service: Gastroenterology   • ENDOSCOPY N/A 2019    Procedure: ESOPHAGOGASTRODUODENOSCOPY WITH BIOPSY;  Surgeon: Vasquez Fagan MD;  Location: [facility]" [facility];  Service: Gastroenterology   • HYSTERECTOMY      1998   • VENTRAL HERNIA REPAIR  10/28/2012   ,   Family History   Problem Relation Age of Onset   • Hypertension Mother    • Asthma Mother    • COPD Mother    • Osteoarthritis Mother    • Cancer Father    • Cancer Sister    • Diabetes Maternal Grandmother    ,   Social History     Tobacco Use   • Smoking status: Never Smoker   • Smokeless tobacco: Never Used   Substance Use Topics   • Alcohol use: Yes     Alcohol/week: 1.0 - 2.0 standard drinks     Types: 1 - 2 Glasses of wine per week     Comment: occas   • Drug use: No   ,   Medications Prior to Admission   Medication Sig Dispense Refill Last Dose   • metoprolol succinate XL (TOPROL-XL) 50 MG 24 hr tablet Take 50 mg by mouth Daily.   5/31/2021 at Unknown time   • albuterol sulfate  (90 Base) MCG/ACT inhaler Inhale 2 puffs 4 (Four) Times a Day. (Patient not taking: Reported on 6/3/2021) 18 g 0 Not Taking at Unknown time   • budesonide-formoterol (SYMBICORT) 80-4.5 MCG/ACT inhaler Inhale 2 puffs 2 (Two) Times a Day.   5/31/2021   • cholecalciferol (VITAMIN D3) 25 MCG (1000 UT) tablet Take 1,000 Units by mouth Daily.   5/31/2021   • citalopram (CeleXA) 20 MG tablet Take 20 mg by mouth Daily.   5/31/2021   • furosemide (Lasix) 20 MG tablet Take 1 tablet by mouth Daily. 30 tablet 0 5/31/2021   • furosemide (LASIX) 40 MG tablet Take 1 tablet by mouth Daily for 5 days. (Patient not taking: Reported on 6/3/2021)   Not Taking at Unknown time   • hydroCHLOROthiazide (HYDRODIURIL) 25 MG tablet Take 25 mg by mouth Daily. Pt not taking at this time because she states \"her BP has been to low.\" (Patient not taking: Reported on 6/3/2021)   Not Taking at Unknown time   • losartan (COZAAR) 100 MG tablet Take 100 mg by mouth Daily. Pt not taking at this time because she states \"her BP has been to low.\" (Patient not taking: Reported on 6/3/2021)   Not Taking at Unknown time   • pantoprazole (PROTONIX) 40 MG EC " "tablet Take 40 mg by mouth Daily.   5/31/2021   • potassium chloride 10 MEQ CR tablet Take 2 tablets by mouth Daily. Take 1 tablet daily with furosemide   5/31/2021   • Xarelto 20 MG tablet Take 1 tablet by mouth Daily. (Patient taking differently: Take 20 mg by mouth Daily. Stopped taking two days ago (6/1/21) because noticed blackened stool per patient) 90 tablet 3 5/31/2021    and Allergies:  Patient has no known allergies.    Objective:    Vital Sign Min/Max for last 24 hours  Temp  Min: 97.4 °F (36.3 °C)  Max: 98.2 °F (36.8 °C)   BP  Min: 101/63  Max: 119/89   Pulse  Min: 92  Max: 133   Resp  Min: 18  Max: 20   SpO2  Min: 95 %  Max: 99 %   Flow (L/min)  Min: 3.5  Max: 4   No data recorded     Flowsheet Rows      First Filed Value   Admission Height  160 cm (63\") Documented at 06/01/2021 1404   Admission Weight  60.9 kg (134 lb 3.2 oz) Documented at 06/01/2021 1404             Ejection Fraction  No results found for: EF    Echo EF Estimated  Lab Results   Component Value Date    ECHOEFEST 32 05/13/2021       Nuclear Stress Ejection Fraction  No components found for: NUCEF    Cath Ejection Fraction Quantitative  No results found for: CATHEF    Physical Exam:   Physical Exam    Results Review:   I reviewed the patient's new clinical results.  Results from last 7 days   Lab Units 06/03/21  0523 06/02/21  1230 06/02/21  0611 06/01/21  1453   WBC 10*3/mm3 8.67  --   --  10.84*   HEMOGLOBIN g/dL 10.2* 10.7* 9.9* 10.6*   HEMATOCRIT % 33.0*  --   --  33.2*   PLATELETS 10*3/mm3 326  --   --  369     Results from last 7 days   Lab Units 06/03/21  0523 06/01/21  1453   SODIUM mmol/L 142 138   POTASSIUM mmol/L 4.3 4.2   CHLORIDE mmol/L 103 98   CO2 mmol/L 34.3* 33.1*   BUN mg/dL 8 15   CREATININE mg/dL 0.48* 0.56*   GLUCOSE mg/dL 82 94   CALCIUM mg/dL 8.8 8.8     Lab Results   Lab Value Date/Time    TROPONINT <0.010 05/13/2021 0828    TROPONINT <0.010 05/12/2021 2351    TROPONINT <0.010 05/12/2021 1827    TROPONINT <0.010 " 03/31/2021 1411    TROPONINT <0.010 03/31/2021 0612    TROPONINT <0.010 03/29/2021 0955    TROPONINT <0.010 12/05/2020 2337    TROPONINT <0.010 11/24/2020 1645    TROPONINT <0.010 11/23/2020 1429    TROPONINT <0.010 01/19/2020 1035    TROPONINT <0.010 01/06/2020 0914             Assessment/Plan:      Lower GI bleeding    Paroxysmal atrial fibrillation (CMS/[facility])    Essential hypertension    Chronic combined systolic and diastolic heart failure (CMS/[facility])    Acute blood loss anemia    Diverticulosis of colon      I have recommended a 1.5 L/day fluid restriction and a less than 1200 mg/day sodium restriction.  I have recommended starting Entresto in place of losartan.  I recommended starting spironolactone.  I have recommended intensification of the patient's loop diuresis.  Patient will follow up with  her regular established cardiologist.  She was actually due to see him as an outpatient clinic appointment on the day that she was admitted for her lower GI bleed.    I discussed the patients findings and my recommendations with patient, family and consulting provider    Rodrigue Rebolledo MD  06/03/21  15:36 EDT

## 2021-06-04 LAB
ANION GAP SERPL CALCULATED.3IONS-SCNC: 9.5 MMOL/L (ref 5–15)
BUN SERPL-MCNC: 6 MG/DL (ref 8–23)
BUN/CREAT SERPL: 12 (ref 7–25)
CALCIUM SPEC-SCNC: 8.6 MG/DL (ref 8.6–10.5)
CHLORIDE SERPL-SCNC: 92 MMOL/L (ref 98–107)
CO2 SERPL-SCNC: 38.5 MMOL/L (ref 22–29)
CREAT SERPL-MCNC: 0.5 MG/DL (ref 0.57–1)
DEPRECATED RDW RBC AUTO: 47.8 FL (ref 37–54)
ERYTHROCYTE [DISTWIDTH] IN BLOOD BY AUTOMATED COUNT: 13.8 % (ref 12.3–15.4)
GFR SERPL CREATININE-BSD FRML MDRD: 119 ML/MIN/1.73
GLUCOSE SERPL-MCNC: 126 MG/DL (ref 65–99)
HCT VFR BLD AUTO: 36 % (ref 34–46.6)
HGB BLD-MCNC: 11.5 G/DL (ref 12–15.9)
MAGNESIUM SERPL-MCNC: 1.6 MG/DL (ref 1.6–2.4)
MCH RBC QN AUTO: 30.4 PG (ref 26.6–33)
MCHC RBC AUTO-ENTMCNC: 31.9 G/DL (ref 31.5–35.7)
MCV RBC AUTO: 95.2 FL (ref 79–97)
PLATELET # BLD AUTO: 369 10*3/MM3 (ref 140–450)
PMV BLD AUTO: 9.6 FL (ref 6–12)
POTASSIUM SERPL-SCNC: 3 MMOL/L (ref 3.5–5.2)
RBC # BLD AUTO: 3.78 10*6/MM3 (ref 3.77–5.28)
SODIUM SERPL-SCNC: 140 MMOL/L (ref 136–145)
WBC # BLD AUTO: 12.75 10*3/MM3 (ref 3.4–10.8)

## 2021-06-04 PROCEDURE — 99232 SBSQ HOSP IP/OBS MODERATE 35: CPT | Performed by: INTERNAL MEDICINE

## 2021-06-04 PROCEDURE — 97161 PT EVAL LOW COMPLEX 20 MIN: CPT

## 2021-06-04 PROCEDURE — 94799 UNLISTED PULMONARY SVC/PX: CPT

## 2021-06-04 PROCEDURE — 80048 BASIC METABOLIC PNL TOTAL CA: CPT | Performed by: INTERNAL MEDICINE

## 2021-06-04 PROCEDURE — 85027 COMPLETE CBC AUTOMATED: CPT | Performed by: INTERNAL MEDICINE

## 2021-06-04 PROCEDURE — 25010000002 FUROSEMIDE PER 20 MG: Performed by: INTERNAL MEDICINE

## 2021-06-04 PROCEDURE — 83735 ASSAY OF MAGNESIUM: CPT | Performed by: INTERNAL MEDICINE

## 2021-06-04 RX ORDER — POTASSIUM CHLORIDE 750 MG/1
40 CAPSULE, EXTENDED RELEASE ORAL ONCE
Status: COMPLETED | OUTPATIENT
Start: 2021-06-05 | End: 2021-06-05

## 2021-06-04 RX ORDER — MAGNESIUM SULFATE 1 G/100ML
1 INJECTION INTRAVENOUS ONCE
Status: COMPLETED | OUTPATIENT
Start: 2021-06-05 | End: 2021-06-05

## 2021-06-04 RX ORDER — METOPROLOL TARTRATE 5 MG/5ML
2.5 INJECTION INTRAVENOUS ONCE
Status: COMPLETED | OUTPATIENT
Start: 2021-06-04 | End: 2021-06-04

## 2021-06-04 RX ADMIN — FUROSEMIDE 60 MG: 10 INJECTION, SOLUTION INTRAMUSCULAR; INTRAVENOUS at 17:52

## 2021-06-04 RX ADMIN — METOPROLOL TARTRATE 2.5 MG: 1 INJECTION, SOLUTION INTRAVENOUS at 03:10

## 2021-06-04 RX ADMIN — IPRATROPIUM BROMIDE AND ALBUTEROL SULFATE 3 ML: .5; 3 SOLUTION RESPIRATORY (INHALATION) at 06:49

## 2021-06-04 RX ADMIN — SACUBITRIL AND VALSARTAN 1 TABLET: 24; 26 TABLET, FILM COATED ORAL at 08:43

## 2021-06-04 RX ADMIN — FUROSEMIDE 60 MG: 10 INJECTION, SOLUTION INTRAMUSCULAR; INTRAVENOUS at 06:30

## 2021-06-04 RX ADMIN — SPIRONOLACTONE 12.5 MG: 25 TABLET, FILM COATED ORAL at 08:43

## 2021-06-04 RX ADMIN — BUDESONIDE 0.5 MG: 0.5 INHALANT RESPIRATORY (INHALATION) at 19:21

## 2021-06-04 RX ADMIN — METOPROLOL SUCCINATE 50 MG: 50 TABLET, EXTENDED RELEASE ORAL at 08:43

## 2021-06-04 RX ADMIN — IPRATROPIUM BROMIDE AND ALBUTEROL SULFATE 3 ML: .5; 3 SOLUTION RESPIRATORY (INHALATION) at 19:20

## 2021-06-04 RX ADMIN — PANTOPRAZOLE SODIUM 40 MG: 40 TABLET, DELAYED RELEASE ORAL at 08:43

## 2021-06-04 RX ADMIN — BUDESONIDE 0.5 MG: 0.5 INHALANT RESPIRATORY (INHALATION) at 06:49

## 2021-06-04 RX ADMIN — GUAIFENESIN 600 MG: 600 TABLET, EXTENDED RELEASE ORAL at 08:43

## 2021-06-04 RX ADMIN — IPRATROPIUM BROMIDE AND ALBUTEROL SULFATE 3 ML: .5; 3 SOLUTION RESPIRATORY (INHALATION) at 01:51

## 2021-06-04 RX ADMIN — CITALOPRAM HYDROBROMIDE 20 MG: 20 TABLET ORAL at 08:43

## 2021-06-04 RX ADMIN — GUAIFENESIN 600 MG: 600 TABLET, EXTENDED RELEASE ORAL at 22:10

## 2021-06-04 NOTE — CASE MANAGEMENT/SOCIAL WORK
Continued Stay Note   Matthew     Patient Name: Elsa Alcaraz  MRN: [id number]  Today's Date: 6/4/2021    Admit Date: 6/1/2021    Discharge Plan     Row Name 06/04/21 1520       Plan    Plan Comments SW received a home health order for the pt. SW met with pt at bedside to inquire about preferences for home health agencies. Pt states that she prefers [facility] first but has no other preferences. SW reached out to Dee Dee with [facility] regarding the referral and they are unable to take pt. SW reached out to [facility] and they state that she should be able to take pt. SW will follow up with [facility].     16:23 EDT  [facility] confirmed that they can accept pt. ABIEL informed them that pt will possibly be discharged tomorrow.             JAVIER Rivera

## 2021-06-04 NOTE — PROGRESS NOTES
Adult Nutrition  Assessment/PES    Patient Name:  Elsa Alcaraz  YOB: 1940  MRN: [id number]  Admit Date:  6/1/2021    Assessment Date:  6/4/2021    Comments:    Recommend:  1. Consider adding cardiac modification to current diet order as medically appropriate and tolerated.  2. Encourage PO intake. Average intake ~64% x 7 meals; improving.   3. Continue Boost Pudding BID.  4. Consider a multivitamin with minerals daily.  5. Continue to monitor and replace electrolytes PRN.     RD to follow pt and available PRN.       Reason for Assessment     Row Name 06/04/21 1501          Reason for Assessment    Reason For Assessment  follow-up protocol     Diagnosis  cardiac disease;cancer diagnosis/related complications;gastrointestinal disease;hematological/related complications;pulmonary disease GI bleed, AFIB, COPD, anemia, CHF, chronic resp failure, diverticulosis, hx breast and colon cancer     Identified At Risk by Screening Criteria  unintentional loss of 10 lbs or more in the past 2 mos;reduced oral intake over the last month;MST SCORE 2+;other (see comments) LACE             Labs/Tests/Procedures/Meds     Row Name 06/04/21 1501          Labs/Procedures/Meds    Lab Results Reviewed  reviewed, pertinent     Lab Results Comments  Low: BUN, Cr, K, Cl, Alb; High: Gluc        Medications    Pertinent Medications Reviewed  reviewed, pertinent     Pertinent Medications Comments  Lasix, Protonix         Physical Findings     Row Name 06/04/21 1502          Physical Findings    Skin  other (see comments) right anterior third toe           Nutrition Prescription Ordered     Row Name 06/04/21 1502          Nutrition Prescription PO    Current PO Diet  Regular     Supplement  Boost Pudding (Ensure Pudding)     Supplement Frequency  2 times a day     Common Modifiers  GI Soft/Chaves         Evaluation of Received Nutrient/Fluid Intake     Row Name 06/04/21 1502          PO Evaluation    Number of Days PO Intake  Evaluated  3 days     Number of Meals  7     % PO Intake  64               Problem/Interventions:  Problem 1     Row Name 06/04/21 1503          Nutrition Diagnoses Problem 1    Problem 1  Altered GI Function     Etiology (related to)  Medical Diagnosis     Gastrointestinal  Diverticulosis;Gastrointestinal bleed     Signs/Symptoms (evidenced by)  Report/Observation     Reported/Observed By  MD               Intervention Goal     Row Name 06/04/21 1503          Intervention Goal    General  Meet nutritional needs for age/condition;Improved nutrition related lab(s)     PO  Meet estimated needs;Increase intake;PO intake (%)     PO Intake %  75 %     Weight  Maintain weight         Nutrition Intervention     Row Name 06/04/21 1503          Nutrition Intervention    RD/Tech Action  Follow Tx progress;Encourage intake;Recommend/ordered     Recommended/Ordered  Diet         Nutrition Prescription     Row Name 06/04/21 1503          Nutrition Prescription PO    PO Prescription  Begin/change diet     Begin/Change Diet to  Regular     Common Modifiers  GI Soft/Converse;Cardiac     New PO Prescription Ordered?  No, recommended        Other Orders    Obtain Weight  Daily     Obtain Weight Ordered?  No, recommended     Supplement  Vitamin mineral supplement     Supplement Ordered?  No, recommended     Other  Continue to monitor and replace electrolytes PRN         Education/Evaluation     Row Name 06/04/21 1506          Education    Education  Will Instruct as appropriate        Monitor/Evaluation    Monitor  I&O;Per protocol;PO intake;Supplement intake;Pertinent labs;Weight;Skin status           Electronically signed by:  Joyce Siddiqi RD  06/04/21 15:04 EDT

## 2021-06-04 NOTE — CASE MANAGEMENT/SOCIAL WORK
"Continued Stay Note   Vipul     Patient Name: Elsa Alcaraz  MRN: [id number]  Today's Date: 6/4/2021    Admit Date: 6/1/2021    Discharge Plan    ACP booklet \"conversations that matter\" given to patient.  Dr. Hicks' note from yesterday said possible dc 2-3 days.     Expected Discharge Date and Time     Expected Discharge Date Expected Discharge Time    Jun 6, 2021             Josette Garcia RN    "

## 2021-06-04 NOTE — PLAN OF CARE
Goal Outcome Evaluation:     Patient with obvious anxious/nervous  behavior. Tachycardia about 3am, lopressor IVP given as ordered. Continue diuresis to improve chest xray findings/HF

## 2021-06-04 NOTE — PROGRESS NOTES
[facility]    Name:  Elsa Alcaraz   Age:  81 y.o.  Sex:  female  :  1940  MRN:  [id number]   Visit Number:  [id number]  Admission Date:  2021  Date Of Service:  21  Primary Care Physician:  Kemi Hdz MD     LOS: 3 days :    Chief Complaint:      Shortness of breath.    Subjective:    Ms. Alcaraz was seen and examined this morning and again in the afternoon.  She is feeling much better with regards to her shortness of breath and chest congestion.  She has been on scheduled IV diuretic therapy with Lasix since yesterday.  She has had about 2000 mL of urine output since yesterday.  Denies any abdominal pain or bright red blood per rectum.  Blood pressures have been on the lower side this morning especially since she has been started on Entresto and spironolactone.  No fevers.    Hospital course:     Ms. Alcaraz is an 81-year-old pleasant female who lives with her , with history of nonischemic cardiomyopathy followed by Dr. Germain, paroxysmal atrial fibrillation on Xarelto was admitted from the emergency room on 2021 with bright red blood per rectum.  She does have history of colon cancer several years ago status post right hemicolectomy.  Patient had stable hemoglobin and was admitted to the medical floor with telemetry.  Serial blood work did not show any significant drop in hemoglobin and she did not require any blood transfusions.  CT of the abdomen done on admission showed colonic diverticulosis without any diverticulitis.  Moderate bilateral pleural effusions with cardiomegaly noted.    Patient was evaluated by Dr. Sanders from gastroenterology.  Patient did undergo colonoscopy on 2021 and was noted to have pandiverticulosis without any bleeding.  However bleeding anastomotic ulcer noted on the right colon which was biopsied.  Dr. Sanders recommended to hold Xarelto for 2 weeks.    Patient developed worsening shortness of  breath the next day.  Repeat chest x-ray showed worsening pleural effusions and cardiomegaly.  Dr. Rebolledo was consulted from cardiology and he increased her diuretic therapy and started on Entresto and spironolactone for her systolic heart failure.    Review of Systems:     All systems were reviewed and negative except as mentioned in subjective, assessment and plan.    Vital Signs:    Temp:  [97.6 °F (36.4 °C)-98.6 °F (37 °C)] 98.6 °F (37 °C)  Heart Rate:  [103-132] 126  Resp:  [17-20] 20  BP: ()/(52-62) 95/52    Intake and output:    I/O last 3 completed shifts:  In: 600 [P.O.:600]  Out: 2000 [Urine:2000]  I/O this shift:  In: 720 [P.O.:720]  Out: 600 [Urine:600]    Physical Examination:    General Appearance:  Alert and cooperative. Comfortable at rest.   Head:  Atraumatic and normocephalic.   Eyes: Conjunctivae and sclerae normal, no icterus. No pallor.   Throat: No oral lesions, no thrush, oral mucosa moist.   Neck: Supple, trachea midline, no thyromegaly.   Lungs:   Breath sounds heard bilaterally equally.  Occasional crackles heard.  No wheezing. No Pleural rub or bronchial breathing.   Heart:  Normal S1 and S2, no murmur, no gallop, no rub. No JVD.   Abdomen:   Normal bowel sounds, no masses, no organomegaly. Soft, nontender, nondistended, no rebound tenderness. Surgical scar noted in the midline.   Extremities: Supple, no edema, no cyanosis, no clubbing.   Skin: No bleeding or rash.   Neurologic: Alert and oriented x 3. No facial asymmetry. Moves all four limbs. No tremors.      Laboratory results:    Results from last 7 days   Lab Units 06/04/21  0837 06/03/21  0523 06/01/21  1453   SODIUM mmol/L 140 142 138   POTASSIUM mmol/L 3.0* 4.3 4.2   CHLORIDE mmol/L 92* 103 98   CO2 mmol/L 38.5* 34.3* 33.1*   BUN mg/dL 6* 8 15   CREATININE mg/dL 0.50* 0.48* 0.56*   CALCIUM mg/dL 8.6 8.8 8.8   BILIRUBIN mg/dL  --   --  1.1   ALK PHOS U/L  --   --  94   ALT (SGPT) U/L  --   --  8   AST (SGOT) U/L  --   --  15    GLUCOSE mg/dL 126* 82 94     Results from last 7 days   Lab Units 06/04/21  0837 06/03/21  0523 06/02/21  1230 06/01/21  1453   WBC 10*3/mm3 12.75* 8.67  --  10.84*   HEMOGLOBIN g/dL 11.5* 10.2* 10.7* 10.6*   HEMATOCRIT % 36.0 33.0*  --  33.2*   PLATELETS 10*3/mm3 369 326  --  369     Results from last 7 days   Lab Units 06/02/21  0611   INR  1.22*     I have reviewed the patient's laboratory results.    Radiology results:    XR Chest 1 View    Result Date: 6/3/2021  PROCEDURE: XR CHEST 1 VW-  HISTORY: Dyspnea; K92.2-Gastrointestinal hemorrhage, unspecified , shortness of breath  COMPARISON:  5/12/2021  FINDINGS:  Portable view of the chest demonstrates bibasilar densities compatible with vascular congestion, interstitial edema and compressive atelectasis. Moderate to large left pleural effusion has slightly increased. Moderate size right pleural effusion has mildly increased. The mediastinum is unremarkable.  The heart size is normal.      Impression: Findings compatible with worsening CHF  This report was finalized on 6/3/2021 12:24 PM by Schuyler Thomas MD.    I have reviewed the patient's radiology reports.    Medication Review:     I have reviewed the patient's active and prn medications.     Problem List:      Lower GI bleeding    Acute on chronic combined systolic and diastolic CHF (congestive heart failure) (CMS/HCC)    Acute blood loss anemia    Paroxysmal atrial fibrillation (CMS/HCC)    Essential hypertension    Diverticulosis of colon    Chronic respiratory failure with hypoxia (CMS/[facility])    Assessment:    1.  Acute blood loss anemia, has not required blood transfusions, POA.  2.  Lower GI bleeding secondary to colonic anastomotic ulcer, POA.  3.  Pandiverticulosis, without evidence of bleeding.  4.  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  5.  Bilateral pleural effusions secondary to #4, POA.  6.  Paroxysmal atrial fibrillation, holding Xarelto  for 2 weeks.  7.  COPD, no evidence of exacerbation.  8.  Essential hypertension.  9.  Chronic hypoxic respiratory failure on home oxygen.    Plan:    Ms. Alcaraz is currently doing better with regards to her CHF exacerbation.  She will be continued on IV Lasix therapy along with Entresto, Toprol-XL and spironolactone for her congestive heart failure.  She did see Dr. Rebolledo yesterday and will need to follow-up with her primary cardiologist Dr. Germain as an outpatient.    Patient has not had any further episodes of bleeding.  We will continue Protonix and continue to hold her Xarelto until 6/14/2021.  Her hemoglobin has remained stable.    Patient's renal function has remained stable.  She has low potassium today due to diuretic therapy and we will replace it orally.    Patient will be continued on her home medications.  She will be continued on physical therapy.  I have discussed the patient's condition and treatment plan with Dr. Rebolledo as well as the patient's  who is at the bedside.  Hopefully, she should be able to go home tomorrow with home health services.  Patient does not have advanced directives and I have discussed the patient's advanced directives with her and she wants to be DNR.  I have discussed with case management services to help her get advanced directives paperwork done.  Discussed with nursing staff and multidisciplinary team.    DVT Prophylaxis: SCDs.  Code Status: Full code.  Diet: Cardiac.  Discharge Plan: Hopefully, home with home health tomorrow.    Nik Hicks MD  06/04/21  14:52 EDT    Dictated utilizing Dragon dictation.

## 2021-06-04 NOTE — PLAN OF CARE
Goal Outcome Evaluation:  Patient in afib on tele, up in chair today, worked with PT, will continue to monitor and notify MD for any issues or concerns

## 2021-06-04 NOTE — PLAN OF CARE
Goal Outcome Evaluation:      Spoke with pt and her  at her bedside. Pt stated she wanted to have a visit from the  because the  from her [employer] was unable to visit and she wanted to have a pastoral care visit while she is here.  I listened as pt talked about her physical condition and symptoms that caused her hospitalization.  Pt's  is present and attentive to her needs.  I offered prayer at her request, and provided a scripture card containing a scripture, a picture of Eduardo, and prayer.  We also talked about the decisions to be made on a Living Will form, and that documenting her desires will be a gift to her family. Pt said she had to make decisions about the care of her parents and understands the importance of making her desires known.  Pt stated that members of her care team had been concerned about her rapid heart beat and anxiety and knows that her medication may need to be changed.  Pt also stated that she  has appreciated the care she is receiving. Pt  hopes to be discharged over the weekend.

## 2021-06-05 ENCOUNTER — APPOINTMENT (OUTPATIENT)
Dept: GENERAL RADIOLOGY | Facility: HOSPITAL | Age: 81
End: 2021-06-05

## 2021-06-05 VITALS
HEIGHT: 63 IN | DIASTOLIC BLOOD PRESSURE: 48 MMHG | WEIGHT: 129.85 LBS | TEMPERATURE: 98.1 F | OXYGEN SATURATION: 95 % | RESPIRATION RATE: 20 BRPM | BODY MASS INDEX: 23.01 KG/M2 | HEART RATE: 120 BPM | SYSTOLIC BLOOD PRESSURE: 95 MMHG

## 2021-06-05 PROBLEM — K92.2 LOWER GI BLEEDING: Status: RESOLVED | Noted: 2021-06-01 | Resolved: 2021-06-05

## 2021-06-05 LAB
ALBUMIN SERPL-MCNC: 2.8 G/DL (ref 3.5–5.2)
ALBUMIN/GLOB SERPL: 1 G/DL
ALP SERPL-CCNC: 82 U/L (ref 39–117)
ALT SERPL W P-5'-P-CCNC: <5 U/L (ref 1–33)
ANION GAP SERPL CALCULATED.3IONS-SCNC: 4.2 MMOL/L (ref 5–15)
AST SERPL-CCNC: 12 U/L (ref 1–32)
BILIRUB SERPL-MCNC: 0.8 MG/DL (ref 0–1.2)
BUN SERPL-MCNC: 9 MG/DL (ref 8–23)
BUN/CREAT SERPL: 16.7 (ref 7–25)
CALCIUM SPEC-SCNC: 8.9 MG/DL (ref 8.6–10.5)
CHLORIDE SERPL-SCNC: 92 MMOL/L (ref 98–107)
CO2 SERPL-SCNC: 43.8 MMOL/L (ref 22–29)
CREAT SERPL-MCNC: 0.54 MG/DL (ref 0.57–1)
DEPRECATED RDW RBC AUTO: 46.5 FL (ref 37–54)
ERYTHROCYTE [DISTWIDTH] IN BLOOD BY AUTOMATED COUNT: 13.7 % (ref 12.3–15.4)
GFR SERPL CREATININE-BSD FRML MDRD: 109 ML/MIN/1.73
GLOBULIN UR ELPH-MCNC: 2.9 GM/DL
GLUCOSE SERPL-MCNC: 103 MG/DL (ref 65–99)
HCT VFR BLD AUTO: 36.6 % (ref 34–46.6)
HGB BLD-MCNC: 12 G/DL (ref 12–15.9)
MCH RBC QN AUTO: 30.5 PG (ref 26.6–33)
MCHC RBC AUTO-ENTMCNC: 32.8 G/DL (ref 31.5–35.7)
MCV RBC AUTO: 92.9 FL (ref 79–97)
PLATELET # BLD AUTO: 461 10*3/MM3 (ref 140–450)
PMV BLD AUTO: 9.6 FL (ref 6–12)
POTASSIUM SERPL-SCNC: 3.4 MMOL/L (ref 3.5–5.2)
PROT SERPL-MCNC: 5.7 G/DL (ref 6–8.5)
RBC # BLD AUTO: 3.94 10*6/MM3 (ref 3.77–5.28)
SODIUM SERPL-SCNC: 140 MMOL/L (ref 136–145)
WBC # BLD AUTO: 11.49 10*3/MM3 (ref 3.4–10.8)

## 2021-06-05 PROCEDURE — 25010000002 FUROSEMIDE PER 20 MG: Performed by: INTERNAL MEDICINE

## 2021-06-05 PROCEDURE — 94799 UNLISTED PULMONARY SVC/PX: CPT

## 2021-06-05 PROCEDURE — 80053 COMPREHEN METABOLIC PANEL: CPT | Performed by: INTERNAL MEDICINE

## 2021-06-05 PROCEDURE — 25010000002 MAGNESIUM SULFATE IN D5W 1G/100ML (PREMIX) 1-5 GM/100ML-% SOLUTION: Performed by: EMERGENCY MEDICINE

## 2021-06-05 PROCEDURE — 85027 COMPLETE CBC AUTOMATED: CPT | Performed by: INTERNAL MEDICINE

## 2021-06-05 PROCEDURE — 71046 X-RAY EXAM CHEST 2 VIEWS: CPT

## 2021-06-05 PROCEDURE — 99239 HOSP IP/OBS DSCHRG MGMT >30: CPT | Performed by: INTERNAL MEDICINE

## 2021-06-05 RX ORDER — IPRATROPIUM BROMIDE AND ALBUTEROL SULFATE 2.5; .5 MG/3ML; MG/3ML
3 SOLUTION RESPIRATORY (INHALATION) EVERY 4 HOURS PRN
Qty: 360 ML | Refills: 0 | Status: SHIPPED | OUTPATIENT
Start: 2021-06-05

## 2021-06-05 RX ORDER — SPIRONOLACTONE 25 MG/1
12.5 TABLET ORAL DAILY
Qty: 15 TABLET | Refills: 0 | Status: SHIPPED | OUTPATIENT
Start: 2021-06-06

## 2021-06-05 RX ORDER — RIVAROXABAN 20 MG/1
20 TABLET, FILM COATED ORAL DAILY
Qty: 90 TABLET | Refills: 3 | Status: SHIPPED | OUTPATIENT
Start: 2021-06-16 | End: 2022-09-07

## 2021-06-05 RX ADMIN — PANTOPRAZOLE SODIUM 40 MG: 40 TABLET, DELAYED RELEASE ORAL at 08:26

## 2021-06-05 RX ADMIN — POTASSIUM CHLORIDE 40 MEQ: 10 CAPSULE, COATED, EXTENDED RELEASE ORAL at 00:29

## 2021-06-05 RX ADMIN — SPIRONOLACTONE 12.5 MG: 25 TABLET, FILM COATED ORAL at 08:27

## 2021-06-05 RX ADMIN — MAGNESIUM SULFATE HEPTAHYDRATE 1 G: 1 INJECTION, SOLUTION INTRAVENOUS at 00:29

## 2021-06-05 RX ADMIN — SACUBITRIL AND VALSARTAN 1 TABLET: 24; 26 TABLET, FILM COATED ORAL at 08:26

## 2021-06-05 RX ADMIN — IPRATROPIUM BROMIDE AND ALBUTEROL SULFATE 3 ML: .5; 3 SOLUTION RESPIRATORY (INHALATION) at 12:55

## 2021-06-05 RX ADMIN — IPRATROPIUM BROMIDE AND ALBUTEROL SULFATE 3 ML: .5; 3 SOLUTION RESPIRATORY (INHALATION) at 00:58

## 2021-06-05 RX ADMIN — BUDESONIDE 0.5 MG: 0.5 INHALANT RESPIRATORY (INHALATION) at 07:04

## 2021-06-05 RX ADMIN — FUROSEMIDE 60 MG: 10 INJECTION, SOLUTION INTRAMUSCULAR; INTRAVENOUS at 05:15

## 2021-06-05 RX ADMIN — IPRATROPIUM BROMIDE AND ALBUTEROL SULFATE 3 ML: .5; 3 SOLUTION RESPIRATORY (INHALATION) at 07:04

## 2021-06-05 RX ADMIN — METOPROLOL SUCCINATE 50 MG: 50 TABLET, EXTENDED RELEASE ORAL at 08:27

## 2021-06-05 RX ADMIN — CITALOPRAM HYDROBROMIDE 20 MG: 20 TABLET ORAL at 08:27

## 2021-06-05 RX ADMIN — GUAIFENESIN 600 MG: 600 TABLET, EXTENDED RELEASE ORAL at 08:27

## 2021-06-05 NOTE — DISCHARGE SUMMARY
[facility]   DISCHARGE SUMMARY      Name:  Elsa Alcaraz   Age:  81 y.o.  Sex:  female  :  1940  MRN:  [id number]   Visit Number:  [id number]    Admission Date:  2021  Date of Discharge:  2021  Primary Care Physician:  Kemi Hdz MD    Important issues to note:    1.  Patient does have bilateral pleural effusions, left more than the right due to her CHF and has been started on new medications and diuretics to improve her effusions.  She may need repeat PA and lateral chest x-ray in 2 to 3 weeks.  2.  New medications started during this admission: Entresto, spironolactone.  3.  Medications discontinued: Losartan and hydrochlorothiazide.  4.  Start Xarelto after 6/15/2021.  5.  Follow-up with Dr. Sanders in 4 weeks.  6.  Follow-up with Dr. Germain in 2 weeks.  7.  Follow-up with primary care physician in 1 week with SHAGU[facility].  8.  Patient has been arranged home health services and home nebulizer therapy.  9.  Patient already has home oxygen at 4 L/min continuously.    Discharge Diagnoses:     1.  Acute blood loss anemia, has not required blood transfusions, POA.  2.  Lower GI bleeding secondary to colonic anastomotic ulcer, POA.  3.  Pandiverticulosis, without evidence of bleeding.  4.  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  5.  Bilateral pleural effusions secondary to #4, POA.  6.  Paroxysmal atrial fibrillation, holding Xarelto for 2 weeks.  7.  COPD, no evidence of exacerbation.  8.  Essential hypertension.  9.  Chronic hypoxic respiratory failure on home oxygen.    Problem List:     Active Hospital Problems    Diagnosis  POA   • Acute blood loss anemia [D62]  Yes     Priority: High   • Acute on chronic combined systolic and diastolic CHF (congestive heart failure) (CMS/HCC) [I50.43]  Yes     Priority: High   • Chronic respiratory failure with hypoxia (CMS/HCC) [J96.11]  Yes   • Diverticulosis of colon  [K57.30]  Yes   • Paroxysmal atrial fibrillation (CMS/HCC) [I48.0]  Yes   • Essential hypertension [I10]  Yes      Resolved Hospital Problems    Diagnosis Date Resolved POA   • **Lower GI bleeding [K92.2] 06/05/2021 Yes     Priority: High     Presenting Problem:    Chief Complaint   Patient presents with   • Rectal Bleeding      Consults:     Consulting Physician(s)  Chat With All Active Members    Provider Relationship Specialty    Rodrigue Rebolledo MD  Consulting Physician Cardiology    Iona Sanders MD  Consulting Physician Gastroenterology        Procedures Performed:    COLONOSCOPY WITH BIOPSY on 6/2/2021.    History of presenting illness/Hospital Course:    Ms. Alcaraz is an 81-year-old pleasant female who lives with her , with history of nonischemic cardiomyopathy followed by Dr. Germain, paroxysmal atrial fibrillation on Xarelto was admitted from the emergency room on 6/1/2021 with bright red blood per rectum.  She does have history of colon cancer several years ago status post right hemicolectomy.  Patient had stable hemoglobin and was admitted to the medical floor with telemetry.  Serial blood work did not show any significant drop in hemoglobin and she did not require any blood transfusions.  CT of the abdomen done on admission showed colonic diverticulosis without any diverticulitis.  Moderate bilateral pleural effusions with cardiomegaly noted.     Patient was evaluated by Dr. Sanders from gastroenterology.  Patient did undergo colonoscopy on 6/2/2021 and was noted to have pandiverticulosis without any bleeding.  However bleeding anastomotic ulcer noted on the right colon which was biopsied.  Dr. Sanders recommended to hold Xarelto for 2 weeks.     Patient developed worsening shortness of breath the next day.  Repeat chest x-ray showed worsening pleural effusions and cardiomegaly.  Dr. Rebolledo was consulted from cardiology and he increased her diuretic therapy and started on Entresto and  spironolactone for her systolic heart failure.    Patient does have bilateral pleural effusion that seem to be chronic in the last 1 to 2 months.  This is likely secondary to decompensated heart failure.  Patient was started on spironolactone and Entresto and was continued on her home dose of Lasix.  She may need several more weeks to improve her pleural effusions and would benefit from outpatient follow-up and escalation of diuretic therapy if necessary.  She is advised to follow-up with her primary cardiologist in 2 weeks.    Patient has remained stable with regards to her hemoglobin and did not require any blood transfusions.  As per the recommendation of Dr. Sanders we will hold Xarelto for a total of 2 weeks.  She is advised to follow-up with her primary care physician in 1 week.  Patient will be arranged home health services and home nebulizer therapy.  She already has home oxygen at 4 L continuously.    Vital Signs:    Temp:  [97.7 °F (36.5 °C)-98.1 °F (36.7 °C)] 98.1 °F (36.7 °C)  Heart Rate:  [111-133] 133  Resp:  [18-20] 20  BP: (89-98)/(48-62) 95/48    Physical Exam:    General Appearance:  Alert and cooperative. Comfortable at rest.   Head:  Atraumatic and normocephalic.   Eyes: Conjunctivae and sclerae normal, no icterus. No pallor.   Ears:  Ears with no abnormalities noted.   Throat: No oral lesions, no thrush, oral mucosa moist.   Neck: Supple, trachea midline, no thyromegaly.   Back:   No kyphoscoliosis present. No tenderness to palpation.   Lungs:   Breath sounds heard bilaterally equally.  Occasional crackles heard.  No wheezing. No Pleural rub or bronchial breathing.   Heart:  Normal S1 and S2, no murmur, no gallop, no rub. No JVD.   Abdomen:   Normal bowel sounds, no masses, no organomegaly. Soft, nontender, nondistended, no rebound tenderness. Surgical scar noted in the midline.   Extremities: Supple, no edema, no cyanosis, no clubbing.   Pulses: Pulses palpable bilaterally.   Skin: No bleeding  or rash.   Neurologic: Alert and oriented x 3. No facial asymmetry. Moves all four limbs. No tremors.     Pertinent Lab Results:     Results from last 7 days   Lab Units 06/05/21  0716 06/04/21  0837 06/03/21  0523 06/01/21  1453   SODIUM mmol/L 140 140 142 138   POTASSIUM mmol/L 3.4* 3.0* 4.3 4.2   CHLORIDE mmol/L 92* 92* 103 98   CO2 mmol/L 43.8* 38.5* 34.3* 33.1*   BUN mg/dL 9 6* 8 15   CREATININE mg/dL 0.54* 0.50* 0.48* 0.56*   CALCIUM mg/dL 8.9 8.6 8.8 8.8   BILIRUBIN mg/dL 0.8  --   --  1.1   ALK PHOS U/L 82  --   --  94   ALT (SGPT) U/L <5  --   --  8   AST (SGOT) U/L 12  --   --  15   GLUCOSE mg/dL 103* 126* 82 94     Results from last 7 days   Lab Units 06/05/21  0716 06/04/21  0837 06/03/21  0523   WBC 10*3/mm3 11.49* 12.75* 8.67   HEMOGLOBIN g/dL 12.0 11.5* 10.2*   HEMATOCRIT % 36.6 36.0 33.0*   PLATELETS 10*3/mm3 461* 369 326     Results from last 7 days   Lab Units 06/02/21  0611   INR  1.22*       Results from last 7 days   Lab Units 06/01/21  1453   LIPASE U/L 13     Pertinent Radiology Results:    Imaging Results (All)     Procedure Component Value Units Date/Time    XR Chest PA & Lateral [[id number]] Collected: 06/05/21 1137     Updated: 06/05/21 1141    Narrative:      PROCEDURE: XR CHEST PA AND LATERAL-     HISTORY: f/u pleural effusions; K92.2-Gastrointestinal hemorrhage,  unspecified; I48.0-Paroxysmal atrial fibrillation; I50.23-Acute on  chronic systolic (congestive) heart failure; J96.22-Acute and chronic  respiratory failure with hypercapnia     COMPARISON: 06/03/2021.      FINDINGS: Cardiomegaly is noted. There is a probable hiatal hernia.  There are persistent bibasilar left greater than right opacities  consistent with atelectasis or pneumonia. Moderate right and large left  pleural effusions are seen. There is no pneumothorax. The bony thorax is  intact. Postoperative changes are seen in the right lateral chest wall.       Impression:      Moderate right and large left pleural effusions.      Left greater than right bibasilar atelectasis or pneumonia        This report was finalized on 6/5/2021 11:39 AM by Aldo Sosa MD.    XR Chest 1 View [[id number]] Collected: 06/03/21 1224     Updated: 06/03/21 1228    Narrative:      PROCEDURE: XR CHEST 1 VW-     HISTORY: Dyspnea; K92.2-Gastrointestinal hemorrhage, unspecified ,  shortness of breath     COMPARISON:  5/12/2021     FINDINGS:  Portable view of the chest demonstrates bibasilar densities  compatible with vascular congestion, interstitial edema and compressive  atelectasis. Moderate to large left pleural effusion has slightly  increased. Moderate size right pleural effusion has mildly increased.  The mediastinum is unremarkable.     The heart size is normal.       Impression:      Findings compatible with worsening CHF      This report was finalized on 6/3/2021 12:24 PM by Schuyler Thomas MD.    CT Abdomen Pelvis With Contrast [[id number]] Collected: 06/01/21 1915     Updated: 06/01/21 1916    Narrative:      FINAL REPORT    TECHNIQUE:  Axial CT images were performed from the lung bases through the  symphysis pubis after the administration of intravenous  contrast.  This study was performed with techniques to keep  radiation doses as low as reasonably achievable (ALARA).  Individualized dose reduction techniques using automated  exposure control or adjustment of mA and/or kV according to the  patient's size were employed.    CLINICAL HISTORY:  rectal bleeding    COMPARISON:  5/12/2021    FINDINGS:  LOWER CHEST: There is cardiomegaly.  There are moderate bilateral pleural effusions.  There is lower lobe atelectasis.    ABDOMEN/PELVIS:    Liver, gallbladder and bile ducts: The liver enhances homogeneously without suspicious focal hepatic lesion.  There is been prior cholecystectomy.  There is no definite biliary duct dilatation.    Adrenal glands: The adrenal glands are morphologically unremarkable without suspicious lesion.    Kidneys, ureter and  urinary bladder: No suspicious renal lesion.  No hydronephrosis.  Urinary bladder is unremarkable.    Spleen: The spleen is normal size.    Pancreas: The pancreas is grossly unremarkable.    GI systems and mesentery: No evidence of bowel obstruction.  The appendix is not visualized.  There is colonic diverticulosis without evidence of diverticulitis.  There are postoperative changes of the cecum.    Lymph nodes: No definite pathologically enlarged abdominal or pelvic lymph nodes present.    Vessels: The aorta and abdominal arteries are grossly patent.  The IVC and portal vein are patent and grossly unremarkable.    Peritoneum: No free intraperitoneal fluid or pneumoperitoneum.    Pelvic viscera: No acute findings.    Body wall: No body wall contusion. No significant body wall hernias.    Bones: No acute fracture.      Impression:      No acute findings in the abdomen or pelvis to account for patient's symptoms.    Colonic diverticulosis without diverticulitis.    Moderate bilateral pleural effusions and cardiomegaly.    Authenticated by Rafael Limon MD on 06/01/2021 07:15:08 PM        Echo:    Results for orders placed during the hospital encounter of 05/12/21    Adult Transthoracic Echo Complete W/ Cont if Necessary Per Protocol    Interpretation Summary  · The left ventricular cavity is moderately dilated.  · Left ventricular wall thickness is consistent with mild concentric hypertrophy.  · Estimated left ventricular EF = 32% Left ventricular systolic function is moderately decreased.  · Left ventricular diastolic function is consistent with (grade III w/high LAP) reversible restrictive pattern.  · The right ventricular cavity is mild to moderately dilated.  · Moderately reduced right ventricular systolic function noted.  · Left atrial volume is severely increased.  · The right atrial cavity is moderate to severely dilated.  · There is moderate calcification of the aortic valve mainly affecting the non-coronary,  left coronary and right coronary cusp(s).  · Estimated right ventricular systolic pressure from tricuspid regurgitation is normal (<35 mmHg).  · There is a moderate sized left pleural effusion. There is a moderate sized right pleural effusion.    Condition on Discharge:      Stable.    Code status during the hospital stay:    Code Status and Medical Interventions:   Ordered at: 06/05/21 0820     Limited Support to NOT Include:    Cardioversion/Defibrillation     Level Of Support Discussed With:    Patient     Code Status:    No CPR     Medical Interventions (Level of Support Prior to Arrest):    Limited     Discharge Disposition:    Home-Health [place] [facility]    Discharge Medications:       Discharge Medications      New Medications      Instructions Start Date   ipratropium-albuterol 0.5-2.5 mg/3 ml nebulizer  Commonly known as: DUO-NEB   3 mL, Nebulization, Every 4 Hours PRN      sacubitril-valsartan 24-26 MG tablet  Commonly known as: ENTRESTO   1 tablet, Oral, Every 12 Hours Scheduled      spironolactone 25 MG tablet  Commonly known as: ALDACTONE   12.5 mg, Oral, Daily   Start Date: June 6, 2021        Changes to Medications      Instructions Start Date   furosemide 20 MG tablet  Commonly known as: Lasix  What changed: Another medication with the same name was removed. Continue taking this medication, and follow the directions you see here.   20 mg, Oral, Daily      Xarelto 20 MG tablet  Generic drug: rivaroxaban  What changed: These instructions start on June 16, 2021. If you are unsure what to do until then, ask your doctor or other care provider.   20 mg, Oral, Daily   Start Date: June 16, 2021        Continue These Medications      Instructions Start Date   albuterol sulfate  (90 Base) MCG/ACT inhaler  Commonly known as: PROVENTIL HFA;VENTOLIN HFA;PROAIR HFA   2 puffs, Inhalation, 4 Times Daily - RT      budesonide-formoterol 80-4.5 MCG/ACT inhaler  Commonly known as: SYMBICORT   2 puffs, Inhalation, 2  Times Daily - RT      cholecalciferol 25 MCG (1000 UT) tablet  Commonly known as: VITAMIN D3   1,000 Units, Oral, Daily      citalopram 20 MG tablet  Commonly known as: CeleXA   20 mg, Oral, Daily      metoprolol succinate XL 50 MG 24 hr tablet  Commonly known as: TOPROL-XL   50 mg, Oral, Daily      pantoprazole 40 MG EC tablet  Commonly known as: PROTONIX   40 mg, Oral, Daily      potassium chloride 10 MEQ CR tablet   20 mEq, Oral, Daily, Take 1 tablet daily with furosemide         Stop These Medications    hydroCHLOROthiazide 25 MG tablet  Commonly known as: HYDRODIURIL     losartan 100 MG tablet  Commonly known as: COZAAR          Discharge Diet:     Diet Instructions     Diet: Cardiac; Thin      Discharge Diet: Cardiac    Fluid Consistency: Thin    Fluid Restriction per day: 1500 mL Fluid        Activity at Discharge:     Activity Instructions     Activity as Tolerated          Follow-up Appointments:    Additional Instructions for the Follow-ups that You Need to Schedule     Ambulatory Referral to Home Health   As directed      Face to Face Visit Date: 6/4/2021    Follow-up provider for Plan of Care?: I treated the patient in an acute care facility and will not continue treatment after discharge.    Follow-up provider: BETI ARAUJO [[id number]]    Reason/Clinical Findings: GI bleed, CHF, AFIB, Respiratory failure    Describe mobility limitations that make leaving home difficult: Generalized weakness, respiratory failure    Nursing/Therapeutic Services Requested: Skilled Nursing Occupational Therapy Physical Therapy    Skilled nursing orders: Medication education CHF management O2 instruction Cardiopulmonary assessments    PT orders: Therapeutic exercise Gait Training Transfer training Strengthening Home safety assessment    Weight Bearing Status: As Tolerated    Occupational orders: Activities of daily living Strengthening    Frequency: 1 Week 1            Contact information for follow-up providers      Iona Sanders MD Follow up on 7/6/2021.    Specialty: Gastroenterology  Why: @ 1:15 PM  Contact information:  [address]  [facility] [address]  [phone]             Fidencio Germain MD Follow up in 2 week(s).    Specialty: Cardiology  Why: The office will contact you with your follow up appointment information.   Contact information:  [address]  [address]  [facility] [address]  [phone]             Kemi Hdz MD Follow up in 1 week(s).    Specialty: Family Medicine  Why: Please contact the office on Monday June 7, 2021 to schedule your 1 week hospital follow up appointment due to the office being closed at the time of discharge.  Contact information:  [address]  [facility] [address]  [phone]                   Contact information for after-discharge care     Durable Medical Equipment     [facility] .    Service: Durable Medical Equipment  Contact information:  [address]  [facility] [address]  [facility] [address]  [phone]                 [facility]     [facility] .    Service: [facility] Services  Contact information:  [address]  [facility] [address]  [phone]                           Future Appointments   Date Time Provider Department Center   7/6/2021  1:15 PM Iona Sanders MD [facility]     Test Results Pending at Discharge:    None.       Nik Hicks MD  06/05/21  12:38 EDT    Time: I spent 35 minutes on this discharge activity which included: face-to-face encounter with the patient, reviewing the data in the system, coordination of the care with the nursing staff as well as consultants, documentation, and entering orders.     Dictated utilizing Dragon dictation.

## 2021-06-05 NOTE — CASE MANAGEMENT/SOCIAL WORK
Discharge Planning Assessment   Vipul     Patient Name: Elsa Alcaraz  MRN: [id number]  Today's Date: 6/5/2021    Admit Date: 6/1/2021    Discharge Needs Assessment    No documentation.       Discharge Plan     Row Name 06/05/21 1329       Plan    Plan  Orders for discharge with HH through [facility] and home nebulizer compresser and kit noted    Pt is current using [employer] for O2 needs   Spoke with Kiley from [facility] about new order (nebulizer) and stated it would be delivered Monday     Spoke with Dr Hicks if pt could wait until Monday for her nebulizer and he stated it would be ok    Faxed orders, facesheet and DC summary to [facility] with success    Also [facility] had accepted pt for HH services on Friday  CM to follow up with [facility] and [facility]  on Monday        Continued Care and Services - Admitted Since 6/1/2021     Durable Medical Equipment Coordination complete    Service Provider Request Status Selected Services Address Phone Fax Patient Preferred    [facility] [address]  [facility] [address] [address] [phone] --       Internal Comment last updated by Josette Garcia, RN 6/4/2021 1244    Current oxygen provider 4L per [place] [facility].                      Home Medical Care Coordination complete    Service Provider Request Status Selected Services Address Phone Fax Patient Preferred    [facility] [address] [facility] [address] [address] [phone] --    [facility]  Declined N/A [address] [facility] [address] [phone] --    [facility]  Declined  Insurance Denial N/A [address] [id number] [phone] --            Selected Continued Care - Prior Encounters Includes selections from prior encounters from 3/3/2021 to 6/5/2021    Discharged on 5/14/2021 Admission date: 5/12/2021 - Discharge  disposition: Home or Self Care    Durable Medical Equipment     Service Provider Selected Services Address Phone Fax Patient Preferred    [facility]  Durable Medical Equipment [address], [facility] [address] [phone] --       Internal Comment last updated by Josette Garcia RN 5/13/2021 6116    Current oxygen provider.                            Discharged on 4/5/2021 Admission date: 3/29/2021 - Discharge disposition: Home or Self Care    Durable Medical Equipment     Service Provider Selected Services Address Phone Fax Patient Preferred    [facility]  Durable Medical Equipment [address], [facility] [address] [phone] --                    Expected Discharge Date and Time     Expected Discharge Date Expected Discharge Time    Jun 5, 2021         Demographic Summary    No documentation.       Functional Status    No documentation.       Psychosocial    No documentation.       Abuse/Neglect    No documentation.       Legal    No documentation.       Substance Abuse    No documentation.       Patient Forms    No documentation.           Gerri Frias, RN

## 2021-06-05 NOTE — THERAPY EVALUATION
Patient Name: Elsa Alcaraz  : 1940    MRN: [id number]                              Today's Date: 2021       Admit Date: 2021    Visit Dx:     ICD-10-CM ICD-9-CM   1. Gastrointestinal hemorrhage, unspecified gastrointestinal hemorrhage type  K92.2 578.9   2. Paroxysmal atrial fibrillation (CMS/HCC)  I48.0 427.31   3. Acute on chronic systolic CHF (congestive heart failure) (CMS/HCC)  I50.23 428.23     428.0   4. Acute on chronic respiratory failure with hypercapnia (CMS/HCC)  J96.22 518.84     Patient Active Problem List   Diagnosis   • Paroxysmal atrial fibrillation (CMS/HCC)   • Hyperlipidemia   • Essential hypertension   • Anxiety disorder due to general medical condition   • Cataract   • Chronic bronchitis (CMS/HCC)   • Depression   • Esophageal reflux   • Hiatal hernia   • Obesity   • Palpitations   • Sleep apnea   • Iron deficiency anemia   • Thrombocytosis (CMS/HCC)   • Iron deficiency anemia secondary to inadequate dietary iron intake   • Hiatal hernia with gastroesophageal reflux   • Gastroesophageal reflux disease with esophagitis   • Epigastric pain   • Age-related nuclear cataract of left eye   • Age-related nuclear cataract of right eye   • Bacterial enteritis   • Moderate mitral regurgitation   • Moderate tricuspid regurgitation   • Acute on chronic combined systolic and diastolic CHF (congestive heart failure) (CMS/HCC)   • Acute on chronic respiratory failure with hypercapnia (CMS/HCC)   • Acute on chronic systolic CHF (congestive heart failure) (CMS/HCC)   • Abdominal pain   • Lower GI bleeding   • Acute blood loss anemia   • Diverticulosis of colon   • Chronic respiratory failure with hypoxia (CMS/HCC)     Past Medical History:   Diagnosis Date   • Anxiety disorder due to general medical condition 2017   • Arthritis    • Atrial fibrillation (CMS/HCC) 2017   • Body piercing     BOTH EARS   • Borderline diabetes    • Breast cancer (CMS/HCC)     right-radiation and a  "lumpectomy   • Cataract 2017    Left eye surgery    • Chronic bronchitis (CMS/HCC) 2017   • Colon cancer (CMS/HCC) 1998    had surgery and chemo   • COPD (chronic obstructive pulmonary disease) (CMS/HCC)    • Cyanocobalamine deficiency (non anemic)    • Depression 2017   • Diverticulosis    • Esophageal reflux 2017   • Hiatal hernia 2017   • History of bladder infections    • Hx of exercise stress test     \"several years ago by Dr. Mercado\".     • Hx of radiation therapy    • Hyperlipidemia    • Hypertension 2017   • Impaired functional mobility and activity tolerance    • Impaired functional mobility, balance, gait, and endurance    • Osteoporosis    • Palpitations 2017   • Prediabetes    • Problems with swallowing     meat at times per pt report   • Shortness of breath     WITH EXERTION    • Sleep apnea 2017    NO CPAP   • Tricuspid valve disorder 2017    Patient doesn't know anything about this   • Vitamin D deficiency    • Wears glasses      Past Surgical History:   Procedure Laterality Date   • ANAL FISTULOTOMY     • APPENDECTOMY         • BREAST BIOPSY     • BREAST LUMPECTOMY Right 2006   • CATARACT EXTRACTION  2019    both eyes    • CATARACT EXTRACTION W/ INTRAOCULAR LENS IMPLANT Left 2019    Procedure: CATARACT PHACO EXTRACTION WITH INTRAOCULAR LENS IMPLANT LEFT;  Surgeon: Masoud David MD;  Location: [facility];  Service: Ophthalmology   • CATARACT EXTRACTION W/ INTRAOCULAR LENS IMPLANT Right 2019    Procedure: CATARACT PHACO EXTRACTION WITH INTRAOCULAR LENS IMPLANT RIGHT;  Surgeon: Masoud David MD;  Location: [facility];  Service: Ophthalmology   •  SECTION  1981   • CHOLECYSTECTOMY  2009   • COLECTOMY PARTIAL / TOTAL  1998    COLON CANCER   • COLONOSCOPY     • COLONOSCOPY N/A 2021    Procedure: COLONOSCOPY WITH BIOPSY;  Surgeon: Iona Sanders MD;  Location: [facility]" ENDOSCOPY;  Service: Gastroenterology;  Laterality: N/A;   • ENDOSCOPY  2016   • ENDOSCOPY N/A 5/4/2018    Procedure: ESOPHAGOGASTRODUODENOSCOPY WITH COLD FORCEP BIOPSY;  Surgeon: Vasquez Fagan MD;  Location: [facility] ENDOSCOPY;  Service: Gastroenterology   • ENDOSCOPY N/A 8/12/2019    Procedure: ESOPHAGOGASTRODUODENOSCOPY WITH BIOPSY;  Surgeon: Vasquez Fagan MD;  Location: [facility] ENDOSCOPY;  Service: Gastroenterology   • HYSTERECTOMY      1998   • VENTRAL HERNIA REPAIR  10/28/2012          06/04/21 1504   Physical Therapy Time and Intention   Document Type evaluation   Mode of Treatment physical therapy   General Information   Patient Profile Reviewed yes   Prior Level of Function independent:;all household mobility   Existing Precautions/Restrictions fall;oxygen therapy device and L/min   Barriers to Rehab medically complex;previous functional deficit   Living Environment   Lives With spouse;grandchild(michael)   Cognition   Orientation Status (Cognition) oriented x 4   Pain   Additional Documentation Pain Scale: Numbers Pre/Post-Treatment (Group)   Pain Scale: Numbers Pre/Post-Treatment   Pretreatment Pain Rating 0/10 - no pain   Posttreatment Pain Rating 0/10 - no pain   Range of Motion Comprehensive   General Range of Motion no range of motion deficits identified   Strength Comprehensive (MMT)   Comment, General Manual Muscle Testing (MMT) Assessment Grossly 3/5   Bed Mobility   Comment (Bed Mobility) Patient is sitting in the recliner when PT arrived   Transfers   Sit-Stand [place] (Transfers) minimum assist (75% patient effort)   Sit-Stand Transfer   Assistive Device (Sit-Stand Transfers) walker, front-wheeled   Gait/Stairs (Locomotion)   [place] Level (Gait) minimum assist (75% patient effort)   Assistive Device (Gait) walker, front-wheeled   Distance in Feet (Gait) 250   Deviations/Abnormal Patterns (Gait) bilateral deviations;festinating/shuffling;stride length decreased;gait speed decreased    Bilateral Gait Deviations forward flexed posture;heel strike decreased   Safety Issues, Functional Mobility   Safety Issues Affecting Function (Mobility) safety precautions follow-through/compliance;awareness of need for assistance;insight into deficits/self-awareness;safety precaution awareness   Impairments Affecting Function (Mobility) strength;balance;shortness of breath;coordination;endurance/activity tolerance   Balance   Balance Assessment sitting static balance;sitting dynamic balance;standing static balance;standing dynamic balance   Static Sitting Balance WFL   Dynamic Sitting Balance WFL   Static Standing Balance mild impairment   Dynamic Standing Balance mild impairment   Plan of Care Review   Plan of Care Reviewed With patient;spouse   Progress no change   Outcome Summary Patient participates well in PT evaluation and demonstrates decreased strength, transfers, balance and gait.  She is able to walk 68 x 2 with minimal assistance and cues for balance, path deviation and breathing coordination.  She is expected to benefit from additional PT services while hospitalized and upon discharge to home with home health.   Bed Mobility Goal 1 (PT)   Activity/Assistive Device (Bed Mobility Goal 1, PT) bed mobility activities, all   Brown Level/Cues Needed (Bed Mobility Goal 1, PT) supervision required   Time Frame (Bed Mobility Goal 1, PT) short term goal (STG)   Progress/Outcomes (Bed Mobility Goal 1, PT) goal ongoing   Transfer Goal 1 (PT)   Activity/Assistive Device (Transfer Goal 1, PT) sit-to-stand/stand-to-sit   Brown Level/Cues Needed (Transfer Goal 1, PT) supervision required   Time Frame (Transfer Goal 1, PT) 5 days   Progress/Outcome (Transfer Goal 1, PT) goal ongoing   Gait Training Goal 1 (PT)   Activity/Assistive Device (Gait Training Goal 1, PT) gait (walking locomotion);assistive device use;walker, rolling   Brown Level (Gait Training Goal 1, PT) contact guard assist    Distance (Gait Training Goal 1, PT) 250   Time Frame (Gait Training Goal 1, PT) 5 days   Progress/Outcome (Gait Training Goal 1, PT) goal ongoing   Patient Education Goal (PT)   Activity (Patient Education Goal, PT) Perform BLE exercises x 20   Bennett/Cues/Accuracy (Memory Goal 2, PT) demonstrates adequately   Time Frame (Patient Education Goal, PT) 3 days   Progress/Outcome (Patient Education Goal, PT) goal ongoing   Positioning and Restraints   Pre-Treatment Position sitting in chair/recliner   Post Treatment Position chair   In Chair sitting;call light within reach;encouraged to call for assist;with family/caregiver   Therapy Assessment/Plan (PT)   Patient/Family Therapy Goals Statement (PT) Patient wants to feel good again   Rehab Potential (PT) good, to achieve stated therapy goals   Criteria for Skilled Interventions Met (PT) yes;meets criteria;skilled treatment is necessary   Therapy Frequency (PT) daily   Planned Therapy Interventions (PT) strengthening;balance training;bed mobility training;transfer training;gait training;home exercise program;patient/family education   PT Evaluation Complexity   History, PT Evaluation Complexity 1-2 personal factors and/or comorbidities   Examination of Body Systems (PT Eval Complexity) 1-2 elements   Clinical Presentation (PT Evaluation Complexity) evolving   Clinical Decision Making (PT Evaluation Complexity) low complexity   Overall Complexity (PT Evaluation Complexity) low complexity   Therapy Plan Review/Discharge Plan (PT)   Anticipated Discharge Disposition (PT) home with home health       General Information    No documentation.       Mobility    No documentation.       Obj/Interventions    No documentation.       Goals/Plan    No documentation.       Clinical Impression    No documentation.       Outcome Measures    No documentation.       Physical Therapy Education                 Title: PT OT SLP Therapies (Done)     Topic: Physical Therapy (Done)      Point: Mobility training (Done)     Learning Progress Summary           Patient Acceptance, E,TB, VU by JR at 6/5/2021 0716    Comment: Role of PT per POC    Acceptance, E,TB, VU by AS at 6/3/2021 1841    Acceptance, E,TB, VU by AS at 6/2/2021 1859   Significant Other Acceptance, E,TB, VU by JR at 6/5/2021 0716    Comment: Role of PT per POC                   Point: Home exercise program (Done)     Learning Progress Summary           Patient Acceptance, E,TB, VU by AS at 6/3/2021 1841    Acceptance, E,TB, VU by AS at 6/2/2021 1859                   Point: Body mechanics (Done)     Learning Progress Summary           Patient Acceptance, E,TB, VU by AS at 6/3/2021 1841    Acceptance, E,TB, VU by AS at 6/2/2021 1859                   Point: Precautions (Done)     Learning Progress Summary           Patient Acceptance, E,TB, VU by AS at 6/3/2021 1841    Acceptance, E,TB, VU by AS at 6/2/2021 1859                               User Key     Initials Effective Dates Name Provider Type Discipline     04/03/18 -  Adenike Randall, PT Physical Therapist PT    AS 03/23/21 -  Schoeneman, Anna, RN Registered Nurse Nurse              PT Recommendation and Plan  Planned Therapy Interventions (PT): strengthening, balance training, bed mobility training, transfer training, gait training, home exercise program, patient/family education  Plan of Care Reviewed With: patient, spouse  Progress: no change  Outcome Summary: Patient participates well in PT evaluation and demonstrates decreased strength, transfers, balance and gait.  She is able to walk 68 x 2 with minimal assistance and cues for balance, path deviation and breathing coordination.  She is expected to benefit from additional PT services while hospitalized and upon discharge to home with home health.     Time Calculation:   PT Charges     Row Name 06/04/21 2124             Time Calculation    Start Time  1504  -      PT Received On  06/04/21  -      PT Goal Re-Cert Due Date   06/14/21  -JR         Untimed Charges    PT Eval/Re-eval Minutes  56  -JR         Total Minutes    Untimed Charges Total Minutes  56  -JR       Total Minutes  56  -JR        User Key  (r) = Recorded By, (t) = Taken By, (c) = Cosigned By    Initials Name Provider Type    Adenike Kuhn, PT Physical Therapist        Therapy Charges for Today     Code Description Service Date Service Provider Modifiers Qty    56128530370 HC PT EVAL LOW COMPLEXITY 4 6/4/2021 Adenike Randall, PT GP 1          PT G-Codes  Outcome Measure Options: AM-PAC 6 Clicks Basic Mobility (PT)  AM-PAC 6 Clicks Score (PT): 17    Adenike Randall, PT  6/5/2021

## 2021-06-05 NOTE — PLAN OF CARE
Goal Outcome Evaluation:  Plan of Care Reviewed With: patient, spouse  Progress: no change  Outcome Summary: Patient participates well in PT evaluation and demonstrates decreased strength, transfers, balance and gait.  She is able to walk 68 x 2 with minimal assistance and cues for balance, path deviation and breathing coordination.  She is expected to benefit from additional PT services while hospitalized and upon discharge to home with home health.

## 2021-06-05 NOTE — PLAN OF CARE
Goal Outcome Evaluation:     Patient remains AFIB with -130s. MD aware. Mg and Potassium replacement initiated. Low BP, MD aware. See MAR. O2 at baseline. Possible discharge today.

## 2021-06-06 ENCOUNTER — READMISSION MANAGEMENT (OUTPATIENT)
Dept: CALL CENTER | Facility: HOSPITAL | Age: 81
End: 2021-06-06

## 2021-06-06 NOTE — OUTREACH NOTE
Prep Survey      Responses   [facility] patient discharged from?  [facility]   Is LACE score < 7 ?  No   Emergency Room discharge w/ pulse ox?  No   Eligibility  Readm Mgmt   Discharge diagnosis  Acute blood loss anemia, has not required blood transfusions, POA,  Lower GI bleeding secondary to colonic anastomotic ulcer, POA,  Pandiverticulosis, without evidence of bleeding,  Acute on chronic chronic systolic and diastolic heart failure   Does the patient have one of the following disease processes/diagnoses(primary or secondary)?  CHF   Does the patient have Home health ordered?  Yes   What is the Home health agency?   [facility]    Is there a DME ordered?  Yes   What DME was ordered?  [employer] for nebulizer,  already has O2   Prep survey completed?  Yes          Victoria Saini RN

## 2021-06-07 NOTE — CASE MANAGEMENT/SOCIAL WORK
[facility]     Patient Name: Elsa Alcaraz  MRN: [id number]  Today's Date: 6/7/2021    Admit Date: 6/1/2021    Discharge Plan     Row Name 06/07/21 1610       Plan    Plan Comments SW contacted the pt to confirm that HH was been out to see pt and to inquire about pt receiving her nebulizer. Pt states that HH visited her today but reports that her nebulizer has not been delivered. SW contacted [facility] to inquire about the nebulizer. They state that they are going to review it and hopefully have it delivered tomorrow or at least by Wednesday. SW will follow up with [facility] regarding nebulizer.        JAVIER Rivera

## 2021-06-09 ENCOUNTER — READMISSION MANAGEMENT (OUTPATIENT)
Dept: CALL CENTER | Facility: HOSPITAL | Age: 81
End: 2021-06-09

## 2021-06-09 NOTE — OUTREACH NOTE
CHF Week 1 Survey      Responses   [facility] patient discharged from?  [facility]   Does the patient have one of the following disease processes/diagnoses(primary or secondary)?  CHF   CHF Week 1 attempt successful?  No   Unsuccessful attempts  Attempt 1          Vane Walls RN

## 2021-06-11 ENCOUNTER — READMISSION MANAGEMENT (OUTPATIENT)
Dept: CALL CENTER | Facility: HOSPITAL | Age: 81
End: 2021-06-11

## 2021-06-11 NOTE — OUTREACH NOTE
CHF Week 1 Survey      Responses   [facility] patient discharged from?  [facility]   Does the patient have one of the following disease processes/diagnoses(primary or secondary)?  CHF   CHF Week 1 attempt successful?  No [Attempted home phone x 2, cellphone x 1]   Unsuccessful attempts  Attempt 2          Mirian Christie RN

## 2021-06-21 ENCOUNTER — OFFICE VISIT (OUTPATIENT)
Dept: CARDIOLOGY | Facility: CLINIC | Age: 81
End: 2021-06-21

## 2021-06-21 VITALS
BODY MASS INDEX: 23.04 KG/M2 | DIASTOLIC BLOOD PRESSURE: 62 MMHG | HEART RATE: 73 BPM | SYSTOLIC BLOOD PRESSURE: 105 MMHG | OXYGEN SATURATION: 96 % | RESPIRATION RATE: 20 BRPM | HEIGHT: 63 IN | WEIGHT: 130 LBS

## 2021-06-21 DIAGNOSIS — I48.0 PAROXYSMAL ATRIAL FIBRILLATION (HCC): Primary | ICD-10-CM

## 2021-06-21 DIAGNOSIS — I34.0 MODERATE MITRAL REGURGITATION: ICD-10-CM

## 2021-06-21 DIAGNOSIS — R00.2 PALPITATIONS: ICD-10-CM

## 2021-06-21 DIAGNOSIS — I50.43 ACUTE ON CHRONIC COMBINED SYSTOLIC AND DIASTOLIC CHF (CONGESTIVE HEART FAILURE) (HCC): ICD-10-CM

## 2021-06-21 DIAGNOSIS — I48.20 CHRONIC ATRIAL FIBRILLATION (HCC): ICD-10-CM

## 2021-06-21 DIAGNOSIS — I50.23 ACUTE ON CHRONIC SYSTOLIC CHF (CONGESTIVE HEART FAILURE) (HCC): ICD-10-CM

## 2021-06-21 DIAGNOSIS — I10 ESSENTIAL HYPERTENSION: ICD-10-CM

## 2021-06-21 DIAGNOSIS — I07.1 MODERATE TRICUSPID REGURGITATION: ICD-10-CM

## 2021-06-21 DIAGNOSIS — I50.22 CHRONIC SYSTOLIC CONGESTIVE HEART FAILURE (HCC): Primary | ICD-10-CM

## 2021-06-21 PROCEDURE — 99213 OFFICE O/P EST LOW 20 MIN: CPT | Performed by: INTERNAL MEDICINE

## 2021-06-21 NOTE — PROGRESS NOTES
[facility] at [facility]  Office Progress Note  Elsa Alcaraz  1940  [address] [place] [address]       Visit Date: 21    PCP: Kemi Hdz MD  [address] [place] [address]    IDENTIFICATION: A 81 y.o. female [facility]     PROBLEM LIST:  1. A. fib  1.  echo MVP, mild TR, normal LVEF  2. 15 echo EF 55-60%, mild to moderate MR w no evidence of MVP, mild TR, RVSP 35 mmHg, biatrial enlargement  3. [id number] 4, a/c w Coumadin   4. 2020 echo: EF 45%, AV calcification, AV max PG 14 mmHg, mod MR, mild TR, RVSP 23.6 mmHg, incidental afib noted  5.  echo EF 35-40%   2. Dyspnea  1.  MPS WNL  2. proBNP [id number] (<1800)  3. HTN  4. HLD  1.   TG 1:30 HDL 60 LDL 17  2. 3  TG 80 HDL 49 LDL 73  5. DAVID  6. COPD  7. Iron deficiency anemia/bone marrow deficiency   1. Per Dr. Harding, Dr. Fagan  8. GERD  9. Hiatal hernia  10. GIBleed  Lower GI ulcer  11. Depression/anxiety  12. Surgical history  1. Anal fistulotomy  2. Breast lumpectomy  3.  next line cholecystectomy  4. Partial colectomy  5. Ventral hernia repair        Chief Complaint   Patient presents with   • Hospital Follow Up Visit       Allergies  No Known Allergies    Current Medications    Current Outpatient Medications:   •  budesonide-formoterol (SYMBICORT) 80-4.5 MCG/ACT inhaler, Inhale 2 puffs 2 (Two) Times a Day., Disp: , Rfl:   •  cholecalciferol (VITAMIN D3) 25 MCG (1000 UT) tablet, Take 1,000 Units by mouth Daily., Disp: , Rfl:   •  citalopram (CeleXA) 20 MG tablet, Take 20 mg by mouth Daily., Disp: , Rfl:   •  furosemide (Lasix) 20 MG tablet, Take 1 tablet by mouth Daily., Disp: 30 tablet, Rfl: 0  •  ipratropium-albuterol (DUO-NEB) 0.5-2.5 mg/3 ml nebulizer, Take 3 mL by nebulization Every 4 (Four) Hours As Needed for Shortness of Air., Disp: 360 mL, Rfl: 0  •  metoprolol succinate XL (TOPROL-XL) 50 MG 24 hr tablet, Take 50  "mg by mouth Daily., Disp: , Rfl:   •  pantoprazole (PROTONIX) 40 MG EC tablet, Take 40 mg by mouth Daily., Disp: , Rfl:   •  potassium chloride 10 MEQ CR tablet, Take 2 tablets by mouth Daily. Take 1 tablet daily with furosemide, Disp: , Rfl:   •  sacubitril-valsartan (ENTRESTO) 24-26 MG tablet, Take 1 tablet by mouth Every 12 (Twelve) Hours., Disp: 60 tablet, Rfl: 0  •  spironolactone (ALDACTONE) 25 MG tablet, Take 0.5 tablets by mouth Daily., Disp: 15 tablet, Rfl: 0  •  Xarelto 20 MG tablet, Take 1 tablet by mouth Daily., Disp: 90 tablet, Rfl: 3  •  albuterol sulfate  (90 Base) MCG/ACT inhaler, Inhale 2 puffs 4 (Four) Times a Day. (Patient not taking: Reported on 6/3/2021), Disp: 18 g, Rfl: 0      History of Present Illness   Elsa Alcaraz is a 81 y.o. year old female here for follow up.    She was hospitalized 1 month prior with lower GI bleed.  She has been off Xarelto and restarted on the 16th 5 days previously.  She has had no recurrent hematochezia.  She was noted with echocardiogram to have worsened LV function when hospitalized [facility] most recently.  She was initiated spironolactone and Entresto of which she is tolerating.  She is able to work out of doors in her garden on occasion states that she is generally feeling improved        OBJECTIVE:  Vitals:    06/21/21 1142   BP: 105/62   BP Location: Left arm   Patient Position: Sitting   Pulse: 73   Resp: 20   SpO2: 96%   Weight: 59 kg (130 lb)   Height: 160 cm (63\")     Physical Exam  Constitutional:       Appearance: She is well-developed.   Neck:      Thyroid: No thyromegaly.      Vascular: No carotid bruit, hepatojugular reflux or JVD.      Trachea: No tracheal deviation.   Cardiovascular:      Rate and Rhythm: Normal rate. Rhythm irregularly irregular.      Chest Wall: PMI is not displaced.      Pulses: Normal pulses and intact distal pulses. No midsystolic click and no opening snap.           Radial pulses are 2+ on the right side " and 2+ on the left side.        Dorsalis pedis pulses are 2+ on the right side and 2+ on the left side.        Posterior tibial pulses are 2+ on the right side and 2+ on the left side.      Heart sounds: S1 normal and S2 normal. Heart sounds not distant. No murmur heard.   No friction rub. No gallop.    Pulmonary:      Effort: Pulmonary effort is normal.      Breath sounds: Normal breath sounds. No wheezing or rales.   Abdominal:      General: Bowel sounds are normal.      Palpations: Abdomen is soft. There is no mass.      Tenderness: There is no abdominal tenderness. There is no guarding.   Musculoskeletal:      Cervical back: Normal range of motion and neck supple.         Diagnostic Data:  Procedures      ASSESSMENT:   Diagnosis Plan   1. Chronic systolic congestive heart failure (CMS/HCC)     2. Chronic atrial fibrillation (CMS/HCC)     3. Essential hypertension         PLAN:  Chronic systolic congestive heart failure   Follow-up 1 month limited echo and discuss options.  Continue Entresto spironolactone long-acting metoprolol at current    Chronic A. fib rate controlled anticoagulated.  Would consider Eliquis in lieu of Xarelto if has recurrent bleeding    Hypertension controlled on losartan metoprolol    Lower extremity edema secondary to venous insufficiency and CHF recommended zip up compression hose elevation and liberalization of diuretic as necessary      Kemi Hdz MD, thank you for referring Ms. Alcaraz for evaluation.  I have forwarded my electronically generated recommendations to you for review.  Please do not hesitate to call with any questions.      Fidencio Germain MD, [facility]

## 2021-06-22 ENCOUNTER — READMISSION MANAGEMENT (OUTPATIENT)
Dept: CALL CENTER | Facility: HOSPITAL | Age: 81
End: 2021-06-22

## 2021-06-22 NOTE — OUTREACH NOTE
CHF Week 3 Survey      Responses   [facility] patient discharged from?  [facility]   Does the patient have one of the following disease processes/diagnoses(primary or secondary)?  CHF   Week 3 attempt successful?  Yes   Call start time  1133   Call end time  1145   Discharge diagnosis  Acute blood loss anemia, has not required blood transfusions, POA,  Lower GI bleeding secondary to colonic anastomotic ulcer, POA,  Pandiverticulosis, without evidence of bleeding,  Acute on chronic chronic systolic and diastolic heart failure   Meds reviewed with patient/caregiver?  Yes   Is the patient taking all medications as directed (includes completed medication regime)?  Yes   Has the patient kept scheduled appointments due by today?  Yes   Comments  Cardiology yesterday 06/21/2021   Pulmonologist next week in [place]   What is the Home health agency?   [facility]    Has home health visited the patient within 72 hours of discharge?  Yes   Pulse Ox monitoring  Intermittent   What is the patient's perception of their health status since discharge?  Improving   Is the patient weighing daily?  Yes   Is the patient able to teach back Heart Failure Zones?  Yes   CHF Week 3 call completed?  Yes          Karyn Metcalf RN

## 2021-06-30 ENCOUNTER — READMISSION MANAGEMENT (OUTPATIENT)
Dept: CALL CENTER | Facility: HOSPITAL | Age: 81
End: 2021-06-30

## 2021-06-30 NOTE — OUTREACH NOTE
CHF Week 4 Survey      Responses   [facility] patient discharged from?  [facility]   Does the patient have one of the following disease processes/diagnoses(primary or secondary)?  CHF   Week 4 attempt successful?  Yes   Call start time  1242   Call end time  1253   Discharge diagnosis  Acute blood loss anemia, has not required blood transfusions, POA,  Lower GI bleeding secondary to colonic anastomotic ulcer, POA,  Pandiverticulosis, without evidence of bleeding,  Acute on chronic chronic systolic and diastolic heart failure   Is the patient still receiving Home Health Services?  Yes   DME comments  Wear 02 at 3 to 4 L [facility]   Pulse Ox monitoring  Intermittent   Pulse Ox device source  Patient   O2 Sat comments  96% with O2 but on RA 80's   O2 Sat: education provided  Sat levels   Psychosocial issues?  No   Comments  her SOA remains. She does intermittent deep breathing exercises. Planning another sleep apnea test, she reports. Right foot still sore but stitches are gone.    What is the patient's perception of their health status since discharge?  Improving   Is the patient weighing daily?  Yes   Is the patient able to teach back Heart Failure diet management?  Yes   Is the patient able to teach back Heart Failure Zones?  Yes   Is the patient/caregiver able to teach back the hierarchy of who to call/visit for symptoms/problems? PCP, Specialist, Home health nurse, Urgent Care, ED, 911  Yes   Week 4 Call Completed?  Yes          Vane Walls RN

## 2021-07-06 ENCOUNTER — OFFICE VISIT (OUTPATIENT)
Dept: GASTROENTEROLOGY | Facility: CLINIC | Age: 81
End: 2021-07-06

## 2021-07-06 VITALS
SYSTOLIC BLOOD PRESSURE: 102 MMHG | HEIGHT: 63 IN | BODY MASS INDEX: 23.01 KG/M2 | DIASTOLIC BLOOD PRESSURE: 70 MMHG | TEMPERATURE: 97.8 F | WEIGHT: 129.85 LBS

## 2021-07-06 DIAGNOSIS — K28.9 ANASTOMOTIC ULCER: ICD-10-CM

## 2021-07-06 DIAGNOSIS — Z79.01 CURRENT USE OF LONG TERM ANTICOAGULATION: ICD-10-CM

## 2021-07-06 DIAGNOSIS — D62 ACUTE BLOOD LOSS ANEMIA: Primary | ICD-10-CM

## 2021-07-06 PROCEDURE — 99213 OFFICE O/P EST LOW 20 MIN: CPT | Performed by: INTERNAL MEDICINE

## 2021-07-06 NOTE — PROGRESS NOTES
Follow Up Note     Date: 2021   Patient Name: Elsa Alcaraz  MRN: [id number]  : 1940     Referring Physician: Kemi Hdz MD    Chief Complaint:    Chief Complaint   Patient presents with   • Follow-up   • GI Bleeding       Interval History:   2021  Elsa Alcaraz is a 81 y.o. female who is here today for follow up  after recent colonoscopy following GI bleed.  She denies any further melena or bright red rectal bleed.  No abdominal pain nausea vomiting.  She states that she had blood work done couple weeks ago at PCPs office and she was been told normal.    2021  This is 81-year-old female patient with a prior history of hypertension, hyperlipidemia, chronic atrial fibrillation on Eliquis, history of breast cancer status post lumpectomy and radiation therapy, history of colon cancer status post right hemicolectomy more than 20 years ago, CHF, COPD/obstructive sleep apnea on home oxygen, colonic diverticulosis, was brought to the emergency room today on 2021 with complaints of rectal bleeding for the past 2 days.     Patient states that she was doing fine until day before yesterday, then had a bowel movement with blood in the stool.  Subsequently she had multiple bowel movements at least 4 times every day.  She continued to have a red blood with clots even today for which she came to emergency room.. She had at least 3 times bowel movement with dark blood and clots.  She does have associated left-sided abdominal cramps.  Denies any nausea vomiting, no fever chills.     Patient denies any prior history of GI bleed.  She has been taking Xarelto and last dose was day before yesterday 2 days ago.  Denies any NSAID use.     She states that she had an EGD done in 2019 Dr. Daniel.  Last colonoscopy was about 5 years ago and was been told normal no reports available.  Her father had some type of stomach cancer details unknown.     She was evaluated emergency room and noted to have a  "drop in her H&H from 14 to 10 g/dL.  CT scan abdomen pelvis done which showed only colonic diverticulosis otherwise unremarkable.  GI was consulted for further evaluation and management.  Subjective      Past Medical History:   Past Medical History:   Diagnosis Date   • Anxiety disorder due to general medical condition 1/11/2017   • Arthritis    • Atrial fibrillation (CMS/HCC) 1/11/2017   • Body piercing     BOTH EARS   • Borderline diabetes    • Breast cancer (CMS/HCC) 2003    right-radiation and a lumpectomy   • Cataract 01/11/2017    Left eye surgery 11/19   • Chronic bronchitis (CMS/HCC) 1/11/2017   • Colon cancer (CMS/HCC) 11/30/1998    had surgery and chemo   • COPD (chronic obstructive pulmonary disease) (CMS/HCC)    • Cyanocobalamine deficiency (non anemic)    • Depression 1/11/2017   • Diverticulosis    • Esophageal reflux 1/11/2017   • Hiatal hernia 1/11/2017   • History of bladder infections    • Hx of exercise stress test     \"several years ago by Dr. Mercado\".     • Hx of radiation therapy    • Hyperlipidemia    • Hypertension 1/11/2017   • Impaired functional mobility and activity tolerance    • Impaired functional mobility, balance, gait, and endurance    • Osteoporosis    • Palpitations 1/11/2017   • Prediabetes    • Problems with swallowing     meat at times per pt report   • Shortness of breath     WITH EXERTION    • Sleep apnea 01/11/2017    NO CPAP   • Tricuspid valve disorder 01/11/2017    Patient doesn't know anything about this   • Vitamin D deficiency    • Wears glasses      Past Surgical History:   Past Surgical History:   Procedure Laterality Date   • ANAL FISTULOTOMY     • APPENDECTOMY      1998   • BREAST BIOPSY     • BREAST LUMPECTOMY Right 03/06/2006   • CATARACT EXTRACTION  2019    both eyes    • CATARACT EXTRACTION W/ INTRAOCULAR LENS IMPLANT Left 11/18/2019    Procedure: CATARACT PHACO EXTRACTION WITH INTRAOCULAR LENS IMPLANT LEFT;  Surgeon: Masoud David MD;  Location: [facility]" [facility] OR;  Service: Ophthalmology   • CATARACT EXTRACTION W/ INTRAOCULAR LENS IMPLANT Right 2019    Procedure: CATARACT PHACO EXTRACTION WITH INTRAOCULAR LENS IMPLANT RIGHT;  Surgeon: Masoud David MD;  Location: [facility] OR;  Service: Ophthalmology   •  SECTION  1981   • CHOLECYSTECTOMY  2009   • COLECTOMY PARTIAL / TOTAL  1998    COLON CANCER   • COLONOSCOPY     • COLONOSCOPY N/A 2021    Procedure: COLONOSCOPY WITH BIOPSY;  Surgeon: Iona Sanders MD;  Location: [facility] ENDOSCOPY;  Service: Gastroenterology;  Laterality: N/A;   • ENDOSCOPY     • ENDOSCOPY N/A 2018    Procedure: ESOPHAGOGASTRODUODENOSCOPY WITH COLD FORCEP BIOPSY;  Surgeon: Vasquez Fagan MD;  Location: [facility] ENDOSCOPY;  Service: Gastroenterology   • ENDOSCOPY N/A 2019    Procedure: ESOPHAGOGASTRODUODENOSCOPY WITH BIOPSY;  Surgeon: Vasquez Fagan MD;  Location: [facility] ENDOSCOPY;  Service: Gastroenterology   • HYSTERECTOMY         • VENTRAL HERNIA REPAIR  10/28/2012       Family History:   Family History   Problem Relation Age of Onset   • Hypertension Mother    • Asthma Mother    • COPD Mother    • Osteoarthritis Mother    • Cancer Father    • Cancer Sister    • Diabetes Maternal Grandmother        Social History:   Social History     Socioeconomic History   • Marital status:      Spouse name: Not on file   • Number of children: Not on file   • Years of education: Not on file   • Highest education level: Not on file   Tobacco Use   • Smoking status: Never Smoker   • Smokeless tobacco: Never Used   Substance and Sexual Activity   • Alcohol use: Yes     Alcohol/week: 1.0 - 2.0 standard drinks     Types: 1 - 2 Glasses of wine per week     Comment: occas   • Drug use: No   • Sexual activity: Defer       Medications:     Current Outpatient Medications:   •  albuterol sulfate  (90 Base) MCG/ACT inhaler, Inhale 2 puffs 4 (Four) Times a Day., Disp: 18 g, Rfl: 0  •   budesonide-formoterol (SYMBICORT) 80-4.5 MCG/ACT inhaler, Inhale 2 puffs 2 (Two) Times a Day., Disp: , Rfl:   •  cholecalciferol (VITAMIN D3) 25 MCG (1000 UT) tablet, Take 1,000 Units by mouth Daily., Disp: , Rfl:   •  citalopram (CeleXA) 20 MG tablet, Take 20 mg by mouth Daily., Disp: , Rfl:   •  furosemide (Lasix) 20 MG tablet, Take 1 tablet by mouth Daily., Disp: 30 tablet, Rfl: 0  •  ipratropium-albuterol (DUO-NEB) 0.5-2.5 mg/3 ml nebulizer, Take 3 mL by nebulization Every 4 (Four) Hours As Needed for Shortness of Air., Disp: 360 mL, Rfl: 0  •  metoprolol succinate XL (TOPROL-XL) 50 MG 24 hr tablet, Take 50 mg by mouth Daily., Disp: , Rfl:   •  pantoprazole (PROTONIX) 40 MG EC tablet, Take 40 mg by mouth Daily., Disp: , Rfl:   •  potassium chloride 10 MEQ CR tablet, Take 2 tablets by mouth Daily. Take 1 tablet daily with furosemide, Disp: , Rfl:   •  sacubitril-valsartan (ENTRESTO) 24-26 MG tablet, Take 1 tablet by mouth Every 12 (Twelve) Hours., Disp: 60 tablet, Rfl: 0  •  spironolactone (ALDACTONE) 25 MG tablet, Take 0.5 tablets by mouth Daily., Disp: 15 tablet, Rfl: 0  •  Xarelto 20 MG tablet, Take 1 tablet by mouth Daily., Disp: 90 tablet, Rfl: 3    Allergies:   No Known Allergies    Review of Systems:   Review of Systems   Constitutional: Negative for appetite change, fatigue and unexpected weight loss.   Gastrointestinal: Negative for abdominal distention, abdominal pain, anal bleeding, blood in stool, constipation, diarrhea, nausea, rectal pain, vomiting, GERD and indigestion.   I have reviewed her review of systems today    The following portions of the patient's history were reviewed and updated as appropriate: allergies, current medications, past family history, past medical history, past social history, past surgical history and problem list.    Objective     Physical Exam:  Vital Signs:   Vitals:    07/06/21 1310   BP: 102/70   Temp: 97.8 °F (36.6 °C)   TempSrc: Temporal   Weight: 58.9 kg (129 lb  "13.6 oz)   Height: 160 cm (63\")       Physical Exam  Vitals and nursing note reviewed.   Constitutional:       Appearance: Normal appearance. She is well-developed.   Eyes:      Conjunctiva/sclera: Conjunctivae normal.   Cardiovascular:      Rate and Rhythm: Normal rate and regular rhythm.      Heart sounds: No murmur heard.     Pulmonary:      Effort: Pulmonary effort is normal. No respiratory distress.      Breath sounds: Normal breath sounds.   Abdominal:      General: Bowel sounds are normal. There is no distension.      Palpations: Abdomen is soft. There is no mass.      Tenderness: There is no abdominal tenderness. There is no guarding.      Hernia: No hernia is present.   Musculoskeletal:      Cervical back: Normal range of motion and neck supple.   Lymphadenopathy:      Cervical: No cervical adenopathy.   Skin:     General: Skin is warm and dry.   Neurological:      General: No focal deficit present.      Mental Status: She is alert and oriented to person, place, and time.      Sensory: No sensory deficit.         Results Review:   I reviewed the patient's new clinical results.    Admission on 06/01/2021, Discharged on 06/05/2021   Component Date Value Ref Range Status   • Glucose 06/01/2021 94  65 - 99 mg/dL Final   • BUN 06/01/2021 15  8 - 23 mg/dL Final   • Creatinine 06/01/2021 0.56* 0.57 - 1.00 mg/dL Final   • Sodium 06/01/2021 138  136 - 145 mmol/L Final   • Potassium 06/01/2021 4.2  3.5 - 5.2 mmol/L Final    Slight hemolysis detected by analyzer. Results may be affected.   • Chloride 06/01/2021 98  98 - 107 mmol/L Final   • CO2 06/01/2021 33.1* 22.0 - 29.0 mmol/L Final   • Calcium 06/01/2021 8.8  8.6 - 10.5 mg/dL Final   • Total Protein 06/01/2021 5.9* 6.0 - 8.5 g/dL Final   • Albumin 06/01/2021 2.90* 3.50 - 5.20 g/dL Final   • ALT (SGPT) 06/01/2021 8  1 - 33 U/L Final   • AST (SGOT) 06/01/2021 15  1 - 32 U/L Final   • Alkaline Phosphatase 06/01/2021 94  39 - 117 U/L Final   • Total Bilirubin " 06/01/2021 1.1  0.0 - 1.2 mg/dL Final   • eGFR Non African Amer 06/01/2021 104  >60 mL/min/1.73 Final   • Globulin 06/01/2021 3.0  gm/dL Final   • A/G Ratio 06/01/2021 1.0  g/dL Final   • BUN/Creatinine Ratio 06/01/2021 26.8* 7.0 - 25.0 Final   • Anion Gap 06/01/2021 6.9  5.0 - 15.0 mmol/L Final   • Lipase 06/01/2021 13  13 - 60 U/L Final   • Color, UA 06/01/2021 Dark Yellow* Yellow, Straw Final   • Appearance, UA 06/01/2021 Cloudy* Clear Final   • pH, UA 06/01/2021 <=5.0  5.0 - 8.0 Final   • Specific Gravity, UA 06/01/2021 >=1.030  1.005 - 1.030 Final   • Glucose, UA 06/01/2021 Negative  Negative Final   • Ketones, UA 06/01/2021 Trace* Negative Final   • Bilirubin, UA 06/01/2021 Moderate (2+)* Negative Final   • Blood, UA 06/01/2021 Trace* Negative Final   • Protein, UA 06/01/2021 Trace* Negative Final   • Leuk Esterase, UA 06/01/2021 Moderate (2+)* Negative Final   • Nitrite, UA 06/01/2021 Negative  Negative Final   • Urobilinogen, UA 06/01/2021 1.0 E.U./dL  0.2 - 1.0 E.U./dL Final   • Fecal Occult Blood 06/01/2021 Positive* Negative Final   • Extra Tube 06/01/2021 hold for add-on   Final    Auto resulted   • Extra Tube 06/01/2021 Hold for add-ons.   Final    Auto resulted.   • Extra Tube 06/01/2021 hold for add-on   Final    Auto resulted   • Extra Tube 06/01/2021 Hold for add-ons.   Final    Auto resulted.   • Extra Tube 06/01/2021 Hold for add-ons.   Final    Auto resulted.   • WBC 06/01/2021 10.84* 3.40 - 10.80 10*3/mm3 Final   • RBC 06/01/2021 3.48* 3.77 - 5.28 10*6/mm3 Final   • Hemoglobin 06/01/2021 10.6* 12.0 - 15.9 g/dL Final   • Hematocrit 06/01/2021 33.2* 34.0 - 46.6 % Final   • MCV 06/01/2021 95.4  79.0 - 97.0 fL Final   • MCH 06/01/2021 30.5  26.6 - 33.0 pg Final   • MCHC 06/01/2021 31.9  31.5 - 35.7 g/dL Final   • RDW 06/01/2021 13.8  12.3 - 15.4 % Final   • RDW-SD 06/01/2021 48.1  37.0 - 54.0 fl Final   • MPV 06/01/2021 9.6  6.0 - 12.0 fL Final   • Platelets 06/01/2021 369  140 - 450 10*3/mm3 Final    • Neutrophil % 06/01/2021 77.3* 42.7 - 76.0 % Final   • Lymphocyte % 06/01/2021 7.7* 19.6 - 45.3 % Final   • Monocyte % 06/01/2021 11.3  5.0 - 12.0 % Final   • Eosinophil % 06/01/2021 2.5  0.3 - 6.2 % Final   • Basophil % 06/01/2021 0.6  0.0 - 1.5 % Final   • Immature Grans % 06/01/2021 0.6* 0.0 - 0.5 % Final   • Neutrophils, Absolute 06/01/2021 8.37* 1.70 - 7.00 10*3/mm3 Final   • Lymphocytes, Absolute 06/01/2021 0.84  0.70 - 3.10 10*3/mm3 Final   • Monocytes, Absolute 06/01/2021 1.23* 0.10 - 0.90 10*3/mm3 Final   • Eosinophils, Absolute 06/01/2021 0.27  0.00 - 0.40 10*3/mm3 Final   • Basophils, Absolute 06/01/2021 0.07  0.00 - 0.20 10*3/mm3 Final   • Immature Grans, Absolute 06/01/2021 0.06* 0.00 - 0.05 10*3/mm3 Final   • nRBC 06/01/2021 0.0  0.0 - 0.2 /100 WBC Final   • RBC, UA 06/01/2021 0-2* None Seen /HPF Final   • WBC, UA 06/01/2021 3-5* None Seen /HPF Final   • Bacteria, UA 06/01/2021 1+* None Seen /HPF Final   • Squamous Epithelial Cells, UA 06/01/2021 3-6* None Seen, 0-2 /HPF Final   • Hyaline Casts, UA 06/01/2021 None Seen  None Seen /LPF Final   • Mucus, UA 06/01/2021 Small/1+* None Seen, Trace /HPF Final   • Methodology 06/01/2021 Manual Light Microscopy   Final   • Hemoglobin 06/02/2021 11.8* 12.0 - 15.9 g/dL Final   • ABO Type 06/02/2021 O   Final   • RH type 06/02/2021 Positive   Final   • Antibody Screen 06/02/2021 Negative   Final   • T&S Expiration Date 06/02/2021 6/5/2021 11:59:59 PM   Final   • ABO Type 06/01/2021 O   Final   • RH type 06/01/2021 Positive   Final   • Hemoglobin 06/02/2021 9.9* 12.0 - 15.9 g/dL Final   • Protime 06/02/2021 16.0* 12.0 - 15.1 Seconds Final   • INR 06/02/2021 1.22* 0.90 - 1.10 Final   • PTT 06/02/2021 31.8  24.5 - 37.2 seconds Final   • Hemoglobin 06/02/2021 10.7* 12.0 - 15.9 g/dL Final   • Case Report 06/02/2021    Final                    Value:Surgical Pathology Report                         Case: [id number]                                  Authorizing  Provider:  Iona Sanders MD  Collected:           06/02/2021 01:00 PM          Ordering Location:     [facility]    Received:            06/02/2021 02:31 PM                                 [facility]                                                                    Pathologist:           Sylvester Lamar MD                                                            Specimen:    Large Intestine, Right / Ascending Colon, ileocolonic anastomotic ulcer biopsy            • Final Diagnosis 06/02/2021    Final                    Value:This result contains rich text formatting which cannot be displayed here.   • Glucose 06/03/2021 82  65 - 99 mg/dL Final   • BUN 06/03/2021 8  8 - 23 mg/dL Final   • Creatinine 06/03/2021 0.48* 0.57 - 1.00 mg/dL Final   • Sodium 06/03/2021 142  136 - 145 mmol/L Final   • Potassium 06/03/2021 4.3  3.5 - 5.2 mmol/L Final   • Chloride 06/03/2021 103  98 - 107 mmol/L Final   • CO2 06/03/2021 34.3* 22.0 - 29.0 mmol/L Final   • Calcium 06/03/2021 8.8  8.6 - 10.5 mg/dL Final   • eGFR Non African Amer 06/03/2021 124  >60 mL/min/1.73 Final   • BUN/Creatinine Ratio 06/03/2021 16.7  7.0 - 25.0 Final   • Anion Gap 06/03/2021 4.7* 5.0 - 15.0 mmol/L Final   • WBC 06/03/2021 8.67  3.40 - 10.80 10*3/mm3 Final   • RBC 06/03/2021 3.36* 3.77 - 5.28 10*6/mm3 Final   • Hemoglobin 06/03/2021 10.2* 12.0 - 15.9 g/dL Final   • Hematocrit 06/03/2021 33.0* 34.0 - 46.6 % Final   • MCV 06/03/2021 98.2* 79.0 - 97.0 fL Final   • MCH 06/03/2021 30.4  26.6 - 33.0 pg Final   • MCHC 06/03/2021 30.9* 31.5 - 35.7 g/dL Final   • RDW 06/03/2021 13.8  12.3 - 15.4 % Final   • RDW-SD 06/03/2021 49.5  37.0 - 54.0 fl Final   • MPV 06/03/2021 9.4  6.0 - 12.0 fL Final   • Platelets 06/03/2021 326  140 - 450 10*3/mm3 Final   • TSH 06/03/2021 0.440  0.270 - 4.200 uIU/mL Final   • Glucose 06/04/2021 126* 65 - 99 mg/dL Final   • BUN 06/04/2021 6* 8 - 23 mg/dL Final   • Creatinine 06/04/2021 0.50* 0.57 - 1.00 mg/dL Final    • Sodium 06/04/2021 140  136 - 145 mmol/L Final   • Potassium 06/04/2021 3.0* 3.5 - 5.2 mmol/L Final    Slight hemolysis detected by analyzer. Results may be affected.   • Chloride 06/04/2021 92* 98 - 107 mmol/L Final   • CO2 06/04/2021 38.5* 22.0 - 29.0 mmol/L Final   • Calcium 06/04/2021 8.6  8.6 - 10.5 mg/dL Final   • eGFR Non African Amer 06/04/2021 119  >60 mL/min/1.73 Final   • BUN/Creatinine Ratio 06/04/2021 12.0  7.0 - 25.0 Final   • Anion Gap 06/04/2021 9.5  5.0 - 15.0 mmol/L Final   • WBC 06/04/2021 12.75* 3.40 - 10.80 10*3/mm3 Final   • RBC 06/04/2021 3.78  3.77 - 5.28 10*6/mm3 Final   • Hemoglobin 06/04/2021 11.5* 12.0 - 15.9 g/dL Final   • Hematocrit 06/04/2021 36.0  34.0 - 46.6 % Final   • MCV 06/04/2021 95.2  79.0 - 97.0 fL Final   • MCH 06/04/2021 30.4  26.6 - 33.0 pg Final   • MCHC 06/04/2021 31.9  31.5 - 35.7 g/dL Final   • RDW 06/04/2021 13.8  12.3 - 15.4 % Final   • RDW-SD 06/04/2021 47.8  37.0 - 54.0 fl Final   • MPV 06/04/2021 9.6  6.0 - 12.0 fL Final   • Platelets 06/04/2021 369  140 - 450 10*3/mm3 Final   • Magnesium 06/04/2021 1.6  1.6 - 2.4 mg/dL Final   • Glucose 06/05/2021 103* 65 - 99 mg/dL Final   • BUN 06/05/2021 9  8 - 23 mg/dL Final   • Creatinine 06/05/2021 0.54* 0.57 - 1.00 mg/dL Final   • Sodium 06/05/2021 140  136 - 145 mmol/L Final   • Potassium 06/05/2021 3.4* 3.5 - 5.2 mmol/L Final   • Chloride 06/05/2021 92* 98 - 107 mmol/L Final   • CO2 06/05/2021 43.8* 22.0 - 29.0 mmol/L Final   • Calcium 06/05/2021 8.9  8.6 - 10.5 mg/dL Final   • Total Protein 06/05/2021 5.7* 6.0 - 8.5 g/dL Final   • Albumin 06/05/2021 2.80* 3.50 - 5.20 g/dL Final   • ALT (SGPT) 06/05/2021 <5  1 - 33 U/L Final   • AST (SGOT) 06/05/2021 12  1 - 32 U/L Final   • Alkaline Phosphatase 06/05/2021 82  39 - 117 U/L Final   • Total Bilirubin 06/05/2021 0.8  0.0 - 1.2 mg/dL Final   • eGFR Non African Amer 06/05/2021 109  >60 mL/min/1.73 Final   • Globulin 06/05/2021 2.9  gm/dL Final   • A/G Ratio 06/05/2021 1.0   g/dL Final   • BUN/Creatinine Ratio 06/05/2021 16.7  7.0 - 25.0 Final   • Anion Gap 06/05/2021 4.2* 5.0 - 15.0 mmol/L Final   • WBC 06/05/2021 11.49* 3.40 - 10.80 10*3/mm3 Final   • RBC 06/05/2021 3.94  3.77 - 5.28 10*6/mm3 Final   • Hemoglobin 06/05/2021 12.0  12.0 - 15.9 g/dL Final   • Hematocrit 06/05/2021 36.6  34.0 - 46.6 % Final   • MCV 06/05/2021 92.9  79.0 - 97.0 fL Final   • MCH 06/05/2021 30.5  26.6 - 33.0 pg Final   • MCHC 06/05/2021 32.8  31.5 - 35.7 g/dL Final   • RDW 06/05/2021 13.7  12.3 - 15.4 % Final   • RDW-SD 06/05/2021 46.5  37.0 - 54.0 fl Final   • MPV 06/05/2021 9.6  6.0 - 12.0 fL Final   • Platelets 06/05/2021 461* 140 - 450 10*3/mm3 Final   No results displayed because visit has over 200 results.         CT Abdomen Pelvis With Contrast    Result Date: 6/1/2021  No acute findings in the abdomen or pelvis to account for patient's symptoms. Colonic diverticulosis without diverticulitis. Moderate bilateral pleural effusions and cardiomegaly. Authenticated by Rafael Limon MD on 06/01/2021 07:15:08 PM    CT Abdomen Pelvis With Contrast    Result Date: 5/12/2021  Moderate bilateral effusions and atelectasis  Constipation and diverticulosis  Chronic changes Authenticated by Dov Sanderson MD on 05/12/2021 08:34:02 PM    XR Chest 1 View    Result Date: 6/3/2021  Findings compatible with worsening CHF  This report was finalized on 6/3/2021 12:24 PM by Schuyler Thomas MD.    XR Chest 1 View    Result Date: 5/13/2021  Worsening left greater than right basilar opacities and pleural effusions are favored to be due to pulmonary edema.        Images were reviewed, interpreted, and dictated by Dr. Mery Ruth MD Transcribed by Nafisa Rincon PA-C.  This report was finalized on 5/13/2021 10:06 AM by Mery Ruth MD.    XR Chest PA & Lateral    Result Date: 6/5/2021  Moderate right and large left pleural effusions.  Left greater than right bibasilar atelectasis or pneumonia   This report was finalized  on 6/5/2021 11:39 AM by Aldo Sosa MD.      6/2/2021 colonoscopy    Diverticulosis in the sigmoid colon, in the descending colon, in the transverse colon and in the ascending colon.  - Non-bleeding internal hemorrhoids.  - Patent end-to-side ileo-colonic anastomosis, characterized by ulceration. Biopsied (single small biopsy  obtained)  - The anastomotic ulceration is likley NSAID induced, ischemic can not be ruled out  - Patient likely bled from this ulceration and no current bleeding noted    Assessment / Plan      1.   Ileocolonic anastomotic ulcer with GI bleed; suspected ischemic versus NSAID induced  2.  Acute blood loss anemia  3.  History of for long-term anticoagulation  4.  Colonic diverticulosis without signs of any bleeding  5.  Remote history of colon cancer status post right hemicolectomy and chemotherapy 1998 7/6/2021  She was admitted on 6/1/2021 with a GI bleed.  She had a colonoscopy done on 6/2/2021 which revealed diverticulosis involving the sigmoid colon and descending colon transverse colon ascending colon however there was no signs of any diverticular bleed.  Patent end-to-side ileocolonic anastomosis was noted with healing ulceration in the anastomotic site.  This was more in favor of NSAID induced versus ischemic ulcerations.  Patient likely bled from the ulcerations.  Biopsies from the ulcer revealed reactive glandular mucosa with mild activity.  No dysplasia or metaplasia or neoplastic changes.    No further signs of any GI bleed.  I have discussed the endoscopy findings and biopsy reports with her today.  Patient would benefit from repeat colonoscopy in 6 months time to confirm the healing of the ulcerations.  However given her significant medical issues we decided on holding on any further colonoscopy unless there is any further GI bleed or hemoglobin still low.    We will repeat her CBC in 6 months time with follow-up in 6 months time.  Advised to hold the Xarelto if there is any  further GI bleed.       6/1/2021  Patient appears to have a diverticular bleeding in the setting of anticoagulation use.  No signs of any current bleeding.  Rectal examination reveals minimal dark stool with minimal clots.  Last dose of Xarelto was 2 days ago.  She is hemodynamically stable.  She needs a colonoscopy for further evaluation.  I have discussed the risk and benefits involved including high risk of perforation.  Patient agreeable to proceed with a colonoscopy.        6.  Chronic atrial fibrillation/ Chronic hypoxic respiratory failure on home oxygen/ CHF  She has been started back on Xarelto since last 2 weeks.          Follow Up:   No follow-ups on file.    Iona Sanders MD  [facility]  7/6/2021  13:11 EDT     Please note that portions of this note may have been completed with a voice recognition program.

## 2021-07-07 RX ORDER — FUROSEMIDE 20 MG/1
20 TABLET ORAL DAILY
Qty: 30 TABLET | Refills: 0 | Status: CANCELLED | OUTPATIENT
Start: 2021-07-07

## 2021-07-26 ENCOUNTER — OFFICE VISIT (OUTPATIENT)
Dept: CARDIOLOGY | Facility: CLINIC | Age: 81
End: 2021-07-26

## 2021-07-26 ENCOUNTER — HOSPITAL ENCOUNTER (OUTPATIENT)
Dept: CARDIOLOGY | Facility: HOSPITAL | Age: 81
Discharge: HOME | End: 2021-07-26
Admitting: INTERNAL MEDICINE

## 2021-07-26 VITALS
WEIGHT: 131 LBS | BODY MASS INDEX: 23.21 KG/M2 | DIASTOLIC BLOOD PRESSURE: 60 MMHG | HEIGHT: 63 IN | HEART RATE: 73 BPM | SYSTOLIC BLOOD PRESSURE: 110 MMHG | OXYGEN SATURATION: 97 %

## 2021-07-26 DIAGNOSIS — I34.0 MODERATE MITRAL REGURGITATION: ICD-10-CM

## 2021-07-26 DIAGNOSIS — I48.0 PAROXYSMAL ATRIAL FIBRILLATION (HCC): ICD-10-CM

## 2021-07-26 DIAGNOSIS — R00.2 PALPITATIONS: ICD-10-CM

## 2021-07-26 DIAGNOSIS — I07.1 MODERATE TRICUSPID REGURGITATION: ICD-10-CM

## 2021-07-26 DIAGNOSIS — I34.0 NONRHEUMATIC MITRAL VALVE REGURGITATION: ICD-10-CM

## 2021-07-26 DIAGNOSIS — I10 ESSENTIAL HYPERTENSION: ICD-10-CM

## 2021-07-26 DIAGNOSIS — I50.43 ACUTE ON CHRONIC COMBINED SYSTOLIC AND DIASTOLIC CHF (CONGESTIVE HEART FAILURE) (HCC): ICD-10-CM

## 2021-07-26 DIAGNOSIS — I48.20 CHRONIC ATRIAL FIBRILLATION (HCC): ICD-10-CM

## 2021-07-26 DIAGNOSIS — I50.22 CHRONIC SYSTOLIC CONGESTIVE HEART FAILURE (HCC): Primary | ICD-10-CM

## 2021-07-26 DIAGNOSIS — I50.23 ACUTE ON CHRONIC SYSTOLIC CHF (CONGESTIVE HEART FAILURE) (HCC): ICD-10-CM

## 2021-07-26 PROBLEM — I07.9 TRICUSPID VALVE DISORDER: Status: ACTIVE | Noted: 2021-07-26

## 2021-07-26 PROBLEM — R06.02 SHORTNESS OF BREATH: Status: ACTIVE | Noted: 2021-07-26

## 2021-07-26 PROCEDURE — 93306 TTE W/DOPPLER COMPLETE: CPT | Performed by: INTERNAL MEDICINE

## 2021-07-26 PROCEDURE — 99214 OFFICE O/P EST MOD 30 MIN: CPT | Performed by: INTERNAL MEDICINE

## 2021-07-26 PROCEDURE — 93306 TTE W/DOPPLER COMPLETE: CPT

## 2021-07-26 NOTE — PROGRESS NOTES
[facility] Cardiology  Office Progress Note  Elsa CHAPARRO [place]  1940  [address] [place] [address]       Visit Date: 21    PCP: Kemi Hdz MD  [address] [facility] [place] [address]    IDENTIFICATION: A 81 y.o. female from [place]    PROBLEM LIST:   1. A. fib  1.  echo MVP, mild TR, normal LVEF  2. 15 echo EF 55-60%, mild to moderate MR w no evidence of MVP, mild TR, RVSP 35 mmHg, biatrial enlargement  3. [id number] 4, a/c w Coumadin   4. 2020 echo: EF 45%, AV calcification, AV max PG 14 mmHg, mod MR, mild TR, RVSP 23.6 mmHg, incidental afib noted  5. 6 echo EF 35-40%   6.  echo EF 38% mod MR  2. Dyspnea  1.  MPS WNL  2. proBNP 4803-3927 (<1800)  3. HTN  4. HLD  1. 9  TG 1:30 HDL 60 LDL 17  2. 3/  TG 80 HDL 49 LDL 73  5. DAVID  6. COPD  7. Iron deficiency anemia/bone marrow deficiency   1. Per Dr. Harding, Dr. Fagan  8. GERD  9. Hiatal hernia  10. GIBleed  Lower GI ulcer  11. Depression/anxiety  12. Surgical history  1. Anal fistulotomy  2. Breast lumpectomy  3.  next line cholecystectomy  4. Partial colectomy  5. Ventral hernia repair      CC:   Chief Complaint   Patient presents with   • echo f/u       Allergies  No Known Allergies    Current Medications    Current Outpatient Medications:   •  albuterol sulfate  (90 Base) MCG/ACT inhaler, Inhale 2 puffs 4 (Four) Times a Day., Disp: 18 g, Rfl: 0  •  budesonide-formoterol (SYMBICORT) 80-4.5 MCG/ACT inhaler, Inhale 2 puffs 2 (Two) Times a Day., Disp: , Rfl:   •  cholecalciferol (VITAMIN D3) 25 MCG (1000 UT) tablet, Take 1,000 Units by mouth Daily., Disp: , Rfl:   •  citalopram (CeleXA) 20 MG tablet, Take 20 mg by mouth Daily., Disp: , Rfl:   •  furosemide (Lasix) 20 MG tablet, Take 1 tablet by mouth Daily., Disp: 30 tablet, Rfl: 0  •  ipratropium-albuterol (DUO-NEB) 0.5-2.5 mg/3 ml nebulizer, Take 3 mL by nebulization Every 4 (Four) Hours As Needed  "for Shortness of Air. (Patient taking differently: Take 3 mL by nebulization 2 (Two) Times a Day.), Disp: 360 mL, Rfl: 0  •  metoprolol succinate XL (TOPROL-XL) 50 MG 24 hr tablet, Take 50 mg by mouth Daily., Disp: , Rfl:   •  pantoprazole (PROTONIX) 40 MG EC tablet, Take 40 mg by mouth Daily., Disp: , Rfl:   •  potassium chloride 10 MEQ CR tablet, Take 2 tablets by mouth Daily. Take 1 tablet daily with furosemide, Disp: , Rfl:   •  sacubitril-valsartan (ENTRESTO) 24-26 MG tablet, Take 1 tablet by mouth Every 12 (Twelve) Hours., Disp: 60 tablet, Rfl: 0  •  spironolactone (ALDACTONE) 25 MG tablet, Take 0.5 tablets by mouth Daily., Disp: 15 tablet, Rfl: 0  •  Xarelto 20 MG tablet, Take 1 tablet by mouth Daily., Disp: 90 tablet, Rfl: 3      History of Present Illness   Elsa Alcaraz is a 81 y.o. year old female here for follow up.    States she is feeling better her appetite is improved and she is less short of breath.  She is using oxygen 24/7      OBJECTIVE:  Vitals:    07/26/21 1018   BP: 110/60   BP Location: Left arm   Patient Position: Sitting   Pulse: 73   SpO2: 97%   Weight: 59.4 kg (131 lb)   Height: 160 cm (63\")     Body mass index is 23.21 kg/m².    Constitutional:       Appearance: Healthy appearance. Not in distress.   Neck:      Vascular: No JVR. JVD normal.   Pulmonary:      Effort: Pulmonary effort is normal.      Breath sounds: Normal breath sounds. No wheezing. No rhonchi. No rales.   Chest:      Chest wall: Not tender to palpatation.   Cardiovascular:      PMI at left midclavicular line. Normal rate. Irregularly irregular rhythm. Normal S1. Normal S2.      Murmurs: There is a systolic murmur.      No gallop. No click. No rub.   Pulses:     Intact distal pulses.   Edema:     Peripheral edema absent.   Abdominal:      General: Bowel sounds are normal.      Palpations: Abdomen is soft.      Tenderness: There is no abdominal tenderness.   Musculoskeletal: Normal range of motion.         General: No " tenderness. Skin:     General: Skin is warm and dry.   Neurological:      General: No focal deficit present.      Mental Status: Alert and oriented to person, place and time.         Diagnostic Data:  Procedures      ASSESSMENT:   Diagnosis Plan   1. Chronic systolic congestive heart failure (CMS/HCC)     2. Nonrheumatic mitral valve regurgitation     3. Chronic atrial fibrillation (CMS/HCC)     4. Essential hypertension         PLAN:  CHF chronic systolic New York Heart Association class III CHF.  Continued metoprolol succinate Entresto spironolactone    Mild regurgitation continue rate control    Chronic A. fib rate controlled anticoagulated with Xarelto    Hypertension controlled on metoprolol and Entresto spironolactone          Fidencio Germain MD, [facility]

## 2021-07-27 LAB
BH CV ECHO MEAS - EF(MOD-BP): 38 %
LV EF 2D ECHO EST: 38 %
MAXIMAL PREDICTED HEART RATE: 140 BPM
STRESS TARGET HR: 119 BPM

## 2021-09-10 NOTE — OUTREACH NOTE
Prep Survey      Responses   [facility]?  [facility]   Is LACE score < 7 ?  No   Emergency Room discharge w/ pulse ox?  No   Eligibility  Readm Mgmt   Discharge diagnosis   constipation and diverticulosis, chronic changes, and moderate bilateral effusions and atelectasis. Acute on chronic respiratory failure with hypercapniaAcute on chronic systolic CHF     Does the patient have one of the following disease processes/diagnoses(primary or secondary)?  CHF   Does the patient have Home health ordered?  No   Is there a DME ordered?  No   Prep survey completed?  Yes          Claritza Segura RN         Subjective:  Susie presents for   Chief Complaint   Patient presents with    Cough     Started Friday. Nees to be cleared to go back to school. There has been at least 8 covid cases at school. This patient has not been exposed  [place] of [place] [employer] [place]  Exposure history to COVID-19: no  Has patient been vaccinated? Which product and when? n/a  Length of symptoms? 7 days    SOB: no  Dry Cough: yes    Nasal congestion/rhinorrhea: no  Sinus pressure: no  Sore throat: no    Any GI sx? no    Fever: no  Myalgias: no  Fatigue: no    Fluid intake: good  Appetite: good    What was done to alleviate symptoms? otc . Risk Factors  Smoker?: no  COPD/underlying lung disease?: no  CAD/CHF?: no  DM2?: no  CKD?: no  Liver disease?: no  Immunosuppressed or current chemotherapy use?: no  Steroid use?no  Travel recently/Where?: no      Objective:  Physical Exam   Vitals:   Vitals:    09/10/21 0952   Pulse: 96   Resp: 18   Temp: 99.1 °F (37.3 °C)   SpO2: 99%   Weight: 42 lb 3.2 oz (19.1 kg)     Wt Readings from Last 3 Encounters:   09/10/21 42 lb 3.2 oz (19.1 kg) (51 %, Z= 0.02)*   08/30/21 42 lb (19.1 kg) (50 %, Z= 0.01)*   07/16/21 33 lb 4.6 oz (15.1 kg) (4 %, Z= -1.74)*     * Growth percentiles are based on CDC (Girls, 2-20 Years) data. Ht Readings from Last 3 Encounters:   08/30/21 43\" (109.2 cm) (35 %, Z= -0.38)*   04/28/21 42\" (106.7 cm) (34 %, Z= -0.42)*   04/23/21 41.34\" (105 cm) (22 %, Z= -0.76)*     * Growth percentiles are based on CDC (Girls, 2-20 Years) data. There is no height or weight on file to calculate BMI. Constitutional: She is oriented to person, place, and time. She appears well-developed and well-nourished and in no acute distress. Answers all my questions appropriately. Head: Normocephalic and atraumatic.    Eyes:conjunctiva appear normal.  Right Ear: External ear normal. TM is clear  Left Ear: External ear normal. TM is clear  Nose: pink, non-edematous mucosa. No polyps. No septal deviation  Throat: no erythema, tonsillar hypertrophy or exudate. No ulcerations noted. Lips/Teeth/Gums all appear normal.  Neck: Normal range of motion. Neck supple. No tracheal deviation present. No abnormal lymphadenopathy. Heart - RRR w/o murmur. No S3/S4 noted  Chest: Clear to auscultation bilaterally. Good breath sounds noted. No rales, wheezes, or rhonchi noted. No respiratory retractions noted. Wall has symmetrical movement with respirations. COVID-19 pcr:  Pending  Assessment:   Encounter Diagnosis   Name Primary?  Cough Yes         Plan:   Push fluids    Sx tx    Isolate till results are back    Hali up for mychart to get the results  There are no discontinued medications. THE ABOVE NOTED DISCONTINUED MEDS MAY ONLY BE FROM 'CLEANING UP' THE MED LIST AND WERE NOT ACTUALLY CANCELED;  SEE CHART FOR DETAILS! No orders of the defined types were placed in this encounter. Orders Placed This Encounter   Procedures    Mychart activation code     This activates the 'MyChart' for the patient. They will get written instructions at discharge on how to activate this module.  Respiratory Panel, Molecular, with COVID-19     Order Specific Question:   Is this test for diagnosis or screening? Answer:   Diagnosis of ill patient     Order Specific Question:   Symptomatic for COVID-19 as defined by CDC? Answer:   Yes     Order Specific Question:   Date of Symptom Onset     Answer:   9/3/2021     Order Specific Question:   Hospitalized for COVID-19? Answer:   No     Order Specific Question:   Admitted to ICU for COVID-19? Answer:   No     Order Specific Question:   Employed in healthcare setting? Answer:   No     Order Specific Question:   Resident in a congregate (group) care setting? Answer:   No     Order Specific Question:   Pregnant? Answer:   No     Order Specific Question:   Previously tested for COVID-19?      Answer:   No     No follow-ups on file. There are no Patient Instructions on file for this visit. Data Unavailable        This visit was provided as a focused evaluation during the COVID -19 pandemic/national emergency. A comprehensive review of all previous patient history and testing was not conducted. Pertinent findings were elicited during the visit.

## 2022-01-05 ENCOUNTER — LAB (OUTPATIENT)
Dept: LAB | Facility: HOSPITAL | Age: 82
End: 2022-01-05

## 2022-01-05 ENCOUNTER — OFFICE VISIT (OUTPATIENT)
Dept: CARDIOLOGY | Facility: CLINIC | Age: 82
End: 2022-01-05

## 2022-01-05 VITALS
OXYGEN SATURATION: 100 % | HEART RATE: 79 BPM | SYSTOLIC BLOOD PRESSURE: 104 MMHG | WEIGHT: 153.4 LBS | BODY MASS INDEX: 27.18 KG/M2 | DIASTOLIC BLOOD PRESSURE: 60 MMHG | HEIGHT: 63 IN

## 2022-01-05 DIAGNOSIS — D62 ACUTE BLOOD LOSS ANEMIA: ICD-10-CM

## 2022-01-05 DIAGNOSIS — I50.22 CHRONIC SYSTOLIC CONGESTIVE HEART FAILURE: Primary | ICD-10-CM

## 2022-01-05 DIAGNOSIS — I34.0 NONRHEUMATIC MITRAL VALVE REGURGITATION: ICD-10-CM

## 2022-01-05 DIAGNOSIS — D50.9 IRON DEFICIENCY ANEMIA, UNSPECIFIED IRON DEFICIENCY ANEMIA TYPE: ICD-10-CM

## 2022-01-05 DIAGNOSIS — I48.20 CHRONIC ATRIAL FIBRILLATION: ICD-10-CM

## 2022-01-05 DIAGNOSIS — I10 PRIMARY HYPERTENSION: ICD-10-CM

## 2022-01-05 LAB
ALBUMIN SERPL-MCNC: 4 G/DL (ref 3.5–5.2)
ALBUMIN/GLOB SERPL: 1.5 G/DL
ALP SERPL-CCNC: 129 U/L (ref 39–117)
ALT SERPL W P-5'-P-CCNC: 11 U/L (ref 1–33)
ANION GAP SERPL CALCULATED.3IONS-SCNC: 10.3 MMOL/L (ref 5–15)
AST SERPL-CCNC: 18 U/L (ref 1–32)
BASOPHILS # BLD AUTO: 0.05 10*3/MM3 (ref 0–0.2)
BASOPHILS NFR BLD AUTO: 0.5 % (ref 0–1.5)
BILIRUB SERPL-MCNC: 0.8 MG/DL (ref 0–1.2)
BUN SERPL-MCNC: 23 MG/DL (ref 8–23)
BUN/CREAT SERPL: 31.1 (ref 7–25)
CALCIUM SPEC-SCNC: 9.3 MG/DL (ref 8.6–10.5)
CHLORIDE SERPL-SCNC: 100 MMOL/L (ref 98–107)
CO2 SERPL-SCNC: 27.7 MMOL/L (ref 22–29)
CREAT SERPL-MCNC: 0.74 MG/DL (ref 0.57–1)
DEPRECATED RDW RBC AUTO: 44.4 FL (ref 37–54)
EOSINOPHIL # BLD AUTO: 0.23 10*3/MM3 (ref 0–0.4)
EOSINOPHIL NFR BLD AUTO: 2.4 % (ref 0.3–6.2)
ERYTHROCYTE [DISTWIDTH] IN BLOOD BY AUTOMATED COUNT: 13.2 % (ref 12.3–15.4)
FERRITIN SERPL-MCNC: 34 NG/ML (ref 13–150)
GFR SERPL CREATININE-BSD FRML MDRD: 76 ML/MIN/1.73
GLOBULIN UR ELPH-MCNC: 2.7 GM/DL
GLUCOSE SERPL-MCNC: 91 MG/DL (ref 65–99)
HCT VFR BLD AUTO: 45.5 % (ref 34–46.6)
HGB BLD-MCNC: 14.2 G/DL (ref 12–15.9)
IMM GRANULOCYTES # BLD AUTO: 0.03 10*3/MM3 (ref 0–0.05)
IMM GRANULOCYTES NFR BLD AUTO: 0.3 % (ref 0–0.5)
IRON 24H UR-MRATE: 92 MCG/DL (ref 37–145)
IRON SATN MFR SERPL: 18 % (ref 20–50)
LYMPHOCYTES # BLD AUTO: 1.07 10*3/MM3 (ref 0.7–3.1)
LYMPHOCYTES NFR BLD AUTO: 11.4 % (ref 19.6–45.3)
MCH RBC QN AUTO: 28.8 PG (ref 26.6–33)
MCHC RBC AUTO-ENTMCNC: 31.2 G/DL (ref 31.5–35.7)
MCV RBC AUTO: 92.3 FL (ref 79–97)
MONOCYTES # BLD AUTO: 1.16 10*3/MM3 (ref 0.1–0.9)
MONOCYTES NFR BLD AUTO: 12.3 % (ref 5–12)
NEUTROPHILS NFR BLD AUTO: 6.88 10*3/MM3 (ref 1.7–7)
NEUTROPHILS NFR BLD AUTO: 73.1 % (ref 42.7–76)
NRBC BLD AUTO-RTO: 0 /100 WBC (ref 0–0.2)
PLATELET # BLD AUTO: 283 10*3/MM3 (ref 140–450)
PMV BLD AUTO: 9.5 FL (ref 6–12)
POTASSIUM SERPL-SCNC: 4.6 MMOL/L (ref 3.5–5.2)
PROT SERPL-MCNC: 6.7 G/DL (ref 6–8.5)
RBC # BLD AUTO: 4.93 10*6/MM3 (ref 3.77–5.28)
SODIUM SERPL-SCNC: 138 MMOL/L (ref 136–145)
TIBC SERPL-MCNC: 524 MCG/DL (ref 298–536)
TRANSFERRIN SERPL-MCNC: 352 MG/DL (ref 200–360)
WBC NRBC COR # BLD: 9.42 10*3/MM3 (ref 3.4–10.8)

## 2022-01-05 PROCEDURE — 80053 COMPREHEN METABOLIC PANEL: CPT

## 2022-01-05 PROCEDURE — 83540 ASSAY OF IRON: CPT

## 2022-01-05 PROCEDURE — 85025 COMPLETE CBC W/AUTO DIFF WBC: CPT

## 2022-01-05 PROCEDURE — 84466 ASSAY OF TRANSFERRIN: CPT

## 2022-01-05 PROCEDURE — 99214 OFFICE O/P EST MOD 30 MIN: CPT | Performed by: INTERNAL MEDICINE

## 2022-01-05 PROCEDURE — 82728 ASSAY OF FERRITIN: CPT

## 2022-01-05 PROCEDURE — 93000 ELECTROCARDIOGRAM COMPLETE: CPT | Performed by: INTERNAL MEDICINE

## 2022-01-05 PROCEDURE — 36415 COLL VENOUS BLD VENIPUNCTURE: CPT

## 2022-01-05 NOTE — PROGRESS NOTES
[facility] Cardiology  Office Progress Note  Elsa CHAPARRO [place]  1940  [address] [place] [address]       Visit Date: 22    PCP: [facility]  [address] [facility] [place] [address]    IDENTIFICATION: A 81 y.o. female from [place]    PROBLEM LIST:   1. A. Fib- chronic   1.  echo MVP, mild TR, normal LVEF  2. 15 echo EF 55-60%, mild to moderate MR w no evidence of MVP, mild TR, RVSP 35 mmHg, biatrial enlargement  3. [id number] 4, a/c w Coumadin   4. 2020 echo: EF 45%, AV calcification, AV max PG 14 mmHg, mod MR, mild TR, RVSP 23.6 mmHg, incidental afib noted  5. 6 echo EF 35-40%   6.  echo EF 38% mod MR  2. Dyspnea  1.  MPS WNL  2. proBNP 6080-3985 (<1800)  3. HTN  4. HLD  1. 9  TG 1:30 HDL 60 LDL 17  2. 3/  TG 80 HDL 49 LDL 73  5. DAVID  6. COPD  7. Iron deficiency anemia/bone marrow deficiency   1. Per Dr. Harding, Dr. Fagan  8. GERD  9. Hiatal hernia  10. GIBleed  Lower GI ulcer  11. Depression/anxiety  12. Surgical history  1. Anal fistulotomy  2. Breast lumpectomy  3.  next line cholecystectomy  4. Partial colectomy  5. Ventral hernia repair      CC:   Chief Complaint   Patient presents with   • Atrial Fibrillation   • Chest Pain   • Shortness of Breath       Allergies  No Known Allergies    Current Medications    Current Outpatient Medications:   •  albuterol sulfate  (90 Base) MCG/ACT inhaler, Inhale 2 puffs 4 (Four) Times a Day., Disp: 18 g, Rfl: 0  •  budesonide-formoterol (SYMBICORT) 80-4.5 MCG/ACT inhaler, Inhale 2 puffs 2 (Two) Times a Day., Disp: , Rfl:   •  cholecalciferol (VITAMIN D3) 25 MCG (1000 UT) tablet, Take 1,000 Units by mouth Daily., Disp: , Rfl:   •  citalopram (CeleXA) 20 MG tablet, Take 20 mg by mouth Daily., Disp: , Rfl:   •  furosemide (Lasix) 20 MG tablet, Take 1 tablet by mouth Daily., Disp: 30 tablet, Rfl: 0  •  ipratropium-albuterol (DUO-NEB) 0.5-2.5 mg/3 ml  "nebulizer, Take 3 mL by nebulization Every 4 (Four) Hours As Needed for Shortness of Air. (Patient taking differently: Take 3 mL by nebulization 2 (Two) Times a Day.), Disp: 360 mL, Rfl: 0  •  metoprolol succinate XL (TOPROL-XL) 50 MG 24 hr tablet, Take 50 mg by mouth Daily., Disp: , Rfl:   •  O2 (OXYGEN), Inhale 4 L/min Daily As Needed., Disp: , Rfl:   •  pantoprazole (PROTONIX) 40 MG EC tablet, Take 40 mg by mouth Daily., Disp: , Rfl:   •  potassium chloride 10 MEQ CR tablet, Take 2 tablets by mouth Daily. Take 1 tablet daily with furosemide (Patient taking differently: Take 20 mEq by mouth Daily.), Disp: , Rfl:   •  sacubitril-valsartan (ENTRESTO) 24-26 MG tablet, Take 1 tablet by mouth Every 12 (Twelve) Hours., Disp: 60 tablet, Rfl: 0  •  spironolactone (ALDACTONE) 25 MG tablet, Take 0.5 tablets by mouth Daily., Disp: 15 tablet, Rfl: 0  •  Xarelto 20 MG tablet, Take 1 tablet by mouth Daily., Disp: 90 tablet, Rfl: 3      History of Present Illness   Elsa Alcaraz is a 81 y.o. year old female here for follow up.  Had significant weight gain fluid retention abdominal fullness since last visit.  She has had increased emotional discord over the holidays as had loss of family members and her sister 1 year her elder is recently placed on hospice.  States that she is no longer using her CPAP and cannot sleep with such.  She is overworked herself during the holidays.  She is been noncompliant with dietary intake including sodas and sodium loads during the holidays  She notes that she has had some crying episodes and has become depressed          OBJECTIVE:  Vitals:    01/05/22 1040   BP: 104/60   BP Location: Left arm   Patient Position: Sitting   Pulse: 79   SpO2: 100%   Weight: 69.6 kg (153 lb 6.4 oz)   Height: 160 cm (63\")     Body mass index is 27.17 kg/m².    Constitutional:       Appearance: Healthy appearance. Not in distress.   Neck:      Vascular: No JVR. JVD normal.   Pulmonary:      Effort: Pulmonary effort is " normal.      Breath sounds: Normal breath sounds. No wheezing. No rhonchi. No rales.      Comments: Diminished at bases  Chest:      Chest wall: Not tender to palpatation.   Cardiovascular:      PMI at left midclavicular line. Normal rate. Irregularly irregular rhythm. Normal S1. Normal S2.      Murmurs: There is a systolic murmur.      No gallop. No click. No rub.   Pulses:     Intact distal pulses.   Edema:     Peripheral edema absent.   Abdominal:      General: Bowel sounds are normal.      Palpations: Abdomen is soft.      Tenderness: There is no abdominal tenderness.      Comments: Abdominal fullness   Musculoskeletal: Normal range of motion.         General: No tenderness. Skin:     General: Skin is warm and dry.   Neurological:      General: No focal deficit present.      Mental Status: Alert and oriented to person, place and time.         Diagnostic Data:    ECG 12 Lead    Date/Time: 1/5/2022 11:20 AM  Performed by: Fidencio Germain MD  Authorized by: Fidencio Germain MD   Previous ECG: no previous ECG available  Rhythm: atrial fibrillation  BPM: 83  Conduction: right bundle branch block    Clinical impression: abnormal EKG              ASSESSMENT:   Diagnosis Plan   1. Chronic systolic congestive heart failure (HCC)     2. Chronic atrial fibrillation (HCC)     3. Nonrheumatic mitral valve regurgitation     4. Primary hypertension         PLAN:  CHF acute on chronic volume overload increase furosemide to 40 mg daily for 5 days and then increase additional 20 to her baseline 20 a day her weight is up 2 pounds in 24 hours.  She is again to comply with low sodium intake    Chronic A. 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 Bautista, she does not tolerate noninvasive ventilation at night continues chronic 24/7 nasal cannula              Fidencio Germain MD, [facility]

## 2022-01-12 NOTE — PROGRESS NOTES
Follow Up Note     Date: 2022   Patient Name: Elsa Alcaraz  MRN: [id number]  : 1940     Referring Physician: Kemi Hdz MD    Chief Complaint:    Chief Complaint   Patient presents with   • Follow-up   • Anemia       Interval History:   2022  Elsa Alcaraz is a 81 y.o. female who is here today for follow up for her anemia and history of chronic ulcers.  She states that she is doing well however for the past couple of days she had some left-sided upper abdominal pain without any nausea vomiting or fever.  Pain almost resolved now.  Denies any blood in the stool or melena.  She is currently taking her Xarelto    2021  Elsa Alcaraz is a 81 y.o. female who is here today for follow up  after recent colonoscopy following GI bleed.  She denies any further melena or bright red rectal bleed.  No abdominal pain nausea vomiting.  She states that she had blood work done couple weeks ago at PCPs office and she was been told normal.     2021  This is 81-year-old female patient with a prior history of hypertension, hyperlipidemia, chronic atrial fibrillation on Eliquis, history of breast cancer status post lumpectomy and radiation therapy, history of colon cancer status post right hemicolectomy more than 20 years ago, CHF, COPD/obstructive sleep apnea on home oxygen, colonic diverticulosis, was brought to the emergency room today on 2021 with complaints of rectal bleeding for the past 2 days.     Patient states that she was doing fine until day before yesterday, then had a bowel movement with blood in the stool.  Subsequently she had multiple bowel movements at least 4 times every day.  She continued to have a red blood with clots even today for which she came to emergency room.. She had at least 3 times bowel movement with dark blood and clots.  She does have associated left-sided abdominal cramps.  Denies any nausea vomiting, no fever chills.     Patient denies any prior history of  "GI bleed.  She has been taking Xarelto and last dose was day before yesterday 2 days ago.  Denies any NSAID use.  She states that she had an EGD done in 2019 Dr. Daniel.  Last colonoscopy was about 5 years ago and was been told normal no reports available.  Her father had some type of stomach cancer details unknown.  She was evaluated emergency room and noted to have a drop in her H&H from 14 to 10 g/dL.  CT scan abdomen pelvis done which showed only colonic diverticulosis otherwise unremarkable.  GI was consulted for further evaluation and management.    Subjective      Past Medical History:   Past Medical History:   Diagnosis Date   • Anxiety disorder due to general medical condition 1/11/2017   • Arthritis    • Atrial fibrillation (HCC) 1/11/2017   • Body piercing     BOTH EARS   • Borderline diabetes    • Breast cancer (HCC) 2003    right-radiation and a lumpectomy   • Cataract 01/11/2017    Left eye surgery 11/19   • Chronic bronchitis (HCC) 1/11/2017   • Colon cancer (HCC) 11/30/1998    had surgery and chemo   • COPD (chronic obstructive pulmonary disease) (HCC)    • Cyanocobalamine deficiency (non anemic)    • Depression 1/11/2017   • Diverticulosis    • Esophageal reflux 1/11/2017   • Hiatal hernia 1/11/2017   • History of bladder infections    • History of echocardiogram 07/26/2021   • Hx of exercise stress test     \"several years ago by Dr. Mercado\".     • Hx of radiation therapy    • Hyperlipidemia    • Hypertension 1/11/2017   • Impaired functional mobility and activity tolerance    • Impaired functional mobility, balance, gait, and endurance    • Osteoporosis    • Palpitations 1/11/2017   • Prediabetes    • Problems with swallowing     meat at times per pt report   • Shortness of breath     WITH EXERTION    • Sleep apnea 01/11/2017    NO CPAP   • Tricuspid valve disorder 01/11/2017    Patient doesn't know anything about this   • Vitamin D deficiency    • Wears glasses      Past Surgical History:   Past " Surgical History:   Procedure Laterality Date   • ANAL FISTULOTOMY     • APPENDECTOMY         • BREAST BIOPSY     • BREAST LUMPECTOMY Right 2006   • CATARACT EXTRACTION  2019    both eyes    • CATARACT EXTRACTION W/ INTRAOCULAR LENS IMPLANT Left 2019    Procedure: CATARACT PHACO EXTRACTION WITH INTRAOCULAR LENS IMPLANT LEFT;  Surgeon: Masoud David MD;  Location: [facility] OR;  Service: Ophthalmology   • CATARACT EXTRACTION W/ INTRAOCULAR LENS IMPLANT Right 2019    Procedure: CATARACT PHACO EXTRACTION WITH INTRAOCULAR LENS IMPLANT RIGHT;  Surgeon: Masoud David MD;  Location: [facility] OR;  Service: Ophthalmology   •  SECTION  1981   • CHOLECYSTECTOMY  2009   • COLECTOMY PARTIAL / TOTAL  1998    COLON CANCER   • COLONOSCOPY     • COLONOSCOPY N/A 2021    Procedure: COLONOSCOPY WITH BIOPSY;  Surgeon: Iona Sanders MD;  Location: [facility] ENDOSCOPY;  Service: Gastroenterology;  Laterality: N/A;   • ENDOSCOPY     • ENDOSCOPY N/A 2018    Procedure: ESOPHAGOGASTRODUODENOSCOPY WITH COLD FORCEP BIOPSY;  Surgeon: Vasquez Fagan MD;  Location: [facility] ENDOSCOPY;  Service: Gastroenterology   • ENDOSCOPY N/A 2019    Procedure: ESOPHAGOGASTRODUODENOSCOPY WITH BIOPSY;  Surgeon: Vasquez Fagan MD;  Location: [facility] ENDOSCOPY;  Service: Gastroenterology   • HYSTERECTOMY         • VENTRAL HERNIA REPAIR  10/28/2012       Family History:   Family History   Problem Relation Age of Onset   • Hypertension Mother    • Asthma Mother    • COPD Mother    • Osteoarthritis Mother    • Cancer Father    • Cancer Sister    • Diabetes Maternal Grandmother        Social History:   Social History     Socioeconomic History   • Marital status:    Tobacco Use   • Smoking status: Never Smoker   • Smokeless tobacco: Never Used   Vaping Use   • Vaping Use: Never used   Substance and Sexual Activity   • Alcohol use: Yes     Alcohol/week: 1.0 - 2.0 standard  drink     Types: 1 - 2 Glasses of wine per week     Comment: rarely   • Drug use: No   • Sexual activity: Defer       Medications:     Current Outpatient Medications:   •  albuterol sulfate  (90 Base) MCG/ACT inhaler, Inhale 2 puffs 4 (Four) Times a Day., Disp: 18 g, Rfl: 0  •  budesonide-formoterol (SYMBICORT) 80-4.5 MCG/ACT inhaler, Inhale 2 puffs 2 (Two) Times a Day., Disp: , Rfl:   •  cholecalciferol (VITAMIN D3) 25 MCG (1000 UT) tablet, Take 1,000 Units by mouth Daily., Disp: , Rfl:   •  citalopram (CeleXA) 20 MG tablet, Take 20 mg by mouth Daily., Disp: , Rfl:   •  furosemide (Lasix) 20 MG tablet, Take 1 tablet by mouth Daily., Disp: 30 tablet, Rfl: 0  •  ipratropium-albuterol (DUO-NEB) 0.5-2.5 mg/3 ml nebulizer, Take 3 mL by nebulization Every 4 (Four) Hours As Needed for Shortness of Air. (Patient taking differently: Take 3 mL by nebulization 2 (Two) Times a Day.), Disp: 360 mL, Rfl: 0  •  metoprolol succinate XL (TOPROL-XL) 50 MG 24 hr tablet, Take 50 mg by mouth Daily., Disp: , Rfl:   •  O2 (OXYGEN), Inhale 4 L/min Daily As Needed., Disp: , Rfl:   •  pantoprazole (PROTONIX) 40 MG EC tablet, Take 40 mg by mouth Daily., Disp: , Rfl:   •  potassium chloride 10 MEQ CR tablet, Take 2 tablets by mouth Daily. Take 1 tablet daily with furosemide (Patient taking differently: Take 20 mEq by mouth Daily.), Disp: , Rfl:   •  sacubitril-valsartan (ENTRESTO) 24-26 MG tablet, Take 1 tablet by mouth Every 12 (Twelve) Hours., Disp: 60 tablet, Rfl: 0  •  spironolactone (ALDACTONE) 25 MG tablet, Take 0.5 tablets by mouth Daily., Disp: 15 tablet, Rfl: 0  •  Xarelto 20 MG tablet, Take 1 tablet by mouth Daily., Disp: 90 tablet, Rfl: 3    Allergies:   No Known Allergies    Review of Systems:   Review of Systems   Constitutional: Negative for appetite change, fatigue, fever and unexpected weight loss.   HENT: Negative for trouble swallowing.    Gastrointestinal: Positive for abdominal pain. Negative for abdominal  "distention, anal bleeding, blood in stool, constipation, diarrhea, nausea, rectal pain, vomiting, GERD and indigestion.       The following portions of the patient's history were reviewed and updated as appropriate: allergies, current medications, past family history, past medical history, past social history, past surgical history and problem list.    Objective     Physical Exam:  Vital Signs:   Vitals:    01/13/22 0951   BP: 102/70   Temp: 97.7 °F (36.5 °C)   TempSrc: Infrared   Weight: 70.3 kg (155 lb)   Height: 160 cm (63\")       Physical Exam  Constitutional:       Appearance: Normal appearance.   HENT:      Head: Normocephalic and atraumatic.   Eyes:      Conjunctiva/sclera: Conjunctivae normal.   Abdominal:      General: Abdomen is flat. There is no distension.      Palpations: There is no mass.      Tenderness: There is no abdominal tenderness. There is no guarding or rebound.      Hernia: No hernia is present.   Musculoskeletal:      Cervical back: Normal range of motion and neck supple.   Neurological:      Mental Status: She is alert.         Results Review:   I reviewed the patient's new clinical results.    Lab on 01/05/2022   Component Date Value Ref Range Status   • Glucose 01/05/2022 91  65 - 99 mg/dL Final   • BUN 01/05/2022 23  8 - 23 mg/dL Final   • Creatinine 01/05/2022 0.74  0.57 - 1.00 mg/dL Final   • Sodium 01/05/2022 138  136 - 145 mmol/L Final   • Potassium 01/05/2022 4.6  3.5 - 5.2 mmol/L Final   • Chloride 01/05/2022 100  98 - 107 mmol/L Final   • CO2 01/05/2022 27.7  22.0 - 29.0 mmol/L Final   • Calcium 01/05/2022 9.3  8.6 - 10.5 mg/dL Final   • Total Protein 01/05/2022 6.7  6.0 - 8.5 g/dL Final   • Albumin 01/05/2022 4.00  3.50 - 5.20 g/dL Final   • ALT (SGPT) 01/05/2022 11  1 - 33 U/L Final   • AST (SGOT) 01/05/2022 18  1 - 32 U/L Final   • Alkaline Phosphatase 01/05/2022 129* 39 - 117 U/L Final   • Total Bilirubin 01/05/2022 0.8  0.0 - 1.2 mg/dL Final   • eGFR Non  Amer " 01/05/2022 76  >60 mL/min/1.73 Final   • Globulin 01/05/2022 2.7  gm/dL Final   • A/G Ratio 01/05/2022 1.5  g/dL Final   • BUN/Creatinine Ratio 01/05/2022 31.1* 7.0 - 25.0 Final   • Anion Gap 01/05/2022 10.3  5.0 - 15.0 mmol/L Final   • Ferritin 01/05/2022 34.00  13.00 - 150.00 ng/mL Final   • Iron 01/05/2022 92  37 - 145 mcg/dL Final   • Iron Saturation 01/05/2022 18* 20 - 50 % Final   • Transferrin 01/05/2022 352  200 - 360 mg/dL Final   • TIBC 01/05/2022 524  298 - 536 mcg/dL Final   • WBC 01/05/2022 9.42  3.40 - 10.80 10*3/mm3 Final   • RBC 01/05/2022 4.93  3.77 - 5.28 10*6/mm3 Final   • Hemoglobin 01/05/2022 14.2  12.0 - 15.9 g/dL Final   • Hematocrit 01/05/2022 45.5  34.0 - 46.6 % Final   • MCV 01/05/2022 92.3  79.0 - 97.0 fL Final   • MCH 01/05/2022 28.8  26.6 - 33.0 pg Final   • MCHC 01/05/2022 31.2* 31.5 - 35.7 g/dL Final   • RDW 01/05/2022 13.2  12.3 - 15.4 % Final   • RDW-SD 01/05/2022 44.4  37.0 - 54.0 fl Final   • MPV 01/05/2022 9.5  6.0 - 12.0 fL Final   • Platelets 01/05/2022 283  140 - 450 10*3/mm3 Final   • Neutrophil % 01/05/2022 73.1  42.7 - 76.0 % Final   • Lymphocyte % 01/05/2022 11.4* 19.6 - 45.3 % Final   • Monocyte % 01/05/2022 12.3* 5.0 - 12.0 % Final   • Eosinophil % 01/05/2022 2.4  0.3 - 6.2 % Final   • Basophil % 01/05/2022 0.5  0.0 - 1.5 % Final   • Immature Grans % 01/05/2022 0.3  0.0 - 0.5 % Final   • Neutrophils, Absolute 01/05/2022 6.88  1.70 - 7.00 10*3/mm3 Final   • Lymphocytes, Absolute 01/05/2022 1.07  0.70 - 3.10 10*3/mm3 Final   • Monocytes, Absolute 01/05/2022 1.16* 0.10 - 0.90 10*3/mm3 Final   • Eosinophils, Absolute 01/05/2022 0.23  0.00 - 0.40 10*3/mm3 Final   • Basophils, Absolute 01/05/2022 0.05  0.00 - 0.20 10*3/mm3 Final   • Immature Grans, Absolute 01/05/2022 0.03  0.00 - 0.05 10*3/mm3 Final   • nRBC 01/05/2022 0.0  0.0 - 0.2 /100 WBC Final      No radiology results for the last 90 days.    Assessment / Plan      1.   Ileocolonic anastomotic ulcer with GI bleed;  suspected ischemic versus NSAID induced  2.  History  Blood loss anemia  3.  History of for long-term anticoagulation  4.  Left upper quadrant abdominal pain  5.  Remote history of colon cancer status post right hemicolectomy and chemotherapy 1998  1/13/2022  She had recent left-sided upper abdominal pain of short duration lasting for 2 days.  No fever chills.  Clinical examination unremarkable no evidence of any acute diverticulitis.  Patient does have a diverticulosis noted without colonoscopy in the past.  As pain is improved and almost resolved now we will simply monitor and advised to call back the office if there is any worsening of symptoms.    No history suggestive of any further GI bleed.  Recent blood work done on 1/5/2022 reveals normal hemoglobin of 14 g/dL patient is currently taking Xarelto.  We will reduce her Protonix dose from 40 mg p.o. daily to 20 mg p.o. daily.  EGD done in 2019 revealed reactive gastropathy. Given her prior history of GI bleed and continued use of anticoagulation we will keep her on a low-dose PPI.  Follow-up with a CBC in 6 months time.  Avoid NSAIDs.  Given her prior history of colon cancer repeat colonoscopy can be considered in 5 years depending on her health status.    7/6/2021  She was admitted on 6/1/2021 with a GI bleed.  She had a colonoscopy done on 6/2/2021 which revealed diverticulosis involving the sigmoid colon and descending colon transverse colon ascending colon however there was no signs of any diverticular bleed.  Patent end-to-side ileocolonic anastomosis was noted with healing ulceration in the anastomotic site.  This was more in favor of NSAID induced versus ischemic ulcerations.  Patient likely bled from the ulcerations.  Biopsies from the ulcer revealed reactive glandular mucosa with mild activity.  No dysplasia or metaplasia or neoplastic changes.     No further signs of any GI bleed.  I have discussed the endoscopy findings and biopsy reports with her  today.  Patient would benefit from repeat colonoscopy in 6 months time to confirm the healing of the ulcerations.  However given her significant medical issues we decided on holding on any further colonoscopy unless there is any further GI bleed or hemoglobin still low.     6/1/2021  Patient appears to have a diverticular bleeding in the setting of anticoagulation use.  No signs of any current bleeding.  Rectal examination reveals minimal dark stool with minimal clots.  Last dose of Xarelto was 2 days ago.  She is hemodynamically stable.  She needs a colonoscopy for further evaluation.  I have discussed the risk and benefits involved including high risk of perforation.  Patient agreeable to proceed with a colonoscopy.         6.  Chronic atrial fibrillation/ Chronic hypoxic respiratory failure on home oxygen/ CHF  Currently on Xarelto.       Follow Up:   No follow-ups on file.    Iona Sanders MD  [facility]  1/13/2022  09:54 EST     Please note that portions of this note may have been completed with a voice recognition program.

## 2022-01-13 ENCOUNTER — OFFICE VISIT (OUTPATIENT)
Dept: GASTROENTEROLOGY | Facility: CLINIC | Age: 82
End: 2022-01-13

## 2022-01-13 VITALS
DIASTOLIC BLOOD PRESSURE: 70 MMHG | HEIGHT: 63 IN | TEMPERATURE: 97.7 F | WEIGHT: 155 LBS | BODY MASS INDEX: 27.46 KG/M2 | SYSTOLIC BLOOD PRESSURE: 102 MMHG

## 2022-01-13 DIAGNOSIS — K28.9 ANASTOMOTIC ULCER: Primary | ICD-10-CM

## 2022-01-13 DIAGNOSIS — Z85.038 PERSONAL HISTORY OF COLON CANCER: ICD-10-CM

## 2022-01-13 DIAGNOSIS — R10.12 LEFT UPPER QUADRANT ABDOMINAL PAIN: ICD-10-CM

## 2022-01-13 DIAGNOSIS — D50.0 ANEMIA, BLOOD LOSS: ICD-10-CM

## 2022-01-13 PROCEDURE — 99214 OFFICE O/P EST MOD 30 MIN: CPT | Performed by: INTERNAL MEDICINE

## 2022-01-13 RX ORDER — PANTOPRAZOLE SODIUM 20 MG/1
20 TABLET, DELAYED RELEASE ORAL DAILY
Qty: 30 TABLET | Refills: 5 | Status: SHIPPED | OUTPATIENT
Start: 2022-01-13 | End: 2022-02-12

## 2022-03-10 ENCOUNTER — OFFICE VISIT (OUTPATIENT)
Dept: PULMONOLOGY | Facility: CLINIC | Age: 82
End: 2022-03-10

## 2022-03-10 VITALS
SYSTOLIC BLOOD PRESSURE: 108 MMHG | OXYGEN SATURATION: 95 % | DIASTOLIC BLOOD PRESSURE: 66 MMHG | WEIGHT: 162.6 LBS | RESPIRATION RATE: 18 BRPM | HEIGHT: 63 IN | HEART RATE: 61 BPM | BODY MASS INDEX: 28.81 KG/M2

## 2022-03-10 DIAGNOSIS — G47.19 EXCESSIVE DAYTIME SLEEPINESS: ICD-10-CM

## 2022-03-10 DIAGNOSIS — R06.02 SHORTNESS OF BREATH: Primary | ICD-10-CM

## 2022-03-10 DIAGNOSIS — I50.22 CHRONIC SYSTOLIC CHF (CONGESTIVE HEART FAILURE): ICD-10-CM

## 2022-03-10 DIAGNOSIS — G47.33 OBSTRUCTIVE SLEEP APNEA: ICD-10-CM

## 2022-03-10 DIAGNOSIS — J45.30 MILD PERSISTENT ASTHMA WITHOUT COMPLICATION: ICD-10-CM

## 2022-03-10 PROCEDURE — 99204 OFFICE O/P NEW MOD 45 MIN: CPT | Performed by: INTERNAL MEDICINE

## 2022-03-10 RX ORDER — POTASSIUM CHLORIDE 750 MG/1
10 TABLET, EXTENDED RELEASE ORAL DAILY
COMMUNITY
Start: 2022-03-02

## 2022-03-10 RX ORDER — PRAMIPEXOLE DIHYDROCHLORIDE 0.25 MG/1
0.25 TABLET ORAL DAILY
COMMUNITY
Start: 2022-02-09 | End: 2022-10-10

## 2022-03-10 NOTE — PROGRESS NOTES
CONSULT NOTE    Requested by:   Kemi Hdz MD      Chief Complaint   Patient presents with   • Consult   • Shortness of Breath       Subjective:  Elsa Alcaraz is a 81 y.o. female.     History of Present Illness   Patient comes in today for consultation because of shortness of breath for the past 2-3 years.    Symptoms are described as mild to moderate and mostly with exertion.  she also notices associated coughing and lower extremity swelling. Patient notes having progressive worsening in her ability to walk up the hill or walk up a flight of stairs.     She never smoked. Her father did smoke.     She was in the hospital in June 2021 and was discharged on oxygen.     The patient has had a diagnosis of stroke sleep apnea but had multiple issues and could not tolerate the CPAP device.    The patient says that multiple masks were tried but she also had trouble with identifying a mask that fit her well.      The following portions of the patient's history were reviewed and updated as appropriate: allergies, current medications, past family history, past medical history, past social history and past surgical history.    Review of Systems   HENT: Positive for sinus pain.    Respiratory: Positive for cough and chest tightness.    Psychiatric/Behavioral: Negative for sleep disturbance.   All other systems reviewed and are negative.      Past Medical History:   Diagnosis Date   • Anxiety disorder due to general medical condition 1/11/2017   • Arthritis    • Atrial fibrillation (HCC) 1/11/2017   • Body piercing     BOTH EARS   • Borderline diabetes    • Breast cancer (HCC) 2003    right-radiation and a lumpectomy   • Cataract 01/11/2017    Left eye surgery 11/19   • Chronic bronchitis (HCC) 1/11/2017   • Colon cancer (HCC) 11/30/1998    had surgery and chemo   • COPD (chronic obstructive pulmonary disease) (HCC)    • Cyanocobalamine deficiency (non anemic)    • Depression 1/11/2017   • Diverticulosis    •  "Esophageal reflux 1/11/2017   • Hiatal hernia 1/11/2017   • History of bladder infections    • History of echocardiogram 07/26/2021   • Hx of exercise stress test     \"several years ago by Dr. Mercado\".     • Hx of radiation therapy    • Hyperlipidemia    • Hypertension 1/11/2017   • Impaired functional mobility and activity tolerance    • Impaired functional mobility, balance, gait, and endurance    • Osteoporosis    • Palpitations 1/11/2017   • Prediabetes    • Problems with swallowing     meat at times per pt report   • Shortness of breath     WITH EXERTION    • Sleep apnea 01/11/2017    NO CPAP   • Tricuspid valve disorder 01/11/2017    Patient doesn't know anything about this   • Vitamin D deficiency    • Wears glasses        Social History     Tobacco Use   • Smoking status: Never Smoker   • Smokeless tobacco: Never Used   Substance Use Topics   • Alcohol use: Yes     Alcohol/week: 1.0 - 2.0 standard drink     Types: 1 - 2 Glasses of wine per week     Comment: rarely         Objective:  Visit Vitals  /66   Pulse 61   Resp 18   Ht 160 cm (63\")   Wt 73.8 kg (162 lb 9.6 oz)   SpO2 95% Comment: on room air   BMI 28.80 kg/m²       Physical Exam  Vitals reviewed.   Constitutional:       Appearance: She is well-developed.   HENT:      Head: Atraumatic.      Mouth/Throat:      Comments: Oropharynx was crowded.  Eyes:      Pupils: Pupils are equal, round, and reactive to light.   Neck:      Thyroid: No thyromegaly.      Vascular: No JVD.      Trachea: No tracheal deviation.   Cardiovascular:      Rate and Rhythm: Normal rate. Rhythm irregular.   Pulmonary:      Effort: Pulmonary effort is normal. No respiratory distress.      Breath sounds: Normal breath sounds.   Musculoskeletal:      Right lower leg: No edema.      Left lower leg: No edema.      Comments: Gait was slow.    Skin:     General: Skin is warm and dry.   Neurological:      Mental Status: She is alert and oriented to person, place, and time. "   Psychiatric:         Mood and Affect: Mood normal.         Behavior: Behavior normal.         Assessment/Plan:  Diagnoses and all orders for this visit:    1. Shortness of breath (Primary)    2. Mild persistent asthma without complication    3. Obstructive sleep apnea    4. Excessive daytime sleepiness    5. Chronic systolic CHF (congestive heart failure) (HCC)        Return in about 3 months (around 6/10/2022) for Recheck, For Lizbeth Reddy).    DISCUSSION(if any):  I reviewed the patient's discharge summary that mentioned acute blood loss anemia, acute on chronic systolic congestive heart failure and bilateral pleural effusion.  It also listed paroxysmal atrial fibrillation and?  COPD along with chronic hypoxic respiratory failure, on home oxygen.    Last chest x-ray was reviewed personally and the results were shared with the patient.  Images reviewed personally.   Results for orders placed during the hospital encounter of 06/01/21    XR Chest PA & Lateral    Narrative  PROCEDURE: XR CHEST PA AND LATERAL-    HISTORY: f/u pleural effusions; K92.2-Gastrointestinal hemorrhage,  unspecified; I48.0-Paroxysmal atrial fibrillation; I50.23-Acute on  chronic systolic (congestive) heart failure; J96.22-Acute and chronic  respiratory failure with hypercapnia    COMPARISON: 06/03/2021.    FINDINGS: Cardiomegaly is noted. There is a probable hiatal hernia.  There are persistent bibasilar left greater than right opacities  consistent with atelectasis or pneumonia. Moderate right and large left  pleural effusions are seen. There is no pneumothorax. The bony thorax is  intact. Postoperative changes are seen in the right lateral chest wall.    Impression  Moderate right and large left pleural effusions.    Left greater than right bibasilar atelectasis or pneumonia      This report was finalized on 6/5/2021 11:39 AM by Aldo Sosa MD.      Last CT scan was reviewed in great detail with the patient. Images reviewed  personally.   Results for orders placed during the hospital encounter of 03/29/21    CT Chest Without Contrast Diagnostic    Narrative  FINAL REPORT    CLINICAL HISTORY:  Interstitial lung disease    FINDINGS:  Multiple contiguous transaxial slices through the chest were  obtained without the intravenous ministration of contrast with  coronal reformatted images.  The heart is enlarged.  There are  calcifications of the coronary arteries and valves.  There is a  small pericardial effusion.  There is consolidative lingular and  bilateral lower lobe airspace disease with small to moderate  bilateral pleural effusions, left greater than right.  Underlying interstitial prominence suggests chronic lung  disease.  There is irregular nodularity right breast with coarse  calcifications.  There are surgical clips the right axilla and  findings may be secondary to postlumpectomy scar.  Recommend  correlation with any recent breast imaging to exclude  recurrent/residual mass.    Impression  Bilateral airspace disease and effusions  pericardial effusion  Chronic changes  likely postlumpectomy changes right breast but  recommend appropriate correlation    Authenticated by Dov Sanderson MD on 03/31/2021 08:15:45 PM      I also reviewed her last echocardiogram and shared the results with her.   Results for orders placed during the hospital encounter of 07/26/21    Adult Transthoracic Echo Complete W/ Cont if Necessary Per Protocol    Interpretation Summary  · Estimated left ventricular EF = 38% Left ventricular systolic function is moderately decreased.  · Moderate to severe mitral valve regurgitation is present.  · There is a moderate sized left pleural effusion.      Results for orders placed during the hospital encounter of 05/12/21    Adult Transthoracic Echo Complete W/ Cont if Necessary Per Protocol    Interpretation Summary  · The left ventricular cavity is moderately dilated.  · Left ventricular wall thickness is  consistent with mild concentric hypertrophy.  · Estimated left ventricular EF = 32% Left ventricular systolic function is moderately decreased.  · Left ventricular diastolic function is consistent with (grade III w/high LAP) reversible restrictive pattern.  · The right ventricular cavity is mild to moderately dilated.  · Moderately reduced right ventricular systolic function noted.  · Left atrial volume is severely increased.  · The right atrial cavity is moderate to severely dilated.  · There is moderate calcification of the aortic valve mainly affecting the non-coronary, left coronary and right coronary cusp(s).  · Estimated right ventricular systolic pressure from tricuspid regurgitation is normal (<35 mmHg).  · There is a moderate sized left pleural effusion. There is a moderate sized right pleural effusion.      Results for orders placed during the hospital encounter of 03/29/21    Adult Transthoracic Echo Complete W/ Cont if Necessary Per Protocol    Interpretation Summary  1.  Normal left ventricular size with moderately reduced LV systolic function, LVEF 35-40%.  2.  Mild concentric LVH.  3.  Grade 3 diastolic dysfunction.  4.  Mild right ventricular dilation with normal RV systolic function.  5.  Moderate right atrial dilation.  6.  Severely increased left atrial volume index.  7.  Calcification of the aortic valve without significant stenosis.  8.  Moderate mitral regurgitation.  9.  Moderate tricuspid vegetation, RVSP 44 mmHg.  10.  Small pericardial effusion without tamponade physiology.  11.  Moderate pleural effusion.      I have also reviewed laboratory data.  Lab Results   Component Value Date    EOSABS 0.23 01/05/2022    EOSABS 0.27 06/01/2021    EOSABS 0.32 05/13/2021    &    Lab Results   Component Value Date    CO2 27.7 01/05/2022     ===========================  ===========================    We will try to obtain her last sleep study results from the performing facility, if not already scanned  in our system.     I told the patient that her symptoms are consistent with poorly controlled sleep apnea.    Patient was advised to continue using PAP for at least 4 hours every night.    Since the patient is having significant issues and has CHF/Atrial Fibrillation, I told her that only realistic option is to try CPAP.     As far as shortness of breath is concerned, there does appear to be an element of congestive heart failure, especially given the last echocardiogram findings of ejection fraction of less than 40%.    I advised the patient to continue oxygen for now.    Further recommendations will be based on reviewing her results from previous sleep study, if it they can be found.    Options would include trial of AutoPap versus full night titration study.    If sleep study results cannot be obtained, then the only realistic option would be to proceed with a split-night study.    I told the patient that given her significant congestive heart failure, she is not a candidate for home sleep study.      Dictated utilizing Dragon dictation.    This document was electronically signed by Brittney Bautista MD on 03/14/22 at 16:07 EDT

## 2022-04-06 ENCOUNTER — OFFICE VISIT (OUTPATIENT)
Dept: CARDIOLOGY | Facility: CLINIC | Age: 82
End: 2022-04-06

## 2022-04-06 VITALS
SYSTOLIC BLOOD PRESSURE: 122 MMHG | HEART RATE: 55 BPM | DIASTOLIC BLOOD PRESSURE: 74 MMHG | HEIGHT: 63 IN | WEIGHT: 168.2 LBS | BODY MASS INDEX: 29.8 KG/M2 | OXYGEN SATURATION: 98 %

## 2022-04-06 DIAGNOSIS — I34.0 MODERATE MITRAL REGURGITATION: ICD-10-CM

## 2022-04-06 DIAGNOSIS — I10 ESSENTIAL HYPERTENSION: ICD-10-CM

## 2022-04-06 DIAGNOSIS — I48.0 PAROXYSMAL ATRIAL FIBRILLATION: Primary | ICD-10-CM

## 2022-04-06 DIAGNOSIS — I07.9 TRICUSPID VALVE DISORDER: ICD-10-CM

## 2022-04-06 PROCEDURE — 99214 OFFICE O/P EST MOD 30 MIN: CPT | Performed by: INTERNAL MEDICINE

## 2022-04-17 ENCOUNTER — APPOINTMENT (OUTPATIENT)
Dept: GENERAL RADIOLOGY | Facility: HOSPITAL | Age: 82
End: 2022-04-17

## 2022-04-17 ENCOUNTER — HOSPITAL ENCOUNTER (EMERGENCY)
Facility: HOSPITAL | Age: 82
Discharge: HOME | End: 2022-04-17
Attending: EMERGENCY MEDICINE | Admitting: EMERGENCY MEDICINE

## 2022-04-17 VITALS
DIASTOLIC BLOOD PRESSURE: 77 MMHG | OXYGEN SATURATION: 99 % | RESPIRATION RATE: 18 BRPM | SYSTOLIC BLOOD PRESSURE: 133 MMHG | TEMPERATURE: 98.2 F | WEIGHT: 165 LBS | BODY MASS INDEX: 29.23 KG/M2 | HEART RATE: 84 BPM | HEIGHT: 63 IN

## 2022-04-17 DIAGNOSIS — S42.215A CLOSED NONDISPLACED FRACTURE OF SURGICAL NECK OF LEFT HUMERUS, UNSPECIFIED FRACTURE MORPHOLOGY, INITIAL ENCOUNTER: Primary | ICD-10-CM

## 2022-04-17 PROCEDURE — 99283 EMERGENCY DEPT VISIT LOW MDM: CPT

## 2022-04-17 PROCEDURE — 63710000001 ONDANSETRON ODT 4 MG TABLET DISPERSIBLE: Performed by: EMERGENCY MEDICINE

## 2022-04-17 PROCEDURE — 73060 X-RAY EXAM OF HUMERUS: CPT

## 2022-04-17 RX ORDER — ONDANSETRON 4 MG/1
4 TABLET, ORALLY DISINTEGRATING ORAL ONCE
Status: COMPLETED | OUTPATIENT
Start: 2022-04-17 | End: 2022-04-17

## 2022-04-17 RX ORDER — ONDANSETRON 4 MG/1
4 TABLET, ORALLY DISINTEGRATING ORAL EVERY 8 HOURS PRN
Qty: 12 TABLET | Refills: 0 | Status: SHIPPED | OUTPATIENT
Start: 2022-04-17 | End: 2022-10-10

## 2022-04-17 RX ORDER — HYDROCODONE BITARTRATE AND ACETAMINOPHEN 5; 325 MG/1; MG/1
1 TABLET ORAL EVERY 6 HOURS PRN
Qty: 12 TABLET | Refills: 0 | Status: SHIPPED | OUTPATIENT
Start: 2022-04-17 | End: 2022-10-10

## 2022-04-17 RX ORDER — HYDROCODONE BITARTRATE AND ACETAMINOPHEN 5; 325 MG/1; MG/1
1 TABLET ORAL ONCE
Status: COMPLETED | OUTPATIENT
Start: 2022-04-17 | End: 2022-04-17

## 2022-04-17 RX ADMIN — HYDROCODONE BITARTRATE AND ACETAMINOPHEN 1 TABLET: 5; 325 TABLET ORAL at 08:23

## 2022-04-17 RX ADMIN — ONDANSETRON 4 MG: 4 TABLET, ORALLY DISINTEGRATING ORAL at 08:23

## 2022-04-17 NOTE — ED PROVIDER NOTES
"Subjective   History of Present Illness   Patient is a very pleasant 82-year-old female with history of A. fib, borderline diabetes, anxiety, arthritis, COPD on 4 L oxygen at all times, GERD, hypertension, hyperlipidemia, among other comorbidities presenting to the ER with complaints of severe left upper extremity pain after a fall at [facility] [employer] [facility].  Patient states that she fell in the grass and is now having severe left upper extremity pain.  She denies head trauma and loss of consciousness.  She is on Xarelto.  She denies any additional injuries and states that she did walk after the accident.  She states that she is unable to lift her left arm up.    Review of Systems   Musculoskeletal:        Left upper extremity pain above elbow   All other systems reviewed and are negative.      Past Medical History:   Diagnosis Date   • Anxiety disorder due to general medical condition 1/11/2017   • Arthritis    • Atrial fibrillation (HCC) 1/11/2017   • Body piercing     BOTH EARS   • Borderline diabetes    • Breast cancer (HCC) 2003    right-radiation and a lumpectomy   • Cataract 01/11/2017    Left eye surgery 11/19   • Chronic bronchitis (HCC) 1/11/2017   • Colon cancer (HCC) 11/30/1998    had surgery and chemo   • COPD (chronic obstructive pulmonary disease) (HCC)    • Cyanocobalamine deficiency (non anemic)    • Depression 1/11/2017   • Diverticulosis    • Esophageal reflux 1/11/2017   • Hiatal hernia 1/11/2017   • History of bladder infections    • History of echocardiogram 07/26/2021   • Hx of exercise stress test     \"several years ago by Dr. Mercado\".     • Hx of radiation therapy    • Hyperlipidemia    • Hypertension 1/11/2017   • Impaired functional mobility and activity tolerance    • Impaired functional mobility, balance, gait, and endurance    • Osteoporosis    • Palpitations 1/11/2017   • Prediabetes    • Problems with swallowing     meat at times per pt report   • Shortness of breath     " WITH EXERTION    • Sleep apnea 2017    NO CPAP   • Tricuspid valve disorder 2017    Patient doesn't know anything about this   • Vitamin D deficiency    • Wears glasses        No Known Allergies    Past Surgical History:   Procedure Laterality Date   • ANAL FISTULOTOMY     • APPENDECTOMY         • BREAST BIOPSY     • BREAST LUMPECTOMY Right 2006   • CATARACT EXTRACTION  2019    both eyes    • CATARACT EXTRACTION W/ INTRAOCULAR LENS IMPLANT Left 2019    Procedure: CATARACT PHACO EXTRACTION WITH INTRAOCULAR LENS IMPLANT LEFT;  Surgeon: Masoud David MD;  Location: [facility] OR;  Service: Ophthalmology   • CATARACT EXTRACTION W/ INTRAOCULAR LENS IMPLANT Right 2019    Procedure: CATARACT PHACO EXTRACTION WITH INTRAOCULAR LENS IMPLANT RIGHT;  Surgeon: Masoud David MD;  Location: [facility] OR;  Service: Ophthalmology   •  SECTION  1981   • CHOLECYSTECTOMY  2009   • COLECTOMY PARTIAL / TOTAL  1998    COLON CANCER   • COLONOSCOPY     • COLONOSCOPY N/A 2021    Procedure: COLONOSCOPY WITH BIOPSY;  Surgeon: Iona Sanders MD;  Location: [facility] ENDOSCOPY;  Service: Gastroenterology;  Laterality: N/A;   • ENDOSCOPY  2016   • ENDOSCOPY N/A 2018    Procedure: ESOPHAGOGASTRODUODENOSCOPY WITH COLD FORCEP BIOPSY;  Surgeon: Vasquez Fagan MD;  Location: [facility] ENDOSCOPY;  Service: Gastroenterology   • ENDOSCOPY N/A 2019    Procedure: ESOPHAGOGASTRODUODENOSCOPY WITH BIOPSY;  Surgeon: Vasquez Fagan MD;  Location: [facility] ENDOSCOPY;  Service: Gastroenterology   • HYSTERECTOMY         • VENTRAL HERNIA REPAIR  10/28/2012       Family History   Problem Relation Age of Onset   • Hypertension Mother    • Asthma Mother    • COPD Mother    • Osteoarthritis Mother    • Cancer Father    • Cancer Sister    • Diabetes Maternal Grandmother        Social History     Socioeconomic History   • Marital status:    Tobacco Use   • Smoking status:  Never Smoker   • Smokeless tobacco: Never Used   Vaping Use   • Vaping Use: Never used   Substance and Sexual Activity   • Alcohol use: Yes     Alcohol/week: 1.0 - 2.0 standard drink     Types: 1 - 2 Glasses of wine per week     Comment: rarely   • Drug use: No   • Sexual activity: Defer           Objective   Physical Exam  Vitals and nursing note reviewed.   Constitutional:       General: She is not in acute distress.     Appearance: She is not toxic-appearing.   HENT:      Head: Normocephalic and atraumatic.      Right Ear: External ear normal.      Nose: Nose normal.   Eyes:      Extraocular Movements: Extraocular movements intact.      Conjunctiva/sclera: Conjunctivae normal.   Cardiovascular:      Rate and Rhythm: Normal rate.   Pulmonary:      Effort: Pulmonary effort is normal. No respiratory distress.      Comments: Breathing comfortably on nasal cannula, patient wears at all times  Abdominal:      General: There is no distension.      Palpations: Abdomen is soft.      Tenderness: There is no abdominal tenderness.   Musculoskeletal:      Cervical back: Normal range of motion and neck supple.      Comments: Patient has significant tenderness to palpation of left upper extremity above the elbow, severe pain with any movement of the left upper extremity above the elbow; 2+ pulses, sensation intact   Skin:     General: Skin is warm and dry.   Neurological:      General: No focal deficit present.      Mental Status: She is alert and oriented to person, place, and time.   Psychiatric:         Mood and Affect: Mood normal.         Behavior: Behavior normal.         Procedures           ED Course                                   JUAN reviewed by Tariq Mora DO             MDM   Patient was evaluated in the ER for left upper extremity pain after a fall at [facility].  She is hemodynamically stable, no acute distress, nontoxic-appearing on exam.  No head trauma or loss of consciousness.  Left upper  extremity is neurovascularly intact. XR was performed and reveals a humerus neck fracture.  Patient was placed in a sling and shoulder immobilizer.  She was given Norco and Zofran for pain and nausea as she states she normally gets nauseous with pain meds.  Patient was given a prescription for Norco and Zofran as well.  She was given information for orthopedic follow-up with Dr. Tidwell for definitive care of fracture.  I also sent Dr. Tidwell a message on epic about the patient.  Patient was advised to keep the sling and shoulder immobilizer in place until follow-up with Orthopedics.  Precautions were given for return to the ER for any new or worsening symptoms.    Final diagnoses:   Closed nondisplaced fracture of surgical neck of left humerus, unspecified fracture morphology, initial encounter       ED Disposition  ED Disposition     ED Disposition   Discharge    Condition   Stable    Comment   --             Jonah Tidwell MD  [address]  [facility]note [facility] [address] [phone]    Schedule an appointment as soon as possible for a visit   for definitive care of humerus neck fracture    Kemi Hdz MD  [address] [facility] [id number] [phone]    Call   As needed    [facility] Emergency Department  [address] [facility] [address] [phone]  Go to   As needed, If symptoms worsen         Medication List      New Prescriptions    HYDROcodone-acetaminophen 5-325 MG per tablet  Commonly known as: NORCO  Take 1 tablet by mouth Every 6 (Six) Hours As Needed for Severe Pain .     ondansetron ODT 4 MG disintegrating tablet  Commonly known as: ZOFRAN-ODT  Place 1 tablet on the tongue Every 8 (Eight) Hours As Needed for Nausea or Vomiting.        Changed    ipratropium-albuterol 0.5-2.5 mg/3 ml nebulizer  Commonly known as: DUO-NEB  Take 3 mL by nebulization Every 4 (Four) Hours As Needed for Shortness of Air.  What changed:   · when to take this  · reasons to  take this           Where to Get Your Medications      These medications were sent to [id number] - [place], [place] - [address] - [phone]  - [phone] FX  [address] [place] [address]    Phone: [phone]   · HYDROcodone-acetaminophen 5-325 MG per tablet  · ondansetron ODT 4 MG disintegrating tablet          Lynette Jarrell PA-C  04/17/22 0931

## 2022-04-17 NOTE — DISCHARGE INSTRUCTIONS
Keep sling and shoulder immobilizer in place until follow-up with Orthopedic specialist for definitive care of humeral neck fracture. Take pain and nausea medication as prescribed as needed. Follow up with your PCP as needed. Return to the ER for new or worsening symptoms or acute concerns.

## 2022-06-20 ENCOUNTER — HOSPITAL ENCOUNTER (EMERGENCY)
Facility: HOSPITAL | Age: 82
Discharge: HOME | End: 2022-06-20
Attending: EMERGENCY MEDICINE | Admitting: EMERGENCY MEDICINE

## 2022-06-20 ENCOUNTER — APPOINTMENT (OUTPATIENT)
Dept: CT IMAGING | Facility: HOSPITAL | Age: 82
End: 2022-06-20

## 2022-06-20 VITALS
WEIGHT: 167 LBS | HEIGHT: 63 IN | HEART RATE: 82 BPM | BODY MASS INDEX: 29.59 KG/M2 | SYSTOLIC BLOOD PRESSURE: 128 MMHG | DIASTOLIC BLOOD PRESSURE: 75 MMHG | TEMPERATURE: 98 F | OXYGEN SATURATION: 100 % | RESPIRATION RATE: 20 BRPM

## 2022-06-20 DIAGNOSIS — K92.2 UPPER GI BLEED: ICD-10-CM

## 2022-06-20 DIAGNOSIS — R10.10 UPPER ABDOMINAL PAIN: Primary | ICD-10-CM

## 2022-06-20 LAB
ALBUMIN SERPL-MCNC: 3.8 G/DL (ref 3.5–5.2)
ALBUMIN/GLOB SERPL: 1.4 G/DL
ALP SERPL-CCNC: 130 U/L (ref 39–117)
ALT SERPL W P-5'-P-CCNC: 13 U/L (ref 1–33)
ANION GAP SERPL CALCULATED.3IONS-SCNC: 14.5 MMOL/L (ref 5–15)
AST SERPL-CCNC: 20 U/L (ref 1–32)
BASOPHILS # BLD AUTO: 0.06 10*3/MM3 (ref 0–0.2)
BASOPHILS NFR BLD AUTO: 0.6 % (ref 0–1.5)
BILIRUB SERPL-MCNC: 0.3 MG/DL (ref 0–1.2)
BUN SERPL-MCNC: 22 MG/DL (ref 8–23)
BUN/CREAT SERPL: 28.6 (ref 7–25)
CALCIUM SPEC-SCNC: 8.9 MG/DL (ref 8.6–10.5)
CHLORIDE SERPL-SCNC: 102 MMOL/L (ref 98–107)
CO2 SERPL-SCNC: 26.5 MMOL/L (ref 22–29)
CREAT SERPL-MCNC: 0.77 MG/DL (ref 0.57–1)
D-LACTATE SERPL-SCNC: 1.6 MMOL/L (ref 0.5–2)
DEPRECATED RDW RBC AUTO: 46.2 FL (ref 37–54)
EGFRCR SERPLBLD CKD-EPI 2021: 77.6 ML/MIN/1.73
EOSINOPHIL # BLD AUTO: 0.32 10*3/MM3 (ref 0–0.4)
EOSINOPHIL NFR BLD AUTO: 3.4 % (ref 0.3–6.2)
ERYTHROCYTE [DISTWIDTH] IN BLOOD BY AUTOMATED COUNT: 14.5 % (ref 12.3–15.4)
GLOBULIN UR ELPH-MCNC: 2.8 GM/DL
GLUCOSE SERPL-MCNC: 149 MG/DL (ref 65–99)
HCT VFR BLD AUTO: 39.3 % (ref 34–46.6)
HGB BLD-MCNC: 12.4 G/DL (ref 12–15.9)
HOLD SPECIMEN: NORMAL
HOLD SPECIMEN: NORMAL
IMM GRANULOCYTES # BLD AUTO: 0.04 10*3/MM3 (ref 0–0.05)
IMM GRANULOCYTES NFR BLD AUTO: 0.4 % (ref 0–0.5)
LIPASE SERPL-CCNC: 22 U/L (ref 13–60)
LYMPHOCYTES # BLD AUTO: 1.26 10*3/MM3 (ref 0.7–3.1)
LYMPHOCYTES NFR BLD AUTO: 13.3 % (ref 19.6–45.3)
MCH RBC QN AUTO: 27.4 PG (ref 26.6–33)
MCHC RBC AUTO-ENTMCNC: 31.6 G/DL (ref 31.5–35.7)
MCV RBC AUTO: 86.9 FL (ref 79–97)
MONOCYTES # BLD AUTO: 1.27 10*3/MM3 (ref 0.1–0.9)
MONOCYTES NFR BLD AUTO: 13.4 % (ref 5–12)
NEUTROPHILS NFR BLD AUTO: 6.52 10*3/MM3 (ref 1.7–7)
NEUTROPHILS NFR BLD AUTO: 68.9 % (ref 42.7–76)
NRBC BLD AUTO-RTO: 0 /100 WBC (ref 0–0.2)
PLATELET # BLD AUTO: 323 10*3/MM3 (ref 140–450)
PMV BLD AUTO: 9.6 FL (ref 6–12)
POTASSIUM SERPL-SCNC: 4.1 MMOL/L (ref 3.5–5.2)
PROT SERPL-MCNC: 6.6 G/DL (ref 6–8.5)
RBC # BLD AUTO: 4.52 10*6/MM3 (ref 3.77–5.28)
SODIUM SERPL-SCNC: 143 MMOL/L (ref 136–145)
TROPONIN T SERPL-MCNC: <0.01 NG/ML (ref 0–0.03)
WBC NRBC COR # BLD: 9.47 10*3/MM3 (ref 3.4–10.8)
WHOLE BLOOD HOLD COAG: NORMAL
WHOLE BLOOD HOLD SPECIMEN: NORMAL

## 2022-06-20 PROCEDURE — 80053 COMPREHEN METABOLIC PANEL: CPT

## 2022-06-20 PROCEDURE — 96374 THER/PROPH/DIAG INJ IV PUSH: CPT

## 2022-06-20 PROCEDURE — 93005 ELECTROCARDIOGRAM TRACING: CPT | Performed by: EMERGENCY MEDICINE

## 2022-06-20 PROCEDURE — 74177 CT ABD & PELVIS W/CONTRAST: CPT

## 2022-06-20 PROCEDURE — 83605 ASSAY OF LACTIC ACID: CPT | Performed by: EMERGENCY MEDICINE

## 2022-06-20 PROCEDURE — 84484 ASSAY OF TROPONIN QUANT: CPT | Performed by: EMERGENCY MEDICINE

## 2022-06-20 PROCEDURE — 83690 ASSAY OF LIPASE: CPT

## 2022-06-20 PROCEDURE — 99283 EMERGENCY DEPT VISIT LOW MDM: CPT

## 2022-06-20 PROCEDURE — 25010000002 IOPAMIDOL 61 % SOLUTION: Performed by: EMERGENCY MEDICINE

## 2022-06-20 PROCEDURE — 85025 COMPLETE CBC W/AUTO DIFF WBC: CPT

## 2022-06-20 RX ORDER — PANTOPRAZOLE SODIUM 40 MG/10ML
80 INJECTION, POWDER, LYOPHILIZED, FOR SOLUTION INTRAVENOUS ONCE
Status: COMPLETED | OUTPATIENT
Start: 2022-06-20 | End: 2022-06-20

## 2022-06-20 RX ORDER — LIDOCAINE HYDROCHLORIDE 20 MG/ML
15 SOLUTION OROPHARYNGEAL ONCE
Status: COMPLETED | OUTPATIENT
Start: 2022-06-20 | End: 2022-06-20

## 2022-06-20 RX ORDER — ALUMINA, MAGNESIA, AND SIMETHICONE 2400; 2400; 240 MG/30ML; MG/30ML; MG/30ML
15 SUSPENSION ORAL ONCE
Status: COMPLETED | OUTPATIENT
Start: 2022-06-20 | End: 2022-06-20

## 2022-06-20 RX ORDER — SODIUM CHLORIDE 0.9 % (FLUSH) 0.9 %
10 SYRINGE (ML) INJECTION AS NEEDED
Status: DISCONTINUED | OUTPATIENT
Start: 2022-06-20 | End: 2022-06-20 | Stop reason: HOSPADM

## 2022-06-20 RX ORDER — PANTOPRAZOLE SODIUM 40 MG/1
40 TABLET, DELAYED RELEASE ORAL DAILY
Qty: 30 TABLET | Refills: 0 | Status: SHIPPED | OUTPATIENT
Start: 2022-06-20

## 2022-06-20 RX ADMIN — PANTOPRAZOLE SODIUM 80 MG: 40 INJECTION, POWDER, FOR SOLUTION INTRAVENOUS at 11:20

## 2022-06-20 RX ADMIN — LIDOCAINE HYDROCHLORIDE 15 ML: 20 SOLUTION ORAL; TOPICAL at 11:19

## 2022-06-20 RX ADMIN — IOPAMIDOL 100 ML: 612 INJECTION, SOLUTION INTRAVENOUS at 12:04

## 2022-06-20 RX ADMIN — ALUMINUM HYDROXIDE, MAGNESIUM HYDROXIDE, AND DIMETHICONE 15 ML: 400; 400; 40 SUSPENSION ORAL at 11:19

## 2022-06-20 NOTE — ED PROVIDER NOTES
"Subjective   82-year-old female presenting with abdominal pain.  She states that for several months she has had intermittent epigastric abdominal pain.  This is a sharp stabbing pain.  It does not radiate.  There are no particular alleviating or aggravating or inciting factors.  She denies any fever, chills, nausea, vomiting, diarrhea.  She notes that for the last couple days that she has had some dark stool.  She does note a history of ulcers in the past.  She does take Xarelto.          Review of Systems    Past Medical History:   Diagnosis Date   • Anxiety disorder due to general medical condition 1/11/2017   • Arthritis    • Atrial fibrillation (HCC) 1/11/2017   • Body piercing     BOTH EARS   • Borderline diabetes    • Breast cancer (HCC) 2003    right-radiation and a lumpectomy   • Cataract 01/11/2017    Left eye surgery 11/19   • Chronic bronchitis (HCC) 1/11/2017   • Colon cancer (HCC) 11/30/1998    had surgery and chemo   • COPD (chronic obstructive pulmonary disease) (HCC)    • Cyanocobalamine deficiency (non anemic)    • Depression 1/11/2017   • Diverticulosis    • Esophageal reflux 1/11/2017   • Hiatal hernia 1/11/2017   • History of bladder infections    • History of echocardiogram 07/26/2021   • Hx of exercise stress test     \"several years ago by Dr. Mercado\".     • Hx of radiation therapy    • Hyperlipidemia    • Hypertension 1/11/2017   • Impaired functional mobility and activity tolerance    • Impaired functional mobility, balance, gait, and endurance    • Osteoporosis    • Palpitations 1/11/2017   • Prediabetes    • Problems with swallowing     meat at times per pt report   • Shortness of breath     WITH EXERTION    • Sleep apnea 01/11/2017    NO CPAP   • Tricuspid valve disorder 01/11/2017    Patient doesn't know anything about this   • Vitamin D deficiency    • Wears glasses        No Known Allergies    Past Surgical History:   Procedure Laterality Date   • ANAL FISTULOTOMY     • APPENDECTOMY  "        • BREAST BIOPSY     • BREAST LUMPECTOMY Right 2006   • CATARACT EXTRACTION  2019    both eyes    • CATARACT EXTRACTION W/ INTRAOCULAR LENS IMPLANT Left 2019    Procedure: CATARACT PHACO EXTRACTION WITH INTRAOCULAR LENS IMPLANT LEFT;  Surgeon: Masoud David MD;  Location: [facility] OR;  Service: Ophthalmology   • CATARACT EXTRACTION W/ INTRAOCULAR LENS IMPLANT Right 2019    Procedure: CATARACT PHACO EXTRACTION WITH INTRAOCULAR LENS IMPLANT RIGHT;  Surgeon: Masoud David MD;  Location: [facility] OR;  Service: Ophthalmology   •  SECTION  1981   • CHOLECYSTECTOMY  2009   • COLECTOMY PARTIAL / TOTAL  1998    COLON CANCER   • COLONOSCOPY     • COLONOSCOPY N/A 2021    Procedure: COLONOSCOPY WITH BIOPSY;  Surgeon: Iona Sanders MD;  Location: [facility] ENDOSCOPY;  Service: Gastroenterology;  Laterality: N/A;   • ENDOSCOPY     • ENDOSCOPY N/A 2018    Procedure: ESOPHAGOGASTRODUODENOSCOPY WITH COLD FORCEP BIOPSY;  Surgeon: Vasquez Fagan MD;  Location: [facility] ENDOSCOPY;  Service: Gastroenterology   • ENDOSCOPY N/A 2019    Procedure: ESOPHAGOGASTRODUODENOSCOPY WITH BIOPSY;  Surgeon: Vasquez Fagan MD;  Location: [facility] ENDOSCOPY;  Service: Gastroenterology   • HYSTERECTOMY         • VENTRAL HERNIA REPAIR  10/28/2012       Family History   Problem Relation Age of Onset   • Hypertension Mother    • Asthma Mother    • COPD Mother    • Osteoarthritis Mother    • Cancer Father    • Cancer Sister    • Diabetes Maternal Grandmother        Social History     Socioeconomic History   • Marital status:    Tobacco Use   • Smoking status: Never Smoker   • Smokeless tobacco: Never Used   Vaping Use   • Vaping Use: Never used   Substance and Sexual Activity   • Alcohol use: Yes     Alcohol/week: 1.0 - 2.0 standard drink     Types: 1 - 2 Glasses of wine per week     Comment: rarely   • Drug use: No   • Sexual activity: Defer            Objective   Physical Exam  Constitutional:       General: She is not in acute distress.     Appearance: Normal appearance. She is not ill-appearing, toxic-appearing or diaphoretic.   HENT:      Head: Normocephalic and atraumatic.      Right Ear: External ear normal.      Left Ear: External ear normal.      Nose: Nose normal.   Eyes:      Extraocular Movements: Extraocular movements intact.   Cardiovascular:      Pulses: Normal pulses.      Heart sounds: Normal heart sounds.      Comments: Irregularly irregular, normal rate  Pulmonary:      Effort: Pulmonary effort is normal. No respiratory distress.      Breath sounds: Normal breath sounds.   Abdominal:      General: Bowel sounds are normal. There is no distension.      Comments: Very minimal epigastric tenderness   Musculoskeletal:         General: No swelling, tenderness or deformity. Normal range of motion.      Cervical back: Normal range of motion.   Skin:     General: Skin is warm and dry.      Capillary Refill: Capillary refill takes less than 2 seconds.      Findings: No rash.   Neurological:      General: No focal deficit present.      Mental Status: She is alert and oriented to person, place, and time.   Psychiatric:         Mood and Affect: Mood normal.         Behavior: Behavior normal.         Procedures           ED Course                                                 MDM  Number of Diagnoses or Management Options  Upper abdominal pain  Upper GI bleed  Diagnosis management comments: 82-year-old female with upper abdominal pain and dark stool.  Well-developed, well-nourished elderly lady in no distress with exam as above.  Her vital signs are normal.  She has minimal epigastric tenderness.  Will obtain labs, EKG and CT scan.  Will provide symptomatic treatment, will give dose of Protonix.  Disposition pending.    DDx: Upper GI bleed, anemia, gastritis, ulcer disease    EKG interpreted by me: Atrial fibrillation, normal rate, nonspecific  interventricular conduction delay, no acute ST changes, this is an abnormal EKG    Blood work is without acute or significant abnormality.  CT scan reveals no acute abnormality.  Given patient takes Xarelto and the ongoing melena I strongly recommended admission for upper endoscopy.  Patient states that there is no way she is staying in the hospital.  She is going to go home and follow-up with Dr. Sanders, I advised her to call for a sooner appointment.  We discussed very strict return precautions.  She is agreeable with plan of care.       Amount and/or Complexity of Data Reviewed  Decide to obtain previous medical records or to obtain history from someone other than the patient: yes        Final diagnoses:   Upper abdominal pain   Upper GI bleed          Luke Amador MD  06/20/22 [id number]

## 2022-06-23 ENCOUNTER — OFFICE VISIT (OUTPATIENT)
Dept: GASTROENTEROLOGY | Facility: CLINIC | Age: 82
End: 2022-06-23

## 2022-06-23 VITALS
BODY MASS INDEX: 29.41 KG/M2 | WEIGHT: 166 LBS | DIASTOLIC BLOOD PRESSURE: 84 MMHG | SYSTOLIC BLOOD PRESSURE: 142 MMHG | HEIGHT: 63 IN

## 2022-06-23 DIAGNOSIS — R19.4 CHANGE IN BOWEL HABITS: ICD-10-CM

## 2022-06-23 DIAGNOSIS — Z79.01 CURRENT USE OF LONG TERM ANTICOAGULATION: ICD-10-CM

## 2022-06-23 DIAGNOSIS — R10.10 UPPER ABDOMINAL PAIN: ICD-10-CM

## 2022-06-23 DIAGNOSIS — K92.2 GASTROINTESTINAL HEMORRHAGE, UNSPECIFIED GASTROINTESTINAL HEMORRHAGE TYPE: Primary | ICD-10-CM

## 2022-06-23 DIAGNOSIS — K92.1 MELENA: ICD-10-CM

## 2022-06-23 PROCEDURE — 99214 OFFICE O/P EST MOD 30 MIN: CPT | Performed by: INTERNAL MEDICINE

## 2022-06-23 RX ORDER — DICYCLOMINE HYDROCHLORIDE 10 MG/5ML
10 SOLUTION ORAL 2 TIMES DAILY PRN
Qty: 473 ML | Refills: 1 | Status: SHIPPED | OUTPATIENT
Start: 2022-06-23 | End: 2022-10-10

## 2022-06-23 RX ORDER — SODIUM CHLORIDE 9 MG/ML
70 INJECTION, SOLUTION INTRAVENOUS CONTINUOUS PRN
Status: CANCELLED | OUTPATIENT
Start: 2022-06-23

## 2022-06-23 NOTE — PROGRESS NOTES
Follow Up Note     Date: 2022   Patient Name: Elsa Alcaraz  MRN: [id number]  : 1940     Referring Physician: Kemi Hdz MD    Chief Complaint:    Chief Complaint   Patient presents with   • Follow-up   • Anastomotic ulcer       Interval History:   2022  Elsa Alcaraz is a 82 y.o. female who is here today for follow up for evaluation for abdominal pain and passing black stools.  She states that she always had some pain in the left side abdomen however recently she noticed some upper abdominal pain and had a dark stool for 2 to 3 days.  The Xarelto and went to ED for evaluation.  She was emergency room on 2022 and had a CT abdomen pelvis done which was unremarkable.  Globin was stable and was discharged with a PPI.  Started back on Xarelto now.     2021  This is 81-year-old female patient with a prior history of hypertension, hyperlipidemia, chronic atrial fibrillation on Eliquis, history of breast cancer status post lumpectomy and radiation therapy, history of colon cancer status post right hemicolectomy more than 20 years ago, CHF, COPD/obstructive sleep apnea on home oxygen, colonic diverticulosis, was brought to the emergency room today on 2021 with complaints of rectal bleeding for the past 2 days.     Patient states that she was doing fine until day before yesterday, then had a bowel movement with blood in the stool.  Subsequently she had multiple bowel movements at least 4 times every day.  She continued to have a red blood with clots even today for which she came to emergency room.. She had at least 3 times bowel movement with dark blood and clots.  She does have associated left-sided abdominal cramps.  Denies any nausea vomiting, no fever chills.     Patient denies any prior history of GI bleed.  She has been taking Xarelto and last dose was day before yesterday 2 days ago.  Denies any NSAID use.  She states that she had an EGD done in 2019 Dr. Daniel.  Last  "colonoscopy was about 5 years ago and was been told normal no reports available.  Abdominal pain and father had some type of stomach cancer details unknown.  She was evaluated emergency room and noted to have a drop in her H&H from 14 to 10 g/dL.  CT scan abdomen pelvis done which showed only colonic diverticulosis otherwise unremarkable.  GI was consulted for further evaluation and management.       Subjective      Past Medical History:   Past Medical History:   Diagnosis Date   • Anxiety disorder due to general medical condition 1/11/2017   • Arthritis    • Atrial fibrillation (HCC) 1/11/2017   • Body piercing     BOTH EARS   • Borderline diabetes    • Breast cancer (HCC) 2003    right-radiation and a lumpectomy   • Cataract 01/11/2017    Left eye surgery 11/19   • Chronic bronchitis (HCC) 1/11/2017   • Colon cancer (HCC) 11/30/1998    had surgery and chemo   • COPD (chronic obstructive pulmonary disease) (HCC)    • Cyanocobalamine deficiency (non anemic)    • Depression 1/11/2017   • Diverticulosis    • Esophageal reflux 1/11/2017   • Hiatal hernia 1/11/2017   • History of bladder infections    • History of echocardiogram 07/26/2021   • Hx of exercise stress test     \"several years ago by Dr. Mercado\".     • Hx of radiation therapy    • Hyperlipidemia    • Hypertension 1/11/2017   • Impaired functional mobility and activity tolerance    • Impaired functional mobility, balance, gait, and endurance    • Osteoporosis    • Palpitations 1/11/2017   • Prediabetes    • Problems with swallowing     meat at times per pt report   • Shortness of breath     WITH EXERTION    • Sleep apnea 01/11/2017    NO CPAP   • Tricuspid valve disorder 01/11/2017    Patient doesn't know anything about this   • Vitamin D deficiency    • Wears glasses      Past Surgical History:   Past Surgical History:   Procedure Laterality Date   • ANAL FISTULOTOMY     • APPENDECTOMY      1998   • BREAST BIOPSY     • BREAST LUMPECTOMY Right 03/06/2006 "   • CATARACT EXTRACTION  2019    both eyes    • CATARACT EXTRACTION W/ INTRAOCULAR LENS IMPLANT Left 2019    Procedure: CATARACT PHACO EXTRACTION WITH INTRAOCULAR LENS IMPLANT LEFT;  Surgeon: Masoud David MD;  Location: [facility] OR;  Service: Ophthalmology   • CATARACT EXTRACTION W/ INTRAOCULAR LENS IMPLANT Right 2019    Procedure: CATARACT PHACO EXTRACTION WITH INTRAOCULAR LENS IMPLANT RIGHT;  Surgeon: Masoud David MD;  Location: [facility] OR;  Service: Ophthalmology   •  SECTION  1981   • CHOLECYSTECTOMY  2009   • COLECTOMY PARTIAL / TOTAL  1998    COLON CANCER   • COLONOSCOPY     • COLONOSCOPY N/A 2021    Procedure: COLONOSCOPY WITH BIOPSY;  Surgeon: Iona Sanders MD;  Location: [facility] ENDOSCOPY;  Service: Gastroenterology;  Laterality: N/A;   • ENDOSCOPY     • ENDOSCOPY N/A 2018    Procedure: ESOPHAGOGASTRODUODENOSCOPY WITH COLD FORCEP BIOPSY;  Surgeon: Vasquez Fagan MD;  Location: [facility] ENDOSCOPY;  Service: Gastroenterology   • ENDOSCOPY N/A 2019    Procedure: ESOPHAGOGASTRODUODENOSCOPY WITH BIOPSY;  Surgeon: Vasquez Fagan MD;  Location: [facility] ENDOSCOPY;  Service: Gastroenterology   • HYSTERECTOMY         • VENTRAL HERNIA REPAIR  10/28/2012       Family History:   Family History   Problem Relation Age of Onset   • Hypertension Mother    • Asthma Mother    • COPD Mother    • Osteoarthritis Mother    • Cancer Father    • Cancer Sister    • Diabetes Maternal Grandmother        Social History:   Social History     Socioeconomic History   • Marital status:    Tobacco Use   • Smoking status: Never Smoker   • Smokeless tobacco: Never Used   Vaping Use   • Vaping Use: Never used   Substance and Sexual Activity   • Alcohol use: Yes     Alcohol/week: 1.0 - 2.0 standard drink     Types: 1 - 2 Glasses of wine per week     Comment: rarely   • Drug use: No   • Sexual activity: Defer       Medications:     Current Outpatient  Medications:   •  albuterol sulfate  (90 Base) MCG/ACT inhaler, Inhale 2 puffs 4 (Four) Times a Day., Disp: 18 g, Rfl: 0  •  budesonide-formoterol (SYMBICORT) 80-4.5 MCG/ACT inhaler, Inhale 2 puffs 2 (Two) Times a Day., Disp: , Rfl:   •  cholecalciferol (VITAMIN D3) 25 MCG (1000 UT) tablet, Take 1,000 Units by mouth Daily., Disp: , Rfl:   •  citalopram (CeleXA) 20 MG tablet, Take 20 mg by mouth Daily., Disp: , Rfl:   •  furosemide (Lasix) 20 MG tablet, Take 1 tablet by mouth Daily., Disp: 30 tablet, Rfl: 0  •  HYDROcodone-acetaminophen (NORCO) 5-325 MG per tablet, Take 1 tablet by mouth Every 6 (Six) Hours As Needed for Severe Pain ., Disp: 12 tablet, Rfl: 0  •  ipratropium-albuterol (DUO-NEB) 0.5-2.5 mg/3 ml nebulizer, Take 3 mL by nebulization Every 4 (Four) Hours As Needed for Shortness of Air. (Patient taking differently: Take 3 mL by nebulization As Needed.), Disp: 360 mL, Rfl: 0  •  metoprolol succinate XL (TOPROL-XL) 50 MG 24 hr tablet, Take 50 mg by mouth Daily., Disp: , Rfl:   •  O2 (OXYGEN), Inhale 4 L/min Daily As Needed., Disp: , Rfl:   •  ondansetron ODT (ZOFRAN-ODT) 4 MG disintegrating tablet, Place 1 tablet on the tongue Every 8 (Eight) Hours As Needed for Nausea or Vomiting., Disp: 12 tablet, Rfl: 0  •  pantoprazole (PROTONIX) 40 MG EC tablet, Take 1 tablet by mouth Daily., Disp: 30 tablet, Rfl: 0  •  potassium chloride (K-DUR,KLOR-CON) 10 MEQ CR tablet, 10 mEq Daily., Disp: , Rfl:   •  pramipexole (MIRAPEX) 0.25 MG tablet, 0.25 mg Daily., Disp: , Rfl:   •  sacubitril-valsartan (ENTRESTO) 24-26 MG tablet, Take 1 tablet by mouth Every 12 (Twelve) Hours., Disp: 60 tablet, Rfl: 0  •  spironolactone (ALDACTONE) 25 MG tablet, Take 0.5 tablets by mouth Daily., Disp: 15 tablet, Rfl: 0  •  Xarelto 20 MG tablet, Take 1 tablet by mouth Daily., Disp: 90 tablet, Rfl: 3    Allergies:   No Known Allergies    Review of Systems:   Review of Systems   Constitutional: Negative for appetite change, fatigue,  "fever and unexpected weight loss.   HENT: Negative for trouble swallowing.    Gastrointestinal: Positive for abdominal pain and blood in stool. Negative for abdominal distention, anal bleeding, constipation, diarrhea, nausea, rectal pain, vomiting, GERD and indigestion.       The following portions of the patient's history were reviewed and updated as appropriate: allergies, current medications, past family history, past medical history, past social history, past surgical history and problem list.    Objective     Physical Exam:  Vital Signs:   Vitals:    06/23/22 1606   BP: 142/84   Weight: 75.3 kg (166 lb)   Height: 160 cm (63\")       Physical Exam  Constitutional:       Appearance: Normal appearance.   HENT:      Head: Normocephalic and atraumatic.   Eyes:      Conjunctiva/sclera: Conjunctivae normal.   Abdominal:      General: Abdomen is flat. There is no distension.      Palpations: There is no mass.      Tenderness: There is no abdominal tenderness (Minimal tenderness in the supraumbilical region.). There is no guarding or rebound.      Hernia: No hernia is present.   Musculoskeletal:      Cervical back: Normal range of motion and neck supple.   Neurological:      Mental Status: She is alert.         Results Review:   I reviewed the patient's new clinical results.    Admission on 06/20/2022, Discharged on 06/20/2022   Component Date Value Ref Range Status   • Glucose 06/20/2022 149 (A) 65 - 99 mg/dL Final   • BUN 06/20/2022 22  8 - 23 mg/dL Final   • Creatinine 06/20/2022 0.77  0.57 - 1.00 mg/dL Final   • Sodium 06/20/2022 143  136 - 145 mmol/L Final   • Potassium 06/20/2022 4.1  3.5 - 5.2 mmol/L Final   • Chloride 06/20/2022 102  98 - 107 mmol/L Final   • CO2 06/20/2022 26.5  22.0 - 29.0 mmol/L Final   • Calcium 06/20/2022 8.9  8.6 - 10.5 mg/dL Final   • Total Protein 06/20/2022 6.6  6.0 - 8.5 g/dL Final   • Albumin 06/20/2022 3.80  3.50 - 5.20 g/dL Final   • ALT (SGPT) 06/20/2022 13  1 - 33 U/L Final   • AST " (SGOT) 06/20/2022 20  1 - 32 U/L Final   • Alkaline Phosphatase 06/20/2022 130 (A) 39 - 117 U/L Final   • Total Bilirubin 06/20/2022 0.3  0.0 - 1.2 mg/dL Final   • Globulin 06/20/2022 2.8  gm/dL Final   • A/G Ratio 06/20/2022 1.4  g/dL Final   • BUN/Creatinine Ratio 06/20/2022 28.6 (A) 7.0 - 25.0 Final   • Anion Gap 06/20/2022 14.5  5.0 - 15.0 mmol/L Final   • eGFR 06/20/2022 77.6  >60.0 mL/min/1.73 Final    National Kidney Foundation and American Society of Nephrology (ASN) Task Force recommended calculation based on the Chronic Kidney Disease Epidemiology Collaboration (CKD-EPI) equation refit without adjustment for race.   • Lipase 06/20/2022 22  13 - 60 U/L Final   • Extra Tube 06/20/2022 Hold for add-ons.   Final    Auto resulted.   • Extra Tube 06/20/2022 hold for add-on   Final    Auto resulted   • Extra Tube 06/20/2022 Hold for add-ons.   Final    Auto resulted.   • Extra Tube 06/20/2022 Hold for add-ons.   Final    Auto resulted   • WBC 06/20/2022 9.47  3.40 - 10.80 10*3/mm3 Final   • RBC 06/20/2022 4.52  3.77 - 5.28 10*6/mm3 Final   • Hemoglobin 06/20/2022 12.4  12.0 - 15.9 g/dL Final   • Hematocrit 06/20/2022 39.3  34.0 - 46.6 % Final   • MCV 06/20/2022 86.9  79.0 - 97.0 fL Final   • MCH 06/20/2022 27.4  26.6 - 33.0 pg Final   • MCHC 06/20/2022 31.6  31.5 - 35.7 g/dL Final   • RDW 06/20/2022 14.5  12.3 - 15.4 % Final   • RDW-SD 06/20/2022 46.2  37.0 - 54.0 fl Final   • MPV 06/20/2022 9.6  6.0 - 12.0 fL Final   • Platelets 06/20/2022 323  140 - 450 10*3/mm3 Final   • Neutrophil % 06/20/2022 68.9  42.7 - 76.0 % Final   • Lymphocyte % 06/20/2022 13.3 (A) 19.6 - 45.3 % Final   • Monocyte % 06/20/2022 13.4 (A) 5.0 - 12.0 % Final   • Eosinophil % 06/20/2022 3.4  0.3 - 6.2 % Final   • Basophil % 06/20/2022 0.6  0.0 - 1.5 % Final   • Immature Grans % 06/20/2022 0.4  0.0 - 0.5 % Final   • Neutrophils, Absolute 06/20/2022 6.52  1.70 - 7.00 10*3/mm3 Final   • Lymphocytes, Absolute 06/20/2022 1.26  0.70 - 3.10  10*3/mm3 Final   • Monocytes, Absolute 06/20/2022 1.27 (A) 0.10 - 0.90 10*3/mm3 Final   • Eosinophils, Absolute 06/20/2022 0.32  0.00 - 0.40 10*3/mm3 Final   • Basophils, Absolute 06/20/2022 0.06  0.00 - 0.20 10*3/mm3 Final   • Immature Grans, Absolute 06/20/2022 0.04  0.00 - 0.05 10*3/mm3 Final   • nRBC 06/20/2022 0.0  0.0 - 0.2 /100 WBC Final   • Lactate 06/20/2022 1.6  0.5 - 2.0 mmol/L Final   • Troponin T 06/20/2022 <0.010  0.000 - 0.030 ng/mL Final      XR Humerus Left    Result Date: 4/17/2022  Fracture as above.     This report was signed and finalized on 4/17/2022 9:53 AM by Billy Stahl DO.    CT Abdomen Pelvis With Contrast    Result Date: 6/20/2022  No acute findings   This study was performed with techniques to keep radiation doses as low as reasonably achievable (ALARA). Individualized dose reduction techniques using automated exposure control or adjustment of vA and/or kV according to the patient size were employed.  This report was signed and finalized on 6/20/2022 2:39 PM by Schuyler Thomas MD.      Assessment / Plan      1.  History of ileocolonic anastomotic ulcer with GI bleed; suspected ischemic versus NSAID induced  2.  History  Blood loss anemia  3.  History of for long-term anticoagulation  4.  Left upper quadrant abdominal pain  5.  Remote history of colon cancer status post right hemicolectomy and chemotherapy 1998  6.  Epigastric pain  7.  Melena  8.  Change in bowel habit  6/23/2022  Patient does have a underlying irritable bowel syndrome symptoms.  However her current history is suspicious for a upper GI bleed versus bleeding from the ileocolonic anastomotic ulcer in the setting of anticoagulation use.  Her stool normalized this last 2 days.  Her hemoglobin done on 6/20/2022 was normal 12 g/dL.  CT abdomen pelvis with contrast done on 6/20/2022 was normal.  Patient continues to have intermittent left upper quadrant and right lower quadrant abdominal pain, bloating which appears to be  functional and could also be secondary to severe diverticular disease.  No clinical signs of acute diverticulitis.    We will continue PPI, tonics 20 mg daily  Started on low-dose dicyclomine 10 mg p.o. twice daily as needed  We will schedule her for an EGD for further evaluation.  Advised to hold Xarelto if there is any further black stools.  Metamucil 1 scoop p.o. daily    No history suggestive of any further GI bleed.  Recent blood work done on 1/5/2022 reveals normal hemoglobin of 14 g/dL patient is currently taking Xarelto.  We will reduce her Protonix dose from 40 mg p.o. daily to 20 mg p.o. daily.    Follow-up with a CBC in 6 months time.  Avoid NSAIDs.  Given her prior history of colon cancer repeat colonoscopy can be considered in 5 years depending on her health status.     7/6/2021  She was admitted on 6/1/2021 with a GI bleed.  She had a colonoscopy done on 6/2/2021 which revealed diverticulosis involving the sigmoid colon and descending colon transverse colon ascending colon however there was no signs of any diverticular bleed.  Patent end-to-side ileocolonic anastomosis was noted with healing ulceration in the anastomotic site.  This was more in favor of NSAID induced versus ischemic ulcerations.  Patient likely bled from the ulcerations.  Biopsies from the ulcer revealed reactive glandular mucosa with mild activity.  No dysplasia or metaplasia or neoplastic changes. EGD done in 2019 revealed reactive gastropathy. Given her prior history of GI bleed and continued use of anticoagulation we will keep her on a low-dose PPI.     6.  Chronic atrial fibrillation/ Chronic hypoxic respiratory failure on home oxygen/ CHF  Currently on Xarelto        Follow Up:   No follow-ups on file.    Iona Sanders MD  [facility] [place]  6/23/2022  16:09 EDT     Please note that portions of this note may have been completed with a voice recognition program.

## 2022-06-24 PROBLEM — K92.2 GASTROINTESTINAL HEMORRHAGE: Status: ACTIVE | Noted: 2022-06-24

## 2022-08-01 ENCOUNTER — TRANSCRIBE ORDERS (OUTPATIENT)
Dept: ADMINISTRATIVE | Facility: HOSPITAL | Age: 82
End: 2022-08-01

## 2022-08-01 DIAGNOSIS — Z78.0 ASYMPTOMATIC POSTMENOPAUSAL STATUS: Primary | ICD-10-CM

## 2022-08-08 ENCOUNTER — APPOINTMENT (OUTPATIENT)
Dept: BONE DENSITY | Facility: HOSPITAL | Age: 82
End: 2022-08-08

## 2022-08-08 DIAGNOSIS — Z78.0 ASYMPTOMATIC POSTMENOPAUSAL STATUS: ICD-10-CM

## 2022-08-08 PROCEDURE — 77080 DXA BONE DENSITY AXIAL: CPT

## 2022-08-22 ENCOUNTER — OFFICE VISIT (OUTPATIENT)
Dept: PULMONOLOGY | Facility: CLINIC | Age: 82
End: 2022-08-22

## 2022-08-22 VITALS
BODY MASS INDEX: 30.48 KG/M2 | HEART RATE: 81 BPM | DIASTOLIC BLOOD PRESSURE: 87 MMHG | RESPIRATION RATE: 16 BRPM | WEIGHT: 172 LBS | SYSTOLIC BLOOD PRESSURE: 126 MMHG | OXYGEN SATURATION: 96 % | HEIGHT: 63 IN

## 2022-08-22 DIAGNOSIS — I50.22 CHRONIC SYSTOLIC CHF (CONGESTIVE HEART FAILURE): ICD-10-CM

## 2022-08-22 DIAGNOSIS — J45.30 MILD PERSISTENT ASTHMA WITHOUT COMPLICATION: Primary | ICD-10-CM

## 2022-08-22 DIAGNOSIS — R06.02 SOB (SHORTNESS OF BREATH): ICD-10-CM

## 2022-08-22 DIAGNOSIS — R06.02 SHORTNESS OF BREATH: Primary | ICD-10-CM

## 2022-08-22 DIAGNOSIS — G47.33 OBSTRUCTIVE SLEEP APNEA: ICD-10-CM

## 2022-08-22 DIAGNOSIS — I48.20 CHRONIC ATRIAL FIBRILLATION: ICD-10-CM

## 2022-08-22 PROCEDURE — 94060 EVALUATION OF WHEEZING: CPT | Performed by: INTERNAL MEDICINE

## 2022-08-22 PROCEDURE — 94726 PLETHYSMOGRAPHY LUNG VOLUMES: CPT | Performed by: INTERNAL MEDICINE

## 2022-08-22 PROCEDURE — 94729 DIFFUSING CAPACITY: CPT | Performed by: INTERNAL MEDICINE

## 2022-08-22 PROCEDURE — 99214 OFFICE O/P EST MOD 30 MIN: CPT | Performed by: NURSE PRACTITIONER

## 2022-08-22 RX ORDER — PANTOPRAZOLE SODIUM 20 MG/1
20 TABLET, DELAYED RELEASE ORAL DAILY
COMMUNITY
Start: 2022-07-21 | End: 2022-09-13 | Stop reason: HOSPADM

## 2022-08-22 NOTE — PROGRESS NOTES
"Chief Complaint   Patient presents with   • Shortness of Breath   • Follow-up         Subjective   Elsa Alcaraz is a 82 y.o. female.     History of Present Illness   The patient is here today for f/u of SOB and DAVID.     She states she cannot tolerate the CPAP machine and she does not want to use the machine. She states she cannot sleep with the machine. She cannot pinpoint why she is unable to use the machine.     She is c/o weight gain. She feels she is retaining some fluid. She had to reschedule EGD. She complains of stomach issues with eating and feeling SOB after eating.    She uses albuterol 1-2 times a day. She does not use the nebulizer.     She uses 4 LPM oxygen at night. She uses oxygen some with activity.     She seems to be very independent at home.  She does all of her own ADLs but has recently hired someone to come occasionally and help her with housework.    She has never smoked. However, she has been exposed to second hand smoke.    The following portions of the patient's history were reviewed and updated as appropriate: allergies, current medications, past family history, past medical history, past social history and past surgical history.    Review of Systems   HENT: Positive for sinus pressure.    Respiratory: Positive for shortness of breath.    Cardiovascular: Positive for palpitations.   Psychiatric/Behavioral: Negative for sleep disturbance.       Objective   Visit Vitals  /87   Pulse 81   Resp 16   Ht 160 cm (63\")   Wt 78 kg (172 lb)   SpO2 96% Comment: RA   BMI 30.47 kg/m²   ============================  ============================    6 MINUTE WALK TEST    Elsa Alcaraz   1940             BASELINE   SpO2%: 96 % RA    Heart Rate 81   Blood Pressure 126/87     EXERCISE SpO2% HEART RATE RA or O2 @ LPM   1 MINUTE 96 106 RA   2 MINUTES 94 122 RA   3 MINUTES 93 143 RA   4 MINUTES N/A N/A N/A   5 MINUTES N/A N/A N/A   6 MINUTES N/A N/A N/A   (Number of laps: 4 X 36 meters + Final partial " lap:meters = 144 meters)            Distance Walked:  144 Meters   SpO2% Post Exercise:  93 %   HR Post Exercise:  143     Reason to stop (if applicable):   ____ Chest Pain   __X__ Light Headedness   ____ Dyspnea Unrelieved by Rest   ____ Abnormal Gait Pattern   ____ Severe Fatigue   ____ Other (Specify: __________________________)    Tech Comments (if any): Pt couldn't continue walk due to feeling dizzy and light headed      Test performed by: JW    ============================  ============================        Physical Exam  Vitals reviewed.   HENT:      Head: Atraumatic.      Mouth/Throat:      Mouth: Mucous membranes are moist.      Comments: Crowded oropharynx.   Eyes:      Extraocular Movements: Extraocular movements intact.   Cardiovascular:      Rate and Rhythm: Normal rate. Rhythm irregular.   Pulmonary:      Effort: Pulmonary effort is normal. No respiratory distress.      Comments: Somewhat decreased A/E without wheezing.  Abdominal:      Comments: Obese abdomen.   Musculoskeletal:      Cervical back: Neck supple.      Comments: Gait slow.   Skin:     General: Skin is warm.   Neurological:      Mental Status: She is alert and oriented to person, place, and time.           Assessment & Plan   Diagnoses and all orders for this visit:    1. Mild persistent asthma without complication (Primary)  -     Overnight Sleep Oximetry Study; Future    2. Chronic systolic CHF (congestive heart failure) ([facility])    3. Obstructive sleep apnea    4. Chronic atrial fibrillation ([facility])  Comments:  On Xarelto    5. SOB (shortness of breath)  -     Cancel: Pulmonary Function Test  -     Converted Six Minute Walk  -     Pulmonary Function Test    Other orders  -     Fluticasone Furoate-Vilanterol (Breo Ellipta) 200-25 MCG/INH inhaler; Inhale 1 puff Daily. Rinse mouth with water after use.  Dispense: 1 each; Refill: 5           Return in about 4 months (around 12/22/2022) for Recheck, For Dr. Bautista.    DISCUSSION (if any):  I  reviewed her sleep study results from 2020 which was consistent with severe obstructive sleep apnea with an apnea-hypopnea index of 65.2/h.    She does NOT want to try CPAP again at this time. I have asked her to d/w her Cardiologist and she states she will. She has appt with Cardiology in October. F/w Dr. Germain.     She has an echocardiogram ordered for October as well.     I have discussed how untreated DAVID can affect the cardiac system especially since she has Atrial fibrillation and CHF. She verbalizes understanding.     On 6-minute walk, she did not display exercise hypoxemia.  Total walk distance 144 m.  She complained of feeling dizzy and stopped 3 minutes into the walk.    I reviewed her PFT results and discussed the results with her today. The PFT is consistent with severe obstruction. Restriction noted without air trapping suggested.     I have prescribed Breo and explained the use of the device. She should use NATHAN as needed.     Will see echo results to see if restriction needs to be investigated further. The untreated DAVID will attribute to restriction as well.          Dictated utilizing Dragon dictation.    This document was electronically signed by BALDOMERO Pickering August 22, 2022  10:33 EDT

## 2022-09-07 RX ORDER — RIVAROXABAN 20 MG/1
TABLET, FILM COATED ORAL
Qty: 90 TABLET | Refills: 1 | Status: SHIPPED | OUTPATIENT
Start: 2022-09-07

## 2022-09-13 ENCOUNTER — HOSPITAL ENCOUNTER (OUTPATIENT)
Facility: HOSPITAL | Age: 82
Setting detail: HOSPITAL OUTPATIENT SURGERY
Discharge: HOME | End: 2022-09-13
Attending: INTERNAL MEDICINE | Admitting: INTERNAL MEDICINE

## 2022-09-13 ENCOUNTER — ANESTHESIA EVENT (OUTPATIENT)
Dept: GASTROENTEROLOGY | Facility: HOSPITAL | Age: 82
End: 2022-09-13

## 2022-09-13 ENCOUNTER — ANESTHESIA (OUTPATIENT)
Dept: GASTROENTEROLOGY | Facility: HOSPITAL | Age: 82
End: 2022-09-13

## 2022-09-13 VITALS
DIASTOLIC BLOOD PRESSURE: 66 MMHG | TEMPERATURE: 98.2 F | SYSTOLIC BLOOD PRESSURE: 104 MMHG | OXYGEN SATURATION: 94 % | HEART RATE: 87 BPM | RESPIRATION RATE: 16 BRPM

## 2022-09-13 DIAGNOSIS — R10.10 UPPER ABDOMINAL PAIN: ICD-10-CM

## 2022-09-13 DIAGNOSIS — K92.2 GASTROINTESTINAL HEMORRHAGE, UNSPECIFIED GASTROINTESTINAL HEMORRHAGE TYPE: ICD-10-CM

## 2022-09-13 PROCEDURE — 25010000002 PROPOFOL 1000 MG/100ML EMULSION: Performed by: NURSE ANESTHETIST, CERTIFIED REGISTERED

## 2022-09-13 PROCEDURE — 43239 EGD BIOPSY SINGLE/MULTIPLE: CPT | Performed by: INTERNAL MEDICINE

## 2022-09-13 PROCEDURE — 88305 TISSUE EXAM BY PATHOLOGIST: CPT

## 2022-09-13 RX ORDER — PROPOFOL 10 MG/ML
INJECTION, EMULSION INTRAVENOUS AS NEEDED
Status: DISCONTINUED | OUTPATIENT
Start: 2022-09-13 | End: 2022-09-13 | Stop reason: SURG

## 2022-09-13 RX ORDER — SODIUM CHLORIDE 9 MG/ML
70 INJECTION, SOLUTION INTRAVENOUS CONTINUOUS PRN
Status: DISCONTINUED | OUTPATIENT
Start: 2022-09-13 | End: 2022-09-13 | Stop reason: HOSPADM

## 2022-09-13 RX ORDER — LIDOCAINE HYDROCHLORIDE 20 MG/ML
INJECTION, SOLUTION INTRAVENOUS AS NEEDED
Status: DISCONTINUED | OUTPATIENT
Start: 2022-09-13 | End: 2022-09-13 | Stop reason: SURG

## 2022-09-13 RX ADMIN — PROPOFOL 50 MG: 10 INJECTION, EMULSION INTRAVENOUS at 14:55

## 2022-09-13 RX ADMIN — PROPOFOL 50 MG: 10 INJECTION, EMULSION INTRAVENOUS at 14:47

## 2022-09-13 RX ADMIN — LIDOCAINE HYDROCHLORIDE 60 MG: 20 INJECTION, SOLUTION INTRAVENOUS at 14:47

## 2022-09-13 RX ADMIN — SODIUM CHLORIDE 70 ML/HR: 9 INJECTION, SOLUTION INTRAVENOUS at 13:12

## 2022-09-13 NOTE — DISCHARGE INSTRUCTIONS
- Discharge patient to home (ambulatory).   - Resume previous diet.   - PPI daily  - OK to resume Xeralto after 48 hours  - High risk for GI bleeding  - Avoid NSAIDs  - Continue present medications.   - PPI daily  - Await pathology results.   - Return to my office in 8 weeks.   To assist you in voiding:  Drink plenty of fluids  Listen to running water while attempting to void.    If you are unable to urinate and you have an uncomfortable urge to void or it has been   6 hours since you were discharged, return to the Emergency Room Please follow all post op instructions and follow up appointment time from your physician's office included in your discharge packet.  .  Rest today  No pushing,pulling,tugging,heavy lifting, or strenuous activity   No major decision making,driving,or drinking alcoholic beverages for 24 hours due to the sedation you received  Always use good hand hygiene/washing technique  No driving on pain medication.

## 2022-09-13 NOTE — H&P
"    [facility]  HISTORY AND PHYSICAL    Patient Name: Elsa Alcaraz  : 1940  MRN: [id number]    Chief Complaint:   For EGD    History Of Presenting Illness:    Iron deficiency anemia  Melena  Upper abdominal pain / Lower abdominal pain      Past Medical History:   Diagnosis Date   • Anxiety disorder due to general medical condition 2017   • Arthritis    • Atrial fibrillation (HCC) 2017   • Body piercing     BOTH EARS   • Borderline diabetes    • Breast cancer (HCC)     right-radiation and a lumpectomy   • Cataract 2017    Left eye surgery    • Chronic bronchitis (HCC) 2017   • Colon cancer (HCC) 1998    had surgery and chemo   • COPD (chronic obstructive pulmonary disease) (HCC)    • Cyanocobalamine deficiency (non anemic)    • Depression 2017   • Diverticulosis    • Esophageal reflux 2017   • Hiatal hernia 2017   • History of bladder infections    • History of echocardiogram 2021   • Hx of exercise stress test     \"several years ago by Dr. Mercado\".     • Hx of radiation therapy    • Hyperlipidemia    • Hypertension 2017   • Impaired functional mobility and activity tolerance    • Impaired functional mobility, balance, gait, and endurance    • Osteoporosis    • Palpitations 2017   • Prediabetes    • Problems with swallowing     meat at times per pt report   • Shortness of breath     WITH EXERTION    • Sleep apnea 2017    NO CPAP   • Tricuspid valve disorder 2017    Patient doesn't know anything about this   • Vitamin D deficiency    • Wears glasses        Past Surgical History:   Procedure Laterality Date   • ANAL FISTULOTOMY     • APPENDECTOMY         • BREAST BIOPSY     • BREAST LUMPECTOMY Right 2006   • CATARACT EXTRACTION  2019    both eyes    • CATARACT EXTRACTION W/ INTRAOCULAR LENS IMPLANT Left 2019    Procedure: CATARACT PHACO EXTRACTION WITH INTRAOCULAR LENS IMPLANT LEFT;  Surgeon: Frankie" Masoud Mojica MD;  Location: [facility] OR;  Service: Ophthalmology   • CATARACT EXTRACTION W/ INTRAOCULAR LENS IMPLANT Right 2019    Procedure: CATARACT PHACO EXTRACTION WITH INTRAOCULAR LENS IMPLANT RIGHT;  Surgeon: Masoud David MD;  Location: [facility] OR;  Service: Ophthalmology   •  SECTION  1981   • CHOLECYSTECTOMY  2009   • COLECTOMY PARTIAL / TOTAL  1998    COLON CANCER   • COLONOSCOPY     • COLONOSCOPY N/A 2021    Procedure: COLONOSCOPY WITH BIOPSY;  Surgeon: Iona Sanders MD;  Location: [facility] ENDOSCOPY;  Service: Gastroenterology;  Laterality: N/A;   • ENDOSCOPY     • ENDOSCOPY N/A 2018    Procedure: ESOPHAGOGASTRODUODENOSCOPY WITH COLD FORCEP BIOPSY;  Surgeon: Vasquez Fagan MD;  Location: [facility] ENDOSCOPY;  Service: Gastroenterology   • ENDOSCOPY N/A 2019    Procedure: ESOPHAGOGASTRODUODENOSCOPY WITH BIOPSY;  Surgeon: Vasquez Fagan MD;  Location: [facility] ENDOSCOPY;  Service: Gastroenterology   • HYSTERECTOMY         • VENTRAL HERNIA REPAIR  10/28/2012       Social History     Socioeconomic History   • Marital status:    Tobacco Use   • Smoking status: Never Smoker   • Smokeless tobacco: Never Used   Vaping Use   • Vaping Use: Never used   Substance and Sexual Activity   • Alcohol use: Yes     Alcohol/week: 1.0 - 2.0 standard drink     Types: 1 - 2 Glasses of wine per week     Comment: rarely   • Drug use: No   • Sexual activity: Defer       Family History   Problem Relation Age of Onset   • Hypertension Mother    • Asthma Mother    • COPD Mother    • Osteoarthritis Mother    • Cancer Father    • Cancer Sister    • Diabetes Maternal Grandmother        Prior to Admission Medications:  Medications Prior to Admission   Medication Sig Dispense Refill Last Dose   • albuterol sulfate  (90 Base) MCG/ACT inhaler Inhale 2 puffs 4 (Four) Times a Day. 18 g 0 Past Week at Unknown time   • cholecalciferol (VITAMIN D3) 25 MCG (1000 UT)  tablet Take 1,000 Units by mouth Daily.   9/12/2022 at Unknown time   • citalopram (CeleXA) 20 MG tablet Take 20 mg by mouth Daily.   9/12/2022 at Unknown time   • Fluticasone Furoate-Vilanterol (Breo Ellipta) 200-25 MCG/INH inhaler Inhale 1 puff Daily. Rinse mouth with water after use. 1 each 5 Past Week at Unknown time   • furosemide (Lasix) 20 MG tablet Take 1 tablet by mouth Daily. 30 tablet 0 9/12/2022 at Unknown time   • ipratropium-albuterol (DUO-NEB) 0.5-2.5 mg/3 ml nebulizer Take 3 mL by nebulization Every 4 (Four) Hours As Needed for Shortness of Air. (Patient taking differently: Take 3 mL by nebulization As Needed.) 360 mL 0 Past Week at Unknown time   • metoprolol succinate XL (TOPROL-XL) 50 MG 24 hr tablet Take 50 mg by mouth Daily.   9/12/2022 at Unknown time   • O2 (OXYGEN) Inhale 4 L/min Daily As Needed.   9/12/2022 at Unknown time   • pantoprazole (PROTONIX) 40 MG EC tablet Take 1 tablet by mouth Daily. 30 tablet 0 9/12/2022 at Unknown time   • potassium chloride (K-DUR,KLOR-CON) 10 MEQ CR tablet 10 mEq Daily.   9/12/2022 at Unknown time   • sacubitril-valsartan (ENTRESTO) 24-26 MG tablet Take 1 tablet by mouth Every 12 (Twelve) Hours. 60 tablet 0 9/12/2022 at Unknown time   • spironolactone (ALDACTONE) 25 MG tablet Take 0.5 tablets by mouth Daily. 15 tablet 0 9/12/2022 at Unknown time   • Xarelto 20 MG tablet Take 1 tablet by mouth once daily 90 tablet 1 Past Week at 2100   • dicyclomine (BENTYL) 10 MG/5ML syrup Take 5 mL by mouth 2 (Two) Times a Day As Needed (abdominal pain). 473 mL 1 Unknown at Unknown time   • HYDROcodone-acetaminophen (NORCO) 5-325 MG per tablet Take 1 tablet by mouth Every 6 (Six) Hours As Needed for Severe Pain . 12 tablet 0 Unknown at Unknown time   • ondansetron ODT (ZOFRAN-ODT) 4 MG disintegrating tablet Place 1 tablet on the tongue Every 8 (Eight) Hours As Needed for Nausea or Vomiting. 12 tablet 0 Unknown at Unknown time   • pantoprazole (PROTONIX) 20 MG EC tablet Take  20 mg by mouth Daily. FOR 30 DAYS   Unknown at Unknown time   • pramipexole (MIRAPEX) 0.25 MG tablet 0.25 mg Daily.   Unknown at Unknown time       Allergies:  No Known Allergies     Vitals: Temp:  [98 °F (36.7 °C)] 98 °F (36.7 °C)  Heart Rate:  [81] 81  Resp:  [18] 18  BP: (125)/(86) 125/86    Review Of Systems:  Constitutional:  Negative for chills, fever, and unexpected weight change.  Respiratory:  Negative for cough, chest tightness, shortness of breath, and wheezing.  Cardiovascular:  Negative for chest pain, palpitations, and leg swelling.  Gastrointestinal:  Negative for abdominal distention, abdominal pain, Nausea, vomiting.  Neurological:  Negative for Weakness, numbness, and headaches.     Physical Exam:    General Appearance:  Alert, cooperative, in no acute distress.   Lungs:   Clear to auscultation, respirations regular, even and                 unlabored.   Heart:  Regular rhythm and normal rate.   Abdomen:   Normal bowel sounds, no masses, no organomegaly. Soft, non-tender, non-distended   Neurologic: Alert and oriented x 3. Moves all four limbs equally       Plan: ESOPHAGOGASTRODUODENOSCOPY (N/A)     Iona Sanders MD  9/13/2022

## 2022-09-13 NOTE — ANESTHESIA POSTPROCEDURE EVALUATION
Patient: Elsa Alcaraz    Procedure Summary     Date: 09/13/22 Room / Location: [facility] ENDOSCOPY 2 / [facility] ENDOSCOPY    Anesthesia Start: 1446 Anesthesia Stop: 1502    Procedure: ESOPHAGOGASTRODUODENOSCOPY with biopsy and polypectomy   (N/A Esophagus) Diagnosis:       Gastrointestinal hemorrhage, unspecified gastrointestinal hemorrhage type      Upper abdominal pain      (Gastrointestinal hemorrhage, unspecified gastrointestinal hemorrhage type [K92.2])      (Upper abdominal pain [R10.10])    Surgeons: Iona Sanders MD Provider: Orestes Lloyd CRNA    Anesthesia Type: MAC ASA Status: 3          Anesthesia Type: MAC    Vitals  No vitals data found for the desired time range.          Post Anesthesia Care and Evaluation    Patient location during evaluation: bedside  Patient participation: complete - patient participated  Level of consciousness: awake  Pain score: 0  Pain management: adequate    Airway patency: patent  Anesthetic complications: No anesthetic complications  PONV Status: controlled  Cardiovascular status: acceptable and stable  Respiratory status: acceptable and room air  Hydration status: acceptable    Comments: Vital signs as noted in nursing documentation as per protocol

## 2022-09-15 LAB — REF LAB TEST METHOD: NORMAL

## 2022-10-10 ENCOUNTER — HOSPITAL ENCOUNTER (OUTPATIENT)
Dept: CARDIOLOGY | Facility: HOSPITAL | Age: 82
Discharge: HOME | End: 2022-10-10
Admitting: INTERNAL MEDICINE

## 2022-10-10 ENCOUNTER — OFFICE VISIT (OUTPATIENT)
Dept: CARDIOLOGY | Facility: CLINIC | Age: 82
End: 2022-10-10

## 2022-10-10 VITALS
OXYGEN SATURATION: 97 % | DIASTOLIC BLOOD PRESSURE: 82 MMHG | HEART RATE: 79 BPM | SYSTOLIC BLOOD PRESSURE: 120 MMHG | WEIGHT: 172 LBS | BODY MASS INDEX: 30.48 KG/M2 | HEIGHT: 63 IN

## 2022-10-10 VITALS — BODY MASS INDEX: 30.48 KG/M2 | HEIGHT: 63 IN | WEIGHT: 172 LBS

## 2022-10-10 DIAGNOSIS — I07.9 TRICUSPID VALVE DISORDER: ICD-10-CM

## 2022-10-10 DIAGNOSIS — I10 PRIMARY HYPERTENSION: ICD-10-CM

## 2022-10-10 DIAGNOSIS — I48.0 PAROXYSMAL ATRIAL FIBRILLATION: ICD-10-CM

## 2022-10-10 DIAGNOSIS — I34.0 NONRHEUMATIC MITRAL VALVE REGURGITATION: Primary | ICD-10-CM

## 2022-10-10 DIAGNOSIS — I50.22 CHRONIC SYSTOLIC CONGESTIVE HEART FAILURE: ICD-10-CM

## 2022-10-10 DIAGNOSIS — I34.0 MODERATE MITRAL REGURGITATION: ICD-10-CM

## 2022-10-10 DIAGNOSIS — I10 ESSENTIAL HYPERTENSION: ICD-10-CM

## 2022-10-10 LAB
ASCENDING AORTA: 3.4 CM
BH CV ECHO MEAS - AO MAX PG: 10.6 MMHG
BH CV ECHO MEAS - AO MEAN PG: 6 MMHG
BH CV ECHO MEAS - AO ROOT DIAM: 3.3 CM
BH CV ECHO MEAS - AO V2 MAX: 163 CM/SEC
BH CV ECHO MEAS - AO V2 VTI: 29.5 CM
BH CV ECHO MEAS - AVA(I,D): 1.61 CM2
BH CV ECHO MEAS - EDV(CUBED): 84 ML
BH CV ECHO MEAS - EDV(MOD-SP2): 94 ML
BH CV ECHO MEAS - EDV(MOD-SP4): 123 ML
BH CV ECHO MEAS - EF(MOD-BP): 37 %
BH CV ECHO MEAS - EF(MOD-SP2): 37.2 %
BH CV ECHO MEAS - EF(MOD-SP4): 36.6 %
BH CV ECHO MEAS - ESV(CUBED): 43.6 ML
BH CV ECHO MEAS - ESV(MOD-SP2): 59 ML
BH CV ECHO MEAS - ESV(MOD-SP4): 78 ML
BH CV ECHO MEAS - FS: 19.6 %
BH CV ECHO MEAS - IVS/LVPW: 0.99 CM
BH CV ECHO MEAS - IVSD: 1.28 CM
BH CV ECHO MEAS - LA DIMENSION: 6.5 CM
BH CV ECHO MEAS - LAT PEAK E' VEL: 11.2 CM/SEC
BH CV ECHO MEAS - LV DIASTOLIC VOL/BSA (35-75): 67.8 CM2
BH CV ECHO MEAS - LV MASS(C)D: 210 GRAMS
BH CV ECHO MEAS - LV MAX PG: 2.07 MMHG
BH CV ECHO MEAS - LV MEAN PG: 1 MMHG
BH CV ECHO MEAS - LV SYSTOLIC VOL/BSA (12-30): 43 CM2
BH CV ECHO MEAS - LV V1 MAX: 71.9 CM/SEC
BH CV ECHO MEAS - LV V1 VTI: 13.7 CM
BH CV ECHO MEAS - LVIDD: 4.4 CM
BH CV ECHO MEAS - LVIDS: 3.5 CM
BH CV ECHO MEAS - LVOT AREA: 3.5 CM2
BH CV ECHO MEAS - LVOT DIAM: 2.1 CM
BH CV ECHO MEAS - LVPWD: 1.29 CM
BH CV ECHO MEAS - MED PEAK E' VEL: 8.06 CM/SEC
BH CV ECHO MEAS - MR MAX PG: 113.2 MMHG
BH CV ECHO MEAS - MR MAX VEL: 532 CM/SEC
BH CV ECHO MEAS - MR MEAN PG: 72 MMHG
BH CV ECHO MEAS - MR MEAN VEL: 401 CM/SEC
BH CV ECHO MEAS - MR VTI: 173 CM
BH CV ECHO MEAS - MV DEC SLOPE: 380 CM/SEC2
BH CV ECHO MEAS - MV DEC TIME: 0.17 MSEC
BH CV ECHO MEAS - MV E MAX VEL: 105 CM/SEC
BH CV ECHO MEAS - MV MAX PG: 5.5 MMHG
BH CV ECHO MEAS - MV MEAN PG: 2 MMHG
BH CV ECHO MEAS - MV P1/2T: 92.5 MSEC
BH CV ECHO MEAS - MV V2 VTI: 29.1 CM
BH CV ECHO MEAS - MVA(P1/2T): 2.38 CM2
BH CV ECHO MEAS - MVA(VTI): 1.63 CM2
BH CV ECHO MEAS - PA ACC SLOPE: 452 CM/SEC2
BH CV ECHO MEAS - PA ACC TIME: 0.14 SEC
BH CV ECHO MEAS - PA PR(ACCEL): 15.5 MMHG
BH CV ECHO MEAS - PA V2 MAX: 90.1 CM/SEC
BH CV ECHO MEAS - PI END-D VEL: 119 CM/SEC
BH CV ECHO MEAS - RAP SYSTOLE: 3 MMHG
BH CV ECHO MEAS - RVSP: 23 MMHG
BH CV ECHO MEAS - SI(MOD-SP2): 19.3 ML/M2
BH CV ECHO MEAS - SI(MOD-SP4): 24.8 ML/M2
BH CV ECHO MEAS - SV(LVOT): 47.5 ML
BH CV ECHO MEAS - SV(MOD-SP2): 35 ML
BH CV ECHO MEAS - SV(MOD-SP4): 45 ML
BH CV ECHO MEAS - TAPSE (>1.6): 1.3 CM
BH CV ECHO MEAS - TR MAX PG: 19.7 MMHG
BH CV ECHO MEAS - TR MAX VEL: 222 CM/SEC
BH CV ECHO MEASUREMENTS AVERAGE E/E' RATIO: 10.9
BH CV VAS BP LEFT ARM: NORMAL MMHG
BH CV XLRA - RV BASE: 4.4 CM
BH CV XLRA - RV LENGTH: 5.8 CM
BH CV XLRA - RV MID: 2.9 CM
BH CV XLRA - TDI S': 7.82 CM/SEC
LEFT ATRIUM VOLUME INDEX: 89 ML/M2
LV EF 2D ECHO EST: 40 %
MAXIMAL PREDICTED HEART RATE: 139 BPM
STRESS TARGET HR: 118 BPM

## 2022-10-10 PROCEDURE — 93306 TTE W/DOPPLER COMPLETE: CPT | Performed by: INTERNAL MEDICINE

## 2022-10-10 PROCEDURE — 93306 TTE W/DOPPLER COMPLETE: CPT

## 2022-10-10 PROCEDURE — 99214 OFFICE O/P EST MOD 30 MIN: CPT | Performed by: INTERNAL MEDICINE

## 2022-10-10 NOTE — PROGRESS NOTES
[facility] Cardiology  Office Progress Note  Elsa CHAPARRO [place]  1940  [address] [place] [address]       Visit Date: 10/10/22    PCP: Kemi Hdz MD  [address] [place] [address]    IDENTIFICATION: A 82 y.o. female  from [place], [place]    PROBLEM LIST:   1. A. Fib- chronic   1.  echo MVP, mild TR, normal LVEF  2. 15 echo EF 55-60%, mild to moderate MR w no evidence of MVP, mild TR, RVSP 35 mmHg, biatrial enlargement  3. [id number] 4, a/c w Coumadin   4. 2020 echo: EF 45%, AV calcification, AV max PG 14 mmHg, mod MR, mild TR, RVSP 23.6 mmHg, incidental afib noted  5.  echo EF 35-40%   6.  echo EF 38% mod MR  2. Dyspnea  1.  MPS WNL  2. proBNP [id number] (<1800)  3. HTN  4. HLD  1. 9  TG 1:30 HDL 60 LDL 17  2. 3/  TG 80 HDL 49 LDL 73  5. DAVID  6. COPD/asthma  7. Iron deficiency anemia/bone marrow deficiency   1. Per Dr. Harding, Dr. Fagan  8. DAVID-CPAP non-adherent  9. GERD  10. Hiatal hernia  11. GIBleed  Lower GI ulcer  12. Depression/anxiety  13. Surgical history  1. Anal fistulotomy  2. Breast lumpectomy  3.  next line cholecystectomy  4. Partial colectomy  5. Ventral hernia repair        CC:   Chief Complaint   Patient presents with   • Paroxysmal atrial fibrillation    • Chronic systolic congestive heart failure        Allergies  No Known Allergies    Current Medications    Current Outpatient Medications:   •  albuterol sulfate  (90 Base) MCG/ACT inhaler, Inhale 2 puffs 4 (Four) Times a Day., Disp: 18 g, Rfl: 0  •  cholecalciferol (VITAMIN D3) 25 MCG (1000 UT) tablet, Take 1,000 Units by mouth Daily., Disp: , Rfl:   •  citalopram (CeleXA) 20 MG tablet, Take 20 mg by mouth Daily., Disp: , Rfl:   •  Fluticasone Furoate-Vilanterol (Breo Ellipta) 200-25 MCG/INH inhaler, Inhale 1 puff Daily. Rinse mouth with water after use., Disp: 1 each, Rfl: 5  •  furosemide (Lasix) 20 MG tablet, Take 1 tablet by  "mouth Daily., Disp: 30 tablet, Rfl: 0  •  ipratropium-albuterol (DUO-NEB) 0.5-2.5 mg/3 ml nebulizer, Take 3 mL by nebulization Every 4 (Four) Hours As Needed for Shortness of Air. (Patient taking differently: Take 3 mL by nebulization As Needed.), Disp: 360 mL, Rfl: 0  •  metoprolol succinate XL (TOPROL-XL) 50 MG 24 hr tablet, Take 50 mg by mouth Daily., Disp: , Rfl:   •  O2 (OXYGEN), Inhale 4 L/min Daily As Needed., Disp: , Rfl:   •  pantoprazole (PROTONIX) 40 MG EC tablet, Take 1 tablet by mouth Daily., Disp: 30 tablet, Rfl: 0  •  potassium chloride (K-DUR,KLOR-CON) 10 MEQ CR tablet, 10 mEq Daily., Disp: , Rfl:   •  sacubitril-valsartan (ENTRESTO) 24-26 MG tablet, Take 1 tablet by mouth Every 12 (Twelve) Hours., Disp: 60 tablet, Rfl: 0  •  spironolactone (ALDACTONE) 25 MG tablet, Take 0.5 tablets by mouth Daily., Disp: 15 tablet, Rfl: 0  •  Xarelto 20 MG tablet, Take 1 tablet by mouth once daily, Disp: 90 tablet, Rfl: 1      History of Present Illness   Elsa Alcaraz is a 82 y.o. year old female here for follow up.    Fractured left humerus since last visit but has had improvement in shortness of breath overall.      OBJECTIVE:  Vitals:    10/10/22 0951   BP: 120/82   BP Location: Left arm   Patient Position: Sitting   Pulse: 79   SpO2: 97%   Weight: 78 kg (172 lb)   Height: 160 cm (63\")     Body mass index is 30.47 kg/m².    Constitutional:       Appearance: Healthy appearance. Not in distress.   Neck:      Vascular: No JVR. JVD normal.   Pulmonary:      Effort: Pulmonary effort is normal.      Breath sounds: Normal breath sounds. No wheezing. No rhonchi. No rales.   Chest:      Chest wall: Not tender to palpatation.   Cardiovascular:      PMI at left midclavicular line. Normal rate. Irregularly irregular rhythm. Normal S1. Normal S2.      Murmurs: There is a systolic murmur.      No gallop. No click. No rub.   Pulses:     Intact distal pulses.   Edema:     Peripheral edema absent.   Abdominal:      General: Bowel " sounds are normal.      Palpations: Abdomen is soft.      Tenderness: There is no abdominal tenderness.   Musculoskeletal: Normal range of motion.         General: No tenderness. Skin:     General: Skin is warm and dry.   Neurological:      General: No focal deficit present.      Mental Status: Alert and oriented to person, place and time.         Diagnostic Data:  Procedures      ASSESSMENT:   Diagnosis Plan   1. Nonrheumatic mitral valve regurgitation  Adult Transthoracic Echo Complete W/ Cont if Necessary Per Protocol      2. Chronic systolic congestive heart failure (HCC)        3. Primary hypertension        4. Paroxysmal atrial fibrillation (HCC)            PLAN:  CHF chronic systolic with combined valvular heart issues continued medical management.  Consider SGLT2 inhibitor    Hypertension controlled Entresto spironolactone metoprolol    Mitral regurgitation moderate for follow-up echocardiogram this following visit, however patient states that she is reluctant to consider any mechanical intervention    A. fib chronic anticoagulated rate controlled        Fidencio Germain MD, FACC

## 2022-12-07 ENCOUNTER — TELEPHONE (OUTPATIENT)
Dept: CARDIOLOGY | Facility: CLINIC | Age: 82
End: 2022-12-07

## 2022-12-07 NOTE — TELEPHONE ENCOUNTER
Adenike with [facility] called to request hold parameters for pt's Xarelto prior to planned tooth extraction.     Phone - [phone], Fax - [phone]    Will review with J and send clearance to hold Xarelto 2-3 days  Per [facility] - hold 3 days. Information faxed.

## 2023-02-28 ENCOUNTER — OFFICE VISIT (OUTPATIENT)
Dept: GASTROENTEROLOGY | Facility: CLINIC | Age: 83
End: 2023-02-28
Payer: MEDICARE

## 2023-02-28 VITALS
HEART RATE: 81 BPM | SYSTOLIC BLOOD PRESSURE: 118 MMHG | HEIGHT: 63 IN | TEMPERATURE: 98.7 F | DIASTOLIC BLOOD PRESSURE: 78 MMHG | OXYGEN SATURATION: 95 % | BODY MASS INDEX: 31.82 KG/M2 | WEIGHT: 179.6 LBS

## 2023-02-28 DIAGNOSIS — K27.9 PEPTIC ULCER DISEASE: ICD-10-CM

## 2023-02-28 DIAGNOSIS — K64.9 ACUTE HEMORRHOID: ICD-10-CM

## 2023-02-28 DIAGNOSIS — D50.0 IRON DEFICIENCY ANEMIA DUE TO CHRONIC BLOOD LOSS: Primary | ICD-10-CM

## 2023-02-28 DIAGNOSIS — K59.04 CHRONIC IDIOPATHIC CONSTIPATION: ICD-10-CM

## 2023-02-28 DIAGNOSIS — R10.12 LEFT UPPER QUADRANT ABDOMINAL PAIN: ICD-10-CM

## 2023-02-28 PROCEDURE — 99214 OFFICE O/P EST MOD 30 MIN: CPT | Performed by: INTERNAL MEDICINE

## 2023-02-28 RX ORDER — METOPROLOL SUCCINATE 50 MG/1
1 TABLET, EXTENDED RELEASE ORAL DAILY
COMMUNITY
Start: 2022-12-02 | End: 2023-02-28

## 2023-02-28 RX ORDER — FUROSEMIDE 20 MG/1
1 TABLET ORAL DAILY
COMMUNITY
Start: 2023-01-09 | End: 2023-02-28

## 2023-02-28 RX ORDER — HYDROCORTISONE 25 MG/G
CREAM TOPICAL 2 TIMES DAILY
Qty: 28 G | Refills: 1 | Status: SHIPPED | OUTPATIENT
Start: 2023-02-28

## 2023-02-28 RX ORDER — CITALOPRAM 20 MG/1
1 TABLET ORAL DAILY
COMMUNITY
Start: 2022-12-14 | End: 2023-02-28

## 2023-02-28 RX ORDER — POTASSIUM CHLORIDE 750 MG/1
10 TABLET, EXTENDED RELEASE ORAL 3 TIMES DAILY
COMMUNITY
Start: 2023-02-09 | End: 2023-02-28

## 2023-02-28 RX ORDER — BUDESONIDE AND FORMOTEROL FUMARATE DIHYDRATE 160; 4.5 UG/1; UG/1
2 AEROSOL RESPIRATORY (INHALATION)
COMMUNITY
Start: 2023-02-09 | End: 2024-02-10

## 2023-02-28 RX ORDER — PANTOPRAZOLE SODIUM 40 MG/1
1 TABLET, DELAYED RELEASE ORAL DAILY
COMMUNITY
Start: 2022-12-14

## 2023-02-28 RX ORDER — AMOXICILLIN AND CLAVULANATE POTASSIUM 500; 125 MG/1; MG/1
1 TABLET, FILM COATED ORAL
COMMUNITY
Start: 2023-02-14

## 2023-02-28 RX ORDER — CEFDINIR 300 MG/1
300 CAPSULE ORAL
COMMUNITY
Start: 2023-02-13

## 2023-02-28 NOTE — PROGRESS NOTES
Follow Up Note     Date: 2023   Patient Name: Elsa Alcaraz  MRN: [id number]  : 1940     Referring Physician: Kemi Hdz MD    Chief Complaint:    Chief Complaint   Patient presents with   • Follow-up     From procedure  Stomach is swelling   Uncomfortable in esophagus when she eats and then doesn't feel good after eating.         Interval History:   2023  Elsa Alcaraz is a 82 y.o. female who is here today for follow up after recent EGD.  She states that she still feels like inadequate evacuation of the stool but will have a daily bowel movement.  She feels that she gained some weight recently.  Occasional epigastric discomfort.  No black stool no red blood in the stool      2021  This is 81-year-old female patient with a prior history of hypertension, hyperlipidemia, chronic atrial fibrillation on Eliquis, history of breast cancer status post lumpectomy and radiation therapy, history of colon cancer status post right hemicolectomy more than 20 years ago, CHF, COPD/obstructive sleep apnea on home oxygen, colonic diverticulosis, was brought to the emergency room today on 2021 with complaints of rectal bleeding for the past 2 days.     Patient states that she was doing fine until day before yesterday, then had a bowel movement with blood in the stool.  Subsequently she had multiple bowel movements at least 4 times every day.  She continued to have a red blood with clots even today for which she came to emergency room.. She had at least 3 times bowel movement with dark blood and clots.  She does have associated left-sided abdominal cramps.  Denies any nausea vomiting, no fever chills.     Patient denies any prior history of GI bleed.  She has been taking Xarelto and last dose was day before yesterday 2 days ago.  Denies any NSAID use. She states that she had an EGD done in 2019 Dr. Daniel.  Last colonoscopy was about 5 years ago and was been told normal no reports  "available.  Abdominal pain and father had some type of stomach cancer details unknown. She was evaluated emergency room and noted to have a drop in her H&H from 14 to 10 g/dL.  CT scan abdomen pelvis done which showed only colonic diverticulosis otherwise unremarkable.  GI was consulted for further evaluation and management    Subjective      Past Medical History:   Past Medical History:   Diagnosis Date   • Anxiety disorder due to general medical condition 1/11/2017   • Arthritis    • Atrial fibrillation (HCC) 1/11/2017   • Body piercing     BOTH EARS   • Borderline diabetes    • Breast cancer (HCC) 2003    right-radiation and a lumpectomy   • Cataract 01/11/2017    Left eye surgery 11/19   • Chronic bronchitis (HCC) 1/11/2017   • Colon cancer (HCC) 11/30/1998    had surgery and chemo   • COPD (chronic obstructive pulmonary disease) (HCC)    • Cyanocobalamine deficiency (non anemic)    • Depression 1/11/2017   • Diverticulosis    • Esophageal reflux 1/11/2017   • Hiatal hernia 1/11/2017   • History of bladder infections    • History of echocardiogram 07/26/2021   • Hx of exercise stress test     \"several years ago by Dr. Mercado\".     • Hx of radiation therapy    • Hyperlipidemia    • Hypertension 1/11/2017   • Impaired functional mobility and activity tolerance    • Impaired functional mobility, balance, gait, and endurance    • Osteoporosis    • Palpitations 1/11/2017   • Prediabetes    • Problems with swallowing     meat at times per pt report   • Shortness of breath     WITH EXERTION    • Sleep apnea 01/11/2017    NO CPAP   • Tricuspid valve disorder 01/11/2017    Patient doesn't know anything about this   • Vitamin D deficiency    • Wears glasses      Past Surgical History:   Past Surgical History:   Procedure Laterality Date   • ANAL FISTULOTOMY     • APPENDECTOMY      1998   • BREAST BIOPSY     • BREAST LUMPECTOMY Right 03/06/2006   • CATARACT EXTRACTION  2019    both eyes    • CATARACT EXTRACTION W/ " INTRAOCULAR LENS IMPLANT Left 2019    Procedure: CATARACT PHACO EXTRACTION WITH INTRAOCULAR LENS IMPLANT LEFT;  Surgeon: Masoud David MD;  Location: [facility] OR;  Service: Ophthalmology   • CATARACT EXTRACTION W/ INTRAOCULAR LENS IMPLANT Right 2019    Procedure: CATARACT PHACO EXTRACTION WITH INTRAOCULAR LENS IMPLANT RIGHT;  Surgeon: Masoud David MD;  Location: [facility] OR;  Service: Ophthalmology   •  SECTION  1981   • CHOLECYSTECTOMY  2009   • COLECTOMY PARTIAL / TOTAL  1998    COLON CANCER   • COLONOSCOPY     • COLONOSCOPY N/A 2021    Procedure: COLONOSCOPY WITH BIOPSY;  Surgeon: Iona Sanders MD;  Location: [facility] ENDOSCOPY;  Service: Gastroenterology;  Laterality: N/A;   • ENDOSCOPY     • ENDOSCOPY N/A 2018    Procedure: ESOPHAGOGASTRODUODENOSCOPY WITH COLD FORCEP BIOPSY;  Surgeon: Vasquez Fagan MD;  Location: [facility] ENDOSCOPY;  Service: Gastroenterology   • ENDOSCOPY N/A 2019    Procedure: ESOPHAGOGASTRODUODENOSCOPY WITH BIOPSY;  Surgeon: Vasquez Fagan MD;  Location: [facility] ENDOSCOPY;  Service: Gastroenterology   • ENDOSCOPY N/A 2022    Procedure: ESOPHAGOGASTRODUODENOSCOPY with biopsy and polypectomy  ;  Surgeon: Iona Sanders MD;  Location: [facility] ENDOSCOPY;  Service: Gastroenterology;  Laterality: N/A;   • HYSTERECTOMY         • VENTRAL HERNIA REPAIR  10/28/2012       Family History:   Family History   Problem Relation Age of Onset   • Hypertension Mother    • Asthma Mother    • COPD Mother    • Osteoarthritis Mother    • Cancer Father    • Cancer Sister    • Diabetes Maternal Grandmother        Social History:   Social History     Socioeconomic History   • Marital status:    Tobacco Use   • Smoking status: Never   • Smokeless tobacco: Never   Vaping Use   • Vaping Use: Never used   Substance and Sexual Activity   • Alcohol use: Yes     Alcohol/week: 1.0 - 2.0 standard drink     Types: 1 - 2 Glasses  of wine per week     Comment: rarely   • Drug use: No   • Sexual activity: Defer       Medications:     Current Outpatient Medications:   •  albuterol sulfate  (90 Base) MCG/ACT inhaler, Inhale 2 puffs 4 (Four) Times a Day., Disp: 18 g, Rfl: 0  •  amoxicillin-clavulanate (AUGMENTIN) 500-125 MG per tablet, Take 1 tablet by mouth., Disp: , Rfl:   •  budesonide-formoterol (SYMBICORT) 160-4.5 MCG/ACT inhaler, Inhale 2 puffs., Disp: , Rfl:   •  cefdinir (OMNICEF) 300 MG capsule, Take 1 capsule by mouth., Disp: , Rfl:   •  cholecalciferol (VITAMIN D3) 25 MCG (1000 UT) tablet, Take 1 tablet by mouth Daily., Disp: , Rfl:   •  citalopram (CeleXA) 20 MG tablet, Take 1 tablet by mouth Daily., Disp: , Rfl:   •  Fluticasone Furoate-Vilanterol (Breo Ellipta) 200-25 MCG/INH inhaler, Inhale 1 puff Daily. Rinse mouth with water after use., Disp: 1 each, Rfl: 5  •  furosemide (Lasix) 20 MG tablet, Take 1 tablet by mouth Daily., Disp: 30 tablet, Rfl: 0  •  ipratropium-albuterol (DUO-NEB) 0.5-2.5 mg/3 ml nebulizer, Take 3 mL by nebulization Every 4 (Four) Hours As Needed for Shortness of Air. (Patient taking differently: Take 3 mL by nebulization As Needed.), Disp: 360 mL, Rfl: 0  •  metoprolol succinate XL (TOPROL-XL) 50 MG 24 hr tablet, Take 1 tablet by mouth Daily., Disp: , Rfl:   •  O2 (OXYGEN), Inhale 4 L/min Daily As Needed., Disp: , Rfl:   •  pantoprazole (PROTONIX) 40 MG EC tablet, Take 1 tablet by mouth Daily., Disp: 30 tablet, Rfl: 0  •  pantoprazole (PROTONIX) 40 MG EC tablet, Take 1 tablet by mouth Daily., Disp: , Rfl:   •  potassium chloride (K-DUR,KLOR-CON) 10 MEQ CR tablet, 1 tablet Daily., Disp: , Rfl:   •  rivaroxaban (XARELTO) 20 MG tablet, Take 1 tablet by mouth Daily., Disp: , Rfl:   •  sacubitril-valsartan (ENTRESTO) 24-26 MG tablet, Take 1 tablet by mouth Every 12 (Twelve) Hours., Disp: 60 tablet, Rfl: 0  •  spironolactone (ALDACTONE) 25 MG tablet, Take 0.5 tablets by mouth Daily., Disp: 15 tablet, Rfl:  "0  •  Xarelto 20 MG tablet, Take 1 tablet by mouth once daily, Disp: 90 tablet, Rfl: 1  •  Hydrocortisone, Perianal, (ANUSOL-HC) 2.5 % rectal cream, Insert  into the rectum 2 (Two) Times a Day., Disp: 28 g, Rfl: 1    Allergies:   No Known Allergies    Review of Systems:   Review of Systems   Constitutional: Negative for appetite change, fatigue, fever and unexpected weight loss.   HENT: Negative for trouble swallowing.    Gastrointestinal: Positive for abdominal pain and constipation. Negative for abdominal distention, anal bleeding, blood in stool, diarrhea, nausea, rectal pain, vomiting, GERD and indigestion.       The following portions of the patient's history were reviewed and updated as appropriate: allergies, current medications, past family history, past medical history, past social history, past surgical history and problem list.    Objective     Physical Exam:  Vital Signs:   Vitals:    02/28/23 1458   BP: 118/78   Pulse: 81   Temp: 98.7 °F (37.1 °C)   SpO2: 95%   Weight: 81.5 kg (179 lb 9.6 oz)   Height: 160 cm (63\")       Physical Exam  Constitutional:       Appearance: She is obese.   HENT:      Head: Normocephalic and atraumatic.   Eyes:      Conjunctiva/sclera: Conjunctivae normal.   Abdominal:      General: Abdomen is flat. There is no distension.      Palpations: There is no mass.      Tenderness: There is no abdominal tenderness. There is no guarding or rebound.      Hernia: No hernia is present.   Musculoskeletal:      Cervical back: Normal range of motion and neck supple.   Neurological:      Mental Status: She is alert.         Results Review:   I reviewed the patient's new clinical results.    No visits with results within 90 Day(s) from this visit.   Latest known visit with results is:   Hospital Outpatient Visit on 10/10/2022   Component Date Value Ref Range Status   • Target HR (85%) 10/10/2022 118  bpm Final   • Max. Pred. HR (100%) 10/10/2022 139  bpm Final   • EF(MOD-bp) 10/10/2022 37.0  " % Final   • LVIDd 10/10/2022 4.4  cm Final   • LVIDs 10/10/2022 3.5  cm Final   • IVSd 10/10/2022 1.28  cm Final   • LVPWd 10/10/2022 1.29  cm Final   • FS 10/10/2022 19.6  % Final   • IVS/LVPW 10/10/2022 0.99  cm Final   • ESV(cubed) 10/10/2022 43.6  ml Final   • LV Sys Vol (BSA corrected) 10/10/2022 43.0  cm2 Final   • EDV(cubed) 10/10/2022 84.0  ml Final   • LV Turner Vol (BSA corrected) 10/10/2022 67.8  cm2 Final   • LVOT area 10/10/2022 3.5  cm2 Final   • LV mass(C)d 10/10/2022 210.0  grams Final   • LVOT diam 10/10/2022 2.10  cm Final   • EDV(MOD-sp2) 10/10/2022 94.0  ml Final   • EDV(MOD-sp4) 10/10/2022 123.0  ml Final   • ESV(MOD-sp2) 10/10/2022 59.0  ml Final   • ESV(MOD-sp4) 10/10/2022 78.0  ml Final   • SV(MOD-sp2) 10/10/2022 35.0  ml Final   • SV(MOD-sp4) 10/10/2022 45.0  ml Final   • SI(MOD-sp2) 10/10/2022 19.3  ml/m2 Final   • SI(MOD-sp4) 10/10/2022 24.8  ml/m2 Final   • EF(MOD-sp2) 10/10/2022 37.2  % Final   • EF(MOD-sp4) 10/10/2022 36.6  % Final   • MV E max leiws 10/10/2022 105.0  cm/sec Final   • MV dec time 10/10/2022 0.17  msec Final   • LA ESV Index (BP) 10/10/2022 89.0  ml/m2 Final   • SV(LVOT) 10/10/2022 47.5  ml Final   • LA dimension (2D)  10/10/2022 6.5  cm Final   • LV V1 max 10/10/2022 71.9  cm/sec Final   • LV V1 max PG 10/10/2022 2.07  mmHg Final   • LV V1 mean PG 10/10/2022 1.00  mmHg Final   • LV V1 VTI 10/10/2022 13.7  cm Final   • Ao pk lewis 10/10/2022 163.0  cm/sec Final   • Ao max PG 10/10/2022 10.6  mmHg Final   • Ao mean PG 10/10/2022 6.0  mmHg Final   • Ao V2 VTI 10/10/2022 29.5  cm Final   • DWIGHT(I,D) 10/10/2022 1.61  cm2 Final   • MV max PG 10/10/2022 5.5  mmHg Final   • MV mean PG 10/10/2022 2.00  mmHg Final   • MV V2 VTI 10/10/2022 29.1  cm Final   • MV P1/2t 10/10/2022 92.5  msec Final   • MVA(P1/2t) 10/10/2022 2.38  cm2 Final   • MVA(VTI) 10/10/2022 1.63  cm2 Final   • MV dec slope 10/10/2022 380.0  cm/sec2 Final   • MR max lewis 10/10/2022 532.0  cm/sec Final   • MR max PG  10/10/2022 113.2  mmHg Final   • MR mean kirk 10/10/2022 401.0  cm/sec Final   • MR mean PG 10/10/2022 72.0  mmHg Final   • MR VTI 10/10/2022 173.0  cm Final   • TR max kirk 10/10/2022 222.0  cm/sec Final   • TR max PG 10/10/2022 19.7  mmHg Final   • PA V2 max 10/10/2022 90.1  cm/sec Final   • PA acc slope 10/10/2022 452.0  cm/sec2 Final   • PA acc time 10/10/2022 0.14  sec Final   • PA pr(Accel) 10/10/2022 15.5  mmHg Final   • PI end-d kirk 10/10/2022 119.0  cm/sec Final   • Ao root diam 10/10/2022 3.3  cm Final   • BH CV VAS BP LEFT ARM 10/10/2022 104/66  mmHg Final   • Med Peak E' Kirk 10/10/2022 8.06  cm/sec Final   • Lat Peak E' Kirk 10/10/2022 11.2  cm/sec Final   • Avg E/e' ratio 10/10/2022 10.90   Final   • RV Base 10/10/2022 4.40  cm Final   • RV Mid 10/10/2022 2.90  cm Final   • RV Length 10/10/2022 5.80  cm Final   • TAPSE (>1.6) 10/10/2022 1.30  cm Final   • RV S' 10/10/2022 7.82  cm/sec Final   • RAP systole 10/10/2022 3  mmHg Final   • RVSP(TR) 10/10/2022 23  mmHg Final   • Ascending aorta 10/10/2022 3.4  cm Final   • Echo EF Estimated 10/10/2022 40  % Final      No radiology results for the last 90 days.    EGD EGD on 09/13/2022  - Non-obstructing and mild Schatzki ring.  - Healing Non-bleeding gastric ulcers with no stigmata of bleeding found with in the hiatal hernial sac.  - Erythematous mucosa in the posterior wall of the stomach, antrum and prepyloric region of the stomach.  Biopsied.  - A few gastric polyps. Resected and retrieved.  - Normal duodenal bulb, first portion of the duodenum, second portion of the duodenum and third portion of the duodenum. Biopsied.  - Localized nodular mucosa was found in the second portion of the duodenum, biopsied    Pathology; reactive gastric mucosa without any H. pylori.  Duodenal biopsy revealed mild duodenitis.    Assessment / Plan      1.  History of ileocolonic anastomotic ulcer with GI bleed; suspected ischemic versus NSAID induced  2.  History of blood loss  anemia  3.  History of for long-term anticoagulation  4.  Intermittent functional left upper quadrant abdominal pain and epigastric pain  5.  Remote history of colon cancer status post right hemicolectomy and chemotherapy 1998  5.  Change in bowel habit  6.  Hemorrhoidal flare  7.  Large hiatal hernia with Hong ulcers/peptic ulcer disease  2/28/2023  EGD done on 9/13/2022 revealed nonobstructing mild esophageal ring, few healing ulcerations in the fundus within the hernial sac.  Gastric pathology negative for H. pylori no history suggestive any current GI bleed.  Her recent lab work done on 2/9/2023 reviewed which reveals a normal hemoglobin of 11.7 g/dL.  Rest of the lab work were normal.  She gets occasional anal discomfort and red blood in the wipes.  She will have a bowel movement 1 to 2/day but often feels inadequate evacuation.    Advised fiber cookies daily  Okay to take MiraLAX as needed if there is any constipation  Low-dose dicyclomine liquid as needed for abdominal pain  We will continue Protonix 20 mg p.o. daily.  Patient is high risk for peptic ulcer disease due to her large hiatal hernia..  Anusol cream 2.5% topical twice daily as needed.  If any significant issues with hemorrhoidal bleeding or flareup will consider hemorrhoidal HET procedure after next follow-up.    6/23/2022  Patient does have a underlying irritable bowel syndrome symptoms.  However her current history is suspicious for a upper GI bleed versus bleeding from the ileocolonic anastomotic ulcer in the setting of anticoagulation use.  Her stool normalized this last 2 days.  Her hemoglobin done on 6/20/2022 was normal 12 g/dL.  CT abdomen pelvis with contrast done on 6/20/2022 was normal.  Patient continues to have intermittent left upper quadrant and right lower quadrant abdominal pain, bloating which appears to be functional and could also be secondary to severe diverticular disease.  No clinical signs of acute  diverticulitis.    7/6/2021  She was admitted on 6/1/2021 with a GI bleed.  She had a colonoscopy done on 6/2/2021 which revealed diverticulosis involving the sigmoid colon and descending colon transverse colon ascending colon however there was no signs of any diverticular bleed.  Patent end-to-side ileocolonic anastomosis was noted with healing ulceration in the anastomotic site.  This was more in favor of NSAID induced versus ischemic ulcerations.  Patient likely bled from the ulcerations.  Biopsies from the ulcer revealed reactive glandular mucosa with mild activity.  No dysplasia or metaplasia or neoplastic changes. EGD done in 2019 revealed reactive gastropathy. Given her prior history of GI bleed and continued use of anticoagulation we will keep her on a low-dose PPI.     Prior history  8.  Chronic atrial fibrillation/ Chronic hypoxic respiratory failure on home oxygen/ CHF  Currently on Xarelto       Follow Up:   Return in about 6 months (around 8/28/2023).    Iona Sanders MD  [facility] [place] [facility]  2/28/2023  17:10 EST     Please note that portions of this note may have been completed with a voice recognition program.

## 2023-05-10 RX ORDER — RIVAROXABAN 20 MG/1
TABLET, FILM COATED ORAL
Qty: 90 TABLET | Refills: 3 | Status: SHIPPED | OUTPATIENT
Start: 2023-05-10

## 2023-05-17 ENCOUNTER — TELEPHONE (OUTPATIENT)
Dept: ORTHOPEDIC SURGERY | Facility: CLINIC | Age: 83
End: 2023-05-17
Payer: MEDICARE

## 2023-05-17 NOTE — TELEPHONE ENCOUNTER
I left message for patient to return my call regarding her appt request. Our office is not currently accepting transfer of cares due to appt availability. Recommendation would be to continue her care at her current orthopedics office or [facility] in [place] may have some availability.  Thank you.

## 2023-07-02 PROBLEM — R00.1 BRADYCARDIA: Status: ACTIVE | Noted: 2023-07-02

## 2023-07-04 PROBLEM — I34.0 MODERATE MITRAL REGURGITATION: Chronic | Status: ACTIVE | Noted: 2021-04-05

## 2023-07-04 PROBLEM — R00.1 SYMPTOMATIC BRADYCARDIA: Status: ACTIVE | Noted: 2023-07-04

## 2023-07-04 PROBLEM — J44.9 COPD (CHRONIC OBSTRUCTIVE PULMONARY DISEASE): Status: ACTIVE | Noted: 2023-07-04

## 2023-07-04 PROBLEM — I10 ESSENTIAL HYPERTENSION: Chronic | Status: ACTIVE | Noted: 2017-01-11

## 2023-07-04 PROBLEM — D50.9 IRON DEFICIENCY ANEMIA: Chronic | Status: ACTIVE | Noted: 2018-04-13

## 2023-07-04 PROBLEM — I49.5 SICK SINUS SYNDROME: Status: ACTIVE | Noted: 2023-07-04

## 2023-07-04 PROBLEM — I50.22 CHRONIC SYSTOLIC HEART FAILURE: Status: ACTIVE | Noted: 2021-05-12

## 2023-07-04 PROBLEM — E78.5 HYPERLIPIDEMIA: Chronic | Status: ACTIVE | Noted: 2017-01-11

## 2023-07-04 PROBLEM — K21.9 HIATAL HERNIA WITH GASTROESOPHAGEAL REFLUX: Chronic | Status: ACTIVE | Noted: 2018-04-27

## 2023-07-04 PROBLEM — K44.9 HIATAL HERNIA WITH GASTROESOPHAGEAL REFLUX: Chronic | Status: ACTIVE | Noted: 2018-04-27

## 2023-07-04 PROBLEM — E66.9 OBESITY: Chronic | Status: ACTIVE | Noted: 2017-01-11

## 2023-07-04 PROBLEM — G47.30 SLEEP APNEA: Chronic | Status: ACTIVE | Noted: 2017-01-11

## 2023-07-04 PROBLEM — I48.0 PAROXYSMAL ATRIAL FIBRILLATION: Chronic | Status: ACTIVE | Noted: 2017-01-11

## 2023-07-04 PROBLEM — Z79.01 CHRONIC ANTICOAGULATION: Status: ACTIVE | Noted: 2023-07-04

## 2023-07-04 PROBLEM — Z79.01 CHRONIC ANTICOAGULATION: Chronic | Status: ACTIVE | Noted: 2023-07-04

## 2023-07-04 PROBLEM — J44.9 COPD (CHRONIC OBSTRUCTIVE PULMONARY DISEASE): Chronic | Status: ACTIVE | Noted: 2023-07-04

## 2023-07-04 PROBLEM — I50.22 CHRONIC SYSTOLIC HEART FAILURE: Chronic | Status: ACTIVE | Noted: 2021-05-12

## 2023-07-05 PROBLEM — J96.22 ACUTE ON CHRONIC RESPIRATORY FAILURE WITH HYPERCAPNIA: Status: RESOLVED | Noted: 2021-05-12 | Resolved: 2023-07-05

## 2023-07-05 PROBLEM — K92.2 GASTROINTESTINAL HEMORRHAGE: Status: RESOLVED | Noted: 2022-06-24 | Resolved: 2023-07-05

## 2023-07-05 PROBLEM — I48.11 LONGSTANDING PERSISTENT ATRIAL FIBRILLATION: Status: ACTIVE | Noted: 2017-01-11

## 2023-07-05 PROBLEM — A04.9 BACTERIAL ENTERITIS: Status: RESOLVED | Noted: 2021-04-05 | Resolved: 2023-07-05

## 2023-07-06 PROBLEM — I44.2 COMPLETE HEART BLOCK: Status: ACTIVE | Noted: 2023-07-06

## 2023-08-09 ENCOUNTER — TELEPHONE (OUTPATIENT)
Dept: CARDIOLOGY | Facility: CLINIC | Age: 83
End: 2023-08-09
Payer: MEDICARE

## 2023-08-21 ENCOUNTER — OFFICE VISIT (OUTPATIENT)
Dept: GASTROENTEROLOGY | Facility: CLINIC | Age: 83
End: 2023-08-21
Payer: MEDICARE

## 2023-08-21 VITALS
HEART RATE: 81 BPM | HEIGHT: 64 IN | DIASTOLIC BLOOD PRESSURE: 59 MMHG | WEIGHT: 172 LBS | TEMPERATURE: 97.3 F | BODY MASS INDEX: 29.37 KG/M2 | SYSTOLIC BLOOD PRESSURE: 100 MMHG

## 2023-08-21 DIAGNOSIS — K64.8 INTERNAL HEMORRHOIDS: ICD-10-CM

## 2023-08-21 DIAGNOSIS — Z87.11 HISTORY OF PEPTIC ULCER DISEASE: ICD-10-CM

## 2023-08-21 DIAGNOSIS — R13.19 ESOPHAGEAL DYSPHAGIA: ICD-10-CM

## 2023-08-21 DIAGNOSIS — D50.0 IRON DEFICIENCY ANEMIA DUE TO CHRONIC BLOOD LOSS: Primary | ICD-10-CM

## 2023-08-21 PROCEDURE — 1160F RVW MEDS BY RX/DR IN RCRD: CPT | Performed by: INTERNAL MEDICINE

## 2023-08-21 PROCEDURE — 99214 OFFICE O/P EST MOD 30 MIN: CPT | Performed by: INTERNAL MEDICINE

## 2023-08-21 PROCEDURE — 3078F DIAST BP <80 MM HG: CPT | Performed by: INTERNAL MEDICINE

## 2023-08-21 PROCEDURE — 1159F MED LIST DOCD IN RCRD: CPT | Performed by: INTERNAL MEDICINE

## 2023-08-21 PROCEDURE — 3074F SYST BP LT 130 MM HG: CPT | Performed by: INTERNAL MEDICINE

## 2023-08-21 RX ORDER — POLYETHYLENE GLYCOL 3350 17 G/17G
17 POWDER, FOR SOLUTION ORAL DAILY PRN
Qty: 510 G | Refills: 1 | Status: SHIPPED | OUTPATIENT
Start: 2023-08-21

## 2023-08-21 NOTE — PROGRESS NOTES
Follow Up Note     Date: 2023   Patient Name: Elsa Alcaraz  MRN: [id number]  : 1940     Referring Physician: Kemi Hdz MD    Chief Complaint:    Chief Complaint   Patient presents with    Anemia     Iron def      Peptic Ulcer Disease    Constipation    LUQ pain       Interval History:   2023  Elsa Alcaraz is a 83 y.o. female who is here today for follow up for her anemia and constipation.  States that she still has a perianal itching with occasional constipation.  No bleeding.  She was admitted last month and during hospital admission had a drop in H&H to 9 g/dL.  She had a PPM during the admission.  Denies any rectal bleeding.     2021  This is 81-year-old female patient with a prior history of hypertension, hyperlipidemia, chronic atrial fibrillation on Eliquis, history of breast cancer status post lumpectomy and radiation therapy, history of colon cancer status post right hemicolectomy more than 20 years ago, CHF, COPD/obstructive sleep apnea on home oxygen, colonic diverticulosis, was brought to the emergency room today on 2021 with complaints of rectal bleeding for the past 2 days.     Patient states that she was doing fine until day before yesterday, then had a bowel movement with blood in the stool.  Subsequently she had multiple bowel movements at least 4 times every day.  She continued to have a red blood with clots even today for which she came to emergency room.. She had at least 3 times bowel movement with dark blood and clots.  She does have associated left-sided abdominal cramps.  Denies any nausea vomiting, no fever chills.     Patient denies any prior history of GI bleed.  She has been taking Xarelto and last dose was day before yesterday 2 days ago.  Denies any NSAID use. She states that she had an EGD done in 2019 Dr. Daniel.  Last colonoscopy was about 5 years ago and was been told normal no reports available.  Abdominal pain and father had some type  "of stomach cancer details unknown. She was evaluated emergency room and noted to have a drop in her H&H from 14 to 10 g/dL.  CT scan abdomen pelvis done which showed only colonic diverticulosis otherwise unremarkable.  GI was consulted for further evaluation and management       Subjective      Past Medical History:   Past Medical History:   Diagnosis Date    Anemia     Anxiety disorder due to general medical condition 01/11/2017    Arthritis     Atrial fibrillation 01/11/2017    Body piercing     BOTH EARS    Borderline diabetes     Breast cancer 2003    right-radiation and a lumpectomy    Broken tooth     Cataract 01/11/2017    Left eye surgery 11/19    Chronic bronchitis 01/11/2017    Colon cancer 11/30/1998    had surgery and chemo    COPD (chronic obstructive pulmonary disease)     Cyanocobalamine deficiency (non anemic)     Depression 01/11/2017    Diverticulosis     Esophageal reflux 01/11/2017    Hiatal hernia 01/11/2017    History of bladder infections     History of echocardiogram 07/26/2021    Hx of exercise stress test     \"several years ago by Dr. Mercado\".      Hx of radiation therapy     Hyperlipidemia     Hypertension 01/11/2017    Impaired functional mobility and activity tolerance     Impaired functional mobility, balance, gait, and endurance     Osteoporosis     Palpitations 01/11/2017    Prediabetes     Problems with swallowing     meat at times per pt report    Shortness of breath     WITH EXERTION     Sleep apnea 01/11/2017    NO CPAP    Tricuspid valve disorder 01/11/2017    Patient doesn't know anything about this    Vitamin D deficiency     Wears glasses      Past Surgical History:   Past Surgical History:   Procedure Laterality Date    ANAL FISTULOTOMY      APPENDECTOMY      1998    BREAST BIOPSY      BREAST LUMPECTOMY Right 03/06/2006    CARDIAC ELECTROPHYSIOLOGY PROCEDURE N/A 07/05/2023    Procedure: Micra;  Surgeon: Keven Willis DO;  Location: [facility] INVASIVE LOCATION;  Service: " Cardiology;  Laterality: N/A;    CATARACT EXTRACTION      both eyes     CATARACT EXTRACTION W/ INTRAOCULAR LENS IMPLANT Left 2019    Procedure: CATARACT PHACO EXTRACTION WITH INTRAOCULAR LENS IMPLANT LEFT;  Surgeon: Masoud David MD;  Location: [facility] OR;  Service: Ophthalmology    CATARACT EXTRACTION W/ INTRAOCULAR LENS IMPLANT Right 2019    Procedure: CATARACT PHACO EXTRACTION WITH INTRAOCULAR LENS IMPLANT RIGHT;  Surgeon: Masoud David MD;  Location: [facility] OR;  Service: Ophthalmology     SECTION  1981    CHOLECYSTECTOMY  2009    COLECTOMY PARTIAL / TOTAL  1998    COLON CANCER    COLONOSCOPY      COLONOSCOPY N/A 2021    Procedure: COLONOSCOPY WITH BIOPSY;  Surgeon: Iona Sanders MD;  Location: [facility] ENDOSCOPY;  Service: Gastroenterology;  Laterality: N/A;    ENDOSCOPY      ENDOSCOPY N/A 2018    Procedure: ESOPHAGOGASTRODUODENOSCOPY WITH COLD FORCEP BIOPSY;  Surgeon: Vasquez Fagan MD;  Location: [facility] ENDOSCOPY;  Service: Gastroenterology    ENDOSCOPY N/A 2019    Procedure: ESOPHAGOGASTRODUODENOSCOPY WITH BIOPSY;  Surgeon: Vasquez Fagan MD;  Location: [facility] ENDOSCOPY;  Service: Gastroenterology    ENDOSCOPY N/A 2022    Procedure: ESOPHAGOGASTRODUODENOSCOPY with biopsy and polypectomy  ;  Surgeon: Iona Sanders MD;  Location: [facility] ENDOSCOPY;  Service: Gastroenterology;  Laterality: N/A;    HYSTERECTOMY          INSERT / REPLACE / REMOVE PACEMAKER      SKIN BIOPSY Left     arm    VENTRAL HERNIA REPAIR  10/28/2012       Family History:   Family History   Problem Relation Age of Onset    Hypertension Mother     Asthma Mother     COPD Mother     Osteoarthritis Mother     Stomach cancer Father     Cancer Father     Cancer Sister     Diabetes Maternal Grandmother     Colon cancer Neg Hx        Social History:   Social History     Socioeconomic History    Marital status:    Tobacco Use    Smoking  status: Never     Passive exposure: Never    Smokeless tobacco: Never   Vaping Use    Vaping Use: Never used   Substance and Sexual Activity    Alcohol use: Never     Alcohol/week: 1.0 - 2.0 standard drink     Types: 1 - 2 Glasses of wine per week     Comment: rarely    Drug use: Never    Sexual activity: Defer       Medications:     Current Outpatient Medications:     cholecalciferol (VITAMIN D3) 25 MCG (1000 UT) tablet, Take 1 tablet by mouth Daily., Disp: , Rfl:     citalopram (CeleXA) 20 MG tablet, Take 1 tablet by mouth Daily., Disp: , Rfl:     Fluticasone Furoate-Vilanterol (Breo Ellipta) 200-25 MCG/INH inhaler, Inhale 1 puff Daily. Rinse mouth with water after use., Disp: 1 each, Rfl: 5    furosemide (Lasix) 20 MG tablet, Take 1 tablet by mouth Daily., Disp: 30 tablet, Rfl: 0    metoprolol succinate XL (TOPROL-XL) 50 MG 24 hr tablet, Take 1 tablet by mouth Daily., Disp: , Rfl:     O2 (OXYGEN), Inhale 4 L/min Daily As Needed., Disp: , Rfl:     pantoprazole (PROTONIX) 40 MG EC tablet, Take 1 tablet by mouth Daily., Disp: , Rfl:     potassium chloride (K-DUR,KLOR-CON) 10 MEQ CR tablet, 1 tablet 3 (Three) Times a Day., Disp: , Rfl:     sacubitril-valsartan (ENTRESTO) 24-26 MG tablet, Take 1 tablet by mouth Every 12 (Twelve) Hours., Disp: 60 tablet, Rfl: 0    spironolactone (ALDACTONE) 25 MG tablet, Take 0.5 tablets by mouth Daily., Disp: 15 tablet, Rfl: 0    Xarelto 20 MG tablet, Take 1 tablet by mouth once daily, Disp: 90 tablet, Rfl: 3    albuterol sulfate  (90 Base) MCG/ACT inhaler, Inhale 2 puffs 4 (Four) Times a Day., Disp: 18 g, Rfl: 0    Allergies:   No Known Allergies    Review of Systems:   Review of Systems   Constitutional:  Negative for appetite change, fatigue, fever and unexpected weight loss.   HENT:  Positive for trouble swallowing.    Gastrointestinal:  Positive for abdominal pain and rectal pain. Negative for abdominal distention, anal bleeding, blood in stool, constipation, diarrhea,  "nausea, vomiting, GERD and indigestion.        Loose bowel movements, sometimes Bowel movement is LARGE in size     The following portions of the patient's history were reviewed and updated as appropriate: allergies, current medications, past family history, past medical history, past social history, past surgical history and problem list.    Objective     Physical Exam:  Vital Signs:   Vitals:    08/21/23 1504   BP: 100/59   Pulse: 81   Temp: 97.3 øF (36.3 øC)   TempSrc: Infrared   Weight: 78 kg (172 lb)   Height: 161.3 cm (63.5\")  Comment: patient states       Physical Exam  Constitutional:       Appearance: She is obese.   HENT:      Head: Normocephalic and atraumatic.   Eyes:      Conjunctiva/sclera: Conjunctivae normal.   Abdominal:      General: Abdomen is flat. There is no distension.      Palpations: There is no mass.      Tenderness: There is no abdominal tenderness. There is no guarding or rebound.      Hernia: No hernia is present.   Musculoskeletal:      Cervical back: Normal range of motion and neck supple.   Neurological:      Mental Status: She is alert.       Results Review:   I reviewed the patient's new clinical results.    Hospital Outpatient Visit on 07/14/2023   Component Date Value Ref Range Status    EF(MOD-bp) 07/14/2023 40.0  % Final    LVIDd 07/14/2023 5.6  cm Final    LVIDs 07/14/2023 4.1  cm Final    IVSd 07/14/2023 1.11  cm Final    LVPWd 07/14/2023 1.23  cm Final    FS 07/14/2023 26.2  % Final    IVS/LVPW 07/14/2023 0.90  cm Final    ESV(cubed) 07/14/2023 71.0  ml Final    EDV(cubed) 07/14/2023 176.6  ml Final    LVOT area 07/14/2023 3.5  cm2 Final    LV mass(C)d 07/14/2023 271.7  grams Final    LVOT diam 07/14/2023 2.10  cm Final    EDV(MOD-sp2) 07/14/2023 125.0  ml Final    EDV(MOD-sp4) 07/14/2023 90.0  ml Final    ESV(MOD-sp2) 07/14/2023 80.0  ml Final    ESV(MOD-sp4) 07/14/2023 52.0  ml Final    SV(MOD-sp2) 07/14/2023 45.0  ml Final    SV(MOD-sp4) 07/14/2023 38.0  ml Final    " EF(MOD-sp2) 07/14/2023 36.0  % Final    EF(MOD-sp4) 07/14/2023 42.2  % Final    MV E max kirk 07/14/2023 125.0  cm/sec Final    MV dec time 07/14/2023 0.12  msec Final    SV(LVOT) 07/14/2023 58.5  ml Final    LA dimension (2D)  07/14/2023 4.7  cm Final    LV V1 max 07/14/2023 82.7  cm/sec Final    LV V1 max PG 07/14/2023 2.7  mmHg Final    LV V1 mean PG 07/14/2023 2.00  mmHg Final    LV V1 VTI 07/14/2023 16.9  cm Final    Ao pk kirk 07/14/2023 205.5  cm/sec Final    Ao max PG 07/14/2023 16.9  mmHg Final    Ao mean PG 07/14/2023 8.5  mmHg Final    Ao V2 VTI 07/14/2023 47.8  cm Final    DWIGHT(I,D) 07/14/2023 1.5  cm2 Final    MR max kirk 07/14/2023 511.5  cm/sec Final    MR max PG 07/14/2023 104.7  mmHg Final    MR mean kirk 07/14/2023 375.0  cm/sec Final    MR mean PG 07/14/2023 64.5  mmHg Final    MR VTI 07/14/2023 172.5  cm Final    TR max kirk 07/14/2023 265.0  cm/sec Final    TR max PG 07/14/2023 28.4  mmHg Final    PA V2 max 07/14/2023 111.5  cm/sec Final    PA acc slope 07/14/2023 832.0  cm/sec2 Final    PA acc time 07/14/2023 0.10  sec Final    PI end-d kirk 07/14/2023 177.0  cm/sec Final    Ao root diam 07/14/2023 3.5  cm Final    Med Peak E' Kirk 07/14/2023 4.60  cm/sec Final    Lat Peak E' Kirk 07/14/2023 9.2  cm/sec Final    Avg E/e' ratio 07/14/2023 18.12   Final    RV Base 07/14/2023 4.00  cm Final    RV Mid 07/14/2023 3.80  cm Final    RV Length 07/14/2023 6.90  cm Final    TAPSE (>1.6) 07/14/2023 2.10  cm Final    RV S' 07/14/2023 9.70  cm/sec Final    RVSP(TR) 07/14/2023 36  mmHg Final    RAP systole 07/14/2023 8  mmHg Final    BH CV VAS BP RIGHT ARM 07/14/2023 149/89  mmHg Final   Admission on 07/04/2023, Discharged on 07/06/2023   Component Date Value Ref Range Status    Total Cholesterol 07/04/2023 124  0 - 200 mg/dL Final    Triglycerides 07/04/2023 54  0 - 150 mg/dL Final    HDL Cholesterol 07/04/2023 64 (H)  40 - 60 mg/dL Final    LDL Cholesterol  07/04/2023 48  0 - 100 mg/dL Final    VLDL Cholesterol  07/04/2023 12  5 - 40 mg/dL Final    LDL/HDL Ratio 07/04/2023 0.77   Final    Glucose 07/04/2023 173 (H)  70 - 130 mg/dL Final    Meter: [id number] : [id number] Cotton Jennifer    Glucose 07/05/2023 103 (H)  65 - 99 mg/dL Final    BUN 07/05/2023 13  8 - 23 mg/dL Final    Creatinine 07/05/2023 0.60  0.57 - 1.00 mg/dL Final    Sodium 07/05/2023 137  136 - 145 mmol/L Final    Potassium 07/05/2023 3.8  3.5 - 5.2 mmol/L Final    Chloride 07/05/2023 95 (L)  98 - 107 mmol/L Final    CO2 07/05/2023 31.0 (H)  22.0 - 29.0 mmol/L Final    Calcium 07/05/2023 9.0  8.6 - 10.5 mg/dL Final    BUN/Creatinine Ratio 07/05/2023 21.7  7.0 - 25.0 Final    Anion Gap 07/05/2023 11.0  5.0 - 15.0 mmol/L Final    eGFR 07/05/2023 89.7  >60.0 mL/min/1.73 Final    WBC 07/05/2023 13.44 (H)  3.40 - 10.80 10*3/mm3 Final    RBC 07/05/2023 4.50  3.77 - 5.28 10*6/mm3 Final    Hemoglobin 07/05/2023 10.9 (L)  12.0 - 15.9 g/dL Final    Hematocrit 07/05/2023 36.8  34.0 - 46.6 % Final    MCV 07/05/2023 81.8  79.0 - 97.0 fL Final    MCH 07/05/2023 24.2 (L)  26.6 - 33.0 pg Final    MCHC 07/05/2023 29.6 (L)  31.5 - 35.7 g/dL Final    RDW 07/05/2023 16.0 (H)  12.3 - 15.4 % Final    RDW-SD 07/05/2023 47.8  37.0 - 54.0 fl Final    MPV 07/05/2023 10.1  6.0 - 12.0 fL Final    Platelets 07/05/2023 308  140 - 450 10*3/mm3 Final    Hemoglobin A1C 07/05/2023 5.30  4.80 - 5.60 % Final    Magnesium 07/05/2023 2.5 (H)  1.6 - 2.4 mg/dL Final    Phosphorus 07/05/2023 1.9 (C)  2.5 - 4.5 mg/dL Final    Protime 07/05/2023 15.7 (H)  12.2 - 14.5 Seconds Final    INR 07/05/2023 1.24 (H)  0.89 - 1.12 Final    ABO Type 07/05/2023 O   Final    RH type 07/05/2023 Positive   Final    Antibody Screen 07/05/2023 Negative   Final    T&S Expiration Date 07/05/2023 7/8/2023 11:59:59 PM   Final    Glucose 07/05/2023 121  70 - 130 mg/dL Final    Meter: [id number] : [id number] Cotton Jennifer    Glucose 07/05/2023 107  70 - 130 mg/dL Final    Meter: [id number] : [id number] Ra  Margret    Phosphorus 07/05/2023 1.9 (C)  2.5 - 4.5 mg/dL Final    Glucose 07/05/2023 149 (H)  70 - 130 mg/dL Final    Meter: [id number] : [id number] Lidia Mccray    Glucose 07/06/2023 82  65 - 99 mg/dL Final    BUN 07/06/2023 17  8 - 23 mg/dL Final    Creatinine 07/06/2023 0.69  0.57 - 1.00 mg/dL Final    Sodium 07/06/2023 140  136 - 145 mmol/L Final    Potassium 07/06/2023 4.2  3.5 - 5.2 mmol/L Final    Slight hemolysis detected by analyzer. Results may be affected.    Chloride 07/06/2023 100  98 - 107 mmol/L Final    CO2 07/06/2023 32.0 (H)  22.0 - 29.0 mmol/L Final    Calcium 07/06/2023 8.2 (L)  8.6 - 10.5 mg/dL Final    BUN/Creatinine Ratio 07/06/2023 24.6  7.0 - 25.0 Final    Anion Gap 07/06/2023 8.0  5.0 - 15.0 mmol/L Final    eGFR 07/06/2023 86.8  >60.0 mL/min/1.73 Final    WBC 07/06/2023 9.19  3.40 - 10.80 10*3/mm3 Final    RBC 07/06/2023 3.93  3.77 - 5.28 10*6/mm3 Final    Hemoglobin 07/06/2023 9.4 (L)  12.0 - 15.9 g/dL Final    Hematocrit 07/06/2023 32.3 (L)  34.0 - 46.6 % Final    MCV 07/06/2023 82.2  79.0 - 97.0 fL Final    MCH 07/06/2023 23.9 (L)  26.6 - 33.0 pg Final    MCHC 07/06/2023 29.1 (L)  31.5 - 35.7 g/dL Final    RDW 07/06/2023 16.7 (H)  12.3 - 15.4 % Final    RDW-SD 07/06/2023 49.9  37.0 - 54.0 fl Final    MPV 07/06/2023 9.5  6.0 - 12.0 fL Final    Platelets 07/06/2023 279  140 - 450 10*3/mm3 Final    Magnesium 07/06/2023 2.0  1.6 - 2.4 mg/dL Final    Phosphorus 07/06/2023 2.8  2.5 - 4.5 mg/dL Final    Glucose 07/05/2023 162 (H)  70 - 130 mg/dL Final    Meter: [id number] : [id number] Porsha Vivas    QT Interval 07/06/2023 466  ms Final    QTC Interval 07/06/2023 557  ms Final    Glucose 07/06/2023 91  70 - 130 mg/dL Final    Meter: [id number] : [id number] Porsha Vivas   Admission on 07/02/2023, Discharged on 07/04/2023   Component Date Value Ref Range Status    Glucose 07/02/2023 92  65 - 99 mg/dL Final    BUN 07/02/2023 28 (H)  8 - 23 mg/dL Final    Creatinine 07/02/2023 0.92  0.57 -  1.00 mg/dL Final    Sodium 07/02/2023 137  136 - 145 mmol/L Final    Potassium 07/02/2023 4.1  3.5 - 5.2 mmol/L Final    Slight hemolysis detected by analyzer. Results may be affected.    Chloride 07/02/2023 99  98 - 107 mmol/L Final    CO2 07/02/2023 26.9  22.0 - 29.0 mmol/L Final    Calcium 07/02/2023 8.6  8.6 - 10.5 mg/dL Final    Total Protein 07/02/2023 6.0  6.0 - 8.5 g/dL Final    Albumin 07/02/2023 3.6  3.5 - 5.2 g/dL Final    ALT (SGPT) 07/02/2023 11  1 - 33 U/L Final    AST (SGOT) 07/02/2023 13  1 - 32 U/L Final    Alkaline Phosphatase 07/02/2023 86  39 - 117 U/L Final    Total Bilirubin 07/02/2023 0.7  0.0 - 1.2 mg/dL Final    Globulin 07/02/2023 2.4  gm/dL Final    A/G Ratio 07/02/2023 1.5  g/dL Final    BUN/Creatinine Ratio 07/02/2023 30.4 (H)  7.0 - 25.0 Final    Anion Gap 07/02/2023 11.1  5.0 - 15.0 mmol/L Final    eGFR 07/02/2023 62.3  >60.0 mL/min/1.73 Final    Color, UA 07/02/2023 Yellow  Yellow, Straw Final    Appearance, UA 07/02/2023 Cloudy (A)  Clear Final    pH, UA 07/02/2023 5.5  5.0 - 8.0 Final    Specific [place], UA 07/02/2023 1.021  1.005 - 1.030 Final    Glucose, UA 07/02/2023 Negative  Negative Final    Ketones, UA 07/02/2023 Trace (A)  Negative Final    Bilirubin, UA 07/02/2023 Negative  Negative Final    Blood, UA 07/02/2023 Negative  Negative Final    Protein, UA 07/02/2023 Negative  Negative Final    Leuk Esterase, UA 07/02/2023 Small (1+) (A)  Negative Final    Nitrite, UA 07/02/2023 Positive (A)  Negative Final    Urobilinogen, UA 07/02/2023 1.0 E.U./dL  0.2 - 1.0 E.U./dL Final    Procalcitonin 07/02/2023 0.02  0.00 - 0.25 ng/mL Final    Magnesium 07/02/2023 2.0  1.6 - 2.4 mg/dL Final    WBC 07/02/2023 9.22  3.40 - 10.80 10*3/mm3 Final    RBC 07/02/2023 4.17  3.77 - 5.28 10*6/mm3 Final    Hemoglobin 07/02/2023 10.0 (L)  12.0 - 15.9 g/dL Final    Hematocrit 07/02/2023 33.1 (L)  34.0 - 46.6 % Final    MCV 07/02/2023 79.4  79.0 - 97.0 fL Final    MCH 07/02/2023 24.0 (L)  26.6 - 33.0  pg Final    MCHC 07/02/2023 30.2 (L)  31.5 - 35.7 g/dL Final    RDW 07/02/2023 16.6 (H)  12.3 - 15.4 % Final    RDW-SD 07/02/2023 47.3  37.0 - 54.0 fl Final    MPV 07/02/2023 10.2  6.0 - 12.0 fL Final    Platelets 07/02/2023 299  140 - 450 10*3/mm3 Final    Neutrophil % 07/02/2023 74.1  42.7 - 76.0 % Final    Lymphocyte % 07/02/2023 11.6 (L)  19.6 - 45.3 % Final    Monocyte % 07/02/2023 11.6  5.0 - 12.0 % Final    Eosinophil % 07/02/2023 2.1  0.3 - 6.2 % Final    Basophil % 07/02/2023 0.4  0.0 - 1.5 % Final    Immature Grans % 07/02/2023 0.2  0.0 - 0.5 % Final    Neutrophils, Absolute 07/02/2023 6.83  1.70 - 7.00 10*3/mm3 Final    Lymphocytes, Absolute 07/02/2023 1.07  0.70 - 3.10 10*3/mm3 Final    Monocytes, Absolute 07/02/2023 1.07 (H)  0.10 - 0.90 10*3/mm3 Final    Eosinophils, Absolute 07/02/2023 0.19  0.00 - 0.40 10*3/mm3 Final    Basophils, Absolute 07/02/2023 0.04  0.00 - 0.20 10*3/mm3 Final    Immature Grans, Absolute 07/02/2023 0.02  0.00 - 0.05 10*3/mm3 Final    nRBC 07/02/2023 0.0  0.0 - 0.2 /100 WBC Final    HS Troponin T 07/02/2023 24 (H)  <10 ng/L Final    HS Troponin T 07/02/2023 29 (H)  <10 ng/L Final    Troponin T Delta 07/02/2023 5 (C)  >=-4 - <+4 ng/L Final    RBC, UA 07/02/2023 None Seen  None Seen /HPF Final    WBC, UA 07/02/2023 3-5 (A)  None Seen /HPF Final    Urine culture not indicated.    Bacteria, UA 07/02/2023 3+ (A)  None Seen /HPF Final    Squamous Epithelial Cells, UA 07/02/2023 3-6 (A)  None Seen, 0-2 /HPF Final    Hyaline Casts, UA 07/02/2023 None Seen  None Seen /LPF Final    Methodology 07/02/2023 Manual Light Microscopy   Final    Urine Culture 07/02/2023 >100,000 CFU/mL Mixed Kitty Isolated   Final    Glucose 07/03/2023 134 (H)  65 - 99 mg/dL Final    BUN 07/03/2023 19  8 - 23 mg/dL Final    Creatinine 07/03/2023 0.86  0.57 - 1.00 mg/dL Final    Sodium 07/03/2023 139  136 - 145 mmol/L Final    Potassium 07/03/2023 4.0  3.5 - 5.2 mmol/L Final    Chloride 07/03/2023 102  98 - 107  mmol/L Final    CO2 07/03/2023 27.1  22.0 - 29.0 mmol/L Final    Calcium 07/03/2023 8.9  8.6 - 10.5 mg/dL Final    BUN/Creatinine Ratio 07/03/2023 22.1  7.0 - 25.0 Final    Anion Gap 07/03/2023 9.9  5.0 - 15.0 mmol/L Final    eGFR 07/03/2023 67.5  >60.0 mL/min/1.73 Final    WBC 07/03/2023 14.05 (H)  3.40 - 10.80 10*3/mm3 Final    RBC 07/03/2023 4.52  3.77 - 5.28 10*6/mm3 Final    Hemoglobin 07/03/2023 10.6 (L)  12.0 - 15.9 g/dL Final    Hematocrit 07/03/2023 36.5  34.0 - 46.6 % Final    MCV 07/03/2023 80.8  79.0 - 97.0 fL Final    MCH 07/03/2023 23.5 (L)  26.6 - 33.0 pg Final    MCHC 07/03/2023 29.0 (L)  31.5 - 35.7 g/dL Final    RDW 07/03/2023 16.4 (H)  12.3 - 15.4 % Final    RDW-SD 07/03/2023 48.1  37.0 - 54.0 fl Final    MPV 07/03/2023 9.8  6.0 - 12.0 fL Final    Platelets 07/03/2023 299  140 - 450 10*3/mm3 Final    Neutrophil % 07/03/2023 83.8 (H)  42.7 - 76.0 % Final    Lymphocyte % 07/03/2023 5.2 (L)  19.6 - 45.3 % Final    Monocyte % 07/03/2023 9.8  5.0 - 12.0 % Final    Eosinophil % 07/03/2023 0.1 (L)  0.3 - 6.2 % Final    Basophil % 07/03/2023 0.4  0.0 - 1.5 % Final    Immature Grans % 07/03/2023 0.7 (H)  0.0 - 0.5 % Final    Neutrophils, Absolute 07/03/2023 11.79 (H)  1.70 - 7.00 10*3/mm3 Final    Lymphocytes, Absolute 07/03/2023 0.73  0.70 - 3.10 10*3/mm3 Final    Monocytes, Absolute 07/03/2023 1.37 (H)  0.10 - 0.90 10*3/mm3 Final    Eosinophils, Absolute 07/03/2023 0.01  0.00 - 0.40 10*3/mm3 Final    Basophils, Absolute 07/03/2023 0.05  0.00 - 0.20 10*3/mm3 Final    Immature Grans, Absolute 07/03/2023 0.10 (H)  0.00 - 0.05 10*3/mm3 Final    nRBC 07/03/2023 0.0  0.0 - 0.2 /100 WBC Final    Hypochromia 07/03/2023 Slight/1+  None Seen Final    WBC Morphology 07/03/2023 Normal  Normal Final    Platelet Estimate 07/03/2023 Adequate  Normal Final    Glucose 07/03/2023 141 (H)  70 - 130 mg/dL Final    Serial Number: [id number]:  [id number]    HS Troponin T 07/03/2023 36 (H)  <10 ng/L Final    Glucose  07/03/2023 129  70 - 130 mg/dL Final    Serial Number: [id number]:  [id number]    Glucose 07/04/2023 113 (H)  65 - 99 mg/dL Final    BUN 07/04/2023 12  8 - 23 mg/dL Final    Creatinine 07/04/2023 0.60  0.57 - 1.00 mg/dL Final    Sodium 07/04/2023 139  136 - 145 mmol/L Final    Potassium 07/04/2023 3.9  3.5 - 5.2 mmol/L Final    Chloride 07/04/2023 99  98 - 107 mmol/L Final    CO2 07/04/2023 28.7  22.0 - 29.0 mmol/L Final    Calcium 07/04/2023 8.9  8.6 - 10.5 mg/dL Final    BUN/Creatinine Ratio 07/04/2023 20.0  7.0 - 25.0 Final    Anion Gap 07/04/2023 11.3  5.0 - 15.0 mmol/L Final    eGFR 07/04/2023 89.7  >60.0 mL/min/1.73 Final    WBC 07/04/2023 16.87 (H)  3.40 - 10.80 10*3/mm3 Final    RBC 07/04/2023 4.86  3.77 - 5.28 10*6/mm3 Final    Hemoglobin 07/04/2023 11.6 (L)  12.0 - 15.9 g/dL Final    Hematocrit 07/04/2023 39.2  34.0 - 46.6 % Final    MCV 07/04/2023 80.7  79.0 - 97.0 fL Final    MCH 07/04/2023 23.9 (L)  26.6 - 33.0 pg Final    MCHC 07/04/2023 29.6 (L)  31.5 - 35.7 g/dL Final    RDW 07/04/2023 16.3 (H)  12.3 - 15.4 % Final    RDW-SD 07/04/2023 47.4  37.0 - 54.0 fl Final    MPV 07/04/2023 10.2  6.0 - 12.0 fL Final    Platelets 07/04/2023 287  140 - 450 10*3/mm3 Final    Neutrophil % 07/04/2023 79.5 (H)  42.7 - 76.0 % Final    Lymphocyte % 07/04/2023 5.1 (L)  19.6 - 45.3 % Final    Monocyte % 07/04/2023 14.4 (H)  5.0 - 12.0 % Final    Eosinophil % 07/04/2023 0.2 (L)  0.3 - 6.2 % Final    Basophil % 07/04/2023 0.3  0.0 - 1.5 % Final    Immature Grans % 07/04/2023 0.5  0.0 - 0.5 % Final    Neutrophils, Absolute 07/04/2023 13.41 (H)  1.70 - 7.00 10*3/mm3 Final    Lymphocytes, Absolute 07/04/2023 0.86  0.70 - 3.10 10*3/mm3 Final    Monocytes, Absolute 07/04/2023 2.43 (H)  0.10 - 0.90 10*3/mm3 Final    Eosinophils, Absolute 07/04/2023 0.03  0.00 - 0.40 10*3/mm3 Final    Basophils, Absolute 07/04/2023 0.05  0.00 - 0.20 10*3/mm3 Final    Immature Grans, Absolute 07/04/2023 0.09 (H)  0.00 - 0.05 10*3/mm3 Final     nRBC 07/04/2023 0.0  0.0 - 0.2 /100 WBC Final    Glucose 07/03/2023 117  70 - 130 mg/dL Final    Serial Number: [id number]:  [id number]    Hypochromia 07/04/2023 Slight/1+  None Seen Final    WBC Morphology 07/04/2023 Normal  Normal Final    Platelet Estimate 07/04/2023 Adequate  Normal Final    Glucose 07/04/2023 112  70 - 130 mg/dL Final    Serial Number: [id number]:  [id number]    Glucose 07/04/2023 137 (H)  70 - 130 mg/dL Final    Serial Number: [id number]:  [id number]    Glucose 07/04/2023 113  70 - 130 mg/dL Final    Serial Number: [id number]:  [id number]      XR Knee 4+ View Left    Result Date: 6/5/2023  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 CRITICAL RESULT:   No. COMMUNICATION: Per this written report. Drafted by Tariq Gaona MD on 6/5/2023 1:03 PM Final report signed by Tariq Goana MD on 6/5/2023 1:04 PM    XR Chest 1 View    Result Date: 7/5/2023  Impression: Increased right basilar opacity may reflect atelectasis or infection. Electronically Signed: Dereje Jimenez  7/5/2023 8:13 AM EDT  Workstation ID: [id number]    XR Chest 1 View    Result Date: 7/2/2023  Some blunting the left costophrenic angle, may represent atelectasis/infiltrate or small effusion.  Continued followup is recommended.  This report was signed and finalized on 7/2/2023 4:07 PM by Billy Stahl DO.    XR Chest PA & Lateral    Result Date: 7/6/2023  1.  Leadless pacemaker in expected location. 2.  No other interval change. Electronically signed by:  Magdiel Evans M.D.  7/6/2023 4:41 AM Mountain Time       EGD EGD on 09/13/2022  - Non-obstructing and mild Schatzki ring.  - Healing Non-bleeding gastric ulcers with no stigmata of bleeding found with in the hiatal hernial sac.  - Erythematous mucosa in the posterior wall of the stomach, antrum and prepyloric region of the stomach.  Biopsied.  - A few gastric polyps. Resected and retrieved.  - Normal duodenal bulb, first portion of the duodenum, second  portion of the duodenum and third portion of the duodenum. Biopsied.  - Localized nodular mucosa was found in the second portion of the duodenum, biopsied     Pathology; reactive gastric mucosa without any H. pylori.  Duodenal biopsy revealed mild duodenitis    Assessment / Plan        1.  History of ileocolonic anastomotic ulcer with GI bleed; suspected ischemic versus NSAID induced  2.  History of blood loss anemia  3.  History of for long-term anticoagulation  4.  Intermittent functional left upper quadrant abdominal pain and epigastric pain  5.  Remote history of colon cancer status post right hemicolectomy and chemotherapy 1998  5.  Change in bowel habit / ? IBS-M  6.  Large hiatal hernia with Hong ulcers/peptic ulcer disease  7.  Symptomatic hemorrhoids  8.  Esophageal dysphagia associated with a hiatal hernia  8/21/2023.  Recent drop in hemoglobin from 11 to 9 g/dL during hospital admission.  No signs of any overt GI bleed.  She and is extremely high risk for intermittent GI bleed with the peptic ulcer disease and anastomotic ulcers in the setting of anticoagulation.  Denies any rectal bleeding.  Some of her perianal itching could be associated with the hemorrhoidal discharge with the pruritus.  Not using her Anusol cream.  Still has intermittent constipation.  Patient has a longstanding history of alternating constipation with loose stools with abdominal bloating suspicious of irritable bowel since young age.    Still has intermittent dysphagia to solids associated with a large hiatal hernia.  Patient has a lab work scheduled for next week at PCPs office.  Recent lab work done on 7/6/2023 revealed hemoglobin of 9.4 dropped from 10.6 on 7/3/2023 INR was 1.24.  Patient has intolerance to iron.  If any further drop in H&H she needs iron infusion.    Continue Bentyl as needed for abdominal pain  Advised to use Anusol cream as needed  MiraLAX 17 g p.o. daily as needed  Patient wants to wait for hemorrhoidal  procedure until some of her surgery scheduled are done  Follow-up in 6 months with labs      2/28/2023  EGD done on 9/13/2022 revealed nonobstructing mild esophageal ring, few healing ulcerations in the fundus within the hernial sac.  Gastric pathology negative for H. pylori no history suggestive any current GI bleed.  Her recent lab work done on 2/9/2023 reviewed which reveals a normal hemoglobin of 11.7 g/dL.  Rest of the lab work were normal  Patient does have a underlying irritable bowel syndrome symptoms.  However her current history is suspicious for a upper GI bleed versus bleeding from the ileocolonic anastomotic ulcer in the setting of anticoagulation use.  Her stool normalized this last 2 days.  Her hemoglobin done on 6/20/2022 was normal 12 g/dL.  CT abdomen pelvis with contrast done on 6/20/2022 was normal.  Patient continues to have intermittent left upper quadrant and right lower quadrant abdominal pain, bloating which appears to be functional and could also be secondary to severe diverticular disease.  No clinical signs of acute diverticulitis.     7/6/2021  She was admitted on 6/1/2021 with a GI bleed.  She had a colonoscopy done on 6/2/2021 which revealed diverticulosis involving the sigmoid colon and descending colon transverse colon ascending colon however there was no signs of any diverticular bleed.  Patent end-to-side ileocolonic anastomosis was noted with healing ulceration in the anastomotic site.  This was more in favor of NSAID induced versus ischemic ulcerations.  Patient likely bled from the ulcerations.  Biopsies from the ulcer revealed reactive glandular mucosa with mild activity.  No dysplasia or metaplasia or neoplastic changes. EGD done in 2019 revealed reactive gastropathy. Given her prior history of GI bleed and continued use of anticoagulation we will keep her on a low-dose PPI.     Prior history  9.  Chronic atrial fibrillation/ Chronic hypoxic respiratory failure on home oxygen/  CHF  Currently on Xarelto         Follow Up:   No follow-ups on file.    Iona Sanders MD  [facility]  8/21/2023  15:06 EDT     Please note that portions of this note may have been completed with a voice recognition program.

## 2023-08-23 ENCOUNTER — TELEPHONE (OUTPATIENT)
Dept: CARDIOLOGY | Facility: CLINIC | Age: 83
End: 2023-08-23
Payer: MEDICARE

## 2023-08-23 NOTE — TELEPHONE ENCOUNTER
Pt called to request hold instructions for Xarelto for anticipated dental surgery with Dr. Schuyler Early at [facility]    On Xarelto for chronic AF  Leadless PM implanted 7/5/2023  Last echo, 7/14/2023: unchanged from prior, EF >35%, Mod + MR     Spoke with Dr. Early's office and pt - she is not yet scheduled for dental surgery - Dr. Early's office will reach out when surgery scheduled for hold instructions and clearance.   Dr. Early's office phone - [phone]

## 2023-09-21 ENCOUNTER — TELEPHONE (OUTPATIENT)
Dept: CARDIOLOGY | Facility: CLINIC | Age: 83
End: 2023-09-21
Payer: MEDICARE

## 2023-09-21 NOTE — TELEPHONE ENCOUNTER
Pt needs cardiac risk assessment for teeth extraction by Dr. Early   Office phone:[phone]  Office fax: [phone]    Last appt with TRELL: 23  Next appt scheduled: 23    Last echo: 23   Last EK23    Seeing patient for afib, chf, htn, hld, natalee, dyspnea    Spoke with pt, asymptomatic from a cardiac standpoint.    Will review with TRELL  Per TRELL -   Ok  Avoid lidocaine w epi  Hold xarelto 2 days prior     Letter sent in epic

## 2023-10-09 ENCOUNTER — OFFICE VISIT (OUTPATIENT)
Dept: CARDIOLOGY | Facility: CLINIC | Age: 83
End: 2023-10-09
Payer: MEDICARE

## 2023-10-09 VITALS
WEIGHT: 169 LBS | OXYGEN SATURATION: 94 % | HEART RATE: 84 BPM | HEIGHT: 64 IN | BODY MASS INDEX: 28.85 KG/M2 | DIASTOLIC BLOOD PRESSURE: 74 MMHG | SYSTOLIC BLOOD PRESSURE: 110 MMHG

## 2023-10-09 DIAGNOSIS — I48.11 LONGSTANDING PERSISTENT ATRIAL FIBRILLATION: Primary | ICD-10-CM

## 2023-10-09 PROCEDURE — 93279 PRGRMG DEV EVAL PM/LDLS PM: CPT | Performed by: PHYSICIAN ASSISTANT

## 2023-10-09 PROCEDURE — 3074F SYST BP LT 130 MM HG: CPT | Performed by: PHYSICIAN ASSISTANT

## 2023-10-09 PROCEDURE — 99213 OFFICE O/P EST LOW 20 MIN: CPT | Performed by: PHYSICIAN ASSISTANT

## 2023-10-09 PROCEDURE — 3078F DIAST BP <80 MM HG: CPT | Performed by: PHYSICIAN ASSISTANT

## 2023-10-09 RX ORDER — BUDESONIDE AND FORMOTEROL FUMARATE DIHYDRATE 160; 4.5 UG/1; UG/1
2 AEROSOL RESPIRATORY (INHALATION)
COMMUNITY
Start: 2023-09-13

## 2023-10-09 NOTE — PROGRESS NOTES
[facility]   OFFICE NOTE      Elsa Alcaraz  1940  PCP: Kemi Hdz MD    SUBJECTIVE:   Elsa Alcaraz is a 83 y.o. female seen for a follow up visit regarding the following:     CC:Afib    HPI:   Pleasant 83 year olf female presents for follow up regarding Permanent Afib, CHF, and recent Leadless ppm placement. She feels better since PPM placement. She is less short of breath. She recently was seen by Dr. Germain. She denies any dizziness, near syncope or syncope.     Cardiac PMH: (Old records have been reviewed and summarized below)  A. Fib- chronic 2020 echo MVP, mild TR, normal LVEF  '15 echo EF 55-60%, mild to moderate MR w no evidence of MVP, mild TR, RVSP 35 mmHg, biatrial enlargement  [id number] 4, a/c w Coumadin   2020 echo: EF 45%, AV calcification, AV max PG 14 mmHg, mod MR, mild TR, RVSP 23.6 mmHg, incidental afib noted   echo EF 35-40%    echo EF 38% mod+ MR RVSP 40  23 MDT leadless pacer Dr Willis  Dyspnea  2011 MPS WNL  proBNP [phone] (<1800)  HTN  HLD    TG 1:30 HDL 60 LDL 17  3/27/18  TG 80 HDL 49 LDL 73  DAVID  COPD/asthma  Iron deficiency anemia/bone marrow deficiency   Per Dr. Harding, Dr. Fagan  DAVID-CPAP non-adherent  GERD  Hiatal hernia  GIBleed  Lower GI ulcer  Depression/anxiety  Surgical history  Anal fistulotomy  Breast lumpectomy   next line cholecystectomy  Partial colectomy  Ventral hernia repair       Past Medical History, Past Surgical History, Family history, Social History, and Medications were all reviewed with the patient today and updated as necessary.       Current Outpatient Medications:     albuterol sulfate  (90 Base) MCG/ACT inhaler, Inhale 2 puffs 4 (Four) Times a Day., Disp: 18 g, Rfl: 0    budesonide-formoterol (SYMBICORT) 160-4.5 MCG/ACT inhaler, Inhale 2 puffs 2 (Two) Times a Day., Disp: , Rfl:     cholecalciferol (VITAMIN D3) 25 MCG (1000 UT) tablet, Take 1 tablet  "by mouth Daily., Disp: , Rfl:     citalopram (CeleXA) 20 MG tablet, Take 1 tablet by mouth Daily., Disp: , Rfl:     furosemide (Lasix) 20 MG tablet, Take 1 tablet by mouth Daily., Disp: 30 tablet, Rfl: 0    metoprolol succinate XL (TOPROL-XL) 50 MG 24 hr tablet, Take 1 tablet by mouth Daily., Disp: , Rfl:     O2 (OXYGEN), Inhale 4 L/min Daily As Needed., Disp: , Rfl:     pantoprazole (PROTONIX) 40 MG EC tablet, Take 1 tablet by mouth Daily., Disp: , Rfl:     polyethylene glycol (MiraLax) 17 GM/SCOOP powder, Take 17 g by mouth Daily As Needed (constipation). Indications: Constipation caused by Irritable Bowel Syndrome, Disp: 510 g, Rfl: 1    potassium chloride (K-DUR,KLOR-CON) 10 MEQ CR tablet, Take 1 tablet by mouth 2 (Two) Times a Day., Disp: , Rfl:     sacubitril-valsartan (ENTRESTO) 24-26 MG tablet, Take 1 tablet by mouth Every 12 (Twelve) Hours., Disp: 60 tablet, Rfl: 0    spironolactone (ALDACTONE) 25 MG tablet, Take 0.5 tablets by mouth Daily., Disp: 15 tablet, Rfl: 0    Xarelto 20 MG tablet, Take 1 tablet by mouth once daily, Disp: 90 tablet, Rfl: 3      No Known Allergies      PHYSICAL EXAM:    /74 (BP Location: Left arm, Patient Position: Sitting)   Pulse 84   Ht 161.3 cm (63.5\")   Wt 76.7 kg (169 lb)   SpO2 94%   BMI 29.47 kg/mý        Wt Readings from Last 5 Encounters:   10/09/23 76.7 kg (169 lb)   08/21/23 78 kg (172 lb)   07/19/23 77.5 kg (170 lb 12.8 oz)   07/14/23 85.7 kg (189 lb)   07/06/23 85.8 kg (189 lb 2.5 oz)       BP Readings from Last 5 Encounters:   10/09/23 110/74   08/21/23 100/59   07/19/23 110/72   07/14/23 149/89   07/06/23 94/52       General appearance - Alert, well appearing, and in no distress   Mental status - Affect appropriate to mood.  Eyes - Sclerae anicteric,  ENMT - Hearing grossly normal bilaterally, Dental hygiene good.  Neck - Carotids upstroke normal bilaterally, no bruits, no JVD.  Resp - Clear to auscultation, no wheezes, rales or rhonchi, symmetric air " entry.  Heart - Normal rate, regular rhythm, normal S1, S2, no murmurs, rubs, clicks or gallops.  GI - Soft, nontender, nondistended, no masses or organomegaly.  Neurological - Grossly intact - normal speech, no focal findings  Musculoskeletal - No joint tenderness, deformity or swelling, no muscular tenderness noted.  Extremities - Peripheral pulses normal, no pedal edema, no clubbing or cyanosis.  Skin - Normal coloration and turgor.  Psych -  oriented to person, place, and time.    Medical problems and test results were reviewed with the patient today.     No results found for this or any previous visit (from the past 672 hour(s)).          Device Interrogation:  Please see device interrogation form which has been signed and updated for full details.  Medtronic pacemaker VVIR 80 RV paced 86%.  Pacing rate set at 80.  Normal R wave thresholds impedance.  Battery life 8 years remaining.    ASSESSMENT   1. Permanent  Afib: Rate controlled on BB. Anticoagulation with Xarelto Rx.     2. Symptomatic Bradycardia: MDT Leadless PPM    3. CHF : Echo 7/14/2023 EF 40%, LAE, Severe MR.         PLAN  Patient states she is feeling better since pacemaker she has more energy she is active on a regular basis.  She follows Dr. Germain regarding cardiomyopathy.  She will continue guideline directed medical therapy.  She return for Dr. Willis in 1 year sooner as needed.            10/9/2023  14:13 EDT    Electronically signed by LYNETTE Nuñez, 10/09/23, 3:23 PM EDT.

## 2023-11-29 ENCOUNTER — OFFICE VISIT (OUTPATIENT)
Dept: CARDIOLOGY | Facility: CLINIC | Age: 83
End: 2023-11-29
Payer: MEDICARE

## 2023-11-29 VITALS
SYSTOLIC BLOOD PRESSURE: 104 MMHG | HEIGHT: 63 IN | OXYGEN SATURATION: 96 % | WEIGHT: 166.6 LBS | HEART RATE: 88 BPM | DIASTOLIC BLOOD PRESSURE: 64 MMHG | BODY MASS INDEX: 29.52 KG/M2

## 2023-11-29 DIAGNOSIS — I48.20 CHRONIC ATRIAL FIBRILLATION: ICD-10-CM

## 2023-11-29 DIAGNOSIS — I34.0 NONRHEUMATIC MITRAL VALVE REGURGITATION: ICD-10-CM

## 2023-11-29 DIAGNOSIS — I50.22 CHRONIC SYSTOLIC CONGESTIVE HEART FAILURE: Primary | ICD-10-CM

## 2023-11-29 PROCEDURE — 3074F SYST BP LT 130 MM HG: CPT | Performed by: INTERNAL MEDICINE

## 2023-11-29 PROCEDURE — 3078F DIAST BP <80 MM HG: CPT | Performed by: INTERNAL MEDICINE

## 2023-11-29 PROCEDURE — 99214 OFFICE O/P EST MOD 30 MIN: CPT | Performed by: INTERNAL MEDICINE

## 2023-11-29 NOTE — PROGRESS NOTES
[facility]  Office Progress Note  Elsa Alcaraz  1940  [address] [place] [address]       Visit Date: 23    PCP: [facility] Hdz [facility]  [address] [place] [address]    IDENTIFICATION: A 83 y.o. female  from [place], [place]    PROBLEM LIST:   A. Fib- chronic 2020 echo MVP, mild TR, normal LVEF  '15 echo EF 55-60%, mild to moderate MR w no evidence of MVP, mild TR, RVSP 35 mmHg, biatrial enlargement  [id number] 4, a/c w Coumadin   2020 echo: EF 45%, AV calcification, AV max PG 14 mmHg, mod MR, mild TR, RVSP 23.6 mmHg, incidental afib noted   echo EF 35-40%    echo EF 38% mod+ MR RVSP 40  23 high grade AV block -MDT leadless pacer Dr Jimenezbo  Dyspnea   MPS WNL  proBNP [id number] (<1800)  HTN  HLD    TG 1:30 HDL 60 LDL 17  3/27/18  TG 80 HDL 49 LDL 73  DAVID  COPD/asthma  Iron deficiency anemia/bone marrow deficiency   Per Dr. Harding, Dr. Fagan  DAVID-CPAP non-adherent  GERD  Hiatal hernia  GIBleed  Lower GI ulcer  Surgical history  Anal fistulotomy  Breast lumpectomy   next line cholecystectomy  Partial colectomy  Ventral hernia repair        CC:   Chief Complaint   Patient presents with    Atrial Fibrillation    Shortness of Breath    Dizziness       Allergies  No Known Allergies    Current Medications    Current Outpatient Medications:     albuterol sulfate  (90 Base) MCG/ACT inhaler, Inhale 2 puffs 4 (Four) Times a Day., Disp: 18 g, Rfl: 0    budesonide-formoterol (SYMBICORT) 160-4.5 MCG/ACT inhaler, Inhale 2 puffs 2 (Two) Times a Day., Disp: , Rfl:     cholecalciferol (VITAMIN D3) 25 MCG (1000 UT) tablet, Take 1 tablet by mouth Daily., Disp: , Rfl:     citalopram (CeleXA) 20 MG tablet, Take 1 tablet by mouth Daily., Disp: , Rfl:     Cyanocobalamin (B-12 COMPLIANCE INJECTION IJ), Inject  as directed Every 30 (Thirty) Days., Disp: , Rfl:     furosemide (Lasix) 20 MG tablet, Take 1 tablet by  "mouth Daily., Disp: 30 tablet, Rfl: 0    metoprolol succinate XL (TOPROL-XL) 50 MG 24 hr tablet, Take 1 tablet by mouth Daily., Disp: , Rfl:     O2 (OXYGEN), Inhale 4 L/min Daily As Needed., Disp: , Rfl:     pantoprazole (PROTONIX) 40 MG EC tablet, Take 1 tablet by mouth Daily., Disp: , Rfl:     polyethylene glycol (MiraLax) 17 GM/SCOOP powder, Take 17 g by mouth Daily As Needed (constipation). Indications: Constipation caused by Irritable Bowel Syndrome, Disp: 510 g, Rfl: 1    potassium chloride (K-DUR,KLOR-CON) 10 MEQ CR tablet, Take 1 tablet by mouth 2 (Two) Times a Day., Disp: , Rfl:     sacubitril-valsartan (ENTRESTO) 24-26 MG tablet, Take 1 tablet by mouth Every 12 (Twelve) Hours., Disp: 60 tablet, Rfl: 0    spironolactone (ALDACTONE) 25 MG tablet, Take 0.5 tablets by mouth Daily., Disp: 15 tablet, Rfl: 0    Xarelto 20 MG tablet, Take 1 tablet by mouth once daily, Disp: 90 tablet, Rfl: 3      History of Present Illness   Elsa Alcaraz is a 83 y.o. year old female here for follow up.  Status quo shortness of breath labile filling pressures.  She is concerned and having difficulty getting her [employer] home monitoring set up  No crescendo pattern of shortness of breath.  Some hypotension    OBJECTIVE:  Vitals:    11/29/23 1300   BP: 104/64   BP Location: Left arm   Patient Position: Sitting   Pulse: 88   SpO2: 96%   Weight: 75.6 kg (166 lb 9.6 oz)   Height: 160 cm (63\")         Body mass index is 29.51 kg/m².    Constitutional:       Appearance: Healthy appearance. Not in distress.   Neck:      Vascular: No JVR. JVD normal.   Pulmonary:      Effort: Pulmonary effort is normal.      Breath sounds: Normal breath sounds. No wheezing. No rhonchi. No rales.   Chest:      Chest wall: Not tender to palpatation.   Cardiovascular:      PMI at left midclavicular line. Normal rate. Regular rhythm. Normal S1. Normal S2.       Murmurs: There is a systolic murmur.      No gallop.  No click. No rub.   Pulses:     Intact distal " pulses.   Edema:     Peripheral edema absent.   Abdominal:      General: Bowel sounds are normal.      Palpations: Abdomen is soft.      Tenderness: There is no abdominal tenderness.   Musculoskeletal: Normal range of motion.         General: No tenderness. Skin:     General: Skin is warm and dry.   Neurological:      General: No focal deficit present.      Mental Status: Alert and oriented to person, place and time.         Diagnostic Data:  Procedures      ASSESSMENT:   Diagnosis Plan   1. Chronic systolic congestive heart failure        2. Nonrheumatic mitral valve regurgitation        3. Chronic atrial fibrillation                PLAN:  CHF chronic systolic with combined valvular heart issues continued medical management.  Considering SGLT2 inhibitor however given hypotension reticent to initiate    Hypertension controlled Entresto spironolactone metoprolol    Mitral regurgitation moderate     A. fib chronic anticoagulated rate controlled        Fidencio Germain MD, FACC

## 2024-01-02 ENCOUNTER — TELEPHONE (OUTPATIENT)
Dept: PULMONOLOGY | Facility: CLINIC | Age: 84
End: 2024-01-02

## 2024-01-02 NOTE — TELEPHONE ENCOUNTER
Patient will need to be qualified.. transferred to [facility] for sooner appt.

## 2024-01-02 NOTE — TELEPHONE ENCOUNTER
Caller: Elsa Alcaraz A    Relationship to patient: Self    Best call back number: [phone]    Patient is needing: PATIENT IS WANTING TO SPEAK TO SOMEONE ABOUT SIGNING OFF ON HER TO GET A OXYGEN TANK SHE CAN CARRY ON HER SHOULDER. SHE SAID HER INSURANCE JUST NEEDS DR. VICKERS TO SIGN OFF ON IT BUT SHE HASN'T BEEN SEEN SINCE 2022. DR. VICKERS AND DARIAN BOYER ARE BOOKED TILL MAY SO SHE IS WANTING TO KNOW WILL SHE HAVE TO BE SEEN BEFORE SHE CAN GET THE OXYGEN MACHINE.

## 2024-01-25 ENCOUNTER — TELEPHONE (OUTPATIENT)
Dept: CARDIOLOGY | Facility: CLINIC | Age: 84
End: 2024-01-25
Payer: MEDICARE

## 2024-01-25 NOTE — TELEPHONE ENCOUNTER
Missed scheduled remote Medtronic [employer] cardiac device transmission. Called patient-left voice mail message on both phone numbers listed  to send in transmission or call device clinic for assistance, call back phone number provided.

## 2024-02-07 ENCOUNTER — OFFICE VISIT (OUTPATIENT)
Dept: PULMONOLOGY | Facility: CLINIC | Age: 84
End: 2024-02-07
Payer: MEDICARE

## 2024-02-07 VITALS
HEIGHT: 63 IN | DIASTOLIC BLOOD PRESSURE: 58 MMHG | OXYGEN SATURATION: 90 % | WEIGHT: 165 LBS | SYSTOLIC BLOOD PRESSURE: 116 MMHG | RESPIRATION RATE: 14 BRPM | HEART RATE: 90 BPM | BODY MASS INDEX: 29.23 KG/M2

## 2024-02-07 DIAGNOSIS — G47.34 NOCTURNAL HYPOXIA: ICD-10-CM

## 2024-02-07 DIAGNOSIS — J45.30 MILD PERSISTENT ASTHMA WITHOUT COMPLICATION: Primary | ICD-10-CM

## 2024-02-07 DIAGNOSIS — I50.22 CHRONIC SYSTOLIC CHF (CONGESTIVE HEART FAILURE): ICD-10-CM

## 2024-02-07 DIAGNOSIS — Z95.0 CARDIAC PACEMAKER: ICD-10-CM

## 2024-02-07 RX ORDER — BUDESONIDE AND FORMOTEROL FUMARATE DIHYDRATE 160; 4.5 UG/1; UG/1
2 AEROSOL RESPIRATORY (INHALATION)
Qty: 10.2 G | Refills: 5 | Status: SHIPPED | OUTPATIENT
Start: 2024-02-07

## 2024-02-07 RX ORDER — ALBUTEROL SULFATE 90 UG/1
2 AEROSOL, METERED RESPIRATORY (INHALATION)
Qty: 18 G | Refills: 5 | Status: SHIPPED | OUTPATIENT
Start: 2024-02-07

## 2024-02-07 NOTE — PROGRESS NOTES
"  Chief Complaint   Patient presents with    Breathing Problem    Follow-up         Subjective   Elsa Alcaraz is a 83 y.o. female.   Last seen in Aug 2022    Patient was admitted to [facility] in July 2023.    The patient denies being on CPAP at this time.    She does mention shortness of breath, occasional cough and wheezing.    She uses Symbicort \"as needed\".    The following portions of the patient's history were reviewed and updated as appropriate: allergies, current medications, past family history, past medical history, past social history, and past surgical history.    Review of Systems   HENT:  Positive for rhinorrhea and sneezing. Negative for sinus pressure and sore throat.    Respiratory:  Positive for cough and shortness of breath. Negative for chest tightness and wheezing.        Objective   Visit Vitals  /58   Pulse 90   Resp 14   Ht 160 cm (63\") Comment: pt reported   Wt 74.8 kg (165 lb)   SpO2 90% Comment: on room air (REST)   BMI 29.23 kg/m²     ============================  ============================    6 MINUTE WALK TEST    Elsa Alcaraz   1940             BASELINE   SpO2%: 90 % RA   Heart Rate 90   Blood Pressure 116/58     EXERCISE SpO2% HEART RATE RA or O2 @ LPM   1 MINUTE 90 90 RA   2 MINUTES 87 106 RA ADD 2LPM   3 MINUTES 90 98 2LPM   4 MINUTES 85 117 ^3LPM   5 MINUTES 79 121 ^4LPM   6 MINUTES 89 118 4LPM   (Number of laps: 7 X 36 meters + Final partial lap:  meters = 252 meters)            Distance Walked:  252 Meters   SpO2% Post Exercise:  98 % ON 4LPM   HR Post Exercise:  103     Reason to stop (if applicable):   ____ Chest Pain   ____ Light Headedness   ____ Dyspnea Unrelieved by Rest   ____ Abnormal Gait Pattern   ____ Severe Fatigue   ____ Other (Specify: __________________________)    Tech Comments (if any):      Test performed by:SELENA    ============================  ============================      BMI Readings from Last 3 Encounters:   02/07/24 29.23 kg/m² "   11/29/23 29.51 kg/m²   10/09/23 29.47 kg/m²       Physical Exam  Vitals reviewed.   Constitutional:       Appearance: She is well-developed.   HENT:      Head: Normocephalic and atraumatic.   Eyes:      Extraocular Movements: Extraocular movements intact.   Cardiovascular:      Rate and Rhythm: Normal rate.   Pulmonary:      Comments: Somewhat hyperresonant to percussion.  Somewhat decreased air entry.  No obvious wheezing noted.   Musculoskeletal:      Cervical back: Neck supple.   Neurological:      Mental Status: She is alert.           Assessment & Plan   Diagnoses and all orders for this visit:    1. Mild persistent asthma without complication (Primary)  -     Converted Six Minute Walk  -     Spirometry With Bronchodilator; Future    2. Nocturnal hypoxia    3. Chronic systolic CHF (congestive heart failure)    4. Cardiac pacemaker  Comments:  Leadless PPM    Other orders  -     budesonide-formoterol (SYMBICORT) 160-4.5 MCG/ACT inhaler; Inhale 2 puffs 2 (Two) Times a Day. Rinse mouth with water after use  Dispense: 10.2 g; Refill: 5  -     albuterol sulfate  (90 Base) MCG/ACT inhaler; Inhale 2 puffs 4 (Four) Times a Day.  Dispense: 18 g; Refill: 5           Return in about 6 months (around 7/26/2024) for Recheck, Spirometry F/U, For [place] ([place]).    DISCUSSION (if any):  I reviewed the patient's discharge summary, dated 7/4/2023, that mentioned symptomatic bradycardia, chronic atrial fibrillation, COPD, chronic systolic heart failure and mitral regurgitation.    I have personally reviewed images of the last chest x-ray and available CT.  Last chest x-ray was reviewed personally and the results were shared with the patient.  Images reviewed personally.   Results for orders placed during the hospital encounter of 07/04/23    XR Chest PA & Lateral    Narrative  EXAMINATION: XR CHEST PA AND LATERAL    DATE OF EXAM: 7/6/2023 5:12 AM    HISTORY: Venous pacer    COMPARISON: July 4,  2023.    FINDINGS:    Leadless pacemaker projecting over the right ventricle, expected location.    No visible pleural effusion or pneumothorax or consolidative opacity.    Cardiac mediastinal silhouette and pulmonary vasculature unchanged.    Impression  1.  Leadless pacemaker in expected location.  2.  No other interval change.    Electronically signed by:  Magdiel Evans M.D.  7/6/2023 4:41 AM Mountain Time      I also reviewed her last echocardiogram and shared the results with her.   Results for orders placed during the hospital encounter of 07/14/23    Adult Transthoracic Echo Complete W/ Cont if Necessary Per Protocol    Interpretation Summary    Left ventricular ejection fraction appears to be 36 - 40%.    The right ventricular cavity is borderline dilated.    The left atrial cavity is dilated.    The right atrial cavity is dilated.    There is calcification of the aortic valve.    Moderate to severe mitral valve regurgitation is present.    Estimated right ventricular systolic pressure from tricuspid regurgitation is mildly elevated (35-45 mmHg). Calculated right ventricular systolic pressure from tricuspid regurgitation is 36 mmHg.    There is a small right pleural effusion.      Results for orders placed during the hospital encounter of 10/10/22    Adult Transthoracic Echo Complete W/ Cont if Necessary Per Protocol    Interpretation Summary    Estimated left ventricular EF = 40% Left ventricular systolic function is mildly decreased.    Left ventricular wall thickness is consistent with hypertrophy.    There is calcification of the aortic valve.    Moderate mitral valve regurgitation is present.    Estimated right ventricular systolic pressure from tricuspid regurgitation is normal (<35 mmHg). Calculated right ventricular systolic pressure from tricuspid regurgitation is 23 mmHg.      Results for orders placed during the hospital encounter of 07/26/21    Adult Transthoracic Echo Complete W/ Cont if  Necessary Per Protocol    Interpretation Summary  · Estimated left ventricular EF = 38% Left ventricular systolic function is moderately decreased.  · Moderate to severe mitral valve regurgitation is present.  · There is a moderate sized left pleural effusion.      Laboratory workup also showed   Lab Results   Component Value Date    HGB 9.4 (L) 07/06/2023    HGB 10.9 (L) 07/05/2023    HGB 11.6 (L) 07/04/2023   ,   Lab Results   Component Value Date    HCT 32.3 (L) 07/06/2023    HCT 36.8 07/05/2023    HCT 39.2 07/04/2023       Lab Results   Component Value Date    EOSABS 0.03 07/04/2023    EOSABS 0.01 07/03/2023    EOSABS 0.19 07/02/2023    & Laboratory workup also showed   Lab Results   Component Value Date    CO2 32.0 (H) 07/06/2023   ,   Lab Results   Component Value Date    HGBA1C 5.30 07/05/2023    HGBA1C 5.20 05/13/2021   ,   Lab Results   Component Value Date    TSH 0.440 06/03/2021     ===========================  ===========================    Last PFTs were reviewed.  Severe obstruction with mild restriction.    Spirometry was ordered for follow up.     It appears that her symptoms of asthma are under adequate control with the current regimen.    Patient's medications for underlying asthma were reviewed in great detail.    Any needed adjustments to her pulmonary medications, either for clinical or insurance coverage reasons, have been made and are reflected in the orders.    Compliance with medications stressed.     Side effects of prescribed medications discussed with the patient.    The need to continue to be aware of triggers that may cause asthma exacerbation versus progression of disease, was also discussed.    I have discussed asthma action plan with her.    The patient was asked to call this office if the symptoms worsen.    The patient was advised to continue using oxygen at night.    The patient was advised to start using oxygen with exercise.    Vaccination status addressed.    Up-to-date with  influenza vaccinations.     Up-to-date with pneumonia vaccinations.     It was recommended that the patient consider Prevnar-20 vaccination and discuss it with her PCP, if not already obtained.         Dictated utilizing Dragon dictation.    This document was electronically signed by Brittney Bautista MD on 02/07/24 at 11:54 EST

## 2024-02-21 ENCOUNTER — OFFICE VISIT (OUTPATIENT)
Dept: GASTROENTEROLOGY | Facility: CLINIC | Age: 84
End: 2024-02-21
Payer: MEDICARE

## 2024-02-21 ENCOUNTER — LAB (OUTPATIENT)
Dept: LAB | Facility: HOSPITAL | Age: 84
End: 2024-02-21
Payer: MEDICARE

## 2024-02-21 VITALS
TEMPERATURE: 96.9 F | BODY MASS INDEX: 28.53 KG/M2 | SYSTOLIC BLOOD PRESSURE: 123 MMHG | DIASTOLIC BLOOD PRESSURE: 77 MMHG | WEIGHT: 161 LBS | HEART RATE: 88 BPM | HEIGHT: 63 IN

## 2024-02-21 DIAGNOSIS — D50.0 IRON DEFICIENCY ANEMIA DUE TO CHRONIC BLOOD LOSS: ICD-10-CM

## 2024-02-21 DIAGNOSIS — K59.00 CONSTIPATION, UNSPECIFIED CONSTIPATION TYPE: ICD-10-CM

## 2024-02-21 DIAGNOSIS — E53.8 VITAMIN B12 DEFICIENCY: ICD-10-CM

## 2024-02-21 DIAGNOSIS — K21.9 GASTROESOPHAGEAL REFLUX DISEASE WITHOUT ESOPHAGITIS: ICD-10-CM

## 2024-02-21 DIAGNOSIS — R13.19 ESOPHAGEAL DYSPHAGIA: ICD-10-CM

## 2024-02-21 DIAGNOSIS — Z87.11 HISTORY OF PEPTIC ULCER DISEASE: Primary | ICD-10-CM

## 2024-02-21 LAB
ALBUMIN SERPL-MCNC: 4.5 G/DL (ref 3.5–5.2)
ALBUMIN/GLOB SERPL: 1.6 G/DL
ALP SERPL-CCNC: 91 U/L (ref 39–117)
ALT SERPL W P-5'-P-CCNC: <5 U/L (ref 1–33)
ANION GAP SERPL CALCULATED.3IONS-SCNC: 11.4 MMOL/L (ref 5–15)
AST SERPL-CCNC: 16 U/L (ref 1–32)
BASOPHILS # BLD AUTO: 0.06 10*3/MM3 (ref 0–0.2)
BASOPHILS NFR BLD AUTO: 0.6 % (ref 0–1.5)
BILIRUB SERPL-MCNC: 0.9 MG/DL (ref 0–1.2)
BUN SERPL-MCNC: 30 MG/DL (ref 8–23)
BUN/CREAT SERPL: 36.1 (ref 7–25)
CALCIUM SPEC-SCNC: 10 MG/DL (ref 8.6–10.5)
CHLORIDE SERPL-SCNC: 98 MMOL/L (ref 98–107)
CO2 SERPL-SCNC: 31.6 MMOL/L (ref 22–29)
CREAT SERPL-MCNC: 0.83 MG/DL (ref 0.57–1)
DEPRECATED RDW RBC AUTO: 49.9 FL (ref 37–54)
EGFRCR SERPLBLD CKD-EPI 2021: 70.5 ML/MIN/1.73
EOSINOPHIL # BLD AUTO: 0.28 10*3/MM3 (ref 0–0.4)
EOSINOPHIL NFR BLD AUTO: 2.7 % (ref 0.3–6.2)
ERYTHROCYTE [DISTWIDTH] IN BLOOD BY AUTOMATED COUNT: 16.9 % (ref 12.3–15.4)
GLOBULIN UR ELPH-MCNC: 2.9 GM/DL
GLUCOSE SERPL-MCNC: 104 MG/DL (ref 65–99)
HCT VFR BLD AUTO: 41.1 % (ref 34–46.6)
HGB BLD-MCNC: 11.8 G/DL (ref 12–15.9)
HYPOCHROMIA BLD QL: NORMAL
IMM GRANULOCYTES # BLD AUTO: 0.04 10*3/MM3 (ref 0–0.05)
IMM GRANULOCYTES NFR BLD AUTO: 0.4 % (ref 0–0.5)
LYMPHOCYTES # BLD AUTO: 1.53 10*3/MM3 (ref 0.7–3.1)
LYMPHOCYTES NFR BLD AUTO: 14.7 % (ref 19.6–45.3)
MCH RBC QN AUTO: 23.4 PG (ref 26.6–33)
MCHC RBC AUTO-ENTMCNC: 28.7 G/DL (ref 31.5–35.7)
MCV RBC AUTO: 81.5 FL (ref 79–97)
MONOCYTES # BLD AUTO: 1.05 10*3/MM3 (ref 0.1–0.9)
MONOCYTES NFR BLD AUTO: 10.1 % (ref 5–12)
NEUTROPHILS NFR BLD AUTO: 7.45 10*3/MM3 (ref 1.7–7)
NEUTROPHILS NFR BLD AUTO: 71.5 % (ref 42.7–76)
NRBC BLD AUTO-RTO: 0 /100 WBC (ref 0–0.2)
OVALOCYTES BLD QL SMEAR: NORMAL
PLAT MORPH BLD: NORMAL
PLATELET # BLD AUTO: 409 10*3/MM3 (ref 140–450)
PMV BLD AUTO: 9.8 FL (ref 6–12)
POTASSIUM SERPL-SCNC: 4.3 MMOL/L (ref 3.5–5.2)
PROT SERPL-MCNC: 7.4 G/DL (ref 6–8.5)
RBC # BLD AUTO: 5.04 10*6/MM3 (ref 3.77–5.28)
SODIUM SERPL-SCNC: 141 MMOL/L (ref 136–145)
VIT B12 BLD-MCNC: 1153 PG/ML (ref 211–946)
WBC MORPH BLD: NORMAL
WBC NRBC COR # BLD AUTO: 10.41 10*3/MM3 (ref 3.4–10.8)

## 2024-02-21 PROCEDURE — 85025 COMPLETE CBC W/AUTO DIFF WBC: CPT

## 2024-02-21 PROCEDURE — 80053 COMPREHEN METABOLIC PANEL: CPT

## 2024-02-21 PROCEDURE — 82607 VITAMIN B-12: CPT

## 2024-02-21 PROCEDURE — 85007 BL SMEAR W/DIFF WBC COUNT: CPT

## 2024-02-21 PROCEDURE — 36415 COLL VENOUS BLD VENIPUNCTURE: CPT

## 2024-02-21 RX ORDER — FAMOTIDINE 40 MG/1
40 TABLET, FILM COATED ORAL NIGHTLY
Qty: 30 TABLET | Refills: 2 | Status: SHIPPED | OUTPATIENT
Start: 2024-02-21

## 2024-02-21 RX ORDER — SODIUM CHLORIDE 9 MG/ML
70 INJECTION, SOLUTION INTRAVENOUS CONTINUOUS PRN
OUTPATIENT
Start: 2024-02-21

## 2024-02-21 RX ORDER — CHLORHEXIDINE GLUCONATE ORAL RINSE 1.2 MG/ML
SOLUTION DENTAL
COMMUNITY
Start: 2024-02-14

## 2024-02-21 RX ORDER — PSYLLIUM SEED (WITH DEXTROSE)
POWDER (GRAM) ORAL DAILY
COMMUNITY

## 2024-02-21 NOTE — PROGRESS NOTES
Follow Up Note     Date: 2024   Patient Name: Elsa Alcaraz  MRN: [id number]  : 1940     Referring Physician: Kemi Hdz MD    Chief Complaint:    Chief Complaint   Patient presents with    Anemia     ZAYNAB    Difficulty Swallowing    Hemorrhoids    HX PUD       Interval History:   2024  Elsa Alcaraz is a 83 y.o. female who is here today for follow up for her ongoing anemia difficulty swallowing and history of peptic ulcer disease.  Patient states that recently her reflux symptoms got worse despite on a Protonix p.o. daily dose.  She has been having good bowel movement now with domicile daily and MiraLAX as needed.  She also has some retrosternal pain and epigastric pain     2021  This is 81-year-old female patient with a prior history of hypertension, hyperlipidemia, chronic atrial fibrillation on Eliquis, history of breast cancer status post lumpectomy and radiation therapy, history of colon cancer status post right hemicolectomy more than 20 years ago, CHF, COPD/obstructive sleep apnea on home oxygen, colonic diverticulosis, was brought to the emergency room today on 2021 with complaints of rectal bleeding for the past 2 days.     Patient states that she was doing fine until day before yesterday, then had a bowel movement with blood in the stool.  Subsequently she had multiple bowel movements at least 4 times every day.  She continued to have a red blood with clots even today for which she came to emergency room.. She had at least 3 times bowel movement with dark blood and clots.  She does have associated left-sided abdominal cramps.  Denies any nausea vomiting, no fever chills.     Patient denies any prior history of GI bleed.  She has been taking Xarelto and last dose was day before yesterday 2 days ago.  Denies any NSAID use. She states that she had an EGD done in 2019 Dr. Daniel.  Last colonoscopy was about 5 years ago and was been told normal no reports available.   "Abdominal pain and father had some type of stomach cancer details unknown. She was evaluated emergency room and noted to have a drop in her H&H from 14 to 10 g/dL.  CT scan abdomen pelvis done which showed only colonic diverticulosis otherwise unremarkable.  GI was consulted for further evaluation and management      Subjective      Past Medical History:   Past Medical History:   Diagnosis Date    Anemia     Anxiety disorder due to general medical condition 01/11/2017    Arthritis     Atrial fibrillation 01/11/2017    Body piercing     BOTH EARS    Borderline diabetes     Breast cancer 2003    right-radiation and a lumpectomy    Broken tooth     Cataract 01/11/2017    Left eye surgery 11/19    Chronic bronchitis 01/11/2017    Colon cancer 11/30/1998    had surgery and chemo    COPD (chronic obstructive pulmonary disease)     Cyanocobalamine deficiency (non anemic)     Depression 01/11/2017    Diverticulosis     Esophageal reflux 01/11/2017    Hiatal hernia 01/11/2017    History of bladder infections     History of echocardiogram 07/26/2021    Hx of exercise stress test     \"several years ago by Dr. Mercado\".      Hx of radiation therapy     Hyperlipidemia     Hypertension 01/11/2017    Impaired functional mobility and activity tolerance     Impaired functional mobility, balance, gait, and endurance     Osteoporosis     Palpitations 01/11/2017    Prediabetes     Problems with swallowing     meat at times per pt report    Shortness of breath     WITH EXERTION     Sleep apnea 01/11/2017    NO CPAP    Tricuspid valve disorder 01/11/2017    Patient doesn't know anything about this    Vitamin D deficiency     Wears glasses      Past Surgical History:   Past Surgical History:   Procedure Laterality Date    ANAL FISTULOTOMY      APPENDECTOMY      1998    BREAST BIOPSY      BREAST LUMPECTOMY Right 03/06/2006    CARDIAC ELECTROPHYSIOLOGY PROCEDURE N/A 07/05/2023    Procedure: Micra;  Surgeon: Keven Willis DO;  Location: "  [facility];  Service: Cardiology;  Laterality: N/A;    CATARACT EXTRACTION      both eyes     CATARACT EXTRACTION W/ INTRAOCULAR LENS IMPLANT Left 2019    Procedure: CATARACT PHACO EXTRACTION WITH INTRAOCULAR LENS IMPLANT LEFT;  Surgeon: Masoud David MD;  Location: [facility] OR;  Service: Ophthalmology    CATARACT EXTRACTION W/ INTRAOCULAR LENS IMPLANT Right 2019    Procedure: CATARACT PHACO EXTRACTION WITH INTRAOCULAR LENS IMPLANT RIGHT;  Surgeon: Masoud David MD;  Location: [facility] OR;  Service: Ophthalmology     SECTION  1981    CHOLECYSTECTOMY  2009    COLECTOMY PARTIAL / TOTAL  1998    COLON CANCER    COLONOSCOPY      COLONOSCOPY N/A 2021    Procedure: COLONOSCOPY WITH BIOPSY;  Surgeon: Iona Sanders MD;  Location: [facility] ENDOSCOPY;  Service: Gastroenterology;  Laterality: N/A;    ENDOSCOPY      ENDOSCOPY N/A 2018    Procedure: ESOPHAGOGASTRODUODENOSCOPY WITH COLD FORCEP BIOPSY;  Surgeon: Vasquez Fagan MD;  Location: [facility] ENDOSCOPY;  Service: Gastroenterology    ENDOSCOPY N/A 2019    Procedure: ESOPHAGOGASTRODUODENOSCOPY WITH BIOPSY;  Surgeon: Vasquez Fagan MD;  Location: [facility] ENDOSCOPY;  Service: Gastroenterology    ENDOSCOPY N/A 2022    Procedure: ESOPHAGOGASTRODUODENOSCOPY with biopsy and polypectomy  ;  Surgeon: Iona Sanders MD;  Location: [facility] ENDOSCOPY;  Service: Gastroenterology;  Laterality: N/A;    HYSTERECTOMY          INSERT / REPLACE / REMOVE PACEMAKER      SKIN BIOPSY Left     arm    SKIN LESION EXCISION N/A     VENTRAL HERNIA REPAIR  10/28/2012       Family History:   Family History   Problem Relation Age of Onset    Hypertension Mother     Asthma Mother     COPD Mother     Osteoarthritis Mother     Stomach cancer Father     Cancer Father     Cancer Sister     Diabetes Maternal Grandmother     Colon cancer Neg Hx        Social History:   Social History      Socioeconomic History    Marital status:    Tobacco Use    Smoking status: Never     Passive exposure: Never    Smokeless tobacco: Never   Vaping Use    Vaping Use: Never used   Substance and Sexual Activity    Alcohol use: Yes     Alcohol/week: 1.0 - 2.0 standard drink of alcohol     Types: 1 - 2 Glasses of wine per week     Comment: rarely    Drug use: Never    Sexual activity: Defer       Medications:     Current Outpatient Medications:     albuterol sulfate  (90 Base) MCG/ACT inhaler, Inhale 2 puffs 4 (Four) Times a Day., Disp: 18 g, Rfl: 5    budesonide-formoterol (SYMBICORT) 160-4.5 MCG/ACT inhaler, Inhale 2 puffs 2 (Two) Times a Day. Rinse mouth with water after use, Disp: 10.2 g, Rfl: 5    chlorhexidine (PERIDEX) 0.12 % solution, SWISH AND SPIT 5-10 MLS THREE TIMES DAILY FOR 14 DAYS, Disp: , Rfl:     cholecalciferol (VITAMIN D3) 25 MCG (1000 UT) tablet, Take 1 tablet by mouth Daily., Disp: , Rfl:     citalopram (CeleXA) 20 MG tablet, Take 1 tablet by mouth Daily., Disp: , Rfl:     Cyanocobalamin (B-12 COMPLIANCE INJECTION IJ), Inject  as directed Every 30 (Thirty) Days., Disp: , Rfl:     furosemide (Lasix) 20 MG tablet, Take 1 tablet by mouth Daily., Disp: 30 tablet, Rfl: 0    Hydrocortisone (PROCTO-MED HC RE), Insert  into the rectum As Needed., Disp: , Rfl:     metoprolol succinate XL (TOPROL-XL) 50 MG 24 hr tablet, Take 1 tablet by mouth Daily., Disp: , Rfl:     O2 (OXYGEN), Inhale 4 L/min Daily As Needed., Disp: , Rfl:     pantoprazole (PROTONIX) 40 MG EC tablet, Take 1 tablet by mouth Daily., Disp: , Rfl:     potassium chloride (K-DUR,KLOR-CON) 10 MEQ CR tablet, Take 1 tablet by mouth 2 (Two) Times a Day., Disp: , Rfl:     Psyllium (Metamucil) wafer wafer, Take  by mouth Daily., Disp: , Rfl:     sacubitril-valsartan (ENTRESTO) 24-26 MG tablet, Take 1 tablet by mouth Every 12 (Twelve) Hours., Disp: 60 tablet, Rfl: 0    spironolactone (ALDACTONE) 25 MG tablet, Take 0.5 tablets by mouth  "Daily., Disp: 15 tablet, Rfl: 0    Xarelto 20 MG tablet, Take 1 tablet by mouth once daily, Disp: 90 tablet, Rfl: 3    polyethylene glycol (MiraLax) 17 GM/SCOOP powder, Take 17 g by mouth Daily As Needed (constipation). Indications: Constipation caused by Irritable Bowel Syndrome (Patient not taking: Reported on 2/21/2024), Disp: 510 g, Rfl: 1    Allergies:   Allergies   Allergen Reactions    Other GI Intolerance     Spicy foods         Review of Systems:   Review of Systems   Constitutional:  Negative for appetite change, fatigue, fever and unexpected weight loss.   HENT:  Positive for trouble swallowing.    Gastrointestinal:  Positive for abdominal pain, diarrhea, nausea, rectal pain, GERD and indigestion. Negative for abdominal distention, anal bleeding, blood in stool, constipation and vomiting.       The following portions of the patient's history were reviewed and updated as appropriate: allergies, current medications, past family history, past medical history, past social history, past surgical history and problem list.    Objective     Physical Exam:  Vital Signs:   Vitals:    02/21/24 1603   BP: 123/77   Pulse: 88   Temp: 96.9 °F (36.1 °C)   TempSrc: Infrared   Weight: 73 kg (161 lb)  Comment: with clothing/shoes   Height: 160 cm (63\")  Comment: per patient       Physical Exam  Constitutional:       Comments: Patient is on oxygen 2 L nasal cannula   HENT:      Head: Normocephalic and atraumatic.   Eyes:      Comments: Pallor present   Abdominal:      General: Abdomen is flat. There is no distension.      Palpations: There is no mass.      Tenderness: There is no abdominal tenderness. There is no guarding or rebound.      Hernia: No hernia is present.   Musculoskeletal:      Cervical back: Normal range of motion and neck supple.   Neurological:      Mental Status: She is alert.         Results Review:   I reviewed the patient's new clinical results.    No visits with results within 90 Day(s) from this visit. "   Latest known visit with results is:   Hospital Outpatient Visit on 07/14/2023   Component Date Value Ref Range Status    EF(MOD-bp) 07/14/2023 40.0  % Final    LVIDd 07/14/2023 5.6  cm Final    LVIDs 07/14/2023 4.1  cm Final    IVSd 07/14/2023 1.11  cm Final    LVPWd 07/14/2023 1.23  cm Final    FS 07/14/2023 26.2  % Final    IVS/LVPW 07/14/2023 0.90  cm Final    ESV(cubed) 07/14/2023 71.0  ml Final    EDV(cubed) 07/14/2023 176.6  ml Final    LVOT area 07/14/2023 3.5  cm2 Final    LV mass(C)d 07/14/2023 271.7  grams Final    LVOT diam 07/14/2023 2.10  cm Final    EDV(MOD-sp2) 07/14/2023 125.0  ml Final    EDV(MOD-sp4) 07/14/2023 90.0  ml Final    ESV(MOD-sp2) 07/14/2023 80.0  ml Final    ESV(MOD-sp4) 07/14/2023 52.0  ml Final    SV(MOD-sp2) 07/14/2023 45.0  ml Final    SV(MOD-sp4) 07/14/2023 38.0  ml Final    EF(MOD-sp2) 07/14/2023 36.0  % Final    EF(MOD-sp4) 07/14/2023 42.2  % Final    MV E max lewis 07/14/2023 125.0  cm/sec Final    MV dec time 07/14/2023 0.12  msec Final    SV(LVOT) 07/14/2023 58.5  ml Final    LA dimension (2D)  07/14/2023 4.7  cm Final    LV V1 max 07/14/2023 82.7  cm/sec Final    LV V1 max PG 07/14/2023 2.7  mmHg Final    LV V1 mean PG 07/14/2023 2.00  mmHg Final    LV V1 VTI 07/14/2023 16.9  cm Final    Ao pk lewis 07/14/2023 205.5  cm/sec Final    Ao max PG 07/14/2023 16.9  mmHg Final    Ao mean PG 07/14/2023 8.5  mmHg Final    Ao V2 VTI 07/14/2023 47.8  cm Final    DWIGHT(I,D) 07/14/2023 1.5  cm2 Final    MR max lewis 07/14/2023 511.5  cm/sec Final    MR max PG 07/14/2023 104.7  mmHg Final    MR mean lewis 07/14/2023 375.0  cm/sec Final    MR mean PG 07/14/2023 64.5  mmHg Final    MR VTI 07/14/2023 172.5  cm Final    TR max lewis 07/14/2023 265.0  cm/sec Final    TR max PG 07/14/2023 28.4  mmHg Final    PA V2 max 07/14/2023 111.5  cm/sec Final    PA acc slope 07/14/2023 832.0  cm/sec2 Final    PA acc time 07/14/2023 0.10  sec Final    PI end-d lewis 07/14/2023 177.0  cm/sec Final    Ao root diam  07/14/2023 3.5  cm Final    Med Peak E' Kirk 07/14/2023 4.60  cm/sec Final    Lat Peak E' Kirk 07/14/2023 9.2  cm/sec Final    Avg E/e' ratio 07/14/2023 18.12   Final    RV Base 07/14/2023 4.00  cm Final    RV Mid 07/14/2023 3.80  cm Final    RV Length 07/14/2023 6.90  cm Final    TAPSE (>1.6) 07/14/2023 2.10  cm Final    RV S' 07/14/2023 9.70  cm/sec Final    RVSP(TR) 07/14/2023 36  mmHg Final    RAP systole 07/14/2023 8  mmHg Final    BH CV VAS BP RIGHT ARM 07/14/2023 149/89  mmHg Final      XR hip 2+ vw unilateral    Result Date: 12/16/2023  Osteoporosis. At least mild to moderate bilateral hip osteoarthritis. This is a difficult study to evaluate for fracture given the osteoporosis present, but recommend MRI if there is persistent pain to exclude fracture. CRITICAL RESULT:   No. COMMUNICATION: Per this written report. Drafted by Matthew Bonilla MD on 12/16/2023 7:51 PM Final report signed by Matthew Bonilla MD on 12/16/2023 7:52 PM     EGD EGD on 09/13/2022  - Non-obstructing and mild Schatzki ring.  - Healing Non-bleeding gastric ulcers with no stigmata of bleeding found with in the hiatal hernial sac.  - Erythematous mucosa in the posterior wall of the stomach, antrum and prepyloric region of the stomach.  Biopsied.  - A few gastric polyps. Resected and retrieved.  - Normal duodenal bulb, first portion of the duodenum, second portion of the duodenum and third portion of the duodenum. Biopsied.  - Localized nodular mucosa was found in the second portion of the duodenum, biopsied     Pathology; reactive gastric mucosa without any H. pylori.  Duodenal biopsy revealed mild duodenitis     Assessment / Plan      1.  History of ileocolonic anastomotic ulcer with GI bleed; suspected ischemic versus NSAID induced  2.  History of blood loss anemia  3.  History of for long-term anticoagulation  4.  Remote history of colon cancer status post right hemicolectomy and chemotherapy 1998  5.  Suspected functional GI  disorder with IBS mixed type  6.  Large hiatal hernia with Hong ulcers/peptic ulcer disease  7.  Esophageal dysphagia associated with a hiatal hernia  8.  Gastroesophageal reflux disease without esophagitis  2/21/2024  Her reflux symptoms flared up now despite on a PPI Protonix 40 mg p.o. daily dose.  Lab work done at outside facility on 11/14/2023 reviewed and hemoglobin mostly normalized to 11.3 g/dL now.  CMP normal.  No history suggestive of GI bleed.  Clinical examination reveals mild epigastric tenderness.  She has dysphagia to solids which is getting worse.  Her dad had a stomach cancer and she is worried on that.  Patient likely has a hiatal hernia associated esophageal ring and dysphagia.    Will add Pepcid 40 mg nightly along with the Protonix 40 g in the a.m.  Will schedule her for an EGD with possible esophageal dilatation  To hold her Xarelto at least 2 days before the test  Continue Metamucil as before  MiraLAX 17 g p.o. as needed  CBC CMP vitamin B12 level today  Patient is slightly high risk for procedure complication that has been discussed with her    The indications, technique, alternatives and potential risk and complications were discussed with the patient including but not limited to bleeding, bowel perforations, missing lesions and anesthetic complications. The patient understands and wishes to proceed with the procedure and has given their verbal consent. Written patient education information was given to the patient.   The patient will call if they have further questions before procedure.      8/21/2023..  She and is extremely high risk for intermittent GI bleed with the peptic ulcer disease and anastomotic ulcers in the setting of anticoagulation. Patient has intolerance to iron.  If any further drop in H&H she needs iron infusion. EGD done on 9/13/2022 revealed nonobstructing mild esophageal ring, few healing ulcerations in the fundus within the hernial sac.  Gastric pathology negative  for H. pylori no history suggestive any current GI bleed.  CT abdomen pelvis with contrast done on 6/20/2022 was normal.  Patient continues to have intermittent left upper quadrant and right lower quadrant abdominal pain, bloating which appears to be functional and could also be secondary to severe diverticular disease.  No clinical signs of acute diverticulitis.     7/6/2021  She was admitted on 6/1/2021 with a GI bleed.  She had a colonoscopy done on 6/2/2021 which revealed diverticulosis involving the sigmoid colon and descending colon transverse colon ascending colon however there was no signs of any diverticular bleed.  Patent end-to-side ileocolonic anastomosis was noted with healing ulceration in the anastomotic site.  This was more in favor of NSAID induced versus ischemic ulcerations.  Patient likely bled from the ulcerations.  Biopsies from the ulcer revealed reactive glandular mucosa with mild activity.  No dysplasia or metaplasia or neoplastic changes. EGD done in 2019 revealed reactive gastropathy. Given her prior history of GI bleed and continued use of anticoagulation we will keep her on a low-dose PPI.     Prior history  9.  Chronic atrial fibrillation/ Chronic hypoxic respiratory failure on home oxygen/ CHF  Currently on Xarelto          Follow Up:   No follow-ups on file.    Iona Sanders MD  [facility] [place]  2/21/2024  16:08 EST     Please note that portions of this note may have been completed with a voice recognition program.

## 2024-02-22 ENCOUNTER — TELEPHONE (OUTPATIENT)
Dept: GASTROENTEROLOGY | Facility: CLINIC | Age: 84
End: 2024-02-22
Payer: MEDICARE

## 2024-02-22 NOTE — TELEPHONE ENCOUNTER
----- Message from Victoria Hill MA sent at 2/22/2024 11:50 AM EST -----    ----- Message -----  From: Iona Sanders MD  Sent: 2/22/2024  11:42 AM EST  To: [facility]    Let her know that her hemoglobin is stable.  Her vitamin B12 level is high as well, if she wants to she could discontinue her shots and can take over-the-counter vitamin B12 pills 500 mcg p.o. daily.  But she can speak to her PCP before changing it

## 2024-02-23 PROBLEM — K21.9 GASTROESOPHAGEAL REFLUX DISEASE WITHOUT ESOPHAGITIS: Status: ACTIVE | Noted: 2024-02-21

## 2024-02-23 PROBLEM — R13.19 ESOPHAGEAL DYSPHAGIA: Status: ACTIVE | Noted: 2024-02-21

## 2024-03-12 ENCOUNTER — TELEPHONE (OUTPATIENT)
Dept: PULMONOLOGY | Facility: CLINIC | Age: 84
End: 2024-03-12

## 2024-03-12 NOTE — TELEPHONE ENCOUNTER
Hub staff attempted to follow warm transfer process and was unsuccessful     Caller: [facility] [employer]    Relationship to patient: INSURANCE PROVIDER    Best call back number:     Patient is needing: PRIOR AUTH  FOR O2 SUPPLIES. [facility]A [facility] FAX# [phone] IF YOU WALTER URGENT WHEN FAXING PRIOR AUTH IT WILL GO FASTER THAN NORMAL 14 DAYS.

## 2024-03-13 ENCOUNTER — TELEPHONE (OUTPATIENT)
Dept: CARDIOLOGY | Facility: CLINIC | Age: 84
End: 2024-03-13
Payer: MEDICARE

## 2024-03-18 ENCOUNTER — TELEPHONE (OUTPATIENT)
Dept: CARDIOLOGY | Facility: CLINIC | Age: 84
End: 2024-03-18
Payer: MEDICARE

## 2024-03-19 ENCOUNTER — APPOINTMENT (OUTPATIENT)
Dept: CT IMAGING | Facility: HOSPITAL | Age: 84
End: 2024-03-19
Payer: MEDICARE

## 2024-03-19 ENCOUNTER — APPOINTMENT (OUTPATIENT)
Dept: GENERAL RADIOLOGY | Facility: HOSPITAL | Age: 84
End: 2024-03-19
Payer: MEDICARE

## 2024-03-19 ENCOUNTER — HOSPITAL ENCOUNTER (OUTPATIENT)
Facility: HOSPITAL | Age: 84
Setting detail: OBSERVATION
Discharge: HOME HEALTH | End: 2024-03-22
Attending: STUDENT IN AN ORGANIZED HEALTH CARE EDUCATION/TRAINING PROGRAM | Admitting: STUDENT IN AN ORGANIZED HEALTH CARE EDUCATION/TRAINING PROGRAM
Payer: MEDICARE

## 2024-03-19 DIAGNOSIS — R53.81 DEBILITY: ICD-10-CM

## 2024-03-19 DIAGNOSIS — J40 BRONCHITIS: Primary | ICD-10-CM

## 2024-03-19 DIAGNOSIS — J96.21 ACUTE ON CHRONIC RESPIRATORY FAILURE WITH HYPOXIA: ICD-10-CM

## 2024-03-19 DIAGNOSIS — I50.22 CHRONIC SYSTOLIC HEART FAILURE: Chronic | ICD-10-CM

## 2024-03-19 DIAGNOSIS — I50.9 ACUTE ON CHRONIC CONGESTIVE HEART FAILURE, UNSPECIFIED HEART FAILURE TYPE: ICD-10-CM

## 2024-03-19 DIAGNOSIS — I95.89 IATROGENIC HYPOTENSION: ICD-10-CM

## 2024-03-19 LAB
ALBUMIN SERPL-MCNC: 4.1 G/DL (ref 3.5–5.2)
ALBUMIN/GLOB SERPL: 1.6 G/DL
ALP SERPL-CCNC: 90 U/L (ref 39–117)
ALT SERPL W P-5'-P-CCNC: 10 U/L (ref 1–33)
ANION GAP SERPL CALCULATED.3IONS-SCNC: 10.2 MMOL/L (ref 5–15)
AST SERPL-CCNC: 16 U/L (ref 1–32)
BASOPHILS # BLD MANUAL: 0.08 10*3/MM3 (ref 0–0.2)
BASOPHILS NFR BLD MANUAL: 1 % (ref 0–1.5)
BILIRUB SERPL-MCNC: 1.2 MG/DL (ref 0–1.2)
BUN SERPL-MCNC: 21 MG/DL (ref 8–23)
BUN/CREAT SERPL: 22.8 (ref 7–25)
CALCIUM SPEC-SCNC: 8.8 MG/DL (ref 8.6–10.5)
CHLORIDE SERPL-SCNC: 94 MMOL/L (ref 98–107)
CO2 SERPL-SCNC: 29.8 MMOL/L (ref 22–29)
CREAT SERPL-MCNC: 0.92 MG/DL (ref 0.57–1)
D-LACTATE SERPL-SCNC: 1.4 MMOL/L (ref 0.5–2)
DEPRECATED RDW RBC AUTO: 47.8 FL (ref 37–54)
EGFRCR SERPLBLD CKD-EPI 2021: 62.3 ML/MIN/1.73
ERYTHROCYTE [DISTWIDTH] IN BLOOD BY AUTOMATED COUNT: 16.8 % (ref 12.3–15.4)
FLUAV SUBTYP SPEC NAA+PROBE: NOT DETECTED
FLUBV RNA ISLT QL NAA+PROBE: NOT DETECTED
GLOBULIN UR ELPH-MCNC: 2.6 GM/DL
GLUCOSE SERPL-MCNC: 100 MG/DL (ref 65–99)
HCT VFR BLD AUTO: 39.2 % (ref 34–46.6)
HGB BLD-MCNC: 11.8 G/DL (ref 12–15.9)
HOLD SPECIMEN: NORMAL
LYMPHOCYTES # BLD MANUAL: 0.84 10*3/MM3 (ref 0.7–3.1)
LYMPHOCYTES NFR BLD MANUAL: 17 % (ref 5–12)
MCH RBC QN AUTO: 23.7 PG (ref 26.6–33)
MCHC RBC AUTO-ENTMCNC: 30.1 G/DL (ref 31.5–35.7)
MCV RBC AUTO: 78.9 FL (ref 79–97)
MONOCYTES # BLD: 1.3 10*3/MM3 (ref 0.1–0.9)
NEUTROPHILS # BLD AUTO: 5.41 10*3/MM3 (ref 1.7–7)
NEUTROPHILS NFR BLD MANUAL: 71 % (ref 42.7–76)
NT-PROBNP SERPL-MCNC: 1997 PG/ML (ref 0–1800)
PLAT MORPH BLD: NORMAL
PLATELET # BLD AUTO: 290 10*3/MM3 (ref 140–450)
PMV BLD AUTO: 9.7 FL (ref 6–12)
POTASSIUM SERPL-SCNC: 4.2 MMOL/L (ref 3.5–5.2)
PROCALCITONIN SERPL-MCNC: 0.08 NG/ML (ref 0–0.25)
PROT SERPL-MCNC: 6.7 G/DL (ref 6–8.5)
RBC # BLD AUTO: 4.97 10*6/MM3 (ref 3.77–5.28)
RBC MORPH BLD: NORMAL
SARS-COV-2 RNA RESP QL NAA+PROBE: NOT DETECTED
SODIUM SERPL-SCNC: 134 MMOL/L (ref 136–145)
TROPONIN T SERPL HS-MCNC: 21 NG/L
VARIANT LYMPHS NFR BLD MANUAL: 1 % (ref 0–5)
VARIANT LYMPHS NFR BLD MANUAL: 10 % (ref 19.6–45.3)
WBC MORPH BLD: NORMAL
WBC NRBC COR # BLD AUTO: 7.62 10*3/MM3 (ref 3.4–10.8)
WHOLE BLOOD HOLD COAG: NORMAL
WHOLE BLOOD HOLD SPECIMEN: NORMAL

## 2024-03-19 PROCEDURE — 71275 CT ANGIOGRAPHY CHEST: CPT

## 2024-03-19 PROCEDURE — 84145 PROCALCITONIN (PCT): CPT | Performed by: STUDENT IN AN ORGANIZED HEALTH CARE EDUCATION/TRAINING PROGRAM

## 2024-03-19 PROCEDURE — 87040 BLOOD CULTURE FOR BACTERIA: CPT | Performed by: STUDENT IN AN ORGANIZED HEALTH CARE EDUCATION/TRAINING PROGRAM

## 2024-03-19 PROCEDURE — 83880 ASSAY OF NATRIURETIC PEPTIDE: CPT

## 2024-03-19 PROCEDURE — 94640 AIRWAY INHALATION TREATMENT: CPT

## 2024-03-19 PROCEDURE — 94799 UNLISTED PULMONARY SVC/PX: CPT

## 2024-03-19 PROCEDURE — 84484 ASSAY OF TROPONIN QUANT: CPT

## 2024-03-19 PROCEDURE — 87636 SARSCOV2 & INF A&B AMP PRB: CPT

## 2024-03-19 PROCEDURE — 80053 COMPREHEN METABOLIC PANEL: CPT

## 2024-03-19 PROCEDURE — 85007 BL SMEAR W/DIFF WBC COUNT: CPT

## 2024-03-19 PROCEDURE — 25810000003 SODIUM CHLORIDE 0.9 % SOLUTION: Performed by: STUDENT IN AN ORGANIZED HEALTH CARE EDUCATION/TRAINING PROGRAM

## 2024-03-19 PROCEDURE — 99285 EMERGENCY DEPT VISIT HI MDM: CPT

## 2024-03-19 PROCEDURE — 71045 X-RAY EXAM CHEST 1 VIEW: CPT

## 2024-03-19 PROCEDURE — 36415 COLL VENOUS BLD VENIPUNCTURE: CPT

## 2024-03-19 PROCEDURE — G0378 HOSPITAL OBSERVATION PER HR: HCPCS

## 2024-03-19 PROCEDURE — 83605 ASSAY OF LACTIC ACID: CPT | Performed by: STUDENT IN AN ORGANIZED HEALTH CARE EDUCATION/TRAINING PROGRAM

## 2024-03-19 PROCEDURE — 25010000002 DEXAMETHASONE SODIUM PHOSPHATE 10 MG/ML SOLUTION: Performed by: STUDENT IN AN ORGANIZED HEALTH CARE EDUCATION/TRAINING PROGRAM

## 2024-03-19 PROCEDURE — 85025 COMPLETE CBC W/AUTO DIFF WBC: CPT

## 2024-03-19 PROCEDURE — 96375 TX/PRO/DX INJ NEW DRUG ADDON: CPT

## 2024-03-19 PROCEDURE — 93005 ELECTROCARDIOGRAM TRACING: CPT

## 2024-03-19 PROCEDURE — 25510000001 IOPAMIDOL 61 % SOLUTION: Performed by: STUDENT IN AN ORGANIZED HEALTH CARE EDUCATION/TRAINING PROGRAM

## 2024-03-19 RX ORDER — DEXAMETHASONE SODIUM PHOSPHATE 10 MG/ML
10 INJECTION, SOLUTION INTRAMUSCULAR; INTRAVENOUS ONCE
Status: COMPLETED | OUTPATIENT
Start: 2024-03-19 | End: 2024-03-19

## 2024-03-19 RX ORDER — SODIUM CHLORIDE 0.9 % (FLUSH) 0.9 %
10 SYRINGE (ML) INJECTION AS NEEDED
Status: DISCONTINUED | OUTPATIENT
Start: 2024-03-19 | End: 2024-03-22 | Stop reason: HOSPADM

## 2024-03-19 RX ORDER — IPRATROPIUM BROMIDE AND ALBUTEROL SULFATE 2.5; .5 MG/3ML; MG/3ML
3 SOLUTION RESPIRATORY (INHALATION) ONCE
Status: COMPLETED | OUTPATIENT
Start: 2024-03-19 | End: 2024-03-19

## 2024-03-19 RX ADMIN — DEXAMETHASONE SODIUM PHOSPHATE 10 MG: 10 INJECTION INTRAMUSCULAR; INTRAVENOUS at 21:26

## 2024-03-19 RX ADMIN — IOPAMIDOL 100 ML: 612 INJECTION, SOLUTION INTRAVENOUS at 22:48

## 2024-03-19 RX ADMIN — IPRATROPIUM BROMIDE AND ALBUTEROL SULFATE 3 ML: .5; 3 SOLUTION RESPIRATORY (INHALATION) at 21:52

## 2024-03-19 RX ADMIN — SODIUM CHLORIDE 1000 ML: 9 INJECTION, SOLUTION INTRAVENOUS at 21:47

## 2024-03-20 PROBLEM — J40 BRONCHITIS: Status: ACTIVE | Noted: 2024-03-20

## 2024-03-20 LAB
ANION GAP SERPL CALCULATED.3IONS-SCNC: 7.3 MMOL/L (ref 5–15)
B PARAPERT DNA SPEC QL NAA+PROBE: NOT DETECTED
B PERT DNA SPEC QL NAA+PROBE: NOT DETECTED
BASOPHILS # BLD AUTO: 0.01 10*3/MM3 (ref 0–0.2)
BASOPHILS NFR BLD AUTO: 0.2 % (ref 0–1.5)
BUN SERPL-MCNC: 20 MG/DL (ref 8–23)
BUN/CREAT SERPL: 24.1 (ref 7–25)
C PNEUM DNA NPH QL NAA+NON-PROBE: NOT DETECTED
CALCIUM SPEC-SCNC: 8.5 MG/DL (ref 8.6–10.5)
CHLORIDE SERPL-SCNC: 100 MMOL/L (ref 98–107)
CO2 SERPL-SCNC: 29.7 MMOL/L (ref 22–29)
CREAT SERPL-MCNC: 0.83 MG/DL (ref 0.57–1)
DEPRECATED RDW RBC AUTO: 48.2 FL (ref 37–54)
EGFRCR SERPLBLD CKD-EPI 2021: 70.5 ML/MIN/1.73
EOSINOPHIL # BLD AUTO: 0 10*3/MM3 (ref 0–0.4)
EOSINOPHIL NFR BLD AUTO: 0 % (ref 0.3–6.2)
ERYTHROCYTE [DISTWIDTH] IN BLOOD BY AUTOMATED COUNT: 16.7 % (ref 12.3–15.4)
FLUAV SUBTYP SPEC NAA+PROBE: NOT DETECTED
FLUBV RNA ISLT QL NAA+PROBE: NOT DETECTED
GLUCOSE SERPL-MCNC: 151 MG/DL (ref 65–99)
HADV DNA SPEC NAA+PROBE: NOT DETECTED
HCOV 229E RNA SPEC QL NAA+PROBE: NOT DETECTED
HCOV HKU1 RNA SPEC QL NAA+PROBE: NOT DETECTED
HCOV NL63 RNA SPEC QL NAA+PROBE: NOT DETECTED
HCOV OC43 RNA SPEC QL NAA+PROBE: NOT DETECTED
HCT VFR BLD AUTO: 36.9 % (ref 34–46.6)
HGB BLD-MCNC: 10.9 G/DL (ref 12–15.9)
HMPV RNA NPH QL NAA+NON-PROBE: NOT DETECTED
HOLD SPECIMEN: NORMAL
HPIV1 RNA ISLT QL NAA+PROBE: NOT DETECTED
HPIV2 RNA SPEC QL NAA+PROBE: NOT DETECTED
HPIV3 RNA NPH QL NAA+PROBE: DETECTED
HPIV4 P GENE NPH QL NAA+PROBE: NOT DETECTED
HYPOCHROMIA BLD QL: NORMAL
IMM GRANULOCYTES # BLD AUTO: 0.03 10*3/MM3 (ref 0–0.05)
IMM GRANULOCYTES NFR BLD AUTO: 0.5 % (ref 0–0.5)
LYMPHOCYTES # BLD AUTO: 0.36 10*3/MM3 (ref 0.7–3.1)
LYMPHOCYTES NFR BLD AUTO: 6.4 % (ref 19.6–45.3)
M PNEUMO IGG SER IA-ACNC: NOT DETECTED
MCH RBC QN AUTO: 23.7 PG (ref 26.6–33)
MCHC RBC AUTO-ENTMCNC: 29.5 G/DL (ref 31.5–35.7)
MCV RBC AUTO: 80.4 FL (ref 79–97)
MONOCYTES # BLD AUTO: 0.15 10*3/MM3 (ref 0.1–0.9)
MONOCYTES NFR BLD AUTO: 2.7 % (ref 5–12)
NEUTROPHILS NFR BLD AUTO: 5.08 10*3/MM3 (ref 1.7–7)
NEUTROPHILS NFR BLD AUTO: 90.2 % (ref 42.7–76)
NRBC BLD AUTO-RTO: 0 /100 WBC (ref 0–0.2)
OVALOCYTES BLD QL SMEAR: NORMAL
PLATELET # BLD AUTO: 262 10*3/MM3 (ref 140–450)
PMV BLD AUTO: 9.7 FL (ref 6–12)
POTASSIUM SERPL-SCNC: 4.4 MMOL/L (ref 3.5–5.2)
RBC # BLD AUTO: 4.59 10*6/MM3 (ref 3.77–5.28)
RHINOVIRUS RNA SPEC NAA+PROBE: NOT DETECTED
RSV RNA NPH QL NAA+NON-PROBE: NOT DETECTED
SARS-COV-2 RNA NPH QL NAA+NON-PROBE: NOT DETECTED
SMALL PLATELETS BLD QL SMEAR: ADEQUATE
SODIUM SERPL-SCNC: 137 MMOL/L (ref 136–145)
WBC MORPH BLD: NORMAL
WBC NRBC COR # BLD AUTO: 5.63 10*3/MM3 (ref 3.4–10.8)

## 2024-03-20 PROCEDURE — 96365 THER/PROPH/DIAG IV INF INIT: CPT

## 2024-03-20 PROCEDURE — 99223 1ST HOSP IP/OBS HIGH 75: CPT | Performed by: STUDENT IN AN ORGANIZED HEALTH CARE EDUCATION/TRAINING PROGRAM

## 2024-03-20 PROCEDURE — 25810000003 SODIUM CHLORIDE 0.9 % SOLUTION: Performed by: STUDENT IN AN ORGANIZED HEALTH CARE EDUCATION/TRAINING PROGRAM

## 2024-03-20 PROCEDURE — 94799 UNLISTED PULMONARY SVC/PX: CPT

## 2024-03-20 PROCEDURE — 80048 BASIC METABOLIC PNL TOTAL CA: CPT | Performed by: STUDENT IN AN ORGANIZED HEALTH CARE EDUCATION/TRAINING PROGRAM

## 2024-03-20 PROCEDURE — G0378 HOSPITAL OBSERVATION PER HR: HCPCS

## 2024-03-20 PROCEDURE — 97166 OT EVAL MOD COMPLEX 45 MIN: CPT

## 2024-03-20 PROCEDURE — 25010000002 AZITHROMYCIN PER 500 MG: Performed by: STUDENT IN AN ORGANIZED HEALTH CARE EDUCATION/TRAINING PROGRAM

## 2024-03-20 PROCEDURE — 85025 COMPLETE CBC W/AUTO DIFF WBC: CPT | Performed by: STUDENT IN AN ORGANIZED HEALTH CARE EDUCATION/TRAINING PROGRAM

## 2024-03-20 PROCEDURE — 0202U NFCT DS 22 TRGT SARS-COV-2: CPT | Performed by: STUDENT IN AN ORGANIZED HEALTH CARE EDUCATION/TRAINING PROGRAM

## 2024-03-20 PROCEDURE — 97162 PT EVAL MOD COMPLEX 30 MIN: CPT

## 2024-03-20 PROCEDURE — 25010000002 CEFTRIAXONE PER 250 MG: Performed by: STUDENT IN AN ORGANIZED HEALTH CARE EDUCATION/TRAINING PROGRAM

## 2024-03-20 PROCEDURE — 94761 N-INVAS EAR/PLS OXIMETRY MLT: CPT

## 2024-03-20 PROCEDURE — 63710000001 PREDNISONE PER 1 MG: Performed by: FAMILY MEDICINE

## 2024-03-20 PROCEDURE — 85007 BL SMEAR W/DIFF WBC COUNT: CPT | Performed by: STUDENT IN AN ORGANIZED HEALTH CARE EDUCATION/TRAINING PROGRAM

## 2024-03-20 RX ORDER — BISACODYL 10 MG
10 SUPPOSITORY, RECTAL RECTAL DAILY PRN
Status: DISCONTINUED | OUTPATIENT
Start: 2024-03-20 | End: 2024-03-22 | Stop reason: HOSPADM

## 2024-03-20 RX ORDER — BUDESONIDE AND FORMOTEROL FUMARATE DIHYDRATE 160; 4.5 UG/1; UG/1
1 AEROSOL RESPIRATORY (INHALATION)
Status: DISCONTINUED | OUTPATIENT
Start: 2024-03-20 | End: 2024-03-20

## 2024-03-20 RX ORDER — CITALOPRAM 20 MG/1
20 TABLET ORAL DAILY
Status: DISCONTINUED | OUTPATIENT
Start: 2024-03-20 | End: 2024-03-22 | Stop reason: HOSPADM

## 2024-03-20 RX ORDER — SODIUM CHLORIDE 9 MG/ML
75 INJECTION, SOLUTION INTRAVENOUS CONTINUOUS
Status: DISCONTINUED | OUTPATIENT
Start: 2024-03-20 | End: 2024-03-20

## 2024-03-20 RX ORDER — BUDESONIDE AND FORMOTEROL FUMARATE DIHYDRATE 160; 4.5 UG/1; UG/1
2 AEROSOL RESPIRATORY (INHALATION) 2 TIMES DAILY
Status: DISCONTINUED | OUTPATIENT
Start: 2024-03-20 | End: 2024-03-20 | Stop reason: SDUPTHER

## 2024-03-20 RX ORDER — ACETAMINOPHEN 160 MG/5ML
650 SOLUTION ORAL EVERY 4 HOURS PRN
Status: DISCONTINUED | OUTPATIENT
Start: 2024-03-20 | End: 2024-03-22 | Stop reason: HOSPADM

## 2024-03-20 RX ORDER — ACETAMINOPHEN 650 MG/1
650 SUPPOSITORY RECTAL EVERY 4 HOURS PRN
Status: DISCONTINUED | OUTPATIENT
Start: 2024-03-20 | End: 2024-03-22 | Stop reason: HOSPADM

## 2024-03-20 RX ORDER — PREDNISONE 20 MG/1
40 TABLET ORAL
Status: DISCONTINUED | OUTPATIENT
Start: 2024-03-20 | End: 2024-03-22 | Stop reason: HOSPADM

## 2024-03-20 RX ORDER — BUDESONIDE AND FORMOTEROL FUMARATE DIHYDRATE 160; 4.5 UG/1; UG/1
2 AEROSOL RESPIRATORY (INHALATION)
Status: DISCONTINUED | OUTPATIENT
Start: 2024-03-20 | End: 2024-03-22 | Stop reason: HOSPADM

## 2024-03-20 RX ORDER — SODIUM CHLORIDE 9 MG/ML
40 INJECTION, SOLUTION INTRAVENOUS AS NEEDED
Status: DISCONTINUED | OUTPATIENT
Start: 2024-03-20 | End: 2024-03-22 | Stop reason: HOSPADM

## 2024-03-20 RX ORDER — ALBUTEROL SULFATE 90 UG/1
2 AEROSOL, METERED RESPIRATORY (INHALATION)
Status: DISCONTINUED | OUTPATIENT
Start: 2024-03-20 | End: 2024-03-22 | Stop reason: HOSPADM

## 2024-03-20 RX ORDER — DICYCLOMINE HYDROCHLORIDE 10 MG/1
10 CAPSULE ORAL
COMMUNITY

## 2024-03-20 RX ORDER — ACETAMINOPHEN 325 MG/1
650 TABLET ORAL EVERY 4 HOURS PRN
Status: DISCONTINUED | OUTPATIENT
Start: 2024-03-20 | End: 2024-03-22 | Stop reason: HOSPADM

## 2024-03-20 RX ORDER — NITROGLYCERIN 0.4 MG/1
0.4 TABLET SUBLINGUAL
Status: DISCONTINUED | OUTPATIENT
Start: 2024-03-20 | End: 2024-03-22 | Stop reason: HOSPADM

## 2024-03-20 RX ORDER — BISACODYL 5 MG/1
5 TABLET, DELAYED RELEASE ORAL DAILY PRN
Status: DISCONTINUED | OUTPATIENT
Start: 2024-03-20 | End: 2024-03-22 | Stop reason: HOSPADM

## 2024-03-20 RX ORDER — SODIUM CHLORIDE 0.9 % (FLUSH) 0.9 %
10 SYRINGE (ML) INJECTION EVERY 12 HOURS SCHEDULED
Status: DISCONTINUED | OUTPATIENT
Start: 2024-03-20 | End: 2024-03-22 | Stop reason: HOSPADM

## 2024-03-20 RX ORDER — SODIUM CHLORIDE 0.9 % (FLUSH) 0.9 %
10 SYRINGE (ML) INJECTION AS NEEDED
Status: DISCONTINUED | OUTPATIENT
Start: 2024-03-20 | End: 2024-03-22 | Stop reason: HOSPADM

## 2024-03-20 RX ORDER — UBIDECARENONE 75 MG
50 CAPSULE ORAL DAILY
COMMUNITY

## 2024-03-20 RX ORDER — ONDANSETRON 2 MG/ML
4 INJECTION INTRAMUSCULAR; INTRAVENOUS EVERY 6 HOURS PRN
Status: DISCONTINUED | OUTPATIENT
Start: 2024-03-20 | End: 2024-03-22 | Stop reason: HOSPADM

## 2024-03-20 RX ORDER — POLYETHYLENE GLYCOL 3350 17 G/17G
17 POWDER, FOR SOLUTION ORAL DAILY PRN
Status: DISCONTINUED | OUTPATIENT
Start: 2024-03-20 | End: 2024-03-22 | Stop reason: HOSPADM

## 2024-03-20 RX ORDER — BUDESONIDE AND FORMOTEROL FUMARATE DIHYDRATE 160; 4.5 UG/1; UG/1
2 AEROSOL RESPIRATORY (INHALATION) 2 TIMES DAILY
Status: ON HOLD | COMMUNITY
End: 2024-03-20 | Stop reason: SDUPTHER

## 2024-03-20 RX ORDER — FAMOTIDINE 20 MG/1
40 TABLET, FILM COATED ORAL NIGHTLY
Status: DISCONTINUED | OUTPATIENT
Start: 2024-03-20 | End: 2024-03-22 | Stop reason: HOSPADM

## 2024-03-20 RX ORDER — AMOXICILLIN 250 MG
2 CAPSULE ORAL 2 TIMES DAILY PRN
Status: DISCONTINUED | OUTPATIENT
Start: 2024-03-20 | End: 2024-03-22 | Stop reason: HOSPADM

## 2024-03-20 RX ORDER — FLUTICASONE FUROATE AND VILANTEROL 200; 25 UG/1; UG/1
POWDER RESPIRATORY (INHALATION)
COMMUNITY
End: 2024-03-22 | Stop reason: HOSPADM

## 2024-03-20 RX ADMIN — ALBUTEROL SULFATE 2 PUFF: 90 AEROSOL, METERED RESPIRATORY (INHALATION) at 07:17

## 2024-03-20 RX ADMIN — ALBUTEROL SULFATE 2 PUFF: 90 AEROSOL, METERED RESPIRATORY (INHALATION) at 13:07

## 2024-03-20 RX ADMIN — BUDESONIDE AND FORMOTEROL FUMARATE DIHYDRATE 2 PUFF: 160; 4.5 AEROSOL RESPIRATORY (INHALATION) at 07:17

## 2024-03-20 RX ADMIN — ALBUTEROL SULFATE 2 PUFF: 90 AEROSOL, METERED RESPIRATORY (INHALATION) at 19:14

## 2024-03-20 RX ADMIN — FAMOTIDINE 40 MG: 20 TABLET, FILM COATED ORAL at 03:21

## 2024-03-20 RX ADMIN — SODIUM CHLORIDE 75 ML/HR: 9 INJECTION, SOLUTION INTRAVENOUS at 03:22

## 2024-03-20 RX ADMIN — FAMOTIDINE 40 MG: 20 TABLET, FILM COATED ORAL at 20:08

## 2024-03-20 RX ADMIN — BUDESONIDE AND FORMOTEROL FUMARATE DIHYDRATE 2 PUFF: 160; 4.5 AEROSOL RESPIRATORY (INHALATION) at 19:14

## 2024-03-20 RX ADMIN — SODIUM CHLORIDE 500 MG: 900 INJECTION, SOLUTION INTRAVENOUS at 00:56

## 2024-03-20 RX ADMIN — RIVAROXABAN 20 MG: 10 TABLET, FILM COATED ORAL at 09:22

## 2024-03-20 RX ADMIN — ALBUTEROL SULFATE 2 PUFF: 90 AEROSOL, METERED RESPIRATORY (INHALATION) at 15:29

## 2024-03-20 RX ADMIN — CEFTRIAXONE SODIUM 1000 MG: 1 INJECTION, POWDER, FOR SOLUTION INTRAMUSCULAR; INTRAVENOUS at 00:13

## 2024-03-20 RX ADMIN — CITALOPRAM HYDROBROMIDE 20 MG: 20 TABLET ORAL at 09:22

## 2024-03-20 RX ADMIN — PREDNISONE 40 MG: 20 TABLET ORAL at 15:27

## 2024-03-20 NOTE — THERAPY EVALUATION
Patient Name: Elsa Alcaraz  : 1940    MRN: [id number]                              Today's Date: 3/20/2024       Admit Date: 3/19/2024    Visit Dx:     ICD-10-CM ICD-9-CM   1. Bronchitis  J40 490   2. Acute on chronic respiratory failure with hypoxia  J96.21 518.84     799.02   3. Acute on chronic congestive heart failure, unspecified heart failure type  I50.9 428.0   4. Iatrogenic hypotension  I95.89 458.29     Patient Active Problem List   Diagnosis    Longstanding persistent atrial fibrillation    Hyperlipidemia    Essential hypertension    Anxiety disorder due to general medical condition    Cataract    Chronic bronchitis    Depression    Obesity    Sleep apnea    Iron deficiency anemia    Thrombocytosis    Hiatal hernia with gastroesophageal reflux    Age-related nuclear cataract of left eye    Age-related nuclear cataract of right eye    Moderate mitral regurgitation    Moderate tricuspid regurgitation    Chronic systolic heart failure    Diverticulosis of colon    Chronic respiratory failure with hypoxia    Symptomatic bradycardia    COPD    Chronic anticoagulation (Xarelto)    Complete heart block    Esophageal dysphagia    Gastroesophageal reflux disease without esophagitis    Bronchitis     Past Medical History:   Diagnosis Date    Anemia     Anxiety disorder due to general medical condition 2017    Arthritis     Atrial fibrillation 2017    Body piercing     BOTH EARS    Borderline diabetes     Breast cancer 2003    right-radiation and a lumpectomy    Broken tooth     Cataract 2017    Left eye surgery     Chronic bronchitis 2017    Colon cancer 1998    had surgery and chemo    COPD (chronic obstructive pulmonary disease)     Cyanocobalamine deficiency (non anemic)     Depression 2017    Diverticulosis     Esophageal reflux 2017    Hiatal hernia 2017    History of bladder infections     History of echocardiogram 2021    Hx of exercise stress  "test     \"several years ago by Dr. Mercado\".      Hx of radiation therapy     Hyperlipidemia     Hypertension 2017    Impaired functional mobility and activity tolerance     Impaired functional mobility, balance, gait, and endurance     Osteoporosis     Palpitations 2017    Prediabetes     Problems with swallowing     meat at times per pt report    Shortness of breath     WITH EXERTION     Sleep apnea 2017    NO CPAP    Tricuspid valve disorder 2017    Patient doesn't know anything about this    Vitamin D deficiency     Wears glasses      Past Surgical History:   Procedure Laterality Date    ANAL FISTULOTOMY      APPENDECTOMY          BREAST BIOPSY      BREAST LUMPECTOMY Right 2006    CARDIAC ELECTROPHYSIOLOGY PROCEDURE N/A 2023    Procedure: Micra;  Surgeon: Keven Willis DO;  Location: [facility] EP INVASIVE LOCATION;  Service: Cardiology;  Laterality: N/A;    CATARACT EXTRACTION      both eyes     CATARACT EXTRACTION W/ INTRAOCULAR LENS IMPLANT Left 2019    Procedure: CATARACT PHACO EXTRACTION WITH INTRAOCULAR LENS IMPLANT LEFT;  Surgeon: Masoud David MD;  Location: [facility] OR;  Service: Ophthalmology    CATARACT EXTRACTION W/ INTRAOCULAR LENS IMPLANT Right 2019    Procedure: CATARACT PHACO EXTRACTION WITH INTRAOCULAR LENS IMPLANT RIGHT;  Surgeon: Masoud David MD;  Location: [facility] OR;  Service: Ophthalmology     SECTION  1981    CHOLECYSTECTOMY  2009    COLECTOMY PARTIAL / TOTAL  1998    COLON CANCER    COLONOSCOPY      COLONOSCOPY N/A 2021    Procedure: COLONOSCOPY WITH BIOPSY;  Surgeon: Iona Sanders MD;  Location: [facility] ENDOSCOPY;  Service: Gastroenterology;  Laterality: N/A;    ENDOSCOPY      ENDOSCOPY N/A 2018    Procedure: ESOPHAGOGASTRODUODENOSCOPY WITH COLD FORCEP BIOPSY;  Surgeon: Vasquez Fagan MD;  Location: [facility] ENDOSCOPY;  Service: Gastroenterology    ENDOSCOPY N/A " 08/12/2019    Procedure: ESOPHAGOGASTRODUODENOSCOPY WITH BIOPSY;  Surgeon: Vasquez Fagan MD;  Location: [facility] ENDOSCOPY;  Service: Gastroenterology    ENDOSCOPY N/A 09/13/2022    Procedure: ESOPHAGOGASTRODUODENOSCOPY with biopsy and polypectomy  ;  Surgeon: Iona Sanders MD;  Location: [facility] ENDOSCOPY;  Service: Gastroenterology;  Laterality: N/A;    HYSTERECTOMY      1998    INSERT / REPLACE / REMOVE PACEMAKER      SKIN BIOPSY Left     arm    SKIN LESION EXCISION N/A     VENTRAL HERNIA REPAIR  10/28/2012      General Information       Row Name 03/20/24 1230          OT Time and Intention    Document Type evaluation  -SD     Mode of Treatment occupational therapy  -SD       Row Name 03/20/24 1230          General Information    Patient Profile Reviewed yes  -SD     Prior Level of Function independent:;all household mobility;ADL's  Lives with her , walks with a rollator, has a shower chair, grab bars and wears 24/7 O2 at 4L  -SD     Existing Precautions/Restrictions fall;oxygen therapy device and L/min  -SD     Barriers to Rehab previous functional deficit  -SD       Row Name 03/20/24 1230          Living Environment    People in Home spouse;grandchild(michael)  -SD       Row Name 03/20/24 1230          Home Main Entrance    Number of Stairs, Main Entrance none  -SD       Row Name 03/20/24 1230          Stairs Within Home, Primary    Number of Stairs, Within Home, Primary twelve  -SD     Stairs Comment, Within Home, Primary stays on 1st floor, grandson lives on 2nd level  -SD       Row Name 03/20/24 1230          Cognition    Orientation Status (Cognition) oriented x 4  -SD       Row Name 03/20/24 1230          Safety Issues, Functional Mobility    Safety Issues Affecting Function (Mobility) safety precautions follow-through/compliance;safety precaution awareness  -SD     Impairments Affecting Function (Mobility) endurance/activity tolerance;shortness of breath;strength;balance  -SD                User Key  (r) = Recorded By, (t) = Taken By, (c) = Cosigned By      Initials Name Provider Type    SD Kallie Montgomery OT Occupational Therapist                     Mobility/ADL's       [facility] Name 03/20/24 1234          Bed Mobility    Bed Mobility supine-sit  -SD     Supine-Sit Barnes (Bed Mobility) modified independence  -SD     Assistive Device (Bed Mobility) bed rails;head of bed elevated  -[facility] Name 03/20/24 1234          Transfers    Transfers sit-stand transfer  -SD       Row Name 03/20/24 1234          Sit-Stand Transfer    Sit-Stand Barnes (Transfers) standby assist  -SD     Assistive Device (Sit-Stand Transfers) walker, front-wheeled  -[facility] Name 03/20/24 1234          Functional Mobility    Functional Mobility- Ind. Level contact guard assist  -SD     Functional Mobility- Device walker, front-wheeled  -SD     Functional Mobility-Distance (Feet) 100  -SD     Functional Mobility- Safety Issues supplemental O2  -[facility] Name 03/20/24 1234          Activities of Daily Living    BADL Assessment/Intervention bathing;upper body dressing;lower body dressing;grooming;feeding;toileting  -[facility] Name 03/20/24 1234          Bathing Assessment/Intervention    Barnes Level (Bathing) minimum assist (75% patient effort)  -SD     Comment, (Bathing) has a shower chair and grab bars, would benefit from long handled sponge  -[facility] Name 03/20/24 1234          Upper Body Dressing Assessment/Training    Barnes Level (Upper Body Dressing) standby assist  -SD       Row Name 03/20/24 123          Lower Body Dressing Assessment/Training    Barnes Level (Lower Body Dressing) minimum assist (75% patient effort)  -SD     Comment, (Lower Body Dressing) would benefit from sock aid and reacher  -SD       [facility] Name 03/20/24 1234          Grooming Assessment/Training    Barnes Level (Grooming) supervision  -[facility] Name 03/20/24 1234          Self-Feeding  Assessment/Training    [place] Level (Feeding) supervision  -SD       Row Name 03/20/24 1234          Toileting Assessment/Training    [place] Level (Toileting) contact guard assist  -SD               User Key  (r) = Recorded By, (t) = Taken By, (c) = Cosigned By      Initials Name Provider Type    Kallie Ashley OT Occupational Therapist                   Obj/Interventions       Row Name 03/20/24 1236          Range of Motion Comprehensive    General Range of Motion bilateral upper extremity ROM WFL  -SD       Row Name 03/20/24 1236          Strength Comprehensive (MMT)    General Manual Muscle Testing (MMT) Assessment upper extremity strength deficits identified  -SD     Comment, General Manual Muscle Testing (MMT) Assessment UB 3+/5 - 4-/5  -SD               User Key  (r) = Recorded By, (t) = Taken By, (c) = Cosigned By      Initials Name Provider Type    SD Kallie Montgomery OT Occupational Therapist                   Goals/Plan       Row Name 03/20/24 1251          Transfer Goal 1 (OT)    Activity/Assistive Device (Transfer Goal 1, OT) sit-to-stand/stand-to-sit  -SD     [place] Level/Cues Needed (Transfer Goal 1, OT) modified independence  -SD     Time Frame (Transfer Goal 1, OT) long term goal (LTG)  -SD     Progress/Outcome (Transfer Goal 1, OT) goal ongoing  -SD       Row Name 03/20/24 1251          Dressing Goal 1 (OT)    Activity/Device (Dressing Goal 1, OT) lower body dressing  -SD     [place]/Cues Needed (Dressing Goal 1, OT) standby assist  -SD     Time Frame (Dressing Goal 1, OT) 2 weeks  -SD     Progress/Outcome (Dressing Goal 1, OT) goal ongoing  -SD       [facility] Name 03/20/24 1251          Toileting Goal 1 (OT)    Activity/Device (Toileting Goal 1, OT) toileting skills, all;commode  -SD     [place] Level/Cues Needed (Toileting Goal 1, OT) standby assist  -SD     Time Frame (Toileting Goal 1, OT) 2 weeks  -SD     Progress/Outcome (Toileting Goal 1, OT) goal ongoing  -SD        Row Name 03/20/24 1251          Strength Goal 1 (OT)    Strength Goal 1 (OT) Patient to perform UB ther ex as tolerated  -SD     Time Frame (Strength Goal 1, OT) long term goal (LTG)  -SD     Progress/Outcome (Strength Goal 1, OT) goal ongoing  -SD       Row Name 03/20/24 1251          Therapy Assessment/Plan (OT)    Planned Therapy Interventions (OT) activity tolerance training;adaptive equipment training;BADL retraining;patient/caregiver education/training;ROM/therapeutic exercise;strengthening exercise;transfer/mobility retraining  -SD               User Key  (r) = Recorded By, (t) = Taken By, (c) = Cosigned By      Initials Name Provider Type    Kallie Ashley OT Occupational Therapist                   Clinical Impression       Row Name 03/20/24 1237          Pain Assessment    Pretreatment Pain Rating 0/10 - no pain  -SD     Posttreatment Pain Rating 0/10 - no pain  -SD       Row Name 03/20/24 1237          Plan of Care Review    Plan of Care Reviewed With patient  -SD     Progress no change  -SD     Outcome Evaluation OT eval completed. Patient is supine in bed, on 4L O2 satting 95%. Patient denies pain at rest, is Ox4. Patient reports she lives with her  in a 2 story home, she stays on main level, her grandson lives upstairs. Patient walks with a rollator, wears 4L O2 24/7 at home. Is ind with ADLs, has a shower chair. Patient's  takes care of IADLs and driving. Patient performed supine to sit with modified ind, sit to stand with SBA, walked 100' using RW with CGA. Required VCs for PLB breathing during mobility, patient breathless upon returning to chair, satting 93%. Patient requires min A for bathing, LBD and CGA for toileting. Would benefit from AE to conserve energy including reacher, sock aid and LH sponge. Patient would also benefit from WC to increase community mobility and overall quality of life as she states she can't leave her house very much anymore d/t shortness of air.  Patient is expected to benefit from continued OT services prior to DC and would benefit from HH services.  -SD       Row Name 03/20/24 1237          Therapy Assessment/Plan (OT)    Patient/Family Therapy Goal Statement (OT) home with HH  -SD     Rehab Potential (OT) good, to achieve stated therapy goals  -SD     Criteria for Skilled Therapeutic Interventions Met (OT) skilled treatment is necessary  -SD     Therapy Frequency (OT) 3 times/wk  5 times if indicated  -SD       Row Name 03/20/24 1237          Therapy Plan Review/Discharge Plan (OT)    Equipment Needs Upon Discharge (OT) wheelchair  -SD     Anticipated Discharge Disposition (OT) home with home health;home with assist  -SD       Row Name 03/20/24 1237          Vital Signs    Pre SpO2 (%) 95  -SD     O2 Delivery Pre Treatment supplemental O2  -SD     Intra SpO2 (%) 93  -SD     O2 Delivery Intra Treatment supplemental O2  -SD     Post SpO2 (%) 96  -SD     O2 Delivery Post Treatment supplemental O2  -SD       Row Name 03/20/24 1237          Positioning and Restraints    Pre-Treatment Position in bed  -SD     Post Treatment Position chair  -SD     In Chair sitting;call light within reach;encouraged to call for assist  -SD               User Key  (r) = Recorded By, (t) = Taken By, (c) = Cosigned By      Initials Name Provider Type    Kallie Ashley OT Occupational Therapist                   Outcome Measures       Row Name 03/20/24 1252          How much help from another is currently needed...    Putting on and taking off regular lower body clothing? 3  -SD     Bathing (including washing, rinsing, and drying) 3  -SD     Toileting (which includes using toilet bed pan or urinal) 3  -SD     Putting on and taking off regular upper body clothing 3  -SD     Taking care of personal grooming (such as brushing teeth) 4  -SD     Eating meals 4  -SD     AM-PAC 6 Clicks Score (OT) 20  -SD       Row Name 03/20/24 1222 03/20/24 0209       How much help from another  person do you currently need...    Turning from your back to your side while in flat bed without using bedrails? 4  -HW 4  -RON    Moving from lying on back to sitting on the side of a flat bed without bedrails? 4  -HW 3  -RON    Moving to and from a bed to a chair (including a wheelchair)? 3  -HW 3  -RON    Standing up from a chair using your arms (e.g., wheelchair, bedside chair)? 3  -HW 3  -RON    Climbing 3-5 steps with a railing? 3  -HW 3  -RON    To walk in hospital room? 3  -HW 3  -RON    AM-PAC 6 Clicks Score (PT) 20  -HW 19  -RON    Highest Level of Mobility Goal 6 --> Walk 10 steps or more  -HW 6 --> Walk 10 steps or more  -RON      Row Name 03/20/24 1252 03/20/24 1222       Functional Assessment    Outcome Measure Options AM-PAC 6 Clicks Daily Activity (OT)  -SD AM-PAC 6 Clicks Basic Mobility (PT)  -              User Key  (r) = Recorded By, (t) = Taken By, (c) = Cosigned By      Initials Name Provider Type    RONRicky Art, RN Registered Nurse    Kallie Ashley, OT Occupational Therapist    HW Joi Cristina, PT Physical Therapist                    Occupational Therapy Education       Title: PT OT SLP Therapies (In Progress)       Topic: Occupational Therapy (In Progress)       Point: ADL training (Done)       Description:   Instruct learner(s) on proper safety adaptation and remediation techniques during self care or transfers.   Instruct in proper use of assistive devices.                  Learning Progress Summary             Patient Acceptance, E,TB, VU by SD at 3/20/2024 1253    Comment: OT POC                         Point: Home exercise program (Not Started)       Description:   Instruct learner(s) on appropriate technique for monitoring, assisting and/or progressing therapeutic exercises/activities.                  Learner Progress:  Not documented in this visit.              Point: Precautions (Not Started)       Description:   Instruct learner(s) on prescribed precautions during self-care  and functional transfers.                  Learner Progress:  Not documented in this visit.              Point: Body mechanics (Not Started)       Description:   Instruct learner(s) on proper positioning and spine alignment during self-care, functional mobility activities and/or exercises.                  Learner Progress:  Not documented in this visit.                              User Key       Initials Effective Dates Name Provider Type Discipline    SD 06/16/21 -  Kallie Montgomery OT Occupational Therapist OT                  OT Recommendation and Plan  Planned Therapy Interventions (OT): activity tolerance training, adaptive equipment training, BADL retraining, patient/caregiver education/training, ROM/therapeutic exercise, strengthening exercise, transfer/mobility retraining  Therapy Frequency (OT): 3 times/wk (5 times if indicated)  Plan of Care Review  Plan of Care Reviewed With: patient  Progress: no change  Outcome Evaluation: OT eval completed. Patient is supine in bed, on 4L O2 satting 95%. Patient denies pain at rest, is Ox4. Patient reports she lives with her  in a 2 story home, she stays on main level, her grandson lives upstairs. Patient walks with a rollator, wears 4L O2 24/7 at home. Is ind with ADLs, has a shower chair. Patient's  takes care of IADLs and driving. Patient performed supine to sit with modified ind, sit to stand with SBA, walked 100' using RW with CGA. Required VCs for PLB breathing during mobility, patient breathless upon returning to chair, satting 93%. Patient requires min A for bathing, LBD and CGA for toileting. Would benefit from AE to conserve energy including reacher, sock aid and LH sponge. Patient would also benefit from WC to increase community mobility and overall quality of life as she states she can't leave her house very much anymore d/t shortness of air. Patient is expected to benefit from continued OT services prior to DC and would benefit from   services.     Time Calculation:   Evaluation Complexity (OT)  Review Occupational Profile/Medical/Therapy History Complexity: expanded/moderate complexity  Assessment, Occupational Performance/Identification of Deficit Complexity: 3-5 performance deficits  Clinical Decision Making Complexity (OT): detailed assessment/moderate complexity  Overall Complexity of Evaluation (OT): moderate complexity     Time Calculation- OT       Row Name 03/20/24 1253             Time Calculation- OT    OT Start Time 1035  -SD      OT Received On 03/20/24  -SD      OT Goal Re-Cert Due Date 04/01/24  -SD         Untimed Charges    OT Eval/Re-eval Minutes 45  -SD         Total Minutes    Untimed Charges Total Minutes 45  -SD       Total Minutes 45  -SD                User Key  (r) = Recorded By, (t) = Taken By, (c) = Cosigned By      Initials Name Provider Type    Kallie Ashley, OT Occupational Therapist                  Therapy Charges for Today       Code Description Service Date Service Provider Modifiers Qty    [id number] HC OT EVAL MOD COMPLEXITY 3 3/20/2024 Kallie Montgomery OT GO 1                 Kallie Montgomery OT  3/20/2024

## 2024-03-20 NOTE — PLAN OF CARE
Goal Outcome Evaluation:  Plan of Care Reviewed With: patient        Progress: no change  Outcome Evaluation: OT eval completed. Patient is supine in bed, on 4L O2 satting 95%. Patient denies pain at rest, is Ox4. Patient reports she lives with her  in a 2 story home, she stays on main level, her grandson lives upstairs. Patient walks with a rollator, wears 4L O2 24/7 at home. Is ind with ADLs, has a shower chair. Patient's  takes care of IADLs and driving. Patient performed supine to sit with modified ind, sit to stand with SBA, walked 100' using RW with CGA. Required VCs for PLB breathing during mobility, patient breathless upon returning to chair, satting 93%. Patient requires min A for bathing, LBD and CGA for toileting. Would benefit from AE to conserve energy including reacher, sock aid and LH sponge. Patient would also benefit from WC to increase community mobility and overall quality of life as she states she can't leave her house very much anymore d/t shortness of air. Patient is expected to benefit from continued OT services prior to DC and would benefit from HH services.      Anticipated Discharge Disposition (OT): home with home health, home with assist

## 2024-03-20 NOTE — THERAPY EVALUATION
Patient Name: Elsa Alcaraz  : 1940    MRN: [id number]                              Today's Date: 3/20/2024       Admit Date: 3/19/2024    Visit Dx:     ICD-10-CM ICD-9-CM   1. Bronchitis  J40 490   2. Acute on chronic respiratory failure with hypoxia  J96.21 518.84     799.02   3. Acute on chronic congestive heart failure, unspecified heart failure type  I50.9 428.0   4. Iatrogenic hypotension  I95.89 458.29     Patient Active Problem List   Diagnosis    Longstanding persistent atrial fibrillation    Hyperlipidemia    Essential hypertension    Anxiety disorder due to general medical condition    Cataract    Chronic bronchitis    Depression    Obesity    Sleep apnea    Iron deficiency anemia    Thrombocytosis    Hiatal hernia with gastroesophageal reflux    Age-related nuclear cataract of left eye    Age-related nuclear cataract of right eye    Moderate mitral regurgitation    Moderate tricuspid regurgitation    Chronic systolic heart failure    Diverticulosis of colon    Chronic respiratory failure with hypoxia    Symptomatic bradycardia    COPD    Chronic anticoagulation (Xarelto)    Complete heart block    Esophageal dysphagia    Gastroesophageal reflux disease without esophagitis    Bronchitis     Past Medical History:   Diagnosis Date    Anemia     Anxiety disorder due to general medical condition 2017    Arthritis     Atrial fibrillation 2017    Body piercing     BOTH EARS    Borderline diabetes     Breast cancer 2003    right-radiation and a lumpectomy    Broken tooth     Cataract 2017    Left eye surgery     Chronic bronchitis 2017    Colon cancer 1998    had surgery and chemo    COPD (chronic obstructive pulmonary disease)     Cyanocobalamine deficiency (non anemic)     Depression 2017    Diverticulosis     Esophageal reflux 2017    Hiatal hernia 2017    History of bladder infections     History of echocardiogram 2021    Hx of exercise stress  "test     \"several years ago by Dr. Mercado\".      Hx of radiation therapy     Hyperlipidemia     Hypertension 2017    Impaired functional mobility and activity tolerance     Impaired functional mobility, balance, gait, and endurance     Osteoporosis     Palpitations 2017    Prediabetes     Problems with swallowing     meat at times per pt report    Shortness of breath     WITH EXERTION     Sleep apnea 2017    NO CPAP    Tricuspid valve disorder 2017    Patient doesn't know anything about this    Vitamin D deficiency     Wears glasses      Past Surgical History:   Procedure Laterality Date    ANAL FISTULOTOMY      APPENDECTOMY          BREAST BIOPSY      BREAST LUMPECTOMY Right 2006    CARDIAC ELECTROPHYSIOLOGY PROCEDURE N/A 2023    Procedure: Micra;  Surgeon: Keven Willis DO;  Location: [facility] EP INVASIVE LOCATION;  Service: Cardiology;  Laterality: N/A;    CATARACT EXTRACTION      both eyes     CATARACT EXTRACTION W/ INTRAOCULAR LENS IMPLANT Left 2019    Procedure: CATARACT PHACO EXTRACTION WITH INTRAOCULAR LENS IMPLANT LEFT;  Surgeon: Masoud David MD;  Location: [facility] OR;  Service: Ophthalmology    CATARACT EXTRACTION W/ INTRAOCULAR LENS IMPLANT Right 2019    Procedure: CATARACT PHACO EXTRACTION WITH INTRAOCULAR LENS IMPLANT RIGHT;  Surgeon: Masoud David MD;  Location: [facility] OR;  Service: Ophthalmology     SECTION  1981    CHOLECYSTECTOMY  2009    COLECTOMY PARTIAL / TOTAL  1998    COLON CANCER    COLONOSCOPY      COLONOSCOPY N/A 2021    Procedure: COLONOSCOPY WITH BIOPSY;  Surgeon: Iona Sanders MD;  Location: [facility] ENDOSCOPY;  Service: Gastroenterology;  Laterality: N/A;    ENDOSCOPY      ENDOSCOPY N/A 2018    Procedure: ESOPHAGOGASTRODUODENOSCOPY WITH COLD FORCEP BIOPSY;  Surgeon: Vasquez Fagan MD;  Location: [facility] ENDOSCOPY;  Service: Gastroenterology    ENDOSCOPY N/A " 08/12/2019    Procedure: ESOPHAGOGASTRODUODENOSCOPY WITH BIOPSY;  Surgeon: Vasquez Fagan MD;  Location: [facility] ENDOSCOPY;  Service: Gastroenterology    ENDOSCOPY N/A 09/13/2022    Procedure: ESOPHAGOGASTRODUODENOSCOPY with biopsy and polypectomy  ;  Surgeon: Iona Sanders MD;  Location: [facility] ENDOSCOPY;  Service: Gastroenterology;  Laterality: N/A;    HYSTERECTOMY      1998    INSERT / REPLACE / REMOVE PACEMAKER      SKIN BIOPSY Left     arm    SKIN LESION EXCISION N/A     VENTRAL HERNIA REPAIR  10/28/2012      General Information       Row Name 03/20/24 1211          Physical Therapy Time and Intention    Document Type evaluation  -     Mode of Treatment physical therapy  -       Row Name 03/20/24 1211          General Information    Patient Profile Reviewed yes  -     Prior Level of Function independent:;gait  -     Existing Precautions/Restrictions fall;oxygen therapy device and L/min  -     Barriers to Rehab previous functional deficit  -       Row Name 03/20/24 1211          Living Environment    People in Home spouse;grandchild(michael)  -       Row Name 03/20/24 1211          Home Main Entrance    Number of Stairs, Main Entrance none  -       Row Name 03/20/24 1211          Stairs Within Home, Primary    Number of Stairs, Within Home, Primary twelve;other (see comments)  multi-level, pt resides on primary level  -       Row Name 03/20/24 1211          Cognition    Orientation Status (Cognition) oriented x 4  -       Row Name 03/20/24 1211          Safety Issues, Functional Mobility    Safety Issues Affecting Function (Mobility) insight into deficits/self-awareness;positioning of assistive device;sequencing abilities  -     Impairments Affecting Function (Mobility) endurance/activity tolerance;strength;postural/trunk control;shortness of breath;balance  -               User Key  (r) = Recorded By, (t) = Taken By, (c) = Cosigned By      Initials Name Provider Type     Jonnie  PRANEETH Tamez Physical Therapist                   Mobility       Row Name 03/20/24 1212          Bed Mobility    Bed Mobility bed mobility (all) activities  -     All Activities, [place] (Bed Mobility) modified independence  -     Assistive Device (Bed Mobility) head of bed elevated  -       Row Name 03/20/24 1212          Sit-Stand Transfer    Sit-Stand [place] (Transfers) standby assist;verbal cues;nonverbal cues (demo/gesture)  -     Assistive Device (Sit-Stand Transfers) walker, front-wheeled  -       Row Name 03/20/24 1212          Gait/Stairs (Locomotion)    Gait/Stairs Locomotion gait/ambulation independence  -     [place] Level (Gait) contact guard  -     Assistive Device (Gait) walker, front-wheeled  -     Patient was able to Ambulate yes  -     Distance in Feet (Gait) 100  -HW     Deviations/Abnormal Patterns (Gait) bilateral deviations  -     Bilateral Gait Deviations heel strike decreased;forward flexed posture  -               User Key  (r) = Recorded By, (t) = Taken By, (c) = Cosigned By      Initials Name Provider Type     Joi Cristina PT Physical Therapist                   Obj/Interventions       Row Name 03/20/24 1213          Range of Motion Comprehensive    General Range of Motion bilateral lower extremity ROM WFL  -       Row Name 03/20/24 1213          Strength Comprehensive (MMT)    General Manual Muscle Testing (MMT) Assessment lower extremity strength deficits identified  -     Comment, General Manual Muscle Testing (MMT) Assessment BLE MMT grossly 4-/5  -       Row Name 03/20/24 1213          Balance    Balance Assessment sitting dynamic balance;sit to stand dynamic balance;standing static balance;standing dynamic balance;sitting static balance  -     Static Sitting Balance modified independence  -     Dynamic Sitting Balance modified independence  -     Position, Sitting Balance unsupported;sitting edge of bed  -     Sit to Stand Dynamic  Balance standby assist  -HW     Static Standing Balance contact guard  -HW     Dynamic Standing Balance contact guard  -HW     Position/Device Used, Standing Balance walker, front-wheeled;supported  -       Row Name 03/20/24 1213          Sensory Assessment (Somatosensory)    Sensory Assessment (Somatosensory) sensation intact  -HW               User Key  (r) = Recorded By, (t) = Taken By, (c) = Cosigned By      Initials Name Provider Type     Joi Cristina, PT Physical Therapist                   Goals/Plan       Row Name 03/20/24 1220          Bed Mobility Goal 1 (PT)    Activity/Assistive Device (Bed Mobility Goal 1, PT) bed mobility activities, all  -HW     [place] Level/Cues Needed (Bed Mobility Goal 1, PT) independent  -HW     Time Frame (Bed Mobility Goal 1, PT) long term goal (LTG);2 weeks  -HW     Progress/Outcomes (Bed Mobility Goal 1, PT) new goal  -       Row Name 03/20/24 1220          Transfer Goal 1 (PT)    Activity/Assistive Device (Transfer Goal 1, PT) transfers, all  LRAD  -HW     [place] Level/Cues Needed (Transfer Goal 1, PT) modified independence  -HW     Time Frame (Transfer Goal 1, PT) long term goal (LTG);2 weeks  -HW     Progress/Outcome (Transfer Goal 1, PT) new goal  -       Row Name 03/20/24 1220          Gait Training Goal 1 (PT)    Activity/Assistive Device (Gait Training Goal 1, PT) gait (walking locomotion)  LRAD  -HW     [place] Level (Gait Training Goal 1, PT) modified independence  -HW     Distance (Gait Training Goal 1, PT) 175  -HW     Time Frame (Gait Training Goal 1, PT) long term goal (LTG);2 weeks  -HW     Progress/Outcome (Gait Training Goal 1, PT) new goal  -       Row Name 03/20/24 1220          Patient Education Goal (PT)    Activity (Patient Education Goal, PT) Pt will demonstrate the ability to perform 3x10 BLE ther-ex with mod (I) to allow for improved BLE strength and endurance.  -HW     [place]/Cues/Accuracy (Memory Goal 2, PT)  demonstrates adequately  -     Time Frame (Patient Education Goal, PT) short term goal (STG);1 week  -     Progress/Outcome (Patient Education Goal, PT) new goal  -       Row Name 03/20/24 1220          Therapy Assessment/Plan (PT)    Planned Therapy Interventions (PT) balance training;bed mobility training;gait training;home exercise program;motor coordination training;neuromuscular re-education;patient/family education;postural re-education;stair training;strengthening;stretching;transfer training  -               User Key  (r) = Recorded By, (t) = Taken By, (c) = Cosigned By      Initials Name Provider Type     Joi Cristina, PT Physical Therapist                   Clinical Impression       Row Name 03/20/24 1213          Pain    Pretreatment Pain Rating 0/10 - no pain  -     Posttreatment Pain Rating 0/10 - no pain  -       Row Name 03/20/24 1213          Plan of Care Review    Plan of Care Reviewed With patient  -     Progress no change  -     Outcome Evaluation Pt participated in PT initial evaluation this date. Pt presents semi-fowlers position in bed, pleasant and agreeable to PT evaluation. Pt denies reports of pain at rest. Pt reports she lives at home with her  and grandchild in a multi-level home with 0 VAL. Pt resides on primary level. Pt reports she normally ambulates with a rollator but is often limited by shortness of breath. Pt is on 4L supplemental O2 at baseline. Pt denies reports of falls at home. Pt performed supine to sit on EOB with mod (I). Pt was able to maintain static sitting at EOB with mod (I). Pt performed sit to stand transfer with SBA and use of a RW for safety. Pt ambulated x100' with use of a RW and CGA, no LOB noted. Pt required VC for proper breathing techniques during exertion. Pt demonstrated dyspnea, fatigue and increased weight-bearing through UE's onto RW during gait. Following evaluation, pt left seated in bedside chair with call light and all needs  within reach. Recommend skilled PT intervention during IP admission to address identified impairments, reduce risk of falls and prevent functional decline. Once medically stable, recommend pt to d/c home with HHPT to address remaining deficits.  -       Row Name 03/20/24 1213          Therapy Assessment/Plan (PT)    Patient/Family Therapy Goals Statement (PT) Pt reports she feels like she needs to get stronger.  -     Rehab Potential (PT) good, to achieve stated therapy goals  -     Criteria for Skilled Interventions Met (PT) yes  -     Therapy Frequency (PT) 5 times/wk  -     Predicted Duration of Therapy Intervention (PT) 10 days  -       Row Name 03/20/24 1213          Vital Signs    Pre SpO2 (%) 95  -HW     O2 Delivery Pre Treatment supplemental O2  4L  -HW     Intra SpO2 (%) 93  -HW     O2 Delivery Intra Treatment supplemental O2  4L  -HW     Post SpO2 (%) 96  -HW     O2 Delivery Post Treatment supplemental O2  4L  -HW     Pre Patient Position Supine  -     Intra Patient Position Standing  -HW     Post Patient Position Sitting  -       Row Name 03/20/24 1213          Positioning and Restraints    Pre-Treatment Position in bed  -     Post Treatment Position chair  -     In Chair sitting;call light within reach;encouraged to call for assist  -               User Key  (r) = Recorded By, (t) = Taken By, (c) = Cosigned By      Initials Name Provider Type     Joi Cristina, PT Physical Therapist                   Outcome Measures       Row Name 03/20/24 1222 03/20/24 0209       How much help from another person do you currently need...    Turning from your back to your side while in flat bed without using bedrails? 4  - 4  -RON    Moving from lying on back to sitting on the side of a flat bed without bedrails? 4  - 3  -RON    Moving to and from a bed to a chair (including a wheelchair)? 3  -HW 3  -RON    Standing up from a chair using your arms (e.g., wheelchair, bedside chair)? 3  -HW 3  -RON     Climbing 3-5 steps with a railing? 3  - 3  -RON    To walk in hospital room? 3  -HW 3  -RON    AM-PAC 6 Clicks Score (PT) 20  - 19  -RON    Highest Level of Mobility Goal 6 --> Walk 10 steps or more  -HW 6 --> Walk 10 steps or more  -RON      Row Name 03/20/24 1222          Functional Assessment    Outcome Measure Options AM-PAC 6 Clicks Basic Mobility (PT)  -               User Key  (r) = Recorded By, (t) = Taken By, (c) = Cosigned By      Initials Name Provider Type    Ricky Mckeon, RN Registered Nurse     Joi Cristina, PT Physical Therapist                                 Physical Therapy Education       Title: PT OT SLP Therapies (In Progress)       Topic: Physical Therapy (In Progress)       Point: Mobility training (Done)       Learning Progress Summary             Patient Acceptance, E,TB, VU by  at 3/20/2024 1222    Comment: Educated pt on importance of OOB mobility during IP admission                         Point: Home exercise program (Not Started)       Learner Progress:  Not documented in this visit.              Point: Body mechanics (Not Started)       Learner Progress:  Not documented in this visit.              Point: Precautions (Not Started)       Learner Progress:  Not documented in this visit.                              User Key       Initials Effective Dates Name Provider Type Discipline     11/29/23 -  Joi Cristina, PRANEETH Physical Therapist PT                  PT Recommendation and Plan  Planned Therapy Interventions (PT): balance training, bed mobility training, gait training, home exercise program, motor coordination training, neuromuscular re-education, patient/family education, postural re-education, stair training, strengthening, stretching, transfer training  Plan of Care Reviewed With: patient  Progress: no change  Outcome Evaluation: Pt participated in PT initial evaluation this date. Pt presents semi-fowlers position in bed, pleasant and agreeable to PT evaluation. Pt  denies reports of pain at rest. Pt reports she lives at home with her  and grandchild in a multi-level home with 0 VAL. Pt resides on primary level. Pt reports she normally ambulates with a rollator but is often limited by shortness of breath. Pt is on 4L supplemental O2 at baseline. Pt denies reports of falls at home. Pt performed supine to sit on EOB with mod (I). Pt was able to maintain static sitting at EOB with mod (I). Pt performed sit to stand transfer with SBA and use of a RW for safety. Pt ambulated x100' with use of a RW and CGA, no LOB noted. Pt required VC for proper breathing techniques during exertion. Pt demonstrated dyspnea, fatigue and increased weight-bearing through UE's onto RW during gait. Following evaluation, pt left seated in bedside chair with call light and all needs within reach. Recommend skilled PT intervention during IP admission to address identified impairments, reduce risk of falls and prevent functional decline. Once medically stable, recommend pt to d/c home with HHPT to address remaining deficits.     Time Calculation:   PT Evaluation Complexity  History, PT Evaluation Complexity: 1-2 personal factors and/or comorbidities  Examination of Body Systems (PT Eval Complexity): total of 3 or more elements  Clinical Presentation (PT Evaluation Complexity): evolving  Clinical Decision Making (PT Evaluation Complexity): moderate complexity  Overall Complexity (PT Evaluation Complexity): moderate complexity     PT Charges       Row Name 03/20/24 1224             Time Calculation    Start Time 1032  -HW      PT Received On 03/20/24  -      PT Goal Re-Cert Due Date 03/30/24  -         Untimed Charges    PT Eval/Re-eval Minutes 42  -HW         Total Minutes    Untimed Charges Total Minutes 42  -HW       Total Minutes 42  -HW                User Key  (r) = Recorded By, (t) = Taken By, (c) = Cosigned By      Initials Name Provider Type    Joi Shelby PT Physical Therapist                   Therapy Charges for Today       Code Description Service Date Service Provider Modifiers Qty    [id number] HC PT EVAL MOD COMPLEXITY 3 3/20/2024 Joi Cristina, PT GP 1            PT G-Codes  Outcome Measure Options: AM-PAC 6 Clicks Basic Mobility (PT)  AM-PAC 6 Clicks Score (PT): 20  PT Discharge Summary  Anticipated Discharge Disposition (PT): home with home health    Joi Cristina, PRANEETH  3/20/2024

## 2024-03-20 NOTE — H&P
[facility]      Name:  Elsa Alcaraz   Age:  83 y.o.  Sex:  female  :  1940  MRN:  [id number]   Visit Number:  [id number]  Admission Date:  3/19/2024  Date Of Service:  24  Primary Care Physician:  Kemi Hdz MD    Chief Complaint:     Dyspnea    History Of Presenting Illness:      Elsa Alcaraz is an 83-year-old woman with past medical history of COPD on home oxygen, atrial fibrillation on rivaroxaban, hypertension, pacemaker, heart failure reduced ejection fraction, hiatal hernia, DAVID, peptic ulcer disease, chronic bronchitis, history of colon cancer with ileocolonic anastomosis ulcer with GI bleed.  Presented to [facility] ED on 3/19/2024 with concern for shortness of air onset 2 to 3 days prior with associated cough and congestion and bodyaches.  Cough is gotten worse.  Feels like she does have mucus but she is unable to get it up.  Denies chest pain, abdominal pain, unilateral leg pain or swelling, N/V/D.    ED summary: Afebrile, hypertensive which resolved, otherwise vital signs stable on 4 L nasal cannula.  EKG paced with conduction delay.  High-sensitivity troponin 21, ACS not suspected.  proBNP over 1900.  Blood glucose 100.  Lactic 1.4.  Procalcitonin not elevated.  White count not elevated.  Hemoglobin 11.  Blood cultures collected.  COVID/flu negative.  CT angio chest no pulmonary embolism, does show bronchitis.  She was provided Rocephin, azithromycin, dexamethasone, DuoNeb, 1 L normal saline bolus.    Review Of Systems:    All systems were reviewed and negative except as mentioned in history of presenting illness, assessment and plan.    Past Medical History: Patient  has a past medical history of Anemia, Anxiety disorder due to general medical condition (2017), Arthritis, Atrial fibrillation (2017), Body piercing, Borderline diabetes, Breast cancer (), Broken tooth, Cataract (2017), Chronic bronchitis  (2017), Colon cancer (1998), COPD (chronic obstructive pulmonary disease), Cyanocobalamine deficiency (non anemic), Depression (2017), Diverticulosis, Esophageal reflux (2017), Hiatal hernia (2017), History of bladder infections, History of echocardiogram (2021), exercise stress test, radiation therapy, Hyperlipidemia, Hypertension (2017), Impaired functional mobility and activity tolerance, Impaired functional mobility, balance, gait, and endurance, Osteoporosis, Palpitations (2017), Prediabetes, Problems with swallowing, Shortness of breath, Sleep apnea (2017), Tricuspid valve disorder (2017), Vitamin D deficiency, and Wears glasses.    Past Surgical History: Patient  has a past surgical history that includes Anal fistulotomy;  section (1981); Cholecystectomy (2009); Colectomy partial / total (1998); Ventral hernia repair (10/28/2012); Colonoscopy (); Esophagogastroduodenoscopy (); Esophagogastroduodenoscopy (N/A, 2018); Breast lumpectomy (Right, 2006); Esophagogastroduodenoscopy (N/A, 2019); Breast biopsy; Cataract extraction w/ intraocular lens implant (Left, 2019); Hysterectomy; Appendectomy; Cataract extraction w/ intraocular lens implant (Right, 2019); Cataract extraction (); Colonoscopy (N/A, 2021); Esophagogastroduodenoscopy (N/A, 2022); Cardiac electrophysiology procedure (N/A, 2023); Insert / replace / remove pacemaker; Skin biopsy (Left); and Skin lesion excision (N/A).    Social History: Patient  reports that she has never smoked. She has never been exposed to tobacco smoke. She has never used smokeless tobacco. She reports current alcohol use of about 1.0 - 2.0 standard drink of alcohol per week. She reports that she does not use drugs.    Family History:  Patient's family history has been reviewed and found to be noncontributory.     Allergies:       Other    Home Medications:    Prior to Admission Medications       Prescriptions Last Dose Informant Patient Reported? Taking?    furosemide (Lasix) 20 MG tablet 3/19/2024  No Yes    Take 1 tablet by mouth Daily.    metoprolol succinate XL (TOPROL-XL) 50 MG 24 hr tablet 3/19/2024  Yes Yes    Take 1 tablet by mouth Daily.    spironolactone (ALDACTONE) 25 MG tablet 3/19/2024  No Yes    Take 0.5 tablets by mouth Daily.    albuterol sulfate  (90 Base) MCG/ACT inhaler   No No    Inhale 2 puffs 4 (Four) Times a Day.    budesonide-formoterol (SYMBICORT) 160-4.5 MCG/ACT inhaler   No No    Inhale 2 puffs 2 (Two) Times a Day. Rinse mouth with water after use    chlorhexidine (PERIDEX) 0.12 % solution   Yes No    SWISH AND SPIT 5-10 MLS THREE TIMES DAILY FOR 14 DAYS    cholecalciferol (VITAMIN D3) 25 MCG (1000 UT) tablet  Self Yes No    Take 1 tablet by mouth Daily.    citalopram (CeleXA) 20 MG tablet   Yes No    Take 1 tablet by mouth Daily.    Cyanocobalamin (B-12 COMPLIANCE INJECTION IJ)   Yes No    Inject  as directed Every 30 (Thirty) Days.    famotidine (PEPCID) 40 MG tablet   No No    Take 1 tablet by mouth Every Night.    Hydrocortisone (PROCTO-MED HC RE)   Yes No    Insert  into the rectum As Needed.    O2 (OXYGEN)   Yes No    Inhale 4 L/min Daily As Needed.    pantoprazole (PROTONIX) 40 MG EC tablet   Yes No    Take 1 tablet by mouth Daily.    polyethylene glycol (MiraLax) 17 GM/SCOOP powder   No No    Take 17 g by mouth Daily As Needed (constipation). Indications: Constipation caused by Irritable Bowel Syndrome    Patient not taking:  Reported on 2/21/2024    potassium chloride (K-DUR,KLOR-CON) 10 MEQ CR tablet   Yes No    Take 1 tablet by mouth 2 (Two) Times a Day.    Psyllium (Metamucil) wafer wafer   Yes No    Take  by mouth Daily.    sacubitril-valsartan (ENTRESTO) 24-26 MG tablet   No No    Take 1 tablet by mouth Every 12 (Twelve) Hours.    Xarelto 20 MG tablet   No No    Take 1 tablet by mouth once  "daily          ED Medications:    Medications   sodium chloride 0.9 % flush 10 mL (has no administration in time range)   azithromycin (ZITHROMAX) 500 mg 0.9% NaCl (Add-vantage) 250 mL (has no administration in time range)   ipratropium-albuterol (DUO-NEB) nebulizer solution 3 mL (3 mL Nebulization Given 3/19/24 2152)   dexAMETHasone sodium phosphate injection 10 mg (10 mg Intravenous Given 3/19/24 2126)   sodium chloride 0.9 % bolus 1,000 mL (0 mL Intravenous Stopped 3/20/24 0014)   iopamidol (ISOVUE-300) 61 % injection 100 mL (100 mL Intravenous Given 3/19/24 2248)   cefTRIAXone (ROCEPHIN) 1,000 mg in sodium chloride 0.9 % 100 mL IVPB (1,000 mg Intravenous New Bag 3/20/24 0013)     Vital Signs:  Temp:  [98.3 °F (36.8 °C)-98.4 °F (36.9 °C)] 98.3 °F (36.8 °C)  Heart Rate:  [82-99] 86  Resp:  [20-22] 20  BP: ()/(46-74) 97/53        03/19/24 2102   Weight: 73 kg (160 lb 15 oz)     Body mass index is 28.51 kg/m².    Physical Exam:     Most recent vital Signs: BP 97/53   Pulse 86   Temp 98.3 °F (36.8 °C)   Resp 20   Ht 160 cm (63\")   Wt 73 kg (160 lb 15 oz)   SpO2 95%   BMI 28.51 kg/m²     Physical Exam  Constitutional:       General: She is not in acute distress.     Appearance: She is ill-appearing. She is not toxic-appearing.   HENT:      Mouth/Throat:      Mouth: Mucous membranes are moist.   Eyes:      Extraocular Movements: Extraocular movements intact.   Cardiovascular:      Rate and Rhythm: Normal rate and regular rhythm.      Pulses: Normal pulses.      Heart sounds: Normal heart sounds.   Pulmonary:      Effort: Pulmonary effort is normal.      Breath sounds: Wheezing present.   Abdominal:      Palpations: Abdomen is soft.      Tenderness: There is no abdominal tenderness.   Musculoskeletal:      Right lower leg: No edema.      Left lower leg: No edema.   Skin:     General: Skin is warm.      Capillary Refill: Capillary refill takes less than 2 seconds.   Neurological:      General: No focal " "deficit present.      Mental Status: She is alert and oriented to person, place, and time.   Psychiatric:         Mood and Affect: Mood normal.         Thought Content: Thought content normal.         Laboratory data:    I have reviewed the labs done in the emergency room.    Results from last 7 days   Lab Units 03/19/24  2137   SODIUM mmol/L 134*   POTASSIUM mmol/L 4.2   CHLORIDE mmol/L 94*   CO2 mmol/L 29.8*   BUN mg/dL 21   CREATININE mg/dL 0.92   CALCIUM mg/dL 8.8   BILIRUBIN mg/dL 1.2   ALK PHOS U/L 90   ALT (SGPT) U/L 10   AST (SGOT) U/L 16   GLUCOSE mg/dL 100*     Results from last 7 days   Lab Units 03/19/24  2137   WBC 10*3/mm3 7.62   HEMOGLOBIN g/dL 11.8*   HEMATOCRIT % 39.2   PLATELETS 10*3/mm3 290         Results from last 7 days   Lab Units 03/19/24 2137   HSTROP T ng/L 21*     Results from last 7 days   Lab Units 03/19/24 2137   PROBNP pg/mL 1,997.0*                       Invalid input(s): \"USDES\", \"NITRITITE\", \"BACT\", \"EP\"    Pain Management Panel           No data to display                EKG:      EKG paced with conduction delay.     Radiology:    CT Angiogram Chest Pulmonary Embolism    Result Date: 3/19/2024  FINAL REPORT TECHNIQUE: Multiple axial CT images were obtained through the chest following IV contrast using a CTA/PE protocol.  3D/MIP reconstruction images were also performed. This study was performed with techniques to keep radiation doses as low as reasonably achievable (ALARA). Individualized dose reduction techniques using automated exposure control or adjustment of mA and/or kV according to the patient's size were employed. CLINICAL HISTORY: Pulmonary embolism (PE) suspected, unknown D-dimer  soa, cp FINDINGS: PAs and aorta: No pulmonary embolus.  Thoracic aorta is unremarkable. There is coronary artery disease. Heart/mediastinum: There is a lead less pacemaker present in the right ventricle.  No evidence for right heart strain.  No pericardial effusion.    There is cardiomegaly.  " Lungs: There is bronchial wall thickening and peripheral mucous plugging.  Lymph nodes: No pathologically enlarged thoracic lymph nodes.  Pleura: There is a small left pleural effusion with lung base atelectasis.  Chest Wall: No chest wall contusion.  Bones: No acute fracture.  Upper abdomen: There is a moderate hiatal hernia.     No pulmonary embolus.     Bronchitis. Authenticated and Electronically Signed by Rafael Limon MD on 03/19/2024 11:43:55 PM     Assessment/Plan:    Observation general floor admission 3/20/2024 with acute on chronic respiratory failure with hypoxia secondary to suspected viral syndrome, also with chronic bronchitis.    At time of admission resting in bed, does appear comfortably ill.     updated at bedside.    Acute on chronic respiratory failure with hypoxia  Viral syndrome  Chronic bronchitis  COPD  Supplemental oxygen as needed keep saturation above 88%.  Prednisone.  Continue Rocephin and azithromycin given significant congestion on exam.  Respiratory PCR panel.    Low blood pressure  Holding home blood pressure medications.  Took her blood pressure medicines at home after not eating or drinking much for couple days.  Likely will recover soon.    Chronic:  COPD on home oxygen, atrial fibrillation on rivaroxaban, hypertension, pacemaker, heart failure reduced ejection fraction, hiatal hernia, DAVID, peptic ulcer disease, chronic bronchitis, history of colon cancer with ileocolonic anastomosis ulcer with GI bleed.      Continue current medications.    Risk Assessment: High  DVT Prophylaxis: Continue home Xarelto  Code Status: DNR/DNI  Diet: Cardiac    Advance Care Planning   ACP discussion was held with the patient during this visit. Patient has an advance directive (not in EMR), copy requested.           Brian Joseph Kerley, DO  03/20/24  00:43 EDT    Dictated utilizing Dragon dictation.

## 2024-03-20 NOTE — PLAN OF CARE
Goal Outcome Evaluation:  Plan of Care Reviewed With: patient        Progress: no change  Outcome Evaluation: New admit overnight. Patient has rested well. Plan of care ongoing.

## 2024-03-20 NOTE — ED PROVIDER NOTES
" EMERGENCY DEPARTMENT ENCOUNTER    Pt Name: Elsa Alcaraz  MRN: [id number]  Pt :   1940  Room Number:  327/1  Date of encounter:  3/19/2024  PCP: Kemi Hdz MD  ED Provider: Clayton Vega MD    Historian: Patient      HPI:  Chief Complaint: Shortness of air        Context: Elsa Alcaraz is a 83 y.o. female who presents to the ED c/o shortness of air.  Patient states for the past 2 to 3 days she has had cough and congestion along with bodyaches.  Cough has gotten worse.  Feels like she needs to cough up mucus but nothing is coming up.  He is supposed to be on 2 to 4 L nasal cannula at home at all times but feels like it is not working.      PAST MEDICAL HISTORY  Past Medical History:   Diagnosis Date    Anemia     Anxiety disorder due to general medical condition 2017    Arthritis     Atrial fibrillation 2017    Body piercing     BOTH EARS    Borderline diabetes     Breast cancer     right-radiation and a lumpectomy    Broken tooth     Cataract 2017    Left eye surgery     Chronic bronchitis 2017    Colon cancer 1998    had surgery and chemo    COPD (chronic obstructive pulmonary disease)     Cyanocobalamine deficiency (non anemic)     Depression 2017    Diverticulosis     Esophageal reflux 2017    Hiatal hernia 2017    History of bladder infections     History of echocardiogram 2021    Hx of exercise stress test     \"several years ago by Dr. Mercado\".      Hx of radiation therapy     Hyperlipidemia     Hypertension 2017    Impaired functional mobility and activity tolerance     Impaired functional mobility, balance, gait, and endurance     Osteoporosis     Palpitations 2017    Prediabetes     Problems with swallowing     meat at times per pt report    Shortness of breath     WITH EXERTION     Sleep apnea 2017    NO CPAP    Tricuspid valve disorder 2017    Patient doesn't know anything about this    Vitamin " D deficiency     Wears glasses          PAST SURGICAL HISTORY  Past Surgical History:   Procedure Laterality Date    ANAL FISTULOTOMY      APPENDECTOMY          BREAST BIOPSY      BREAST LUMPECTOMY Right 2006    CARDIAC ELECTROPHYSIOLOGY PROCEDURE N/A 2023    Procedure: Micra;  Surgeon: Keven Willis DO;  Location: [facility] EP INVASIVE LOCATION;  Service: Cardiology;  Laterality: N/A;    CATARACT EXTRACTION      both eyes     CATARACT EXTRACTION W/ INTRAOCULAR LENS IMPLANT Left 2019    Procedure: CATARACT PHACO EXTRACTION WITH INTRAOCULAR LENS IMPLANT LEFT;  Surgeon: Masoud David MD;  Location: [facility] OR;  Service: Ophthalmology    CATARACT EXTRACTION W/ INTRAOCULAR LENS IMPLANT Right 2019    Procedure: CATARACT PHACO EXTRACTION WITH INTRAOCULAR LENS IMPLANT RIGHT;  Surgeon: Masoud David MD;  Location: [facility] OR;  Service: Ophthalmology     SECTION  1981    CHOLECYSTECTOMY  2009    COLECTOMY PARTIAL / TOTAL  1998    COLON CANCER    COLONOSCOPY      COLONOSCOPY N/A 2021    Procedure: COLONOSCOPY WITH BIOPSY;  Surgeon: Iona Sanders MD;  Location: [facility] ENDOSCOPY;  Service: Gastroenterology;  Laterality: N/A;    ENDOSCOPY      ENDOSCOPY N/A 2018    Procedure: ESOPHAGOGASTRODUODENOSCOPY WITH COLD FORCEP BIOPSY;  Surgeon: Vasquez Fagan MD;  Location: [facility] ENDOSCOPY;  Service: Gastroenterology    ENDOSCOPY N/A 2019    Procedure: ESOPHAGOGASTRODUODENOSCOPY WITH BIOPSY;  Surgeon: Vasquez Fagan MD;  Location: [facility] ENDOSCOPY;  Service: Gastroenterology    ENDOSCOPY N/A 2022    Procedure: ESOPHAGOGASTRODUODENOSCOPY with biopsy and polypectomy  ;  Surgeon: Iona Sanders MD;  Location: [facility] ENDOSCOPY;  Service: Gastroenterology;  Laterality: N/A;    HYSTERECTOMY          INSERT / REPLACE / REMOVE PACEMAKER      SKIN BIOPSY Left     arm    SKIN LESION EXCISION N/A     VENTRAL HERNIA REPAIR   10/28/2012         FAMILY HISTORY  Family History   Problem Relation Age of Onset    Hypertension Mother     Asthma Mother     COPD Mother     Osteoarthritis Mother     Stomach cancer Father     Cancer Father     Cancer Sister     Diabetes Maternal Grandmother     Colon cancer Neg Hx          SOCIAL HISTORY  Social History     Socioeconomic History    Marital status:    Tobacco Use    Smoking status: Never     Passive exposure: Never    Smokeless tobacco: Never   Vaping Use    Vaping status: Never Used   Substance and Sexual Activity    Alcohol use: Yes     Alcohol/week: 1.0 - 2.0 standard drink of alcohol     Types: 1 - 2 Glasses of wine per week     Comment: rarely    Drug use: Never    Sexual activity: Defer         ALLERGIES  Other        REVIEW OF SYSTEMS  Review of Systems     All systems reviewed and negative except for those discussed in HPI.       PHYSICAL EXAM    I have reviewed the triage vital signs and nursing notes.    ED Triage Vitals [03/19/24 2102]   Temp Heart Rate Resp BP SpO2   98.4 °F (36.9 °C) 82 22 (!) 87/55 (!) 87 %      Temp src Heart Rate Source Patient Position BP Location FiO2 (%)   Oral Monitor Sitting Left arm --       Physical Exam    General:  Awake, alert, elderly, no acute distress  HEENT: Atraumatic, normocephalic, EOMI, PERRLA, mucous membranes moist  NECK:  Supple, atraumatic  Cardiovascular:  Regular rate, regular rhythm, no murmurs, rubs, or gallops.  Extremities well perfused   Respiratory: Intermittent coughing, regular rate, mild rhonchi in lower lung fields bilaterally but no significant wheezing.  Abdominal:  Soft, nondistended, nontender.  No guarding or rebound.  No palpable masses  Extremity:  No visible bony abnormalities in all 4 extremities.  Full range of motion of all extremities.  Skin:  Warm and dry.  No rashes  Neuro:  AAOx3, GCS 15. Cranial nerves 2-12 grossly intact.  No focal strength or sensation deficits.  Psych:  Mood and affect  appropriate.        LAB RESULTS  Recent Results (from the past 24 hour(s))   COVID-19 and FLU A/B PCR, 1 HR TAT - Swab, Nasopharynx    Collection Time: 03/19/24  9:19 PM    Specimen: Nasopharynx; Swab   Result Value Ref Range    COVID19 Not Detected Not Detected - Ref. Range    Influenza A PCR Not Detected Not Detected    Influenza B PCR Not Detected Not Detected   Gold Top - SST    Collection Time: 03/19/24  9:30 PM   Result Value Ref Range    Extra Tube Hold for add-ons.    Comprehensive Metabolic Panel    Collection Time: 03/19/24  9:37 PM    Specimen: Blood   Result Value Ref Range    Glucose 100 (H) 65 - 99 mg/dL    BUN 21 8 - 23 mg/dL    Creatinine 0.92 0.57 - 1.00 mg/dL    Sodium 134 (L) 136 - 145 mmol/L    Potassium 4.2 3.5 - 5.2 mmol/L    Chloride 94 (L) 98 - 107 mmol/L    CO2 29.8 (H) 22.0 - 29.0 mmol/L    Calcium 8.8 8.6 - 10.5 mg/dL    Total Protein 6.7 6.0 - 8.5 g/dL    Albumin 4.1 3.5 - 5.2 g/dL    ALT (SGPT) 10 1 - 33 U/L    AST (SGOT) 16 1 - 32 U/L    Alkaline Phosphatase 90 39 - 117 U/L    Total Bilirubin 1.2 0.0 - 1.2 mg/dL    Globulin 2.6 gm/dL    A/G Ratio 1.6 g/dL    BUN/Creatinine Ratio 22.8 7.0 - 25.0    Anion Gap 10.2 5.0 - 15.0 mmol/L    eGFR 62.3 >60.0 mL/min/1.73   BNP    Collection Time: 03/19/24  9:37 PM    Specimen: Blood   Result Value Ref Range    proBNP 1,997.0 (H) 0.0 - 1,800.0 pg/mL   Single High Sensitivity Troponin T    Collection Time: 03/19/24  9:37 PM    Specimen: Blood   Result Value Ref Range    HS Troponin T 21 (H) <14 ng/L   Green Top (Gel)    Collection Time: 03/19/24  9:37 PM   Result Value Ref Range    Extra Tube Hold for add-ons.    Lavender Top    Collection Time: 03/19/24  9:37 PM   Result Value Ref Range    Extra Tube hold for add-on    Light Blue Top    Collection Time: 03/19/24  9:37 PM   Result Value Ref Range    Extra Tube Hold for add-ons.    CBC Auto Differential    Collection Time: 03/19/24  9:37 PM    Specimen: Blood   Result Value Ref Range    WBC 7.62 3.40  - 10.80 10*3/mm3    RBC 4.97 3.77 - 5.28 10*6/mm3    Hemoglobin 11.8 (L) 12.0 - 15.9 g/dL    Hematocrit 39.2 34.0 - 46.6 %    MCV 78.9 (L) 79.0 - 97.0 fL    MCH 23.7 (L) 26.6 - 33.0 pg    MCHC 30.1 (L) 31.5 - 35.7 g/dL    RDW 16.8 (H) 12.3 - 15.4 %    RDW-SD 47.8 37.0 - 54.0 fl    MPV 9.7 6.0 - 12.0 fL    Platelets 290 140 - 450 10*3/mm3   Lactic Acid, Plasma    Collection Time: 03/19/24  9:37 PM    Specimen: Blood   Result Value Ref Range    Lactate 1.4 0.5 - 2.0 mmol/L   Procalcitonin    Collection Time: 03/19/24  9:37 PM    Specimen: Blood   Result Value Ref Range    Procalcitonin 0.08 0.00 - 0.25 ng/mL   Manual Differential    Collection Time: 03/19/24  9:37 PM    Specimen: Blood   Result Value Ref Range    Neutrophil % 71.0 42.7 - 76.0 %    Lymphocyte % 10.0 (L) 19.6 - 45.3 %    Monocyte % 17.0 (H) 5.0 - 12.0 %    Basophil % 1.0 0.0 - 1.5 %    Atypical Lymphocyte % 1.0 0.0 - 5.0 %    Neutrophils Absolute 5.41 1.70 - 7.00 10*3/mm3    Lymphocytes Absolute 0.84 0.70 - 3.10 10*3/mm3    Monocytes Absolute 1.30 (H) 0.10 - 0.90 10*3/mm3    Basophils Absolute 0.08 0.00 - 0.20 10*3/mm3    RBC Morphology Normal Normal    WBC Morphology Normal Normal    Platelet Morphology Normal Normal   Respiratory Panel PCR w/COVID-19(SARS-CoV-2) DOT/ABRIL/RAFA/PAD/COR/NANO In-House, NP Swab in [facility], 2 HR TAT - Swab, Nasopharynx    Collection Time: 03/20/24  1:36 AM    Specimen: Nasopharynx; Swab   Result Value Ref Range    ADENOVIRUS, PCR Not Detected Not Detected    Coronavirus 229E Not Detected Not Detected    Coronavirus HKU1 Not Detected Not Detected    Coronavirus NL63 Not Detected Not Detected    Coronavirus OC43 Not Detected Not Detected    COVID19 Not Detected Not Detected - Ref. Range    Human Metapneumovirus Not Detected Not Detected    Human Rhinovirus/Enterovirus Not Detected Not Detected    Influenza A PCR Not Detected Not Detected    Influenza B PCR Not Detected Not Detected    Parainfluenza Virus 1 Not Detected Not  Detected    Parainfluenza Virus 2 Not Detected Not Detected    Parainfluenza Virus 3 Detected (A) Not Detected    Parainfluenza Virus 4 Not Detected Not Detected    RSV, PCR Not Detected Not Detected    Bordetella pertussis pcr Not Detected Not Detected    Bordetella parapertussis PCR Not Detected Not Detected    Chlamydophila pneumoniae PCR Not Detected Not Detected    Mycoplasma pneumo by PCR Not Detected Not Detected       If labs were ordered, I independently reviewed the results and considered them in treating the patient.        RADIOLOGY  CT Angiogram Chest Pulmonary Embolism    Result Date: 3/19/2024  FINAL REPORT TECHNIQUE: Multiple axial CT images were obtained through the chest following IV contrast using a CTA/PE protocol.  3D/MIP reconstruction images were also performed. This study was performed with techniques to keep radiation doses as low as reasonably achievable (ALARA). Individualized dose reduction techniques using automated exposure control or adjustment of mA and/or kV according to the patient's size were employed. CLINICAL HISTORY: Pulmonary embolism (PE) suspected, unknown D-dimer  soa, cp FINDINGS: PAs and aorta: No pulmonary embolus.  Thoracic aorta is unremarkable. There is coronary artery disease. Heart/mediastinum: There is a lead less pacemaker present in the right ventricle.  No evidence for right heart strain.  No pericardial effusion.    There is cardiomegaly.  Lungs: There is bronchial wall thickening and peripheral mucous plugging.  Lymph nodes: No pathologically enlarged thoracic lymph nodes.  Pleura: There is a small left pleural effusion with lung base atelectasis.  Chest Wall: No chest wall contusion.  Bones: No acute fracture.  Upper abdomen: There is a moderate hiatal hernia.     No pulmonary embolus.     Bronchitis. Authenticated and Electronically Signed by Rafael Limon MD on 03/19/2024 11:43:55 PM     I ordered and independently reviewed the above noted radiographic  studies.     See radiologist's dictation for official interpretation.        PROCEDURES    Procedures    ECG 12 Lead ED Triage Standing Order; SOA   Final Result          MEDICATIONS GIVEN IN ER    Medications   sodium chloride 0.9 % flush 10 mL (has no administration in time range)   albuterol sulfate HFA (PROVENTIL HFA;VENTOLIN HFA;PROAIR HFA) inhaler 2 puff (has no administration in time range)   budesonide-formoterol (SYMBICORT) 160-4.5 MCG/ACT inhaler 2 puff (has no administration in time range)   citalopram (CeleXA) tablet 20 mg (has no administration in time range)   famotidine (PEPCID) tablet 40 mg (has no administration in time range)   rivaroxaban (XARELTO) tablet 20 mg (has no administration in time range)   sodium chloride 0.9 % flush 10 mL (has no administration in time range)   sodium chloride 0.9 % flush 10 mL (has no administration in time range)   sodium chloride 0.9 % infusion 40 mL (has no administration in time range)   acetaminophen (TYLENOL) tablet 650 mg (has no administration in time range)     Or   acetaminophen (TYLENOL) 160 MG/5ML oral solution 650 mg (has no administration in time range)     Or   acetaminophen (TYLENOL) suppository 650 mg (has no administration in time range)   ondansetron (ZOFRAN) injection 4 mg (has no administration in time range)   nitroglycerin (NITROSTAT) SL tablet 0.4 mg (has no administration in time range)   Potassium Replacement - Follow Nurse / BPA Driven Protocol (has no administration in time range)   Magnesium Standard Dose Replacement - Follow Nurse / BPA Driven Protocol (has no administration in time range)   Phosphorus Replacement - Follow Nurse / BPA Driven Protocol (has no administration in time range)   Calcium Replacement - Follow Nurse / BPA Driven Protocol (has no administration in time range)   sodium chloride 0.9 % infusion (has no administration in time range)   sennosides-docusate (PERICOLACE) 8.6-50 MG per tablet 2 tablet (has no administration  in time range)     And   polyethylene glycol (MIRALAX) packet 17 g (has no administration in time range)     And   bisacodyl (DULCOLAX) EC tablet 5 mg (has no administration in time range)     And   bisacodyl (DULCOLAX) suppository 10 mg (has no administration in time range)   cefTRIAXone (ROCEPHIN) 1,000 mg in sodium chloride 0.9 % 100 mL IVPB (has no administration in time range)   azithromycin (ZITHROMAX) 500 mg 0.9% NaCl (Add-vantage) 250 mL (has no administration in time range)   budesonide-formoterol (SYMBICORT) 160-4.5 MCG/ACT inhaler 2 puff (has no administration in time range)   budesonide-formoterol (SYMBICORT) 160-4.5 MCG/ACT inhaler 1 puff (has no administration in time range)   ipratropium-albuterol (DUO-NEB) nebulizer solution 3 mL (3 mL Nebulization Given 3/19/24 2152)   dexAMETHasone sodium phosphate injection 10 mg (10 mg Intravenous Given 3/19/24 2126)   sodium chloride 0.9 % bolus 1,000 mL (0 mL Intravenous Stopped 3/20/24 0014)   iopamidol (ISOVUE-300) 61 % injection 100 mL (100 mL Intravenous Given 3/19/24 2248)   cefTRIAXone (ROCEPHIN) 1,000 mg in sodium chloride 0.9 % 100 mL IVPB (0 mg Intravenous Stopped 3/20/24 0045)   azithromycin (ZITHROMAX) 500 mg 0.9% NaCl (Add-vantage) 250 mL (500 mg Intravenous New Bag 3/20/24 0056)         MEDICAL DECISION MAKING, PROGRESS, and CONSULTS    All labs, if obtained, have been independently reviewed by me.  All radiology studies, if obtained, have been reviewed by me and the radiologist dictating the report.  All EKG's, if obtained, have been independently viewed and interpreted by me.      Discussion below represents my analysis of pertinent findings related to patient's condition, differential diagnosis, treatment plan and final disposition.    Elsa Alcaraz is a 83 y.o. female who presents to the ED c/o shortness of air.  Hypotensive on arrival with blood pressure of 87/55 along with hypoxic to 87%.  Placed on 3 L nasal cannula with improvement to 95%.   Possibility that her home oxygen is not working adequately.  Differential also includes and is not limited to viral upper respiratory infection, COVID-19, influenza, pneumothorax, pneumonia, pulmonary edema, myocardial infarction.  Extensive labs and EKG along with chest x-ray ordered.  Patient given DuoNeb treatment and 10 mg of IV dexamethasone.      EKG obtained which was personally reviewed and interpreted showing evidence of left bundle branch block with rate around 96.  Upon chart review of old EKGs in system patient has a known history of chronic left bundle branch block.  Placed on cardiac monitoring.    Labs show no significant leukocytosis or critical electrolyte abnormalities requiring emergent correction other than mildly elevated CO2 consistent with patient's COPD.  Elevated proBNP of 1997.  Chest x-ray personally reviewed and interpreted showing evidence of blunting of left costophrenic angle possibly secondary to atelectasis versus edema or possible infectious source such as pneumonia.  Due to patient's increased dyspnea concern for possible pulmonary embolism so CT scan was obtained.  Scan negative for pulmonary embolism showing only reportedly evidence of bronchitis.    Upon further discussion with patient patient had taken all of her blood pressure medications tonight for hypertension, likely contributing to low blood pressure with her viral illness and decreased p.o. intake over the past 2 days.  Viral swabs negative.  Lactate negative.  Procalcitonin also negative at 0.08.    Due to patient's significant COPD and bronchitis will empirically treat with IV Rocephin and azithromycin to prevent further formation of pneumonia.  Patient's blood pressure remaining soft while in emergency department however maps above 60-65 and mentating appropriately.  Discussed all results with patient and  at bedside along with offered admission for continued treatment due to patient's acute on chronic  respiratory failure with exertional dyspnea and hypoxia.  Patient agreeable with this plan and admitted by hospitalist.                           Orders placed during this visit:  Orders Placed This Encounter   Procedures    COVID PRE-OP / PRE-PROCEDURE SCREENING ORDER (NO ISOLATION) - Swab, Nasopharynx    COVID-19 and FLU A/B PCR, 1 HR TAT - Swab, Nasopharynx    Blood Culture - Blood,    Blood Culture - Blood,    Respiratory Panel PCR w/COVID-19(SARS-CoV-2) DOT/ABRIL/RAFA/PAD/COR/NANO In-House, NP Swab in UTM/VTM, 2 HR TAT - Swab, Nasopharynx    XR Chest 1 View    CT Angiogram Chest Pulmonary Embolism    [facility] Draw    Comprehensive Metabolic Panel    BNP    Single High Sensitivity Troponin T    CBC Auto Differential    Lactic Acid, Plasma    Procalcitonin    Manual Differential    Basic Metabolic Panel    CBC Auto Differential    Diet: Cardiac; Healthy Heart (2-3 Na+); Fluid Consistency: Thin (IDDSI 0)    Undress & Gown    Continuous Pulse Oximetry    Vital Signs    Vital Signs    Up With Assistance    Intake & Output    Weigh Patient    Oral Care    Maintain IV Access    Telemetry - Place Orders & Notify Provider of Results When Patient Experiences Acute Chest Pain, Dysrhythmia or Respiratory Distress    May Be Off Telemetry for Tests    Notify Provider (With Default Parameters)    Code Status and Medical Interventions:    Inpatient Case Management  Consult    OT Consult: Eval & Treat ADL Performance Below Baseline    PT Consult: Eval & Treat Discharge Placement Assessment    Oxygen Therapy- Nasal Cannula; Titrate 1-6 LPM Per SpO2; 90 - 95%    ECG 12 Lead ED Triage Standing Order; SOA    Insert Peripheral IV    Insert Peripheral IV    Initiate Observation Status    CBC & Differential    Green Top (Gel)    Lavender Top    Gold Top - SST    Light Blue Top         ED Course:    Consultants:                  Shared Decision Making:  After my consideration of clinical presentation and any  laboratory/radiology studies obtained, I discussed the findings with the patient/patient representative who is in agreement with the treatment plan and the final disposition.   Risks and benefits of discharge and/or observation/admission were discussed.                    DIAGNOSIS  Final diagnoses:   Bronchitis   Acute on chronic respiratory failure with hypoxia   Acute on chronic congestive heart failure, unspecified heart failure type   Iatrogenic hypotension         DISPOSITION  Admit      Please note that portions of this document were completed with voice recognition software.        Clayton Vega MD  03/20/24 0302

## 2024-03-20 NOTE — PROGRESS NOTES
"Dietitian Assessment    Patient Name: Elsa Alcaraz  YOB: 1940  MRN: [id number]  Admission date: 3/19/2024    Comment:    Pt has had wt loss. Will provide Boost [facility] to promote PO intake.    Clinical Nutrition Assessment      Reason for Assessment MST   H&P  Past Medical History:   Diagnosis Date    Anemia     Anxiety disorder due to general medical condition 01/11/2017    Arthritis     Atrial fibrillation 01/11/2017    Body piercing     BOTH EARS    Borderline diabetes     Breast cancer 2003    right-radiation and a lumpectomy    Broken tooth     Cataract 01/11/2017    Left eye surgery 11/19    Chronic bronchitis 01/11/2017    Colon cancer 11/30/1998    had surgery and chemo    COPD (chronic obstructive pulmonary disease)     Cyanocobalamine deficiency (non anemic)     Depression 01/11/2017    Diverticulosis     Esophageal reflux 01/11/2017    Hiatal hernia 01/11/2017    History of bladder infections     History of echocardiogram 07/26/2021    Hx of exercise stress test     \"several years ago by Dr. Mercado\".      Hx of radiation therapy     Hyperlipidemia     Hypertension 01/11/2017    Impaired functional mobility and activity tolerance     Impaired functional mobility, balance, gait, and endurance     Osteoporosis     Palpitations 01/11/2017    Prediabetes     Problems with swallowing     meat at times per pt report    Shortness of breath     WITH EXERTION     Sleep apnea 01/11/2017    NO CPAP    Tricuspid valve disorder 01/11/2017    Patient doesn't know anything about this    Vitamin D deficiency     Wears glasses        Past Surgical History:   Procedure Laterality Date    ANAL FISTULOTOMY      APPENDECTOMY      1998    BREAST BIOPSY      BREAST LUMPECTOMY Right 03/06/2006    CARDIAC ELECTROPHYSIOLOGY PROCEDURE N/A 07/05/2023    Procedure: Micra;  Surgeon: Keven Willis DO;  Location: [facility] INVASIVE LOCATION;  Service: Cardiology;  Laterality: N/A;    CATARACT EXTRACTION  2019    both eyes  " "   CATARACT EXTRACTION W/ INTRAOCULAR LENS IMPLANT Left 2019    Procedure: CATARACT PHACO EXTRACTION WITH INTRAOCULAR LENS IMPLANT LEFT;  Surgeon: Masoud David MD;  Location: [facility] OR;  Service: Ophthalmology    CATARACT EXTRACTION W/ INTRAOCULAR LENS IMPLANT Right 2019    Procedure: CATARACT PHACO EXTRACTION WITH INTRAOCULAR LENS IMPLANT RIGHT;  Surgeon: Masoud David MD;  Location: [facility] OR;  Service: Ophthalmology     SECTION  1981    CHOLECYSTECTOMY  2009    COLECTOMY PARTIAL / TOTAL  1998    COLON CANCER    COLONOSCOPY      COLONOSCOPY N/A 2021    Procedure: COLONOSCOPY WITH BIOPSY;  Surgeon: Iona Sanders MD;  Location: [facility] ENDOSCOPY;  Service: Gastroenterology;  Laterality: N/A;    ENDOSCOPY      ENDOSCOPY N/A 2018    Procedure: ESOPHAGOGASTRODUODENOSCOPY WITH COLD FORCEP BIOPSY;  Surgeon: Vasquez Fagan MD;  Location: [facility] ENDOSCOPY;  Service: Gastroenterology    ENDOSCOPY N/A 2019    Procedure: ESOPHAGOGASTRODUODENOSCOPY WITH BIOPSY;  Surgeon: Vasquez Fagan MD;  Location: [facility] ENDOSCOPY;  Service: Gastroenterology    ENDOSCOPY N/A 2022    Procedure: ESOPHAGOGASTRODUODENOSCOPY with biopsy and polypectomy  ;  Surgeon: Iona Sanders MD;  Location: [facility] ENDOSCOPY;  Service: Gastroenterology;  Laterality: N/A;    HYSTERECTOMY          INSERT / REPLACE / REMOVE PACEMAKER      SKIN BIOPSY Left     arm    SKIN LESION EXCISION N/A     VENTRAL HERNIA REPAIR  10/28/2012            Current Problems        Encounter Information        Trending Narrative     3/20: Pt noted for not eating/drinking much for a couple of days. EMR shows trending wt loss. Will provide Boost [facility] daily to encouraged PO intake. RD to f/up.     Anthropometrics        Current Height, Weight Height: 160 cm (63\")  Weight: 72.2 kg (159 lb 2.8 oz) (24 0500)   Trending Weight Hx     This admission:              PTA:     Wt " Readings from Last 30 Encounters:   03/20/24 0500 72.2 kg (159 lb 2.8 oz)   03/20/24 0203 71.4 kg (157 lb 6.5 oz)   03/19/24 2102 73 kg (160 lb 15 oz)   02/21/24 1603 73 kg (161 lb)   02/07/24 1118 74.8 kg (165 lb)   11/29/23 1300 75.6 kg (166 lb 9.6 oz)   10/09/23 1358 76.7 kg (169 lb)   08/21/23 1504 78 kg (172 lb)   07/19/23 1416 77.5 kg (170 lb 12.8 oz)   07/14/23 1022 85.7 kg (189 lb)   07/06/23 0600 85.8 kg (189 lb 2.5 oz)   07/04/23 2302 79.9 kg (176 lb 2.4 oz)   07/04/23 0400 79.1 kg (174 lb 6.1 oz)   07/02/23 1646 81 kg (178 lb 9.2 oz)   07/02/23 1135 81.6 kg (180 lb)   02/28/23 1458 81.5 kg (179 lb 9.6 oz)   10/10/22 0903 78 kg (172 lb)   10/10/22 0951 78 kg (172 lb)   08/22/22 0905 78 kg (172 lb)   06/23/22 1606 75.3 kg (166 lb)   06/20/22 1020 75.8 kg (167 lb)   04/17/22 0756 74.8 kg (165 lb)   04/06/22 1334 76.3 kg (168 lb 3.2 oz)   03/10/22 1308 73.8 kg (162 lb 9.6 oz)   01/13/22 0951 70.3 kg (155 lb)   01/05/22 1040 69.6 kg (153 lb 6.4 oz)   07/26/21 1018 59.4 kg (131 lb)   07/06/21 1310 58.9 kg (129 lb 13.6 oz)   06/21/21 1142 59 kg (130 lb)   06/05/21 0500 58.9 kg (129 lb 13.6 oz)   06/04/21 0500 61.2 kg (134 lb 14.7 oz)   06/01/21 2142 61.5 kg (135 lb 9.3 oz)   06/01/21 1404 60.9 kg (134 lb 3.2 oz)   05/14/21 0500 60.5 kg (133 lb 6.1 oz)   05/12/21 2100 63.8 kg (140 lb 10.5 oz)   05/12/21 1717 63.5 kg (140 lb)   04/07/21 1009 64.9 kg (143 lb)   04/05/21 0500 65.3 kg (143 lb 15.4 oz)   04/04/21 0541 65.2 kg (143 lb 11.8 oz)   04/03/21 0500 66.4 kg (146 lb 6.2 oz)   04/02/21 0400 71.5 kg (157 lb 11.2 oz)   04/01/21 0400 71.3 kg (157 lb 1.6 oz)   03/31/21 2100 71.3 kg (157 lb 3.2 oz)   03/31/21 0916 69 kg (152 lb 3 oz)   03/30/21 0513 68.9 kg (151 lb 14.4 oz)   03/29/21 1540 66.4 kg (146 lb 6.2 oz)   03/29/21 1505 66.4 kg (146 lb 6.2 oz)   03/29/21 0833 65.3 kg (144 lb)   12/14/20 1253 71.2 kg (157 lb)   12/05/20 2325 71.7 kg (158 lb)      BMI kg/m2 Body mass index is 28.2 kg/m².     Labs         Pertinent Labs     Results from last 7 days   Lab Units 03/20/24  0535 03/19/24  2137   SODIUM mmol/L 137 134*   POTASSIUM mmol/L 4.4 4.2   CHLORIDE mmol/L 100 94*   CO2 mmol/L 29.7* 29.8*   BUN mg/dL 20 21   CREATININE mg/dL 0.83 0.92   CALCIUM mg/dL 8.5* 8.8   BILIRUBIN mg/dL  --  1.2   ALK PHOS U/L  --  90   ALT (SGPT) U/L  --  10   AST (SGOT) U/L  --  16   GLUCOSE mg/dL 151* 100*       Results from last 7 days   Lab Units 03/20/24  0535   HEMOGLOBIN g/dL 10.9*   HEMATOCRIT % 36.9       Lab Results   Component Value Date    HGBA1C 5.30 07/05/2023            Medications       Scheduled Medications albuterol sulfate HFA, 2 puff, Inhalation, 4x Daily - RT  [START ON 3/21/2024] azithromycin, 500 mg, Intravenous, Q24H  budesonide-formoterol, 2 puff, Inhalation, BID - RT  [START ON 3/21/2024] cefTRIAXone, 1,000 mg, Intravenous, Q24H  citalopram, 20 mg, Oral, Daily  famotidine, 40 mg, Oral, Nightly  rivaroxaban, 20 mg, Oral, Daily  sodium chloride, 10 mL, Intravenous, Q12H        Infusions sodium chloride, 75 mL/hr, Last Rate: 75 mL/hr (03/20/24 0600)         PRN Medications   acetaminophen **OR** acetaminophen **OR** acetaminophen    senna-docusate sodium **AND** polyethylene glycol **AND** bisacodyl **AND** bisacodyl    Calcium Replacement - Follow Nurse / BPA Driven Protocol    Magnesium Standard Dose Replacement - Follow Nurse / BPA Driven Protocol    nitroglycerin    ondansetron    Phosphorus Replacement - Follow Nurse / BPA Driven Protocol    Potassium Replacement - Follow Nurse / BPA Driven Protocol    sodium chloride    sodium chloride    sodium chloride     Physical Findings        Trending Physical   Appearance, NFPE    --  Edema  None     Bowel Function LBM: 3/19     Tubes Peripheral IV     Chewing/Swallowing WNL     Skin WNL       Estimated/Assessed Needs       Energy Requirements    EST Needs, Method, Wt used 5767-9837 kcal (25-30 kcal/kg CBW)       Protein Requirements    EST Needs, Method, Wt used 72-86 g  pro (1-1.2 g pro/kg CBW)       Fluid Requirements     Estimated Needs (mL/day)        Current Nutrition Orders & Evaluation of Intake       Oral Nutrition     Food Allergies    Current PO Diet Diet: Cardiac; Healthy Heart (2-3 Na+); Fluid Consistency: Thin (IDDSI 0)   Supplement    PO Evaluation     Trending % PO Intake No PO intake recorded at this time     Enteral Nutrition    Enteral Route    Order, Modulars, Flushes    Residual/Tolerance    TF Observation         Parenteral Nutrition     TPN Route    Total # Days on TPN    TPN Order, Lipid Details    MVI & Trace Element Freq    TPN Observation       Nutrition Diagnosis         Nutrition Dx Problem 1 Unintended wt loss r/t COPD AEB EMR shows wt loss of 10# (5.9%) within 6 months.       Nutrition Dx Problem 2        Intervention Goal         Intervention Goal(s) Maintain CBW  PO intake meet >50% of estimated needs  Adhere to ONS     Nutrition Intervention        RD Action Will provide Boost [facility] daily     Nutrition Prescription          Diet Prescription HH   Supplement Prescription Boost [facility] daily     Enteral Prescription        TPN Prescription      Monitor/Evaluation        Monitor Per protocol, I&O, PO intake, Supplement intake, Pertinent labs, Weight, Skin status, GI status, Symptoms, POC/GOC     RD to f/up    Electronically signed by:  Angeles Dubose RD  03/20/24 08:44 EDT

## 2024-03-20 NOTE — CASE MANAGEMENT/SOCIAL WORK
Discharge Planning Assessment  [facility]     Patient Name: Elsa Alcaraz  MRN: [id number]  Today's Date: 3/20/2024    Admit Date: 3/19/2024    Plan: Pt provided demographics and general information for sdoh and dcp. She does not have an AD, POA, or financial concerns.  Home DME includes shower seat with grab bars, rollator, 4L O2 from [facility]emi [facility]dz is her primary and [facility] is pharmacy, enrolled in meds to bed. Pt is independent with activities, her spouse/family provides transportation. She plans to return home at [place], no concerns voiced.   Discharge Needs Assessment       Row Name 03/20/24 1147       Living Environment    People in Home grandchild(michael);spouse  adult grandson    Current Living Arrangements home    Duration at Residence 60 yrs    Potentially Unsafe Housing Conditions none    In the past 12 months has the electric, gas, oil, or water company threatened to shut off services in your home? No    Primary Care Provided by self    Family Caregiver if Needed spouse;child(michael), adult;grandchild(michael), adult    Quality of Family Relationships helpful;involved    Able to Return to Prior Arrangements yes       Resource/Environmental Concerns    Resource/Environmental Concerns none    Transportation Concerns none       Transportation Needs    In the past 12 months, has lack of transportation kept you from medical appointments or from getting medications? no    In the past 12 months, has lack of transportation kept you from meetings, work, or from getting things needed for daily living? No       Food Insecurity    Within the past 12 months, you worried that your food would run out before you got the money to buy more. Never true    Within the past 12 months, the food you bought just didn't last and you didn't have money to get more. Never true       Transition Planning    Patient/Family Anticipates Transition to home with family    Patient/Family Anticipated Services at Transition none     Transportation Anticipated family or friend will provide       Discharge Needs Assessment    Readmission Within the Last 30 Days no previous admission in last 30 days    Equipment Currently Used at Home rollator;grab bar    Concerns to be Addressed no discharge needs identified;denies needs/concerns at this time    Anticipated Changes Related to Illness none    Equipment Needed After Discharge none                   Discharge Plan       Row Name 03/20/24 1150       Plan    Plan Pt provided demographics and general information for sdoh and dcp. She does not have an AD, POA, or financial concerns.  Home DME includes shower seat with grab bars, rollator, 4L O2 from [employer].[facility] [place] is her primary and [facility], enrolled in [employer] to bed. Pt is independent with activities, her spouse/family provides transportation. She plans to return home at [place], no concerns voiced.                  Continued Care and Services - Admitted Since 3/19/2024    No active coordination exists for this encounter.          Demographic Summary       Row Name 03/20/24 1146       General Information    Admission Type observation    Arrived From emergency department    Required Notices Provided Observation Status Notice    Referral Source admission list    Reason for Consult discharge planning    Preferred Language English       Contact Information    Permission Granted to Share Info With family/designee                   Functional Status       Row Name 03/20/24 1147       Functional Status    Usual Activity Tolerance moderate       Physical Activity    On average, how many days per week do you engage in moderate to strenuous exercise (like a brisk walk)? 0 days    On average, how many minutes do you engage in exercise at this level? 0 min    Number of minutes of exercise per week 0       Functional Status, IADL    Medications independent    Meal Preparation independent    Housekeeping independent    Laundry independent     Shopping assistive person       Mental Status    General Appearance WDL WDL       Mental Status Summary    Recent Changes in Mental Status/Cognitive Functioning no changes       Employment/    Employment Status retired                   Psychosocial    No documentation.                  Abuse/Neglect    No documentation.                  Legal    No documentation.                  Substance Abuse    No documentation.                  Patient Forms    No documentation.                     Emily Sifuentes RN

## 2024-03-20 NOTE — PLAN OF CARE
Goal Outcome Evaluation:  Plan of Care Reviewed With: patient        Progress: no change  Outcome Evaluation: Pt participated in PT initial evaluation this date. Pt presents semi-fowlers position in bed, pleasant and agreeable to PT evaluation. Pt denies reports of pain at rest. Pt reports she lives at home with her  and grandchild in a multi-level home with 0 VAL. Pt resides on primary level. Pt reports she normally ambulates with a rollator but is often limited by shortness of breath. Pt is on 4L supplemental O2 at baseline. Pt denies reports of falls at home. Pt performed supine to sit on EOB with mod (I). Pt was able to maintain static sitting at EOB with mod (I). Pt performed sit to stand transfer with SBA and use of a RW for safety. Pt ambulated x100' with use of a RW and CGA, no LOB noted. Pt required VC for proper breathing techniques during exertion. Pt demonstrated dyspnea, fatigue and increased weight-bearing through UE's onto RW during gait. Following evaluation, pt left seated in bedside chair with call light and all needs within reach. Recommend skilled PT intervention during IP admission to address identified impairments, reduce risk of falls and prevent functional decline. Once medically stable, recommend pt to d/c home with HHPT to address remaining deficits.      Anticipated Discharge Disposition (PT): home with home health                         Bilobed Transposition Flap Text: The defect edges were debeveled with a #15 scalpel blade.  Given the location of the defect and the proximity to free margins a bilobed transposition flap was deemed most appropriate.  Using a sterile surgical marker, an appropriate bilobe flap drawn around the defect.    The area thus outlined was incised deep to adipose tissue with a #15 scalpel blade.  The skin margins were undermined to an appropriate distance in all directions utilizing iris scissors.

## 2024-03-21 LAB
ANION GAP SERPL CALCULATED.3IONS-SCNC: 10.5 MMOL/L (ref 5–15)
BASOPHILS # BLD AUTO: 0.02 10*3/MM3 (ref 0–0.2)
BASOPHILS NFR BLD AUTO: 0.2 % (ref 0–1.5)
BUN SERPL-MCNC: 25 MG/DL (ref 8–23)
BUN/CREAT SERPL: 34.2 (ref 7–25)
BURR CELLS BLD QL SMEAR: NORMAL
CALCIUM SPEC-SCNC: 8.5 MG/DL (ref 8.6–10.5)
CHLORIDE SERPL-SCNC: 103 MMOL/L (ref 98–107)
CO2 SERPL-SCNC: 26.5 MMOL/L (ref 22–29)
CREAT SERPL-MCNC: 0.73 MG/DL (ref 0.57–1)
DEPRECATED RDW RBC AUTO: 48.5 FL (ref 37–54)
EGFRCR SERPLBLD CKD-EPI 2021: 82.2 ML/MIN/1.73
EOSINOPHIL # BLD AUTO: 0 10*3/MM3 (ref 0–0.4)
EOSINOPHIL NFR BLD AUTO: 0 % (ref 0.3–6.2)
ERYTHROCYTE [DISTWIDTH] IN BLOOD BY AUTOMATED COUNT: 16.6 % (ref 12.3–15.4)
GLUCOSE SERPL-MCNC: 140 MG/DL (ref 65–99)
HCT VFR BLD AUTO: 35.9 % (ref 34–46.6)
HGB BLD-MCNC: 10.3 G/DL (ref 12–15.9)
HYPOCHROMIA BLD QL: NORMAL
IMM GRANULOCYTES # BLD AUTO: 0.06 10*3/MM3 (ref 0–0.05)
IMM GRANULOCYTES NFR BLD AUTO: 0.5 % (ref 0–0.5)
LYMPHOCYTES # BLD AUTO: 0.65 10*3/MM3 (ref 0.7–3.1)
LYMPHOCYTES NFR BLD AUTO: 5.5 % (ref 19.6–45.3)
MCH RBC QN AUTO: 23.4 PG (ref 26.6–33)
MCHC RBC AUTO-ENTMCNC: 28.7 G/DL (ref 31.5–35.7)
MCV RBC AUTO: 81.6 FL (ref 79–97)
MONOCYTES # BLD AUTO: 1 10*3/MM3 (ref 0.1–0.9)
MONOCYTES NFR BLD AUTO: 8.5 % (ref 5–12)
NEUTROPHILS NFR BLD AUTO: 10.1 10*3/MM3 (ref 1.7–7)
NEUTROPHILS NFR BLD AUTO: 85.3 % (ref 42.7–76)
NRBC BLD AUTO-RTO: 0 /100 WBC (ref 0–0.2)
OVALOCYTES BLD QL SMEAR: NORMAL
PLAT MORPH BLD: NORMAL
PLATELET # BLD AUTO: 280 10*3/MM3 (ref 140–450)
PMV BLD AUTO: 9.8 FL (ref 6–12)
POIKILOCYTOSIS BLD QL SMEAR: NORMAL
POLYCHROMASIA BLD QL SMEAR: NORMAL
POTASSIUM SERPL-SCNC: 4.6 MMOL/L (ref 3.5–5.2)
RBC # BLD AUTO: 4.4 10*6/MM3 (ref 3.77–5.28)
SODIUM SERPL-SCNC: 140 MMOL/L (ref 136–145)
WBC MORPH BLD: NORMAL
WBC NRBC COR # BLD AUTO: 11.83 10*3/MM3 (ref 3.4–10.8)

## 2024-03-21 PROCEDURE — G0378 HOSPITAL OBSERVATION PER HR: HCPCS

## 2024-03-21 PROCEDURE — 97530 THERAPEUTIC ACTIVITIES: CPT

## 2024-03-21 PROCEDURE — 94799 UNLISTED PULMONARY SVC/PX: CPT

## 2024-03-21 PROCEDURE — 94761 N-INVAS EAR/PLS OXIMETRY MLT: CPT

## 2024-03-21 PROCEDURE — 85007 BL SMEAR W/DIFF WBC COUNT: CPT | Performed by: STUDENT IN AN ORGANIZED HEALTH CARE EDUCATION/TRAINING PROGRAM

## 2024-03-21 PROCEDURE — 85025 COMPLETE CBC W/AUTO DIFF WBC: CPT | Performed by: STUDENT IN AN ORGANIZED HEALTH CARE EDUCATION/TRAINING PROGRAM

## 2024-03-21 PROCEDURE — 63710000001 PREDNISONE PER 1 MG: Performed by: FAMILY MEDICINE

## 2024-03-21 PROCEDURE — 97110 THERAPEUTIC EXERCISES: CPT

## 2024-03-21 PROCEDURE — 99232 SBSQ HOSP IP/OBS MODERATE 35: CPT | Performed by: FAMILY MEDICINE

## 2024-03-21 PROCEDURE — 80048 BASIC METABOLIC PNL TOTAL CA: CPT | Performed by: STUDENT IN AN ORGANIZED HEALTH CARE EDUCATION/TRAINING PROGRAM

## 2024-03-21 PROCEDURE — 94664 DEMO&/EVAL PT USE INHALER: CPT

## 2024-03-21 RX ORDER — SPIRONOLACTONE 25 MG/1
12.5 TABLET ORAL DAILY
Status: DISCONTINUED | OUTPATIENT
Start: 2024-03-21 | End: 2024-03-22 | Stop reason: HOSPADM

## 2024-03-21 RX ORDER — SODIUM CHLORIDE FOR INHALATION 3 %
4 VIAL, NEBULIZER (ML) INHALATION
Status: DISCONTINUED | OUTPATIENT
Start: 2024-03-21 | End: 2024-03-22 | Stop reason: HOSPADM

## 2024-03-21 RX ORDER — DICYCLOMINE HYDROCHLORIDE 10 MG/1
10 CAPSULE ORAL
Status: DISCONTINUED | OUTPATIENT
Start: 2024-03-21 | End: 2024-03-22 | Stop reason: HOSPADM

## 2024-03-21 RX ADMIN — Medication 10 ML: at 10:05

## 2024-03-21 RX ADMIN — Medication 10 ML: at 21:28

## 2024-03-21 RX ADMIN — BUDESONIDE AND FORMOTEROL FUMARATE DIHYDRATE 2 PUFF: 160; 4.5 AEROSOL RESPIRATORY (INHALATION) at 07:32

## 2024-03-21 RX ADMIN — ALBUTEROL SULFATE 2 PUFF: 90 AEROSOL, METERED RESPIRATORY (INHALATION) at 16:41

## 2024-03-21 RX ADMIN — RIVAROXABAN 20 MG: 10 TABLET, FILM COATED ORAL at 09:58

## 2024-03-21 RX ADMIN — DICYCLOMINE HYDROCHLORIDE 10 MG: 10 CAPSULE ORAL at 10:04

## 2024-03-21 RX ADMIN — Medication 4 ML: at 18:52

## 2024-03-21 RX ADMIN — SPIRONOLACTONE 12.5 MG: 25 TABLET ORAL at 10:05

## 2024-03-21 RX ADMIN — SACUBITRIL AND VALSARTAN 1 TABLET: 24; 26 TABLET, FILM COATED ORAL at 10:05

## 2024-03-21 RX ADMIN — FAMOTIDINE 40 MG: 20 TABLET, FILM COATED ORAL at 21:26

## 2024-03-21 RX ADMIN — BUDESONIDE AND FORMOTEROL FUMARATE DIHYDRATE 2 PUFF: 160; 4.5 AEROSOL RESPIRATORY (INHALATION) at 18:52

## 2024-03-21 RX ADMIN — ALBUTEROL SULFATE 2 PUFF: 90 AEROSOL, METERED RESPIRATORY (INHALATION) at 18:53

## 2024-03-21 RX ADMIN — ALBUTEROL SULFATE 2 PUFF: 90 AEROSOL, METERED RESPIRATORY (INHALATION) at 07:32

## 2024-03-21 RX ADMIN — ALBUTEROL SULFATE 2 PUFF: 90 AEROSOL, METERED RESPIRATORY (INHALATION) at 13:07

## 2024-03-21 RX ADMIN — PREDNISONE 40 MG: 20 TABLET ORAL at 09:58

## 2024-03-21 RX ADMIN — CITALOPRAM HYDROBROMIDE 20 MG: 20 TABLET ORAL at 09:58

## 2024-03-21 NOTE — PLAN OF CARE
Goal Outcome Evaluation:      Interventions implemented as appropriate.

## 2024-03-21 NOTE — PLAN OF CARE
Goal Outcome Evaluation:  Plan of Care Reviewed With: patient        Progress: improving  Outcome Evaluation: No acute events overnight. Patient has rested well. Blood pressure improved overnight. VSS. Plan of care ongoing.

## 2024-03-21 NOTE — PLAN OF CARE
Problem: Adult Inpatient Plan of Care  Goal: Plan of Care Review  Recent Flowsheet Documentation  Taken 3/21/2024 1514 by Kallie Montgomery OT  Progress: improving  Plan of Care Reviewed With: patient  Outcome Evaluation: OT tx completed. Patient is sitting in chair, on 4L O2 satting 95%. Performed sit to stand SBA, walked 100' using RW with SBA-CGA. Patient requires VCs to adhere to breathing techniques. Patient satting 93% upon returning to chair, performed grooming tasks with SBA followed by UB ther ex using 5# dowel lyubov and UBE seated 2 minutes + 2 1/2 minutes. Discussed AE for energy conservation during ADL tasks. Continue OT POC   Goal Outcome Evaluation:  Plan of Care Reviewed With: patient        Progress: improving  Outcome Evaluation: OT tx completed. Patient is sitting in chair, on 4L O2 satting 95%. Performed sit to stand SBA, walked 100' using RW with SBA-CGA. Patient requires VCs to adhere to breathing techniques. Patient satting 93% upon returning to chair, performed grooming tasks with SBA followed by UB ther ex using 5# dowel lyubov and UBE seated 2 minutes + 2 1/2 minutes. Discussed AE for energy conservation during ADL tasks. Continue OT POC      Anticipated Discharge Disposition (OT): home with home health, home with assist

## 2024-03-21 NOTE — PLAN OF CARE
Goal Outcome Evaluation:  Plan of Care Reviewed With: patient        Progress: improving       VSS.

## 2024-03-21 NOTE — THERAPY TREATMENT NOTE
Patient Name: Elsa Alcaraz  : 1940    MRN: [id number]                              Today's Date: 3/21/2024       Admit Date: 3/19/2024    Visit Dx:     ICD-10-CM ICD-9-CM   1. Bronchitis  J40 490   2. Acute on chronic respiratory failure with hypoxia  J96.21 518.84     799.02   3. Acute on chronic congestive heart failure, unspecified heart failure type  I50.9 428.0   4. Iatrogenic hypotension  I95.89 458.29     Patient Active Problem List   Diagnosis    Longstanding persistent atrial fibrillation    Hyperlipidemia    Essential hypertension    Anxiety disorder due to general medical condition    Cataract    Chronic bronchitis    Depression    Obesity    Sleep apnea    Iron deficiency anemia    Thrombocytosis    Hiatal hernia with gastroesophageal reflux    Age-related nuclear cataract of left eye    Age-related nuclear cataract of right eye    Moderate mitral regurgitation    Moderate tricuspid regurgitation    Chronic systolic heart failure    Diverticulosis of colon    Chronic respiratory failure with hypoxia    Symptomatic bradycardia    COPD    Chronic anticoagulation (Xarelto)    Complete heart block    Esophageal dysphagia    Gastroesophageal reflux disease without esophagitis    Bronchitis     Past Medical History:   Diagnosis Date    Anemia     Anxiety disorder due to general medical condition 2017    Arthritis     Atrial fibrillation 2017    Body piercing     BOTH EARS    Borderline diabetes     Breast cancer 2003    right-radiation and a lumpectomy    Broken tooth     Cataract 2017    Left eye surgery     Chronic bronchitis 2017    Colon cancer 1998    had surgery and chemo    COPD (chronic obstructive pulmonary disease)     Cyanocobalamine deficiency (non anemic)     Depression 2017    Diverticulosis     Esophageal reflux 2017    Hiatal hernia 2017    History of bladder infections     History of echocardiogram 2021    Hx of exercise stress  "test     \"several years ago by Dr. Mercado\".      Hx of radiation therapy     Hyperlipidemia     Hypertension 2017    Impaired functional mobility and activity tolerance     Impaired functional mobility, balance, gait, and endurance     Osteoporosis     Palpitations 2017    Prediabetes     Problems with swallowing     meat at times per pt report    Shortness of breath     WITH EXERTION     Sleep apnea 2017    NO CPAP    Tricuspid valve disorder 2017    Patient doesn't know anything about this    Vitamin D deficiency     Wears glasses      Past Surgical History:   Procedure Laterality Date    ANAL FISTULOTOMY      APPENDECTOMY          BREAST BIOPSY      BREAST LUMPECTOMY Right 2006    CARDIAC ELECTROPHYSIOLOGY PROCEDURE N/A 2023    Procedure: Micra;  Surgeon: Keven Willis DO;  Location: [facility] EP INVASIVE LOCATION;  Service: Cardiology;  Laterality: N/A;    CATARACT EXTRACTION      both eyes     CATARACT EXTRACTION W/ INTRAOCULAR LENS IMPLANT Left 2019    Procedure: CATARACT PHACO EXTRACTION WITH INTRAOCULAR LENS IMPLANT LEFT;  Surgeon: Masoud David MD;  Location: [facility] OR;  Service: Ophthalmology    CATARACT EXTRACTION W/ INTRAOCULAR LENS IMPLANT Right 2019    Procedure: CATARACT PHACO EXTRACTION WITH INTRAOCULAR LENS IMPLANT RIGHT;  Surgeon: Masoud David MD;  Location: [facility] OR;  Service: Ophthalmology     SECTION  1981    CHOLECYSTECTOMY  2009    COLECTOMY PARTIAL / TOTAL  1998    COLON CANCER    COLONOSCOPY      COLONOSCOPY N/A 2021    Procedure: COLONOSCOPY WITH BIOPSY;  Surgeon: Iona Sanders MD;  Location: [facility] ENDOSCOPY;  Service: Gastroenterology;  Laterality: N/A;    ENDOSCOPY      ENDOSCOPY N/A 2018    Procedure: ESOPHAGOGASTRODUODENOSCOPY WITH COLD FORCEP BIOPSY;  Surgeon: Vasquez Fagan MD;  Location: [facility] ENDOSCOPY;  Service: Gastroenterology    ENDOSCOPY N/A " 08/12/2019    Procedure: ESOPHAGOGASTRODUODENOSCOPY WITH BIOPSY;  Surgeon: Vasquez Fagan MD;  Location: [facility] ENDOSCOPY;  Service: Gastroenterology    ENDOSCOPY N/A 09/13/2022    Procedure: ESOPHAGOGASTRODUODENOSCOPY with biopsy and polypectomy  ;  Surgeon: Iona Sanders MD;  Location: [facility] ENDOSCOPY;  Service: Gastroenterology;  Laterality: N/A;    HYSTERECTOMY      1998    INSERT / REPLACE / REMOVE PACEMAKER      SKIN BIOPSY Left     arm    SKIN LESION EXCISION N/A     VENTRAL HERNIA REPAIR  10/28/2012      General Information       Row Name 03/21/24 1506          OT Time and Intention    Document Type therapy note (daily note)  -SD     Mode of Treatment occupational therapy  -SD       Row Name 03/21/24 1506          General Information    Patient Profile Reviewed yes  -SD               User Key  (r) = Recorded By, (t) = Taken By, (c) = Cosigned By      Initials Name Provider Type    SD Kallie Montgomery OT Occupational Therapist                     Mobility/ADL's       Row Name 03/21/24 1506          Bed Mobility    Comment, (Bed Mobility) in chair  -SD       Row Name 03/21/24 1506          Transfers    Transfers sit-stand transfer  -SD       Row Name 03/21/24 1506          Sit-Stand Transfer    Sit-Stand [place] (Transfers) standby assist  -SD     Assistive Device (Sit-Stand Transfers) walker, front-wheeled  -SD       Row Name 03/21/24 1506          Functional Mobility    Functional Mobility- Ind. Level contact guard assist;standby assist  -SD     Functional Mobility- Device walker, front-wheeled  -SD     Functional Mobility-Distance (Feet) 100  -SD     Functional Mobility- Safety Issues supplemental O2  -SD       Row Name 03/21/24 1506          Grooming Assessment/Training    [place] Level (Grooming) oral care regimen;hair care, combing/brushing;set up  -SD     Position (Grooming) supported sitting  -SD               User Key  (r) = Recorded By, (t) = Taken By, (c) = Cosigned By       Initials Name Provider Type    Kallie Ashley OT Occupational Therapist                   Obj/Interventions       Row Name 03/21/24 1509          Motor Skills    Motor Skills functional endurance  -SD     Functional Endurance UBE 2 mins + 2 1/2 minutes seated  -SD     Therapeutic Exercise elbow/forearm;other (see comments)  UB ther ex using 5# dowel with breathing coordination  -SD               User Key  (r) = Recorded By, (t) = Taken By, (c) = Cosigned By      Initials Name Provider Type    SD Kallie Montgomery OT Occupational Therapist                   Goals/Plan    No documentation.                  Clinical Impression       Row Name 03/21/24 1514          Pain Assessment    Pretreatment Pain Rating 0/10 - no pain  -SD     Posttreatment Pain Rating 0/10 - no pain  -SD       Row Name 03/21/24 1514          Plan of Care Review    Plan of Care Reviewed With patient  -SD     Progress improving  -SD     Outcome Evaluation OT tx completed. Patient is sitting in chair, on 4L O2 satting 95%. Performed sit to stand SBA, walked 100' using RW with SBA-CGA. Patient requires VCs to adhere to breathing techniques. Patient satting 93% upon returning to chair, performed grooming tasks with SBA followed by UB ther ex using 5# dowel lyubov and UBE seated 2 minutes + 2 1/2 minutes. Discussed AE for energy conservation during ADL tasks. Continue OT POC  -SD       Row Name 03/21/24 1514 03/21/24 1511       Vital Signs    Pre SpO2 (%) 95  -SD 95  -SD    O2 Delivery Pre Treatment supplemental O2  -SD supplemental O2  -SD    Intra SpO2 (%) 92  -SD 92  -SD    O2 Delivery Intra Treatment supplemental O2  -SD supplemental O2  -SD    Post SpO2 (%) 95  -SD 95  -SD    O2 Delivery Post Treatment supplemental O2  -SD supplemental O2  -SD      Row Name 03/21/24 1514 03/21/24 1511       Positioning and Restraints    Pre-Treatment Position sitting in chair/recliner  -SD sitting in chair/recliner  -SD    Post Treatment Position chair  -SD  chair  -SD    In Chair sitting;call light within reach;encouraged to call for assist;exit alarm on  -SD sitting;call light within reach;encouraged to call for assist  -SD              User Key  (r) = Recorded By, (t) = Taken By, (c) = Cosigned By      Initials Name Provider Type    Kallie Ashley OT Occupational Therapist                   Outcome Measures       Row Name 03/21/24 1513          How much help from another is currently needed...    Putting on and taking off regular lower body clothing? 3  -SD     Bathing (including washing, rinsing, and drying) 3  -SD     Toileting (which includes using toilet bed pan or urinal) 3  -SD     Putting on and taking off regular upper body clothing 3  -SD     Taking care of personal grooming (such as brushing teeth) 4  -SD     Eating meals 4  -SD     AM-PAC 6 Clicks Score (OT) 20  -SD       Row Name 03/21/24 1512 03/21/24 0830       How much help from another person do you currently need...    Turning from your back to your side while in flat bed without using bedrails? 4  -RM 4  -MM    Moving from lying on back to sitting on the side of a flat bed without bedrails? 4  -RM 4  -MM    Moving to and from a bed to a chair (including a wheelchair)? 3  -RM 3  -MM    Standing up from a chair using your arms (e.g., wheelchair, bedside chair)? 3  -RM 3  -MM    Climbing 3-5 steps with a railing? 2  -RM 3  -MM    To walk in hospital room? 3  -RM 3  -MM    AM-PAC 6 Clicks Score (PT) 19  -RM 20  -MM    Highest Level of Mobility Goal 6 --> Walk 10 steps or more  -RM 6 --> Walk 10 steps or more  -MM      Row Name 03/21/24 1518 03/21/24 1513       Functional Assessment    Outcome Measure Options AM-PAC 6 Clicks Daily Activity (OT)  -SD AM-PAC 6 Clicks Daily Activity (OT)  -SD      Row Name 03/21/24 1512          Functional Assessment    Outcome Measure Options AM-PAC 6 Clicks Basic Mobility (PT)  -RM               User Key  (r) = Recorded By, (t) = Taken By, (c) = Cosigned By       Initials Name Provider Type    MM Angelica García, RN Registered Nurse    Ramesh Adams, PTA Physical Therapist Assistant    Kallie Ashley OT Occupational Therapist                    Occupational Therapy Education       Title: PT OT SLP Therapies (In Progress)       Topic: Occupational Therapy (In Progress)       Point: ADL training (In Progress)       Description:   Instruct learner(s) on proper safety adaptation and remediation techniques during self care or transfers.   Instruct in proper use of assistive devices.                  Learning Progress Summary             Patient Acceptance, E,TB, NR by SD at 3/21/2024 1518    Comment: Breathing techniques    Acceptance, E,TB, VU by SD at 3/20/2024 1253    Comment: OT POC                         Point: Home exercise program (Not Started)       Description:   Instruct learner(s) on appropriate technique for monitoring, assisting and/or progressing therapeutic exercises/activities.                  Learner Progress:  Not documented in this visit.              Point: Precautions (Not Started)       Description:   Instruct learner(s) on prescribed precautions during self-care and functional transfers.                  Learner Progress:  Not documented in this visit.              Point: Body mechanics (Not Started)       Description:   Instruct learner(s) on proper positioning and spine alignment during self-care, functional mobility activities and/or exercises.                  Learner Progress:  Not documented in this visit.                              User Key       Initials Effective Dates Name Provider Type Discipline    SD 06/16/21 -  Kallie Montgomery OT Occupational Therapist OT                  OT Recommendation and Plan  Planned Therapy Interventions (OT): activity tolerance training, adaptive equipment training, BADL retraining, patient/caregiver education/training, ROM/therapeutic exercise, strengthening exercise, transfer/mobility  retraining  Therapy Frequency (OT): 3 times/wk (5 times if indicated)  Plan of Care Review  Plan of Care Reviewed With: patient  Progress: improving  Outcome Evaluation: OT tx completed. Patient is sitting in chair, on 4L O2 satting 95%. Performed sit to stand SBA, walked 100' using RW with SBA-CGA. Patient requires VCs to adhere to breathing techniques. Patient satting 93% upon returning to chair, performed grooming tasks with SBA followed by UB ther ex using 5# dowel lyubov and UBE seated 2 minutes + 2 1/2 minutes. Discussed AE for energy conservation during ADL tasks. Continue OT POC     Time Calculation:   Evaluation Complexity (OT)  Review Occupational Profile/Medical/Therapy History Complexity: expanded/moderate complexity  Assessment, Occupational Performance/Identification of Deficit Complexity: 3-5 performance deficits  Clinical Decision Making Complexity (OT): detailed assessment/moderate complexity  Overall Complexity of Evaluation (OT): moderate complexity     Time Calculation- OT       Row Name 03/21/24 1518             Time Calculation- OT    OT Start Time 1336  -SD      OT Stop Time 1410  -SD      OT Time Calculation (min) 34 min  -SD      OT Received On 03/21/24  -SD      OT Goal Re-Cert Due Date 04/01/24  -SD         Timed Charges    51442 - OT Therapeutic Exercise Minutes 13  -SD      20138 - OT Therapeutic Activity Minutes 15  -SD      25447 - OT Self Care/Mgmt Minutes 6  -SD         Total Minutes    Timed Charges Total Minutes 34  -SD       Total Minutes 34  -SD                User Key  (r) = Recorded By, (t) = Taken By, (c) = Cosigned By      Initials Name Provider Type    SD Kallie Montgomery OT Occupational Therapist                  Therapy Charges for Today       Code Description Service Date Service Provider Modifiers Qty    [id number]  OT EVAL MOD COMPLEXITY 3 3/20/2024 Kallie Montgomery OT GO 1    [id number]  OT THER PROC EA 15 MIN 3/21/2024 Kallie Montgomery OT GO 1    [id number]   OT THERAPEUTIC ACT EA 15 MIN 3/21/2024 Kallie Montgomery OT GO 1                 Kallie Montgomery OT  3/21/2024

## 2024-03-21 NOTE — THERAPY TREATMENT NOTE
Patient Name: Elsa Alcaraz  : 1940    MRN: [id number]                              Today's Date: 3/21/2024       Admit Date: 3/19/2024    Visit Dx:     ICD-10-CM ICD-9-CM   1. Bronchitis  J40 490   2. Acute on chronic respiratory failure with hypoxia  J96.21 518.84     799.02   3. Acute on chronic congestive heart failure, unspecified heart failure type  I50.9 428.0   4. Iatrogenic hypotension  I95.89 458.29     Patient Active Problem List   Diagnosis    Longstanding persistent atrial fibrillation    Hyperlipidemia    Essential hypertension    Anxiety disorder due to general medical condition    Cataract    Chronic bronchitis    Depression    Obesity    Sleep apnea    Iron deficiency anemia    Thrombocytosis    Hiatal hernia with gastroesophageal reflux    Age-related nuclear cataract of left eye    Age-related nuclear cataract of right eye    Moderate mitral regurgitation    Moderate tricuspid regurgitation    Chronic systolic heart failure    Diverticulosis of colon    Chronic respiratory failure with hypoxia    Symptomatic bradycardia    COPD    Chronic anticoagulation (Xarelto)    Complete heart block    Esophageal dysphagia    Gastroesophageal reflux disease without esophagitis    Bronchitis     Past Medical History:   Diagnosis Date    Anemia     Anxiety disorder due to general medical condition 2017    Arthritis     Atrial fibrillation 2017    Body piercing     BOTH EARS    Borderline diabetes     Breast cancer 2003    right-radiation and a lumpectomy    Broken tooth     Cataract 2017    Left eye surgery     Chronic bronchitis 2017    Colon cancer 1998    had surgery and chemo    COPD (chronic obstructive pulmonary disease)     Cyanocobalamine deficiency (non anemic)     Depression 2017    Diverticulosis     Esophageal reflux 2017    Hiatal hernia 2017    History of bladder infections     History of echocardiogram 2021    Hx of exercise stress  "test     \"several years ago by Dr. Mercado\".      Hx of radiation therapy     Hyperlipidemia     Hypertension 2017    Impaired functional mobility and activity tolerance     Impaired functional mobility, balance, gait, and endurance     Osteoporosis     Palpitations 2017    Prediabetes     Problems with swallowing     meat at times per pt report    Shortness of breath     WITH EXERTION     Sleep apnea 2017    NO CPAP    Tricuspid valve disorder 2017    Patient doesn't know anything about this    Vitamin D deficiency     Wears glasses      Past Surgical History:   Procedure Laterality Date    ANAL FISTULOTOMY      APPENDECTOMY          BREAST BIOPSY      BREAST LUMPECTOMY Right 2006    CARDIAC ELECTROPHYSIOLOGY PROCEDURE N/A 2023    Procedure: Micra;  Surgeon: Keven Willis DO;  Location: [facility] EP INVASIVE LOCATION;  Service: Cardiology;  Laterality: N/A;    CATARACT EXTRACTION      both eyes     CATARACT EXTRACTION W/ INTRAOCULAR LENS IMPLANT Left 2019    Procedure: CATARACT PHACO EXTRACTION WITH INTRAOCULAR LENS IMPLANT LEFT;  Surgeon: Masoud David MD;  Location: [facility] OR;  Service: Ophthalmology    CATARACT EXTRACTION W/ INTRAOCULAR LENS IMPLANT Right 2019    Procedure: CATARACT PHACO EXTRACTION WITH INTRAOCULAR LENS IMPLANT RIGHT;  Surgeon: Masoud David MD;  Location: [facility] OR;  Service: Ophthalmology     SECTION  1981    CHOLECYSTECTOMY  2009    COLECTOMY PARTIAL / TOTAL  1998    COLON CANCER    COLONOSCOPY      COLONOSCOPY N/A 2021    Procedure: COLONOSCOPY WITH BIOPSY;  Surgeon: Iona Sanders MD;  Location: [facility] ENDOSCOPY;  Service: Gastroenterology;  Laterality: N/A;    ENDOSCOPY      ENDOSCOPY N/A 2018    Procedure: ESOPHAGOGASTRODUODENOSCOPY WITH COLD FORCEP BIOPSY;  Surgeon: Vasquez Fagan MD;  Location: [facility] ENDOSCOPY;  Service: Gastroenterology    ENDOSCOPY N/A " 08/12/2019    Procedure: ESOPHAGOGASTRODUODENOSCOPY WITH BIOPSY;  Surgeon: Vasquez Fagan MD;  Location: [facility] ENDOSCOPY;  Service: Gastroenterology    ENDOSCOPY N/A 09/13/2022    Procedure: ESOPHAGOGASTRODUODENOSCOPY with biopsy and polypectomy  ;  Surgeon: Iona Sanders MD;  Location: [facility] ENDOSCOPY;  Service: Gastroenterology;  Laterality: N/A;    HYSTERECTOMY      1998    INSERT / REPLACE / REMOVE PACEMAKER      SKIN BIOPSY Left     arm    SKIN LESION EXCISION N/A     VENTRAL HERNIA REPAIR  10/28/2012      General Information       Row Name 03/21/24 1508          Physical Therapy Time and Intention    Document Type therapy note (daily note)  -RM     Mode of Treatment physical therapy  -RM       Row Name 03/21/24 1508          General Information    Patient Profile Reviewed yes  -RM     Existing Precautions/Restrictions fall;oxygen therapy device and L/min  4L  -RM       Row Name 03/21/24 1508          Cognition    Orientation Status (Cognition) oriented x 4  -RM       Row Name 03/21/24 1508          Safety Issues, Functional Mobility    Safety Issues Affecting Function (Mobility) safety precautions follow-through/compliance;safety precaution awareness  -RM     Impairments Affecting Function (Mobility) endurance/activity tolerance;shortness of breath;strength;balance  -RM               User Key  (r) = Recorded By, (t) = Taken By, (c) = Cosigned By      Initials Name Provider Type    RM Ramesh Mccormick, PTA Physical Therapist Assistant                   Mobility    No documentation.                  Obj/Interventions       Row Name 03/21/24 1510          Motor Skills    Therapeutic Exercise hip;knee;ankle  -RM       Row Name 03/21/24 1510          Hip (Therapeutic Exercise)    Hip (Therapeutic Exercise) strengthening exercise;isometric exercises  -RM     Hip Isometrics (Therapeutic Exercise) bilateral;gluteal sets;10 repetitions;3 second hold  -RM     Hip Strengthening (Therapeutic Exercise)  bilateral;marching while seated;3 second hold;10 repetitions  -RM       Row Name 03/21/24 1510          Knee (Therapeutic Exercise)    Knee (Therapeutic Exercise) strengthening exercise  -RM     Knee Strengthening (Therapeutic Exercise) bilateral;LAQ (long arc quad);10 repetitions;5 second hold  -RM       Row Name 03/21/24 1510          Ankle (Therapeutic Exercise)    Ankle (Therapeutic Exercise) AROM (active range of motion)  -RM     Ankle AROM (Therapeutic Exercise) bilateral;dorsiflexion;plantarflexion;15 repititions  -RM               User Key  (r) = Recorded By, (t) = Taken By, (c) = Cosigned By      Initials Name Provider Type    Ramesh Adams, MOJGAN Physical Therapist Assistant                   Goals/Plan    No documentation.                  Clinical Impression       Row Name 03/21/24 1511          Pain    Pretreatment Pain Rating 0/10 - no pain  Simultaneous filing. User may be unaware of other data.  -RM     Posttreatment Pain Rating 0/10 - no pain  Simultaneous filing. User may be unaware of other data.  -RM       Row Name 03/21/24 1511          Plan of Care Review    Plan of Care Reviewed With patient  Simultaneous filing. User may be unaware of other data.  -RM     Progress improving  Simultaneous filing. User may be unaware of other data.  -RM     Outcome Evaluation Pt  sitting UIC  and  willing to participate with treatment. Pt performed B LE seated exercises. See flowsheet for details. Cont PT per POC progressing to goals as pt tolerates.  Simultaneous filing. User may be unaware of other data.  -RM               User Key  (r) = Recorded By, (t) = Taken By, (c) = Cosigned By      Initials Name Provider Type    Ramesh Adams, MOJGAN Physical Therapist Assistant                   Outcome Measures       Row Name 03/21/24 1512 03/21/24 0830       How much help from another person do you currently need...    Turning from your back to your side while in flat bed without using bedrails? 4  -RM 4   -MM    Moving from lying on back to sitting on the side of a flat bed without bedrails? 4  -RM 4  -MM    Moving to and from a bed to a chair (including a wheelchair)? 3  -RM 3  -MM    Standing up from a chair using your arms (e.g., wheelchair, bedside chair)? 3  -RM 3  -MM    Climbing 3-5 steps with a railing? 2  -RM 3  -MM    To walk in hospital room? 3  -RM 3  -MM    AM-PAC 6 Clicks Score (PT) 19  -RM 20  -MM    Highest Level of Mobility Goal 6 --> Walk 10 steps or more  -RM 6 --> Walk 10 steps or more  -MM      Row Name 03/21/24 1513 03/21/24 1512       Functional Assessment    Outcome Measure Options AM-PAC 6 Clicks Daily Activity (OT)  -SD AM-PAC 6 Clicks Basic Mobility (PT)  -              User Key  (r) = Recorded By, (t) = Taken By, (c) = Cosigned By      Initials Name Provider Type    MM Angelica García, RN Registered Nurse     Ramesh Mccormick, PTA Physical Therapist Assistant    Kallie Ashley, OT Occupational Therapist                                 Physical Therapy Education       Title: PT OT SLP Therapies (In Progress)       Topic: Physical Therapy (In Progress)       Point: Mobility training (Done)       Learning Progress Summary             Patient Acceptance, E,TB, VU by  at 3/20/2024 1222    Comment: Educated pt on importance of OOB mobility during IP admission                         Point: Home exercise program (Done)       Learning Progress Summary             Patient Acceptance, E,TB,D, VU,NR by  at 3/21/2024 1513    Comment: Exercise techniques    Acceptance, E,TB,D, VU,NR by  at 3/21/2024 1513    Comment: exercise techniques                         Point: Body mechanics (Not Started)       Learner Progress:  Not documented in this visit.              Point: Precautions (Not Started)       Learner Progress:  Not documented in this visit.                              User Key       Initials Effective Dates Name Provider Type [facility] 06/16/21 -  Ramesh Mccormick, PTA  Physical Therapist Assistant PT     11/29/23 -  Joi Cristina PT Physical Therapist PT                  PT Recommendation and Plan     Plan of Care Reviewed With: patient (Simultaneous filing. User may be unaware of other data.)  Progress: improving (Simultaneous filing. User may be unaware of other data.)  Outcome Evaluation: Pt  sitting UIC  and  willing to participate with treatment. Pt performed B LE seated exercises. See flowsheet for details. Cont PT per POC progressing to goals as pt tolerates. (Simultaneous filing. User may be unaware of other data.)     Time Calculation:         PT Charges       Row Name 03/21/24 1514             Time Calculation    Start Time 1445  -RM      Stop Time 1504  -RM      Time Calculation (min) 19 min  -RM      PT Received On 03/21/24  -RM      PT Goal Re-Cert Due Date 03/30/24  -RM         Time Calculation- PT    Total Timed Code Minutes- PT 19 minute(s)  -RM         Timed Charges    13296 - PT Therapeutic Exercise Minutes 19  -RM         Total Minutes    Timed Charges Total Minutes 19  -RM       Total Minutes 19  -RM                User Key  (r) = Recorded By, (t) = Taken By, (c) = Cosigned By      Initials Name Provider Type    Ramesh Adams PTA Physical Therapist Assistant                  Therapy Charges for Today       Code Description Service Date Service Provider Modifiers Qty    [id number] HC PT THER PROC EA 15 MIN 3/21/2024 Ramesh Mccormick PTA GP 1            PT G-Codes  Outcome Measure Options: AM-PAC 6 Clicks Daily Activity (OT)  AM-PAC 6 Clicks Score (PT): 19  AM-PAC 6 Clicks Score (OT): 20       Ramesh Mccormick PTA  3/21/2024

## 2024-03-21 NOTE — PLAN OF CARE
Goal Outcome Evaluation:  Plan of Care Reviewed With: patient (Simultaneous filing. User may be unaware of other data.)        Progress: improving (Simultaneous filing. User may be unaware of other data.)  Outcome Evaluation: Pt  sitting UIC  and  willing to participate with treatment. Pt performed B LE seated exercises. See flowsheet for details. Cont PT per POC progressing to goals as pt tolerates. (Simultaneous filing. User may be unaware of other data.)

## 2024-03-21 NOTE — PROGRESS NOTES
[facility]    Name:  Elsa Alcaraz   Age:  83 y.o.  Sex:  female  :  1940  MRN:  [id number]   Visit Number:  [id number]  Admission Date:  3/19/2024  Date Of Service:  24  Primary Care Physician:  Kemi Hdz MD     LOS: 0 days :    Chief Complaint:      Follow-up cough, shortness of breath    Subjective:    Patient overall feeling better.  Still having some mucus production.  Denies any chest pain.  Has had some generalized weakness.    Hospital Course:    83-year-old female with history of COPD with chronic respiratory failure, atrial fibrillation, pacemaker, heart failure reduced ejection fraction, hiatal hernia, history of peptic ulcer disease, history of prior colon cancer.  She had presented to the emergency room with generalized bodyaches, cough, congestion.    In the ER, the patient was requiring up to 6 L nasal cannula with a baseline of 4.  EKG with paced rhythm noted.  CT scan of the chest was unremarkable for PE, there was evidence of bronchitis.  She was admitted and started on steroids and antibiotics.  She tested positive for parainfluenza virus.  She    Review of Systems:     All systems were reviewed and negative except as mentioned in subjective, assessment and plan.    Vital Signs:    Temp:  [97.2 °F (36.2 °C)-98.9 °F (37.2 °C)] 97.8 °F (36.6 °C)  Heart Rate:  [88-91] 91  Resp:  [17-18] 18  BP: ()/(48-68) 99/68    Intake and output:    I/O last 3 completed shifts:  In: 2903.8 [P.O.:600; I.V.:1303.8; IV Piggyback:1000]  Out: -   I/O this shift:  In: 120 [P.O.:120]  Out: -     Physical Examination:    General Appearance:  Alert and cooperative.  NAD    Head:  Atraumatic and normocephalic.   Eyes: Conjunctivae and sclerae normal, no icterus. No pallor.   Throat: No oral lesions, no thrush, oral mucosa moist.   Neck: Supple, trachea midline, no thyromegaly.   Lungs:   Breath sounds heard bilaterally equally.  wheezes  "bilaterally   Heart:  Normal S1 and S2, no murmur, no gallop, no rub. No JVD.   Abdomen:   Normal bowel sounds, no masses, no organomegaly. Soft, nontender, nondistended, no rebound tenderness.   Extremities: Supple, no edema, no cyanosis, no clubbing.   Skin: No bleeding or rash.   Neurologic: Alert and oriented x 3. No facial asymmetry. Moves all four limbs. No tremors.      Laboratory results:    Results from last 7 days   Lab Units 03/21/24  0600 03/20/24  0535 03/19/24  2137   SODIUM mmol/L 140 137 134*   POTASSIUM mmol/L 4.6 4.4 4.2   CHLORIDE mmol/L 103 100 94*   CO2 mmol/L 26.5 29.7* 29.8*   BUN mg/dL 25* 20 21   CREATININE mg/dL 0.73 0.83 0.92   CALCIUM mg/dL 8.5* 8.5* 8.8   BILIRUBIN mg/dL  --   --  1.2   ALK PHOS U/L  --   --  90   ALT (SGPT) U/L  --   --  10   AST (SGOT) U/L  --   --  16   GLUCOSE mg/dL 140* 151* 100*     Results from last 7 days   Lab Units 03/21/24  0600 03/20/24  0535 03/19/24  2137   WBC 10*3/mm3 11.83* 5.63 7.62   HEMOGLOBIN g/dL 10.3* 10.9* 11.8*   HEMATOCRIT % 35.9 36.9 39.2   PLATELETS 10*3/mm3 280 262 290         Results from last 7 days   Lab Units 03/19/24  2137   HSTROP T ng/L 21*     Results from last 7 days   Lab Units 03/19/24  2137   BLOODCX  No growth at 24 hours  No growth at 24 hours     No results for input(s): \"PHART\", \"UGV8WXH\", \"PO2ART\", \"XIY9UIJ\", \"BASEEXCESS\" in the last 8760 hours.   I have reviewed the patient's laboratory results.    Radiology results:    XR Chest 1 View    Result Date: 3/20/2024  PROCEDURE: XR CHEST 1 VW-  HISTORY: SOA Triage Protocol  COMPARISON: 7/6/2023  FINDINGS:  Portable view of the chest demonstrates mild left basilar atelectasis. Right lung is clear. There is no evidence of effusion, pneumothorax or other significant pleural disease. The mediastinum is unremarkable.  The heart size is normal.      Impression: Left basilar atelectasis    This report was signed and finalized on 3/20/2024 8:02 AM by Schuyler Thomas MD.      CT Angiogram " Chest Pulmonary Embolism    Result Date: 3/19/2024  FINAL REPORT TECHNIQUE: Multiple axial CT images were obtained through the chest following IV contrast using a CTA/PE protocol.  3D/MIP reconstruction images were also performed. This study was performed with techniques to keep radiation doses as low as reasonably achievable (ALARA). Individualized dose reduction techniques using automated exposure control or adjustment of mA and/or kV according to the patient's size were employed. CLINICAL HISTORY: Pulmonary embolism (PE) suspected, unknown D-dimer  soa, cp FINDINGS: PAs and aorta: No pulmonary embolus.  Thoracic aorta is unremarkable. There is coronary artery disease. Heart/mediastinum: There is a lead less pacemaker present in the right ventricle.  No evidence for right heart strain.  No pericardial effusion.    There is cardiomegaly.  Lungs: There is bronchial wall thickening and peripheral mucous plugging.  Lymph nodes: No pathologically enlarged thoracic lymph nodes.  Pleura: There is a small left pleural effusion with lung base atelectasis.  Chest Wall: No chest wall contusion.  Bones: No acute fracture.  Upper abdomen: There is a moderate hiatal hernia.     Impression: No pulmonary embolus.     Bronchitis. Authenticated and Electronically Signed by Rafael Limon MD on 03/19/2024 11:43:55 PM   I have reviewed the patient's radiology reports.    Medication Review:     I have reviewed the patient's active and prn medications.     Problem List:      Bronchitis      Assessment:    Acute on chronic respiratory failure with hypoxia, POA  Parainfluenza virus with bronchitis, POA  Chronic combined systolic/diastolic congestive heart failure, POA  Hiatal hernia  Status post pacemaker  CAD    Plan:    Respiratory failure/parainfluenza viral/bronchitis:  Continue with current treatment.  Will continue with bronchodilators, steroids, nebulizer.  She is on home oxygen requirement now.  Encourage incentive spirometer use.   Did add saline neb today.    CHF/pacemaker/CAD:  Will restart her Entresto and spironolactone.  Continue her Xarelto.  Blood pressures have been borderline low but overall stable.  About her baseline.    Anticipate with further improvement in cough, she can likely be discharged home tomorrow with outpatient follow-up.  Of note, patient is due for an EGD on the 27th with Dr. Sanders.    DVT Prophylaxis: Xarelto  Code Status: DNR  Diet: Cardiac  Discharge Plan: Likely home tomorrow with home health    Micheline Hall DO  03/21/24  12:15 EDT    Dictated utilizing Dragon dictation.

## 2024-03-21 NOTE — CASE MANAGEMENT/SOCIAL WORK
Case Management/Social Work    Patient Name:  Elsa Alcaraz  YOB: 1940  MRN: [id number]  Admit Date:  3/19/2024        09:24 EDT  Met with patient at bedside. Plan to DC home with family and home health once medically ready. Patient requests assistance obtaining a wheelchair. CM will continue to follow.      Electronically signed by:  Gio Mckeon RN  03/21/24 09:24 EDT

## 2024-03-22 ENCOUNTER — HOME HEALTH ADMISSION (OUTPATIENT)
Dept: HOME HEALTH SERVICES | Facility: HOME HEALTHCARE | Age: 84
End: 2024-03-22
Payer: MEDICARE

## 2024-03-22 VITALS
OXYGEN SATURATION: 97 % | WEIGHT: 154.98 LBS | DIASTOLIC BLOOD PRESSURE: 70 MMHG | HEIGHT: 63 IN | TEMPERATURE: 98.1 F | BODY MASS INDEX: 27.46 KG/M2 | RESPIRATION RATE: 18 BRPM | HEART RATE: 100 BPM | SYSTOLIC BLOOD PRESSURE: 108 MMHG

## 2024-03-22 LAB
ANION GAP SERPL CALCULATED.3IONS-SCNC: 8.3 MMOL/L (ref 5–15)
BUN SERPL-MCNC: 21 MG/DL (ref 8–23)
BUN/CREAT SERPL: 28.8 (ref 7–25)
BURR CELLS BLD QL SMEAR: ABNORMAL
CALCIUM SPEC-SCNC: 9 MG/DL (ref 8.6–10.5)
CHLORIDE SERPL-SCNC: 103 MMOL/L (ref 98–107)
CO2 SERPL-SCNC: 30.7 MMOL/L (ref 22–29)
CREAT SERPL-MCNC: 0.73 MG/DL (ref 0.57–1)
DEPRECATED RDW RBC AUTO: 49 FL (ref 37–54)
EGFRCR SERPLBLD CKD-EPI 2021: 82.2 ML/MIN/1.73
ERYTHROCYTE [DISTWIDTH] IN BLOOD BY AUTOMATED COUNT: 16.7 % (ref 12.3–15.4)
GLUCOSE SERPL-MCNC: 92 MG/DL (ref 65–99)
HCT VFR BLD AUTO: 36.9 % (ref 34–46.6)
HGB BLD-MCNC: 10.7 G/DL (ref 12–15.9)
HYPOCHROMIA BLD QL: ABNORMAL
LYMPHOCYTES # BLD MANUAL: 1.31 10*3/MM3 (ref 0.7–3.1)
LYMPHOCYTES NFR BLD MANUAL: 9 % (ref 5–12)
MCH RBC QN AUTO: 23.7 PG (ref 26.6–33)
MCHC RBC AUTO-ENTMCNC: 29 G/DL (ref 31.5–35.7)
MCV RBC AUTO: 81.6 FL (ref 79–97)
MONOCYTES # BLD: 1.31 10*3/MM3 (ref 0.1–0.9)
NEUTROPHILS # BLD AUTO: 11.96 10*3/MM3 (ref 1.7–7)
NEUTROPHILS NFR BLD MANUAL: 80 % (ref 42.7–76)
NEUTS BAND NFR BLD MANUAL: 2 % (ref 0–5)
OVALOCYTES BLD QL SMEAR: ABNORMAL
PLATELET # BLD AUTO: 261 10*3/MM3 (ref 140–450)
PMV BLD AUTO: 10.3 FL (ref 6–12)
POIKILOCYTOSIS BLD QL SMEAR: ABNORMAL
POTASSIUM SERPL-SCNC: 4.3 MMOL/L (ref 3.5–5.2)
RBC # BLD AUTO: 4.52 10*6/MM3 (ref 3.77–5.28)
SCAN SLIDE: NORMAL
SMALL PLATELETS BLD QL SMEAR: ADEQUATE
SODIUM SERPL-SCNC: 142 MMOL/L (ref 136–145)
VARIANT LYMPHS NFR BLD MANUAL: 9 % (ref 19.6–45.3)
WBC MORPH BLD: NORMAL
WBC NRBC COR # BLD AUTO: 14.59 10*3/MM3 (ref 3.4–10.8)

## 2024-03-22 PROCEDURE — 63710000001 PREDNISONE PER 1 MG: Performed by: FAMILY MEDICINE

## 2024-03-22 PROCEDURE — 94799 UNLISTED PULMONARY SVC/PX: CPT

## 2024-03-22 PROCEDURE — 94761 N-INVAS EAR/PLS OXIMETRY MLT: CPT

## 2024-03-22 PROCEDURE — 80048 BASIC METABOLIC PNL TOTAL CA: CPT | Performed by: STUDENT IN AN ORGANIZED HEALTH CARE EDUCATION/TRAINING PROGRAM

## 2024-03-22 PROCEDURE — 85025 COMPLETE CBC W/AUTO DIFF WBC: CPT | Performed by: STUDENT IN AN ORGANIZED HEALTH CARE EDUCATION/TRAINING PROGRAM

## 2024-03-22 PROCEDURE — 99238 HOSP IP/OBS DSCHRG MGMT 30/<: CPT | Performed by: FAMILY MEDICINE

## 2024-03-22 PROCEDURE — 94664 DEMO&/EVAL PT USE INHALER: CPT

## 2024-03-22 PROCEDURE — G0378 HOSPITAL OBSERVATION PER HR: HCPCS

## 2024-03-22 RX ORDER — PREDNISONE 20 MG/1
40 TABLET ORAL
Qty: 8 TABLET | Refills: 0 | Status: SHIPPED | OUTPATIENT
Start: 2024-03-23 | End: 2024-03-27

## 2024-03-22 RX ORDER — GUAIFENESIN 600 MG/1
600 TABLET, EXTENDED RELEASE ORAL 2 TIMES DAILY
Qty: 20 TABLET | Refills: 0 | Status: SHIPPED | OUTPATIENT
Start: 2024-03-22 | End: 2024-04-01

## 2024-03-22 RX ADMIN — Medication 4 ML: at 07:02

## 2024-03-22 RX ADMIN — RIVAROXABAN 20 MG: 10 TABLET, FILM COATED ORAL at 09:47

## 2024-03-22 RX ADMIN — Medication 10 ML: at 09:46

## 2024-03-22 RX ADMIN — ALBUTEROL SULFATE 2 PUFF: 90 AEROSOL, METERED RESPIRATORY (INHALATION) at 07:02

## 2024-03-22 RX ADMIN — PREDNISONE 40 MG: 20 TABLET ORAL at 09:47

## 2024-03-22 RX ADMIN — SPIRONOLACTONE 12.5 MG: 25 TABLET ORAL at 09:47

## 2024-03-22 RX ADMIN — CITALOPRAM HYDROBROMIDE 20 MG: 20 TABLET ORAL at 09:47

## 2024-03-22 RX ADMIN — SACUBITRIL AND VALSARTAN 1 TABLET: 24; 26 TABLET, FILM COATED ORAL at 09:47

## 2024-03-22 RX ADMIN — BUDESONIDE AND FORMOTEROL FUMARATE DIHYDRATE 2 PUFF: 160; 4.5 AEROSOL RESPIRATORY (INHALATION) at 07:02

## 2024-03-22 NOTE — DISCHARGE SUMMARY
[facility]      Name:  Elsa Alcaraz   Age:  83 y.o.  Sex:  female  :  1940  MRN:  [id number]   Visit Number:  [id number]    Admission Date:  3/19/2024  Date of Discharge:  3/22/2024  Primary Care Physician:  Kemi Hdz MD    Important issues to note:    -Patient discharged on prednisone to finish treatment and prescribed Mucinex  -Patient to follow-up with GI, cardiology and pulmonology as an outpatient.  -Home health ordered.  -Patient discharged at baseline oxygen requirement of 4 L which she uses chronically  -Patient follow-up with PCP    Discharge Diagnoses:     Acute on chronic respiratory failure with hypoxia, POA, improved  Parainfluenza virus with bronchitis, POA, improved  Chronic combined systolic/diastolic congestive heart failure, POA  Hiatal hernia  Status post pacemaker  CAD    Problem List:     Active Hospital Problems    Diagnosis  POA    **Bronchitis [J40]  Yes      Resolved Hospital Problems   No resolved problems to display.     Presenting Problem:    Chief Complaint   Patient presents with    Shortness of Breath    Cough      Consults:     Consulting Physician(s)                     None              Procedures Performed:        History of presenting illness/Hospital Course:    83-year-old female with history of COPD with chronic respiratory failure, atrial fibrillation, pacemaker, heart failure reduced ejection fraction, hiatal hernia, history of peptic ulcer disease, history of prior colon cancer.  She had presented to the emergency room with generalized bodyaches, cough, congestion.     In the ER, the patient was requiring up to 6 L nasal cannula with a baseline of 4.  EKG with paced rhythm noted.  CT scan of the chest was unremarkable for PE, there was evidence of bronchitis.  She was admitted and started on steroids and antibiotics.  She tested positive for parainfluenza virus.    Respiratory failure/parainfluenza  Long discussion regarding side effects from benzodiazepines in view of age  Patient understands that she has increased risk for falls and possible fracture  We will try to substitute the benzodiazepines with trazodone, take 50 mg half an hour before bedtime, if does not help in few days and try taking 2 tablets   viral/bronchitis:  -continued with bronchodilators, steroids, nebulizer.    -She is on home oxygen requirement now.    -incentive spirometer    -Received saline neb which has helped  -Symptoms all improving, still has mild cough but appears to be improving with less secretions and sputum.  -Patient titrated down on her oxygen and currently on 4 L which she uses currently at home.  -patient is due for an EGD on the 27th with Dr. Sanders.    CHF/pacemaker/CAD:  restarted Entresto and spironolactone.    Continue her Xarelto.  Blood pressures have been borderline low but overall stable.  About her baseline.     Patient was seen and examined on the day of discharge.  Daughter at bedside.  Patient sitting up comfortably in bed with no distress.  Patient reports feeling significantly better and is eager to go home today.  Patient reports feeling more comfortable at home and will be happy if she can be discharged today.  Daughter at bedside will be helping her at home along with home health ordered.  Patient denies any shortness of breath or discomfort.  No pain or difficulty breathing.  She still has mild cough however improving compared to yesterday.  Patient has been tolerating p.o. well.  Ambulatory with therapy.  Patient denies any other acute complaints.  Patient instructed on management and plan in details.  Discussed prednisone and Mucinex.  Patient to follow-up with GI, cardiology and pulmonology as an outpatient. Patient to follow-up with PCP in 2 to 3 days for recheck and continued care. Clear return precautions discussed.  Patient and daughter both verbalized understanding and agreeable to plan.  All questions were answered to their satisfaction.    Vital Signs:    Temp:  [97.8 °F (36.6 °C)-98.5 °F (36.9 °C)] 98.1 °F (36.7 °C)  Heart Rate:  [100-103] 100  Resp:  [18-20] 18  BP: ()/(61-90) 108/70    Physical Exam:    General Appearance:  Alert and cooperative.  No acute distress.   Head:  Atraumatic and  normocephalic.   Eyes: Conjunctivae and sclerae normal, no icterus. No pallor.   Ears:  Ears with no abnormalities noted.   Throat: No oral lesions, no thrush, oral mucosa moist.   Neck: Supple, trachea midline, no thyromegaly.   Back:   No tenderness to palpation.   Lungs:   Breath sounds heard bilaterally equally.  No crackles.  Mild improving expiratory wheezing bilaterally. No Pleural rub or bronchial breathing.  Nontachypneic.  Unlabored.  Speaking in complete sentences with no difficulty.  On supplemental oxygen with 4 L nasal cannula currently.   Heart:  Normal S1 and S2, no murmur, no gallop, no rub. No JVD.   Abdomen:   Normal bowel sounds, no masses, no organomegaly. Soft, nontender, nondistended, no rebound tenderness.   Extremities: Supple, no edema, no cyanosis, no clubbing.   Pulses: Pulses palpable bilaterally.   Skin: No bleeding or rash.   Neurologic: Alert and oriented x 3. No facial asymmetry. Moves all four limbs. No tremors.     Pertinent Lab Results:     Results from last 7 days   Lab Units 03/22/24  0637 03/21/24  0600 03/20/24  0535 03/19/24  2137   SODIUM mmol/L 142 140 137 134*   POTASSIUM mmol/L 4.3 4.6 4.4 4.2   CHLORIDE mmol/L 103 103 100 94*   CO2 mmol/L 30.7* 26.5 29.7* 29.8*   BUN mg/dL 21 25* 20 21   CREATININE mg/dL 0.73 0.73 0.83 0.92   CALCIUM mg/dL 9.0 8.5* 8.5* 8.8   BILIRUBIN mg/dL  --   --   --  1.2   ALK PHOS U/L  --   --   --  90   ALT (SGPT) U/L  --   --   --  10   AST (SGOT) U/L  --   --   --  16   GLUCOSE mg/dL 92 140* 151* 100*     Results from last 7 days   Lab Units 03/22/24  0637 03/21/24  0600 03/20/24  0535   WBC 10*3/mm3 14.59* 11.83* 5.63   HEMOGLOBIN g/dL 10.7* 10.3* 10.9*   HEMATOCRIT % 36.9 35.9 36.9   PLATELETS 10*3/mm3 261 280 262         Results from last 7 days   Lab Units 03/19/24 2137   HSTROP T ng/L 21*     Results from last 7 days   Lab Units 03/19/24 2137   PROBNP pg/mL 1,997.0*                 Results from last 7 days   Lab Units 03/19/24 2137    BLOODCX  No growth at 2 days  No growth at 2 days       Pertinent Radiology Results:    Imaging Results (All)       Procedure Component Value Units Date/Time    XR Chest 1 View [[id number]] Collected: 03/20/24 0758     Updated: 03/20/24 0804    Narrative:      PROCEDURE: XR CHEST 1 VW-     HISTORY: SOA Triage Protocol     COMPARISON: 7/6/2023     FINDINGS:  Portable view of the chest demonstrates mild left basilar  atelectasis. Right lung is clear. There is no evidence of effusion,  pneumothorax or other significant pleural disease. The mediastinum is  unremarkable.     The heart size is normal.       Impression:      Left basilar atelectasis           This report was signed and finalized on 3/20/2024 8:02 AM by Scuhyler Thomas MD.       CT Angiogram Chest Pulmonary Embolism [[id number]] Collected: 03/19/24 2320     Updated: 03/19/24 2344    Narrative:      FINAL REPORT    TECHNIQUE:  Multiple axial CT images were obtained through the chest  following IV contrast using a CTA/PE protocol.  3D/MIP  reconstruction images were also performed. This study was  performed with techniques to keep radiation doses as low as  reasonably achievable (ALARA). Individualized dose reduction  techniques using automated exposure control or adjustment of mA  and/or kV according to the patient's size were employed.    CLINICAL HISTORY:  Pulmonary embolism (PE) suspected, unknown D-dimer  soa, cp    FINDINGS:  PAs and aorta: No pulmonary embolus.  Thoracic aorta is  unremarkable. There is coronary artery disease.  Heart/mediastinum: There is a lead less pacemaker present in the  right ventricle.  No evidence for right heart strain.  No  pericardial effusion.    There is cardiomegaly.  Lungs: There is  bronchial wall thickening and peripheral mucous plugging.  Lymph  nodes: No pathologically enlarged thoracic lymph nodes.  Pleura:  There is a small left pleural effusion with lung base  atelectasis.  Chest Wall: No chest wall contusion.   Bones: No  acute fracture.  Upper abdomen: There is a moderate hiatal  hernia.      Impression:      No pulmonary embolus.     Bronchitis.    Authenticated and Electronically Signed by Rafael Limon MD on  03/19/2024 11:43:55 PM            Echo:    Results for orders placed during the hospital encounter of 07/14/23    Adult Transthoracic Echo Complete W/ Cont if Necessary Per Protocol    Interpretation Summary    Left ventricular ejection fraction appears to be 36 - 40%.    The right ventricular cavity is borderline dilated.    The left atrial cavity is dilated.    The right atrial cavity is dilated.    There is calcification of the aortic valve.    Moderate to severe mitral valve regurgitation is present.    Estimated right ventricular systolic pressure from tricuspid regurgitation is mildly elevated (35-45 mmHg). Calculated right ventricular systolic pressure from tricuspid regurgitation is 36 mmHg.    There is a small right pleural effusion.    Condition on Discharge:      Stable.    Code status during the hospital stay:    Code Status and Medical Interventions:   Ordered at: 03/20/24 0104     Medical Intervention Limits:    NO intubation (DNI)     Code Status (Patient has no pulse and is not breathing):    No CPR (Do Not Attempt to Resuscitate)     Medical Interventions (Patient has pulse or is breathing):    Limited Support     Discharge Disposition:    Home-Health Care [facility]    Discharge Medications:       Discharge Medications        New Medications        Instructions Start Date   guaiFENesin 600 MG 12 hr tablet  Commonly known as: Mucinex   600 mg, Oral, 2 Times Daily      predniSONE 20 MG tablet  Commonly known as: DELTASONE   40 mg, Oral, Daily With Breakfast   Start Date: March 23, 2024            Changes to Medications        Instructions Start Date   vitamin B-12 100 MCG tablet  Commonly known as: CYANOCOBALAMIN  What changed: Another medication with the same name was removed. Continue taking this  medication, and follow the directions you see here.   50 mcg, Oral, Daily             Continue These Medications        Instructions Start Date   albuterol sulfate  (90 Base) MCG/ACT inhaler  Commonly known as: PROVENTIL HFA;VENTOLIN HFA;PROAIR HFA   2 puffs, Inhalation, 4 Times Daily - RT      budesonide-formoterol 160-4.5 MCG/ACT inhaler  Commonly known as: SYMBICORT   2 puffs, Inhalation, 2 Times Daily - RT, Rinse mouth with water after use      cholecalciferol 25 MCG (1000 UT) tablet   1,000 Units, Oral, Daily      citalopram 20 MG tablet  Commonly known as: CeleXA   20 mg, Oral, Daily      dicyclomine 10 MG capsule  Commonly known as: BENTYL   10 mg, Oral, 4 Times Daily Before Meals & Nightly PRN      famotidine 40 MG tablet  Commonly known as: PEPCID   40 mg, Oral, Nightly      furosemide 20 MG tablet  Commonly known as: Lasix   20 mg, Oral, Daily      Metamucil wafer wafer   2-4 wafers, Oral, Daily      metoprolol succinate XL 50 MG 24 hr tablet  Commonly known as: TOPROL-XL   50 mg, Oral, Daily      O2  Commonly known as: OXYGEN   4 L/min, Inhalation, Daily PRN      pantoprazole 40 MG EC tablet  Commonly known as: PROTONIX   1 tablet, Oral, Daily      polyethylene glycol 17 GM/SCOOP powder  Commonly known as: MiraLax   17 g, Oral, Daily PRN      potassium chloride 10 MEQ CR tablet  Commonly known as: KLOR-CON M10   10 mEq, Oral, 2 Times Daily      PROCTO-MED HC RE   Rectal, As Needed      sacubitril-valsartan 24-26 MG tablet  Commonly known as: ENTRESTO   1 tablet, Oral, Every 12 Hours Scheduled      spironolactone 25 MG tablet  Commonly known as: ALDACTONE   12.5 mg, Oral, Daily      Xarelto 20 MG tablet  Generic drug: rivaroxaban   Take 1 tablet by mouth once daily             Stop These Medications      chlorhexidine 0.12 % solution  Commonly known as: PERIDEX     Fluticasone Furoate-Vilanterol 200-25 MCG/ACT inhaler  Commonly known as: BREO ELLIPTA            Discharge Diet:   Cardiac    Activity  at Discharge:   As tolerated    Follow-up Appointments:    Additional Instructions for the Follow-ups that You Need to Schedule       Ambulatory Referral to Home Health (Hospital)   As directed      Face to Face Visit Date: 3/22/2024   Follow-up provider for Plan of Care?: I treated the patient in an acute care facility and will not continue treatment after discharge.   Follow-up provider: BETI HDZ [[id number]]   Reason/Clinical Findings: debility, chronic respiratory failure, CHF   Describe mobility limitations that make leaving home difficult: debility, chronic respiratory failure, CHF   Nursing/Therapeutic Services Requested: Skilled Nursing Physical Therapy Occupational Therapy   Skilled nursing orders: CHF management Cardiopulmonary assessments O2 instruction   PT orders: Gait Training Transfer training Strengthening   Weight Bearing Status: As Tolerated   Occupational orders: Activities of daily living Strengthening   Frequency: 1 Week 1               Follow-up Information       Beti Hdz MD .    Specialty: Family Medicine  Contact information:  [address] [facility] [place] [address]  [phone]                           Future Appointments   Date Time Provider Department Center   4/15/2024  2:15 PM Keven Willis DO [facility]   8/5/2024  1:00 PM Lizbeth Barnhart APRN [facility]   3/10/2025  1:45 PM Fidencio Germain MD [facility]     Test Results Pending at Discharge:    Pending Labs       Order Current Status    Blood Culture - Blood, Arm, Left Preliminary result    Blood Culture - Blood, Hand, Left Preliminary result               Silvio Avila MD  03/22/24  11:42 EDT    Time: I spent 28 minutes on this discharge activity which included: face-to-face encounter with the patient, reviewing the data in the system, coordination of the care with the nursing staff as well as consultants, documentation, and entering orders.     Dictated utilizing  Dipesh dictation.

## 2024-03-22 NOTE — PROGRESS NOTES
"Dietitian Follow-up    Patient Name: Elsa Alcaraz  YOB: 1940  MRN: [id number]  Admission date: 3/19/2024    Comment:      Clinical Nutrition Follow-up   Encounter Information        Trending Narrative     3/22: Pt w/ 50% PO intake x 3 meals. Supplement ordered     3/20: Pt noted for not eating/drinking much for a couple of days. EMR shows trending wt loss. Will provide Boost VHC daily to encouraged PO intake. RD to f/up.      Anthropometrics        Current Height, Weight Height: 160 cm (63\")  Weight: 70.3 kg (154 lb 15.7 oz) (03/22/24 0507)       Trending Weight Hx     This admission:              PTA:     Wt Readings from Last 30 Encounters:   03/22/24 0507 70.3 kg (154 lb 15.7 oz)   03/20/24 0500 72.2 kg (159 lb 2.8 oz)   03/20/24 0203 71.4 kg (157 lb 6.5 oz)   03/19/24 2102 73 kg (160 lb 15 oz)   02/21/24 1603 73 kg (161 lb)   02/07/24 1118 74.8 kg (165 lb)   11/29/23 1300 75.6 kg (166 lb 9.6 oz)   10/09/23 1358 76.7 kg (169 lb)   08/21/23 1504 78 kg (172 lb)   07/19/23 1416 77.5 kg (170 lb 12.8 oz)   07/14/23 1022 85.7 kg (189 lb)   07/06/23 0600 85.8 kg (189 lb 2.5 oz)   07/04/23 2302 79.9 kg (176 lb 2.4 oz)   07/04/23 0400 79.1 kg (174 lb 6.1 oz)   07/02/23 1646 81 kg (178 lb 9.2 oz)   07/02/23 1135 81.6 kg (180 lb)   02/28/23 1458 81.5 kg (179 lb 9.6 oz)   10/10/22 0903 78 kg (172 lb)   10/10/22 0951 78 kg (172 lb)   08/22/22 0905 78 kg (172 lb)   06/23/22 1606 75.3 kg (166 lb)   06/20/22 1020 75.8 kg (167 lb)   04/17/22 0756 74.8 kg (165 lb)   04/06/22 1334 76.3 kg (168 lb 3.2 oz)   03/10/22 1308 73.8 kg (162 lb 9.6 oz)   01/13/22 0951 70.3 kg (155 lb)   01/05/22 1040 69.6 kg (153 lb 6.4 oz)   07/26/21 1018 59.4 kg (131 lb)   07/06/21 1310 58.9 kg (129 lb 13.6 oz)   06/21/21 1142 59 kg (130 lb)   06/05/21 0500 58.9 kg (129 lb 13.6 oz)   06/04/21 0500 61.2 kg (134 lb 14.7 oz)   06/01/21 2142 61.5 kg (135 lb 9.3 oz)   06/01/21 1404 60.9 kg (134 lb 3.2 oz)   05/14/21 0500 60.5 kg (133 lb 6.1 oz) "   05/12/21 2100 63.8 kg (140 lb 10.5 oz)   05/12/21 1717 63.5 kg (140 lb)   04/07/21 1009 64.9 kg (143 lb)   04/05/21 0500 65.3 kg (143 lb 15.4 oz)   04/04/21 0541 65.2 kg (143 lb 11.8 oz)   04/03/21 0500 66.4 kg (146 lb 6.2 oz)   04/02/21 0400 71.5 kg (157 lb 11.2 oz)   04/01/21 0400 71.3 kg (157 lb 1.6 oz)   03/31/21 2100 71.3 kg (157 lb 3.2 oz)   03/31/21 0916 69 kg (152 lb 3 oz)   03/30/21 0513 68.9 kg (151 lb 14.4 oz)   03/29/21 1540 66.4 kg (146 lb 6.2 oz)   03/29/21 1505 66.4 kg (146 lb 6.2 oz)   03/29/21 0833 65.3 kg (144 lb)   12/14/20 1253 71.2 kg (157 lb)   12/05/20 2325 71.7 kg (158 lb)      BMI kg/m2 Body mass index is 27.45 kg/m².     Labs        Pertinent Labs Results from last 7 days   Lab Units 03/22/24  0637 03/21/24  0600 03/20/24  0535 03/19/24  2137   SODIUM mmol/L 142 140 137 134*   POTASSIUM mmol/L 4.3 4.6 4.4 4.2   CHLORIDE mmol/L 103 103 100 94*   CO2 mmol/L 30.7* 26.5 29.7* 29.8*   BUN mg/dL 21 25* 20 21   CREATININE mg/dL 0.73 0.73 0.83 0.92   CALCIUM mg/dL 9.0 8.5* 8.5* 8.8   BILIRUBIN mg/dL  --   --   --  1.2   ALK PHOS U/L  --   --   --  90   ALT (SGPT) U/L  --   --   --  10   AST (SGOT) U/L  --   --   --  16   GLUCOSE mg/dL 92 140* 151* 100*     Results from last 7 days   Lab Units 03/22/24  0637   HEMOGLOBIN g/dL 10.7*   HEMATOCRIT % 36.9         Medications    Scheduled Medications albuterol sulfate HFA, 2 puff, Inhalation, 4x Daily - RT  budesonide-formoterol, 2 puff, Inhalation, BID - RT  citalopram, 20 mg, Oral, Daily  famotidine, 40 mg, Oral, Nightly  predniSONE, 40 mg, Oral, Daily With Breakfast  rivaroxaban, 20 mg, Oral, Daily  sacubitril-valsartan, 1 tablet, Oral, Q12H  sodium chloride, 10 mL, Intravenous, Q12H  sodium chloride, 4 mL, Nebulization, BID - RT  spironolactone, 12.5 mg, Oral, Daily        Infusions      PRN Medications   acetaminophen **OR** acetaminophen **OR** acetaminophen    senna-docusate sodium **AND** polyethylene glycol **AND** bisacodyl **AND**  bisacodyl    Calcium Replacement - Follow Nurse / BPA Driven Protocol    dicyclomine    Magnesium Standard Dose Replacement - Follow Nurse / BPA Driven Protocol    nitroglycerin    ondansetron    Phosphorus Replacement - Follow Nurse / BPA Driven Protocol    Potassium Replacement - Follow Nurse / BPA Driven Protocol    sodium chloride    sodium chloride    sodium chloride     Physical Findings        Trending Physical   Appearance, NFPE    --  Edema  2+ L hand edema   Bowel Function Last bm 3/21   Tubes Peripheral IV    Chewing/Swallowing WNL    Skin WNL    --  Current Nutrition Orders & Evaluation of Intake       Oral Nutrition     Food Allergies    Current PO Diet Diet: Cardiac; Healthy Heart (2-3 Na+); Fluid Consistency: Thin (IDDSI 0)   Supplement    PO Evaluation     Trending % PO Intake 3/22: 50% x 3 meals   3/20: No PO intake reported      Nutrition Diagnosis         Nutrition Dx Problem 1 Unintended wt loss r/t COPD AEB EMR shows wt loss of 10# (5.9%) within 6 months.        Nutrition Dx Problem 2        Intervention Goal         Intervention Goal(s) PO intake to meet >50% of estimated needs  Adhere to supplement ordered  Maintain current body weight      Nutrition Intervention        RD Action No action at this time     Nutrition Prescription          Diet Prescription HH   Supplement Prescription Boost Very High Calorie daily    Enteral Nutrition Prescription     TPN Prescription       Monitor/Evaluation        Monitor Per protocol, PO intake, Supplement intake, Pertinent labs, Weight, Skin status, GI status, Symptoms, POC/GOC, Swallow function, Hemodynamic stability     RD to follow-up.   Electronically signed by:  Spencer Tena RD  03/22/24 10:20 EDT

## 2024-03-22 NOTE — CASE MANAGEMENT/SOCIAL WORK
Case Management/Social Work    Patient Name:  Elsa Alcaraz  YOB: 1940  MRN: [id number]  Admit Date:  3/19/2024        09:30 EDT  Met with patient at bedside. Plan to DC home with family once medically ready. Patient stated this morning that she spoke with her  and does not think a wheelchair would work well in their home, so she no longer wants one. Has no preference for home health company. CM will continue to follow.      Electronically signed by:  Gio Mckeon RN  03/22/24 09:30 EDT

## 2024-03-22 NOTE — DISCHARGE INSTRUCTIONS
-Continue steroids as prescribed at home until finished.  -Use Mucinex as prescribed to help with the cough.  -Follow-up with pulmonology, gastroenterology and cardiology as scheduled.  -Follow-up with your PCP in 2 to 3 days for recheck and continued care.

## 2024-03-23 ENCOUNTER — READMISSION MANAGEMENT (OUTPATIENT)
Dept: CALL CENTER | Facility: HOSPITAL | Age: 84
End: 2024-03-23
Payer: MEDICARE

## 2024-03-23 NOTE — OUTREACH NOTE
Prep Survey      Flowsheet Row Responses   [facility] facility patient discharged from? [facility]   Is LACE score < 7 ? No   Eligibility Readm Mgmt   Discharge diagnosis Bronchitis   Does the patient have one of the following disease processes/diagnoses(primary or secondary)? Other   Does the patient have Home health ordered? Yes   What is the Home health agency?  [facility]   Is there a DME ordered? No  [current with [employer]Oxygen 4L]   Comments regarding appointments PCP f/u appt on 3/28/24 with Dr. Kemi Hdz.   Medication alerts for this patient see AVS   Prep survey completed? Yes            Anum MARROQUIN - Registered Nurse

## 2024-03-24 LAB
BACTERIA SPEC AEROBE CULT: NORMAL
BACTERIA SPEC AEROBE CULT: NORMAL

## 2024-03-26 NOTE — PRE-PROCEDURE INSTRUCTIONS
PAT phone history completed with pt for upcoming procedure on 3/27/24, with Dr. Sanders.      PAT PASS GIVEN/REVIEWED WITH PT.  VERBALIZED UNDERSTANDING OF THE FOLLOWING:  DO NOT EAT, DRINK, SMOKE, USE SMOKELESS TOBACCO OR CHEW GUM AFTER MIDNIGHT THE NIGHT BEFORE SURGERY.  THIS ALSO INCLUDES HARD CANDIES AND MINTS.    DO NOT SHAVE THE AREA TO BE OPERATED ON AT LEAST 48 HOURS PRIOR TO THE PROCEDURE.  DO NOT WEAR MAKE UP OR NAIL POLISH.  DO NOT LEAVE IN ANY PIERCING OR WEAR JEWELRY THE DAY OF SURGERY.      DO NOT USE ADHESIVES IF YOU WEAR DENTURES.    DO NOT WEAR EYE CONTACTS; BRING IN YOUR GLASSES.    ONLY TAKE MEDICATION THE MORNING OF YOUR PROCEDURE IF INSTRUCTED BY YOUR SURGEON WITH ENOUGH WATER TO SWALLOW THE MEDICATION.  IF YOUR SURGEON DID NOT SPECIFY WHICH MEDICATIONS TO TAKE, YOU WILL NEED TO CALL THEIR OFFICE FOR FURTHER INSTRUCTIONS AND DO AS THEY INSTRUCT.    LEAVE ANYTHING YOU CONSIDER VALUABLE AT HOME.    YOU WILL NEED TO ARRANGE FOR SOMEONE TO DRIVE YOU HOME AFTER SURGERY.  IT IS RECOMMENDED THAT YOU DO NOT DRIVE, WORK, DRINK ALCOHOL OR MAKE MAJOR DECISIONS FOR AT LEAST 24 HOURS AFTER YOUR PROCEDURE IS COMPLETE.      THE DAY OF YOUR PROCEDURE, BRING IN THE FOLLOWING IF APPLICABLE:   PICTURE ID AND INSURANCE/MEDICARE OR MEDICAID CARDS/ANY CO-PAY THAT MAY BE DUE   COPY OF ADVANCED DIRECTIVE/LIVING WILL/POWER OR    CPAP/BIPAP/INHALERS   SKIN PREP SHEET   YOUR PREADMISSION TESTING PASS (IF NOT A PHONE HISTORY)    Medication instructions given to pt by RN per anesthesia protocol.  Pt referred back to surgeon for further instructions if he/she is on any blood thinners.

## 2024-03-27 ENCOUNTER — HOSPITAL ENCOUNTER (OUTPATIENT)
Facility: HOSPITAL | Age: 84
Setting detail: HOSPITAL OUTPATIENT SURGERY
Discharge: HOME | End: 2024-03-27
Attending: INTERNAL MEDICINE | Admitting: INTERNAL MEDICINE
Payer: MEDICARE

## 2024-03-27 ENCOUNTER — ANESTHESIA EVENT (OUTPATIENT)
Dept: PERIOP | Facility: HOSPITAL | Age: 84
End: 2024-03-27

## 2024-03-27 ENCOUNTER — TELEPHONE (OUTPATIENT)
Dept: CARDIOLOGY | Facility: CLINIC | Age: 84
End: 2024-03-27
Payer: MEDICARE

## 2024-03-27 ENCOUNTER — READMISSION MANAGEMENT (OUTPATIENT)
Dept: CALL CENTER | Facility: HOSPITAL | Age: 84
End: 2024-03-27
Payer: MEDICARE

## 2024-03-27 ENCOUNTER — ANESTHESIA (OUTPATIENT)
Dept: PERIOP | Facility: HOSPITAL | Age: 84
End: 2024-03-27

## 2024-03-27 VITALS
RESPIRATION RATE: 18 BRPM | WEIGHT: 160 LBS | TEMPERATURE: 97 F | BODY MASS INDEX: 28.35 KG/M2 | HEIGHT: 63 IN | OXYGEN SATURATION: 100 % | HEART RATE: 97 BPM | DIASTOLIC BLOOD PRESSURE: 61 MMHG | SYSTOLIC BLOOD PRESSURE: 89 MMHG

## 2024-03-27 PROCEDURE — G0463 HOSPITAL OUTPT CLINIC VISIT: HCPCS | Performed by: INTERNAL MEDICINE

## 2024-03-27 NOTE — ANESTHESIA PREPROCEDURE EVALUATION
Anesthesia Evaluation     Patient summary reviewed and Nursing notes reviewed   NPO Solid Status: > 8 hours  NPO Liquid Status: > 8 hours           Airway   Mallampati: II  TM distance: >3 FB  Neck ROM: full  Possible difficult intubation  Dental      Pulmonary    (+) pneumonia resolved , COPD,home oxygen, shortness of breath, sleep apnea  Cardiovascular     (+) hypertension, valvular problems/murmurs, dysrhythmias, hyperlipidemia      Neuro/Psych  (+) psychiatric history  GI/Hepatic/Renal/Endo    (+) obesity, hiatal hernia, GERD    Musculoskeletal     Abdominal    Substance History      OB/GYN          Other   arthritis,   history of cancer    ROS/Med Hx Other: Age-related nuclear cataract of left eye  Age-related nuclear cataract of right eye  Anxiety disorder due to general medical condition  Bronchitis  COPD  Cataract  Chronic anticoagulation (Xarelto)  Chronic bronchitis  Chronic respiratory failure with hypoxia  Chronic systolic heart failure  Complete heart block  Depression  Diverticulosis of colon  Esophageal dysphagia  Essential hypertension  Gastroesophageal reflux disease without esophagitis  Hiatal hernia with gastroesophageal reflux  Hyperlipidemia  Iron deficiency anemia  Longstanding persistent atrial fibrillation  Moderate mitral regurgitation  Moderate tricuspid regurgitation  Obesity  Sleep apnea  Symptomatic bradycardia  Thrombocytosis                  Anesthesia Plan    ASA 4     MAC     intravenous induction     Anesthetic plan, risks, benefits, and alternatives have been provided, discussed and informed consent has been obtained with: patient.  Pre-procedure education provided  Plan discussed with CRNA.    CODE STATUS:

## 2024-03-27 NOTE — OUTREACH NOTE
Medical Week 1 Survey      Flowsheet Row Responses   [facility] patient discharged from? [facility]   Does the patient have one of the following disease processes/diagnoses(primary or secondary)? Other   Week 1 attempt successful? No   Unsuccessful attempts Attempt 1   Revoke Readmitted            Yamilex WRIGHT - Registered Nurse

## 2024-03-27 NOTE — TELEPHONE ENCOUNTER
Pt states she went for a in for a scope this morning and was found to be in a fib so the scope was cancelled. States she was hospitalized for bronchitis and was discharged 3/22/2024. She has been on steroids for bronchitis. States her highest heart rate is around 116 but averages around 100. She denies any chest pain or shortness of breath. States she was not told she was in a fib at the hospital so she assumes she went into a fib sometime after Friday. BP earlier was 97/69 but denies any dizziness or near/syncope and even did yard work this morning.     She had an EKG done at [facility] in [place] this morning but it is not scanned into the chart yet. She was off her blood thinners for 2 days in preparation of her scope but she did take it after she got home this morning.     She is scheduled to have a PCP appt around 1 tomorrow but she is unsure if she is able to make it as she has home health at 11. I advised her if she is able to keep this appt, to have an EKG done in their office and faxed to us. I advised her we would be in touch with regards to further plan of care but if she became symptomatic or her heart rate increased and/or blood pressure dropped lower to go to the ER for treatment.         Patient also needs assistance with remote monitoring of her pacemaker to send in a remote transmission. Best phone number to reach her at is [phone].

## 2024-03-28 NOTE — TELEPHONE ENCOUNTER
Mrs Alcaraz called back and unable to get signal on her home monitor.  She reports her heart rates have been better and she is able to do all her activities.  She will keep her appt on 4/15/24.

## 2024-03-28 NOTE — TELEPHONE ENCOUNTER
Called Mrs Alcaraz to get a remote reading and she doesn't have monitor plugged up.  Explained that she needs to plug monitor up for it to charge before she can send in a remote reading.  She will do so later today.  She wrote my number down and will call for assistance in sending in a reading.  She says she feel ok today and home health is coming today.

## 2024-04-11 PROBLEM — J40 BRONCHITIS: Status: RESOLVED | Noted: 2024-03-20 | Resolved: 2024-04-11

## 2024-04-12 ENCOUNTER — TELEPHONE (OUTPATIENT)
Dept: PULMONOLOGY | Facility: CLINIC | Age: 84
End: 2024-04-12

## 2024-04-12 NOTE — TELEPHONE ENCOUNTER
Caller: LEIGH [facility]- [facility] NURSE    Relationship to patient: Provider    Best call back number: [phone]    Patient is needing: HOME HEALTH NURSE STATES THAT PATIENT TOLD HER THAT DR. VICKERS OR [facility] ARE WORKING ON GETTING HER A PORTABLE OXYGEN CONCENTRATOR. SHE IS TRYING TO FIGURE OUT WHERE ORDER WAS SENT, IF IT WAS. NURSE HAS BEEN IN CONTACT WITH [employer] AND THEY STATE THEY ONLY HAVE HER ON 2 LITRES OF OXYGEN NIGHTLY. PLEASE CALL BACK TO CLARIFY STATUS OF ORDER FOR THIS.

## 2024-04-15 ENCOUNTER — OFFICE VISIT (OUTPATIENT)
Dept: CARDIOLOGY | Facility: CLINIC | Age: 84
End: 2024-04-15
Payer: MEDICARE

## 2024-04-15 VITALS
OXYGEN SATURATION: 96 % | BODY MASS INDEX: 28.1 KG/M2 | DIASTOLIC BLOOD PRESSURE: 68 MMHG | HEIGHT: 63 IN | HEART RATE: 87 BPM | SYSTOLIC BLOOD PRESSURE: 118 MMHG | WEIGHT: 158.6 LBS

## 2024-04-15 DIAGNOSIS — R09.02 EXERCISE HYPOXEMIA: Primary | ICD-10-CM

## 2024-04-15 DIAGNOSIS — I48.11 LONGSTANDING PERSISTENT ATRIAL FIBRILLATION: Primary | ICD-10-CM

## 2024-04-15 DIAGNOSIS — Z79.01 CHRONIC ANTICOAGULATION: Chronic | ICD-10-CM

## 2024-04-15 DIAGNOSIS — Z95.0 PRESENCE OF CARDIAC PACEMAKER: ICD-10-CM

## 2024-04-15 DIAGNOSIS — I10 ESSENTIAL HYPERTENSION: Chronic | ICD-10-CM

## 2024-04-15 DIAGNOSIS — I44.2 COMPLETE HEART BLOCK: ICD-10-CM

## 2024-04-15 PROCEDURE — 3078F DIAST BP <80 MM HG: CPT | Performed by: PHYSICIAN ASSISTANT

## 2024-04-15 PROCEDURE — 3074F SYST BP LT 130 MM HG: CPT | Performed by: PHYSICIAN ASSISTANT

## 2024-04-15 PROCEDURE — 93279 PRGRMG DEV EVAL PM/LDLS PM: CPT | Performed by: PHYSICIAN ASSISTANT

## 2024-04-15 PROCEDURE — 99213 OFFICE O/P EST LOW 20 MIN: CPT | Performed by: PHYSICIAN ASSISTANT

## 2024-04-15 RX ORDER — RIVAROXABAN 20 MG/1
20 TABLET, FILM COATED ORAL DAILY
Qty: 90 TABLET | Refills: 3 | Status: SHIPPED | OUTPATIENT
Start: 2024-04-15

## 2024-04-15 NOTE — TELEPHONE ENCOUNTER
Rx for oxygen with activity placed based off 6mwt    [employer] needs to give her tanks    POC on 4lpm will not last as long as pt needs.

## 2024-04-15 NOTE — PROGRESS NOTES
Encounter Date:04/15/2024      Patient ID: Elsa Alcaraz is a 84 y.o. female.    Kemi Hdz MD    Cheif Complaint EP: Atrial Fibrillation, Heart block, and Pacemaker check    PROBLEM LIST:  Patient Active Problem List    Diagnosis Date Noted    Complete heart block 07/06/2023     Priority: High     Note Last Updated: 7/6/2023     [employer] Micra VR model leadless cardiac pacemaker ( LCP ), with [facility] [id number] serial number. 7-5-23      Longstanding persistent atrial fibrillation 01/11/2017     Priority: High    Presence of cardiac pacemaker 04/15/2024     Priority: Medium    Chronic systolic heart failure 05/12/2021     Priority: Medium     Note Last Updated: 7/5/2023     Echo, 10/10/2022: EF 40%.  There is calcification of the aortic valve with no hemodynamic significant aortic valve stenosis.  Mitral annular calcification, moderate MR.      Moderate mitral regurgitation 04/05/2021     Priority: Medium    Moderate tricuspid regurgitation 04/05/2021     Priority: Medium    Chronic anticoagulation (Xarelto) 07/04/2023     Priority: Low    Essential hypertension 01/11/2017     Priority: Low    Sleep apnea 01/11/2017     Priority: Low    Esophageal dysphagia 02/21/2024    Gastroesophageal reflux disease without esophagitis 02/21/2024    COPD 07/04/2023    Chronic respiratory failure with hypoxia 06/03/2021    Diverticulosis of colon 06/02/2021    Age-related nuclear cataract of right eye 12/16/2019    Age-related nuclear cataract of left eye 11/18/2019    Hiatal hernia with gastroesophageal reflux 04/27/2018    Iron deficiency anemia 04/13/2018    Thrombocytosis 04/13/2018    Hyperlipidemia 01/11/2017    Anxiety disorder due to general medical condition 01/11/2017    Cataract 01/11/2017    Chronic bronchitis 01/11/2017    Depression 01/11/2017    Obesity 01/11/2017               History of Present Illness  Patient presents today for follow-up with a history of persistent atrial fibrillation and  "complete heart block with a leadless permanent pacemaker.  He returns today for scheduled EP follow-up.  She is doing quite well.  Has no awareness of palpitations, no dizziness no syncope.  I reviewed her home health notes which indicate her blood pressure ranges from 100 to 120 mmHg systolic.  She states compliance with current medical regimen reports no significant adverse side effects.    Allergies   Allergen Reactions    Other GI Intolerance     Spicy foods         Current Outpatient Medications   Medication Instructions    albuterol sulfate  (90 Base) MCG/ACT inhaler 2 puffs, Inhalation, 4 Times Daily - RT    budesonide-formoterol (SYMBICORT) 160-4.5 MCG/ACT inhaler 2 puffs, Inhalation, 2 Times Daily - RT, Rinse mouth with water after use    cholecalciferol 1,000 Units, Oral, Daily    citalopram (CELEXA) 20 mg, Oral, Daily    famotidine (PEPCID) 40 mg, Oral, Nightly    furosemide (LASIX) 20 mg, Oral, Daily    Hydrocortisone (PROCTO-MED HC RE) Rectal, As Needed    metoprolol succinate XL (TOPROL-XL) 50 mg, Oral, Daily    O2 (OXYGEN) 4 L/min, Inhalation, Daily PRN    pantoprazole (PROTONIX) 40 MG EC tablet 1 tablet, Oral, Daily    polyethylene glycol (MIRALAX) 17 g, Oral, Daily PRN    potassium chloride (K-DUR,KLOR-CON) 10 MEQ CR tablet 10 mEq, Oral, 2 Times Daily    Psyllium (Metamucil) wafer wafer 2-4 wafers, Oral, Daily    sacubitril-valsartan (ENTRESTO) 24-26 MG tablet 1 tablet, Oral, Every 12 Hours Scheduled    spironolactone (ALDACTONE) 12.5 mg, Oral, Daily    vitamin B-12 (CYANOCOBALAMIN) 50 mcg, Oral, Daily    Xarelto 20 MG tablet Take 1 tablet by mouth once daily       .    Objective:     /68 (BP Location: Left arm, Patient Position: Sitting)   Pulse 87   Ht 160 cm (63\")   Wt 71.9 kg (158 lb 9.6 oz)   SpO2 96%   BMI 28.09 kg/m²    Body mass index is 28.09 kg/m².     Constitutional:       Appearance: Well-developed.   Pulmonary:      Effort: Pulmonary effort is normal. No respiratory " distress.      Breath sounds: Normal breath sounds. No wheezing. No rales.      Comments: Bases clear  Chest:      Chest wall: Not tender to palpatation.   Cardiovascular:      Normal rate. Regular rhythm.      Murmurs: There is no murmur.      No gallop.  No click. No rub.   Pulses:     Intact distal pulses.   Edema:     Peripheral edema absent.   Musculoskeletal: Normal range of motion.       Lab Review:     Lab Results   Component Value Date    GLUCOSE 92 03/22/2024    BUN 21 03/22/2024    CREATININE 0.73 03/22/2024    EGFR 82.2 03/22/2024    BCR 28.8 (H) 03/22/2024    K 4.3 03/22/2024    CO2 30.7 (H) 03/22/2024    CALCIUM 9.0 03/22/2024    ALBUMIN 4.1 03/19/2024    BILITOT 1.2 03/19/2024    AST 16 03/19/2024    ALT 10 03/19/2024     Lab Results   Component Value Date    WBC 14.59 (H) 03/22/2024    HGB 10.7 (L) 03/22/2024    HCT 36.9 03/22/2024    MCV 81.6 03/22/2024     03/22/2024     Lab Results   Component Value Date    TSH 0.440 06/03/2021           Procedures               Assessment:      Diagnosis Plan   1. Longstanding persistent atrial fibrillation  Underlying rhythm atrial fibrillation on device interrogation, rate controlled and chronically anticoagulated      2. Complete heart block  Normal functioning leadless permanent pacemaker with greater than 8 years battery life      3. Essential hypertension  Well-managed on current medical regimen      4. Presence of cardiac pacemaker    This patient's Cardiac Implanted Electronic Device was manually interrogated and reprogrammed during the patient encounter today.  Iterative programming changes were manually made to determine the sensing threshold, pacing threshold, lead impedance as well as underlying cardiac rhythm.  These programming changes were not limited to but included some or all of the following when appropriate: pacing mode, programmed AV delays, blanking periods, and refractory periods.  Data obtained as a result of these manual programing  changes informed the patient's CIED permanent programming.        5. Chronic anticoagulation (Xarelto)  Tolerating anticoagulation, refilled Xarelto at current dose        Plan:     Advance Care Planning   ACP discussion was declined by the patient. Patient does not have an advance directive, declines further assistance.           Stable cardiac status.  Continue current medications.   in 6 months, sooner as needed.  Thank you for allowing us to participate in the care of your patient.     Electronically signed by LYNETTE Murray, 04/15/24, 2:43 PM EDT.

## 2024-04-19 ENCOUNTER — TELEPHONE (OUTPATIENT)
Dept: CARDIOLOGY | Facility: CLINIC | Age: 84
End: 2024-04-19
Payer: MEDICARE

## 2024-04-19 NOTE — TELEPHONE ENCOUNTER
I spoke with  regarding her remote monitoring. She has tried numerous times to transmit and gets frustrated because it will not work. Today we discovered it will not transmit due to her house having a metal roof. She has two daughters that live close by but they both have metal roofs also. She said she gets upset every time she tries to send a reading. I told her if she could not find some where to send it from not to worry about it and we will check it in office.

## 2024-04-29 ENCOUNTER — APPOINTMENT (OUTPATIENT)
Dept: GENERAL RADIOLOGY | Facility: HOSPITAL | Age: 84
End: 2024-04-29
Payer: MEDICARE

## 2024-04-29 ENCOUNTER — HOSPITAL ENCOUNTER (EMERGENCY)
Facility: HOSPITAL | Age: 84
Discharge: HOME | End: 2024-04-30
Attending: STUDENT IN AN ORGANIZED HEALTH CARE EDUCATION/TRAINING PROGRAM
Payer: MEDICARE

## 2024-04-29 DIAGNOSIS — I48.0 PAROXYSMAL ATRIAL FIBRILLATION: Primary | ICD-10-CM

## 2024-04-29 LAB
ALBUMIN SERPL-MCNC: 4 G/DL (ref 3.5–5.2)
ALBUMIN/GLOB SERPL: 1.7 G/DL
ALP SERPL-CCNC: 73 U/L (ref 39–117)
ALT SERPL W P-5'-P-CCNC: 49 U/L (ref 1–33)
ANION GAP SERPL CALCULATED.3IONS-SCNC: 10.3 MMOL/L (ref 5–15)
AST SERPL-CCNC: 24 U/L (ref 1–32)
BASOPHILS # BLD AUTO: 0.03 10*3/MM3 (ref 0–0.2)
BASOPHILS NFR BLD AUTO: 0.2 % (ref 0–1.5)
BILIRUB SERPL-MCNC: 1 MG/DL (ref 0–1.2)
BUN SERPL-MCNC: 27 MG/DL (ref 8–23)
BUN/CREAT SERPL: 29 (ref 7–25)
CALCIUM SPEC-SCNC: 8.6 MG/DL (ref 8.6–10.5)
CHLORIDE SERPL-SCNC: 98 MMOL/L (ref 98–107)
CO2 SERPL-SCNC: 28.7 MMOL/L (ref 22–29)
CREAT SERPL-MCNC: 0.93 MG/DL (ref 0.57–1)
DEPRECATED RDW RBC AUTO: 48.6 FL (ref 37–54)
EGFRCR SERPLBLD CKD-EPI 2021: 61.1 ML/MIN/1.73
EOSINOPHIL # BLD AUTO: 0.01 10*3/MM3 (ref 0–0.4)
EOSINOPHIL NFR BLD AUTO: 0.1 % (ref 0.3–6.2)
ERYTHROCYTE [DISTWIDTH] IN BLOOD BY AUTOMATED COUNT: 16.8 % (ref 12.3–15.4)
GLOBULIN UR ELPH-MCNC: 2.4 GM/DL
GLUCOSE SERPL-MCNC: 94 MG/DL (ref 65–99)
HCT VFR BLD AUTO: 38.2 % (ref 34–46.6)
HGB BLD-MCNC: 11.5 G/DL (ref 12–15.9)
HOLD SPECIMEN: NORMAL
HOLD SPECIMEN: NORMAL
IMM GRANULOCYTES # BLD AUTO: 0.17 10*3/MM3 (ref 0–0.05)
IMM GRANULOCYTES NFR BLD AUTO: 1.2 % (ref 0–0.5)
LYMPHOCYTES # BLD AUTO: 0.98 10*3/MM3 (ref 0.7–3.1)
LYMPHOCYTES NFR BLD AUTO: 7.1 % (ref 19.6–45.3)
MAGNESIUM SERPL-MCNC: 2.2 MG/DL (ref 1.6–2.4)
MCH RBC QN AUTO: 24.2 PG (ref 26.6–33)
MCHC RBC AUTO-ENTMCNC: 30.1 G/DL (ref 31.5–35.7)
MCV RBC AUTO: 80.4 FL (ref 79–97)
MONOCYTES # BLD AUTO: 1.45 10*3/MM3 (ref 0.1–0.9)
MONOCYTES NFR BLD AUTO: 10.5 % (ref 5–12)
NEUTROPHILS NFR BLD AUTO: 11.16 10*3/MM3 (ref 1.7–7)
NEUTROPHILS NFR BLD AUTO: 80.9 % (ref 42.7–76)
NRBC BLD AUTO-RTO: 0 /100 WBC (ref 0–0.2)
NT-PROBNP SERPL-MCNC: 6547 PG/ML (ref 0–1800)
PLATELET # BLD AUTO: 373 10*3/MM3 (ref 140–450)
PMV BLD AUTO: 9.7 FL (ref 6–12)
POTASSIUM SERPL-SCNC: 4.2 MMOL/L (ref 3.5–5.2)
PROT SERPL-MCNC: 6.4 G/DL (ref 6–8.5)
RBC # BLD AUTO: 4.75 10*6/MM3 (ref 3.77–5.28)
SODIUM SERPL-SCNC: 137 MMOL/L (ref 136–145)
TROPONIN T SERPL HS-MCNC: 24 NG/L
TSH SERPL DL<=0.05 MIU/L-ACNC: 3.66 UIU/ML (ref 0.27–4.2)
WBC NRBC COR # BLD AUTO: 13.8 10*3/MM3 (ref 3.4–10.8)
WHOLE BLOOD HOLD COAG: NORMAL
WHOLE BLOOD HOLD SPECIMEN: NORMAL

## 2024-04-29 PROCEDURE — 93005 ELECTROCARDIOGRAM TRACING: CPT

## 2024-04-29 PROCEDURE — 85025 COMPLETE CBC W/AUTO DIFF WBC: CPT | Performed by: STUDENT IN AN ORGANIZED HEALTH CARE EDUCATION/TRAINING PROGRAM

## 2024-04-29 PROCEDURE — 71045 X-RAY EXAM CHEST 1 VIEW: CPT

## 2024-04-29 PROCEDURE — 93005 ELECTROCARDIOGRAM TRACING: CPT | Performed by: STUDENT IN AN ORGANIZED HEALTH CARE EDUCATION/TRAINING PROGRAM

## 2024-04-29 PROCEDURE — 25810000003 SODIUM CHLORIDE 0.9 % SOLUTION: Performed by: PHYSICIAN ASSISTANT

## 2024-04-29 PROCEDURE — 83880 ASSAY OF NATRIURETIC PEPTIDE: CPT | Performed by: PHYSICIAN ASSISTANT

## 2024-04-29 PROCEDURE — 96361 HYDRATE IV INFUSION ADD-ON: CPT

## 2024-04-29 PROCEDURE — 80053 COMPREHEN METABOLIC PANEL: CPT | Performed by: STUDENT IN AN ORGANIZED HEALTH CARE EDUCATION/TRAINING PROGRAM

## 2024-04-29 PROCEDURE — 84484 ASSAY OF TROPONIN QUANT: CPT | Performed by: STUDENT IN AN ORGANIZED HEALTH CARE EDUCATION/TRAINING PROGRAM

## 2024-04-29 PROCEDURE — 96374 THER/PROPH/DIAG INJ IV PUSH: CPT

## 2024-04-29 PROCEDURE — 84443 ASSAY THYROID STIM HORMONE: CPT | Performed by: STUDENT IN AN ORGANIZED HEALTH CARE EDUCATION/TRAINING PROGRAM

## 2024-04-29 PROCEDURE — 83735 ASSAY OF MAGNESIUM: CPT | Performed by: STUDENT IN AN ORGANIZED HEALTH CARE EDUCATION/TRAINING PROGRAM

## 2024-04-29 PROCEDURE — 99284 EMERGENCY DEPT VISIT MOD MDM: CPT

## 2024-04-29 PROCEDURE — 36415 COLL VENOUS BLD VENIPUNCTURE: CPT

## 2024-04-29 RX ORDER — SODIUM CHLORIDE 0.9 % (FLUSH) 0.9 %
10 SYRINGE (ML) INJECTION AS NEEDED
Status: DISCONTINUED | OUTPATIENT
Start: 2024-04-29 | End: 2024-04-30 | Stop reason: HOSPADM

## 2024-04-29 RX ORDER — METOPROLOL TARTRATE 1 MG/ML
5 INJECTION, SOLUTION INTRAVENOUS ONCE
Status: COMPLETED | OUTPATIENT
Start: 2024-04-29 | End: 2024-04-29

## 2024-04-29 RX ADMIN — METOPROLOL TARTRATE 5 MG: 1 INJECTION, SOLUTION INTRAVENOUS at 23:29

## 2024-04-29 RX ADMIN — SODIUM CHLORIDE 500 ML: 9 INJECTION, SOLUTION INTRAVENOUS at 21:59

## 2024-04-30 ENCOUNTER — TELEPHONE (OUTPATIENT)
Dept: CARDIOLOGY | Facility: CLINIC | Age: 84
End: 2024-04-30
Payer: MEDICARE

## 2024-04-30 VITALS
BODY MASS INDEX: 27.96 KG/M2 | HEIGHT: 63 IN | HEART RATE: 82 BPM | TEMPERATURE: 97.7 F | DIASTOLIC BLOOD PRESSURE: 70 MMHG | OXYGEN SATURATION: 100 % | RESPIRATION RATE: 16 BRPM | WEIGHT: 157.8 LBS | SYSTOLIC BLOOD PRESSURE: 109 MMHG

## 2024-04-30 NOTE — TELEPHONE ENCOUNTER
Ms. Alcaraz called today to let me know her home health nurse sent her to the ER yesterday due to being in atrial fibrillation. She is calling to ask if she needs to be seen earlier then her scheduled appointment. I ask her if she was experiencing SOA, fatigue, or extremity edema more than usual. She answered no. I explained to her that unless she is experiencing symptoms and nothing has changed since seeing us last there would be no reason for her to come in earlier. Im  not sure why her HHN sent her to the ER other than Ms Alcaraz states she was unaware she was in atrial fibrillation. She states she feels good and will call back if she begins to feel bad.

## 2024-04-30 NOTE — ED PROVIDER NOTES
"Subjective  History of Present Illness:    Chief Complaint: A-fib  History of Present Illness: 84-year-old female with history of A-fib, she does have a pacemaker, she states that she was taken her blood pressure at home which she does regularly, and noticed that her heart rate was up-and-down and she was concerned about being in A-fib.  She has a history of A-fib, COPD, arthritis, anemia.  She is taking all of her medications as prescribed.  She states she can feel the palpitations when her heart rate goes up.  Onset: Sudden onset  Duration: She noticed earlier today  Exacerbating / Alleviating factors: History of A-fib, has a pacemaker  Associated symptoms: Palpitations      Nurses Notes reviewed and agree, including vitals, allergies, social history and prior medical history.     REVIEW OF SYSTEMS: All systems reviewed and not pertinent unless noted.    Review of Systems   Cardiovascular:  Positive for palpitations.   All other systems reviewed and are negative.      Past Medical History:   Diagnosis Date    Anemia     Anxiety disorder due to general medical condition 01/11/2017    Arthritis     Atrial fibrillation 01/11/2017    Body piercing     BOTH EARS    Borderline diabetes     Breast cancer 2003    right-radiation and a lumpectomy    Broken tooth     Cataract 01/11/2017    Left eye surgery 11/19    Chronic bronchitis 01/11/2017    Colon cancer 11/30/1998    had surgery and chemo    COPD (chronic obstructive pulmonary disease)     Cyanocobalamine deficiency (non anemic)     Depression 01/11/2017    Diverticulosis     Elevated cholesterol     Esophageal reflux 01/11/2017    Hiatal hernia 01/11/2017    History of bladder infections     History of echocardiogram 07/26/2021    Hx of exercise stress test     \"several years ago by Dr. Mercado\".      Hx of radiation therapy     Hyperlipidemia     Hypertension 01/11/2017    Impaired functional mobility and activity tolerance     Impaired functional mobility, " balance, gait, and endurance     Osteoporosis     Palpitations 2017    Prediabetes     Problems with swallowing     meat at times per pt report    Shortness of breath     WITH EXERTION     Sleep apnea 2017    NO CPAP    Tricuspid valve disorder 2017    Patient doesn't know anything about this    Vitamin D deficiency     Wears glasses        Allergies:    Other      Past Surgical History:   Procedure Laterality Date    ANAL FISTULOTOMY      APPENDECTOMY          BREAST BIOPSY      BREAST LUMPECTOMY Right 2006    CARDIAC ELECTROPHYSIOLOGY PROCEDURE N/A 2023    Procedure: Micra;  Surgeon: Keven Willis DO;  [facility];  Service: Cardiology;  Laterality: N/A;    CATARACT EXTRACTION      both eyes     CATARACT EXTRACTION W/ INTRAOCULAR LENS IMPLANT Left 2019    Procedure: CATARACT PHACO EXTRACTION WITH INTRAOCULAR LENS IMPLANT LEFT;  Surgeon: Masoud David MD;  Location: [facility] OR;  Service: Ophthalmology    CATARACT EXTRACTION W/ INTRAOCULAR LENS IMPLANT Right 2019    Procedure: CATARACT PHACO EXTRACTION WITH INTRAOCULAR LENS IMPLANT RIGHT;  Surgeon: Masoud David MD;  Location: [facility] OR;  Service: Ophthalmology     SECTION  1981    CHOLECYSTECTOMY  2009    COLECTOMY PARTIAL / TOTAL  1998    COLON CANCER    COLONOSCOPY      COLONOSCOPY N/A 2021    Procedure: COLONOSCOPY WITH BIOPSY;  Surgeon: Iona Sanders MD;  Location: [facility] ENDOSCOPY;  Service: Gastroenterology;  Laterality: N/A;    ENDOSCOPY  2016    ENDOSCOPY N/A 2018    Procedure: ESOPHAGOGASTRODUODENOSCOPY WITH COLD FORCEP BIOPSY;  Surgeon: Vasquez Fagan MD;  Location: [facility] ENDOSCOPY;  Service: Gastroenterology    ENDOSCOPY N/A 2019    Procedure: ESOPHAGOGASTRODUODENOSCOPY WITH BIOPSY;  Surgeon: Vasquez Fagna MD;  Location: [facility] ENDOSCOPY;  Service: Gastroenterology    ENDOSCOPY N/A 2022     "Procedure: ESOPHAGOGASTRODUODENOSCOPY with biopsy and polypectomy  ;  Surgeon: Iona Sanders MD;  Location: [facility] ENDOSCOPY;  Service: Gastroenterology;  Laterality: N/A;    HYSTERECTOMY      1998    INSERT / REPLACE / REMOVE PACEMAKER      SKIN BIOPSY Left     arm    SKIN LESION EXCISION N/A     VENTRAL HERNIA REPAIR  10/28/2012         Social History     Socioeconomic History    Marital status:    Tobacco Use    Smoking status: Never     Passive exposure: Never    Smokeless tobacco: Never   Vaping Use    Vaping status: Never Used   Substance and Sexual Activity    Alcohol use: Not Currently     Alcohol/week: 1.0 - 2.0 standard drink of alcohol     Types: 1 - 2 Glasses of wine per week     Comment: rarely    Drug use: Never    Sexual activity: Defer         Family History   Problem Relation Age of Onset    Hypertension Mother     Asthma Mother     COPD Mother     Osteoarthritis Mother     Stomach cancer Father     Cancer Father     Cancer Sister     Diabetes Maternal Grandmother     Colon cancer Neg Hx        Objective  Physical Exam:  /70 (Patient Position: Lying)   Pulse 82   Temp 97.7 °F (36.5 °C) (Oral)   Resp 16   Ht 160 cm (63\")   Wt 71.6 kg (157 lb 12.8 oz)   SpO2 100%   BMI 27.95 kg/m²      Physical Exam  Vitals and nursing note reviewed.   Constitutional:       Appearance: She is well-developed.   HENT:      Head: Normocephalic and atraumatic.      Mouth/Throat:      Mouth: Mucous membranes are moist.   Cardiovascular:      Rate and Rhythm: Normal rate. Rhythm irregular.   Pulmonary:      Effort: Pulmonary effort is normal.      Breath sounds: Normal breath sounds.   Abdominal:      Palpations: Abdomen is soft.   Musculoskeletal:         General: Normal range of motion.      Cervical back: Normal range of motion and neck supple.   Skin:     General: Skin is warm and dry.   Neurological:      Mental Status: She is alert and oriented to person, place, and time.      Deep Tendon " Reflexes: Reflexes are normal and symmetric.           Procedures    ED Course:    ED Course as of 04/30/24 0158   Mon Apr 29, 2024 2111 I have interviewed and examined the patient FACE-TO-FACE.  I reviewed the mid-level provider(s) note and agree with the clinical impression, plan, and disposition unless otherwise stated in the MDM below.    ATTENDING ATTESTATION  HPI: 84-year-old female with past medical history of atrial fibrillation and CHF who presents to the ER with chief complaint of palpitations.  This started gradually last night.  Palpitations is described as heart beating fast and chest.  No chest pain or shortness of breath, fever or cough.  No other associated symptoms.    EXAM:   General: Alert.  Nontoxic appearance.  No acute distress.  Head: Normocephalic.  Atraumatic.  Eyes: No scleral icterus.  ENT: Moist mucous membranes.  Cardiovascular: Tachycardic.  Regular rhythm. No murmurs.  No rubs.  2+ distal pulses bilaterally.  Respiratory: Equal breath sounds bilaterally.  No rales.  No rhonchi.  No wheezing.  GI: Abdomen is soft.  Nondistended.  Nontender to palpation.  No rebound.  No guarding.  No CVA tenderness.  MSK: Moves all 4 extremities.  Neurologic: Oriented x 3.  No focal deficits.  Skin: No erythema.  No edema. No pallor. No cyanosis.  Psych: Normal mood.  Pleasant affect.    MDM: EKG per my interpretation fibrillation, rate 101, left axis deviation, right bundle branch block, QRS interval 132 ms, no STEMI.    Heart rate fluctuating between high 90s and low 110s.  Will treat with IV Lopressor 5 mg bolus. [JS]   2204 BP: 96/70 [CS]   2204 Patient is hypotensive, she does have a history of CHF, based on this I did give normal saline 500 cc as opposed to 1 L [CS]   Tue Apr 30, 2024   0038 Discussed results with patient and family at the bedside, she is rate controlled A-fib, and is on anticoagulation, I discussed home versus admission based on results and how she feels.  She states she feels  "asymptomatic, and feels very well, she would prefer to go home versus an admission.  I recommended that she follow-up closely with her cardiologist and she was given signs and symptoms to look for to return [CS]   0042 [id number] Score for Atrial Fibrillation Stroke Risk - MDCalc  Calculated on Apr 30 2024 12:42 AM  6 points -> Stroke risk was 9.7% per year in >90,000 patients (the [place] Atrial Fibrillation Cohort Study) and 13.6% risk of stroke/TIA/systemic embolism. One recommendation suggests a 0 score for men or 1 score for women (no clinical risk factors) is \"low\" risk and may not require anticoagulation; a 1 score for men or 2 score for women is \"low-moderate\" risk and should consider antiplatelet or anticoagulation; and a score =2 for men or =3 for women is \"moderate-high\" risk and should otherwise be an anticoagulation candidate. [CS]      ED Course User Index  [CS] Aldo Shen Jr., PA-C  [JS] Shaka Pizarro DO       Lab Results (last 24 hours)       Procedure Component Value Units Date/Time    CBC & Differential [[id number]]  (Abnormal) Collected: 04/29/24 2026    Specimen: Blood Updated: 04/29/24 2032    Narrative:      The following orders were created for panel order CBC & Differential.  Procedure                               Abnormality         Status                     ---------                               -----------         ------                     CBC Auto Differential[[id number]]        Abnormal            Final result                 Please view results for these tests on the individual orders.    Comprehensive Metabolic Panel [[id number]]  (Abnormal) Collected: 04/29/24 2026    Specimen: Blood Updated: 04/29/24 2051     Glucose 94 mg/dL      BUN 27 mg/dL      Creatinine 0.93 mg/dL      Sodium 137 mmol/L      Potassium 4.2 mmol/L      Chloride 98 mmol/L      CO2 28.7 mmol/L      Calcium 8.6 mg/dL      Total Protein 6.4 g/dL      Albumin 4.0 g/dL      ALT (SGPT) 49 U/L      AST " (SGOT) 24 U/L      Alkaline Phosphatase 73 U/L      Total Bilirubin 1.0 mg/dL      Globulin 2.4 gm/dL      A/G Ratio 1.7 g/dL      BUN/Creatinine Ratio 29.0     Anion Gap 10.3 mmol/L      eGFR 61.1 mL/min/1.73     Narrative:      GFR Normal >60  Chronic Kidney Disease <60  Kidney Failure <15    The GFR formula is only valid for adults with stable renal function between ages 18 and 70.    Magnesium [[id number]]  (Normal) Collected: 04/29/24 2026    Specimen: Blood Updated: 04/29/24 2051     Magnesium 2.2 mg/dL     Single High Sensitivity Troponin T [[id number]]  (Abnormal) Collected: 04/29/24 2026    Specimen: Blood Updated: 04/29/24 2102     HS Troponin T 24 ng/L     Narrative:      High Sensitive Troponin T Reference Range:  <14.0 ng/L- Negative Female for AMI  <22.0 ng/L- Negative Male for AMI  >=14 - Abnormal Female indicating possible myocardial injury.  >=22 - Abnormal Male indicating possible myocardial injury.   Clinicians would have to utilize clinical acumen, EKG, Troponin, and serial changes to determine if it is an Acute Myocardial Infarction or myocardial injury due to an underlying chronic condition.         TSH [[id number]]  (Normal) Collected: 04/29/24 2026    Specimen: Blood Updated: 04/29/24 2102     TSH 3.660 uIU/mL     CBC Auto Differential [[id number]]  (Abnormal) Collected: 04/29/24 2026    Specimen: Blood Updated: 04/29/24 2032     WBC 13.80 10*3/mm3      RBC 4.75 10*6/mm3      Hemoglobin 11.5 g/dL      Hematocrit 38.2 %      MCV 80.4 fL      MCH 24.2 pg      MCHC 30.1 g/dL      RDW 16.8 %      RDW-SD 48.6 fl      MPV 9.7 fL      Platelets 373 10*3/mm3      Neutrophil % 80.9 %      Lymphocyte % 7.1 %      Monocyte % 10.5 %      Eosinophil % 0.1 %      Basophil % 0.2 %      Immature Grans % 1.2 %      Neutrophils, Absolute 11.16 10*3/mm3      Lymphocytes, Absolute 0.98 10*3/mm3      Monocytes, Absolute 1.45 10*3/mm3      Eosinophils, Absolute 0.01 10*3/mm3      Basophils, Absolute 0.03 10*3/mm3       Immature Grans, Absolute 0.17 10*3/mm3      nRBC 0.0 /100 WBC     BNP [[id number]]  (Abnormal) Collected: 04/29/24 2026    Specimen: Blood Updated: 04/29/24 2202     proBNP 6,547.0 pg/mL     Narrative:      This assay is used as an aid in the diagnosis of individuals suspected of having heart failure. It can be used as an aid in the diagnosis of acute decompensated heart failure (ADHF) in patients presenting with signs and symptoms of ADHF to the emergency department (ED). In addition, NT-proBNP of <300 pg/mL indicates ADHF is not likely.    Age Range Result Interpretation  NT-proBNP Concentration (pg/mL:      <50             Positive            >450                   Gray                 300-450                    Negative             <300    50-75           Positive            >900                  Gray                300-900                  Negative            <300      >75             Positive            >1800                  Gray                300-1800                  Negative            <300             No radiology results from the last 24 hrs       Medical Decision Making  Patient Presentation 84-year-old female presented with A-fib, intermittent tachycardia on my initial assessment she was in no acute distress and stable    DDX A-fib, A-fib with RVR, electrolyte abnormalities, arrhythmia, dehydration, acute kidney insufficiency    Data Review/ Non ED Records /Analysis/Ordering unique tests  Review of previous  non ED visits, prior labs, prior imaging, available notes from prior evaluations or visits with specialists, medication list, allergies, past medical history, past surgical history        Independent Review Studies  I Personally reviewed all laboratory studies performed in the emergency department     Intervention/Re-evaluation intervention included fluids, and IV metoprolol.  Symptoms did improve    Independent Clinician discussed with my supervising physician     Risk Stratification  tools/clinical decision rules patient presented with a history of A-fib, she was in A-fib with RVR, vital signs stable she did have a few episodes of slightly low blood pressure, but she remained stable, she received IV metoprolol and fluid bolus on reevaluation she was rate controlled A-fib.  Discussed admission versus home, the patient preferred to go home, she felt comfortable and was asymptomatic and will follow-up with her cardiologist    Shared Decision Making discussed this plan of care with patient and family they were agreeable    Disposition patient stable for discharge    Problems Addressed:  Paroxysmal atrial fibrillation: complicated acute illness or injury    Amount and/or Complexity of Data Reviewed  External Data Reviewed: labs, radiology and notes.  Labs: ordered. Decision-making details documented in ED Course.  Radiology: ordered and independent interpretation performed. Decision-making details documented in ED Course.  ECG/medicine tests: ordered. Decision-making details documented in ED Course.    Risk  Prescription drug management.          Final diagnoses:   Paroxysmal atrial fibrillation           Disposition discharged       Aldo Shen Jr., SPENCER  04/30/24 0158

## 2024-05-17 ENCOUNTER — OFFICE VISIT (OUTPATIENT)
Dept: CARDIOLOGY | Facility: CLINIC | Age: 84
End: 2024-05-17
Payer: MEDICARE

## 2024-05-17 VITALS
WEIGHT: 158.8 LBS | DIASTOLIC BLOOD PRESSURE: 70 MMHG | HEART RATE: 87 BPM | OXYGEN SATURATION: 92 % | SYSTOLIC BLOOD PRESSURE: 118 MMHG | HEIGHT: 63 IN | BODY MASS INDEX: 28.14 KG/M2

## 2024-05-17 DIAGNOSIS — Z79.01 CHRONIC ANTICOAGULATION: Chronic | ICD-10-CM

## 2024-05-17 DIAGNOSIS — I10 ESSENTIAL HYPERTENSION: Chronic | ICD-10-CM

## 2024-05-17 DIAGNOSIS — I48.11 LONGSTANDING PERSISTENT ATRIAL FIBRILLATION: Primary | ICD-10-CM

## 2024-05-17 DIAGNOSIS — I50.22 CHRONIC SYSTOLIC HEART FAILURE: Chronic | ICD-10-CM

## 2024-05-17 DIAGNOSIS — Z95.0 PRESENCE OF CARDIAC PACEMAKER: ICD-10-CM

## 2024-05-17 DIAGNOSIS — I44.2 COMPLETE HEART BLOCK: ICD-10-CM

## 2024-05-17 RX ORDER — METOPROLOL SUCCINATE 50 MG/1
50 TABLET, EXTENDED RELEASE ORAL DAILY
Qty: 90 TABLET | Refills: 2 | Status: SHIPPED | OUTPATIENT
Start: 2024-05-17

## 2024-05-17 NOTE — PROGRESS NOTES
Encounter Date:05/17/2024      Patient ID: Elsa Alcaraz is a 84 y.o. female.    Kemi Hdz MD    Cheif Complaint EP: Atrial Fibrillation, Heart block, and Pacemaker check    PROBLEM LIST:  Patient Active Problem List    Diagnosis Date Noted    Complete heart block 07/06/2023     Priority: High     Note Last Updated: 7/6/2023     [employer] Micra VR model leadless cardiac pacemaker ( LCP ), with [facility] [id number] serial number. 7-5-23      Longstanding persistent atrial fibrillation 01/11/2017     Priority: High    Presence of cardiac pacemaker 04/15/2024     Priority: Medium    Chronic systolic heart failure 05/12/2021     Priority: Medium     Note Last Updated: 7/5/2023     Echo, 10/10/2022: EF 40%.  There is calcification of the aortic valve with no hemodynamic significant aortic valve stenosis.  Mitral annular calcification, moderate MR.      Moderate mitral regurgitation 04/05/2021     Priority: Medium    Moderate tricuspid regurgitation 04/05/2021     Priority: Medium    Chronic anticoagulation (Xarelto) 07/04/2023     Priority: Low    Essential hypertension 01/11/2017     Priority: Low    Sleep apnea 01/11/2017     Priority: Low    Esophageal dysphagia 02/21/2024    Gastroesophageal reflux disease without esophagitis 02/21/2024    COPD 07/04/2023    Chronic respiratory failure with hypoxia 06/03/2021    Diverticulosis of colon 06/02/2021    Age-related nuclear cataract of right eye 12/16/2019    Age-related nuclear cataract of left eye 11/18/2019    Hiatal hernia with gastroesophageal reflux 04/27/2018    Iron deficiency anemia 04/13/2018    Thrombocytosis 04/13/2018    Hyperlipidemia 01/11/2017    Anxiety disorder due to general medical condition 01/11/2017    Cataract 01/11/2017    Chronic bronchitis 01/11/2017    Depression 01/11/2017    Obesity 01/11/2017               History of Present Illness  Patient presents today for follow-up with a history of longstanding persistent atrial  "fibrillation, complete heart block with a leadless permanent pacemaker.  She returns today scheduled for physiology follow-up.  From a cardiac standpoint she is doing quite well.  She has no awareness of palpitations, no dizziness no syncope.  She has been walking and working in her garden.  Her blood pressure at home typically runs about 120 mmHg systolic.  She states compliance with current medical regimen reports no significant adverse side effects.    Allergies   Allergen Reactions    Other GI Intolerance     Spicy foods         Current Outpatient Medications   Medication Instructions    albuterol sulfate  (90 Base) MCG/ACT inhaler 2 puffs, Inhalation, 4 Times Daily - RT    budesonide-formoterol (SYMBICORT) 160-4.5 MCG/ACT inhaler 2 puffs, Inhalation, 2 Times Daily - RT, Rinse mouth with water after use    cholecalciferol (VITAMIN D3) 1,000 Units, Oral, Daily    citalopram (CELEXA) 20 mg, Oral, Daily    famotidine (PEPCID) 40 mg, Oral, Nightly    furosemide (LASIX) 20 mg, Oral, Daily    Hydrocortisone (PROCTO-MED HC RE) Rectal, As Needed    metoprolol succinate XL (TOPROL-XL) 50 mg, Oral, Daily    O2 (OXYGEN) 4 L/min, Inhalation, Daily PRN    pantoprazole (PROTONIX) 40 MG EC tablet 1 tablet, Oral, Daily    potassium chloride (K-DUR,KLOR-CON) 10 MEQ CR tablet 10 mEq, Oral, 2 Times Daily    Psyllium (Metamucil) wafer wafer 2-4 wafers, Oral, Daily    sacubitril-valsartan (ENTRESTO) 24-26 MG tablet 1 tablet, Oral, Every 12 Hours Scheduled    spironolactone (ALDACTONE) 12.5 mg, Oral, Daily    vitamin B-12 (CYANOCOBALAMIN) 50 mcg, Oral, Daily    Xarelto 20 mg, Oral, Daily       .    Objective:     /70 (BP Location: Left arm, Patient Position: Sitting)   Pulse 87   Ht 160 cm (63\")   Wt 72 kg (158 lb 12.8 oz)   SpO2 92%   BMI 28.13 kg/m²    Body mass index is 28.13 kg/m².     Vitals reviewed.   Constitutional:       Appearance: Well-developed.   Pulmonary:      Effort: Pulmonary effort is normal. No " respiratory distress.      Breath sounds: Normal breath sounds. No wheezing. No rales.      Comments: Bases clear  Chest:      Chest wall: Not tender to palpatation.   Cardiovascular:      Normal rate. Regular rhythm.      Murmurs: There is no murmur.      No gallop.  No click. No rub.   Pulses:     Intact distal pulses.   Edema:     Peripheral edema absent.   Musculoskeletal: Normal range of motion.       Lab Review:     Lab Results   Component Value Date    GLUCOSE 94 04/29/2024    BUN 27 (H) 04/29/2024    CREATININE 0.93 04/29/2024    EGFR 61.1 04/29/2024    BCR 29.0 (H) 04/29/2024    K 4.2 04/29/2024    CO2 28.7 04/29/2024    CALCIUM 8.6 04/29/2024    ALBUMIN 4.0 04/29/2024    BILITOT 1.0 04/29/2024    AST 24 04/29/2024    ALT 49 (H) 04/29/2024     Lab Results   Component Value Date    WBC 13.80 (H) 04/29/2024    HGB 11.5 (L) 04/29/2024    HCT 38.2 04/29/2024    MCV 80.4 04/29/2024     04/29/2024     Lab Results   Component Value Date    TSH 3.660 04/29/2024           Procedures               Assessment:      Diagnosis Plan   1. Longstanding persistent atrial fibrillation  Asymptomatic, complete heart block with VVI pacemaker      2. Complete heart block  Normal functioning leadless permanent pacemaker with normal lead parameters.  2.04 V remaining.  No episodes.      3. Presence of cardiac pacemaker    This patient's Cardiac Implanted Electronic Device was manually interrogated and reprogrammed during the patient encounter today.  Iterative programming changes were manually made to determine the sensing threshold, pacing threshold, lead impedance as well as underlying cardiac rhythm.  These programming changes were not limited to but included some or all of the following when appropriate: pacing mode, programmed AV delays, blanking periods, and refractory periods.  Data obtained as a result of these manual programing changes informed the patient's CIED permanent programming.        4. Essential  hypertension  Well-managed, refilled metoprolol at current dose      5. Chronic anticoagulation (Xarelto)  Tolerating anticoagulation, continue Xarelto 20 mg daily      6. Chronic systolic heart failure  Refilled Entresto at current dose        Plan:     Advance Care Planning   ACP discussion was declined by the patient. Patient has an advance directive (not in EMR), copy requested.           Stable cardiac status.  Continue current medications.   in 6 months, sooner as needed.  Thank you for allowing us to participate in the care of your patient.     Electronically signed by LYNETTE Murray, 05/17/24, 10:27 AM EDT.

## 2024-06-20 ENCOUNTER — OFFICE VISIT (OUTPATIENT)
Dept: GASTROENTEROLOGY | Facility: CLINIC | Age: 84
End: 2024-06-20
Payer: MEDICARE

## 2024-06-20 VITALS
WEIGHT: 156 LBS | DIASTOLIC BLOOD PRESSURE: 80 MMHG | HEART RATE: 82 BPM | BODY MASS INDEX: 27.63 KG/M2 | OXYGEN SATURATION: 93 % | SYSTOLIC BLOOD PRESSURE: 128 MMHG

## 2024-06-20 DIAGNOSIS — Z87.11 HISTORY OF PEPTIC ULCER DISEASE: Primary | ICD-10-CM

## 2024-06-20 DIAGNOSIS — R13.19 ESOPHAGEAL DYSPHAGIA: ICD-10-CM

## 2024-06-20 RX ORDER — DICYCLOMINE HYDROCHLORIDE 10 MG/5ML
20 SOLUTION ORAL
COMMUNITY

## 2024-06-20 RX ORDER — SODIUM CHLORIDE 9 MG/ML
30 INJECTION, SOLUTION INTRAVENOUS CONTINUOUS PRN
OUTPATIENT
Start: 2024-06-20

## 2024-06-20 NOTE — PROGRESS NOTES
"     Follow Up Note     Date: 2024   Patient Name: Elsa Alcaraz  MRN: [id number]  : 1940     Primary Care Provider: Kemi Hdz MD     Chief Complaint   Patient presents with    Abdominal Pain    Endoscopy     History of present illness:   2024  Elsa Alcaraz is a 84 y.o. female who is here today for follow up regarding Abdominal Pain and discuss endoscopy.    Saw her cardiologist since last appt. She became worried at the time of her previously scheduled EGD and cancelled it. She would like to reschedule now, having the same symptoms as previous.    Interval History:  2024  Elsa Alcaraz is a 83 y.o. female who is here today for follow up for her ongoing anemia difficulty swallowing and history of peptic ulcer disease.  Patient states that recently her reflux symptoms got worse despite on a Protonix p.o. daily dose.  She has been having good bowel movement now with domicile daily and MiraLAX as needed.  She also has some retrosternal pain and epigastric pain    Subjective     Past Medical History:   Diagnosis Date    Anemia     Anxiety disorder due to general medical condition 2017    Arthritis     Atrial fibrillation 2017    Body piercing     BOTH EARS    Borderline diabetes     Breast cancer     right-radiation and a lumpectomy    Broken tooth     Cataract 2017    Left eye surgery     Chronic bronchitis 2017    Colon cancer 1998    had surgery and chemo    COPD (chronic obstructive pulmonary disease)     Cyanocobalamine deficiency (non anemic)     Depression 2017    Diverticulosis     Elevated cholesterol     Esophageal reflux 2017    Hiatal hernia 2017    History of bladder infections     History of echocardiogram 2021    Hx of exercise stress test     \"several years ago by Dr. Mercado\".      Hx of radiation therapy     Hyperlipidemia     Hypertension 2017    Impaired functional mobility and activity " tolerance     Impaired functional mobility, balance, gait, and endurance     Osteoporosis     Palpitations 2017    Prediabetes     Problems with swallowing     meat at times per pt report    Shortness of breath     WITH EXERTION     Sleep apnea 2017    NO CPAP    Tricuspid valve disorder 2017    Patient doesn't know anything about this    Vitamin D deficiency     Wears glasses      Past Surgical History:   Procedure Laterality Date    ANAL FISTULOTOMY      APPENDECTOMY      1998    BREAST BIOPSY      BREAST LUMPECTOMY Right 2006    CARDIAC ELECTROPHYSIOLOGY PROCEDURE N/A 2023    Procedure: Micra;  Surgeon: Keven Willis DO;  Location: [facility] EP INVASIVE LOCATION;  Service: Cardiology;  Laterality: N/A;    CATARACT EXTRACTION      both eyes     CATARACT EXTRACTION W/ INTRAOCULAR LENS IMPLANT Left 2019    Procedure: CATARACT PHACO EXTRACTION WITH INTRAOCULAR LENS IMPLANT LEFT;  Surgeon: Masoud David MD;  Location: [facility] OR;  Service: Ophthalmology    CATARACT EXTRACTION W/ INTRAOCULAR LENS IMPLANT Right 2019    Procedure: CATARACT PHACO EXTRACTION WITH INTRAOCULAR LENS IMPLANT RIGHT;  Surgeon: Masoud David MD;  Location: [facility] OR;  Service: Ophthalmology     SECTION  1981    CHOLECYSTECTOMY  2009    COLECTOMY PARTIAL / TOTAL  1998    COLON CANCER    COLONOSCOPY      COLONOSCOPY N/A 2021    Procedure: COLONOSCOPY WITH BIOPSY;  Surgeon: Iona Sanders MD;  Location: [facility] ENDOSCOPY;  Service: Gastroenterology;  Laterality: N/A;    ENDOSCOPY  2016    ENDOSCOPY N/A 2018    Procedure: ESOPHAGOGASTRODUODENOSCOPY WITH COLD FORCEP BIOPSY;  Surgeon: Vasquez Fagan MD;  Location: [facility] ENDOSCOPY;  Service: Gastroenterology    ENDOSCOPY N/A 2019    Procedure: ESOPHAGOGASTRODUODENOSCOPY WITH BIOPSY;  Surgeon: Vasquez Fagan MD;  Location: [facility] ENDOSCOPY;  Service: Gastroenterology    ENDOSCOPY N/A  09/13/2022    Procedure: ESOPHAGOGASTRODUODENOSCOPY with biopsy and polypectomy  ;  Surgeon: Iona Sanders MD;  Location: [facility] ENDOSCOPY;  Service: Gastroenterology;  Laterality: N/A;    HYSTERECTOMY      1998    INSERT / REPLACE / REMOVE PACEMAKER      SKIN BIOPSY Left     arm    SKIN LESION EXCISION N/A     VENTRAL HERNIA REPAIR  10/28/2012     Family History   Problem Relation Age of Onset    Hypertension Mother     Asthma Mother     COPD Mother     Osteoarthritis Mother     Stomach cancer Father     Cancer Father     Cancer Sister     Diabetes Maternal Grandmother     Colon cancer Neg Hx      Social History     Socioeconomic History    Marital status:    Tobacco Use    Smoking status: Never     Passive exposure: Never    Smokeless tobacco: Never   Vaping Use    Vaping status: Never Used   Substance and Sexual Activity    Alcohol use: Not Currently     Alcohol/week: 1.0 - 2.0 standard drink of alcohol     Types: 1 - 2 Glasses of wine per week     Comment: rarely    Drug use: Never    Sexual activity: Defer       Current Outpatient Medications:     albuterol sulfate  (90 Base) MCG/ACT inhaler, Inhale 2 puffs 4 (Four) Times a Day. (Patient taking differently: Inhale 2 puffs 2 (Two) Times a Day.), Disp: 18 g, Rfl: 5    budesonide-formoterol (SYMBICORT) 160-4.5 MCG/ACT inhaler, Inhale 2 puffs 2 (Two) Times a Day. Rinse mouth with water after use, Disp: 10.2 g, Rfl: 5    cholecalciferol (VITAMIN D3) 25 MCG (1000 UT) tablet, Take 1 tablet by mouth Daily., Disp: , Rfl:     citalopram (CeleXA) 20 MG tablet, Take 1 tablet by mouth Daily., Disp: , Rfl:     dicyclomine (BENTYL) 10 MG/5ML syrup, Take 10 mL by mouth 4 (Four) Times a Day Before Meals & at Bedtime As Needed., Disp: , Rfl:     famotidine (PEPCID) 40 MG tablet, Take 1 tablet by mouth Every Night., Disp: 30 tablet, Rfl: 2    furosemide (Lasix) 20 MG tablet, Take 1 tablet by mouth Daily., Disp: 30 tablet, Rfl: 0    metoprolol succinate XL  (TOPROL-XL) 50 MG 24 hr tablet, Take 1 tablet by mouth Daily., Disp: 90 tablet, Rfl: 2    O2 (OXYGEN), Inhale 4 L/min Daily As Needed., Disp: , Rfl:     pantoprazole (PROTONIX) 40 MG EC tablet, Take 1 tablet by mouth Daily., Disp: , Rfl:     potassium chloride (K-DUR,KLOR-CON) 10 MEQ CR tablet, Take 1 tablet by mouth 2 (Two) Times a Day., Disp: , Rfl:     Psyllium (Metamucil) wafer wafer, Take 2-4 wafers by mouth Daily., Disp: , Rfl:     sacubitril-valsartan (ENTRESTO) 24-26 MG tablet, Take 1 tablet by mouth Every 12 (Twelve) Hours., Disp: 60 tablet, Rfl: 6    spironolactone (ALDACTONE) 25 MG tablet, Take 0.5 tablets by mouth Daily., Disp: 15 tablet, Rfl: 0    vitamin B-12 (CYANOCOBALAMIN) 100 MCG tablet, Take 0.5 tablets by mouth Daily., Disp: , Rfl:     Xarelto 20 MG tablet, Take 1 tablet by mouth Daily., Disp: 90 tablet, Rfl: 3    Allergies   Allergen Reactions    Other GI Intolerance     Spicy foods         The following portions of the patient's history were reviewed and updated as appropriate: allergies, current medications, past family history, past medical history, past social history, past surgical history and problem list.    Objective     Physical Exam  Constitutional:       General: She is not in acute distress.     Appearance: Normal appearance. She is well-developed. She is not diaphoretic.   HENT:      Head: Normocephalic and atraumatic.      Right Ear: External ear normal.      Left Ear: External ear normal.      Nose: Nose normal.      Comments: NC oxygen  Eyes:      General: No scleral icterus.        Right eye: No discharge.         Left eye: No discharge.      Conjunctiva/sclera: Conjunctivae normal.   Neck:      Trachea: No tracheal deviation.   Pulmonary:      Effort: Pulmonary effort is normal. No respiratory distress.   Musculoskeletal:      Cervical back: Normal range of motion.      Comments: Using walker to ambulate   Skin:     Coloration: Skin is pale.      Findings: No erythema or rash.    Neurological:      Mental Status: She is alert and oriented to person, place, and time.      Coordination: Coordination normal.   Psychiatric:         Behavior: Behavior normal.         Thought Content: Thought content normal.         Judgment: Judgment normal.      Comments: Anxious affect       Vitals:    06/20/24 1322   BP: 128/80   Pulse: 82   SpO2: 93%   Weight: 70.8 kg (156 lb)     Results Review:   I reviewed the patient's new clinical results.    Admission on 04/29/2024, Discharged on 04/30/2024   Component Date Value Ref Range Status    Glucose 04/29/2024 94  65 - 99 mg/dL Final    BUN 04/29/2024 27 (H)  8 - 23 mg/dL Final    Creatinine 04/29/2024 0.93  0.57 - 1.00 mg/dL Final    Sodium 04/29/2024 137  136 - 145 mmol/L Final    Potassium 04/29/2024 4.2  3.5 - 5.2 mmol/L Final    Chloride 04/29/2024 98  98 - 107 mmol/L Final    CO2 04/29/2024 28.7  22.0 - 29.0 mmol/L Final    Calcium 04/29/2024 8.6  8.6 - 10.5 mg/dL Final    Total Protein 04/29/2024 6.4  6.0 - 8.5 g/dL Final    Albumin 04/29/2024 4.0  3.5 - 5.2 g/dL Final    ALT (SGPT) 04/29/2024 49 (H)  1 - 33 U/L Final    AST (SGOT) 04/29/2024 24  1 - 32 U/L Final    Alkaline Phosphatase 04/29/2024 73  39 - 117 U/L Final    Total Bilirubin 04/29/2024 1.0  0.0 - 1.2 mg/dL Final    Globulin 04/29/2024 2.4  gm/dL Final    A/G Ratio 04/29/2024 1.7  g/dL Final    BUN/Creatinine Ratio 04/29/2024 29.0 (H)  7.0 - 25.0 Final    Anion Gap 04/29/2024 10.3  5.0 - 15.0 mmol/L Final    eGFR 04/29/2024 61.1  >60.0 mL/min/1.73 Final    Magnesium 04/29/2024 2.2  1.6 - 2.4 mg/dL Final    HS Troponin T 04/29/2024 24 (H)  <14 ng/L Final    TSH 04/29/2024 3.660  0.270 - 4.200 uIU/mL Final    WBC 04/29/2024 13.80 (H)  3.40 - 10.80 10*3/mm3 Final    RBC 04/29/2024 4.75  3.77 - 5.28 10*6/mm3 Final    Hemoglobin 04/29/2024 11.5 (L)  12.0 - 15.9 g/dL Final    Hematocrit 04/29/2024 38.2  34.0 - 46.6 % Final    MCV 04/29/2024 80.4  79.0 - 97.0 fL Final    MCH 04/29/2024 24.2 (L)   26.6 - 33.0 pg Final    MCHC 04/29/2024 30.1 (L)  31.5 - 35.7 g/dL Final    RDW 04/29/2024 16.8 (H)  12.3 - 15.4 % Final    RDW-SD 04/29/2024 48.6  37.0 - 54.0 fl Final    MPV 04/29/2024 9.7  6.0 - 12.0 fL Final    Platelets 04/29/2024 373  140 - 450 10*3/mm3 Final       Colonoscopy was completed Dr. Sanders on 6/2/2021:  - The perianal and digital rectal examinations were normal.  - Multiple small and large-mouthed diverticula were found in the sigmoid colon, descending colon, transverse colon and ascending colon.  - Non-bleeding internal hemorrhoids were found. The hemorrhoids were moderate and medium-sized.  - There was evidence of a patent end-to-side ileo-colonic anastomosis found in the alanna-terminal Ileum. The anastomotic site was characterized by an ulceration. This was traversed. Biopsies were taken with a cold forceps for histology.  - Neoterminal ileum was normal  Pathology at ileocolonic ulcer with reactive glandular mucosa with focal activity, no dysplasia. Comment- findings are nonspecific. No definitive features suggestive of ischemic colitis are identified.     EGD 9/2022  - Normal oropharynx. - Z-line regular, 30 cm from the incisors. - No gross lesions in the entire esophagus. - Tortuous esophagus. - 4 cm hiatal hernia. - Non-obstructing and mild Schatzki ring. - Healing Non-bleeding gastric ulcers with no stigmata of bleeding found with in the hiatal hernial sac. - Erythematous mucosa in the posterior wall of the stomach, antrum and prepyloric region of the stomach. Biopsied. - A few gastric polyps. Resected and retrieved. - Normal duodenal bulb, first portion of the duodenum, second portion of the duodenum and third portion of the duodenum. Biopsied. - Localized nodular mucosa was found in the second portion of the duodenum, biopsied.   Pathology DIAGNOSIS:  A.   DUODENUM, BIOPSY:  Small bowel mucosa with no significant pathologic change  B.   ANTRUM AND BODY, BIOPSY:  Gastric mucosa with  reactive changes  No Helicobacter pylori-like organisms seen  Negative for dysplasia or malignancy  C.   DUODENUM, BIOPSY, ABNORMAL MUCOSA:  Nonspecific mild chronic duodenitis  D.   GASTRIC POLYP:  Oxyntic type mucosa with reactive changes  No Helicobacter pylori-like organisms seen  Negative for dysplasia or malignancy     Assessment / Plan      1. History of peptic ulcer disease  2. Esophageal dysphagia  3.  Large hiatal hernia with Hong ulcers/peptic ulcer disease  4.  History of ileocolonic anastomotic ulcer with GI bleed; suspected ischemic versus NSAID induced  5.  History of blood loss anemia  6.  History of long-term anticoagulation  7.  Remote history of colon cancer status post right hemicolectomy and chemotherapy 1998  8.  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 daily dose.  Lab work done at outside facility on 11/14/2023 reviewed and hemoglobin later mostly normalized to 11.3 g/dL.  CMP normal.  No history suggestive of GI bleed.  Clinical examination reveals mild epigastric tenderness.  She has dysphagia to solids which is getting worse.  Her dad had stomach cancer and she is worried on that.  Patient likely has a hiatal hernia associated esophageal ring and dysphagia.    She and is extremely high risk for intermittent GI bleed with the peptic ulcer disease and anastomotic ulcers in the setting of anticoagulation. Has had intolerance to iron.  EGD done on 9/13/2022 revealed nonobstructing mild esophageal ring, few healing ulcerations in the fundus within the hernial sac.  Gastric pathology negative for H. pylori no history suggestive any current GI bleed.  CT abdomen pelvis with contrast done on 6/20/2022 was normal.  Patient continues to have intermittent left upper quadrant and right lower quadrant abdominal pain, bloating which appears to be functional and could also be secondary to severe diverticular disease.  Colonoscopy done on 6/2/2021 which revealed diverticulosis involving the sigmoid colon and descending colon transverse colon ascending colon however there was no signs of any diverticular bleed.  Patent end-to-side ileocolonic anastomosis was noted with healing ulceration in the anastomotic site.  This was more in favor of NSAID induced versus ischemic ulcerations.  Patient likely bled from the ulcerations.  Biopsies from the ulcer revealed reactive glandular mucosa with mild activity.  No dysplasia or metaplasia or neoplastic changes.     EGD will be rescheduled as previously ordered  High risk for GI bleeding  Avoid NSAIDs   Continue Pepcid 40 mg nightly along with the Protonix 40 g in the a.m.  To hold her Xarelto at least 2 days before the test  Continue Metamucil as before  MiraLAX 17 g p.o. as needed  Patient is slightly high risk for procedure complication that has been discussed with her     She will need an esophagogastroduodenoscopy with possible dilatation of the esophagus performed with monitored anesthesia care. The indications, technique, alternatives and potential risk and complications were discussed with the patient including but not limited to bleeding, bowel perforations, missing lesions and anesthetic complications. The patient understands and wishes to proceed with the procedure and has given their verbal consent. Written patient education information was given to the patient. She should follow up in the office after this procedure to discuss the results and further recommendations can be made at that time. The patient will call if they have further questions before procedure.  - Case Request    Follow Up:   Return for follow up after procedure to discuss results with Dr. Sanders.    Beth Robertson PA-C  [facility]  6/20/2024  16:51 EDT    Dictated Utilizing Dragon Dictation: Part of this note may be an electronic transcription/translation of spoken language to printed text using  the Dragon Dictation System.

## 2024-06-21 PROBLEM — Z87.11 HISTORY OF PEPTIC ULCER DISEASE: Status: ACTIVE | Noted: 2024-06-20

## 2024-06-28 ENCOUNTER — ANESTHESIA EVENT (OUTPATIENT)
Dept: GASTROENTEROLOGY | Facility: HOSPITAL | Age: 84
End: 2024-06-28
Payer: MEDICARE

## 2024-06-28 ENCOUNTER — ANESTHESIA (OUTPATIENT)
Dept: GASTROENTEROLOGY | Facility: HOSPITAL | Age: 84
End: 2024-06-28
Payer: MEDICARE

## 2024-06-28 ENCOUNTER — HOSPITAL ENCOUNTER (OUTPATIENT)
Facility: HOSPITAL | Age: 84
Setting detail: HOSPITAL OUTPATIENT SURGERY
Discharge: HOME | End: 2024-06-28
Attending: INTERNAL MEDICINE | Admitting: INTERNAL MEDICINE
Payer: MEDICARE

## 2024-06-28 VITALS
OXYGEN SATURATION: 100 % | TEMPERATURE: 97.3 F | RESPIRATION RATE: 20 BRPM | DIASTOLIC BLOOD PRESSURE: 65 MMHG | HEART RATE: 81 BPM | SYSTOLIC BLOOD PRESSURE: 96 MMHG

## 2024-06-28 DIAGNOSIS — R13.19 ESOPHAGEAL DYSPHAGIA: ICD-10-CM

## 2024-06-28 DIAGNOSIS — Z87.11 HISTORY OF PEPTIC ULCER DISEASE: ICD-10-CM

## 2024-06-28 PROCEDURE — 25010000002 PROPOFOL 10 MG/ML EMULSION: Performed by: NURSE ANESTHETIST, CERTIFIED REGISTERED

## 2024-06-28 PROCEDURE — 43239 EGD BIOPSY SINGLE/MULTIPLE: CPT | Performed by: INTERNAL MEDICINE

## 2024-06-28 PROCEDURE — 43249 ESOPH EGD DILATION <30 MM: CPT | Performed by: INTERNAL MEDICINE

## 2024-06-28 PROCEDURE — C1726 CATH, BAL DIL, NON-VASCULAR: HCPCS | Performed by: INTERNAL MEDICINE

## 2024-06-28 PROCEDURE — 25810000003 SODIUM CHLORIDE 0.9 % SOLUTION: Performed by: PHYSICIAN ASSISTANT

## 2024-06-28 RX ORDER — LIDOCAINE HCL/PF 100 MG/5ML
SYRINGE (ML) INJECTION AS NEEDED
Status: DISCONTINUED | OUTPATIENT
Start: 2024-06-28 | End: 2024-06-28 | Stop reason: SURG

## 2024-06-28 RX ORDER — PROPOFOL 10 MG/ML
VIAL (ML) INTRAVENOUS AS NEEDED
Status: DISCONTINUED | OUTPATIENT
Start: 2024-06-28 | End: 2024-06-28 | Stop reason: SURG

## 2024-06-28 RX ORDER — SODIUM CHLORIDE 9 MG/ML
30 INJECTION, SOLUTION INTRAVENOUS CONTINUOUS PRN
Status: DISCONTINUED | OUTPATIENT
Start: 2024-06-28 | End: 2024-06-28 | Stop reason: HOSPADM

## 2024-06-28 RX ADMIN — PROPOFOL 20 MG: 10 INJECTION, EMULSION INTRAVENOUS at 07:43

## 2024-06-28 RX ADMIN — PROPOFOL 50 MG: 10 INJECTION, EMULSION INTRAVENOUS at 07:38

## 2024-06-28 RX ADMIN — PROPOFOL 20 MG: 10 INJECTION, EMULSION INTRAVENOUS at 07:44

## 2024-06-28 RX ADMIN — Medication 60 MG: at 07:37

## 2024-06-28 RX ADMIN — PROPOFOL 50 MG: 10 INJECTION, EMULSION INTRAVENOUS at 07:37

## 2024-06-28 RX ADMIN — PROPOFOL 30 MG: 10 INJECTION, EMULSION INTRAVENOUS at 07:41

## 2024-06-28 RX ADMIN — PROPOFOL 20 MG: 10 INJECTION, EMULSION INTRAVENOUS at 07:42

## 2024-06-28 RX ADMIN — PROPOFOL 20 MG: 10 INJECTION, EMULSION INTRAVENOUS at 07:49

## 2024-06-28 RX ADMIN — SODIUM CHLORIDE 30 ML/HR: 9 INJECTION, SOLUTION INTRAVENOUS at 06:43

## 2024-06-28 RX ADMIN — PROPOFOL 20 MG: 10 INJECTION, EMULSION INTRAVENOUS at 07:46

## 2024-06-28 NOTE — H&P
"    [facility]  HISTORY AND PHYSICAL    Patient Name: Elsa Alcaraz  : 1940  MRN: [id number]    Chief Complaint:   For EGD    History Of Presenting Illness:    Dysphagia  PUD      Past Medical History:   Diagnosis Date    Anemia     Anxiety disorder due to general medical condition 2017    Arthritis     Atrial fibrillation 2017    Body piercing     BOTH EARS    Borderline diabetes     Breast cancer     right-radiation and a lumpectomy    Broken tooth     Cataract 2017    Left eye surgery     Chronic bronchitis 2017    Colon cancer 1998    had surgery and chemo    COPD (chronic obstructive pulmonary disease)     Cyanocobalamine deficiency (non anemic)     Depression 2017    Diverticulosis     Elevated cholesterol     Esophageal reflux 2017    Hiatal hernia 2017    History of bladder infections     History of echocardiogram 2021    Hx of exercise stress test     \"several years ago by Dr. Mercado\".      Hx of radiation therapy     Hyperlipidemia     Hypertension 2017    Impaired functional mobility and activity tolerance     Impaired functional mobility, balance, gait, and endurance     Osteoporosis     Palpitations 2017    Prediabetes     Problems with swallowing     meat at times per pt report    Shortness of breath     WITH EXERTION     Sleep apnea 2017    NO CPAP    Tricuspid valve disorder 2017    Patient doesn't know anything about this    Vitamin D deficiency     Wears glasses        Past Surgical History:   Procedure Laterality Date    ANAL FISTULOTOMY      APPENDECTOMY          BREAST BIOPSY      BREAST LUMPECTOMY Right 2006    CARDIAC ELECTROPHYSIOLOGY PROCEDURE N/A 2023    Procedure: Micra;  Surgeon: Keven Willis DO;  Location: [facility] INVASIVE LOCATION;  Service: Cardiology;  Laterality: N/A;    CATARACT EXTRACTION      both eyes     CATARACT EXTRACTION W/ INTRAOCULAR LENS " IMPLANT Left 2019    Procedure: CATARACT PHACO EXTRACTION WITH INTRAOCULAR LENS IMPLANT LEFT;  Surgeon: Masoud aDvid MD;  Location: [facility] OR;  Service: Ophthalmology    CATARACT EXTRACTION W/ INTRAOCULAR LENS IMPLANT Right 2019    Procedure: CATARACT PHACO EXTRACTION WITH INTRAOCULAR LENS IMPLANT RIGHT;  Surgeon: Masoud David MD;  Location: [facility] OR;  Service: Ophthalmology     SECTION  1981    CHOLECYSTECTOMY  2009    COLECTOMY PARTIAL / TOTAL  1998    COLON CANCER    COLONOSCOPY      COLONOSCOPY N/A 2021    Procedure: COLONOSCOPY WITH BIOPSY;  Surgeon: Iona Sanders MD;  Location: [facility] ENDOSCOPY;  Service: Gastroenterology;  Laterality: N/A;    ENDOSCOPY      ENDOSCOPY N/A 2018    Procedure: ESOPHAGOGASTRODUODENOSCOPY WITH COLD FORCEP BIOPSY;  Surgeon: Vasquez Fagan MD;  Location: [facility] ENDOSCOPY;  Service: Gastroenterology    ENDOSCOPY N/A 2019    Procedure: ESOPHAGOGASTRODUODENOSCOPY WITH BIOPSY;  Surgeon: Vasquez Fagan MD;  Location: [facility] ENDOSCOPY;  Service: Gastroenterology    ENDOSCOPY N/A 2022    Procedure: ESOPHAGOGASTRODUODENOSCOPY with biopsy and polypectomy  ;  Surgeon: Iona Sanders MD;  Location: [facility] ENDOSCOPY;  Service: Gastroenterology;  Laterality: N/A;    HYSTERECTOMY          INSERT / REPLACE / REMOVE PACEMAKER      SKIN BIOPSY Left     arm    SKIN LESION EXCISION N/A     VENTRAL HERNIA REPAIR  10/28/2012       Social History     Socioeconomic History    Marital status:    Tobacco Use    Smoking status: Never     Passive exposure: Never    Smokeless tobacco: Never   Vaping Use    Vaping status: Never Used   Substance and Sexual Activity    Alcohol use: Not Currently     Alcohol/week: 1.0 - 2.0 standard drink of alcohol     Types: 1 - 2 Glasses of wine per week     Comment: rarely    Drug use: Never    Sexual activity: Defer       Family History   Problem Relation Age  of Onset    Hypertension Mother     Asthma Mother     COPD Mother     Osteoarthritis Mother     Stomach cancer Father     Cancer Father     Cancer Sister     Diabetes Maternal Grandmother     Colon cancer Neg Hx        Prior to Admission Medications:  Medications Prior to Admission   Medication Sig Dispense Refill Last Dose    albuterol sulfate  (90 Base) MCG/ACT inhaler Inhale 2 puffs 4 (Four) Times a Day. (Patient taking differently: Inhale 2 puffs 2 (Two) Times a Day.) 18 g 5 6/27/2024    budesonide-formoterol (SYMBICORT) 160-4.5 MCG/ACT inhaler Inhale 2 puffs 2 (Two) Times a Day. Rinse mouth with water after use 10.2 g 5 6/27/2024    cholecalciferol (VITAMIN D3) 25 MCG (1000 UT) tablet Take 1 tablet by mouth Daily.   6/27/2024    citalopram (CeleXA) 20 MG tablet Take 1 tablet by mouth Daily.   6/27/2024    dicyclomine (BENTYL) 10 MG/5ML syrup Take 10 mL by mouth 4 (Four) Times a Day Before Meals & at Bedtime As Needed.   6/27/2024    famotidine (PEPCID) 40 MG tablet Take 1 tablet by mouth Every Night. 30 tablet 2 6/27/2024    furosemide (Lasix) 20 MG tablet Take 1 tablet by mouth Daily. 30 tablet 0 6/27/2024    metoprolol succinate XL (TOPROL-XL) 50 MG 24 hr tablet Take 1 tablet by mouth Daily. 90 tablet 2 6/27/2024    O2 (OXYGEN) Inhale 4 L/min Daily As Needed.   6/27/2024    pantoprazole (PROTONIX) 40 MG EC tablet Take 1 tablet by mouth Daily.   6/27/2024    potassium chloride (K-DUR,KLOR-CON) 10 MEQ CR tablet Take 1 tablet by mouth 2 (Two) Times a Day.   6/27/2024    Psyllium (Metamucil) wafer wafer Take 2-4 wafers by mouth Daily.   6/27/2024    sacubitril-valsartan (ENTRESTO) 24-26 MG tablet Take 1 tablet by mouth Every 12 (Twelve) Hours. 60 tablet 6 6/27/2024    spironolactone (ALDACTONE) 25 MG tablet Take 0.5 tablets by mouth Daily. 15 tablet 0 6/27/2024    vitamin B-12 (CYANOCOBALAMIN) 100 MCG tablet Take 0.5 tablets by mouth Daily.   6/27/2024    Xarelto 20 MG tablet Take 1 tablet by mouth Daily.  90 tablet 3 6/26/2024       Allergies:  Allergies   Allergen Reactions    Other GI Intolerance     Spicy foods          Vitals: Temp:  [96.9 °F (36.1 °C)] 96.9 °F (36.1 °C)  Heart Rate:  [101] 101  Resp:  [18] 18  BP: (127)/(75) 127/75    Review Of Systems:  Constitutional:  Negative for chills, fever, and unexpected weight change.  Respiratory:  Negative for cough, chest tightness, shortness of breath, and wheezing.  Cardiovascular:  Negative for chest pain, palpitations, and leg swelling.  Gastrointestinal:  Negative for abdominal distention, abdominal pain, nausea, vomiting.  Neurological:  Negative for weakness, numbness, and headaches.     Physical Exam:    General Appearance:  Alert, cooperative, in no acute distress.   Lungs:   Clear to auscultation, respirations regular, even and                 unlabored.   Heart:  Regular rhythm and normal rate.   Abdomen:   Normal bowel sounds, no masses, no organomegaly. Soft, nontender, nondistended   Neurologic: Alert and oriented x 3. Moves all four limbs equally       Assessment & Plan     Assessment:  Principal Problem:    History of peptic ulcer disease  Active Problems:    Esophageal dysphagia      Plan: ESOPHAGOGASTRODUODENOSCOPY WITH BIOPSY (N/A)     Iona Sanders MD  6/28/2024

## 2024-06-28 NOTE — ANESTHESIA PREPROCEDURE EVALUATION
Anesthesia Evaluation     Patient summary reviewed and Nursing notes reviewed   NPO Solid Status: > 8 hours  NPO Liquid Status: > 2 hours           Airway   Mallampati: II  TM distance: >3 FB  Neck ROM: full  Possible difficult intubation  Dental      Pulmonary    (+) pneumonia resolved , COPD,home oxygen, shortness of breath, sleep apnea (no cpap), decreased breath sounds  Cardiovascular   Exercise tolerance: good (4-7 METS)    ECG reviewed  PT is on anticoagulation therapy  Patient on routine beta blocker  Rhythm: irregular  Rate: normal    (+) hypertension, valvular problems/murmurs, dysrhythmias (hx of complete heart block) Atrial Fib, hyperlipidemia    ROS comment: 4/29/24 EKG - afib w/ rvr, RBBB. Abnormal ekg    Neuro/Psych  (+) psychiatric history  GI/Hepatic/Renal/Endo    (+) obesity, morbid obesity, hiatal hernia, GERD    Musculoskeletal     Abdominal    Substance History - negative use     OB/GYN negative ob/gyn ROS         Other   arthritis, blood dyscrasia anemia,   history of cancer (colon, breast)    ROS/Med Hx Other: Age-related nuclear cataract of left eye  Age-related nuclear cataract of right eye  Anxiety disorder due to general medical condition  Bronchitis  COPD  Cataract  Chronic anticoagulation (Xarelto)  Chronic bronchitis  Chronic respiratory failure with hypoxia  Chronic systolic heart failure  Complete heart block  Depression  Diverticulosis of colon  Esophageal dysphagia  Essential hypertension  Gastroesophageal reflux disease without esophagitis  Hiatal hernia with gastroesophageal reflux  Hyperlipidemia  Iron deficiency anemia  Longstanding persistent atrial fibrillation  Moderate mitral regurgitation  Moderate tricuspid regurgitation  Obesity  Sleep apnea  Symptomatic bradycardia  Thrombocytosis                  Anesthesia Plan    ASA 4     MAC     (Risks and benefits discussed including risk of aspiration, recall and dental damage. All patient questions answered.    Will continue with  plan of care.)  intravenous induction     Anesthetic plan, risks, benefits, and alternatives have been provided, discussed and informed consent has been obtained with: patient.  Pre-procedure education provided  Plan discussed with CRNA.    CODE STATUS:

## 2024-06-28 NOTE — ANESTHESIA POSTPROCEDURE EVALUATION
Patient: Elsa Alcaraz    Procedure Summary       Date: 06/28/24 Room / Location: [facility] ENDOSCOPY 2 / [facility] ENDOSCOPY    Anesthesia Start: 0723 Anesthesia Stop: 0754    Procedure: ESOPHAGOGASTRODUODENOSCOPY WITH BIOPSY and dilatation Diagnosis:       History of peptic ulcer disease      Esophageal dysphagia      (History of peptic ulcer disease [Z87.11])      (Esophageal dysphagia [R13.19])    Surgeons: Iona Sanders MD Provider: Yunior Garcia CRNA    Anesthesia Type: MAC ASA Status: 4            Anesthesia Type: MAC    Vitals  No vitals data found for the desired time range.          Post Anesthesia Care and Evaluation    Patient location during evaluation: bedside  Patient participation: complete - patient participated  Level of consciousness: awake and alert  Pain score: 0  Pain management: satisfactory to patient    Airway patency: patent  Anesthetic complications: No anesthetic complications  PONV Status: none  Cardiovascular status: acceptable and stable  Respiratory status: acceptable  Hydration status: acceptable    Comments: Vitals signs as noted in nursing documentation as per protocol.

## 2024-06-28 NOTE — DISCHARGE INSTRUCTIONS
Rest today  No pushing,pulling,tugging,heavy lifting, or strenuous activity   No major decision making,driving,or drinking alcoholic beverages for 24 hours due to the sedation you received  Always use good hand hygiene/washing technique  No driving on pain medication.    To assist you in voiding:  Drink plenty of fluids  Listen to running water while attempting to void.    If you are unable to urinate and you have an uncomfortable urge to void or it has been   6 hours since you were discharged, return to the Emergency Room.    - Discharge patient to home (ambulatory).   - Mechanical soft diet daily.   - Hold Xeralto for 3 days   - PPI daily  - Avoid NSAIDS  - Await pathology results.   - Return to my office in 8 weeks.

## 2024-07-01 LAB — REF LAB TEST METHOD: NORMAL

## 2024-08-03 DIAGNOSIS — Z87.11 HISTORY OF PEPTIC ULCER DISEASE: ICD-10-CM

## 2024-08-03 DIAGNOSIS — R13.19 ESOPHAGEAL DYSPHAGIA: ICD-10-CM

## 2024-08-03 DIAGNOSIS — K21.9 GASTROESOPHAGEAL REFLUX DISEASE WITHOUT ESOPHAGITIS: ICD-10-CM

## 2024-08-05 RX ORDER — FAMOTIDINE 40 MG/1
40 TABLET, FILM COATED ORAL NIGHTLY
Qty: 90 TABLET | Refills: 0 | Status: SHIPPED | OUTPATIENT
Start: 2024-08-05

## 2024-08-26 ENCOUNTER — APPOINTMENT (OUTPATIENT)
Dept: CT IMAGING | Facility: HOSPITAL | Age: 84
End: 2024-08-26
Payer: MEDICARE

## 2024-08-26 ENCOUNTER — APPOINTMENT (OUTPATIENT)
Dept: GENERAL RADIOLOGY | Facility: HOSPITAL | Age: 84
End: 2024-08-26
Payer: MEDICARE

## 2024-08-26 ENCOUNTER — HOSPITAL ENCOUNTER (EMERGENCY)
Facility: HOSPITAL | Age: 84
Discharge: HOME | End: 2024-08-26
Attending: STUDENT IN AN ORGANIZED HEALTH CARE EDUCATION/TRAINING PROGRAM | Admitting: STUDENT IN AN ORGANIZED HEALTH CARE EDUCATION/TRAINING PROGRAM
Payer: MEDICARE

## 2024-08-26 VITALS
WEIGHT: 156 LBS | RESPIRATION RATE: 18 BRPM | SYSTOLIC BLOOD PRESSURE: 98 MMHG | DIASTOLIC BLOOD PRESSURE: 54 MMHG | TEMPERATURE: 98 F | HEART RATE: 80 BPM | BODY MASS INDEX: 27.64 KG/M2 | OXYGEN SATURATION: 93 % | HEIGHT: 63 IN

## 2024-08-26 DIAGNOSIS — Z79.01 CHRONIC ANTICOAGULATION: Chronic | ICD-10-CM

## 2024-08-26 DIAGNOSIS — I50.22 CHRONIC SYSTOLIC HEART FAILURE: Chronic | ICD-10-CM

## 2024-08-26 DIAGNOSIS — J96.11 CHRONIC RESPIRATORY FAILURE WITH HYPOXIA: ICD-10-CM

## 2024-08-26 DIAGNOSIS — B34.8 RHINOVIRUS INFECTION: Primary | ICD-10-CM

## 2024-08-26 DIAGNOSIS — Z95.0 PRESENCE OF CARDIAC PACEMAKER: ICD-10-CM

## 2024-08-26 LAB
ALBUMIN SERPL-MCNC: 4.1 G/DL (ref 3.5–5.2)
ALBUMIN/GLOB SERPL: 1.7 G/DL
ALP SERPL-CCNC: 97 U/L (ref 39–117)
ALT SERPL W P-5'-P-CCNC: 6 U/L (ref 1–33)
ANION GAP SERPL CALCULATED.3IONS-SCNC: 7.2 MMOL/L (ref 5–15)
AST SERPL-CCNC: 12 U/L (ref 1–32)
B PARAPERT DNA SPEC QL NAA+PROBE: NOT DETECTED
B PERT DNA SPEC QL NAA+PROBE: NOT DETECTED
BASOPHILS # BLD AUTO: 0.06 10*3/MM3 (ref 0–0.2)
BASOPHILS NFR BLD AUTO: 0.7 % (ref 0–1.5)
BILIRUB SERPL-MCNC: 0.6 MG/DL (ref 0–1.2)
BUN SERPL-MCNC: 14 MG/DL (ref 8–23)
BUN/CREAT SERPL: 17.7 (ref 7–25)
C PNEUM DNA NPH QL NAA+NON-PROBE: NOT DETECTED
CALCIUM SPEC-SCNC: 9.2 MG/DL (ref 8.6–10.5)
CHLORIDE SERPL-SCNC: 100 MMOL/L (ref 98–107)
CO2 SERPL-SCNC: 33.8 MMOL/L (ref 22–29)
CREAT SERPL-MCNC: 0.79 MG/DL (ref 0.57–1)
DEPRECATED RDW RBC AUTO: 52.2 FL (ref 37–54)
EGFRCR SERPLBLD CKD-EPI 2021: 74.3 ML/MIN/1.73
EOSINOPHIL # BLD AUTO: 0.6 10*3/MM3 (ref 0–0.4)
EOSINOPHIL NFR BLD AUTO: 7 % (ref 0.3–6.2)
ERYTHROCYTE [DISTWIDTH] IN BLOOD BY AUTOMATED COUNT: 16.9 % (ref 12.3–15.4)
FLUAV SUBTYP SPEC NAA+PROBE: NOT DETECTED
FLUBV RNA ISLT QL NAA+PROBE: NOT DETECTED
GLOBULIN UR ELPH-MCNC: 2.4 GM/DL
GLUCOSE SERPL-MCNC: 90 MG/DL (ref 65–99)
HADV DNA SPEC NAA+PROBE: NOT DETECTED
HCOV 229E RNA SPEC QL NAA+PROBE: NOT DETECTED
HCOV HKU1 RNA SPEC QL NAA+PROBE: NOT DETECTED
HCOV NL63 RNA SPEC QL NAA+PROBE: NOT DETECTED
HCOV OC43 RNA SPEC QL NAA+PROBE: NOT DETECTED
HCT VFR BLD AUTO: 36.7 % (ref 34–46.6)
HGB BLD-MCNC: 10.8 G/DL (ref 12–15.9)
HMPV RNA NPH QL NAA+NON-PROBE: NOT DETECTED
HOLD SPECIMEN: NORMAL
HOLD SPECIMEN: NORMAL
HPIV1 RNA ISLT QL NAA+PROBE: NOT DETECTED
HPIV2 RNA SPEC QL NAA+PROBE: NOT DETECTED
HPIV3 RNA NPH QL NAA+PROBE: NOT DETECTED
HPIV4 P GENE NPH QL NAA+PROBE: NOT DETECTED
HYPOCHROMIA BLD QL: NORMAL
IMM GRANULOCYTES # BLD AUTO: 0.03 10*3/MM3 (ref 0–0.05)
IMM GRANULOCYTES NFR BLD AUTO: 0.4 % (ref 0–0.5)
LYMPHOCYTES # BLD AUTO: 0.95 10*3/MM3 (ref 0.7–3.1)
LYMPHOCYTES NFR BLD AUTO: 11.1 % (ref 19.6–45.3)
M PNEUMO IGG SER IA-ACNC: NOT DETECTED
MCH RBC QN AUTO: 25.2 PG (ref 26.6–33)
MCHC RBC AUTO-ENTMCNC: 29.4 G/DL (ref 31.5–35.7)
MCV RBC AUTO: 85.5 FL (ref 79–97)
MONOCYTES # BLD AUTO: 1.28 10*3/MM3 (ref 0.1–0.9)
MONOCYTES NFR BLD AUTO: 14.9 % (ref 5–12)
NEUTROPHILS NFR BLD AUTO: 5.65 10*3/MM3 (ref 1.7–7)
NEUTROPHILS NFR BLD AUTO: 65.9 % (ref 42.7–76)
NRBC BLD AUTO-RTO: 0 /100 WBC (ref 0–0.2)
NT-PROBNP SERPL-MCNC: 2214 PG/ML (ref 0–1800)
PLAT MORPH BLD: NORMAL
PLATELET # BLD AUTO: 302 10*3/MM3 (ref 140–450)
PMV BLD AUTO: 9.2 FL (ref 6–12)
POTASSIUM SERPL-SCNC: 4.2 MMOL/L (ref 3.5–5.2)
PROCALCITONIN SERPL-MCNC: 0.05 NG/ML (ref 0–0.25)
PROT SERPL-MCNC: 6.5 G/DL (ref 6–8.5)
RBC # BLD AUTO: 4.29 10*6/MM3 (ref 3.77–5.28)
RHINOVIRUS RNA SPEC NAA+PROBE: DETECTED
RSV RNA NPH QL NAA+NON-PROBE: NOT DETECTED
SARS-COV-2 RNA NPH QL NAA+NON-PROBE: NOT DETECTED
SODIUM SERPL-SCNC: 141 MMOL/L (ref 136–145)
TROPONIN T SERPL HS-MCNC: 17 NG/L
TROPONIN T SERPL HS-MCNC: 18 NG/L
WBC MORPH BLD: NORMAL
WBC NRBC COR # BLD AUTO: 8.57 10*3/MM3 (ref 3.4–10.8)
WHOLE BLOOD HOLD COAG: NORMAL
WHOLE BLOOD HOLD SPECIMEN: NORMAL

## 2024-08-26 PROCEDURE — 85007 BL SMEAR W/DIFF WBC COUNT: CPT | Performed by: STUDENT IN AN ORGANIZED HEALTH CARE EDUCATION/TRAINING PROGRAM

## 2024-08-26 PROCEDURE — 71045 X-RAY EXAM CHEST 1 VIEW: CPT

## 2024-08-26 PROCEDURE — 83880 ASSAY OF NATRIURETIC PEPTIDE: CPT | Performed by: STUDENT IN AN ORGANIZED HEALTH CARE EDUCATION/TRAINING PROGRAM

## 2024-08-26 PROCEDURE — 84484 ASSAY OF TROPONIN QUANT: CPT | Performed by: STUDENT IN AN ORGANIZED HEALTH CARE EDUCATION/TRAINING PROGRAM

## 2024-08-26 PROCEDURE — 93005 ELECTROCARDIOGRAM TRACING: CPT

## 2024-08-26 PROCEDURE — 85025 COMPLETE CBC W/AUTO DIFF WBC: CPT | Performed by: STUDENT IN AN ORGANIZED HEALTH CARE EDUCATION/TRAINING PROGRAM

## 2024-08-26 PROCEDURE — 99284 EMERGENCY DEPT VISIT MOD MDM: CPT

## 2024-08-26 PROCEDURE — 71250 CT THORAX DX C-: CPT

## 2024-08-26 PROCEDURE — 94761 N-INVAS EAR/PLS OXIMETRY MLT: CPT

## 2024-08-26 PROCEDURE — 84145 PROCALCITONIN (PCT): CPT | Performed by: STUDENT IN AN ORGANIZED HEALTH CARE EDUCATION/TRAINING PROGRAM

## 2024-08-26 PROCEDURE — 94640 AIRWAY INHALATION TREATMENT: CPT

## 2024-08-26 PROCEDURE — 96374 THER/PROPH/DIAG INJ IV PUSH: CPT

## 2024-08-26 PROCEDURE — 25010000002 METHYLPREDNISOLONE PER 40 MG: Performed by: STUDENT IN AN ORGANIZED HEALTH CARE EDUCATION/TRAINING PROGRAM

## 2024-08-26 PROCEDURE — 80053 COMPREHEN METABOLIC PANEL: CPT | Performed by: STUDENT IN AN ORGANIZED HEALTH CARE EDUCATION/TRAINING PROGRAM

## 2024-08-26 PROCEDURE — 94799 UNLISTED PULMONARY SVC/PX: CPT

## 2024-08-26 PROCEDURE — 0202U NFCT DS 22 TRGT SARS-COV-2: CPT | Performed by: STUDENT IN AN ORGANIZED HEALTH CARE EDUCATION/TRAINING PROGRAM

## 2024-08-26 PROCEDURE — 36415 COLL VENOUS BLD VENIPUNCTURE: CPT

## 2024-08-26 PROCEDURE — 93005 ELECTROCARDIOGRAM TRACING: CPT | Performed by: STUDENT IN AN ORGANIZED HEALTH CARE EDUCATION/TRAINING PROGRAM

## 2024-08-26 RX ORDER — SODIUM CHLORIDE 0.9 % (FLUSH) 0.9 %
10 SYRINGE (ML) INJECTION AS NEEDED
Status: DISCONTINUED | OUTPATIENT
Start: 2024-08-26 | End: 2024-08-26 | Stop reason: HOSPADM

## 2024-08-26 RX ORDER — IPRATROPIUM BROMIDE AND ALBUTEROL SULFATE 2.5; .5 MG/3ML; MG/3ML
3 SOLUTION RESPIRATORY (INHALATION) ONCE
Status: COMPLETED | OUTPATIENT
Start: 2024-08-26 | End: 2024-08-26

## 2024-08-26 RX ORDER — GUAIFENESIN 600 MG/1
1200 TABLET, EXTENDED RELEASE ORAL 2 TIMES DAILY
Qty: 20 TABLET | Refills: 0 | Status: SHIPPED | OUTPATIENT
Start: 2024-08-26

## 2024-08-26 RX ORDER — METHYLPREDNISOLONE SODIUM SUCCINATE 40 MG/ML
40 INJECTION, POWDER, LYOPHILIZED, FOR SOLUTION INTRAMUSCULAR; INTRAVENOUS ONCE
Status: COMPLETED | OUTPATIENT
Start: 2024-08-26 | End: 2024-08-26

## 2024-08-26 RX ORDER — BENZONATATE 100 MG/1
100 CAPSULE ORAL 3 TIMES DAILY PRN
Qty: 20 CAPSULE | Refills: 0 | Status: SHIPPED | OUTPATIENT
Start: 2024-08-26

## 2024-08-26 RX ADMIN — IPRATROPIUM BROMIDE AND ALBUTEROL SULFATE 3 ML: .5; 3 SOLUTION RESPIRATORY (INHALATION) at 13:17

## 2024-08-26 RX ADMIN — METHYLPREDNISOLONE SODIUM SUCCINATE 40 MG: 40 INJECTION, POWDER, FOR SOLUTION INTRAMUSCULAR; INTRAVENOUS at 13:11

## 2024-08-26 NOTE — ED PROVIDER NOTES
"         [facility] EMERGENCY DEPARTMENT  Emergency Department Encounter  Emergency Medicine Physician Note       Pt Name: Elsa Alcaraz  MRN: [id number]  Pt :   1940  Room Number:    Date of encounter:  2024  PCP: Kemi Hdz MD  ED Provider: Cristino Morales MD    Historian: Patient      HPI:  Chief Complaint: Cough, shortness of breath        Context: Elsa Alcaraz is a 84 y.o. female who presents to the ED for cough, shortness of breath.  Patient reports increasing symptoms for the past several days.  She states she has been on her baseline nasal cannula.  She has developed a productive cough.  She has been exposed to grandkids with viral respiratory symptoms.  She denies any overt pain.      PAST MEDICAL HISTORY  Past Medical History:   Diagnosis Date    Anemia     Anxiety disorder due to general medical condition 2017    Arthritis     Atrial fibrillation 2017    Body piercing     BOTH EARS    Borderline diabetes     Breast cancer     right-radiation and a lumpectomy    Broken tooth     Cataract 2017    Left eye surgery     Chronic bronchitis 2017    Colon cancer 1998    had surgery and chemo    COPD (chronic obstructive pulmonary disease)     Cyanocobalamine deficiency (non anemic)     Depression 2017    Diverticulosis     Elevated cholesterol     Esophageal reflux 2017    Hiatal hernia 2017    History of bladder infections     History of echocardiogram 2021    Hx of exercise stress test     \"several years ago by Dr. Mercado\".      Hx of radiation therapy     Hyperlipidemia     Hypertension 2017    Impaired functional mobility and activity tolerance     Impaired functional mobility, balance, gait, and endurance     Osteoporosis     Palpitations 2017    Prediabetes     Problems with swallowing     meat at times per pt report    Shortness of breath     WITH EXERTION     Sleep apnea 2017    " NO CPAP    Tricuspid valve disorder 2017    Patient doesn't know anything about this    Vitamin D deficiency     Wears glasses          PAST SURGICAL HISTORY  Past Surgical History:   Procedure Laterality Date    ANAL FISTULOTOMY      APPENDECTOMY          BREAST BIOPSY      BREAST LUMPECTOMY Right 2006    CARDIAC ELECTROPHYSIOLOGY PROCEDURE N/A 2023    Procedure: Micra;  Surgeon: Keven Willis DO;  Location: [facility] EP INVASIVE LOCATION;  Service: Cardiology;  Laterality: N/A;    CATARACT EXTRACTION      both eyes     CATARACT EXTRACTION W/ INTRAOCULAR LENS IMPLANT Left 2019    Procedure: CATARACT PHACO EXTRACTION WITH INTRAOCULAR LENS IMPLANT LEFT;  Surgeon: Masoud David MD;  Location: [facility] OR;  Service: Ophthalmology    CATARACT EXTRACTION W/ INTRAOCULAR LENS IMPLANT Right 2019    Procedure: CATARACT PHACO EXTRACTION WITH INTRAOCULAR LENS IMPLANT RIGHT;  Surgeon: Masoud David MD;  Location: [facility] OR;  Service: Ophthalmology     SECTION  1981    CHOLECYSTECTOMY  2009    COLECTOMY PARTIAL / TOTAL  1998    COLON CANCER    COLONOSCOPY      COLONOSCOPY N/A 2021    Procedure: COLONOSCOPY WITH BIOPSY;  Surgeon: Iona Sanders MD;  Location: [facility] ENDOSCOPY;  Service: Gastroenterology;  Laterality: N/A;    ENDOSCOPY  2016    ENDOSCOPY N/A 2018    Procedure: ESOPHAGOGASTRODUODENOSCOPY WITH COLD FORCEP BIOPSY;  Surgeon: Vasquez Fagan MD;  Location: [facility] ENDOSCOPY;  Service: Gastroenterology    ENDOSCOPY N/A 2019    Procedure: ESOPHAGOGASTRODUODENOSCOPY WITH BIOPSY;  Surgeon: Vasquez Fagan MD;  Location: [facility] ENDOSCOPY;  Service: Gastroenterology    ENDOSCOPY N/A 2022    Procedure: ESOPHAGOGASTRODUODENOSCOPY with biopsy and polypectomy  ;  Surgeon: Iona Sanders MD;  Location: [facility] ENDOSCOPY;  Service: Gastroenterology;  Laterality: N/A;    ENDOSCOPY N/A 2024    Procedure:  ESOPHAGOGASTRODUODENOSCOPY WITH BIOPSY and dilatation;  Surgeon: Iona Sanders MD;  Location: [facility] ENDOSCOPY;  Service: Gastroenterology;  Laterality: N/A;    HYSTERECTOMY      1998    INSERT / REPLACE / REMOVE PACEMAKER      SKIN BIOPSY Left     arm    SKIN LESION EXCISION N/A     VENTRAL HERNIA REPAIR  10/28/2012         FAMILY HISTORY  Family History   Problem Relation Age of Onset    Hypertension Mother     Asthma Mother     COPD Mother     Osteoarthritis Mother     Stomach cancer Father     Cancer Father     Cancer Sister     Diabetes Maternal Grandmother     Colon cancer Neg Hx          SOCIAL HISTORY  Social History     Socioeconomic History    Marital status:    Tobacco Use    Smoking status: Never     Passive exposure: Never    Smokeless tobacco: Never   Vaping Use    Vaping status: Never Used   Substance and Sexual Activity    Alcohol use: Not Currently     Alcohol/week: 1.0 - 2.0 standard drink of alcohol     Types: 1 - 2 Glasses of wine per week     Comment: rarely    Drug use: Never    Sexual activity: Defer         ALLERGIES  Other        REVIEW OF SYSTEMS  Systems reviewed and negative      PHYSICAL EXAM    I have reviewed the triage vital signs and nursing notes.    ED Triage Vitals [08/26/24 1120]   Temp Heart Rate Resp BP SpO2   98 °F (36.7 °C) 81 20 98/59 93 %      Temp src Heart Rate Source Patient Position BP Location FiO2 (%)   Oral Monitor Sitting Left arm --       Physical Exam  Constitutional:       General: She is not in acute distress.  HENT:      Nose: Congestion present.   Eyes:      Extraocular Movements: Extraocular movements intact.   Cardiovascular:      Rate and Rhythm: Normal rate.   Pulmonary:      Effort: Pulmonary effort is normal. No respiratory distress.      Breath sounds: Wheezing present.      Comments: Mild crackles bilaterally  Abdominal:      Palpations: Abdomen is soft.      Tenderness: There is no abdominal tenderness.   Musculoskeletal:       Comments: Trace edema lower extremities   Neurological:      Mental Status: She is alert and oriented to person, place, and time.         LAB RESULTS  No results found for this or any previous visit (from the past 24 hour(s)).      If labs were ordered, I independently reviewed the results and considered them in treating the patient.        RADIOLOGY  No Radiology Exams Resulted Within Past 24 Hours    PROCEDURES    Procedures    ECG 12 Lead ED Triage Standing Order; SOA   Final Result          MEDICATIONS GIVEN IN ER    Medications   ipratropium-albuterol (DUO-NEB) nebulizer solution 3 mL (3 mL Nebulization Given 8/26/24 1317)   methylPREDNISolone sodium succinate (SOLU-Medrol) injection 40 mg (40 mg Intravenous Given 8/26/24 1311)         MEDICAL DECISION MAKING, PROGRESS, and CONSULTS    All labs, if obtained, have been independently reviewed by me.  All radiology studies, if obtained, have been reviewed by me and the radiologist dictating the report.  All EKG's, if obtained, have been independently viewed and interpreted by me.      Discussion below represents my analysis of pertinent findings related to patient's condition, differential diagnosis, treatment plan and final disposition.                         Differential diagnosis:    Pneumonia, PE, CHF, arrhythmia, ACS, viral illness, others.      Additional sources:    - Discussed/ obtained information from independent historians:  Family member at bedside    - External (non-ED) record review: Cardiology progress note 5/17/2024    - Chronic or social conditions impacting care: Chronic respiratory failure    - Shared decision making:        Orders placed during this visit:  Orders Placed This Encounter   Procedures    Respiratory Panel PCR w/COVID-19(SARS-CoV-2) DOT/ABRIL/RAFA/PAD/COR/NANO In-House, NP Swab in UTM/VTM, 2 HR TAT - Swab, Nasopharynx    XR Chest 1 View    CT Chest Without Contrast Diagnostic    [place] Draw    Comprehensive Metabolic Panel    BNP     Single High Sensitivity Troponin T    CBC Auto Differential    Scan Slide    Procalcitonin    Single High Sensitivity Troponin T    Undress & Gown    Continuous Pulse Oximetry    Vital Signs    ECG 12 Lead ED Triage Standing Order; SOA    CBC & Differential    Green Top (Gel)    Lavender Top    Gold Top - SST    Light Blue Top         Additional orders considered but not ordered:      ED Course/MDM Discussion:    Patient is a 84-year-old female with notable history of complete heart block status post pacemaker placement, atrial fibrillation, hypertension, heart failure, COPD with chronic respiratory failure presenting for cough, shortness of breath.  Patient is afebrile with reassuring vital signs on presentation.  She is satting at goal on baseline nasal cannula.  Her symptoms are consistent with respiratory illness.  EKG demonstrated left bundle branch morphology in the setting of paced rhythm and normal rate with repolarization abnormality on my interpretation.  Troponin is chronically elevated however no significant delta on repeat and is at baseline when compared to previous values.  She does not complain ongoing pain and ACS is felt to be unlikely clinically.  Patient is anticoagulated she is not tachycardic and PE is felt to be less likely.  Chest x-ray showed no obvious lobar consolidation on my interpretation of imaging.  CT of the chest was obtained for better clarity which demonstrated small bilateral pleural effusion similar to previous in the setting of known CHF.  No radiographic findings for bacterial or lobar pneumonia she has no leukocytosis and procalcitonin is normal.  Viral panel was positive for human rhino enterovirus which is likely etiology of her symptoms.  BNP is downtrending from previous and patient more likely mildly dehydrated in the setting of respiratory illness and soft BP.  She was wheezing and was given DuoNeb and methylprednisolone for this.  On reassessment patient is doing well  and remains at baseline nasal cannula.  Discussed labs and imaging with patient and overall clinical suspicion for viral respiratory illness.  Offered observation admission patient feels comfortable discharge home with symptomatic therapy.  Patient instructed on strict return precautions.                    Consultants:      Shared Decision Making:  After my consideration of clinical presentation and any laboratory/radiology studies obtained, I discussed the findings with the patient/patient representative who is in agreement with the treatment plan and the final disposition.   Risks and benefits of discharge and/or observation/admission were discussed.         AS OF 15:33 EDT VITALS:    BP - 98/54  HR - 80  TEMP - 98 °F (36.7 °C) (Oral)  O2 SATS - 93%                  DIAGNOSIS  Final diagnoses:   Rhinovirus infection   Presence of cardiac pacemaker   Chronic anticoagulation (Xarelto)   Chronic systolic heart failure   Chronic respiratory failure with hypoxia         DISPOSITION  ED Disposition       ED Disposition   Discharge    Condition   Stable    Comment   --                   Please note that portions of this document were completed with voice recognition software.        Cristino Morales MD  08/27/24 8027

## 2024-08-26 NOTE — DISCHARGE INSTRUCTIONS
Take Mucinex as prescribed  Tessalon Perles for cough  Do not hesitate to return if symptoms worsen

## 2024-09-24 ENCOUNTER — OFFICE VISIT (OUTPATIENT)
Dept: GASTROENTEROLOGY | Facility: CLINIC | Age: 84
End: 2024-09-24
Payer: MEDICARE

## 2024-09-24 VITALS
WEIGHT: 153.6 LBS | BODY MASS INDEX: 27.21 KG/M2 | HEART RATE: 85 BPM | DIASTOLIC BLOOD PRESSURE: 58 MMHG | OXYGEN SATURATION: 97 % | SYSTOLIC BLOOD PRESSURE: 108 MMHG

## 2024-09-24 DIAGNOSIS — R13.19 ESOPHAGEAL DYSPHAGIA: ICD-10-CM

## 2024-09-24 DIAGNOSIS — D64.9 NORMOCYTIC ANEMIA: ICD-10-CM

## 2024-09-24 DIAGNOSIS — K22.2 SCHATZKI'S RING OF DISTAL ESOPHAGUS: ICD-10-CM

## 2024-09-24 DIAGNOSIS — K25.9 GASTRIC ULCER WITHOUT HEMORRHAGE OR PERFORATION, UNSPECIFIED CHRONICITY: Primary | ICD-10-CM

## 2024-09-24 DIAGNOSIS — K44.9 HIATAL HERNIA: ICD-10-CM

## 2024-09-24 PROCEDURE — 3078F DIAST BP <80 MM HG: CPT | Performed by: PHYSICIAN ASSISTANT

## 2024-09-24 PROCEDURE — 1160F RVW MEDS BY RX/DR IN RCRD: CPT | Performed by: PHYSICIAN ASSISTANT

## 2024-09-24 PROCEDURE — 99214 OFFICE O/P EST MOD 30 MIN: CPT | Performed by: PHYSICIAN ASSISTANT

## 2024-09-24 PROCEDURE — 1159F MED LIST DOCD IN RCRD: CPT | Performed by: PHYSICIAN ASSISTANT

## 2024-09-24 PROCEDURE — 3074F SYST BP LT 130 MM HG: CPT | Performed by: PHYSICIAN ASSISTANT

## 2024-10-07 ENCOUNTER — OFFICE VISIT (OUTPATIENT)
Dept: CARDIOLOGY | Facility: CLINIC | Age: 84
End: 2024-10-07
Payer: MEDICARE

## 2024-10-07 ENCOUNTER — HOSPITAL ENCOUNTER (OUTPATIENT)
Dept: CARDIOLOGY | Facility: HOSPITAL | Age: 84
Discharge: HOME | End: 2024-10-07
Admitting: INTERNAL MEDICINE
Payer: MEDICARE

## 2024-10-07 VITALS
HEART RATE: 83 BPM | DIASTOLIC BLOOD PRESSURE: 66 MMHG | WEIGHT: 153 LBS | HEIGHT: 64 IN | OXYGEN SATURATION: 93 % | BODY MASS INDEX: 26.12 KG/M2 | SYSTOLIC BLOOD PRESSURE: 110 MMHG

## 2024-10-07 VITALS — WEIGHT: 153.66 LBS | HEIGHT: 63 IN | BODY MASS INDEX: 27.23 KG/M2

## 2024-10-07 DIAGNOSIS — I50.22 CHRONIC SYSTOLIC CONGESTIVE HEART FAILURE: ICD-10-CM

## 2024-10-07 DIAGNOSIS — I50.21 ACUTE HFREF (HEART FAILURE WITH REDUCED EJECTION FRACTION): Primary | ICD-10-CM

## 2024-10-07 DIAGNOSIS — I10 PRIMARY HYPERTENSION: ICD-10-CM

## 2024-10-07 DIAGNOSIS — I48.20 CHRONIC ATRIAL FIBRILLATION: ICD-10-CM

## 2024-10-07 DIAGNOSIS — I34.0 NONRHEUMATIC MITRAL VALVE REGURGITATION: ICD-10-CM

## 2024-10-07 LAB
BH CV ECHO MEAS - AI P1/2T: 436.5 MSEC
BH CV ECHO MEAS - AO MAX PG: 12.5 MMHG
BH CV ECHO MEAS - AO MEAN PG: 6.5 MMHG
BH CV ECHO MEAS - AO ROOT DIAM: 3.2 CM
BH CV ECHO MEAS - AO V2 MAX: 176.5 CM/SEC
BH CV ECHO MEAS - AO V2 VTI: 32.7 CM
BH CV ECHO MEAS - AVA(I,D): 1.58 CM2
BH CV ECHO MEAS - EDV(CUBED): 250 ML
BH CV ECHO MEAS - EDV(MOD-SP2): 171 ML
BH CV ECHO MEAS - EDV(MOD-SP4): 146 ML
BH CV ECHO MEAS - EF(MOD-BP): 37 %
BH CV ECHO MEAS - EF(MOD-SP2): 32.7 %
BH CV ECHO MEAS - EF(MOD-SP4): 33.1 %
BH CV ECHO MEAS - ESV(CUBED): 152.3 ML
BH CV ECHO MEAS - ESV(MOD-SP2): 115 ML
BH CV ECHO MEAS - ESV(MOD-SP4): 97.7 ML
BH CV ECHO MEAS - FS: 15.2 %
BH CV ECHO MEAS - IVS/LVPW: 0.9 CM
BH CV ECHO MEAS - IVSD: 0.9 CM
BH CV ECHO MEAS - LA DIMENSION: 5.9 CM
BH CV ECHO MEAS - LAT PEAK E' VEL: 11.1 CM/SEC
BH CV ECHO MEAS - LV DIASTOLIC VOL/BSA (35-75): 84.6 CM2
BH CV ECHO MEAS - LV MASS(C)D: 251.3 GRAMS
BH CV ECHO MEAS - LV MAX PG: 3.6 MMHG
BH CV ECHO MEAS - LV MEAN PG: 2 MMHG
BH CV ECHO MEAS - LV SYSTOLIC VOL/BSA (12-30): 56.6 CM2
BH CV ECHO MEAS - LV V1 MAX: 95.4 CM/SEC
BH CV ECHO MEAS - LV V1 VTI: 16.9 CM
BH CV ECHO MEAS - LVIDD: 6.3 CM
BH CV ECHO MEAS - LVIDS: 5.3 CM
BH CV ECHO MEAS - LVOT AREA: 3.1 CM2
BH CV ECHO MEAS - LVOT DIAM: 1.97 CM
BH CV ECHO MEAS - LVPWD: 1 CM
BH CV ECHO MEAS - MED PEAK E' VEL: 5.4 CM/SEC
BH CV ECHO MEAS - MR MAX PG: 117 MMHG
BH CV ECHO MEAS - MR MAX VEL: 540.8 CM/SEC
BH CV ECHO MEAS - MR MEAN PG: 78.9 MMHG
BH CV ECHO MEAS - MR MEAN VEL: 422 CM/SEC
BH CV ECHO MEAS - MR VTI: 178.3 CM
BH CV ECHO MEAS - MV DEC SLOPE: 843.7 CM/SEC2
BH CV ECHO MEAS - MV DEC TIME: 0.15 SEC
BH CV ECHO MEAS - MV E MAX VEL: 127 CM/SEC
BH CV ECHO MEAS - MV MAX PG: 7 MMHG
BH CV ECHO MEAS - MV MEAN PG: 2.26 MMHG
BH CV ECHO MEAS - MV P1/2T: 43.7 MSEC
BH CV ECHO MEAS - MV V2 VTI: 23 CM
BH CV ECHO MEAS - MVA(P1/2T): 5 CM2
BH CV ECHO MEAS - MVA(VTI): 2.24 CM2
BH CV ECHO MEAS - PA ACC TIME: 0.1 SEC
BH CV ECHO MEAS - PA V2 MAX: 89.6 CM/SEC
BH CV ECHO MEAS - PI END-D VEL: 164.2 CM/SEC
BH CV ECHO MEAS - RAP SYSTOLE: 15 MMHG
BH CV ECHO MEAS - RF(MV,LVOT)(1DIAM): 0.69 CM
BH CV ECHO MEAS - RVSP: 61 MMHG
BH CV ECHO MEAS - SV(LVOT): 51.6 ML
BH CV ECHO MEAS - SV(MOD-SP2): 56 ML
BH CV ECHO MEAS - SV(MOD-SP4): 48.3 ML
BH CV ECHO MEAS - SVI(LVOT): 29.9 ML/M2
BH CV ECHO MEAS - SVI(MOD-SP2): 32.5 ML/M2
BH CV ECHO MEAS - SVI(MOD-SP4): 28 ML/M2
BH CV ECHO MEAS - TAPSE (>1.6): 1.25 CM
BH CV ECHO MEAS - TR MAX PG: 46.5 MMHG
BH CV ECHO MEAS - TR MAX VEL: 340.8 CM/SEC
BH CV ECHO MEASUREMENTS AVERAGE E/E' RATIO: 15.39
BH CV XLRA - RV BASE: 4 CM
BH CV XLRA - RV LENGTH: 6.3 CM
BH CV XLRA - RV MID: 2.7 CM
BH CV XLRA - TDI S': 6.7 CM/SEC
LEFT ATRIUM VOLUME INDEX: 100 ML/M2

## 2024-10-07 PROCEDURE — 93306 TTE W/DOPPLER COMPLETE: CPT

## 2024-10-07 PROCEDURE — 3078F DIAST BP <80 MM HG: CPT | Performed by: INTERNAL MEDICINE

## 2024-10-07 PROCEDURE — 93306 TTE W/DOPPLER COMPLETE: CPT | Performed by: INTERNAL MEDICINE

## 2024-10-07 PROCEDURE — 3074F SYST BP LT 130 MM HG: CPT | Performed by: INTERNAL MEDICINE

## 2024-10-07 PROCEDURE — 99214 OFFICE O/P EST MOD 30 MIN: CPT | Performed by: INTERNAL MEDICINE

## 2024-10-07 NOTE — PROGRESS NOTES
[facility] Cardiology  Office Progress Note  Elsa CHAPARRO [place]  1940  [address] [place] [address]       Visit Date: 10/07/24    PCP: Keim Hdz MD  [address] [place] [address]    IDENTIFICATION: A 84 y.o. female  from [place], [place]    PROBLEM LIST:   A. Fib- chronic 2020 echo MVP, mild TR, normal LVEF  '15 echo EF 55-60%, mild to moderate MR w no evidence of MVP, mild TR, RVSP 35 mmHg, biatrial enlargement  [id number] 4, a/c w Coumadin   2020 echo: EF 45%, AV calcification, AV max PG 14 mmHg, mod MR, mild TR, RVSP 23.6 mmHg, incidental afib noted   echo EF 35-40%    echo EF 38% mod+ MR RVSP 40  23 high grade AV block -MDT leadless pacer Dr Jimenezbo  Dyspnea   MPS WNL  proBNP [id number] (<1800)  HTN  HLD    TG 1:30 HDL 60 LDL 17  3/27/18  TG 80 HDL 49 LDL 73  DAVID  COPD/asthma  Iron deficiency anemia/bone marrow deficiency   Per Dr. Harding, Dr. Fagan  DAVID-CPAP non-adherent  GERD  Hiatal hernia  GIBleed  Lower GI ulcer  Surgical history  Anal fistulotomy  Breast lumpectomy   next line cholecystectomy  Partial colectomy  Ventral hernia repair        CC:   Chief Complaint   Patient presents with    <9>Chronic systolic congestive heart failure       Allergies  Allergies   Allergen Reactions    Other GI Intolerance     Spicy foods         Current Medications    Current Outpatient Medications:     albuterol sulfate  (90 Base) MCG/ACT inhaler, Inhale 2 puffs 4 (Four) Times a Day. (Patient taking differently: Inhale 2 puffs 2 (Two) Times a Day.), Disp: 18 g, Rfl: 5    benzonatate (TESSALON) 100 MG capsule, Take 1 capsule by mouth 3 (Three) Times a Day As Needed for Cough., Disp: 20 capsule, Rfl: 0    budesonide-formoterol (SYMBICORT) 160-4.5 MCG/ACT inhaler, Inhale 2 puffs 2 (Two) Times a Day. Rinse mouth with water after use, Disp: 10.2 g, Rfl: 5    cholecalciferol (VITAMIN D3) 25 MCG (1000 UT) tablet,  "Take 1 tablet by mouth Daily., Disp: , Rfl:     citalopram (CeleXA) 20 MG tablet, Take 1 tablet by mouth Daily., Disp: , Rfl:     dicyclomine (BENTYL) 10 MG/5ML syrup, Take 10 mL by mouth 4 (Four) Times a Day Before Meals & at Bedtime As Needed., Disp: , Rfl:     famotidine (PEPCID) 40 MG tablet, TAKE 1 TABLET BY MOUTH ONCE DAILY AT NIGHT, Disp: 90 tablet, Rfl: 0    furosemide (Lasix) 20 MG tablet, Take 1 tablet by mouth Daily., Disp: 30 tablet, Rfl: 0    guaiFENesin (MUCINEX) 600 MG 12 hr tablet, Take 2 tablets by mouth 2 (Two) Times a Day., Disp: 20 tablet, Rfl: 0    metoprolol succinate XL (TOPROL-XL) 50 MG 24 hr tablet, Take 1 tablet by mouth Daily., Disp: 90 tablet, Rfl: 2    O2 (OXYGEN), Inhale 4 L/min Daily As Needed., Disp: , Rfl:     pantoprazole (PROTONIX) 40 MG EC tablet, Take 1 tablet by mouth Daily., Disp: , Rfl:     potassium chloride (K-DUR,KLOR-CON) 10 MEQ CR tablet, Take 1 tablet by mouth 2 (Two) Times a Day., Disp: , Rfl:     Psyllium (Metamucil) wafer wafer, Take 2-4 wafers by mouth Daily., Disp: , Rfl:     sacubitril-valsartan (ENTRESTO) 24-26 MG tablet, Take 1 tablet by mouth Every 12 (Twelve) Hours., Disp: 60 tablet, Rfl: 6    spironolactone (ALDACTONE) 25 MG tablet, Take 0.5 tablets by mouth Daily., Disp: 15 tablet, Rfl: 0    vitamin B-12 (CYANOCOBALAMIN) 100 MCG tablet, Take 0.5 tablets by mouth Daily., Disp: , Rfl:     Xarelto 20 MG tablet, Take 1 tablet by mouth Daily., Disp: 90 tablet, Rfl: 3      History of Present Illness   Elsa Alcaraz is a 84 y.o. year old female here for follow up.  Continues with baseline shortness of breath does activities daily living and walks very fast with her rollator walker  Continues with as needed furosemide that is most days currently    OBJECTIVE:  Vitals:    10/07/24 1357   BP: 110/66   BP Location: Left arm   Patient Position: Sitting   Cuff Size: Adult   Pulse: 83   SpO2: 93%   Weight: 69.4 kg (153 lb)   Height: 162.6 cm (64\")           Body mass index " is 26.26 kg/m².    Constitutional:       Appearance: Healthy appearance. Not in distress.   Neck:      Vascular: No JVR. JVD normal.   Pulmonary:      Effort: Pulmonary effort is normal.      Breath sounds: Normal breath sounds. No wheezing. No rhonchi. No rales.   Chest:      Chest wall: Not tender to palpatation.   Cardiovascular:      PMI at left midclavicular line. Normal rate. Regular rhythm. Normal S1. Normal S2.       Murmurs: There is a systolic murmur.      No gallop.  No click. No rub.   Pulses:     Intact distal pulses.   Edema:     Peripheral edema absent.   Abdominal:      General: Bowel sounds are normal.      Palpations: Abdomen is soft.      Tenderness: There is no abdominal tenderness.   Musculoskeletal: Normal range of motion.         General: No tenderness. Skin:     General: Skin is warm and dry.   Neurological:      General: No focal deficit present.      Mental Status: Alert and oriented to person, place and time.         Diagnostic Data:  Procedures      ASSESSMENT:   Diagnosis Plan   1. Acute HFrEF (heart failure with reduced ejection fraction)        2. Nonrheumatic mitral valve regurgitation        3. Primary hypertension        4. Chronic atrial fibrillation                  PLAN:  CHF chronic systolic with combined valvular heart issues continued medical management.  Will add SGLT2 inhibitor     Hypertension controlled Entresto spironolactone metoprolol    Mitral regurgitation severe will have structural evaluate for potential clip candidacy    A. fib chronic anticoagulated rate controlled post leadless pacer        Fidencio Germain MD, [facility]

## 2024-10-17 ENCOUNTER — DOCUMENTATION (OUTPATIENT)
Dept: CARDIOLOGY | Facility: HOSPITAL | Age: 84
End: 2024-10-17
Payer: MEDICARE

## 2024-10-17 NOTE — PROGRESS NOTES
Referral received from Dr. Germain for severe MR. Called patient to discuss MitraClip and workup process. LVM for call back.

## 2024-10-22 ENCOUNTER — DOCUMENTATION (OUTPATIENT)
Dept: CARDIOLOGY | Facility: HOSPITAL | Age: 84
End: 2024-10-22
Payer: MEDICARE

## 2024-10-22 NOTE — PROGRESS NOTES
Called patient to discuss severe MR/MitraClip. Home number-busy tone. Cell number- no answer, LVM for call back    Called patient again on 10/23 & 10/25, LVM for call back

## 2024-11-19 ENCOUNTER — TELEPHONE (OUTPATIENT)
Dept: CARDIOLOGY | Facility: HOSPITAL | Age: 84
End: 2024-11-19
Payer: MEDICARE

## 2024-11-19 NOTE — TELEPHONE ENCOUNTER
Patient returned your call in regards to Mitrclip. Pt has appointment with Dr. Willis 11/25/24 at 1:30  She can be reached at [phone].  Johnny Contreras MA

## 2024-11-25 ENCOUNTER — OFFICE VISIT (OUTPATIENT)
Dept: CARDIOLOGY | Facility: CLINIC | Age: 84
End: 2024-11-25
Payer: MEDICARE

## 2024-11-25 VITALS
WEIGHT: 152.4 LBS | BODY MASS INDEX: 27 KG/M2 | OXYGEN SATURATION: 93 % | HEART RATE: 50 BPM | DIASTOLIC BLOOD PRESSURE: 74 MMHG | HEIGHT: 63 IN | SYSTOLIC BLOOD PRESSURE: 118 MMHG

## 2024-11-25 DIAGNOSIS — I50.22 CHRONIC SYSTOLIC CONGESTIVE HEART FAILURE: ICD-10-CM

## 2024-11-25 DIAGNOSIS — Z95.0 PRESENCE OF CARDIAC PACEMAKER: ICD-10-CM

## 2024-11-25 DIAGNOSIS — I48.11 LONGSTANDING PERSISTENT ATRIAL FIBRILLATION: ICD-10-CM

## 2024-11-25 DIAGNOSIS — I44.2 COMPLETE HEART BLOCK: Primary | ICD-10-CM

## 2024-11-25 DIAGNOSIS — Z79.01 CHRONIC ANTICOAGULATION: ICD-10-CM

## 2024-11-25 PROCEDURE — 93279 PRGRMG DEV EVAL PM/LDLS PM: CPT | Performed by: INTERNAL MEDICINE

## 2024-11-25 PROCEDURE — 3074F SYST BP LT 130 MM HG: CPT | Performed by: INTERNAL MEDICINE

## 2024-11-25 PROCEDURE — 3078F DIAST BP <80 MM HG: CPT | Performed by: INTERNAL MEDICINE

## 2024-11-25 PROCEDURE — 99214 OFFICE O/P EST MOD 30 MIN: CPT | Performed by: INTERNAL MEDICINE

## 2024-11-25 NOTE — PROGRESS NOTES
"                   Cardiac Electrophysiology Outpatient Follow Up Note            [facility] at [facility]    Follow Up Office Visit      Elsa Alcaraz  [id number]  11/25/2024  [unfilled]  [unfilled]    Primary Care Physician: Kemi Hdz MD    Referred By: No ref. provider found    Subjective     Chief Complaint:   Diagnoses and all orders for this visit:    1. Complete heart block (Primary)    2. Presence of cardiac pacemaker    3. Longstanding persistent atrial fibrillation    4. Chronic systolic congestive heart failure    5. Chronic anticoagulation (Xarelto)      Chief Complaint   Patient presents with    Longstanding persistent atrial fibrillation    Acute HFrEF (heart failure with reduced ejection fraction)    Chest Pain     \"Sharp pains in chest that occur every once in a while\"       History of Present Illness:   Elsa Alcaraz is a 84 y.o. female who presents to my electrophysiology clinic for follow up of above.      Past Medical History:   Past Medical History:   Diagnosis Date    Anemia     Anxiety disorder due to general medical condition 01/11/2017    Arthritis     Atrial fibrillation 01/11/2017    Body piercing     BOTH EARS    Borderline diabetes     Breast cancer 2003    right-radiation and a lumpectomy    Broken tooth     Cataract 01/11/2017    Left eye surgery 11/19    Chronic bronchitis 01/11/2017    Colon cancer 11/30/1998    had surgery and chemo    COPD (chronic obstructive pulmonary disease)     Cyanocobalamine deficiency (non anemic)     Depression 01/11/2017    Diverticulosis     Elevated cholesterol     Esophageal reflux 01/11/2017    Hiatal hernia 01/11/2017    History of bladder infections     History of echocardiogram 07/26/2021    Hx of exercise stress test     \"several years ago by Dr. Mercado\".      Hx of radiation therapy     Hyperlipidemia     Hypertension 01/11/2017    Impaired functional mobility and activity tolerance     Impaired " functional mobility, balance, gait, and endurance     Osteoporosis     Palpitations 2017    Prediabetes     Problems with swallowing     meat at times per pt report    Shortness of breath     WITH EXERTION     Sleep apnea 2017    NO CPAP    Tricuspid valve disorder 2017    Patient doesn't know anything about this    Vitamin D deficiency     Wears glasses        Past Surgical History:   Past Surgical History:   Procedure Laterality Date    ANAL FISTULOTOMY      APPENDECTOMY      1998    BREAST BIOPSY      BREAST LUMPECTOMY Right 2006    CARDIAC ELECTROPHYSIOLOGY PROCEDURE N/A 2023    Procedure: Micra;  Surgeon: Keven Willis DO;  Location: [facility] EP INVASIVE LOCATION;  Service: Cardiology;  Laterality: N/A;    CATARACT EXTRACTION      both eyes     CATARACT EXTRACTION W/ INTRAOCULAR LENS IMPLANT Left 2019    Procedure: CATARACT PHACO EXTRACTION WITH INTRAOCULAR LENS IMPLANT LEFT;  Surgeon: Masoud David MD;  Location: [facility] OR;  Service: Ophthalmology    CATARACT EXTRACTION W/ INTRAOCULAR LENS IMPLANT Right 2019    Procedure: CATARACT PHACO EXTRACTION WITH INTRAOCULAR LENS IMPLANT RIGHT;  Surgeon: Masoud David MD;  Location: [facility] OR;  Service: Ophthalmology     SECTION  1981    CHOLECYSTECTOMY  2009    COLECTOMY PARTIAL / TOTAL  1998    COLON CANCER    COLONOSCOPY  2016    COLONOSCOPY N/A 2021    Procedure: COLONOSCOPY WITH BIOPSY;  Surgeon: Iona Sanders MD;  Location: [facility] ENDOSCOPY;  Service: Gastroenterology;  Laterality: N/A;    ENDOSCOPY  2016    ENDOSCOPY N/A 2018    Procedure: ESOPHAGOGASTRODUODENOSCOPY WITH COLD FORCEP BIOPSY;  Surgeon: Vasquez Fagan MD;  Location: [facility] ENDOSCOPY;  Service: Gastroenterology    ENDOSCOPY N/A 2019    Procedure: ESOPHAGOGASTRODUODENOSCOPY WITH BIOPSY;  Surgeon: Vasquez Fagan MD;  Location: [facility] ENDOSCOPY;  Service: Gastroenterology    ENDOSCOPY N/A  09/13/2022    Procedure: ESOPHAGOGASTRODUODENOSCOPY with biopsy and polypectomy  ;  Surgeon: Iona Sanders MD;  Location: [facility] ENDOSCOPY;  Service: Gastroenterology;  Laterality: N/A;    ENDOSCOPY N/A 6/28/2024    Procedure: ESOPHAGOGASTRODUODENOSCOPY WITH BIOPSY and dilatation;  Surgeon: Iona Sanders MD;  Location: [facility] ENDOSCOPY;  Service: Gastroenterology;  Laterality: N/A;    HYSTERECTOMY      1998    INSERT / REPLACE / REMOVE PACEMAKER      SKIN BIOPSY Left     arm    SKIN LESION EXCISION N/A     VENTRAL HERNIA REPAIR  10/28/2012       Family History:   Family History   Problem Relation Age of Onset    Hypertension Mother     Asthma Mother     COPD Mother     Osteoarthritis Mother     Stomach cancer Father     Cancer Father     Cancer Sister     Diabetes Maternal Grandmother     Colon cancer Neg Hx        Social History:   Social History     Socioeconomic History    Marital status:    Tobacco Use    Smoking status: Never     Passive exposure: Never    Smokeless tobacco: Never   Vaping Use    Vaping status: Never Used   Substance and Sexual Activity    Alcohol use: Not Currently     Alcohol/week: 1.0 - 2.0 standard drink of alcohol     Types: 1 - 2 Glasses of wine per week     Comment: rarely    Drug use: Never    Sexual activity: Defer       Medications:     Current Outpatient Medications:     albuterol sulfate  (90 Base) MCG/ACT inhaler, Inhale 2 puffs 4 (Four) Times a Day. (Patient taking differently: Inhale 2 puffs 2 (Two) Times a Day.), Disp: 18 g, Rfl: 5    budesonide-formoterol (SYMBICORT) 160-4.5 MCG/ACT inhaler, Inhale 2 puffs 2 (Two) Times a Day. Rinse mouth with water after use, Disp: 10.2 g, Rfl: 5    cholecalciferol (VITAMIN D3) 25 MCG (1000 UT) tablet, Take 1 tablet by mouth Daily., Disp: , Rfl:     citalopram (CeleXA) 20 MG tablet, Take 1 tablet by mouth Daily., Disp: , Rfl:     dicyclomine (BENTYL) 10 MG/5ML syrup, Take 10 mL by mouth 4 (Four) Times a Day  "Before Meals & at Bedtime As Needed., Disp: , Rfl:     famotidine (PEPCID) 40 MG tablet, TAKE 1 TABLET BY MOUTH ONCE DAILY AT NIGHT, Disp: 90 tablet, Rfl: 0    furosemide (Lasix) 20 MG tablet, Take 1 tablet by mouth Daily., Disp: 30 tablet, Rfl: 0    guaiFENesin (MUCINEX) 600 MG 12 hr tablet, Take 2 tablets by mouth 2 (Two) Times a Day., Disp: 20 tablet, Rfl: 0    metoprolol succinate XL (TOPROL-XL) 50 MG 24 hr tablet, Take 1 tablet by mouth Daily., Disp: 90 tablet, Rfl: 2    O2 (OXYGEN), Inhale 5 L/min Daily As Needed., Disp: , Rfl:     pantoprazole (PROTONIX) 40 MG EC tablet, Take 1 tablet by mouth Daily., Disp: , Rfl:     potassium chloride (K-DUR,KLOR-CON) 10 MEQ CR tablet, Take 1 tablet by mouth 2 (Two) Times a Day., Disp: , Rfl:     Psyllium (Metamucil) wafer wafer, Take 2-4 wafers by mouth Daily., Disp: , Rfl:     sacubitril-valsartan (ENTRESTO) 24-26 MG tablet, Take 1 tablet by mouth Every 12 (Twelve) Hours., Disp: 60 tablet, Rfl: 6    spironolactone (ALDACTONE) 25 MG tablet, Take 0.5 tablets by mouth Daily., Disp: 15 tablet, Rfl: 0    vitamin B-12 (CYANOCOBALAMIN) 100 MCG tablet, Take 0.5 tablets by mouth Daily., Disp: , Rfl:     Xarelto 20 MG tablet, Take 1 tablet by mouth Daily., Disp: 90 tablet, Rfl: 3    benzonatate (TESSALON) 100 MG capsule, Take 1 capsule by mouth 3 (Three) Times a Day As Needed for Cough. (Patient not taking: Reported on 11/25/2024), Disp: 20 capsule, Rfl: 0    empagliflozin (Jardiance) 10 MG tablet tablet, Take 1 tablet by mouth Daily. (Patient not taking: Reported on 11/25/2024), Disp: 90 tablet, Rfl: 3    Allergies:   Allergies   Allergen Reactions    Other GI Intolerance     Spicy foods         Objective   Vital Signs:   Vitals:    11/25/24 1322   BP: 118/74   BP Location: Left arm   Patient Position: Sitting   Pulse: 50   SpO2: 93%   Weight: 69.1 kg (152 lb 6.4 oz)   Height: 160 cm (63\")       PHYSICAL EXAM  General appearance: Awake, alert, cooperative  Head: Normocephalic, " "without obvious abnormality, atraumatic  Eyes: Conjunctivae/corneas clear, EOMs intact  Neck: no adenopathy, no carotid bruit, no JVD, and thyroid: not enlarged  Lungs: clear to auscultation bilaterally and no rhonchi or crackles\", ' symmetric  Heart: regular rate and rhythm, S1, S2 normal, no murmur, click, rub or gallop  Abdomen: Soft, non-tender, bowel sounds normal,  no organomegaly  Extremities: extremities normal, atraumatic, no cyanosis or edema  Skin: Skin color, turgor normal, no rashes or lesions  Neurologic: Grossly normal     Lab Results   Component Value Date    GLUCOSE 90 08/26/2024    CALCIUM 9.2 08/26/2024     08/26/2024    K 4.2 08/26/2024    CO2 33.8 (H) 08/26/2024     08/26/2024    BUN 14 08/26/2024    CREATININE 0.79 08/26/2024    EGFRIFAFRI 68 12/14/2020    EGFRIFNONA 76 01/05/2022    BCR 17.7 08/26/2024    ANIONGAP 7.2 08/26/2024     Lab Results   Component Value Date    WBC 8.57 08/26/2024    HGB 10.8 (L) 08/26/2024    HCT 36.7 08/26/2024    MCV 85.5 08/26/2024     08/26/2024     Lab Results   Component Value Date    INR 1.24 (H) 07/05/2023    INR 1.22 (H) 06/02/2021    INR 1.10 04/05/2021    PROTIME 15.7 (H) 07/05/2023    PROTIME 16.0 (H) 06/02/2021    PROTIME 14.7 04/05/2021     Lab Results   Component Value Date    TSH 3.660 04/29/2024       Cardiac Testing:     I personally viewed and interpreted the patient's EKG/Telemetry/lab data    Procedures    Tobacco Cessation: N/A  Obstructive Sleep Apnea Screening: Completed    Advance Care Planning   ACP discussion was declined by the patient. Patient does not have an advance directive, declines further assistance.       Assessment & Plan    Diagnoses and all orders for this visit:    1. Complete heart block (Primary)    2. Presence of cardiac pacemaker    3. Longstanding persistent atrial fibrillation    4. Chronic systolic congestive heart failure    5. Chronic anticoagulation (Xarelto)         Diagnosis Plan   1. Complete " heart block  Intermittent complete heart block.  Not pacemaker dependent today.  Underlying rhythm is A-fib.      2. Presence of cardiac pacemaker  [employer]onic Micra.  Normal device function appropriate pacing sensing and impedance values.      This patient's Cardiac Implanted Electronic Device was manually interrogated and reprogrammed during the patient encounter today.  Iterative programming changes were manually made to determine the sensing threshold, pacing threshold, lead impedance as well as underlying cardiac rhythm.  These programming changes were not limited to but included some or all of the following when appropriate: pacing mode, programmed AV delays, blanking periods, and refractory periods.  Data obtained as a result of these manual programing changes informed the patient's CIED permanent programming.        3. Longstanding persistent atrial fibrillation  As above       4. Chronic systolic congestive heart failure  Salt restriction is helpful.      5. Chronic anticoagulation (Xarelto)  primary prevention of stroke        Body mass index is 27 kg/m².    I spent 38 minutes in consultation with this patient which included more than 65% of this time in direct face-to-face counseling, physical examination and discussion of my assessment and findings and this shared decision making with the patient.  The remainder of the time not spent face-to-face was performing one, some or all of the following actions: preparing to see the patient (e.g. reviewing tests, prior clinicians' notes, etc), ordering medications, tests or procedures, coordination of care, discussion of the plan with other healthcare providers, documenting clinical information in epic as well as independently interpreting results and communication of these results to the patient family and/or caregiver(s).  Please note that this explicitly excludes time spent on other separate billable services such as performing procedures or test interpretation,  when applicable.      Follow Up:       Thank you for allowing me to participate in the care of your patient. Please to not hesitate to contact me with additional questions or concerns.      Keven Willis DO, FACC, RS  Cardiac Electrophysiologist  [facility] / [facility]

## 2024-12-04 ENCOUNTER — DOCUMENTATION (OUTPATIENT)
Dept: CARDIOLOGY | Facility: HOSPITAL | Age: 84
End: 2024-12-04
Payer: MEDICARE

## 2024-12-04 NOTE — PROGRESS NOTES
Discussed mitral valve regurgitation with patient and option for MitraClip procedure. Agreeable to meet with D IC in 2025 after the holidays. Will discuss consult after the end of the year.     Educational folder mailed today, my contact information is included.

## 2024-12-05 ENCOUNTER — TELEPHONE (OUTPATIENT)
Dept: CARDIOLOGY | Facility: CLINIC | Age: 84
End: 2024-12-05
Payer: MEDICARE

## 2024-12-05 ENCOUNTER — CLINICAL SUPPORT NO REQUIREMENTS (OUTPATIENT)
Dept: CARDIOLOGY | Facility: CLINIC | Age: 84
End: 2024-12-05
Payer: MEDICARE

## 2024-12-05 DIAGNOSIS — Z95.0 PRESENCE OF CARDIAC PACEMAKER: Primary | ICD-10-CM

## 2024-12-05 NOTE — TELEPHONE ENCOUNTER
Normal in office interrogation.  Mrs Alcaraz was unaware her rate was changed at last office visit from 80 to 60 bpm.

## 2024-12-05 NOTE — TELEPHONE ENCOUNTER
Ms Alcaraz called yesterday stating she went to the dentist and had a tooth pulled. She states since then her heart rate has been low.I ask her to send a manual reading from her home monitor but she has a metal roof and her monitor will not transmit. I made her an appointment to come to the office and have her device checked in person.

## 2024-12-11 ENCOUNTER — TELEPHONE (OUTPATIENT)
Dept: CARDIOLOGY | Facility: HOSPITAL | Age: 84
End: 2024-12-11
Payer: MEDICARE

## 2024-12-12 NOTE — TELEPHONE ENCOUNTER
She is stating that she is ready to get started on what needs to be done to get the TAVR surgery.

## 2025-01-06 ENCOUNTER — TELEPHONE (OUTPATIENT)
Dept: CARDIOLOGY | Facility: CLINIC | Age: 85
End: 2025-01-06

## 2025-01-06 NOTE — TELEPHONE ENCOUNTER
Name: Elsa Alcaraz    Relationship: Self    Best Callback Number: [phone]    Incoming call to the Hub, requesting to  Reschedule their [facility] CONSULT PT OF DR. JOHNS AND DR. ROTHMAN, EP FOLLOWS DEVICE, OK TO ADD PER   appointment on 1/8/25.     Per [facility] workflow, further review is needed before the task can be completed.    Result of Call: Please reach out to the patient to reschedule    PT CALLING IN AND THINKS SHE MAY NEED TO R/S DUE TO WEATHER. PLEASE ADVISE, THANK YOU.

## 2025-01-09 DIAGNOSIS — K21.9 GASTROESOPHAGEAL REFLUX DISEASE WITHOUT ESOPHAGITIS: ICD-10-CM

## 2025-01-09 DIAGNOSIS — Z87.11 HISTORY OF PEPTIC ULCER DISEASE: ICD-10-CM

## 2025-01-09 DIAGNOSIS — R13.19 ESOPHAGEAL DYSPHAGIA: ICD-10-CM

## 2025-01-09 RX ORDER — FAMOTIDINE 40 MG/1
40 TABLET, FILM COATED ORAL NIGHTLY
Qty: 90 TABLET | Refills: 3 | Status: SHIPPED | OUTPATIENT
Start: 2025-01-09

## 2025-01-14 ENCOUNTER — OFFICE VISIT (OUTPATIENT)
Dept: CARDIOLOGY | Facility: CLINIC | Age: 85
End: 2025-01-14
Payer: MEDICARE

## 2025-01-14 VITALS
OXYGEN SATURATION: 94 % | DIASTOLIC BLOOD PRESSURE: 62 MMHG | HEIGHT: 63 IN | SYSTOLIC BLOOD PRESSURE: 106 MMHG | HEART RATE: 99 BPM | BODY MASS INDEX: 26.61 KG/M2 | WEIGHT: 150.2 LBS

## 2025-01-14 DIAGNOSIS — I48.11 LONGSTANDING PERSISTENT ATRIAL FIBRILLATION: ICD-10-CM

## 2025-01-14 DIAGNOSIS — J44.9 CHRONIC OBSTRUCTIVE PULMONARY DISEASE, UNSPECIFIED COPD TYPE: Chronic | ICD-10-CM

## 2025-01-14 DIAGNOSIS — I34.0 SEVERE MITRAL REGURGITATION: Primary | ICD-10-CM

## 2025-01-14 PROCEDURE — 3074F SYST BP LT 130 MM HG: CPT | Performed by: INTERNAL MEDICINE

## 2025-01-14 PROCEDURE — 99214 OFFICE O/P EST MOD 30 MIN: CPT | Performed by: INTERNAL MEDICINE

## 2025-01-14 PROCEDURE — 3078F DIAST BP <80 MM HG: CPT | Performed by: INTERNAL MEDICINE

## 2025-01-14 RX ORDER — POTASSIUM CHLORIDE 750 MG/1
TABLET, EXTENDED RELEASE ORAL
COMMUNITY
Start: 2024-12-11

## 2025-01-14 NOTE — H&P (VIEW-ONLY)
[facility] Cardiology  New Patient Consultation       Encounter Date:01/08/2025  Patient ID: Elsa Alcaraz is a 84 y.o. female from [place], [place].  Referred by: Fidencio Germain    Reason for consultation: Mitral regurgitation    Problem List:  Mitral regurgitation  Echo 10/10/2022: EF 40%, moderate MR, RVSP 23  Echo 7/14/2023: EF 36-40%, moderate to severe MR, mild TR with RVSP 36  Echo 10/7/2024: EF 36-40%, severe MR mild to moderate TR, RVSP 61.  Chronic systolic heart failure  Normal EF 2015, reduced EF 45% in 2020 and 35 to 40% 2021  Longstanding persistent atrial fibrillation  [id number] = 5, on Xarelto  Complete heart block  S/p Medtronic Micra 7/5/2023  Hypertension  Hyperlipidemia  COPD  Severe obstruction with mild restriction by most recent PFTs.  DAVID nonadherent to CPAP    Subjective:     Allergies   Allergen Reactions    Other GI Intolerance     Spicy foods           Current Outpatient Medications:     albuterol sulfate  (90 Base) MCG/ACT inhaler, Inhale 2 puffs 4 (Four) Times a Day. (Patient taking differently: Inhale 2 puffs 2 (Two) Times a Day.), Disp: 18 g, Rfl: 5    budesonide-formoterol (SYMBICORT) 160-4.5 MCG/ACT inhaler, Inhale 2 puffs 2 (Two) Times a Day. Rinse mouth with water after use (Patient taking differently: Inhale 2 puffs Daily. Rinse mouth with water after use), Disp: 10.2 g, Rfl: 5    cholecalciferol (VITAMIN D3) 25 MCG (1000 UT) tablet, Take 1 tablet by mouth Daily., Disp: , Rfl:     dicyclomine (BENTYL) 10 MG/5ML syrup, Take 10 mL by mouth 4 (Four) Times a Day Before Meals & at Bedtime As Needed., Disp: , Rfl:     famotidine (PEPCID) 40 MG tablet, TAKE 1 TABLET BY MOUTH ONCE DAILY AT NIGHT, Disp: 90 tablet, Rfl: 3    furosemide (Lasix) 20 MG tablet, Take 1 tablet by mouth Daily., Disp: 30 tablet, Rfl: 0    guaiFENesin (MUCINEX) 600 MG 12 hr tablet, Take 2 tablets by mouth 2 (Two) Times a Day. (Patient taking differently: Take 2 tablets by mouth  As Needed.), Disp: 20 tablet, Rfl: 0    metoprolol succinate XL (TOPROL-XL) 50 MG 24 hr tablet, Take 1 tablet by mouth Daily., Disp: 90 tablet, Rfl: 2    O2 (OXYGEN), Inhale 5 L/min Daily As Needed., Disp: , Rfl:     pantoprazole (PROTONIX) 40 MG EC tablet, Take 1 tablet by mouth Daily., Disp: , Rfl:     potassium chloride 10 MEQ CR tablet, , Disp: , Rfl:     Psyllium (Metamucil) wafer wafer, Take 2-4 wafers by mouth Daily., Disp: , Rfl:     sacubitril-valsartan (ENTRESTO) 24-26 MG tablet, Take 1 tablet by mouth Every 12 (Twelve) Hours., Disp: 60 tablet, Rfl: 6    spironolactone (ALDACTONE) 25 MG tablet, Take 0.5 tablets by mouth Daily., Disp: 15 tablet, Rfl: 0    vitamin B-12 (CYANOCOBALAMIN) 100 MCG tablet, Take 0.5 tablets by mouth Daily., Disp: , Rfl:     Xarelto 20 MG tablet, Take 1 tablet by mouth Daily., Disp: 90 tablet, Rfl: 3    benzonatate (TESSALON) 100 MG capsule, Take 1 capsule by mouth 3 (Three) Times a Day As Needed for Cough. (Patient not taking: Reported on 1/14/2025), Disp: 20 capsule, Rfl: 0    citalopram (CeleXA) 20 MG tablet, Take 1 tablet by mouth Daily. (Patient not taking: Reported on 1/14/2025), Disp: , Rfl:     empagliflozin (Jardiance) 10 MG tablet tablet, Take 1 tablet by mouth Daily. (Patient not taking: Reported on 1/14/2025), Disp: 90 tablet, Rfl: 3    potassium chloride (K-DUR,KLOR-CON) 10 MEQ CR tablet, Take 1 tablet by mouth 2 (Two) Times a Day. (Patient not taking: Reported on 1/14/2025), Disp: , Rfl:     HPI:  Elsa Alcaraz is a 84 y.o. female referred by Fidencio Germain symptomatic mitral regurgitation.  Patient was last seen in October by Dr. Germain for her chronic HFrEF.  At that time Jardiance was added to her GDMT regimen to see if this would help with her symptoms.  Patient has continued to have shortness of breath and was agreeable to be evaluated by Dr. Zabala for possible MitraClip.  The patient has been wearing oxygen for the last 4 years.  She increases the oxygen  level up to 5 L at times.  She sees Dr. Bautista in [place] for COPD.    She notes that she is tired at times and can no longer do the things that she used to do due to fatigue and dyspnea.  She denies PND and orthopnea and states that she has good days and bad days.      Past Surgical History:   Procedure Laterality Date    ANAL FISTULOTOMY      APPENDECTOMY          BREAST BIOPSY      BREAST LUMPECTOMY Right 2006    CARDIAC ELECTROPHYSIOLOGY PROCEDURE N/A 2023    Procedure: Micra;  Surgeon: Keven Willis DO;  Location: [facility] EP INVASIVE LOCATION;  Service: Cardiology;  Laterality: N/A;    CATARACT EXTRACTION      both eyes     CATARACT EXTRACTION W/ INTRAOCULAR LENS IMPLANT Left 2019    Procedure: CATARACT PHACO EXTRACTION WITH INTRAOCULAR LENS IMPLANT LEFT;  Surgeon: Masoud David MD;  Location: [facility] OR;  Service: Ophthalmology    CATARACT EXTRACTION W/ INTRAOCULAR LENS IMPLANT Right 2019    Procedure: CATARACT PHACO EXTRACTION WITH INTRAOCULAR LENS IMPLANT RIGHT;  Surgeon: Masoud David MD;  Location: [facility] OR;  Service: Ophthalmology     SECTION  1981    CHOLECYSTECTOMY  2009    COLECTOMY PARTIAL / TOTAL  1998    COLON CANCER    COLONOSCOPY      COLONOSCOPY N/A 2021    Procedure: COLONOSCOPY WITH BIOPSY;  Surgeon: Iona Sanders MD;  Location: [facility] ENDOSCOPY;  Service: Gastroenterology;  Laterality: N/A;    ENDOSCOPY  2016    ENDOSCOPY N/A 2018    Procedure: ESOPHAGOGASTRODUODENOSCOPY WITH COLD FORCEP BIOPSY;  Surgeon: Vasquez Fagan MD;  Location: [facility] ENDOSCOPY;  Service: Gastroenterology    ENDOSCOPY N/A 2019    Procedure: ESOPHAGOGASTRODUODENOSCOPY WITH BIOPSY;  Surgeon: Vasquez Fagan MD;  Location: [facility] ENDOSCOPY;  Service: Gastroenterology    ENDOSCOPY N/A 2022    Procedure: ESOPHAGOGASTRODUODENOSCOPY with biopsy and polypectomy  ;  Surgeon: Iona Sanders MD;  Location: [facility]  "ENDOSCOPY;  Service: Gastroenterology;  Laterality: N/A;    ENDOSCOPY N/A 6/28/2024    Procedure: ESOPHAGOGASTRODUODENOSCOPY WITH BIOPSY and dilatation;  Surgeon: Iona Sanders MD;  Location: [facility] ENDOSCOPY;  Service: Gastroenterology;  Laterality: N/A;    HYSTERECTOMY      1998    INSERT / REPLACE / REMOVE PACEMAKER      SKIN BIOPSY Left     arm    SKIN LESION EXCISION N/A     VENTRAL HERNIA REPAIR  10/28/2012       Social History     Socioeconomic History    Marital status:    Tobacco Use    Smoking status: Never     Passive exposure: Past    Smokeless tobacco: Never   Vaping Use    Vaping status: Never Used   Substance and Sexual Activity    Alcohol use: Not Currently     Alcohol/week: 1.0 - 2.0 standard drink of alcohol     Types: 1 - 2 Glasses of wine per week     Comment: rarely    Drug use: Never    Sexual activity: Defer        Family History   Problem Relation Age of Onset    Hypertension Mother     Asthma Mother     COPD Mother     Osteoarthritis Mother     Stomach cancer Father     Cancer Father     Cancer Sister     Diabetes Maternal Grandmother     Colon cancer Neg Hx        The following portions of the patient's history were reviewed and updated as appropriate: allergies, current medications, problem list, surgical history, social history, and family history.    Review of Systems:    A 12-point ROS performed is negative except per listed in HPI.  Of note she is somewhat hard of hearing.        Objective:   /62 (BP Location: Left arm, Patient Position: Sitting, Cuff Size: Adult)   Pulse 99   Ht 160 cm (63\")   Wt 68.1 kg (150 lb 3.2 oz)   SpO2 94% Comment: 5 L/min  BMI 26.61 kg/m²        Physical Exam:  General:Well-developed well-nourished, no acute distress  HEENT: Pupils equal round and reactive to light.  Oropharynx is clear and moist.  CV: Regular rate and rhythm with only a 1-2/6 HS murmur, no gallop or rub.    Resp: The lungs are clear bilaterally with no rales or " Follow-up Pulm Progress Note    No new respiratory events overnight.  Denies SOB/CP.     Medications:  MEDICATIONS  (STANDING):  piperacillin/tazobactam IVPB. 3.375 Gram(s) IV Intermittent every 8 hours  vancomycin  IVPB 1000 milliGRAM(s) IV Intermittent every 12 hours  vancomycin  IVPB      ALBUTerol/ipratropium for Nebulization 3 milliLiter(s) Nebulizer every 6 hours  metoprolol Injectable 5 milliGRAM(s) IV Push every 4 hours  acetylcysteine 20% Inhalation 3 milliLiter(s) Inhalation every 6 hours  heparin  Infusion 1200 Unit(s)/Hr (15 mL/Hr) IV Continuous <Continuous>  micafungin IVPB      micafungin IVPB 100 milliGRAM(s) IV Intermittent every 24 hours  pantoprazole  Injectable 40 milliGRAM(s) IV Push every 12 hours  acetaminophen  IVPB. 1000 milliGRAM(s) IV Intermittent once  acetaminophen  IVPB. 1000 milliGRAM(s) IV Intermittent once  acetaminophen  IVPB. 1000 milliGRAM(s) IV Intermittent once  HYDROmorphone PCA (1 mG/mL) 30 milliLiter(s) PCA Continuous PCA Continuous  dextrose 5% + sodium chloride 0.45%. 1000 milliLiter(s) (50 mL/Hr) IV Continuous <Continuous>    MEDICATIONS  (PRN):  HYDROmorphone PCA (1 mG/mL) Rescue Clinician Bolus 0.5 milliGRAM(s) IV Push every 15 minutes PRN for Pain Scale GREATER THAN 6  naloxone Injectable 0.1 milliGRAM(s) IV Push every 3 minutes PRN For ANY of the following changes in patient status:  A. RR LESS THAN 10 breaths per minute, B. Oxygen saturation LESS THAN 90%, C. Sedation score of 6  ondansetron Injectable 4 milliGRAM(s) IV Push every 6 hours PRN Nausea          Vital Signs Last 24 Hrs  T(C): 36.8 (27 Sep 2017 11:00), Max: 36.8 (27 Sep 2017 11:00)  T(F): 98.3 (27 Sep 2017 11:00), Max: 98.3 (27 Sep 2017 11:00)  HR: 91 (27 Sep 2017 14:17) (73 - 93)  BP: 120/70 (27 Sep 2017 14:00) (102/62 - 138/80)  BP(mean): 88 (27 Sep 2017 14:00) (76 - 103)  RR: 16 (27 Sep 2017 14:17) (12 - 34)  SpO2: 97% (27 Sep 2017 14:17) (92% - 100%)    ABG - ( 26 Sep 2017 01:04 )  pH: 7.42  /  pCO2: 41    /  pO2: 223   / HCO3: 26    / Base Excess: 1.8   /  SaO2: 100               VBG pH 7.38  @ 01:17    VBG pCO2 50  @ 01:17    VBG O2 sat 68  @ 01:17    VBG lactate 1.5  @ 01:17       @ 07:01  -   @ 07:00  --------------------------------------------------------  IN: 2683 mL / OUT: 2785 mL / NET: -102 mL          LABS:                        10.8   32.9  )-----------( 103      ( 27 Sep 2017 00:45 )             32.6     0927    140  |  102  |  45<H>  ----------------------------<  86  4.8   |  27  |  1.06    Ca    8.0<L>      27 Sep 2017 00:45  Phos  4.7       Mg     3.0         TPro  4.8<L>  /  Alb  2.4<L>  /  TBili  3.1<H>  /  DBili  2.6<H>  /  AST  60<H>  /  ALT  33  /  AlkPhos  91  27      CARDIAC MARKERS ( 27 Sep 2017 13:21 )  x     / 0.37 ng/mL / 374 U/L / x     / 4.1 ng/mL  CARDIAC MARKERS ( 27 Sep 2017 05:26 )  x     / 0.38 ng/mL / 185 U/L / x     / 3.6 ng/mL  CARDIAC MARKERS ( 27 Sep 2017 00:45 )  x     / 0.34 ng/mL / 286 U/L / x     / 4.6 ng/mL      CAPILLARY BLOOD GLUCOSE        PT/INR - ( 27 Sep 2017 00:45 )   PT: 14.5 sec;   INR: 1.32 ratio         PTT - ( 27 Sep 2017 00:45 )  PTT:73.8 sec  Urinalysis Basic - ( 26 Sep 2017 15:51 )    Color: Yellow / Appearance: x / S.026 / pH: x  Gluc: x / Ketone: Negative  / Bili: Negative / Urobili: Negative   Blood: x / Protein: Trace / Nitrite: Negative   Leuk Esterase: Negative / RBC: 3-5 /HPF / WBC 3-5 /HPF   Sq Epi: x / Non Sq Epi: OCC /HPF / Bacteria: Many /HPF      Procalcitonin, Serum: 3.45 ng/mL (27 Sep 2017 11:07)  Procalcitonin, Serum: 6.39 ng/mL (26 Sep 2017 01:09)  Procalcitonin, Serum: 11.97 ng/mL (25 Sep 2017 02:28)  Procalcitonin, Serum: 11.18 ng/mL (24 Sep 2017 15:52)    Serum Pro-Brain Natriuretic Peptide: 7093 pg/mL (26 Sep 2017 01:09)  Serum Pro-Brain Natriuretic Peptide: 7042 pg/mL (25 Sep 2017 02:28)                CULTURES: (if applicable)  Pending from yesterday        Physical Examination:  PULM: Decreased BS L  CVS: S1, S2 RRR    RADIOLOGY REVIEWED  CXR: Worsened L basilar atelectasis from yesterday wheezes.  Equal expansion is noted.  GI: The abdomen is soft and nontender with normal bowel sounds throughout.  No hepatosplenomegaly or midline bruits are present.  Musculoskeletal/extremities: No gross joint deformities are noted.    Trace lower extremity edema with 1+ pedal pulses in all except in the left posterior tibial which is a 2+ pulse.  Skin: Warm and dry  Neurologic: Nonfocal  Psychiatric: Normal mood and affect      Diagnostic Data:    Lab Results   Component Value Date    WBC 8.57 08/26/2024    HGB 10.8 (L) 08/26/2024    HCT 36.7 08/26/2024    MCV 85.5 08/26/2024     08/26/2024     Lab Results   Component Value Date    GLUCOSE 90 08/26/2024    BUN 14 08/26/2024    CREATININE 0.79 08/26/2024    EGFR 74.3 08/26/2024    EGFRIFAFRI 68 12/14/2020    BCR 17.7 08/26/2024    K 4.2 08/26/2024    CO2 33.8 (H) 08/26/2024    CALCIUM 9.2 08/26/2024    ALBUMIN 4.1 08/26/2024    AST 12 08/26/2024    ALT 6 08/26/2024     Lab Results   Component Value Date    CHOL 124 07/04/2023    TRIG 54 07/04/2023    HDL 64 (H) 07/04/2023    LDL 48 07/04/2023     Lab Results   Component Value Date    HGBA1C 5.30 07/05/2023     Lab Results   Component Value Date    TSH 3.660 04/29/2024       Procedures            Assessment:     1. Severe mitral regurgitation, becoming more symptomatic.   2.     Atrial fibrillation  3.     COPD     The patient presented today with her .  The patient's echo images were reviewed with her and her .  We had a long discussion about the possibility of a MitraClip.  The model of the MitraClip was demonstrated.  The patient understands that she would have to undergo a transesophageal echo to evaluate her mitral anatomy.  On her transthoracic echo it certainly appears that she would be a good MitraClip candidate.  I also discussed cardiac catheterization with her as I think it is important to evaluate all patients with severe MR for coronary artery disease which could be contributing to  their mitral regurgitation.  She is agreeable to proceed.  Her radial pulses are at best 1+.  We discussed the surgical option of a mitral valve repair as well.  Based on the patient's COPD as well as her age I think she would be a much better MitraClip candidate.          Plan:   Schedule transesophageal echo.  Further recommendations will follow.        Advance Care Planning   ACP discussion was held with the patient during this visit. Patient does not have an advance directive, information provided.            Jeannie Zabala MD, FACC     Follow-up Pulm Progress Note    No new respiratory events overnight.  Denies SOB/CP.   Awake and alert with R side down  100% on Hi Vineet 40L/min 50% fi02    Medications:  MEDICATIONS  (STANDING):  piperacillin/tazobactam IVPB. 3.375 Gram(s) IV Intermittent every 8 hours  vancomycin  IVPB 1000 milliGRAM(s) IV Intermittent every 12 hours  vancomycin  IVPB      ALBUTerol/ipratropium for Nebulization 3 milliLiter(s) Nebulizer every 6 hours  metoprolol Injectable 5 milliGRAM(s) IV Push every 4 hours  acetylcysteine 20% Inhalation 3 milliLiter(s) Inhalation every 6 hours  heparin  Infusion 1200 Unit(s)/Hr (15 mL/Hr) IV Continuous <Continuous>  micafungin IVPB      micafungin IVPB 100 milliGRAM(s) IV Intermittent every 24 hours  pantoprazole  Injectable 40 milliGRAM(s) IV Push every 12 hours  acetaminophen  IVPB. 1000 milliGRAM(s) IV Intermittent once  acetaminophen  IVPB. 1000 milliGRAM(s) IV Intermittent once  acetaminophen  IVPB. 1000 milliGRAM(s) IV Intermittent once  HYDROmorphone PCA (1 mG/mL) 30 milliLiter(s) PCA Continuous PCA Continuous  dextrose 5% + sodium chloride 0.45%. 1000 milliLiter(s) (50 mL/Hr) IV Continuous <Continuous>    MEDICATIONS  (PRN):  HYDROmorphone PCA (1 mG/mL) Rescue Clinician Bolus 0.5 milliGRAM(s) IV Push every 15 minutes PRN for Pain Scale GREATER THAN 6  naloxone Injectable 0.1 milliGRAM(s) IV Push every 3 minutes PRN For ANY of the following changes in patient status:  A. RR LESS THAN 10 breaths per minute, B. Oxygen saturation LESS THAN 90%, C. Sedation score of 6  ondansetron Injectable 4 milliGRAM(s) IV Push every 6 hours PRN Nausea          Vital Signs Last 24 Hrs  T(C): 36.8 (27 Sep 2017 11:00), Max: 36.8 (27 Sep 2017 11:00)  T(F): 98.3 (27 Sep 2017 11:00), Max: 98.3 (27 Sep 2017 11:00)  HR: 91 (27 Sep 2017 14:17) (73 - 93)  BP: 120/70 (27 Sep 2017 14:00) (102/62 - 138/80)  BP(mean): 88 (27 Sep 2017 14:00) (76 - 103)  RR: 16 (27 Sep 2017 14:17) (12 - 34)  SpO2: 97% (27 Sep 2017 14:17) (92% - 100%) on Hi Vineet    ABG - ( 26 Sep 2017 01:04 )  pH: 7.42  /  pCO2: 41    /  pO2: 223   / HCO3: 26    / Base Excess: 1.8   /  SaO2: 100               VBG pH 7.38  @ 01:17    VBG pCO2 50  @ 01:17    VBG O2 sat 68  @ 01:17    VBG lactate 1.5  @ 01:17       @ 07:01  -   @ 07:00  --------------------------------------------------------  IN: 2683 mL / OUT: 2785 mL / NET: -102 mL          LABS:                        10.8   32.9  )-----------( 103      ( 27 Sep 2017 00:45 )             32.6     0927    140  |  102  |  45<H>  ----------------------------<  86  4.8   |  27  |  1.06    Ca    8.0<L>      27 Sep 2017 00:45  Phos  4.7       Mg     3.0         TPro  4.8<L>  /  Alb  2.4<L>  /  TBili  3.1<H>  /  DBili  2.6<H>  /  AST  60<H>  /  ALT  33  /  AlkPhos  91        CARDIAC MARKERS ( 27 Sep 2017 13:21 )  x     / 0.37 ng/mL / 374 U/L / x     / 4.1 ng/mL  CARDIAC MARKERS ( 27 Sep 2017 05:26 )  x     / 0.38 ng/mL / 185 U/L / x     / 3.6 ng/mL  CARDIAC MARKERS ( 27 Sep 2017 00:45 )  x     / 0.34 ng/mL / 286 U/L / x     / 4.6 ng/mL      CAPILLARY BLOOD GLUCOSE        PT/INR - ( 27 Sep 2017 00:45 )   PT: 14.5 sec;   INR: 1.32 ratio         PTT - ( 27 Sep 2017 00:45 )  PTT:73.8 sec  Urinalysis Basic - ( 26 Sep 2017 15:51 )    Color: Yellow / Appearance: x / S.026 / pH: x  Gluc: x / Ketone: Negative  / Bili: Negative / Urobili: Negative   Blood: x / Protein: Trace / Nitrite: Negative   Leuk Esterase: Negative / RBC: 3-5 /HPF / WBC 3-5 /HPF   Sq Epi: x / Non Sq Epi: OCC /HPF / Bacteria: Many /HPF      Procalcitonin, Serum: 3.45 ng/mL (27 Sep 2017 11:07)  Procalcitonin, Serum: 6.39 ng/mL (26 Sep 2017 01:09)  Procalcitonin, Serum: 11.97 ng/mL (25 Sep 2017 02:28)  Procalcitonin, Serum: 11.18 ng/mL (24 Sep 2017 15:52)    Serum Pro-Brain Natriuretic Peptide: 7093 pg/mL (26 Sep 2017 01:09)  Serum Pro-Brain Natriuretic Peptide: 7042 pg/mL (25 Sep 2017 02:28)                CULTURES: (if applicable)  Pending from yesterday        Physical Examination:  PULM: Decreased BS L  CVS: S1, S2 RRR    RADIOLOGY REVIEWED  CXR: Worsened L basilar atelectasis from yesterday Follow-up Pulm Progress Note    No new respiratory events overnight.  Denies SOB/CP.   Awake and alert with R side down  100% on Hi Vineet 40L/min 50% fi02    Medications:  MEDICATIONS  (STANDING):  piperacillin/tazobactam IVPB. 3.375 Gram(s) IV Intermittent every 8 hours  vancomycin  IVPB 1000 milliGRAM(s) IV Intermittent every 12 hours  vancomycin  IVPB      ALBUTerol/ipratropium for Nebulization 3 milliLiter(s) Nebulizer every 6 hours  metoprolol Injectable 5 milliGRAM(s) IV Push every 4 hours  acetylcysteine 20% Inhalation 3 milliLiter(s) Inhalation every 6 hours  heparin  Infusion 1200 Unit(s)/Hr (15 mL/Hr) IV Continuous <Continuous>  micafungin IVPB      micafungin IVPB 100 milliGRAM(s) IV Intermittent every 24 hours  pantoprazole  Injectable 40 milliGRAM(s) IV Push every 12 hours  acetaminophen  IVPB. 1000 milliGRAM(s) IV Intermittent once  acetaminophen  IVPB. 1000 milliGRAM(s) IV Intermittent once  acetaminophen  IVPB. 1000 milliGRAM(s) IV Intermittent once  HYDROmorphone PCA (1 mG/mL) 30 milliLiter(s) PCA Continuous PCA Continuous  dextrose 5% + sodium chloride 0.45%. 1000 milliLiter(s) (50 mL/Hr) IV Continuous <Continuous>    MEDICATIONS  (PRN):  HYDROmorphone PCA (1 mG/mL) Rescue Clinician Bolus 0.5 milliGRAM(s) IV Push every 15 minutes PRN for Pain Scale GREATER THAN 6  naloxone Injectable 0.1 milliGRAM(s) IV Push every 3 minutes PRN For ANY of the following changes in patient status:  A. RR LESS THAN 10 breaths per minute, B. Oxygen saturation LESS THAN 90%, C. Sedation score of 6  ondansetron Injectable 4 milliGRAM(s) IV Push every 6 hours PRN Nausea    Vital Signs Last 24 Hrs  T(C): 36.8 (27 Sep 2017 11:00), Max: 36.8 (27 Sep 2017 11:00)  T(F): 98.3 (27 Sep 2017 11:00), Max: 98.3 (27 Sep 2017 11:00)  HR: 91 (27 Sep 2017 14:17) (73 - 93)  BP: 120/70 (27 Sep 2017 14:00) (102/62 - 138/80)  BP(mean): 88 (27 Sep 2017 14:00) (76 - 103)  RR: 16 (27 Sep 2017 14:17) (12 - 34)  SpO2: 97% (27 Sep 2017 14:17) (92% - 100%) on Hi Vineet    ABG - ( 26 Sep 2017 01:04 )  pH: 7.42  /  pCO2: 41    /  pO2: 223   / HCO3: 26    / Base Excess: 1.8   /  SaO2: 100       VBG pH 7.38  @ 01:17    VBG pCO2 50  @ 01:17    VBG O2 sat 68  @ 01:17    VBG lactate 1.5  @ 01:17       @ 07:01  -   @ 07:00  --------------------------------------------------------  IN: 2683 mL / OUT: 2785 mL / NET: -102 mL          LABS:                        10.8   32.9  )-----------( 103      ( 27 Sep 2017 00:45 )             32.6     0927    140  |  102  |  45<H>  ----------------------------<  86  4.8   |  27  |  1.06    Ca    8.0<L>      27 Sep 2017 00:45  Phos  4.7       Mg     3.0         TPro  4.8<L>  /  Alb  2.4<L>  /  TBili  3.1<H>  /  DBili  2.6<H>  /  AST  60<H>  /  ALT  33  /  AlkPhos  91        CARDIAC MARKERS ( 27 Sep 2017 13:21 )  x     / 0.37 ng/mL / 374 U/L / x     / 4.1 ng/mL  CARDIAC MARKERS ( 27 Sep 2017 05:26 )  x     / 0.38 ng/mL / 185 U/L / x     / 3.6 ng/mL  CARDIAC MARKERS ( 27 Sep 2017 00:45 )  x     / 0.34 ng/mL / 286 U/L / x     / 4.6 ng/mL      CAPILLARY BLOOD GLUCOSE        PT/INR - ( 27 Sep 2017 00:45 )   PT: 14.5 sec;   INR: 1.32 ratio         PTT - ( 27 Sep 2017 00:45 )  PTT:73.8 sec  Urinalysis Basic - ( 26 Sep 2017 15:51 )    Color: Yellow / Appearance: x / S.026 / pH: x  Gluc: x / Ketone: Negative  / Bili: Negative / Urobili: Negative   Blood: x / Protein: Trace / Nitrite: Negative   Leuk Esterase: Negative / RBC: 3-5 /HPF / WBC 3-5 /HPF   Sq Epi: x / Non Sq Epi: OCC /HPF / Bacteria: Many /HPF      Procalcitonin, Serum: 3.45 ng/mL (27 Sep 2017 11:07)  Procalcitonin, Serum: 6.39 ng/mL (26 Sep 2017 01:09)  Procalcitonin, Serum: 11.97 ng/mL (25 Sep 2017 02:28)  Procalcitonin, Serum: 11.18 ng/mL (24 Sep 2017 15:52)    Serum Pro-Brain Natriuretic Peptide: 7093 pg/mL (26 Sep 2017 01:09)  Serum Pro-Brain Natriuretic Peptide: 7042 pg/mL (25 Sep 2017 02:28)    CULTURES: (if applicable)  Pending from yesterday      Physical Examination:  PULM: Decreased BS L  CVS: S1, S2 RRR    RADIOLOGY REVIEWED  CXR: Worsened L basilar atelectasis from yesterday

## 2025-01-14 NOTE — PROGRESS NOTES
[facility] Cardiology  New Patient Consultation       Encounter Date:01/08/2025  Patient ID: Elsa Alcaraz is a 84 y.o. female from [place], [place].  Referred by: Fidencio Gremain    Reason for consultation: Mitral regurgitation    Problem List:  Mitral regurgitation  Echo 10/10/2022: EF 40%, moderate MR, RVSP 23  Echo 7/14/2023: EF 36-40%, moderate to severe MR, mild TR with RVSP 36  Echo 10/7/2024: EF 36-40%, severe MR mild to moderate TR, RVSP 61.  Chronic systolic heart failure  Normal EF 2015, reduced EF 45% in 2020 and 35 to 40% 2021  Longstanding persistent atrial fibrillation  [id number] = 5, on Xarelto  Complete heart block  S/p Medtronic Micra 7/5/2023  Hypertension  Hyperlipidemia  COPD  Severe obstruction with mild restriction by most recent PFTs.  DAVID nonadherent to CPAP    Subjective:     Allergies   Allergen Reactions    Other GI Intolerance     Spicy foods           Current Outpatient Medications:     albuterol sulfate  (90 Base) MCG/ACT inhaler, Inhale 2 puffs 4 (Four) Times a Day. (Patient taking differently: Inhale 2 puffs 2 (Two) Times a Day.), Disp: 18 g, Rfl: 5    budesonide-formoterol (SYMBICORT) 160-4.5 MCG/ACT inhaler, Inhale 2 puffs 2 (Two) Times a Day. Rinse mouth with water after use (Patient taking differently: Inhale 2 puffs Daily. Rinse mouth with water after use), Disp: 10.2 g, Rfl: 5    cholecalciferol (VITAMIN D3) 25 MCG (1000 UT) tablet, Take 1 tablet by mouth Daily., Disp: , Rfl:     dicyclomine (BENTYL) 10 MG/5ML syrup, Take 10 mL by mouth 4 (Four) Times a Day Before Meals & at Bedtime As Needed., Disp: , Rfl:     famotidine (PEPCID) 40 MG tablet, TAKE 1 TABLET BY MOUTH ONCE DAILY AT NIGHT, Disp: 90 tablet, Rfl: 3    furosemide (Lasix) 20 MG tablet, Take 1 tablet by mouth Daily., Disp: 30 tablet, Rfl: 0    guaiFENesin (MUCINEX) 600 MG 12 hr tablet, Take 2 tablets by mouth 2 (Two) Times a Day. (Patient taking differently: Take 2 tablets by mouth  As Needed.), Disp: 20 tablet, Rfl: 0    metoprolol succinate XL (TOPROL-XL) 50 MG 24 hr tablet, Take 1 tablet by mouth Daily., Disp: 90 tablet, Rfl: 2    O2 (OXYGEN), Inhale 5 L/min Daily As Needed., Disp: , Rfl:     pantoprazole (PROTONIX) 40 MG EC tablet, Take 1 tablet by mouth Daily., Disp: , Rfl:     potassium chloride 10 MEQ CR tablet, , Disp: , Rfl:     Psyllium (Metamucil) wafer wafer, Take 2-4 wafers by mouth Daily., Disp: , Rfl:     sacubitril-valsartan (ENTRESTO) 24-26 MG tablet, Take 1 tablet by mouth Every 12 (Twelve) Hours., Disp: 60 tablet, Rfl: 6    spironolactone (ALDACTONE) 25 MG tablet, Take 0.5 tablets by mouth Daily., Disp: 15 tablet, Rfl: 0    vitamin B-12 (CYANOCOBALAMIN) 100 MCG tablet, Take 0.5 tablets by mouth Daily., Disp: , Rfl:     Xarelto 20 MG tablet, Take 1 tablet by mouth Daily., Disp: 90 tablet, Rfl: 3    benzonatate (TESSALON) 100 MG capsule, Take 1 capsule by mouth 3 (Three) Times a Day As Needed for Cough. (Patient not taking: Reported on 1/14/2025), Disp: 20 capsule, Rfl: 0    citalopram (CeleXA) 20 MG tablet, Take 1 tablet by mouth Daily. (Patient not taking: Reported on 1/14/2025), Disp: , Rfl:     empagliflozin (Jardiance) 10 MG tablet tablet, Take 1 tablet by mouth Daily. (Patient not taking: Reported on 1/14/2025), Disp: 90 tablet, Rfl: 3    potassium chloride (K-DUR,KLOR-CON) 10 MEQ CR tablet, Take 1 tablet by mouth 2 (Two) Times a Day. (Patient not taking: Reported on 1/14/2025), Disp: , Rfl:     HPI:  Elsa Alcaraz is a 84 y.o. female referred by Fidencio Germain symptomatic mitral regurgitation.  Patient was last seen in October by Dr. Germain for her chronic HFrEF.  At that time Jardiance was added to her GDMT regimen to see if this would help with her symptoms.  Patient has continued to have shortness of breath and was agreeable to be evaluated by Dr. Zabala for possible MitraClip.  The patient has been wearing oxygen for the last 4 years.  She increases the oxygen  level up to 5 L at times.  She sees Dr. Bautista in [place] for COPD.    She notes that she is tired at times and can no longer do the things that she used to do due to fatigue and dyspnea.  She denies PND and orthopnea and states that she has good days and bad days.      Past Surgical History:   Procedure Laterality Date    ANAL FISTULOTOMY      APPENDECTOMY          BREAST BIOPSY      BREAST LUMPECTOMY Right 2006    CARDIAC ELECTROPHYSIOLOGY PROCEDURE N/A 2023    Procedure: Micra;  Surgeon: Keven Willis DO;  Location: [facility] EP INVASIVE LOCATION;  Service: Cardiology;  Laterality: N/A;    CATARACT EXTRACTION      both eyes     CATARACT EXTRACTION W/ INTRAOCULAR LENS IMPLANT Left 2019    Procedure: CATARACT PHACO EXTRACTION WITH INTRAOCULAR LENS IMPLANT LEFT;  Surgeon: Masoud David MD;  Location: [facility] OR;  Service: Ophthalmology    CATARACT EXTRACTION W/ INTRAOCULAR LENS IMPLANT Right 2019    Procedure: CATARACT PHACO EXTRACTION WITH INTRAOCULAR LENS IMPLANT RIGHT;  Surgeon: Masoud David MD;  Location: [facility] OR;  Service: Ophthalmology     SECTION  1981    CHOLECYSTECTOMY  2009    COLECTOMY PARTIAL / TOTAL  1998    COLON CANCER    COLONOSCOPY      COLONOSCOPY N/A 2021    Procedure: COLONOSCOPY WITH BIOPSY;  Surgeon: Iona Sanders MD;  Location: [facility] ENDOSCOPY;  Service: Gastroenterology;  Laterality: N/A;    ENDOSCOPY  2016    ENDOSCOPY N/A 2018    Procedure: ESOPHAGOGASTRODUODENOSCOPY WITH COLD FORCEP BIOPSY;  Surgeon: Vasquez Fagan MD;  Location: [facility] ENDOSCOPY;  Service: Gastroenterology    ENDOSCOPY N/A 2019    Procedure: ESOPHAGOGASTRODUODENOSCOPY WITH BIOPSY;  Surgeon: Vasquez Fagan MD;  Location: [facility] ENDOSCOPY;  Service: Gastroenterology    ENDOSCOPY N/A 2022    Procedure: ESOPHAGOGASTRODUODENOSCOPY with biopsy and polypectomy  ;  Surgeon: Iona Sanders MD;  Location: [facility]  "ENDOSCOPY;  Service: Gastroenterology;  Laterality: N/A;    ENDOSCOPY N/A 6/28/2024    Procedure: ESOPHAGOGASTRODUODENOSCOPY WITH BIOPSY and dilatation;  Surgeon: Iona Sanders MD;  Location: [facility] ENDOSCOPY;  Service: Gastroenterology;  Laterality: N/A;    HYSTERECTOMY      1998    INSERT / REPLACE / REMOVE PACEMAKER      SKIN BIOPSY Left     arm    SKIN LESION EXCISION N/A     VENTRAL HERNIA REPAIR  10/28/2012       Social History     Socioeconomic History    Marital status:    Tobacco Use    Smoking status: Never     Passive exposure: Past    Smokeless tobacco: Never   Vaping Use    Vaping status: Never Used   Substance and Sexual Activity    Alcohol use: Not Currently     Alcohol/week: 1.0 - 2.0 standard drink of alcohol     Types: 1 - 2 Glasses of wine per week     Comment: rarely    Drug use: Never    Sexual activity: Defer        Family History   Problem Relation Age of Onset    Hypertension Mother     Asthma Mother     COPD Mother     Osteoarthritis Mother     Stomach cancer Father     Cancer Father     Cancer Sister     Diabetes Maternal Grandmother     Colon cancer Neg Hx        The following portions of the patient's history were reviewed and updated as appropriate: allergies, current medications, problem list, surgical history, social history, and family history.    Review of Systems:    A 12-point ROS performed is negative except per listed in HPI.  Of note she is somewhat hard of hearing.        Objective:   /62 (BP Location: Left arm, Patient Position: Sitting, Cuff Size: Adult)   Pulse 99   Ht 160 cm (63\")   Wt 68.1 kg (150 lb 3.2 oz)   SpO2 94% Comment: 5 L/min  BMI 26.61 kg/m²        Physical Exam:  General:Well-developed well-nourished, no acute distress  HEENT: Pupils equal round and reactive to light.  Oropharynx is clear and moist.  CV: Regular rate and rhythm with only a 1-2/6 HS murmur, no gallop or rub.    Resp: The lungs are clear bilaterally with no rales or " wheezes.  Equal expansion is noted.  GI: The abdomen is soft and nontender with normal bowel sounds throughout.  No hepatosplenomegaly or midline bruits are present.  Musculoskeletal/extremities: No gross joint deformities are noted.    Trace lower extremity edema with 1+ pedal pulses in all except in the left posterior tibial which is a 2+ pulse.  Skin: Warm and dry  Neurologic: Nonfocal  Psychiatric: Normal mood and affect      Diagnostic Data:    Lab Results   Component Value Date    WBC 8.57 08/26/2024    HGB 10.8 (L) 08/26/2024    HCT 36.7 08/26/2024    MCV 85.5 08/26/2024     08/26/2024     Lab Results   Component Value Date    GLUCOSE 90 08/26/2024    BUN 14 08/26/2024    CREATININE 0.79 08/26/2024    EGFR 74.3 08/26/2024    EGFRIFAFRI 68 12/14/2020    BCR 17.7 08/26/2024    K 4.2 08/26/2024    CO2 33.8 (H) 08/26/2024    CALCIUM 9.2 08/26/2024    ALBUMIN 4.1 08/26/2024    AST 12 08/26/2024    ALT 6 08/26/2024     Lab Results   Component Value Date    CHOL 124 07/04/2023    TRIG 54 07/04/2023    HDL 64 (H) 07/04/2023    LDL 48 07/04/2023     Lab Results   Component Value Date    HGBA1C 5.30 07/05/2023     Lab Results   Component Value Date    TSH 3.660 04/29/2024       Procedures            Assessment:     1. Severe mitral regurgitation, becoming more symptomatic.   2.     Atrial fibrillation  3.     COPD     The patient presented today with her .  The patient's echo images were reviewed with her and her .  We had a long discussion about the possibility of a MitraClip.  The model of the MitraClip was demonstrated.  The patient understands that she would have to undergo a transesophageal echo to evaluate her mitral anatomy.  On her transthoracic echo it certainly appears that she would be a good MitraClip candidate.  I also discussed cardiac catheterization with her as I think it is important to evaluate all patients with severe MR for coronary artery disease which could be contributing to  their mitral regurgitation.  She is agreeable to proceed.  Her radial pulses are at best 1+.  We discussed the surgical option of a mitral valve repair as well.  Based on the patient's COPD as well as her age I think she would be a much better MitraClip candidate.          Plan:   Schedule transesophageal echo.  Further recommendations will follow.        Advance Care Planning   ACP discussion was held with the patient during this visit. Patient does not have an advance directive, information provided.            Jeannie Zabala MD, FACC

## 2025-01-29 RX ORDER — RIVAROXABAN 20 MG/1
20 TABLET, FILM COATED ORAL DAILY
Qty: 90 TABLET | Refills: 3 | Status: SHIPPED | OUTPATIENT
Start: 2025-01-29

## 2025-01-30 ENCOUNTER — DOCUMENTATION (OUTPATIENT)
Dept: CARDIOLOGY | Facility: HOSPITAL | Age: 85
End: 2025-01-30
Payer: MEDICARE

## 2025-01-30 ENCOUNTER — HOSPITAL ENCOUNTER (OUTPATIENT)
Dept: CARDIOLOGY | Facility: HOSPITAL | Age: 85
Discharge: HOME | End: 2025-01-30
Admitting: INTERNAL MEDICINE
Payer: MEDICARE

## 2025-01-30 VITALS
RESPIRATION RATE: 11 BRPM | HEART RATE: 60 BPM | OXYGEN SATURATION: 97 % | SYSTOLIC BLOOD PRESSURE: 103 MMHG | DIASTOLIC BLOOD PRESSURE: 47 MMHG

## 2025-01-30 DIAGNOSIS — I05.8 OTHER RHEUMATIC MITRAL VALVE DISEASES: ICD-10-CM

## 2025-01-30 DIAGNOSIS — I34.0 SEVERE MITRAL REGURGITATION: ICD-10-CM

## 2025-01-30 LAB
BH CV ECHO MEAS - MV MEAN PG: 2 MMHG
BH CV VAS BP RIGHT ARM: NORMAL MMHG
LV EF 2D ECHO EST: 35 %

## 2025-01-30 PROCEDURE — 76376 3D RENDER W/INTRP POSTPROCES: CPT

## 2025-01-30 PROCEDURE — 25010000002 MIDAZOLAM PER 1 MG: Performed by: INTERNAL MEDICINE

## 2025-01-30 PROCEDURE — 25010000002 FENTANYL CITRATE (PF) 50 MCG/ML SOLUTION: Performed by: INTERNAL MEDICINE

## 2025-01-30 PROCEDURE — 93312 ECHO TRANSESOPHAGEAL: CPT

## 2025-01-30 PROCEDURE — 93325 DOPPLER ECHO COLOR FLOW MAPG: CPT

## 2025-01-30 PROCEDURE — 93320 DOPPLER ECHO COMPLETE: CPT

## 2025-01-30 RX ORDER — FENTANYL CITRATE 50 UG/ML
INJECTION, SOLUTION INTRAMUSCULAR; INTRAVENOUS
Status: COMPLETED | OUTPATIENT
Start: 2025-01-30 | End: 2025-01-30

## 2025-01-30 RX ORDER — MIDAZOLAM HYDROCHLORIDE 1 MG/ML
INJECTION, SOLUTION INTRAMUSCULAR; INTRAVENOUS
Status: COMPLETED | OUTPATIENT
Start: 2025-01-30 | End: 2025-01-30

## 2025-01-30 RX ORDER — ETOMIDATE 2 MG/ML
INJECTION INTRAVENOUS
Status: COMPLETED | OUTPATIENT
Start: 2025-01-30 | End: 2025-01-30

## 2025-01-30 RX ADMIN — MIDAZOLAM 2 MG: 1 INJECTION INTRAMUSCULAR; INTRAVENOUS at 09:29

## 2025-01-30 RX ADMIN — ETOMIDATE 7 MG: 20 INJECTION, SOLUTION INTRAVENOUS at 09:33

## 2025-01-30 RX ADMIN — FENTANYL CITRATE 50 MCG: 50 INJECTION, SOLUTION INTRAMUSCULAR; INTRAVENOUS at 09:27

## 2025-01-30 RX ADMIN — MIDAZOLAM 2 MG: 1 INJECTION INTRAMUSCULAR; INTRAVENOUS at 09:27

## 2025-01-30 RX ADMIN — ETOMIDATE 4 MG: 20 INJECTION, SOLUTION INTRAVENOUS at 10:00

## 2025-01-30 RX ADMIN — FENTANYL CITRATE 50 MCG: 50 INJECTION, SOLUTION INTRAMUSCULAR; INTRAVENOUS at 09:30

## 2025-01-30 NOTE — INTERVAL H&P NOTE
H&P reviewed. The patient was examined and there are no changes to the H&P.      /70   Pulse 75   Resp 14   SpO2 98%      Patient is a 84-year-old history of mitral regurgitation who is being evaluated for MitraClip.  She presents today for transesophageal echocardiogram.  She does tell me that she has a history of esophageal dilatation that was done last June by EGD.  I reviewed the procedure and it was noted that the stricture was around the gastroesophageal junction.  Patient does state that she has had a lot of acid reflux recently but she has not had any food that has gotten stuck in her esophagus.  I had a brief discussion with the patient prior to chart review.  Patient will be prepped for transesophageal echocardiogram.  I did discuss with the patient given her history that it is possible that Dr. Zabala may have to abort the procedure.  She will determine this prior to the procedure and further recommendations will be made.    Electronically signed by Christiane Valenzuela PA-C, 01/30/25, 8:36 AM PETER Zabala MD, [facility]

## 2025-02-07 ENCOUNTER — TELEPHONE (OUTPATIENT)
Dept: CARDIOLOGY | Facility: CLINIC | Age: 85
End: 2025-02-07
Payer: MEDICARE

## 2025-02-07 NOTE — TELEPHONE ENCOUNTER
"Patient left voice mail message.  She states she has some questions and requests a return call.  Spoke with patient.  She would like REBECA results.  Results reviewed with patient.  She states she is tired and not sure if she wants the vijaya-clip..  She doesn't want to be a \"burden\" to her family.  She is instructed that her symptoms will get worse and she will be unable to do things as her symptoms worsen.  She again states she is tired and isn't sure. She is encouraged that [facility] would want her to be 100% sure she wants the mtira-clip before she does the procedure.   Patient spoke a long time about her groin area being very itchy and how she had a cold.  She states she is tired  multiple times.  She states multiple times she didn't want to be a \"burden\" to her family.  She reports a redness and itching in her groin that she thinks is infected.  She then starts talking about her cold and how she is coughing \"so bad\".  She is encouraged to follow up with her PCP for the groin infection and cough.  She verbalizes understanding.  "

## 2025-02-08 ENCOUNTER — HOSPITAL ENCOUNTER (INPATIENT)
Facility: HOSPITAL | Age: 85
LOS: 2 days | Discharge: HOME | DRG: 292 | End: 2025-02-10
Attending: STUDENT IN AN ORGANIZED HEALTH CARE EDUCATION/TRAINING PROGRAM | Admitting: INTERNAL MEDICINE
Payer: MEDICARE

## 2025-02-08 ENCOUNTER — APPOINTMENT (OUTPATIENT)
Dept: GENERAL RADIOLOGY | Facility: HOSPITAL | Age: 85
DRG: 292 | End: 2025-02-08
Payer: MEDICARE

## 2025-02-08 DIAGNOSIS — I50.23 ACUTE ON CHRONIC SYSTOLIC HEART FAILURE: Primary | ICD-10-CM

## 2025-02-08 DIAGNOSIS — I50.9 ACUTE ON CHRONIC CONGESTIVE HEART FAILURE, UNSPECIFIED HEART FAILURE TYPE: ICD-10-CM

## 2025-02-08 LAB
ALBUMIN SERPL-MCNC: 4.2 G/DL (ref 3.5–5.2)
ALBUMIN/GLOB SERPL: 1.6 G/DL
ALP SERPL-CCNC: 96 U/L (ref 39–117)
ALT SERPL W P-5'-P-CCNC: 9 U/L (ref 1–33)
ANION GAP SERPL CALCULATED.3IONS-SCNC: 10.5 MMOL/L (ref 5–15)
AST SERPL-CCNC: 19 U/L (ref 1–32)
ATMOSPHERIC PRESS: 728 MMHG
BASE EXCESS BLDV CALC-SCNC: -7.7 MMOL/L (ref 0–2)
BASOPHILS # BLD AUTO: 0.04 10*3/MM3 (ref 0–0.2)
BASOPHILS NFR BLD AUTO: 0.5 % (ref 0–1.5)
BDY SITE: ABNORMAL
BILIRUB SERPL-MCNC: 1 MG/DL (ref 0–1.2)
BUN SERPL-MCNC: 17 MG/DL (ref 8–23)
BUN/CREAT SERPL: 21 (ref 7–25)
CALCIUM SPEC-SCNC: 9 MG/DL (ref 8.6–10.5)
CHLORIDE SERPL-SCNC: 98 MMOL/L (ref 98–107)
CO2 SERPL-SCNC: 25.5 MMOL/L (ref 22–29)
COHGB MFR BLD: 1.2 % (ref 0–5)
CREAT SERPL-MCNC: 0.81 MG/DL (ref 0.57–1)
D-LACTATE SERPL-SCNC: 1.4 MMOL/L (ref 0.5–2)
DEPRECATED RDW RBC AUTO: 50.5 FL (ref 37–54)
EGFRCR SERPLBLD CKD-EPI 2021: 72.1 ML/MIN/1.73
EOSINOPHIL # BLD AUTO: 0.04 10*3/MM3 (ref 0–0.4)
EOSINOPHIL NFR BLD AUTO: 0.5 % (ref 0.3–6.2)
ERYTHROCYTE [DISTWIDTH] IN BLOOD BY AUTOMATED COUNT: 16.2 % (ref 12.3–15.4)
FLUAV SUBTYP SPEC NAA+PROBE: DETECTED
FLUBV RNA ISLT QL NAA+PROBE: NOT DETECTED
GEN 5 1HR TROPONIN T REFLEX: 24 NG/L
GLOBULIN UR ELPH-MCNC: 2.7 GM/DL
GLUCOSE SERPL-MCNC: 103 MG/DL (ref 65–99)
HCO3 BLDV-SCNC: 17.8 MMOL/L (ref 22–28)
HCT VFR BLD AUTO: 37.4 % (ref 34–46.6)
HGB BLD-MCNC: 11.6 G/DL (ref 12–15.9)
HOLD SPECIMEN: NORMAL
HOLD SPECIMEN: NORMAL
IMM GRANULOCYTES # BLD AUTO: 0.06 10*3/MM3 (ref 0–0.05)
IMM GRANULOCYTES NFR BLD AUTO: 0.7 % (ref 0–0.5)
INHALED O2 CONCENTRATION: 21 %
LYMPHOCYTES # BLD AUTO: 0.4 10*3/MM3 (ref 0.7–3.1)
LYMPHOCYTES NFR BLD AUTO: 4.7 % (ref 19.6–45.3)
Lab: ABNORMAL
Lab: ABNORMAL
MCH RBC QN AUTO: 26.7 PG (ref 26.6–33)
MCHC RBC AUTO-ENTMCNC: 31 G/DL (ref 31.5–35.7)
MCV RBC AUTO: 86 FL (ref 79–97)
METHGB BLD QL: 0.9 % (ref 0–3)
MODALITY: ABNORMAL
MONOCYTES # BLD AUTO: 1.42 10*3/MM3 (ref 0.1–0.9)
MONOCYTES NFR BLD AUTO: 16.7 % (ref 5–12)
NEUTROPHILS NFR BLD AUTO: 6.53 10*3/MM3 (ref 1.7–7)
NEUTROPHILS NFR BLD AUTO: 76.9 % (ref 42.7–76)
NOTIFIED BY: ABNORMAL
NOTIFIED WHO: ABNORMAL
NRBC BLD AUTO-RTO: 0 /100 WBC (ref 0–0.2)
NT-PROBNP SERPL-MCNC: 3308 PG/ML (ref 0–1800)
OXYHGB MFR BLDV: 46.2 % (ref 40–70)
PCO2 BLDV: 35.1 MM HG (ref 40–50)
PH BLDV: 7.31 PH UNITS (ref 7.32–7.42)
PLATELET # BLD AUTO: 244 10*3/MM3 (ref 140–450)
PMV BLD AUTO: 9.8 FL (ref 6–12)
PO2 BLDV: 26.5 MM HG (ref 30–50)
POTASSIUM SERPL-SCNC: 4.2 MMOL/L (ref 3.5–5.2)
PROCALCITONIN SERPL-MCNC: 0.1 NG/ML (ref 0–0.25)
PROT SERPL-MCNC: 6.9 G/DL (ref 6–8.5)
RBC # BLD AUTO: 4.35 10*6/MM3 (ref 3.77–5.28)
SAO2 % BLDCOV: 47.2 % (ref 45–75)
SARS-COV-2 RNA RESP QL NAA+PROBE: NOT DETECTED
SODIUM SERPL-SCNC: 134 MMOL/L (ref 136–145)
TROPONIN T % DELTA: 0
TROPONIN T NUMERIC DELTA: 0 NG/L
TROPONIN T SERPL HS-MCNC: 24 NG/L
VENTILATOR MODE: ABNORMAL
WBC NRBC COR # BLD AUTO: 8.49 10*3/MM3 (ref 3.4–10.8)
WHOLE BLOOD HOLD COAG: NORMAL
WHOLE BLOOD HOLD SPECIMEN: NORMAL

## 2025-02-08 PROCEDURE — 82805 BLOOD GASES W/O2 SATURATION: CPT

## 2025-02-08 PROCEDURE — 84484 ASSAY OF TROPONIN QUANT: CPT | Performed by: STUDENT IN AN ORGANIZED HEALTH CARE EDUCATION/TRAINING PROGRAM

## 2025-02-08 PROCEDURE — 99223 1ST HOSP IP/OBS HIGH 75: CPT | Performed by: INTERNAL MEDICINE

## 2025-02-08 PROCEDURE — 25010000002 ONDANSETRON PER 1 MG: Performed by: PHYSICIAN ASSISTANT

## 2025-02-08 PROCEDURE — 71045 X-RAY EXAM CHEST 1 VIEW: CPT

## 2025-02-08 PROCEDURE — 99285 EMERGENCY DEPT VISIT HI MDM: CPT | Performed by: STUDENT IN AN ORGANIZED HEALTH CARE EDUCATION/TRAINING PROGRAM

## 2025-02-08 PROCEDURE — 84145 PROCALCITONIN (PCT): CPT | Performed by: PHYSICIAN ASSISTANT

## 2025-02-08 PROCEDURE — 93005 ELECTROCARDIOGRAM TRACING: CPT | Performed by: STUDENT IN AN ORGANIZED HEALTH CARE EDUCATION/TRAINING PROGRAM

## 2025-02-08 PROCEDURE — 83880 ASSAY OF NATRIURETIC PEPTIDE: CPT | Performed by: STUDENT IN AN ORGANIZED HEALTH CARE EDUCATION/TRAINING PROGRAM

## 2025-02-08 PROCEDURE — 80053 COMPREHEN METABOLIC PANEL: CPT | Performed by: STUDENT IN AN ORGANIZED HEALTH CARE EDUCATION/TRAINING PROGRAM

## 2025-02-08 PROCEDURE — 87636 SARSCOV2 & INF A&B AMP PRB: CPT | Performed by: PHYSICIAN ASSISTANT

## 2025-02-08 PROCEDURE — 85025 COMPLETE CBC W/AUTO DIFF WBC: CPT | Performed by: STUDENT IN AN ORGANIZED HEALTH CARE EDUCATION/TRAINING PROGRAM

## 2025-02-08 PROCEDURE — 82820 HEMOGLOBIN-OXYGEN AFFINITY: CPT

## 2025-02-08 PROCEDURE — 25010000002 FUROSEMIDE PER 20 MG: Performed by: PHYSICIAN ASSISTANT

## 2025-02-08 PROCEDURE — 83605 ASSAY OF LACTIC ACID: CPT | Performed by: PHYSICIAN ASSISTANT

## 2025-02-08 RX ORDER — GUAIFENESIN 600 MG/1
600 TABLET, EXTENDED RELEASE ORAL EVERY 12 HOURS SCHEDULED
Status: DISCONTINUED | OUTPATIENT
Start: 2025-02-08 | End: 2025-02-10 | Stop reason: HOSPADM

## 2025-02-08 RX ORDER — PANTOPRAZOLE SODIUM 40 MG/1
40 TABLET, DELAYED RELEASE ORAL DAILY
Status: DISCONTINUED | OUTPATIENT
Start: 2025-02-09 | End: 2025-02-10 | Stop reason: HOSPADM

## 2025-02-08 RX ORDER — ONDANSETRON 2 MG/ML
4 INJECTION INTRAMUSCULAR; INTRAVENOUS EVERY 6 HOURS PRN
Status: DISCONTINUED | OUTPATIENT
Start: 2025-02-08 | End: 2025-02-10 | Stop reason: HOSPADM

## 2025-02-08 RX ORDER — SODIUM CHLORIDE 9 MG/ML
40 INJECTION, SOLUTION INTRAVENOUS AS NEEDED
Status: DISCONTINUED | OUTPATIENT
Start: 2025-02-08 | End: 2025-02-10 | Stop reason: HOSPADM

## 2025-02-08 RX ORDER — FUROSEMIDE 10 MG/ML
20 INJECTION INTRAMUSCULAR; INTRAVENOUS ONCE
Status: COMPLETED | OUTPATIENT
Start: 2025-02-08 | End: 2025-02-08

## 2025-02-08 RX ORDER — SODIUM CHLORIDE 0.9 % (FLUSH) 0.9 %
10 SYRINGE (ML) INJECTION EVERY 12 HOURS SCHEDULED
Status: DISCONTINUED | OUTPATIENT
Start: 2025-02-08 | End: 2025-02-10 | Stop reason: HOSPADM

## 2025-02-08 RX ORDER — BUDESONIDE AND FORMOTEROL FUMARATE DIHYDRATE 160; 4.5 UG/1; UG/1
2 AEROSOL RESPIRATORY (INHALATION)
Status: DISCONTINUED | OUTPATIENT
Start: 2025-02-08 | End: 2025-02-10 | Stop reason: HOSPADM

## 2025-02-08 RX ORDER — POLYETHYLENE GLYCOL 3350 17 G/17G
17 POWDER, FOR SOLUTION ORAL DAILY PRN
Status: DISCONTINUED | OUTPATIENT
Start: 2025-02-08 | End: 2025-02-10 | Stop reason: HOSPADM

## 2025-02-08 RX ORDER — OSELTAMIVIR PHOSPHATE 75 MG/1
75 CAPSULE ORAL ONCE
Status: COMPLETED | OUTPATIENT
Start: 2025-02-08 | End: 2025-02-08

## 2025-02-08 RX ORDER — IPRATROPIUM BROMIDE AND ALBUTEROL SULFATE 2.5; .5 MG/3ML; MG/3ML
3 SOLUTION RESPIRATORY (INHALATION) EVERY 4 HOURS PRN
Status: DISCONTINUED | OUTPATIENT
Start: 2025-02-08 | End: 2025-02-10 | Stop reason: HOSPADM

## 2025-02-08 RX ORDER — SODIUM CHLORIDE 0.9 % (FLUSH) 0.9 %
10 SYRINGE (ML) INJECTION AS NEEDED
Status: DISCONTINUED | OUTPATIENT
Start: 2025-02-08 | End: 2025-02-10 | Stop reason: HOSPADM

## 2025-02-08 RX ORDER — FUROSEMIDE 10 MG/ML
40 INJECTION INTRAMUSCULAR; INTRAVENOUS 2 TIMES DAILY
Status: DISCONTINUED | OUTPATIENT
Start: 2025-02-09 | End: 2025-02-10

## 2025-02-08 RX ORDER — OSELTAMIVIR PHOSPHATE 30 MG/1
30 CAPSULE ORAL EVERY 12 HOURS SCHEDULED
Status: DISCONTINUED | OUTPATIENT
Start: 2025-02-09 | End: 2025-02-10 | Stop reason: HOSPADM

## 2025-02-08 RX ORDER — METOPROLOL SUCCINATE 50 MG/1
50 TABLET, EXTENDED RELEASE ORAL DAILY
Status: DISCONTINUED | OUTPATIENT
Start: 2025-02-09 | End: 2025-02-10 | Stop reason: HOSPADM

## 2025-02-08 RX ORDER — ACETAMINOPHEN 650 MG/1
650 SUPPOSITORY RECTAL EVERY 4 HOURS PRN
Status: DISCONTINUED | OUTPATIENT
Start: 2025-02-08 | End: 2025-02-10 | Stop reason: HOSPADM

## 2025-02-08 RX ORDER — BISACODYL 5 MG/1
5 TABLET, DELAYED RELEASE ORAL DAILY PRN
Status: DISCONTINUED | OUTPATIENT
Start: 2025-02-08 | End: 2025-02-10 | Stop reason: HOSPADM

## 2025-02-08 RX ORDER — SPIRONOLACTONE 25 MG/1
12.5 TABLET ORAL DAILY
Status: DISCONTINUED | OUTPATIENT
Start: 2025-02-09 | End: 2025-02-10 | Stop reason: HOSPADM

## 2025-02-08 RX ORDER — ACETAMINOPHEN 160 MG/5ML
650 SOLUTION ORAL EVERY 4 HOURS PRN
Status: DISCONTINUED | OUTPATIENT
Start: 2025-02-08 | End: 2025-02-10 | Stop reason: HOSPADM

## 2025-02-08 RX ORDER — BISACODYL 10 MG
10 SUPPOSITORY, RECTAL RECTAL DAILY PRN
Status: DISCONTINUED | OUTPATIENT
Start: 2025-02-08 | End: 2025-02-10 | Stop reason: HOSPADM

## 2025-02-08 RX ORDER — POTASSIUM CHLORIDE 750 MG/1
10 CAPSULE, EXTENDED RELEASE ORAL DAILY
Status: DISCONTINUED | OUTPATIENT
Start: 2025-02-09 | End: 2025-02-10 | Stop reason: HOSPADM

## 2025-02-08 RX ORDER — ONDANSETRON 2 MG/ML
4 INJECTION INTRAMUSCULAR; INTRAVENOUS ONCE
Status: COMPLETED | OUTPATIENT
Start: 2025-02-08 | End: 2025-02-08

## 2025-02-08 RX ORDER — ACETAMINOPHEN 325 MG/1
650 TABLET ORAL EVERY 4 HOURS PRN
Status: DISCONTINUED | OUTPATIENT
Start: 2025-02-08 | End: 2025-02-10 | Stop reason: HOSPADM

## 2025-02-08 RX ORDER — DICYCLOMINE HYDROCHLORIDE 10 MG/1
10 CAPSULE ORAL 4 TIMES DAILY PRN
Status: DISCONTINUED | OUTPATIENT
Start: 2025-02-08 | End: 2025-02-10 | Stop reason: HOSPADM

## 2025-02-08 RX ORDER — AMOXICILLIN 250 MG
2 CAPSULE ORAL 2 TIMES DAILY
Status: DISCONTINUED | OUTPATIENT
Start: 2025-02-08 | End: 2025-02-10 | Stop reason: HOSPADM

## 2025-02-08 RX ADMIN — GUAIFENESIN 600 MG: 600 TABLET, EXTENDED RELEASE ORAL at 22:30

## 2025-02-08 RX ADMIN — SACUBITRIL AND VALSARTAN 1 TABLET: 24; 26 TABLET, FILM COATED ORAL at 22:30

## 2025-02-08 RX ADMIN — ONDANSETRON 4 MG: 2 INJECTION INTRAMUSCULAR; INTRAVENOUS at 20:03

## 2025-02-08 RX ADMIN — OSELTAMIVIR PHOSPHATE 75 MG: 75 CAPSULE ORAL at 20:03

## 2025-02-08 RX ADMIN — FUROSEMIDE 20 MG: 10 INJECTION, SOLUTION INTRAMUSCULAR; INTRAVENOUS at 20:03

## 2025-02-08 RX ADMIN — BUDESONIDE AND FORMOTEROL FUMARATE DIHYDRATE 2 PUFF: 160; 4.5 AEROSOL RESPIRATORY (INHALATION) at 22:30

## 2025-02-08 NOTE — ED PROVIDER NOTES
" EMERGENCY DEPARTMENT ENCOUNTER    Pt Name: Elsa Alcaraz  MRN: [id number]  Pt :   1940  Room Number:  429/1  Date of encounter:  2025  PCP: Kemi Hdz MD  ED Provider: Cristino Morales MD    Historian: Patient      HPI:  Chief Complaint   Patient presents with    Exposure To Known Illness    Shortness of Breath          Context: Elsa Alcaraz is a 84 y.o. female who presents to the ED c/o cough, shortness of breath.  Patient states symptoms started couple days ago when she was exposed to someone who had \"a cold\".  She states she has got a cough productive with yellow sputum.  No chest pain.  Wears 4 to 5 L of oxygen chronically since bad reaction to COVID-vaccine.  Just had an echocardiogram done 2025 at [facility] which showed a estimated left EF of 35% with mitral regurgitation.  Patient denies any swelling in her lower extremities worse than baseline.  Had chills but no fever.      PAST MEDICAL HISTORY  Past Medical History:   Diagnosis Date    Anemia     Anxiety disorder due to general medical condition 2017    Arthritis     Atrial fibrillation 2017    Body piercing     BOTH EARS    Borderline diabetes     Breast cancer     right-radiation and a lumpectomy    Broken tooth     Cataract 2017    Left eye surgery     Chronic bronchitis 2017    Colon cancer 1998    had surgery and chemo    COPD (chronic obstructive pulmonary disease)     Cyanocobalamine deficiency (non anemic)     Depression 2017    Diverticulosis     Elevated cholesterol     Esophageal reflux 2017    Hiatal hernia 2017    History of bladder infections     History of echocardiogram 2021    Hx of exercise stress test     \"several years ago by Dr. Mercado\".      Hx of radiation therapy     Hyperlipidemia     Hypertension 2017    Impaired functional mobility and activity tolerance     Impaired functional mobility, balance, gait, and " endurance     Osteoporosis     Palpitations 2017    Prediabetes     Problems with swallowing     meat at times per pt report    Shortness of breath     WITH EXERTION     Sleep apnea 2017    NO CPAP    Tricuspid valve disorder 2017    Patient doesn't know anything about this    Vitamin D deficiency     Wears glasses          PAST SURGICAL HISTORY  Past Surgical History:   Procedure Laterality Date    ANAL FISTULOTOMY      APPENDECTOMY          BREAST BIOPSY      BREAST LUMPECTOMY Right 2006    CARDIAC ELECTROPHYSIOLOGY PROCEDURE N/A 2023    Procedure: Micra;  Surgeon: Keven Willis DO;  Location: [facility] EP INVASIVE LOCATION;  Service: Cardiology;  Laterality: N/A;    CATARACT EXTRACTION      both eyes     CATARACT EXTRACTION W/ INTRAOCULAR LENS IMPLANT Left 2019    Procedure: CATARACT PHACO EXTRACTION WITH INTRAOCULAR LENS IMPLANT LEFT;  Surgeon: Masoud David MD;  Location: [facility] OR;  Service: Ophthalmology    CATARACT EXTRACTION W/ INTRAOCULAR LENS IMPLANT Right 2019    Procedure: CATARACT PHACO EXTRACTION WITH INTRAOCULAR LENS IMPLANT RIGHT;  Surgeon: Masoud David MD;  Location: [facility] OR;  Service: Ophthalmology     SECTION  1981    CHOLECYSTECTOMY  2009    COLECTOMY PARTIAL / TOTAL  1998    COLON CANCER    COLONOSCOPY      COLONOSCOPY N/A 2021    Procedure: COLONOSCOPY WITH BIOPSY;  Surgeon: Iona Sanders MD;  Location: [facility] ENDOSCOPY;  Service: Gastroenterology;  Laterality: N/A;    ENDOSCOPY  2016    ENDOSCOPY N/A 2018    Procedure: ESOPHAGOGASTRODUODENOSCOPY WITH COLD FORCEP BIOPSY;  Surgeon: Vasquez Fagan MD;  Location: [facility] ENDOSCOPY;  Service: Gastroenterology    ENDOSCOPY N/A 2019    Procedure: ESOPHAGOGASTRODUODENOSCOPY WITH BIOPSY;  Surgeon: Vasquez Fagan MD;  Location: [facility] ENDOSCOPY;  Service: Gastroenterology    ENDOSCOPY N/A 2022    Procedure:  ESOPHAGOGASTRODUODENOSCOPY with biopsy and polypectomy  ;  Surgeon: Iona Sanders MD;  Location: [facility] ENDOSCOPY;  Service: Gastroenterology;  Laterality: N/A;    ENDOSCOPY N/A 6/28/2024    Procedure: ESOPHAGOGASTRODUODENOSCOPY WITH BIOPSY and dilatation;  Surgeon: Iona Sanders MD;  Location: [facility] ENDOSCOPY;  Service: Gastroenterology;  Laterality: N/A;    HYSTERECTOMY      1998    INSERT / REPLACE / REMOVE PACEMAKER      SKIN BIOPSY Left     arm    SKIN LESION EXCISION N/A     VENTRAL HERNIA REPAIR  10/28/2012         FAMILY HISTORY  Family History   Problem Relation Age of Onset    Hypertension Mother     Asthma Mother     COPD Mother     Osteoarthritis Mother     Stomach cancer Father     Cancer Father     Cancer Sister     Diabetes Maternal Grandmother     Colon cancer Neg Hx          SOCIAL HISTORY  Social History     Socioeconomic History    Marital status:    Tobacco Use    Smoking status: Never     Passive exposure: Past    Smokeless tobacco: Never   Vaping Use    Vaping status: Never Used   Substance and Sexual Activity    Alcohol use: Not Currently     Alcohol/week: 1.0 - 2.0 standard drink of alcohol     Types: 1 - 2 Glasses of wine per week     Comment: rarely    Drug use: Never    Sexual activity: Defer         ALLERGIES  Other        REVIEW OF SYSTEMS  Review of Systems   Constitutional:  Positive for chills.   HENT: Negative.     Eyes: Negative.    Respiratory:  Positive for cough and shortness of breath.    Cardiovascular: Negative.    Gastrointestinal: Negative.    Genitourinary: Negative.  Negative for dysuria.   Musculoskeletal: Negative.    Skin: Negative.         Rash to groin   Neurological: Negative.    Psychiatric/Behavioral: Negative.          All systems reviewed and negative except for those discussed in HPI.       PHYSICAL EXAM    I have reviewed the triage vital signs and nursing notes.    ED Triage Vitals [02/08/25 1758]   Temp Heart Rate Resp BP SpO2   --  66 20 105/44 94 %      Temp src Heart Rate Source Patient Position BP Location FiO2 (%)   -- -- -- -- --       Physical Exam  Vitals and nursing note reviewed.   Constitutional:       General: She is not in acute distress.     Appearance: She is well-developed. She is not ill-appearing, toxic-appearing or diaphoretic.   HENT:      Head: Normocephalic and atraumatic.   Eyes:      Extraocular Movements: Extraocular movements intact.   Cardiovascular:      Rate and Rhythm: Normal rate and regular rhythm.   Pulmonary:      Effort: Pulmonary effort is normal.      Breath sounds: Normal breath sounds.      Comments: Coarse breath sounds throughout  Musculoskeletal:         General: Normal range of motion.      Cervical back: Normal range of motion.      Right lower leg: No tenderness. No edema.      Left lower leg: No tenderness. No edema.   Skin:     General: Skin is warm and dry.      Comments: Erythematous rash to the bilateral labia majora consistent with Candida.   Neurological:      General: No focal deficit present.      Mental Status: She is alert.   Psychiatric:         Mood and Affect: Mood normal.         Behavior: Behavior normal.            LAB RESULTS  No results found for this or any previous visit (from the past 24 hours).      If labs were ordered, I independently reviewed the results and considered them in treating the patient.        RADIOLOGY  No Radiology Exams Resulted Within Past 24 Hours        PROCEDURES    Procedures    Interpretations    O2 Sat: The patient's oxygen saturation was 94% on  6 L Nasal Cannula.  This was independently interpreted by me as Normal    EKG: I reviewed and independently interpreted the EKG as ventricularly paced rhythm    Cardiac Monitoring: I reviewed and independently interpreted the Rhythm Strip as ventricularly paced rhythm at 66 bpm    Radiology: I ordered and independently reviewed the above noted radiographic studies.  I viewed images of Chest Xray which showed No  pulmonary process per my independent interpretation. See radiologist's dictation for official interpretation.     MEDICATIONS GIVEN IN ER    Medications   furosemide (LASIX) injection 20 mg (20 mg Intravenous Given 2/8/25 2003)   oseltamivir (TAMIFLU) capsule 75 mg (75 mg Oral Given 2/8/25 2003)   ondansetron (ZOFRAN) injection 4 mg (4 mg Intravenous Given 2/8/25 2003)         MEDICAL DECISION MAKING, PROGRESS, and CONSULTS    All labs, if obtained, have been independently reviewed by me.  All radiology studies, if obtained, have been reviewed by me and the radiologist dictating the report.  All EKG's, if obtained, have been independently viewed and interpreted by me      Discussion below represents my analysis of pertinent findings related to patient's condition, differential diagnosis, treatment plan and final disposition.      Differential diagnosis:    Pneumonia, COVID, flu, CHF exacerbation    Additional Sources:  None      Orders placed during this visit:  Orders Placed This Encounter   Procedures    COVID-19 and FLU A/B PCR, 1 HR TAT - Swab, Nasopharynx    XR Chest 1 View    [place] Draw    Comprehensive Metabolic Panel    BNP    High Sensitivity Troponin T    CBC Auto Differential    Procalcitonin    Lactic Acid, Plasma    High Sensitivity Troponin T 1Hr    Blood Gas, Venous With Co-Ox    Basic Metabolic Panel    CBC (No Diff)    Magnesium    CBC (No Diff)    Basic Metabolic Panel    Ambulatory Referral to Disease State Management    Undress & Gown    Vital Signs    Check Temperature    Weigh patient    Saline Lock & Maintain IV Access    Continuous Pulse Oximetry    Discharge Follow-up with PCP    Diet: Cardiac Diets; Healthy Heart (2-3 Na+); Regular (IDDSI 7); Thin (IDDSI 0)    Activity as Tolerated    Resume Oxygen Therapy After Discharge    ECG 12 Lead ED Triage Standing Order; SOA    Telemetry Scan    Telemetry Scan    Telemetry Scan    Telemetry Scan    Inpatient Admission    Discharge patient    CBC &  Differential    Green Top (Gel)    Lavender Top    Gold Top - SST    Light Blue Top         Additional orders considered but not ordered:  None    ED Course:    Consultants:  None    ED Course as of 02/17/25 1645   Sat Feb 08, 2025   1842 WBC: 8.49 [TM]   1854 Lactate: 1.4 [TM]   1917 Influenza A PCR(!): Detected [TM]   1917 proBNP(!): 3,308.0 [TM]   1917 HS Troponin T(!): 24 [TM]   1917 Procalcitonin: 0.10 [TM]   1917 Lactate: 1.4 [TM]   1917 WBC: 8.49 [TM]   2016 Patient on Xarelto, has had no missed doses, no tachycardia, normal oxygen saturation on her home oxygen lying in bed, no chest pain, low suspicion for PE. [TM]      ED Course User Index  [TM] Husam Montgomery PA-C           After my consideration of clinical presentation and any laboratory/radiology studies obtained, I discussed the findings with the patient/patient representative who is in agreement with the treatment plan and the final disposition. Risks and benefits of admission was discussed     AS OF 16:45 EST VITALS:    BP - 92/58  HR - 79  TEMP - 97.5 °F (36.4 °C) (Oral)  O2 SATS - 96%    I reviewed the patient's prescription monitoring report if available prior to discharge    DIAGNOSIS  Final diagnoses:   Acute on chronic congestive heart failure, unspecified heart failure type         DISPOSITION  ED Disposition       ED Disposition   Decision to Admit    Condition   --    Comment   Level of Care: Telemetry [5]   Diagnosis: Acute on chronic systolic heart failure [428.23.ICD-9-CM]   Admitting Physician: JACQUELINE POZO [[id number]]   Certification: I Certify That Inpatient Hospital Services Are Medically Necessary For Greater Than 2 Midnights                     Please note that portions of this document were completed with voice recognition software.        Husam Montgomery PA-C  02/09/25 1700       Cristino Morales MD  02/17/25 [id number]

## 2025-02-09 LAB
ANION GAP SERPL CALCULATED.3IONS-SCNC: 12.4 MMOL/L (ref 5–15)
BUN SERPL-MCNC: 18 MG/DL (ref 8–23)
BUN/CREAT SERPL: 22.5 (ref 7–25)
CALCIUM SPEC-SCNC: 8.4 MG/DL (ref 8.6–10.5)
CHLORIDE SERPL-SCNC: 100 MMOL/L (ref 98–107)
CO2 SERPL-SCNC: 24.6 MMOL/L (ref 22–29)
CREAT SERPL-MCNC: 0.8 MG/DL (ref 0.57–1)
DEPRECATED RDW RBC AUTO: 51 FL (ref 37–54)
EGFRCR SERPLBLD CKD-EPI 2021: 73.2 ML/MIN/1.73
ERYTHROCYTE [DISTWIDTH] IN BLOOD BY AUTOMATED COUNT: 16.2 % (ref 12.3–15.4)
GLUCOSE SERPL-MCNC: 81 MG/DL (ref 65–99)
HCT VFR BLD AUTO: 34.8 % (ref 34–46.6)
HGB BLD-MCNC: 10.8 G/DL (ref 12–15.9)
MAGNESIUM SERPL-MCNC: 2.1 MG/DL (ref 1.6–2.4)
MCH RBC QN AUTO: 26.7 PG (ref 26.6–33)
MCHC RBC AUTO-ENTMCNC: 31 G/DL (ref 31.5–35.7)
MCV RBC AUTO: 85.9 FL (ref 79–97)
PLATELET # BLD AUTO: 209 10*3/MM3 (ref 140–450)
PMV BLD AUTO: 9.9 FL (ref 6–12)
POTASSIUM SERPL-SCNC: 3.9 MMOL/L (ref 3.5–5.2)
RBC # BLD AUTO: 4.05 10*6/MM3 (ref 3.77–5.28)
SODIUM SERPL-SCNC: 137 MMOL/L (ref 136–145)
WBC NRBC COR # BLD AUTO: 5.59 10*3/MM3 (ref 3.4–10.8)

## 2025-02-09 PROCEDURE — 94761 N-INVAS EAR/PLS OXIMETRY MLT: CPT

## 2025-02-09 PROCEDURE — 25010000002 FUROSEMIDE PER 20 MG: Performed by: INTERNAL MEDICINE

## 2025-02-09 PROCEDURE — 94799 UNLISTED PULMONARY SVC/PX: CPT

## 2025-02-09 PROCEDURE — 85027 COMPLETE CBC AUTOMATED: CPT | Performed by: INTERNAL MEDICINE

## 2025-02-09 PROCEDURE — 83735 ASSAY OF MAGNESIUM: CPT | Performed by: INTERNAL MEDICINE

## 2025-02-09 PROCEDURE — 80048 BASIC METABOLIC PNL TOTAL CA: CPT | Performed by: INTERNAL MEDICINE

## 2025-02-09 PROCEDURE — 99232 SBSQ HOSP IP/OBS MODERATE 35: CPT | Performed by: INTERNAL MEDICINE

## 2025-02-09 RX ADMIN — GUAIFENESIN 600 MG: 600 TABLET, EXTENDED RELEASE ORAL at 09:13

## 2025-02-09 RX ADMIN — BUDESONIDE AND FORMOTEROL FUMARATE DIHYDRATE 2 PUFF: 160; 4.5 AEROSOL RESPIRATORY (INHALATION) at 19:30

## 2025-02-09 RX ADMIN — OSELTAMIVIR PHOSPHATE 30 MG: 30 CAPSULE ORAL at 09:12

## 2025-02-09 RX ADMIN — RIVAROXABAN 20 MG: 10 TABLET, FILM COATED ORAL at 09:11

## 2025-02-09 RX ADMIN — EMPAGLIFLOZIN 10 MG: 10 TABLET, FILM COATED ORAL at 09:12

## 2025-02-09 RX ADMIN — PANTOPRAZOLE SODIUM 40 MG: 40 TABLET, DELAYED RELEASE ORAL at 09:13

## 2025-02-09 RX ADMIN — Medication 10 ML: at 09:13

## 2025-02-09 RX ADMIN — POTASSIUM CHLORIDE 10 MEQ: 750 CAPSULE, EXTENDED RELEASE ORAL at 09:11

## 2025-02-09 RX ADMIN — SPIRONOLACTONE 12.5 MG: 25 TABLET, FILM COATED ORAL at 09:11

## 2025-02-09 RX ADMIN — FUROSEMIDE 40 MG: 10 INJECTION, SOLUTION INTRAMUSCULAR; INTRAVENOUS at 23:59

## 2025-02-09 RX ADMIN — BUDESONIDE AND FORMOTEROL FUMARATE DIHYDRATE 2 PUFF: 160; 4.5 AEROSOL RESPIRATORY (INHALATION) at 07:12

## 2025-02-09 RX ADMIN — FUROSEMIDE 40 MG: 10 INJECTION, SOLUTION INTRAMUSCULAR; INTRAVENOUS at 09:10

## 2025-02-09 RX ADMIN — SACUBITRIL AND VALSARTAN 1 TABLET: 24; 26 TABLET, FILM COATED ORAL at 09:12

## 2025-02-09 NOTE — H&P
[facility]      Name:  Elsa Alcaraz   Age:  84 y.o.  Sex:  female  :  1940  MRN:  [id number]   Visit Number:  [id number]  Admission Date:  2025  Date Of Service:  25  Primary Care Physician:  Kemi Hdz MD    Chief Complaint:     Shortness of breath.    History Of Presenting Illness:      Elsa Alcaraz is an 84-year-old female with history of chronic systolic heart failure (follows up with Dr. Germain), chronic atrial fibrillation on rivaroxaban, status post intracardiac pacemaker, severe mitral regurgitation COPD on home oxygen at 4 L, hypertension, GERD was brought to the emergency room by her  with symptoms of shortness of breath since a couple of days.  Patient apparently was exposed to someone with flulike illness.  Patient complains of progressive shortness of breath associated with productive cough with yellow sputum in the last couple of days.  She wears about 4 L of oxygen at home but had to increase it to 6 L to maintain saturations.  Patient recently had an echocardiogram on 2025 at [facility] which showed ejection fraction of 35% and severe mitral valve regurgitation.  Patient states that she has been advised transluminal MitraClip placement which has not been scheduled yet.    In the ED, her initial temperature was 99.7, pulse 66, blood pressure 105/44 and pulse oxygen saturation of 95% on 5 L of nasal cannula oxygen.  Initial troponin 24 with a repeat at 24.  proBNP 3308.  CMP was unremarkable except for his sodium of 134.  Lactic acid, procalcitonin, CBC were unremarkable.  COVID test was negative but she was positive for influenza A.  Chest x-ray showed increased bronchovascular markings with cardiomegaly.  Due to low normal blood pressures, she was only given 20 mg of IV Lasix, Zofran and Tamiflu in the emergency room and was subsequently admitted to the medical floor with telemetry for  management of acute on chronic systolic heart failure and acute influenza A bronchitis.    Review Of Systems:    All systems were reviewed and negative except as mentioned in history of presenting illness, assessment and plan.    Past Medical History: Patient's  has a past medical history of Anemia, Anxiety disorder due to general medical condition (2017), Arthritis, Atrial fibrillation (2017), Body piercing, Borderline diabetes, Breast cancer (), Broken tooth, Cataract (2017), Chronic bronchitis (2017), Colon cancer (1998), COPD (chronic obstructive pulmonary disease), Cyanocobalamine deficiency (non anemic), Depression (2017), Diverticulosis, Elevated cholesterol, Esophageal reflux (2017), Hiatal hernia (2017), History of bladder infections, History of echocardiogram (2021), exercise stress test, radiation therapy, Hyperlipidemia, Hypertension (2017), Impaired functional mobility and activity tolerance, Impaired functional mobility, balance, gait, and endurance, Osteoporosis, Palpitations (2017), Prediabetes, Problems with swallowing, Shortness of breath, Sleep apnea (2017), Tricuspid valve disorder (2017), Vitamin D deficiency, and Wears glasses.    Past Surgical History: Patient's  has a past surgical history that includes Anal fistulotomy;  section (1981); Cholecystectomy (2009); Colectomy partial / total (1998); Ventral hernia repair (10/28/2012); Colonoscopy (); Esophagogastroduodenoscopy (); Esophagogastroduodenoscopy (N/A, 2018); Breast lumpectomy (Right, 2006); Esophagogastroduodenoscopy (N/A, 2019); Breast biopsy; Cataract extraction w/ intraocular lens implant (Left, 2019); Hysterectomy; Appendectomy; Cataract extraction w/ intraocular lens implant (Right, 2019); Cataract extraction (); Colonoscopy (N/A, 2021); Esophagogastroduodenoscopy (N/A,  09/13/2022); Cardiac electrophysiology procedure (N/A, 07/05/2023); Insert / replace / remove pacemaker; Skin biopsy (Left); Skin lesion excision (N/A); and Esophagogastroduodenoscopy (N/A, 6/28/2024).    Social History: Patient's  reports that she has never smoked. She has been exposed to tobacco smoke. She has never used smokeless tobacco. She reports that she does not currently use alcohol after a past usage of about 1.0 - 2.0 standard drink of alcohol per week. She reports that she does not use drugs.    Family History:  Patient's family history includes Asthma in her mother; COPD in her mother; Cancer in her father and sister; Diabetes in her maternal grandmother; Hypertension in her mother; Osteoarthritis in her mother; Stomach cancer in her father.     Allergies:      Other    Home Medications:    Prior to Admission Medications       Prescriptions Last Dose Informant Patient Reported? Taking?    albuterol sulfate  (90 Base) MCG/ACT inhaler  Self No No    Inhale 2 puffs 4 (Four) Times a Day.    Patient taking differently:  Inhale 2 puffs 2 (Two) Times a Day.    benzonatate (TESSALON) 100 MG capsule   No No    Take 1 capsule by mouth 3 (Three) Times a Day As Needed for Cough.    Patient not taking:  Reported on 1/14/2025    budesonide-formoterol (SYMBICORT) 160-4.5 MCG/ACT inhaler  Self No No    Inhale 2 puffs 2 (Two) Times a Day. Rinse mouth with water after use    Patient taking differently:  Inhale 2 puffs Daily. Rinse mouth with water after use    cholecalciferol (VITAMIN D3) 25 MCG (1000 UT) tablet  Self Yes No    Take 1 tablet by mouth Daily.    citalopram (CeleXA) 20 MG tablet  Self Yes No    Take 1 tablet by mouth Daily.    Patient not taking:  Reported on 1/14/2025    dicyclomine (BENTYL) 10 MG/5ML syrup   Yes No    Take 10 mL by mouth 4 (Four) Times a Day Before Meals & at Bedtime As Needed.    empagliflozin (Jardiance) 10 MG tablet tablet   No No    Take 1 tablet by mouth Daily.    Patient not  taking:  Reported on 1/14/2025    famotidine (PEPCID) 40 MG tablet   No No    TAKE 1 TABLET BY MOUTH ONCE DAILY AT NIGHT    furosemide (Lasix) 20 MG tablet  Self No No    Take 1 tablet by mouth Daily.    guaiFENesin (MUCINEX) 600 MG 12 hr tablet  Self No No    Take 2 tablets by mouth 2 (Two) Times a Day.    Patient taking differently:  Take 2 tablets by mouth As Needed.    metoprolol succinate XL (TOPROL-XL) 50 MG 24 hr tablet   No No    Take 1 tablet by mouth Daily.    O2 (OXYGEN)   Yes No    Inhale 5 L/min Daily As Needed.    pantoprazole (PROTONIX) 40 MG EC tablet  Self Yes No    Take 1 tablet by mouth Daily.    potassium chloride (K-DUR,KLOR-CON) 10 MEQ CR tablet  Self Yes No    Take 1 tablet by mouth 2 (Two) Times a Day.    Patient not taking:  Reported on 1/14/2025    potassium chloride 10 MEQ CR tablet   Yes No    Psyllium (Metamucil) wafer wafer  Self Yes No    Take 2-4 wafers by mouth Daily.    sacubitril-valsartan (ENTRESTO) 24-26 MG tablet   No No    Take 1 tablet by mouth Every 12 (Twelve) Hours.    spironolactone (ALDACTONE) 25 MG tablet  Self No No    Take 0.5 tablets by mouth Daily.    vitamin B-12 (CYANOCOBALAMIN) 100 MCG tablet  Self Yes No    Take 0.5 tablets by mouth Daily.    Xarelto 20 MG tablet   No No    Take 1 tablet by mouth Daily.     ED Medications:    Medications   sodium chloride 0.9 % flush 10 mL (has no administration in time range)   Pharmacy to Dose Oseltamivir (TAMIFLU) (has no administration in time range)   oseltamivir (TAMIFLU) capsule 75 mg (75 mg Oral Given 2/8/25 2003)     Followed by   oseltamivir (TAMIFLU) capsule 30 mg (has no administration in time range)   furosemide (LASIX) injection 20 mg (20 mg Intravenous Given 2/8/25 2003)   ondansetron (ZOFRAN) injection 4 mg (4 mg Intravenous Given 2/8/25 2003)     Vital Signs:  Temp:  [99.7 °F (37.6 °C)] 99.7 °F (37.6 °C)  Heart Rate:  [65-66] 65  Resp:  [20-23] 23  BP: (105-106)/(44-48) 106/48        02/08/25  1758   Weight: 68.1  "kg (150 lb 2.1 oz)     Body mass index is 26.59 kg/m².    Physical Exam:     Most recent vital Signs: /48 (BP Location: Left arm, Patient Position: Lying)   Pulse 65   Temp 99.7 °F (37.6 °C) (Oral)   Resp 23   Ht 160 cm (63\")   Wt 68.1 kg (150 lb 2.1 oz)   SpO2 95%   BMI 26.59 kg/m²     Physical Exam  Constitutional:       General: She is not in acute distress.     Appearance: She is not ill-appearing.   HENT:      Head: Normocephalic and atraumatic.      Right Ear: External ear normal.      Left Ear: External ear normal.      Nose: Nose normal.      Mouth/Throat:      Mouth: Mucous membranes are moist.   Eyes:      Conjunctiva/sclera: Conjunctivae normal.   Cardiovascular:      Rate and Rhythm: Normal rate and regular rhythm.      Pulses: Normal pulses.      Heart sounds: Murmur heard.      Comments: 2/6 pansystolic murmur heard at the apex.  Pulmonary:      Breath sounds: Wheezing and rales present.      Comments: Tensive coarse crackles heard bilaterally both upper and lower zones.  Bilateral scattered wheezing heard.  Abdominal:      General: Bowel sounds are normal.      Palpations: Abdomen is soft.      Tenderness: There is no abdominal tenderness. There is no guarding or rebound.   Musculoskeletal:         General: Normal range of motion.      Cervical back: Neck supple.      Right lower leg: Edema present.      Left lower leg: Edema present.      Comments: 2+ pitting edema noted in both legs.   Skin:     General: Skin is warm.      Findings: No erythema or rash.   Neurological:      General: No focal deficit present.      Mental Status: She is alert and oriented to person, place, and time. Mental status is at baseline.   Psychiatric:         Mood and Affect: Mood normal.         Behavior: Behavior normal.       Laboratory data:    I have reviewed the labs done in the emergency room.    Results from last 7 days   Lab Units 02/08/25  1832   SODIUM mmol/L 134*   POTASSIUM mmol/L 4.2   CHLORIDE " mmol/L 98   CO2 mmol/L 25.5   BUN mg/dL 17   CREATININE mg/dL 0.81   CALCIUM mg/dL 9.0   BILIRUBIN mg/dL 1.0   ALK PHOS U/L 96   ALT (SGPT) U/L 9   AST (SGOT) U/L 19   GLUCOSE mg/dL 103*     Results from last 7 days   Lab Units 02/08/25  1832   WBC 10*3/mm3 8.49   HEMOGLOBIN g/dL 11.6*   HEMATOCRIT % 37.4   PLATELETS 10*3/mm3 244         Results from last 7 days   Lab Units 02/08/25  1933 02/08/25  1832   HSTROP T ng/L 24* 24*     Results from last 7 days   Lab Units 02/08/25  1832   PROBNP pg/mL 3,308.0*     EKG:      EKG done in the emergency room was reviewed by me.  It shows ventricular paced rhythm at 68 bpm with broad QRS complexes and associated nonspecific ST-T changes.    Radiology:    Portable chest x-ray done in the emergency room was reviewed by me.  It shows cardiomegaly with aortic calcifications.  Prominent bronchovascular markings noted without any infiltrates.  Intracardiac pacemaker noted.    Assessment:    Acute on chronic systolic heart failure, POA.  Acute influenza A bronchitis, POA.  Chronic hypoxic respiratory failure on home oxygen.  COPD, no exacerbation.  Severe mitral valve regurgitation.  Sick sinus syndrome status post intracardiac pacemaker.  Permanent atrial fibrillation on rivaroxaban.  Hiatal hernia.  Gastroesophageal reflux disease.    Plan:    Acute on chronic systolic heart failure.  - Likely secondary to valvular heart disease.  - Patient was given 20 mg of Lasix in the emergency room.  - We will place her on Lasix 40 mg twice daily per heart failure protocol.  - Continue nasal cannula oxygen to maintain saturation above 90%.  - Continue Toprol-XL, Entresto, Jardiance.    Acute influenza A bronchitis.  - Continue oseltamivir for 5 days.    COPD.  - No exacerbation at this time.  - Continue bronchodilators, Symbicort, mucolytic agents.    Permanent atrial fibrillation.  - Continue Toprol-XL and rivaroxaban.    I have discussed the patient's condition and treatment plan with her   Logan who is at the bedside.  Patient does not have a living will but wants to be full code.    Risk Assessment: Moderate  DVT Prophylaxis: Rivaroxaban  Code Status: Full  Diet: Cardiac      Nik Hicks MD  02/08/25  21:13 EST    Dictated utilizing Dragon dictation.

## 2025-02-09 NOTE — PHARMACY RECOMMENDATION
"Pharmacokinetic Consult - Oseltamivir Dosing  Elsa Alcaraz is a 84 y.o. female who has been consulted to dose oseltamivir (Tamiflu) for  confirmed influenza A .    Current Antimicrobial Therapy    Anti-Infectives (From admission, onward)      Ordered     Dose/Rate Route Frequency Start Stop    02/08/25 1935  oseltamivir (TAMIFLU) capsule 30 mg        Ordering Provider: Husam Montgomery PA-C   Placed in \"Followed by\" Linked Group    30 mg Oral Every 12 Hours Scheduled 02/09/25 0900 02/13/25 2059 02/08/25 1935  oseltamivir (TAMIFLU) capsule 75 mg        Ordering Provider: Husam Montgomery PA-C   Placed in \"Followed by\" Linked Group    75 mg Oral Once 02/08/25 1950 02/13/25 1950            Microbiology Results (last 10 days)       Procedure Component Value - Date/Time    COVID-19 and FLU A/B PCR, 1 HR TAT - Swab, Nasopharynx [[id number]]  (Abnormal) Collected: 02/08/25 1844    Lab Status: Final result Specimen: Swab from Nasopharynx Updated: 02/08/25 1908     COVID19 Not Detected     Influenza A PCR Detected     Influenza B PCR Not Detected    Narrative:      Fact sheet for providers: https://www.fda.gov/media/346712/download    Fact sheet for patients: https://www.fda.gov/media/017102/download    Test performed by PCR.             Allergies  Other    Relevant clinical data and objective history reviewed:  Creatinine   Date Value Ref Range Status   02/08/2025 0.81 0.57 - 1.00 mg/dL Final     Estimated Creatinine Clearance: 48.8 mL/min (by C-G formula based on SCr of 0.81 mg/dL).  No intake/output data recorded.  Patient weight: 68.1 kg (150 lb 2.1 oz)    Asessment/Plan  Initiate oseltamivir 75 mg PO × 1, followed by 30 mg PO q 12 hrs for a total of 5 days (10 doses).  Pharmacy will monitor Ms. Alcaraz's renal function and clinical status and adjust the oseltamivir dose and/or frequency as needed.      Thank you for the opportunity to consult on this patient.    John Guerrero RP, Pharm.D.  02/08/25 "  19:40 EST

## 2025-02-09 NOTE — PROGRESS NOTES
[facility]    Name:  Elsa Alcaraz   Age:  84 y.o.  Sex:  female  :  1940  MRN:  [id number]   Visit Number:  [id number]  Admission Date:  2025  Date Of Service:  25  Primary Care Physician:  Kemi Hdz MD     LOS: 1 day :    Chief Complaint:      Shortness of breath    Subjective:    Patient examined this morning.  Stable on her home 4 L nasal cannula.  Diuresing well.  No acute events overnight.  Still with slight crackles.  Will continue diuresis today with hopeful discharge home tomorrow.    Hospital Course:    Elsa Alcaraz is an 84-year-old female with history of chronic systolic heart failure (follows up with Dr. Germain), chronic atrial fibrillation on rivaroxaban, status post intracardiac pacemaker, severe mitral regurgitation COPD on home oxygen at 4 L, hypertension, GERD was brought to the emergency room by her  with symptoms of shortness of breath since a couple of days.     Review of Systems:     All systems were reviewed and negative except as mentioned in subjective, assessment and plan.    Vital Signs:    Temp:  [98 °F (36.7 °C)-99.7 °F (37.6 °C)] 98 °F (36.7 °C)  Heart Rate:  [64-79] 66  Resp:  [18-23] 20  BP: ()/(44-52) 93/46    Intake and output:    No intake/output data recorded.  I/O this shift:  In: 360 [P.O.:360]  Out: -     Physical Examination:    General Appearance:  Alert and cooperative.    Head:  Atraumatic and normocephalic.   Eyes: Conjunctivae and sclerae normal, no icterus. No pallor.   Throat: No oral lesions, no thrush, oral mucosa moist.   Neck: Supple, trachea midline, no thyromegaly.   Lungs:   Slight crackles on exam.  Nonlabored.   Heart:  Normal S1 and S2, no murmur, no gallop, no rub. No JVD.   Abdomen:   Normal bowel sounds, no masses, no organomegaly. Soft, nontender, nondistended, no rebound tenderness.   Extremities: Supple, no edema, no cyanosis, no clubbing.   Skin: No bleeding or  "rash.   Neurologic: Alert and oriented x 3. No facial asymmetry. Moves all four limbs. No tremors.      Laboratory results:    Results from last 7 days   Lab Units 02/09/25  0539 02/08/25  1832   SODIUM mmol/L 137 134*   POTASSIUM mmol/L 3.9 4.2   CHLORIDE mmol/L 100 98   CO2 mmol/L 24.6 25.5   BUN mg/dL 18 17   CREATININE mg/dL 0.80 0.81   CALCIUM mg/dL 8.4* 9.0   BILIRUBIN mg/dL  --  1.0   ALK PHOS U/L  --  96   ALT (SGPT) U/L  --  9   AST (SGOT) U/L  --  19   GLUCOSE mg/dL 81 103*     Results from last 7 days   Lab Units 02/09/25  0539 02/08/25  1832   WBC 10*3/mm3 5.59 8.49   HEMOGLOBIN g/dL 10.8* 11.6*   HEMATOCRIT % 34.8 37.4   PLATELETS 10*3/mm3 209 244         Results from last 7 days   Lab Units 02/08/25  1933 02/08/25  1832   HSTROP T ng/L 24* 24*         No results for input(s): \"PHART\", \"UNW7RPZ\", \"PO2ART\", \"DAB2PJI\", \"BASEEXCESS\" in the last 8760 hours.   I have reviewed the patient's laboratory results.    Radiology results:    XR Chest 1 View    Result Date: 2/9/2025  PROCEDURE: XR CHEST 1 VW-  HISTORY: [facility]A Triage Protocol  COMPARISON: 08/26/2024  FINDINGS:  Portable view of the chest demonstrates coarse increased markings in the retrocardiac left lung base. These findings are similar to prior exam and may be due to atelectasis or scarring. Right lung is clear. There is no evidence of effusion, pneumothorax or other significant pleural disease. The mediastinum is unremarkable.  The heart size is mildly enlarged.      Impression: Persistent increased markings left lower lobe may represent atelectasis or scarring.    This report was signed and finalized on 2/9/2025 8:18 AM by Schuyler Thomas MD.     I have reviewed the patient's radiology reports.    Medication Review:     I have reviewed the patient's active and prn medications.     Problem List:      Acute on chronic systolic heart failure      Assessment:    Acute on chronic systolic heart failure, POA.  Acute influenza A bronchitis, POA.  Chronic " hypoxic respiratory failure on home oxygen.  COPD, no exacerbation.  Severe mitral valve regurgitation.  Sick sinus syndrome status post intracardiac pacemaker.  Permanent atrial fibrillation on rivaroxaban.  Hiatal hernia.  Gastroesophageal reflux disease.    Plan:    Acute on chronic systolic heart failure.  - Likely secondary to valvular heart disease.  - Patient was given 20 mg of Lasix in the emergency room.  - Continue Lasix 40 mg twice daily per heart failure protocol.  - Continue nasal cannula oxygen to maintain saturation above 90%.  - Continue Toprol-XL, Entresto, Jardiance.     Acute influenza A bronchitis.  - Continue oseltamivir for 5 days.     COPD.  - No exacerbation at this time.  - Continue bronchodilators, Symbicort, mucolytic agents.     Permanent atrial fibrillation.  - Continue Toprol-XL and rivaroxaban.    Further orders as clinical course dictate    DVT Prophylaxis: Rivaroxaban  Code Status: Full  Diet: Cardiac  Discharge Plan: Home tomorrow    Sarwat Morley DO  02/09/25  14:13 EST    Dictated utilizing Dragon dictation.

## 2025-02-09 NOTE — PAYOR COMM NOTE
"To:  Branson West  From: Sosa Yee RN  Phone: [phone]  Fax: [phone]  NPI: [id number]  TIN: [id number]  Member ID: [id number]   MRN: [id number]    Elsa Caraballo (84 y.o. Female)       Date of Birth   1940    Social Security Number       Address   [address] [place] [address]    Home Phone   [phone]    MRN   [id number]       Taoist   Jehovah's witness    Marital Status                               Admission Date   25    Admission Type   Emergency    Admitting Provider   Nik Hicks MD    Attending Provider   Sarwat Morley DO    Department, Room/Bed   [facility] TELEMETRY , /       Discharge Date       Discharge Disposition       Discharge Destination                                 Attending Provider: Sarwat Morley DO    Allergies: Other    Isolation: Droplet   Infection: Influenza (25)   Code Status: CPR    Ht: 160 cm (63\")   Wt: 67 kg (147 lb 11.3 oz)    Admission Cmt: None   Principal Problem: Acute on chronic systolic heart failure [I50.23]                   Active Insurance as of 2025       Primary Coverage       Payor Plan Insurance Group Employer/Plan Group    ANTHEM MEDICARE REPLACEMENT ANTHEM MED ADV PPO [id number]       Payor Plan Address Payor Plan Phone Number Payor Plan Fax Number Effective Dates    [address] [phone]  2020 - None Entered    [facility] [id number]         Subscriber Name Subscriber Birth Date Member ID       ELSA CARABALLO 1940 [id number]                     Emergency Contacts        (Rel.) Home Phone Work Phone Mobile Phone    KieranLogan (Spouse) [phone] -- [phone]    DARIAN CHERRY (Daughter) [phone] -- [phone]    KieranJuliet (Daughter) -- -- [phone]                 History & Physical        Nik Hicks MD at 25 2113            [facility] HOSPITALIST   HISTORY AND PHYSICAL      Name:  Elsa Caraballo   Age:  84 y.o.  Sex:  female  :  " 1940  MRN:  [id number]   Visit Number:  [id number]  Admission Date:  2/8/2025  Date Of Service:  02/08/25  Primary Care Physician:  Kemi Hdz MD    Chief Complaint:     Shortness of breath.    History Of Presenting Illness:      Elsa Alcaraz is an 84-year-old female with history of chronic systolic heart failure (follows up with Dr. Germain), chronic atrial fibrillation on rivaroxaban, status post intracardiac pacemaker, severe mitral regurgitation COPD on home oxygen at 4 L, hypertension, GERD was brought to the emergency room by her  with symptoms of shortness of breath since a couple of days.  Patient apparently was exposed to someone with flulike illness.  Patient complains of progressive shortness of breath associated with productive cough with yellow sputum in the last couple of days.  She wears about 4 L of oxygen at home but had to increase it to 6 L to maintain saturations.  Patient recently had an echocardiogram on 1/30/2025 at [facility] which showed ejection fraction of 35% and severe mitral valve regurgitation.  Patient states that she has been advised transluminal MitraClip placement which has not been scheduled yet.    In the ED, her initial temperature was 99.7, pulse 66, blood pressure 105/44 and pulse oxygen saturation of 95% on 5 L of nasal cannula oxygen.  Initial troponin 24 with a repeat at 24.  proBNP 3308.  CMP was unremarkable except for his sodium of 134.  Lactic acid, procalcitonin, CBC were unremarkable.  COVID test was negative but she was positive for influenza A.  Chest x-ray showed increased bronchovascular markings with cardiomegaly.  Due to low normal blood pressures, she was only given 20 mg of IV Lasix, Zofran and Tamiflu in the emergency room and was subsequently admitted to the medical floor with telemetry for management of acute on chronic systolic heart failure and acute influenza A bronchitis.    Review Of Systems:    All systems were  reviewed and negative except as mentioned in history of presenting illness, assessment and plan.    Past Medical History: Patient's  has a past medical history of Anemia, Anxiety disorder due to general medical condition (2017), Arthritis, Atrial fibrillation (2017), Body piercing, Borderline diabetes, Breast cancer (), Broken tooth, Cataract (2017), Chronic bronchitis (2017), Colon cancer (1998), COPD (chronic obstructive pulmonary disease), Cyanocobalamine deficiency (non anemic), Depression (2017), Diverticulosis, Elevated cholesterol, Esophageal reflux (2017), Hiatal hernia (2017), History of bladder infections, History of echocardiogram (2021), exercise stress test, radiation therapy, Hyperlipidemia, Hypertension (2017), Impaired functional mobility and activity tolerance, Impaired functional mobility, balance, gait, and endurance, Osteoporosis, Palpitations (2017), Prediabetes, Problems with swallowing, Shortness of breath, Sleep apnea (2017), Tricuspid valve disorder (2017), Vitamin D deficiency, and Wears glasses.    Past Surgical History: Patient's  has a past surgical history that includes Anal fistulotomy;  section (1981); Cholecystectomy (2009); Colectomy partial / total (1998); Ventral hernia repair (10/28/2012); Colonoscopy (); Esophagogastroduodenoscopy (); Esophagogastroduodenoscopy (N/A, 2018); Breast lumpectomy (Right, 2006); Esophagogastroduodenoscopy (N/A, 2019); Breast biopsy; Cataract extraction w/ intraocular lens implant (Left, 2019); Hysterectomy; Appendectomy; Cataract extraction w/ intraocular lens implant (Right, 2019); Cataract extraction (); Colonoscopy (N/A, 2021); Esophagogastroduodenoscopy (N/A, 2022); Cardiac electrophysiology procedure (N/A, 2023); Insert / replace / remove pacemaker; Skin biopsy (Left); Skin lesion  excision (N/A); and Esophagogastroduodenoscopy (N/A, 6/28/2024).    Social History: Patient's  reports that she has never smoked. She has been exposed to tobacco smoke. She has never used smokeless tobacco. She reports that she does not currently use alcohol after a past usage of about 1.0 - 2.0 standard drink of alcohol per week. She reports that she does not use drugs.    Family History:  Patient's family history includes Asthma in her mother; COPD in her mother; Cancer in her father and sister; Diabetes in her maternal grandmother; Hypertension in her mother; Osteoarthritis in her mother; Stomach cancer in her father.     Allergies:      Other    Home Medications:    Prior to Admission Medications       Prescriptions Last Dose Informant Patient Reported? Taking?    albuterol sulfate  (90 Base) MCG/ACT inhaler  Self No No    Inhale 2 puffs 4 (Four) Times a Day.    Patient taking differently:  Inhale 2 puffs 2 (Two) Times a Day.    benzonatate (TESSALON) 100 MG capsule   No No    Take 1 capsule by mouth 3 (Three) Times a Day As Needed for Cough.    Patient not taking:  Reported on 1/14/2025    budesonide-formoterol (SYMBICORT) 160-4.5 MCG/ACT inhaler  Self No No    Inhale 2 puffs 2 (Two) Times a Day. Rinse mouth with water after use    Patient taking differently:  Inhale 2 puffs Daily. Rinse mouth with water after use    cholecalciferol (VITAMIN D3) 25 MCG (1000 UT) tablet  Self Yes No    Take 1 tablet by mouth Daily.    citalopram (CeleXA) 20 MG tablet  Self Yes No    Take 1 tablet by mouth Daily.    Patient not taking:  Reported on 1/14/2025    dicyclomine (BENTYL) 10 MG/5ML syrup   Yes No    Take 10 mL by mouth 4 (Four) Times a Day Before Meals & at Bedtime As Needed.    empagliflozin (Jardiance) 10 MG tablet tablet   No No    Take 1 tablet by mouth Daily.    Patient not taking:  Reported on 1/14/2025    famotidine (PEPCID) 40 MG tablet   No No    TAKE 1 TABLET BY MOUTH ONCE DAILY AT NIGHT    furosemide  (Lasix) 20 MG tablet  Self No No    Take 1 tablet by mouth Daily.    guaiFENesin (MUCINEX) 600 MG 12 hr tablet  Self No No    Take 2 tablets by mouth 2 (Two) Times a Day.    Patient taking differently:  Take 2 tablets by mouth As Needed.    metoprolol succinate XL (TOPROL-XL) 50 MG 24 hr tablet   No No    Take 1 tablet by mouth Daily.    O2 (OXYGEN)   Yes No    Inhale 5 L/min Daily As Needed.    pantoprazole (PROTONIX) 40 MG EC tablet  Self Yes No    Take 1 tablet by mouth Daily.    potassium chloride (K-DUR,KLOR-CON) 10 MEQ CR tablet  Self Yes No    Take 1 tablet by mouth 2 (Two) Times a Day.    Patient not taking:  Reported on 1/14/2025    potassium chloride 10 MEQ CR tablet   Yes No    Psyllium (Metamucil) wafer wafer  Self Yes No    Take 2-4 wafers by mouth Daily.    sacubitril-valsartan (ENTRESTO) 24-26 MG tablet   No No    Take 1 tablet by mouth Every 12 (Twelve) Hours.    spironolactone (ALDACTONE) 25 MG tablet  Self No No    Take 0.5 tablets by mouth Daily.    vitamin B-12 (CYANOCOBALAMIN) 100 MCG tablet  Self Yes No    Take 0.5 tablets by mouth Daily.    Xarelto 20 MG tablet   No No    Take 1 tablet by mouth Daily.     ED Medications:    Medications   sodium chloride 0.9 % flush 10 mL (has no administration in time range)   Pharmacy to Dose Oseltamivir (TAMIFLU) (has no administration in time range)   oseltamivir (TAMIFLU) capsule 75 mg (75 mg Oral Given 2/8/25 2003)     Followed by   oseltamivir (TAMIFLU) capsule 30 mg (has no administration in time range)   furosemide (LASIX) injection 20 mg (20 mg Intravenous Given 2/8/25 2003)   ondansetron (ZOFRAN) injection 4 mg (4 mg Intravenous Given 2/8/25 2003)     Vital Signs:  Temp:  [99.7 °F (37.6 °C)] 99.7 °F (37.6 °C)  Heart Rate:  [65-66] 65  Resp:  [20-23] 23  BP: (105-106)/(44-48) 106/48        02/08/25  0868   Weight: 68.1 kg (150 lb 2.1 oz)     Body mass index is 26.59 kg/m².    Physical Exam:     Most recent vital Signs: /48 (BP Location: Left  "arm, Patient Position: Lying)   Pulse 65   Temp 99.7 °F (37.6 °C) (Oral)   Resp 23   Ht 160 cm (63\")   Wt 68.1 kg (150 lb 2.1 oz)   SpO2 95%   BMI 26.59 kg/m²     Physical Exam  Constitutional:       General: She is not in acute distress.     Appearance: She is not ill-appearing.   HENT:      Head: Normocephalic and atraumatic.      Right Ear: External ear normal.      Left Ear: External ear normal.      Nose: Nose normal.      Mouth/Throat:      Mouth: Mucous membranes are moist.   Eyes:      Conjunctiva/sclera: Conjunctivae normal.   Cardiovascular:      Rate and Rhythm: Normal rate and regular rhythm.      Pulses: Normal pulses.      Heart sounds: Murmur heard.      Comments: 2/6 pansystolic murmur heard at the apex.  Pulmonary:      Breath sounds: Wheezing and rales present.      Comments: Tensive coarse crackles heard bilaterally both upper and lower zones.  Bilateral scattered wheezing heard.  Abdominal:      General: Bowel sounds are normal.      Palpations: Abdomen is soft.      Tenderness: There is no abdominal tenderness. There is no guarding or rebound.   Musculoskeletal:         General: Normal range of motion.      Cervical back: Neck supple.      Right lower leg: Edema present.      Left lower leg: Edema present.      Comments: 2+ pitting edema noted in both legs.   Skin:     General: Skin is warm.      Findings: No erythema or rash.   Neurological:      General: No focal deficit present.      Mental Status: She is alert and oriented to person, place, and time. Mental status is at baseline.   Psychiatric:         Mood and Affect: Mood normal.         Behavior: Behavior normal.       Laboratory data:    I have reviewed the labs done in the emergency room.    Results from last 7 days   Lab Units 02/08/25  1832   SODIUM mmol/L 134*   POTASSIUM mmol/L 4.2   CHLORIDE mmol/L 98   CO2 mmol/L 25.5   BUN mg/dL 17   CREATININE mg/dL 0.81   CALCIUM mg/dL 9.0   BILIRUBIN mg/dL 1.0   ALK PHOS U/L 96   ALT " (SGPT) U/L 9   AST (SGOT) U/L 19   GLUCOSE mg/dL 103*     Results from last 7 days   Lab Units 02/08/25  1832   WBC 10*3/mm3 8.49   HEMOGLOBIN g/dL 11.6*   HEMATOCRIT % 37.4   PLATELETS 10*3/mm3 244         Results from last 7 days   Lab Units 02/08/25  1933 02/08/25  1832   HSTROP T ng/L 24* 24*     Results from last 7 days   Lab Units 02/08/25  1832   PROBNP pg/mL 3,308.0*     EKG:      EKG done in the emergency room was reviewed by me.  It shows ventricular paced rhythm at 68 bpm with broad QRS complexes and associated nonspecific ST-T changes.    Radiology:    Portable chest x-ray done in the emergency room was reviewed by me.  It shows cardiomegaly with aortic calcifications.  Prominent bronchovascular markings noted without any infiltrates.  Intracardiac pacemaker noted.    Assessment:    Acute on chronic systolic heart failure, POA.  Acute influenza A bronchitis, POA.  Chronic hypoxic respiratory failure on home oxygen.  COPD, no exacerbation.  Severe mitral valve regurgitation.  Sick sinus syndrome status post intracardiac pacemaker.  Permanent atrial fibrillation on rivaroxaban.  Hiatal hernia.  Gastroesophageal reflux disease.    Plan:    Acute on chronic systolic heart failure.  - Likely secondary to valvular heart disease.  - Patient was given 20 mg of Lasix in the emergency room.  - We will place her on Lasix 40 mg twice daily per heart failure protocol.  - Continue nasal cannula oxygen to maintain saturation above 90%.  - Continue Toprol-XL, Entresto, Jardiance.    Acute influenza A bronchitis.  - Continue oseltamivir for 5 days.    COPD.  - No exacerbation at this time.  - Continue bronchodilators, Symbicort, mucolytic agents.    Permanent atrial fibrillation.  - Continue Toprol-XL and rivaroxaban.    I have discussed the patient's condition and treatment plan with her  Logan who is at the bedside.  Patient does not have a living will but wants to be full code.    Risk Assessment:  Moderate  DVT Prophylaxis: Rivaroxaban  Code Status: Full  Diet: Cardiac      Nik Hicks MD  02/08/25  21:13 EST    Dictated utilizing Dragon dictation.    Electronically signed by Nik Hicks MD at 02/08/25 2157          Emergency Department Notes        Nikole Mitchell, RN at 02/09/25 0045          Incontinence care provided. New depend placed. No other needs at this time.    Electronically signed by Nikole Mitchell, RN at 02/09/25 0045       Vital Signs (last day)       Date/Time Temp Temp src Pulse Resp BP Patient Position SpO2    02/09/25 0727 98.2 (36.8) Oral 64 18 97/52 Lying 96    02/09/25 0712 -- -- 69 -- -- -- 98    02/09/25 0239 98.8 (37.1) Oral 79 22 89/46 Lying 99    02/09/25 0213 -- -- 68 -- -- -- 95    02/09/25 0000 -- -- 71 -- -- -- 96    02/08/25 2300 -- -- 75 -- -- -- 98    02/08/25 2245 -- -- 73 -- -- -- 96    02/08/25 1931 -- -- 65 23 106/48 Lying 95    02/08/25 1841 99.7 (37.6) Oral -- -- -- -- --    02/08/25 1758 -- -- 66 20 105/44 -- 94          Current Facility-Administered Medications   Medication Dose Route Frequency Provider Last Rate Last Admin    acetaminophen (TYLENOL) tablet 650 mg  650 mg Oral Q4H PRN Nik Hicks MD        Or    acetaminophen (TYLENOL) 160 MG/5ML oral solution 650 mg  650 mg Oral Q4H PRN Nik Hicks MD        Or    acetaminophen (TYLENOL) suppository 650 mg  650 mg Rectal Q4H PRN Nik Hicks MD        sennosides-docusate (PERICOLACE) 8.6-50 MG per tablet 2 tablet  2 tablet Oral BID Nik Hicks MD        And    polyethylene glycol (MIRALAX) packet 17 g  17 g Oral Daily PRN Nik Hicks MD        And    bisacodyl (DULCOLAX) EC tablet 5 mg  5 mg Oral Daily PRN Nik Hicks MD        And    bisacodyl (DULCOLAX) suppository 10 mg  10 mg Rectal Daily PRN Nik Hicks MD        budesonide-formoterol (SYMBICORT) 160-4.5 MCG/ACT inhaler 2 puff  2 puff Inhalation BID - RT Nik Hicks MD   2 puff at 02/09/25 0712    dicyclomine (BENTYL) capsule 10 mg  10 mg Oral 4x  Daily PRN Nik Hicks MD        empagliflozin (JARDIANCE) tablet 10 mg  10 mg Oral Daily Nik Hicks MD   10 mg at 02/09/25 0912    furosemide (LASIX) injection 40 mg  40 mg Intravenous BID Nik Hicks MD   40 mg at 02/09/25 0910    guaiFENesin (MUCINEX) 12 hr tablet 600 mg  600 mg Oral Q12H Nik Hicks MD   600 mg at 02/09/25 0913    ipratropium-albuterol (DUO-NEB) nebulizer solution 3 mL  3 mL Nebulization Q4H PRN Nik Hicks MD        metoprolol succinate XL (TOPROL-XL) 24 hr tablet 50 mg  50 mg Oral Daily Nik Hicks MD        ondansetron (ZOFRAN) injection 4 mg  4 mg Intravenous Q6H PRN Nik Hicks MD        oseltamivir (TAMIFLU) capsule 30 mg  30 mg Oral Q12H Husam Montgomery PA-C   30 mg at 02/09/25 0912    pantoprazole (PROTONIX) EC tablet 40 mg  40 mg Oral Daily Nik Hicks MD   40 mg at 02/09/25 0913    Pharmacy to Dose Oseltamivir (TAMIFLU)   Not Applicable Continuous PRN Husam Montgomery PA-C        potassium chloride (MICRO-K/KLOR-CON) CR capsule  10 mEq Oral Daily Nik Hicks MD   10 mEq at 02/09/25 0911    rivaroxaban (XARELTO) tablet 20 mg  20 mg Oral Daily Nik Hicks MD   20 mg at 02/09/25 0911    sacubitril-valsartan (ENTRESTO) 24-26 MG tablet 1 tablet  1 tablet Oral Q12H Nik Hicks MD   1 tablet at 02/09/25 0912    sodium chloride 0.9 % flush 10 mL  10 mL Intravenous PRN Cristino Morales MD        sodium chloride 0.9 % flush 10 mL  10 mL Intravenous Q12H Nik Hicks MD   10 mL at 02/09/25 0913    sodium chloride 0.9 % flush 10 mL  10 mL Intravenous PRN Nik Hicks MD        sodium chloride 0.9 % infusion 40 mL  40 mL Intravenous PRN Nik Hicks MD        spironolactone (ALDACTONE) tablet 12.5 mg  12.5 mg Oral Daily Nik Hicks MD   12.5 mg at 02/09/25 0911     Lab Results (last 24 hours)       Procedure Component Value Units Date/Time    Magnesium [[id number]]  (Normal) Collected: 02/09/25 0539    Specimen: Blood Updated: 02/09/25 0631     Magnesium  2.1 mg/dL     Basic Metabolic Panel [[id number]]  (Abnormal) Collected: 02/09/25 0539    Specimen: Blood Updated: 02/09/25 0631     Glucose 81 mg/dL      BUN 18 mg/dL      Creatinine 0.80 mg/dL      Sodium 137 mmol/L      Potassium 3.9 mmol/L      Chloride 100 mmol/L      CO2 24.6 mmol/L      Calcium 8.4 mg/dL      BUN/Creatinine Ratio 22.5     Anion Gap 12.4 mmol/L      eGFR 73.2 mL/min/1.73     Narrative:      GFR Categories in Chronic Kidney Disease (CKD)      GFR Category          GFR (mL/min/1.73)    Interpretation  G1                     90 or greater         Normal or high (1)  G2                      60-89                Mild decrease (1)  G3a                   45-59                Mild to moderate decrease  G3b                   30-44                Moderate to severe decrease  G4                    15-29                Severe decrease  G5                    14 or less           Kidney failure          (1)In the absence of evidence of kidney disease, neither GFR category G1 or G2 fulfill the criteria for CKD.    eGFR calculation 2021 CKD-EPI creatinine equation, which does not include race as a factor    CBC (No Diff) [[id number]]  (Abnormal) Collected: 02/09/25 0539    Specimen: Blood Updated: 02/09/25 0612     WBC 5.59 10*3/mm3      RBC 4.05 10*6/mm3      Hemoglobin 10.8 g/dL      Hematocrit 34.8 %      MCV 85.9 fL      MCH 26.7 pg      MCHC 31.0 g/dL      RDW 16.2 %      RDW-SD 51.0 fl      MPV 9.9 fL      Platelets 209 10*3/mm3     High Sensitivity Troponin T 1Hr [[id number]]  (Abnormal) Collected: 02/08/25 1933    Specimen: Blood from Arm, Left Updated: 02/08/25 1957     HS Troponin T 24 ng/L      Troponin T Numeric Delta 0 ng/L      Troponin T % Delta 0    Narrative:      High Sensitive Troponin T Reference Range:  <14.0 ng/L- Negative Female for AMI  <22.0 ng/L- Negative Male for AMI  >=14 - Abnormal Female indicating possible myocardial injury.  >=22 - Abnormal Male indicating possible myocardial  injury.   Clinicians would have to utilize clinical acumen, EKG, Troponin, and serial changes to determine if it is an Acute Myocardial Infarction or myocardial injury due to an underlying chronic condition.         Blood Gas, Venous With Co-Ox [[id number]]  (Abnormal) Collected: 02/08/25 1942    Specimen: Venous Blood Updated: 02/08/25 1942     Site OTHER     pH, Venous 7.314 pH Units      pCO2, Venous 35.1 mm Hg      Comment: 84 Value below reference range        pO2, Venous 26.5 mm Hg      Comment: 84 Value below reference range        HCO3, Venous 17.8 mmol/L      Comment: 84 Value below reference range        Base Excess, Venous -7.7 mmol/L      Comment: 84 Value below reference range        O2 Saturation, Venous 47.2 %      Oxyhemoglobin Venous 46.2 %      Comment: 85 Value below critical limit        Methemoglobin Venous 0.9 %      Carboxyhemoglobin Venous 1.2 %      Barometric Pressure for Blood Gas 728 mmHg      Modality Room Air     FIO2 21 %      Ventilator Mode NA     Notified Who pA     Notified By [id number]     Notified Time 02/08/2025 19:43     Collected by KJ     Comment: Meter: [id number]     :  [id number]       COVID-19 and FLU A/B PCR, 1 HR TAT - Swab, Nasopharynx [[id number]]  (Abnormal) Collected: 02/08/25 1844    Specimen: Swab from Nasopharynx Updated: 02/08/25 1908     COVID19 Not Detected     Influenza A PCR Detected     Influenza B PCR Not Detected    Narrative:      Fact sheet for providers: https://www.fda.gov/media/424571/download    Fact sheet for patients: https://www.fda.gov/media/321808/download    Test performed by PCR.    Procalcitonin [[id number]]  (Normal) Collected: 02/08/25 1832    Specimen: Blood Updated: 02/08/25 1908     Procalcitonin 0.10 ng/mL     Narrative:      As a Marker for Sepsis (Non-Neonates):    1. <0.5 ng/mL represents a low risk of severe sepsis and/or septic shock.  2. >2 ng/mL represents a high risk of severe sepsis and/or septic shock.    As a Marker  "for Lower Respiratory Tract Infections that require antibiotic therapy:    PCT on Admission    Antibiotic Therapy       6-12 Hrs later    >0.5                Strongly Recommended  >0.25 - <0.5        Recommended   0.1 - 0.25          Discouraged              Remeasure/reassess PCT  <0.1                Strongly Discouraged     Remeasure/reassess PCT    As 28 day mortality risk marker: \"Change in Procalcitonin Result\" (>80% or <=80%) if Day 0 (or Day 1) and Day 4 values are available. Refer to [email or web address]://[email or web address]-pct-calculator.[email or web address]    Change in PCT <=80%  A decrease of PCT levels below or equal to 80% defines a positive change in PCT test result representing a higher risk for 28-day all-cause mortality of patients diagnosed with severe sepsis for septic shock.    Change in PCT >80%  A decrease of PCT levels of more than 80% defines a negative change in PCT result representing a lower risk for 28-day all-cause mortality of patients diagnosed with severe sepsis or septic shock.       Comprehensive Metabolic Panel [[id number]]  (Abnormal) Collected: 02/08/25 1832    Specimen: Blood Updated: 02/08/25 1902     Glucose 103 mg/dL      BUN 17 mg/dL      Creatinine 0.81 mg/dL      Sodium 134 mmol/L      Potassium 4.2 mmol/L      Comment: Slight hemolysis detected by analyzer. Result may be falsely elevated.        Chloride 98 mmol/L      CO2 25.5 mmol/L      Calcium 9.0 mg/dL      Total Protein 6.9 g/dL      Albumin 4.2 g/dL      ALT (SGPT) 9 U/L      AST (SGOT) 19 U/L      Alkaline Phosphatase 96 U/L      Total Bilirubin 1.0 mg/dL      Globulin 2.7 gm/dL      A/G Ratio 1.6 g/dL      BUN/Creatinine Ratio 21.0     Anion Gap 10.5 mmol/L      eGFR 72.1 mL/min/1.73     Narrative:      GFR Categories in Chronic Kidney Disease (CKD)      GFR Category          GFR (mL/min/1.73)    Interpretation  G1                     90 or greater         Normal or high (1)  G2                      60-89                Mild decrease (1)  G3a           "         45-59                Mild to moderate decrease  G3b                   30-44                Moderate to severe decrease  G4                    15-29                Severe decrease  G5                    14 or less           Kidney failure          (1)In the absence of evidence of kidney disease, neither GFR category G1 or G2 fulfill the criteria for CKD.    eGFR calculation 2021 CKD-EPI creatinine equation, which does not include race as a factor    BNP [[id number]]  (Abnormal) Collected: 02/08/25 1832    Specimen: Blood Updated: 02/08/25 1902     proBNP 3,308.0 pg/mL     Narrative:      This assay is used as an aid in the diagnosis of individuals suspected of having heart failure. It can be used as an aid in the diagnosis of acute decompensated heart failure (ADHF) in patients presenting with signs and symptoms of ADHF to the emergency department (ED). In addition, NT-proBNP of <300 pg/mL indicates ADHF is not likely.    Age Range Result Interpretation  NT-proBNP Concentration (pg/mL:      <50             Positive            >450                   Gray                 300-450                    Negative             <300    50-75           Positive            >900                  Gray                300-900                  Negative            <300      >75             Positive            >1800                  Gray                300-1800                  Negative            <300    High Sensitivity Troponin T [[id number]]  (Abnormal) Collected: 02/08/25 1832    Specimen: Blood Updated: 02/08/25 1900     HS Troponin T 24 ng/L     Narrative:      High Sensitive Troponin T Reference Range:  <14.0 ng/L- Negative Female for AMI  <22.0 ng/L- Negative Male for AMI  >=14 - Abnormal Female indicating possible myocardial injury.  >=22 - Abnormal Male indicating possible myocardial injury.   Clinicians would have to utilize clinical acumen, EKG, Troponin, and serial changes to determine if it is an Acute Myocardial  Infarction or myocardial injury due to an underlying chronic condition.         Lactic Acid, Plasma [[id number]]  (Normal) Collected: 02/08/25 1832    Specimen: Blood Updated: 02/08/25 1853     Lactate 1.4 mmol/L     Unity Draw [[id number]] Collected: 02/08/25 1832    Specimen: Blood Updated: 02/08/25 1845    Narrative:      The following orders were created for panel order Unity Draw.  Procedure                               Abnormality         Status                     ---------                               -----------         ------                     Green Top (Gel)[[id number]]                                  Final result               Lavender Top[[id number]]                                     Final result               Gold Top - SST[[id number]]                                   Final result               Light Blue Top[[id number]]                                   Final result                 Please view results for these tests on the individual orders.    Green Top (Gel) [[id number]] Collected: 02/08/25 1832    Specimen: Blood Updated: 02/08/25 1845     Extra Tube Hold for add-ons.     Comment: Auto resulted.       Lavender Top [[id number]] Collected: 02/08/25 1832    Specimen: Blood Updated: 02/08/25 1845     Extra Tube hold for add-on     Comment: Auto resulted       Gold Top - SST [[id number]] Collected: 02/08/25 1832    Specimen: Blood Updated: 02/08/25 1845     Extra Tube Hold for add-ons.     Comment: Auto resulted.       Light Blue Top [[id number]] Collected: 02/08/25 1832    Specimen: Blood Updated: 02/08/25 1845     Extra Tube Hold for add-ons.     Comment: Auto resulted       CBC & Differential [[id number]]  (Abnormal) Collected: 02/08/25 1832    Specimen: Blood Updated: 02/08/25 1842    Narrative:      The following orders were created for panel order CBC & Differential.  Procedure                               Abnormality         Status                     ---------                                -----------         ------                     CBC Auto Differential[[id number]]        Abnormal            Final result                 Please view results for these tests on the individual orders.    CBC Auto Differential [[id number]]  (Abnormal) Collected: 02/08/25 1832    Specimen: Blood Updated: 02/08/25 1842     WBC 8.49 10*3/mm3      RBC 4.35 10*6/mm3      Hemoglobin 11.6 g/dL      Hematocrit 37.4 %      MCV 86.0 fL      MCH 26.7 pg      MCHC 31.0 g/dL      RDW 16.2 %      RDW-SD 50.5 fl      MPV 9.8 fL      Platelets 244 10*3/mm3      Neutrophil % 76.9 %      Lymphocyte % 4.7 %      Monocyte % 16.7 %      Eosinophil % 0.5 %      Basophil % 0.5 %      Immature Grans % 0.7 %      Neutrophils, Absolute 6.53 10*3/mm3      Lymphocytes, Absolute 0.40 10*3/mm3      Monocytes, Absolute 1.42 10*3/mm3      Eosinophils, Absolute 0.04 10*3/mm3      Basophils, Absolute 0.04 10*3/mm3      Immature Grans, Absolute 0.06 10*3/mm3      nRBC 0.0 /100 WBC           Imaging Results (Last 24 Hours)       Procedure Component Value Units Date/Time    XR Chest 1 View [[id number]] Collected: 02/09/25 0802     Updated: 02/09/25 0820    Narrative:      PROCEDURE: XR CHEST 1 VW-     HISTORY: SOA Triage Protocol     COMPARISON: 08/26/2024     FINDINGS:  Portable view of the chest demonstrates coarse increased  markings in the retrocardiac left lung base. These findings are similar  to prior exam and may be due to atelectasis or scarring. Right lung is  clear. There is no evidence of effusion, pneumothorax or other  significant pleural disease. The mediastinum is unremarkable.     The heart size is mildly enlarged.       Impression:      Persistent increased markings left lower lobe may represent  atelectasis or scarring.           This report was signed and finalized on 2/9/2025 8:18 AM by Schuyler Thomas MD.             Orders (last 24 hrs)        Start     Ordered    02/09/25 0900  oseltamivir (TAMIFLU) capsule 30 mg  Every 12 Hours  "Scheduled        Placed in \"Followed by\" Linked Group    02/08/25 1935    02/09/25 0900  empagliflozin (JARDIANCE) tablet 10 mg  Daily         02/08/25 2200 02/09/25 0900  metoprolol succinate XL (TOPROL-XL) 24 hr tablet 50 mg  Daily         02/08/25 2200 02/09/25 0900  pantoprazole (PROTONIX) EC tablet 40 mg  Daily         02/08/25 2200 02/09/25 0900  potassium chloride (MICRO-K/KLOR-CON) CR capsule  Daily         02/08/25 2200 02/09/25 0900  rivaroxaban (XARELTO) tablet 20 mg  Daily         02/08/25 2200 02/09/25 0900  spironolactone (ALDACTONE) tablet 12.5 mg  Daily         02/08/25 2200 02/09/25 0900  furosemide (LASIX) injection 40 mg  2 Times Daily         02/08/25 2200 02/09/25 0800  Oral Care  2 Times Daily       02/08/25 2151 02/09/25 0600  Basic Metabolic Panel  Morning Draw         02/08/25 2151 02/09/25 0600  CBC (No Diff)  Morning Draw         02/08/25 2151 02/09/25 0600  Magnesium  Morning Draw         02/08/25 2151 02/09/25 0000  Vital Signs  Every 4 Hours       02/08/25 2151 02/09/25 0000  Strict Intake & Output  Every 4 Hours       02/08/25 2200 02/08/25 2219  guaiFENesin (MUCINEX) 12 hr tablet 600 mg  Every 12 Hours Scheduled         02/08/25 2203 02/08/25 2216  budesonide-formoterol (SYMBICORT) 160-4.5 MCG/ACT inhaler 2 puff  2 Times Daily - RT         02/08/25 2200 02/08/25 2216  sacubitril-valsartan (ENTRESTO) 24-26 MG tablet 1 tablet  Every 12 Hours Scheduled         02/08/25 2200 02/08/25 2207  sodium chloride 0.9 % flush 10 mL  Every 12 Hours Scheduled         02/08/25 2151 02/08/25 2207  sennosides-docusate (PERICOLACE) 8.6-50 MG per tablet 2 tablet  2 Times Daily        Placed in \"And\" Linked Group    02/08/25 2151 02/08/25 2203  ipratropium-albuterol (DUO-NEB) nebulizer solution 3 mL  Every 4 Hours PRN         02/08/25 2203 02/08/25 2201  Daily Weights  Daily       02/08/25 2200 02/08/25 2200  Incentive Spirometry  Every 4 Hours " "While Awake       02/08/25 2151 02/08/25 2158  dicyclomine (BENTYL) capsule 10 mg  4 Times Daily PRN         02/08/25 2200 02/08/25 2151  Intake & Output  Every Shift       02/08/25 2151 02/08/25 2151  Weigh patient  Once         02/08/25 2151 02/08/25 2151  Fall Precautions  Continuous         02/08/25 2151 02/08/25 2151  Insert Peripheral IV  Once         02/08/25 2151 02/08/25 2151  Saline Lock & Maintain IV Access  Continuous         02/08/25 2151 02/08/25 2151  Code Status and Medical Interventions: CPR (Attempt to Resuscitate); Full Support  Continuous         02/08/25 2151 02/08/25 2151  Continuous Pulse Oximetry  Continuous         02/08/25 2151 02/08/25 2151  Diet: Cardiac, Fluid Restriction (240 mL/tray); Healthy Heart (2-3 Na+); 1500 mL/day; Texture: Regular (IDDSI 7); Fluid Consistency: Thin (IDDSI 0)  Diet Effective Now         02/08/25 2151 02/08/25 2150  polyethylene glycol (MIRALAX) packet 17 g  Daily PRN        Placed in \"And\" Linked Group    02/08/25 2151 02/08/25 2150  bisacodyl (DULCOLAX) EC tablet 5 mg  Daily PRN        Placed in \"And\" Linked Group    02/08/25 2151 02/08/25 2150  bisacodyl (DULCOLAX) suppository 10 mg  Daily PRN        Placed in \"And\" Linked Group    02/08/25 2151 02/08/25 2150  sodium chloride 0.9 % flush 10 mL  As Needed         02/08/25 2151 02/08/25 2150  sodium chloride 0.9 % infusion 40 mL  As Needed         02/08/25 2151 02/08/25 2150  acetaminophen (TYLENOL) tablet 650 mg  Every 4 Hours PRN        Placed in \"Or\" Linked Group    02/08/25 2151 02/08/25 2150  acetaminophen (TYLENOL) 160 MG/5ML oral solution 650 mg  Every 4 Hours PRN        Placed in \"Or\" Linked Group    02/08/25 2151    02/08/25 2150  acetaminophen (TYLENOL) suppository 650 mg  Every 4 Hours PRN        Placed in \"Or\" Linked Group    02/08/25 2151    02/08/25 2150  ondansetron (ZOFRAN) injection 4 mg  Every 6 Hours PRN         02/08/25 2151 02/08/25 2114  " "Inpatient Admission  Once         02/08/25 2114    02/08/25 2001  ondansetron (ZOFRAN) injection 4 mg  Once         02/08/25 1945    02/08/25 1950  oseltamivir (TAMIFLU) capsule 75 mg  Once        Placed in \"Followed by\" Linked Group    02/08/25 1935    02/08/25 1947  furosemide (LASIX) injection 20 mg  Once         02/08/25 1931 02/08/25 1945  Blood Gas, Venous -With Co-Ox Panel: Yes  Once,   Status:  Canceled         02/08/25 1944    02/08/25 1943  Blood Gas, Venous With Co-Ox  PROCEDURE ONCE         02/08/25 1942    02/08/25 1932  High Sensitivity Troponin T 1Hr  PROCEDURE ONCE         02/08/25 1859    02/08/25 1932  Pulse Oximetry While Ambulating  Once        Comments: Ambulate with assist device if usually uses one and monitor pulse ox    02/08/25 1931 02/08/25 1930  Pharmacy to Dose Oseltamivir (TAMIFLU)  Continuous PRN         02/08/25 1931 02/08/25 1832  Blood Gas, Arterial -With Co-Ox Panel: Yes  STAT,   Status:  Canceled         02/08/25 1831    02/08/25 1832  COVID-19 and FLU A/B PCR, 1 HR TAT - Swab, Nasopharynx  Once         02/08/25 1831    02/08/25 1832  Check Temperature  Once         02/08/25 1831    02/08/25 1832  Procalcitonin  Once         02/08/25 1831    02/08/25 1832  Lactic Acid, Plasma  Once         02/08/25 1831    02/08/25 1818  XR Chest 1 View  1 Time Imaging         02/08/25 1813    02/08/25 1814  NPO Diet NPO Type: Strict NPO  Diet Effective Now,   Status:  Canceled         02/08/25 1813    02/08/25 1814  Undress & Gown  Once         02/08/25 1813    02/08/25 1814  Cardiac Monitoring  Continuous        Comments: Follow Standing Orders As Outlined in Process Instructions (Open Order Report to View Full Instructions)    02/08/25 1813    02/08/25 1814  Continuous Pulse Oximetry  Continuous,   Status:  Canceled         02/08/25 1813    02/08/25 1814  Vital Signs  Per Hospital Policy/Protocol         02/08/25 1813    02/08/25 1814  ECG 12 Lead ED Triage Standing Order; SOA  Once    "      02/08/25 1813    02/08/25 1814  XR Chest 2 View  1 Time Imaging,   Status:  Canceled         02/08/25 1813    02/08/25 1814  Insert Peripheral IV  Once         02/08/25 1813    02/08/25 1814  [place] Draw  Once         02/08/25 1813    02/08/25 1814  CBC & Differential  Once         02/08/25 1813    02/08/25 1814  Comprehensive Metabolic Panel  Once         02/08/25 1813    02/08/25 1814  BNP  Once         02/08/25 1813    02/08/25 1814  High Sensitivity Troponin T  Once         02/08/25 1813    02/08/25 1814  Green Top (Gel)  PROCEDURE ONCE         02/08/25 1813    02/08/25 1814  Lavender Top  PROCEDURE ONCE         02/08/25 1813    02/08/25 1814  Gold Top - SST  PROCEDURE ONCE         02/08/25 1813    02/08/25 1814  Light Blue Top  PROCEDURE ONCE         02/08/25 1813    02/08/25 1814  CBC Auto Differential  PROCEDURE ONCE         02/08/25 1813    02/08/25 1813  sodium chloride 0.9 % flush 10 mL  As Needed         02/08/25 1813    Unscheduled  Oxygen Therapy- Nasal Cannula; Titrate 1-6 LPM Per SpO2; 90 - 95%  Continuous PRN       02/08/25 1813    Unscheduled  Up With Assistance  As Needed       02/08/25 2151                  Physician Progress Notes (last 24 hours)  Notes from 02/08/25 0935 through 02/09/25 0935   No notes of this type exist for this encounter.       Consult Notes (most recent note)    No notes of this type exist for this encounter.

## 2025-02-10 ENCOUNTER — READMISSION MANAGEMENT (OUTPATIENT)
Dept: CALL CENTER | Facility: HOSPITAL | Age: 85
End: 2025-02-10
Payer: MEDICARE

## 2025-02-10 VITALS
OXYGEN SATURATION: 96 % | SYSTOLIC BLOOD PRESSURE: 92 MMHG | WEIGHT: 144.62 LBS | DIASTOLIC BLOOD PRESSURE: 58 MMHG | RESPIRATION RATE: 20 BRPM | HEIGHT: 63 IN | BODY MASS INDEX: 25.62 KG/M2 | TEMPERATURE: 97.5 F | HEART RATE: 79 BPM

## 2025-02-10 LAB
ANION GAP SERPL CALCULATED.3IONS-SCNC: 10.1 MMOL/L (ref 5–15)
BUN SERPL-MCNC: 25 MG/DL (ref 8–23)
BUN/CREAT SERPL: 28.1 (ref 7–25)
CALCIUM SPEC-SCNC: 8.8 MG/DL (ref 8.6–10.5)
CHLORIDE SERPL-SCNC: 96 MMOL/L (ref 98–107)
CO2 SERPL-SCNC: 29.9 MMOL/L (ref 22–29)
CREAT SERPL-MCNC: 0.89 MG/DL (ref 0.57–1)
DEPRECATED RDW RBC AUTO: 49.8 FL (ref 37–54)
EGFRCR SERPLBLD CKD-EPI 2021: 64.4 ML/MIN/1.73
ERYTHROCYTE [DISTWIDTH] IN BLOOD BY AUTOMATED COUNT: 15.9 % (ref 12.3–15.4)
GLUCOSE SERPL-MCNC: 97 MG/DL (ref 65–99)
HCT VFR BLD AUTO: 41.5 % (ref 34–46.6)
HGB BLD-MCNC: 12.7 G/DL (ref 12–15.9)
MCH RBC QN AUTO: 26 PG (ref 26.6–33)
MCHC RBC AUTO-ENTMCNC: 30.6 G/DL (ref 31.5–35.7)
MCV RBC AUTO: 84.9 FL (ref 79–97)
PLATELET # BLD AUTO: 230 10*3/MM3 (ref 140–450)
PMV BLD AUTO: 9.5 FL (ref 6–12)
POTASSIUM SERPL-SCNC: 3.6 MMOL/L (ref 3.5–5.2)
RBC # BLD AUTO: 4.89 10*6/MM3 (ref 3.77–5.28)
SODIUM SERPL-SCNC: 136 MMOL/L (ref 136–145)
WBC NRBC COR # BLD AUTO: 8.54 10*3/MM3 (ref 3.4–10.8)

## 2025-02-10 PROCEDURE — 80048 BASIC METABOLIC PNL TOTAL CA: CPT | Performed by: INTERNAL MEDICINE

## 2025-02-10 PROCEDURE — 99239 HOSP IP/OBS DSCHRG MGMT >30: CPT | Performed by: INTERNAL MEDICINE

## 2025-02-10 PROCEDURE — 94799 UNLISTED PULMONARY SVC/PX: CPT

## 2025-02-10 PROCEDURE — 94664 DEMO&/EVAL PT USE INHALER: CPT

## 2025-02-10 PROCEDURE — 85027 COMPLETE CBC AUTOMATED: CPT | Performed by: INTERNAL MEDICINE

## 2025-02-10 PROCEDURE — 94761 N-INVAS EAR/PLS OXIMETRY MLT: CPT

## 2025-02-10 RX ORDER — FLUCONAZOLE 100 MG/1
100 TABLET ORAL EVERY 24 HOURS
Qty: 4 TABLET | Refills: 0 | Status: SHIPPED | OUTPATIENT
Start: 2025-02-11 | End: 2025-02-15

## 2025-02-10 RX ORDER — OSELTAMIVIR PHOSPHATE 30 MG/1
30 CAPSULE ORAL EVERY 12 HOURS SCHEDULED
Qty: 6 CAPSULE | Refills: 0 | Status: SHIPPED | OUTPATIENT
Start: 2025-02-10 | End: 2025-02-13

## 2025-02-10 RX ORDER — GUAIFENESIN 200 MG/10ML
200 LIQUID ORAL EVERY 4 HOURS PRN
Status: DISCONTINUED | OUTPATIENT
Start: 2025-02-10 | End: 2025-02-10 | Stop reason: HOSPADM

## 2025-02-10 RX ORDER — FLUCONAZOLE 100 MG/1
100 TABLET ORAL EVERY 24 HOURS
Status: DISCONTINUED | OUTPATIENT
Start: 2025-02-10 | End: 2025-02-10 | Stop reason: HOSPADM

## 2025-02-10 RX ADMIN — GUAIFENESIN 600 MG: 600 TABLET, EXTENDED RELEASE ORAL at 00:04

## 2025-02-10 RX ADMIN — Medication 10 ML: at 09:11

## 2025-02-10 RX ADMIN — RIVAROXABAN 20 MG: 10 TABLET, FILM COATED ORAL at 09:33

## 2025-02-10 RX ADMIN — POTASSIUM CHLORIDE 10 MEQ: 750 CAPSULE, EXTENDED RELEASE ORAL at 09:33

## 2025-02-10 RX ADMIN — BUDESONIDE AND FORMOTEROL FUMARATE DIHYDRATE 2 PUFF: 160; 4.5 AEROSOL RESPIRATORY (INHALATION) at 06:53

## 2025-02-10 RX ADMIN — FLUCONAZOLE 100 MG: 100 TABLET ORAL at 09:33

## 2025-02-10 RX ADMIN — SENNOSIDES AND DOCUSATE SODIUM 2 TABLET: 50; 8.6 TABLET ORAL at 00:06

## 2025-02-10 RX ADMIN — ACETAMINOPHEN 650 MG: 325 TABLET, FILM COATED ORAL at 09:32

## 2025-02-10 RX ADMIN — SACUBITRIL AND VALSARTAN 1 TABLET: 24; 26 TABLET, FILM COATED ORAL at 00:04

## 2025-02-10 RX ADMIN — OSELTAMIVIR PHOSPHATE 30 MG: 30 CAPSULE ORAL at 00:04

## 2025-02-10 RX ADMIN — Medication 10 ML: at 00:07

## 2025-02-10 RX ADMIN — EMPAGLIFLOZIN 10 MG: 10 TABLET, FILM COATED ORAL at 09:33

## 2025-02-10 RX ADMIN — OSELTAMIVIR PHOSPHATE 30 MG: 30 CAPSULE ORAL at 09:33

## 2025-02-10 RX ADMIN — GUAIFENESIN 600 MG: 600 TABLET, EXTENDED RELEASE ORAL at 09:33

## 2025-02-10 RX ADMIN — PANTOPRAZOLE SODIUM 40 MG: 40 TABLET, DELAYED RELEASE ORAL at 09:33

## 2025-02-10 NOTE — DISCHARGE SUMMARY
[facility]   DISCHARGE SUMMARY      Name:  Elsa Alcaraz   Age:  84 y.o.  Sex:  female  :  1940  MRN:  [id number]   Visit Number:  [id number]    Admission Date:  2025  Date of Discharge:  2/10/2025  Primary Care Physician:  Kemi Hdz MD      Discharge Diagnoses:     Acute on chronic systolic heart failure, POA.  Acute influenza A bronchitis, POA.  Chronic hypoxic respiratory failure on home oxygen.  COPD, no exacerbation.  Severe mitral valve regurgitation.  Sick sinus syndrome status post intracardiac pacemaker.  Permanent atrial fibrillation on rivaroxaban.  Hiatal hernia.  Gastroesophageal reflux disease.    Problem List:     Active Hospital Problems    Diagnosis  POA    **Acute on chronic systolic heart failure [I50.23]  Yes      Resolved Hospital Problems   No resolved problems to display.     Presenting Problem:    Chief Complaint   Patient presents with    Exposure To Known Illness    Shortness of Breath      Consults:     Consulting Physician(s)                     None              Procedures Performed:        History of presenting illness/Hospital Course:    Elsa Alcaraz is an 84-year-old female with history of chronic systolic heart failure (follows up with Dr. Germain), chronic atrial fibrillation on rivaroxaban, status post intracardiac pacemaker, severe mitral regurgitation COPD on home oxygen at 4 L, hypertension, GERD was brought to the emergency room by her  with symptoms of shortness of breath since a couple of days.     Acute on chronic systolic heart failure.  - Likely secondary to valvular heart disease.  - Patient was given 20 mg of Lasix in the emergency room.  - Diuresed well with Lasix 40 mg twice daily per heart failure protocol.  - Continue nasal cannula oxygen to maintain saturation above 90%.  Patient remained stable on her home oxygen requirement.  - Continue Toprol-XL, Entresto, Jardiance.     Acute influenza A  bronchitis.  - Continue oseltamivir for 5 days.    Patient discharged home.  Encouraged follow-up with her primary physician and with her established cardiologist.  She has also been referred to our heart failure disease state management clinic.    Vital Signs:    Temp:  [97.2 °F (36.2 °C)-98.4 °F (36.9 °C)] 97.5 °F (36.4 °C)  Heart Rate:  [] 79  Resp:  [18-20] 20  BP: ()/(38-59) 92/58    Physical Exam:    General Appearance:  Alert and cooperative.    Head:  Atraumatic and normocephalic.   Eyes: Conjunctivae and sclerae normal, no icterus. No pallor.   Ears:  Ears with no abnormalities noted.   Throat: No oral lesions, no thrush, oral mucosa moist.   Neck: Supple, trachea midline, no thyromegaly.   Back:   No kyphoscoliosis present. No tenderness to palpation.   Lungs:   Breath sounds heard bilaterally equally.  No crackles or wheezing. No Pleural rub or bronchial breathing.   Heart:  Normal S1 and S2, no murmur, no gallop, no rub. No JVD.   Abdomen:   Normal bowel sounds, no masses, no organomegaly. Soft, nontender, nondistended, no rebound tenderness.   Extremities: Supple, no edema, no cyanosis, no clubbing.   Pulses: Pulses palpable bilaterally.   Skin: No bleeding or rash.   Neurologic: Alert and oriented x 3. No facial asymmetry. Moves all four limbs. No tremors.     Pertinent Lab Results:     Results from last 7 days   Lab Units 02/10/25  0539 02/09/25  0539 02/08/25  1832   SODIUM mmol/L 136 137 134*   POTASSIUM mmol/L 3.6 3.9 4.2   CHLORIDE mmol/L 96* 100 98   CO2 mmol/L 29.9* 24.6 25.5   BUN mg/dL 25* 18 17   CREATININE mg/dL 0.89 0.80 0.81   CALCIUM mg/dL 8.8 8.4* 9.0   BILIRUBIN mg/dL  --   --  1.0   ALK PHOS U/L  --   --  96   ALT (SGPT) U/L  --   --  9   AST (SGOT) U/L  --   --  19   GLUCOSE mg/dL 97 81 103*     Results from last 7 days   Lab Units 02/10/25  0539 02/09/25  0539 02/08/25  1832   WBC 10*3/mm3 8.54 5.59 8.49   HEMOGLOBIN g/dL 12.7 10.8* 11.6*   HEMATOCRIT % 41.5 34.8 37.4    PLATELETS 10*3/mm3 230 209 244         Results from last 7 days   Lab Units 02/08/25  1933 02/08/25  1832   HSTROP T ng/L 24* 24*     Results from last 7 days   Lab Units 02/08/25  1832   PROBNP pg/mL 3,308.0*                       Pertinent Radiology Results:    Imaging Results (All)       Procedure Component Value Units Date/Time    XR Chest 1 View [[id number]] Collected: 02/09/25 0802     Updated: 02/09/25 0820    Narrative:      PROCEDURE: XR CHEST 1 VW-     HISTORY: SOA Triage Protocol     COMPARISON: 08/26/2024     FINDINGS:  Portable view of the chest demonstrates coarse increased  markings in the retrocardiac left lung base. These findings are similar  to prior exam and may be due to atelectasis or scarring. Right lung is  clear. There is no evidence of effusion, pneumothorax or other  significant pleural disease. The mediastinum is unremarkable.     The heart size is mildly enlarged.       Impression:      Persistent increased markings left lower lobe may represent  atelectasis or scarring.           This report was signed and finalized on 2/9/2025 8:18 AM by Schuyler Thomas MD.               Echo:    Results for orders placed during the hospital encounter of 01/30/25    Adult Transesophageal Echo 3D (REBECA) W/ Cont If Necessary Per Protocol    Interpretation Summary    No transgastric views could be obtained as the probe would not pass beyond the GE junction.    Left ventricular systolic function is moderately decreased. Estimated left ventricular EF = 35%    The left atrial cavity is moderate to severely dilated.    The mitral annulus is dilated.  There is poor central leaflet coaptation.    The mitral valve chordal apparatus is calcified. The posterior leaflet appears tethered. The posterior leaflet appears to measure between 7 and 8 mm in length.    There is severe mitral regurgitation which appears to emanate from A2-P2. The mitral valve area on planimetry by 3D is 4.9 cm².    Note previous history of  elevated RVSP at 61 mmHg in October 2024.  This was not noted on today's echo.    Anesthesia: Cath lab/Echo lab moderate sedation    I was present with the patient for the duration of moderate sedation and supervised staff who had no other duties and monitored the patient for the entire procedure.    Name of independent trained observer: Radha Barbosa RN  Intra-service start time: 0925  Intra-service end time: 1003    Condition on Discharge:      Stable.    Code status during the hospital stay:    Code Status and Medical Interventions: CPR (Attempt to Resuscitate); Full Support   Ordered at: 02/08/25 2151     Code Status (Patient has no pulse and is not breathing):    CPR (Attempt to Resuscitate)     Medical Interventions (Patient has pulse or is breathing):    Full Support     Discharge Disposition:    Home or Self Care    Discharge Medications:       Discharge Medications        New Medications        Instructions Start Date   fluconazole 100 MG tablet  Commonly known as: DIFLUCAN   100 mg, Oral, Every 24 Hours   Start Date: February 11, 2025     oseltamivir 30 MG capsule  Commonly known as: TAMIFLU   30 mg, Oral, Every 12 Hours Scheduled             Continue These Medications        Instructions Start Date   albuterol sulfate  (90 Base) MCG/ACT inhaler  Commonly known as: PROVENTIL HFA;VENTOLIN HFA;PROAIR HFA   2 puffs, Inhalation, 4 Times Daily - RT      budesonide-formoterol 160-4.5 MCG/ACT inhaler  Commonly known as: SYMBICORT   2 puffs, Inhalation, 2 Times Daily - RT, Rinse mouth with water after use      cholecalciferol 25 MCG (1000 UT) tablet  Commonly known as: VITAMIN D3   1,000 Units, Daily      citalopram 20 MG tablet  Commonly known as: CeleXA   20 mg, Daily      empagliflozin 10 MG tablet tablet  Commonly known as: Jardiance   10 mg, Oral, Daily      famotidine 40 MG tablet  Commonly known as: PEPCID   40 mg, Oral, Nightly      furosemide 20 MG tablet  Commonly known as: Lasix   20 mg, Oral,  Daily      guaiFENesin 600 MG 12 hr tablet  Commonly known as: MUCINEX   1,200 mg, Oral, 2 Times Daily      metoprolol succinate XL 50 MG 24 hr tablet  Commonly known as: TOPROL-XL   50 mg, Oral, Daily      O2  Commonly known as: OXYGEN   5 L/min, Daily PRN      pantoprazole 40 MG EC tablet  Commonly known as: PROTONIX   1 tablet, Daily      sacubitril-valsartan 24-26 MG tablet  Commonly known as: ENTRESTO   1 tablet, Oral, Every 12 Hours Scheduled      spironolactone 25 MG tablet  Commonly known as: ALDACTONE   12.5 mg, Oral, Daily      vitamin B-12 100 MCG tablet  Commonly known as: CYANOCOBALAMIN   50 mcg, Daily      Xarelto 20 MG tablet  Generic drug: rivaroxaban   20 mg, Oral, Daily             Stop These Medications      benzonatate 100 MG capsule  Commonly known as: TESSALON     dicyclomine 10 MG/5ML syrup  Commonly known as: BENTYL     Metamucil wafer wafer     potassium chloride 10 MEQ CR tablet  Commonly known as: KLOR-CON M10     potassium chloride 10 MEQ CR tablet            Discharge Diet:     Diet Instructions       Diet: Cardiac Diets; Healthy Heart (2-3 Na+); Regular (IDDSI 7); Thin (IDDSI 0)      Discharge Diet: Cardiac Diets    Cardiac Diet: Healthy Heart (2-3 Na+)    Texture: Regular (IDDSI 7)    Fluid Consistency: Thin (IDDSI 0)          Activity at Discharge:     Activity Instructions       Activity as Tolerated            Follow-up Appointments:    Additional Instructions for the Follow-ups that You Need to Schedule       Discharge Follow-up with PCP   As directed       Currently Documented PCP:    Kemi Hdz MD    PCP Phone Number:    [phone]     Follow Up Details: 1 week               Follow-up Information       Kemi Hdz MD .    Specialty: Family Medicine  Why: 1 week  Contact information:  [address] [facility]  [place] [address]  [phone]                           Future Appointments   Date Time Provider Department Center   2/18/2025 11:00 AM  Lizbeth Barnhart APRN [facility]   12/1/2025  1:30 PM Keven Willis DO [facility]   1/19/2026  3:45 PM Fidencio Germain MD [facility]     Test Results Pending at Discharge:    Pending Results       None                 Sarwat Morley DO  02/10/25  12:27 EST    Time: I spent >30 minutes on this discharge activity which included: face-to-face encounter with the patient, reviewing the data in the system, coordination of the care with the nursing staff as well as consultants, documentation, and entering orders.     Dictated utilizing Dragon dictation.

## 2025-02-10 NOTE — CASE MANAGEMENT/SOCIAL WORK
Case Management/Social Work    Patient Name:  Elsa Alcaraz  YOB: 1940  MRN: [id number]  Admit Date:  2/8/2025        09:23 EST   Discharge Plan       Row Name 02/10/25 0922       Plan    Plan Patient plans to return home once medically ready, states her spouse will pick her up and provide transportation. No stated needs.                        Electronically signed by:  Gio Mckeon RN  02/10/25 09:23 EST

## 2025-02-10 NOTE — CASE MANAGEMENT/SOCIAL WORK
Case Management Discharge Note                Selected Continued Care - Admitted Since 2/8/2025       Destination    No services have been selected for the patient.                Durable Medical Equipment    No services have been selected for the patient.                Dialysis/Infusion    No services have been selected for the patient.                Home Medical Care    No services have been selected for the patient.                Therapy    No services have been selected for the patient.                Community Resources    No services have been selected for the patient.                [facility]    No services have been selected for the patient.                    Transportation Services  Private: Car    Final Discharge Disposition Code: 01 - home or self-care

## 2025-02-11 NOTE — OUTREACH NOTE
Prep Survey      Flowsheet Row Responses   [facility] discharged from? [facility]   Is LACE score < 7 ? No   Eligibility Readm Mgmt   Discharge diagnosis Acute on chronic systolic heart failure   Does the patient have one of the following disease processes/diagnoses(primary or secondary)? CHF   Does the patient have Home health ordered? No   Is there a DME ordered? No   Prep survey completed? Yes            Ada PEREZ - Registered Nurse

## 2025-02-11 NOTE — PAYOR COMM NOTE
"To:  [place]  From: Sosa Yee RN  Phone: [phone]  Fax: [phone]  NPI: [id number]  TIN: [id number]  Member ID: [id number]   MRN: [id number]    Elsa Caraballo (84 y.o. Female)       Date of Birth   1940    Social Security Number       Address   [address] [place] [address]    Home Phone   [phone]    MRN   [id number]       Hindu   Congregational    Marital Status                               Admission Date   25    Admission Type   Emergency    Admitting Provider   Nik Hicks MD    Attending Provider       Department, Room/Bed   [facility] TELEMETRY 4, 429/       Discharge Date   2/10/2025    Discharge Disposition   Home or Self Care    Discharge Destination   Home                              Attending Provider: (none)   Allergies: Other    Isolation: None   Infection: Influenza (25)   Code Status: Prior    Ht: 160 cm (63\")   Wt: 65.6 kg (144 lb 10 oz)    Admission Cmt: None   Principal Problem: Acute on chronic systolic heart failure [I50.23]                   Active Insurance as of 2025       Primary Coverage       Payor Plan Insurance Group Employer/Plan Group    [employer]HEM MEDICARE REPLACEMENT ANTHEM MED ADV PPO [id number]       Payor Plan Address Payor Plan Phone Number Payor Plan Fax Number Effective Dates    [address] [phone]  2020 - None Entered    [facility] [address]         Subscriber Name Subscriber Birth Date Member ID       ELSA CARABALLO 1940 [id number]                     Emergency Contacts        (Rel.) Home Phone Work Phone Mobile Phone    Logan Caraballo (Spouse) [phone] -- [phone]    JO ANNDARIAN (Daughter) [phone] -- [phone]    Juliet Caraballo (Daughter) -- -- [phone]                 Discharge Summary        Sarwat Morley DO at 02/10/25 1227              [facility]      Name:  Elsa Caraballo   Age:  84 y.o.  Sex:  female  :  " 1940  MRN:  [id number]   Visit Number:  [id number]    Admission Date:  2/8/2025  Date of Discharge:  2/10/2025  Primary Care Physician:  Kemi Hdz MD      Discharge Diagnoses:     Acute on chronic systolic heart failure, POA.  Acute influenza A bronchitis, POA.  Chronic hypoxic respiratory failure on home oxygen.  COPD, no exacerbation.  Severe mitral valve regurgitation.  Sick sinus syndrome status post intracardiac pacemaker.  Permanent atrial fibrillation on rivaroxaban.  Hiatal hernia.  Gastroesophageal reflux disease.    Problem List:     Active Hospital Problems    Diagnosis  POA    **Acute on chronic systolic heart failure [I50.23]  Yes      Resolved Hospital Problems   No resolved problems to display.     Presenting Problem:    Chief Complaint   Patient presents with    Exposure To Known Illness    Shortness of Breath      Consults:     Consulting Physician(s)                     None              Procedures Performed:        History of presenting illness/Hospital Course:    Elsa Alcaraz is an 84-year-old female with history of chronic systolic heart failure (follows up with Dr. Germain), chronic atrial fibrillation on rivaroxaban, status post intracardiac pacemaker, severe mitral regurgitation COPD on home oxygen at 4 L, hypertension, GERD was brought to the emergency room by her  with symptoms of shortness of breath since a couple of days.     Acute on chronic systolic heart failure.  - Likely secondary to valvular heart disease.  - Patient was given 20 mg of Lasix in the emergency room.  - Diuresed well with Lasix 40 mg twice daily per heart failure protocol.  - Continue nasal cannula oxygen to maintain saturation above 90%.  Patient remained stable on her home oxygen requirement.  - Continue Toprol-XL, Entresto, Jardiance.     Acute influenza A bronchitis.  - Continue oseltamivir for 5 days.    Patient discharged home.  Encouraged follow-up with her primary physician and with  her established cardiologist.  She has also been referred to our heart failure disease state management clinic.    Vital Signs:    Temp:  [97.2 °F (36.2 °C)-98.4 °F (36.9 °C)] 97.5 °F (36.4 °C)  Heart Rate:  [] 79  Resp:  [18-20] 20  BP: ()/(38-59) 92/58    Physical Exam:    General Appearance:  Alert and cooperative.    Head:  Atraumatic and normocephalic.   Eyes: Conjunctivae and sclerae normal, no icterus. No pallor.   Ears:  Ears with no abnormalities noted.   Throat: No oral lesions, no thrush, oral mucosa moist.   Neck: Supple, trachea midline, no thyromegaly.   Back:   No kyphoscoliosis present. No tenderness to palpation.   Lungs:   Breath sounds heard bilaterally equally.  No crackles or wheezing. No Pleural rub or bronchial breathing.   Heart:  Normal S1 and S2, no murmur, no gallop, no rub. No JVD.   Abdomen:   Normal bowel sounds, no masses, no organomegaly. Soft, nontender, nondistended, no rebound tenderness.   Extremities: Supple, no edema, no cyanosis, no clubbing.   Pulses: Pulses palpable bilaterally.   Skin: No bleeding or rash.   Neurologic: Alert and oriented x 3. No facial asymmetry. Moves all four limbs. No tremors.     Pertinent Lab Results:     Results from last 7 days   Lab Units 02/10/25  0539 02/09/25  0539 02/08/25  1832   SODIUM mmol/L 136 137 134*   POTASSIUM mmol/L 3.6 3.9 4.2   CHLORIDE mmol/L 96* 100 98   CO2 mmol/L 29.9* 24.6 25.5   BUN mg/dL 25* 18 17   CREATININE mg/dL 0.89 0.80 0.81   CALCIUM mg/dL 8.8 8.4* 9.0   BILIRUBIN mg/dL  --   --  1.0   ALK PHOS U/L  --   --  96   ALT (SGPT) U/L  --   --  9   AST (SGOT) U/L  --   --  19   GLUCOSE mg/dL 97 81 103*     Results from last 7 days   Lab Units 02/10/25  0539 02/09/25  0539 02/08/25  1832   WBC 10*3/mm3 8.54 5.59 8.49   HEMOGLOBIN g/dL 12.7 10.8* 11.6*   HEMATOCRIT % 41.5 34.8 37.4   PLATELETS 10*3/mm3 230 209 244         Results from last 7 days   Lab Units 02/08/25  1933 02/08/25  1832   HSTROP T ng/L 24* 24*      Results from last 7 days   Lab Units 02/08/25  1832   PROBNP pg/mL 3,308.0*                       Pertinent Radiology Results:    Imaging Results (All)       Procedure Component Value Units Date/Time    XR Chest 1 View [[id number]] Collected: 02/09/25 0802     Updated: 02/09/25 0820    Narrative:      PROCEDURE: XR CHEST 1 VW-     HISTORY: SOA Triage Protocol     COMPARISON: 08/26/2024     FINDINGS:  Portable view of the chest demonstrates coarse increased  markings in the retrocardiac left lung base. These findings are similar  to prior exam and may be due to atelectasis or scarring. Right lung is  clear. There is no evidence of effusion, pneumothorax or other  significant pleural disease. The mediastinum is unremarkable.     The heart size is mildly enlarged.       Impression:      Persistent increased markings left lower lobe may represent  atelectasis or scarring.           This report was signed and finalized on 2/9/2025 8:18 AM by Schuyler Thomas MD.               Echo:    Results for orders placed during the hospital encounter of 01/30/25    Adult Transesophageal Echo 3D (REBECA) W/ Cont If Necessary Per Protocol    Interpretation Summary    No transgastric views could be obtained as the probe would not pass beyond the GE junction.    Left ventricular systolic function is moderately decreased. Estimated left ventricular EF = 35%    The left atrial cavity is moderate to severely dilated.    The mitral annulus is dilated.  There is poor central leaflet coaptation.    The mitral valve chordal apparatus is calcified. The posterior leaflet appears tethered. The posterior leaflet appears to measure between 7 and 8 mm in length.    There is severe mitral regurgitation which appears to emanate from A2-P2. The mitral valve area on planimetry by 3D is 4.9 cm².    Note previous history of elevated RVSP at 61 mmHg in October 2024.  This was not noted on today's echo.    Anesthesia: Cath lab/Echo lab moderate sedation    I  was present with the patient for the duration of moderate sedation and supervised staff who had no other duties and monitored the patient for the entire procedure.    Name of independent trained observer: Radha Barbosa RN  Intra-service start time: 0925  Intra-service end time: 1003    Condition on Discharge:      Stable.    Code status during the hospital stay:    Code Status and Medical Interventions: CPR (Attempt to Resuscitate); Full Support   Ordered at: 02/08/25 2151     Code Status (Patient has no pulse and is not breathing):    CPR (Attempt to Resuscitate)     Medical Interventions (Patient has pulse or is breathing):    Full Support     Discharge Disposition:    Home or Self Care    Discharge Medications:       Discharge Medications        New Medications        Instructions Start Date   fluconazole 100 MG tablet  Commonly known as: DIFLUCAN   100 mg, Oral, Every 24 Hours   Start Date: February 11, 2025     oseltamivir 30 MG capsule  Commonly known as: TAMIFLU   30 mg, Oral, Every 12 Hours Scheduled             Continue These Medications        Instructions Start Date   albuterol sulfate  (90 Base) MCG/ACT inhaler  Commonly known as: PROVENTIL HFA;VENTOLIN HFA;PROAIR HFA   2 puffs, Inhalation, 4 Times Daily - RT      budesonide-formoterol 160-4.5 MCG/ACT inhaler  Commonly known as: SYMBICORT   2 puffs, Inhalation, 2 Times Daily - RT, Rinse mouth with water after use      cholecalciferol 25 MCG (1000 UT) tablet  Commonly known as: VITAMIN D3   1,000 Units, Daily      citalopram 20 MG tablet  Commonly known as: CeleXA   20 mg, Daily      empagliflozin 10 MG tablet tablet  Commonly known as: Jardiance   10 mg, Oral, Daily      famotidine 40 MG tablet  Commonly known as: PEPCID   40 mg, Oral, Nightly      furosemide 20 MG tablet  Commonly known as: Lasix   20 mg, Oral, Daily      guaiFENesin 600 MG 12 hr tablet  Commonly known as: MUCINEX   1,200 mg, Oral, 2 Times Daily      metoprolol succinate XL 50  MG 24 hr tablet  Commonly known as: TOPROL-XL   50 mg, Oral, Daily      O2  Commonly known as: OXYGEN   5 L/min, Daily PRN      pantoprazole 40 MG EC tablet  Commonly known as: PROTONIX   1 tablet, Daily      sacubitril-valsartan 24-26 MG tablet  Commonly known as: ENTRESTO   1 tablet, Oral, Every 12 Hours Scheduled      spironolactone 25 MG tablet  Commonly known as: ALDACTONE   12.5 mg, Oral, Daily      vitamin B-12 100 MCG tablet  Commonly known as: CYANOCOBALAMIN   50 mcg, Daily      Xarelto 20 MG tablet  Generic drug: rivaroxaban   20 mg, Oral, Daily             Stop These Medications      benzonatate 100 MG capsule  Commonly known as: TESSALON     dicyclomine 10 MG/5ML syrup  Commonly known as: BENTYL     Metamucil wafer wafer     potassium chloride 10 MEQ CR tablet  Commonly known as: KLOR-CON M10     potassium chloride 10 MEQ CR tablet            Discharge Diet:     Diet Instructions       Diet: Cardiac Diets; Healthy Heart (2-3 Na+); Regular (IDDSI 7); Thin (IDDSI 0)      Discharge Diet: Cardiac Diets    Cardiac Diet: Healthy Heart (2-3 Na+)    Texture: Regular (IDDSI 7)    Fluid Consistency: Thin (IDDSI 0)          Activity at Discharge:     Activity Instructions       Activity as Tolerated            Follow-up Appointments:    Additional Instructions for the Follow-ups that You Need to Schedule       Discharge Follow-up with PCP   As directed       Currently Documented PCP:    Kemi Hdz MD    PCP Phone Number:    [phone]     Follow Up Details: 1 week               Follow-up Information       Kemi Hdz MD .    Specialty: Family Medicine  Why: 1 week  Contact information:  [address] [facility] [place] [address]  [phone]                           Future Appointments   Date Time Provider Department Center   2/18/2025 11:00 AM Lizbeth Barnhart APRN [facility]   12/1/2025  1:30 PM Keven Willis DO [facility]   1/19/2026  3:45 PM Jessa  Fidencio KRUSE MD [facility]     Test Results Pending at Discharge:    Pending Results       None                 Sarwat Morley DO  02/10/25  12:27 EST    Time: I spent >30 minutes on this discharge activity which included: face-to-face encounter with the patient, reviewing the data in the system, coordination of the care with the nursing staff as well as consultants, documentation, and entering orders.     Dictated utilizing Dragon dictation.        Electronically signed by Sarwat Morley DO at 02/10/25 4736

## 2025-02-12 DIAGNOSIS — J45.30 MILD PERSISTENT ASTHMA WITHOUT COMPLICATION: Primary | ICD-10-CM

## 2025-02-12 RX ORDER — ALBUTEROL SULFATE 90 UG/1
2 INHALANT RESPIRATORY (INHALATION) EVERY 4 HOURS PRN
Qty: 18 G | Refills: 5 | Status: SHIPPED | OUTPATIENT
Start: 2025-02-12

## 2025-02-12 NOTE — TELEPHONE ENCOUNTER
Caller: Kieran Elsa W    Relationship: Self    Best call back number:     [phone] (Home)       Requested Prescriptions:   Requested Prescriptions     Pending Prescriptions Disp Refills    albuterol sulfate  (90 Base) MCG/ACT inhaler 18 g 5     Sig: Inhale 2 puffs 4 (Four) Times a Day.        Pharmacy where request should be sent:    [facility] [address] [place] [address] [phone] [facility] [phone]  [phone]          Last office visit with prescribing clinician: Visit date not found   Last telemedicine visit with prescribing clinician: Visit date not found   Next office visit with prescribing clinician: 2/18/2025     Additional details provided by patient:     Does the patient have less than a 3 day supply:  [] Yes  [] No    Would you like a call back once the refill request has been completed: [] Yes [] No    If the office needs to give you a call back, can they leave a voicemail: [] Yes [] No    Nissa Egan Rep   02/12/25 13:47 EST

## 2025-02-14 ENCOUNTER — READMISSION MANAGEMENT (OUTPATIENT)
Dept: CALL CENTER | Facility: HOSPITAL | Age: 85
End: 2025-02-14
Payer: MEDICARE

## 2025-02-14 NOTE — OUTREACH NOTE
CHF Week 1 Survey      Flowsheet Row Responses   [facility] patient discharged from? [facility]   Does the patient have one of the following disease processes/diagnoses(primary or secondary)? CHF   CHF Week 1 attempt successful? Yes   Call start time 1258   Call end time 1305   Discharge diagnosis Acute on chronic systolic heart failure   Is the patient having any side effects they believe may be caused by any medication additions or changes? No   Does the patient have all medications ordered at discharge? Yes   Comments regarding appointments Pulmonologist appt 2/18/2025   Does the patient have a primary care provider?  Yes   Comments regarding PCP Patient states has appt with PCP 2/19/2025   Psychosocial issues? No   Did the patient receive a copy of their discharge instructions? Yes   Nursing interventions Reviewed instructions with patient   What is the patient's perception of their health status since discharge? Improving   Nursing interventions Nurse provided patient education   Is the patient able to teach back signs and symptoms of worsening condition? (i.e. weight gain, shortness of air, etc.) Yes   If the patient is a current smoker, are they able to teach back resources for cessation? Not a smoker   Is the patient/caregiver able to teach back the hierarchy of who to call/visit for symptoms/problems? PCP, Specialist, Home health nurse, Urgent Care, ED, 911 Yes   Is the patient able to teach back Heart Failure Zones? Yes   CHF Zone this Call Green Zone   Green Zone Patient reports doing well, No new or worsening shortness of breath, Physical activity level is normal for you, No new swelling -  feet, ankles and legs look normal for you, Weight check stable, No chest pain   Green Zone Interventions Daily weight check, Meds as directed, Low sodium diet, Follow up visits planned    CHF Week 1 call completed? Yes   Is the patient interested in additional calls from an ambulatory ? No   Would this  patient benefit from a Referral to Amb Social Work? No   Wrap up additional comments Patient complains of productive cough. Patient states this is nothing new. Patient plans to discuss this with pulmonary and PCP next week.   Call end time 1305            EDNA PARRY - Registered Nurse

## 2025-02-25 ENCOUNTER — READMISSION MANAGEMENT (OUTPATIENT)
Dept: CALL CENTER | Facility: HOSPITAL | Age: 85
End: 2025-02-25
Payer: MEDICARE

## 2025-02-25 NOTE — OUTREACH NOTE
CHF Week 2 Survey      Flowsheet Row Responses   [facility] patient discharged from? [facility]   Does the patient have one of the following disease processes/diagnoses(primary or secondary)? CHF   Week 2 attempt successful? Yes   Call start time 1247   Call end time 1300   Discharge diagnosis Acute on chronic systolic heart failure   Person spoke with today (if not patient) and relationship pt   Meds reviewed with patient/caregiver? Yes   Is the patient having any side effects they believe may be caused by any medication additions or changes? No   Does the patient have all medications ordered at discharge? Yes   Is the patient taking all medications as directed (includes completed medication regime)? Yes   Does the patient have a primary care provider?  Yes   Does the patient have an appointment with their PCP within 7 days of discharge? Yes   Has the patient kept scheduled appointments due by today? N/A   Psychosocial issues? No   What is the patient's perception of their health status since discharge? Improving   Nursing interventions Nurse provided patient education   Is the patient able to teach back signs and symptoms of worsening condition? (i.e. weight gain, shortness of air, etc.) Yes   If the patient is a current smoker, are they able to teach back resources for cessation? Not a smoker   Is the patient/caregiver able to teach back the hierarchy of who to call/visit for symptoms/problems? PCP, Specialist, Home health nurse, Urgent Care, ED, 911 Yes   Is the patient able to teach back Heart Failure Zones? Yes   CHF Nursing Interventions Education provided on various zones   CHF Zone this Call Green Zone   Green Zone Patient reports doing well, No new or worsening shortness of breath, Physical activity level is normal for you, No new swelling -  feet, ankles and legs look normal for you, Weight check stable, No chest pain   Green Zone Interventions Daily weight check, Meds as directed, Low sodium diet,  Follow up visits planned   CHF Week 2 call completed? Yes   Is the patient interested in additional calls from an ambulatory ? No   Would this patient benefit from a Referral to [facility] Social Work? No   Wrap up additional comments Pt states she is doing good, and denies any fluid retention. Pt has PCP fu appt today.   Call end time 1300            Audrey PEREZ - Registered Nurse

## 2025-02-27 ENCOUNTER — TELEPHONE (OUTPATIENT)
Dept: CARDIOLOGY | Facility: CLINIC | Age: 85
End: 2025-02-27
Payer: MEDICARE

## 2025-03-06 DIAGNOSIS — I07.1 MODERATE TRICUSPID REGURGITATION: ICD-10-CM

## 2025-03-06 DIAGNOSIS — I34.0 MODERATE MITRAL REGURGITATION: Primary | Chronic | ICD-10-CM

## 2025-03-06 DIAGNOSIS — I50.22 CHRONIC SYSTOLIC HEART FAILURE: Chronic | ICD-10-CM

## 2025-03-24 ENCOUNTER — DOCUMENTATION (OUTPATIENT)
Dept: CARDIOLOGY | Facility: HOSPITAL | Age: 85
End: 2025-03-24
Payer: MEDICARE

## 2025-03-24 ENCOUNTER — HOSPITAL ENCOUNTER (OUTPATIENT)
Facility: HOSPITAL | Age: 85
Setting detail: HOSPITAL OUTPATIENT SURGERY
Discharge: HOME | End: 2025-03-24
Attending: INTERNAL MEDICINE | Admitting: INTERNAL MEDICINE
Payer: MEDICARE

## 2025-03-24 VITALS
BODY MASS INDEX: 25.59 KG/M2 | DIASTOLIC BLOOD PRESSURE: 50 MMHG | TEMPERATURE: 98 F | HEIGHT: 63 IN | RESPIRATION RATE: 16 BRPM | OXYGEN SATURATION: 100 % | SYSTOLIC BLOOD PRESSURE: 102 MMHG | HEART RATE: 60 BPM | WEIGHT: 144.4 LBS

## 2025-03-24 DIAGNOSIS — I34.0 MODERATE MITRAL REGURGITATION: ICD-10-CM

## 2025-03-24 DIAGNOSIS — I50.22 CHRONIC SYSTOLIC HEART FAILURE: ICD-10-CM

## 2025-03-24 DIAGNOSIS — I07.1 MODERATE TRICUSPID REGURGITATION: ICD-10-CM

## 2025-03-24 LAB
ANION GAP SERPL CALCULATED.3IONS-SCNC: 14 MMOL/L (ref 5–15)
BUN SERPL-MCNC: 27 MG/DL (ref 8–23)
BUN/CREAT SERPL: 34.2 (ref 7–25)
CALCIUM SPEC-SCNC: 8.9 MG/DL (ref 8.6–10.5)
CHLORIDE SERPL-SCNC: 100 MMOL/L (ref 98–107)
CO2 SERPL-SCNC: 26 MMOL/L (ref 22–29)
CREAT BLDA-MCNC: 1 MG/DL (ref 0.6–1.3)
CREAT SERPL-MCNC: 0.79 MG/DL (ref 0.57–1)
DEPRECATED RDW RBC AUTO: 50.9 FL (ref 37–54)
EGFRCR SERPLBLD CKD-EPI 2021: 74.3 ML/MIN/1.73
ERYTHROCYTE [DISTWIDTH] IN BLOOD BY AUTOMATED COUNT: 15.7 % (ref 12.3–15.4)
GLUCOSE SERPL-MCNC: 84 MG/DL (ref 65–99)
HCT VFR BLD AUTO: 38.6 % (ref 34–46.6)
HGB BLD-MCNC: 12 G/DL (ref 12–15.9)
MCH RBC QN AUTO: 27.2 PG (ref 26.6–33)
MCHC RBC AUTO-ENTMCNC: 31.1 G/DL (ref 31.5–35.7)
MCV RBC AUTO: 87.5 FL (ref 79–97)
PLATELET # BLD AUTO: 288 10*3/MM3 (ref 140–450)
PMV BLD AUTO: 9.3 FL (ref 6–12)
POTASSIUM SERPL-SCNC: 3.9 MMOL/L (ref 3.5–5.2)
RBC # BLD AUTO: 4.41 10*6/MM3 (ref 3.77–5.28)
SODIUM SERPL-SCNC: 140 MMOL/L (ref 136–145)
WBC NRBC COR # BLD AUTO: 7.81 10*3/MM3 (ref 3.4–10.8)

## 2025-03-24 PROCEDURE — C1769 GUIDE WIRE: HCPCS | Performed by: INTERNAL MEDICINE

## 2025-03-24 PROCEDURE — 25010000002 LIDOCAINE PF 1% 1 % SOLUTION: Performed by: INTERNAL MEDICINE

## 2025-03-24 PROCEDURE — 80048 BASIC METABOLIC PNL TOTAL CA: CPT | Performed by: PHYSICIAN ASSISTANT

## 2025-03-24 PROCEDURE — 93454 CORONARY ARTERY ANGIO S&I: CPT | Performed by: INTERNAL MEDICINE

## 2025-03-24 PROCEDURE — 25010000002 MIDAZOLAM PER 1 MG: Performed by: INTERNAL MEDICINE

## 2025-03-24 PROCEDURE — C1894 INTRO/SHEATH, NON-LASER: HCPCS | Performed by: INTERNAL MEDICINE

## 2025-03-24 PROCEDURE — 36415 COLL VENOUS BLD VENIPUNCTURE: CPT

## 2025-03-24 PROCEDURE — 25510000001 IOPAMIDOL PER 1 ML: Performed by: INTERNAL MEDICINE

## 2025-03-24 PROCEDURE — 85027 COMPLETE CBC AUTOMATED: CPT | Performed by: PHYSICIAN ASSISTANT

## 2025-03-24 PROCEDURE — 82565 ASSAY OF CREATININE: CPT

## 2025-03-24 PROCEDURE — 25010000002 FENTANYL CITRATE (PF) 50 MCG/ML SOLUTION: Performed by: INTERNAL MEDICINE

## 2025-03-24 RX ORDER — SODIUM CHLORIDE 9 MG/ML
250 INJECTION, SOLUTION INTRAVENOUS ONCE AS NEEDED
Status: ACTIVE | OUTPATIENT
Start: 2025-03-24 | End: 2025-03-24

## 2025-03-24 RX ORDER — LIDOCAINE HYDROCHLORIDE 10 MG/ML
INJECTION, SOLUTION EPIDURAL; INFILTRATION; INTRACAUDAL; PERINEURAL
Status: DISCONTINUED | OUTPATIENT
Start: 2025-03-24 | End: 2025-03-24 | Stop reason: HOSPADM

## 2025-03-24 RX ORDER — ASPIRIN 81 MG/1
324 TABLET, CHEWABLE ORAL ONCE
Status: COMPLETED | OUTPATIENT
Start: 2025-03-24 | End: 2025-03-24

## 2025-03-24 RX ORDER — HYDROCODONE BITARTRATE AND ACETAMINOPHEN 7.5; 325 MG/1; MG/1
1 TABLET ORAL EVERY 4 HOURS PRN
Refills: 0 | Status: DISCONTINUED | OUTPATIENT
Start: 2025-03-24 | End: 2025-03-24 | Stop reason: HOSPADM

## 2025-03-24 RX ORDER — FENTANYL CITRATE 50 UG/ML
INJECTION, SOLUTION INTRAMUSCULAR; INTRAVENOUS
Status: DISCONTINUED | OUTPATIENT
Start: 2025-03-24 | End: 2025-03-24 | Stop reason: HOSPADM

## 2025-03-24 RX ORDER — ALPRAZOLAM 0.25 MG
0.25 TABLET ORAL 3 TIMES DAILY PRN
Status: DISCONTINUED | OUTPATIENT
Start: 2025-03-24 | End: 2025-03-24 | Stop reason: HOSPADM

## 2025-03-24 RX ORDER — IOPAMIDOL 755 MG/ML
INJECTION, SOLUTION INTRAVASCULAR
Status: DISCONTINUED | OUTPATIENT
Start: 2025-03-24 | End: 2025-03-24 | Stop reason: HOSPADM

## 2025-03-24 RX ORDER — RIVAROXABAN 20 MG/1
20 TABLET, FILM COATED ORAL DAILY
Qty: 90 TABLET | Refills: 3 | Status: SHIPPED | OUTPATIENT
Start: 2025-03-24

## 2025-03-24 RX ORDER — SODIUM CHLORIDE 9 MG/ML
100 INJECTION, SOLUTION INTRAVENOUS CONTINUOUS
Status: ACTIVE | OUTPATIENT
Start: 2025-03-24 | End: 2025-03-24

## 2025-03-24 RX ORDER — NALOXONE HCL 0.4 MG/ML
0.4 VIAL (ML) INJECTION
Status: DISCONTINUED | OUTPATIENT
Start: 2025-03-24 | End: 2025-03-24 | Stop reason: HOSPADM

## 2025-03-24 RX ORDER — ASPIRIN 81 MG/1
81 TABLET ORAL DAILY
Status: DISCONTINUED | OUTPATIENT
Start: 2025-03-25 | End: 2025-03-24 | Stop reason: HOSPADM

## 2025-03-24 RX ORDER — ACETAMINOPHEN 325 MG/1
650 TABLET ORAL EVERY 4 HOURS PRN
Status: DISCONTINUED | OUTPATIENT
Start: 2025-03-24 | End: 2025-03-24 | Stop reason: HOSPADM

## 2025-03-24 RX ORDER — MIDAZOLAM HYDROCHLORIDE 1 MG/ML
INJECTION, SOLUTION INTRAMUSCULAR; INTRAVENOUS
Status: DISCONTINUED | OUTPATIENT
Start: 2025-03-24 | End: 2025-03-24 | Stop reason: HOSPADM

## 2025-03-24 RX ORDER — MORPHINE SULFATE 2 MG/ML
1 INJECTION, SOLUTION INTRAMUSCULAR; INTRAVENOUS EVERY 4 HOURS PRN
Status: DISCONTINUED | OUTPATIENT
Start: 2025-03-24 | End: 2025-03-24 | Stop reason: HOSPADM

## 2025-03-24 RX ORDER — NITROGLYCERIN 0.4 MG/1
0.4 TABLET SUBLINGUAL
Status: DISCONTINUED | OUTPATIENT
Start: 2025-03-24 | End: 2025-03-24 | Stop reason: HOSPADM

## 2025-03-24 RX ADMIN — ASPIRIN 324 MG: 81 TABLET, CHEWABLE ORAL at 09:36

## 2025-03-24 NOTE — PROGRESS NOTES
Met with patient and spouse in [facility] after LHC. She reports feeling better after recovering from the flu. Able to do ADLs with moderate difficulty. Has been on 4LNC supplemental oxygen for 4 years. We reviewed MitraClip procedure and need for consult with Dr. Dimas prior to procedure. Tentative date 4/23.     Questions about pacemaker transmission, sent to EP dept to reach out.     NYHA III  [facility] 45/70

## 2025-03-24 NOTE — PROGRESS NOTES
Pt. Referred for Phase II Cardiac Rehab. Staff discussed benefits of exercise, program protocol, and educational material provided. Teach back verified. Staff will follow up with patient after Mitraclip procedure.

## 2025-03-24 NOTE — H&P
Pre-cardiac Catheterization History and Physical  [facility] at [facility]      Patient:  Elsa Alcaraz  :  1940  MRN: [id number]    PCP:  Kemi Hdz MD  PHONE:  [phone]  Primary Cardiologist: Dr. Germain    DATE: 3/24/2025  ID: Elsa Alcaraz is a 84 y.o. female resident of [place], [place]     CC: Mitral regurgitation     PROBLEM LIST:   Mitral regurgitation  Echo 10/10/2022: EF 40%, moderate MR, RVSP 23  Echo 2023: EF 36-40%, moderate to severe MR, mild TR with RVSP 36  Echo 10/7/2024: EF 36-40%, severe MR mild to moderate TR, RVSP 61.  REBECA 2025: EF 35%, severe mitral regurgitation, mitral chordae apparatus is calcified, posterior leaflet appears tethered   Chronic systolic heart failure  Normal EF , reduced EF 45% in  and 35 to 40%   Longstanding persistent atrial fibrillation  [id number] = 5, on Xarelto  Complete heart block  S/p Medtronic Micra 2023  Hypertension  Hyperlipidemia  COPD  Severe obstruction with mild restriction by most recent PFTs.  DAVID nonadherent to CPAP    BRIEF HPI: Mrs. Alcaraz is an 85 y/o female who presents for cardiac cath today as part of work-up for Mitraclip procedure. She has been followed for MR by Dr. Germain for a number of years, recent REBECA 2025 showed severe MR with EF 35%. She notes worsening LLAMAS but also has COPD and has been on chronic O2 for the past 4 years. She follows with Dr. Bautista in terms of her lung disease.     Cardiac Risk Factors: advanced age (older than 55 for men, 65 for women), dyslipidemia, hypertension, sedentary lifestyle, and smoking/ tobacco exposure    Allergies:      Allergies   Allergen Reactions    Other GI Intolerance     Spicy foods         MEDICATIONS:  Current Outpatient Medications   Medication Instructions    albuterol sulfate  (90 Base) MCG/ACT inhaler 2 puffs, Inhalation, Every 4 Hours PRN    budesonide-formoterol (SYMBICORT) 160-4.5 MCG/ACT inhaler 2 puffs,  Inhalation, 2 Times Daily - RT, Rinse mouth with water after use    cholecalciferol (VITAMIN D3) 1,000 Units, Daily    citalopram (CELEXA) 20 mg, Daily    empagliflozin (JARDIANCE) 10 mg, Oral, Daily    famotidine (PEPCID) 40 mg, Oral, Nightly    furosemide (LASIX) 20 mg, Oral, Daily    guaiFENesin (MUCINEX) 1,200 mg, Oral, 2 Times Daily    metoprolol succinate XL (TOPROL-XL) 50 mg, Oral, Daily    O2 (OXYGEN) 5 L/min, Daily PRN    pantoprazole (PROTONIX) 40 MG EC tablet 1 tablet, Daily    sacubitril-valsartan (ENTRESTO) 24-26 MG tablet 1 tablet, Oral, Every 12 Hours Scheduled    spironolactone (ALDACTONE) 12.5 mg, Oral, Daily    vitamin B-12 (CYANOCOBALAMIN) 50 mcg, Daily    Xarelto 20 mg, Oral, Daily       Past medical & surgical history, social and family history reviewed in the electronic medical record.    ROS: Pertinent positives listed in the HPI and problem list above. All others reviewed and negative.     Physical Exam:   There were no vitals taken for this visit.    Constitutional:    Alert, cooperative, in no acute distress   Neck:     No Jugular venous distention, adenopathy, or thyromegaly noted.    Heart:    Irregular rhythm and normal rate, 2/6 systolic murmur, no gallops, rubs, or clicks. No distinct PMI noted.    Lungs:    Respirations regular, even and unlabored. No wheezing or ronchi. On 4L NC    Extremities:   No gross deformities, no edema, clubbing, or cyanosis.        Labs and Diagnostic Data:          Lab Results   Component Value Date    CHOL 124 07/04/2023    TRIG 54 07/04/2023    HDL 64 (H) 07/04/2023    LDL 48 07/04/2023    AST 19 02/08/2025    ALT 9 02/08/2025                 Results for orders placed during the hospital encounter of 01/30/25    Adult Transesophageal Echo 3D (REBECA) W/ Cont If Necessary Per Protocol    Interpretation Summary    No transgastric views could be obtained as the probe would not pass beyond the GE junction.    Left ventricular systolic function is moderately  decreased. Estimated left ventricular EF = 35%    The left atrial cavity is moderate to severely dilated.    The mitral annulus is dilated.  There is poor central leaflet coaptation.    The mitral valve chordal apparatus is calcified. The posterior leaflet appears tethered. The posterior leaflet appears to measure between 7 and 8 mm in length.    There is severe mitral regurgitation which appears to emanate from A2-P2. The mitral valve area on planimetry by 3D is 4.9 cm².    Note previous history of elevated RVSP at 61 mmHg in October 2024.  This was not noted on today's echo.    Anesthesia: Cath lab/Echo lab moderate sedation    I was present with the patient for the duration of moderate sedation and supervised staff who had no other duties and monitored the patient for the entire procedure.    Name of independent trained observer: Radha Barbosa RN  Intra-service start time: 0925  Intra-service end time: 1003      IMPRESSION:  83 y/o female with HFrEF, persistent Afib, complete heart block, s/p Micra leadless PPM, severe MR and COPD who presents for cardiac cath today as part of her pre-op workup for possible MitraClip.   Last dose of Xarelto 3/20/25     PLAN:  Procedure to perform: LHC +/- CBI via right femoral access. Risks, benefits and alternatives to the procedure explained to the patient and she understands and wishes to proceed.         Electronically signed by Meg Lyon PA-C, 03/24/25, 9:07 AM EDT.    Jeannie Zabala MD, [facility]

## 2025-03-24 NOTE — H&P (VIEW-ONLY)
Pre-cardiac Catheterization History and Physical  [facility] at [facility]      Patient:  Elsa Alcaraz  :  1940  MRN: [id number]    PCP:  Kemi Hdz MD  PHONE:  [phone]  Primary Cardiologist: Dr. Germain    DATE: 3/24/2025  ID: Elsa Alcaraz is a 84 y.o. female resident of [place], [place]     CC: Mitral regurgitation     PROBLEM LIST:   Mitral regurgitation  Echo 10/10/2022: EF 40%, moderate MR, RVSP 23  Echo 2023: EF 36-40%, moderate to severe MR, mild TR with RVSP 36  Echo 10/7/2024: EF 36-40%, severe MR mild to moderate TR, RVSP 61.  REBECA 2025: EF 35%, severe mitral regurgitation, mitral chordae apparatus is calcified, posterior leaflet appears tethered   Chronic systolic heart failure  Normal EF , reduced EF 45% in  and 35 to 40%   Longstanding persistent atrial fibrillation  [id number] = 5, on Xarelto  Complete heart block  S/p Medtronic Micra 2023  Hypertension  Hyperlipidemia  COPD  Severe obstruction with mild restriction by most recent PFTs.  DAVID nonadherent to CPAP    BRIEF HPI: Mrs. Alcaraz is an 85 y/o female who presents for cardiac cath today as part of work-up for Mitraclip procedure. She has been followed for MR by Dr. Germain for a number of years, recent REBECA 2025 showed severe MR with EF 35%. She notes worsening LLAMAS but also has COPD and has been on chronic O2 for the past 4 years. She follows with Dr. Bautista in terms of her lung disease.     Cardiac Risk Factors: advanced age (older than 55 for men, 65 for women), dyslipidemia, hypertension, sedentary lifestyle, and smoking/ tobacco exposure    Allergies:      Allergies   Allergen Reactions    Other GI Intolerance     Spicy foods         MEDICATIONS:  Current Outpatient Medications   Medication Instructions    albuterol sulfate  (90 Base) MCG/ACT inhaler 2 puffs, Inhalation, Every 4 Hours PRN    budesonide-formoterol (SYMBICORT) 160-4.5 MCG/ACT inhaler 2 puffs,  Inhalation, 2 Times Daily - RT, Rinse mouth with water after use    cholecalciferol (VITAMIN D3) 1,000 Units, Daily    citalopram (CELEXA) 20 mg, Daily    empagliflozin (JARDIANCE) 10 mg, Oral, Daily    famotidine (PEPCID) 40 mg, Oral, Nightly    furosemide (LASIX) 20 mg, Oral, Daily    guaiFENesin (MUCINEX) 1,200 mg, Oral, 2 Times Daily    metoprolol succinate XL (TOPROL-XL) 50 mg, Oral, Daily    O2 (OXYGEN) 5 L/min, Daily PRN    pantoprazole (PROTONIX) 40 MG EC tablet 1 tablet, Daily    sacubitril-valsartan (ENTRESTO) 24-26 MG tablet 1 tablet, Oral, Every 12 Hours Scheduled    spironolactone (ALDACTONE) 12.5 mg, Oral, Daily    vitamin B-12 (CYANOCOBALAMIN) 50 mcg, Daily    Xarelto 20 mg, Oral, Daily       Past medical & surgical history, social and family history reviewed in the electronic medical record.    ROS: Pertinent positives listed in the HPI and problem list above. All others reviewed and negative.     Physical Exam:   There were no vitals taken for this visit.    Constitutional:    Alert, cooperative, in no acute distress   Neck:     No Jugular venous distention, adenopathy, or thyromegaly noted.    Heart:    Irregular rhythm and normal rate, 2/6 systolic murmur, no gallops, rubs, or clicks. No distinct PMI noted.    Lungs:    Respirations regular, even and unlabored. No wheezing or ronchi. On 4L NC    Extremities:   No gross deformities, no edema, clubbing, or cyanosis.        Labs and Diagnostic Data:          Lab Results   Component Value Date    CHOL 124 07/04/2023    TRIG 54 07/04/2023    HDL 64 (H) 07/04/2023    LDL 48 07/04/2023    AST 19 02/08/2025    ALT 9 02/08/2025                 Results for orders placed during the hospital encounter of 01/30/25    Adult Transesophageal Echo 3D (REBECA) W/ Cont If Necessary Per Protocol    Interpretation Summary    No transgastric views could be obtained as the probe would not pass beyond the GE junction.    Left ventricular systolic function is moderately  decreased. Estimated left ventricular EF = 35%    The left atrial cavity is moderate to severely dilated.    The mitral annulus is dilated.  There is poor central leaflet coaptation.    The mitral valve chordal apparatus is calcified. The posterior leaflet appears tethered. The posterior leaflet appears to measure between 7 and 8 mm in length.    There is severe mitral regurgitation which appears to emanate from A2-P2. The mitral valve area on planimetry by 3D is 4.9 cm².    Note previous history of elevated RVSP at 61 mmHg in October 2024.  This was not noted on today's echo.    Anesthesia: Cath lab/Echo lab moderate sedation    I was present with the patient for the duration of moderate sedation and supervised staff who had no other duties and monitored the patient for the entire procedure.    Name of independent trained observer: Radha Barbosa RN  Intra-service start time: 0925  Intra-service end time: 1003      IMPRESSION:  85 y/o female with HFrEF, persistent Afib, complete heart block, s/p Micra leadless PPM, severe MR and COPD who presents for cardiac cath today as part of her pre-op workup for possible MitraClip.   Last dose of Xarelto 3/20/25     PLAN:  Procedure to perform: LHC +/- CBI via right femoral access. Risks, benefits and alternatives to the procedure explained to the patient and she understands and wishes to proceed.         Electronically signed by Meg Lyon PA-C, 03/24/25, 9:07 AM EDT.    Jeannie Zabala MD, [facility]

## 2025-03-25 ENCOUNTER — TELEPHONE (OUTPATIENT)
Dept: CARDIOLOGY | Facility: CLINIC | Age: 85
End: 2025-03-25
Payer: MEDICARE

## 2025-03-25 DIAGNOSIS — I34.0 SEVERE MITRAL VALVE REGURGITATION: Primary | ICD-10-CM

## 2025-03-25 NOTE — TELEPHONE ENCOUNTER
Caller: Elsa Alcaraz    Relationship: Self    Best call back number: [phone]     What is the best time to reach you: ANY    Who are you requesting to speak with (clinical staff, provider,  specific staff member): DUC    What was the call regarding: PATIENT IS REQUESTING A CALL BACK FROM DUC IN REGARDS TO HER PACEMEAKER.    Is it okay if the provider responds through [employer]: PLEASE CALL

## 2025-03-25 NOTE — TELEPHONE ENCOUNTER
Called and spoke with Mrs Alcaraz.  She is going to get her pacemaker checked on 3/31/25 @ 10 am for a device check only.  She doesn't do remotes due to not having good cell service at her house.    ----- Message from Dee Dee LUCIO sent at 3/25/2025 11:57 AM EDT -----  Regarding: FW: Device data transmission    ----- Message -----  From: Vanessa Tena APRN  Sent: 3/24/2025   3:24 PM EDT  To: Fidencio Khan RN  Subject: Device data transmission                         Patient had questions about her device and and data transmission, states she has a metal roof. She's currently in [facility] after C with Dr. Zabala, but I told her I'd pass along the information for someone to reach out from EP. Thank you!   Male

## 2025-03-28 NOTE — PROGRESS NOTES
[facility] Cardiothoracic Surgery New Patient Office Note     Date of Encounter: 2025     Name: Elsa Alcaraz  : 1940     Referred By: Jeannie Zabala,*  PCP: Kemi Hdz MD    Chief Complaint:    Chief Complaint   Patient presents with    Consult     NP consult per Dr. Zabala and Vanessa ALEXANDRE with mitral valve regurgitation for possible mitral clip.         Subjective      History of Present Illness:    Elsa Alcaraz is a 85 y.o. female referred to Dr. Dimas for mitral valve regurgitation. PMH: severe mitral valve regurgitation, chronic systolic heart failure, persistent atrial fibrillation, complete heart block s/p leadless ppm insertion, HTN, HLD, COPD, chronic respiratory failure (on chronic O2), DAVID (noncompliant with CPAP), GERD, iron deficiency anemia, bilateral cataracts, obesity, anxiety, and depression. Patient with c/o worsening shortness of breath and fatigue over the last few years. She is also unable to complete her ADLs as she previously had due to symptoms. REBECA was completed and demonstrated with severe mitral regurgitation with an area of 4.9 cm2. MR max veloity from TTE 10/2024 was noted at 540 cm/sec with a MV mean peak gradient of 2.26 mmHg and a max peak gradient of 7 mmHg. Cardiac catheterization 3/24/25 demonstrated normal coronary arteries. She reports she was cleared by dentist to undergo procedure but does have two teeth that need to be pulled. No infection present. She also reports an external yeast infection to her vagina that is not in her groin that is being treated by PCP. Her lower extremity has a small sore located that is healing and has been treated by PCP with antibiotics.     Review of Systems:  Review of Systems   Constitutional: Negative. Negative for chills, decreased appetite, diaphoresis, fever, malaise/fatigue, night sweats, weight gain and weight loss.   HENT:  Positive for hoarse voice.    Eyes: Negative.  Negative  for blurred vision, double vision and visual disturbance.   Cardiovascular:  Positive for chest pain (mid chest with cough), dyspnea on exertion, leg swelling (BLE - minimal) and palpitations. Negative for claudication, irregular heartbeat, near-syncope, orthopnea, paroxysmal nocturnal dyspnea and syncope.   Respiratory:  Positive for cough. Negative for hemoptysis, shortness of breath, sputum production and wheezing.    Hematologic/Lymphatic: Negative for adenopathy and bleeding problem. Bruises/bleeds easily.   Skin:  Positive for poor wound healing (lesion non healng on right lower leg). Negative for color change, nail changes and rash.   Musculoskeletal:  Positive for arthritis (diffuse). Negative for back pain, falls and muscle cramps.   Gastrointestinal:  Positive for abdominal pain (hiatal hernia) and diarrhea. Negative for dysphagia and heartburn.   Genitourinary: Negative.  Negative for flank pain.   Neurological:  Positive for dizziness. Negative for brief paralysis, disturbances in coordination, focal weakness, headaches, light-headedness, loss of balance, numbness, paresthesias, sensory change, vertigo and weakness.   Psychiatric/Behavioral: Negative.  Negative for depression and suicidal ideas.    Allergic/Immunologic: Negative for persistent infections.       I have reviewed the following portions of the patient's history: problem list, current medications, allergies, past surgical history, past medical history, past social history, past family history, and ROS and confirm it's accurate.    Allergies:  Allergies   Allergen Reactions    Other GI Intolerance     Spicy foods         Medications:      Current Outpatient Medications:     albuterol sulfate  (90 Base) MCG/ACT inhaler, Inhale 2 puffs Every 4 (Four) Hours As Needed for Wheezing., Disp: 18 g, Rfl: 5    budesonide-formoterol (SYMBICORT) 160-4.5 MCG/ACT inhaler, Inhale 2 puffs 2 (Two) Times a Day. Rinse mouth with water after use (Patient  taking differently: Inhale 2 puffs Daily. Rinse mouth with water after use), Disp: 10.2 g, Rfl: 5    cholecalciferol (VITAMIN D3) 25 MCG (1000 UT) tablet, Take 1 tablet by mouth Daily., Disp: , Rfl:     citalopram (CeleXA) 20 MG tablet, Take 1 tablet by mouth Daily., Disp: , Rfl:     empagliflozin (Jardiance) 10 MG tablet tablet, Take 1 tablet by mouth Daily., Disp: 90 tablet, Rfl: 3    famotidine (PEPCID) 40 MG tablet, TAKE 1 TABLET BY MOUTH ONCE DAILY AT NIGHT, Disp: 90 tablet, Rfl: 3    furosemide (Lasix) 20 MG tablet, Take 1 tablet by mouth Daily., Disp: 30 tablet, Rfl: 0    metoprolol succinate XL (TOPROL-XL) 50 MG 24 hr tablet, Take 1 tablet by mouth Daily., Disp: 90 tablet, Rfl: 2    O2 (OXYGEN), Inhale 5 L/min Daily As Needed., Disp: , Rfl:     pantoprazole (PROTONIX) 40 MG EC tablet, Take 1 tablet by mouth Every Morning., Disp: , Rfl:     sacubitril-valsartan (ENTRESTO) 24-26 MG tablet, Take 1 tablet by mouth Every 12 (Twelve) Hours., Disp: 60 tablet, Rfl: 6    spironolactone (ALDACTONE) 25 MG tablet, Take 0.5 tablets by mouth Daily., Disp: 15 tablet, Rfl: 0    vitamin B-12 (CYANOCOBALAMIN) 100 MCG tablet, Take 1 tablet by mouth Daily., Disp: , Rfl:     Xarelto 20 MG tablet, Take 1 tablet by mouth Daily. *Resume on 3/27/25*, Disp: 90 tablet, Rfl: 3    guaiFENesin (MUCINEX) 600 MG 12 hr tablet, Take 2 tablets by mouth 2 (Two) Times a Day. (Patient not taking: Reported on 3/31/2025), Disp: 20 tablet, Rfl: 0    History:   Past Medical History:   Diagnosis Date    Anemia     Anxiety disorder due to general medical condition 01/11/2017    Arthritis     Atrial fibrillation 01/11/2017    Body piercing     BOTH EARS    Borderline diabetes     Breast cancer 2003    right-radiation and a lumpectomy    Broken tooth     Cataract 01/11/2017    Left eye surgery 11/19    Chronic bronchitis 01/11/2017    Colon cancer 11/30/1998    had surgery and chemo    COPD (chronic obstructive pulmonary disease)     Cyanocobalamine  "deficiency (non anemic)     Depression 2017    Diverticulosis     Elevated cholesterol     Esophageal reflux 2017    Hiatal hernia 2017    History of bladder infections     History of echocardiogram 2021    Hx of exercise stress test     \"several years ago by Dr. Mercado\".      Hx of radiation therapy     Hyperlipidemia     Hypertension 2017    Impaired functional mobility and activity tolerance     Impaired functional mobility, balance, gait, and endurance     Osteoporosis     Palpitations 2017    Prediabetes     Problems with swallowing     meat at times per pt report    Shortness of breath     WITH EXERTION     Sleep apnea 2017    NO CPAP    Tricuspid valve disorder 2017    Patient doesn't know anything about this    Vitamin D deficiency     Wears glasses        Past Surgical History:   Procedure Laterality Date    ANAL FISTULOTOMY      APPENDECTOMY          BREAST BIOPSY      BREAST LUMPECTOMY Right 2006    CARDIAC CATHETERIZATION N/A 3/24/2025    Procedure: Left Heart Cath - Femoral access;  Surgeon: Jeannie Zabala MD;  Location:  [facility];  Service: Cardiovascular;  Laterality: N/A;    CARDIAC ELECTROPHYSIOLOGY PROCEDURE N/A 2023    Procedure: Micra;  Surgeon: Keven Willis DO;  Location:  [facility];  Service: Cardiology;  Laterality: N/A;    CATARACT EXTRACTION      both eyes     CATARACT EXTRACTION W/ INTRAOCULAR LENS IMPLANT Left 2019    Procedure: CATARACT PHACO EXTRACTION WITH INTRAOCULAR LENS IMPLANT LEFT;  Surgeon: Masoud David MD;  Location: [facility] OR;  Service: Ophthalmology    CATARACT EXTRACTION W/ INTRAOCULAR LENS IMPLANT Right 2019    Procedure: CATARACT PHACO EXTRACTION WITH INTRAOCULAR LENS IMPLANT RIGHT;  Surgeon: Masoud David MD;  Location: [facility] OR;  Service: Ophthalmology     SECTION  1981    CHOLECYSTECTOMY  2009    COLECTOMY " PARTIAL / TOTAL  11/30/1998    COLON CANCER    COLONOSCOPY  2016    COLONOSCOPY N/A 06/02/2021    Procedure: COLONOSCOPY WITH BIOPSY;  Surgeon: Iona Sanders MD;  Location: [facility] ENDOSCOPY;  Service: Gastroenterology;  Laterality: N/A;    ENDOSCOPY  2016    ENDOSCOPY N/A 05/04/2018    Procedure: ESOPHAGOGASTRODUODENOSCOPY WITH COLD FORCEP BIOPSY;  Surgeon: Vasquez Fagan MD;  Location: [facility] ENDOSCOPY;  Service: Gastroenterology    ENDOSCOPY N/A 08/12/2019    Procedure: ESOPHAGOGASTRODUODENOSCOPY WITH BIOPSY;  Surgeon: Vasquez Fagan MD;  Location: [facility] ENDOSCOPY;  Service: Gastroenterology    ENDOSCOPY N/A 09/13/2022    Procedure: ESOPHAGOGASTRODUODENOSCOPY with biopsy and polypectomy  ;  Surgeon: Iona Sanders MD;  Location: [facility] ENDOSCOPY;  Service: Gastroenterology;  Laterality: N/A;    ENDOSCOPY N/A 6/28/2024    Procedure: ESOPHAGOGASTRODUODENOSCOPY WITH BIOPSY and dilatation;  Surgeon: Iona Sanders MD;  Location: [facility] ENDOSCOPY;  Service: Gastroenterology;  Laterality: N/A;    HYSTERECTOMY      1998    INSERT / REPLACE / REMOVE PACEMAKER      SKIN BIOPSY Left     arm    SKIN LESION EXCISION N/A     VENTRAL HERNIA REPAIR  10/28/2012       Social History     Socioeconomic History    Marital status:    Tobacco Use    Smoking status: Never     Passive exposure: Past    Smokeless tobacco: Never   Vaping Use    Vaping status: Never Used   Substance and Sexual Activity    Alcohol use: Not Currently     Alcohol/week: 1.0 - 2.0 standard drink of alcohol     Types: 1 - 2 Glasses of wine per week     Comment: rarely    Drug use: Never    Sexual activity: Defer        Family History   Problem Relation Age of Onset    Hypertension Mother     Asthma Mother     COPD Mother     Osteoarthritis Mother     Stomach cancer Father     Cancer Father     Cancer Sister     Diabetes Maternal Grandmother     Colon cancer Neg Hx        Objective     Imaging/Labs:  Cardiac  "Catheterization/Vascular Study (03/24/2025 12:31)   Conclusion  FINAL IMPRESSION:  Normal coronary arteries     RECOMMENDATIONS:  May proceed with MitraClip placement    Adult Transesophageal Echo 3D (REBECA) W/ Cont If Necessary Per Protocol (01/30/2025 10:44)   Interpretation Summary    No transgastric views could be obtained as the probe would not pass beyond the GE junction.    Left ventricular systolic function is moderately decreased. Estimated left ventricular EF = 35%    The left atrial cavity is moderate to severely dilated.    The mitral annulus is dilated.  There is poor central leaflet coaptation.    The mitral valve chordal apparatus is calcified. The posterior leaflet appears tethered. The posterior leaflet appears to measure between 7 and 8 mm in length.    There is severe mitral regurgitation which appears to emanate from A2-P2. The mitral valve area on planimetry by 3D is 4.9 cm².    Note previous history of elevated RVSP at 61 mmHg in October 2024.  This was not noted on today's echo.     Adult Transthoracic Echo Complete W/ Cont if Necessary Per Protocol (10/07/2024 13:37)   Interpretation Summary    Left ventricular ejection fraction appears to be 36 - 40%.    Severe mitral valve regurgitation is present.    There is calcification of the aortic valve.    Aortic valve maximum pressure gradient is 13 mmHg.    Estimated right ventricular systolic pressure from tricuspid regurgitation is markedly elevated (>55 mmHg). Calculated right ventricular systolic pressure from tricuspid regurgitation is 61 mmHg.       Physical Exam:  Vitals:    03/31/25 1020   BP: 104/70   BP Location: Left arm   Patient Position: Sitting   Pulse: 85   Temp: 96.8 °F (36 °C)   SpO2: 98%  Comment: on 5 liters O2   Weight: 65.7 kg (144 lb 12.8 oz)   Height: 160 cm (62.99\")  Comment: pt reported      Body mass index is 25.66 kg/m².       Physical Exam  Vitals and nursing note reviewed.   Constitutional:       General: She is not in " acute distress.  HENT:      Head: Normocephalic and atraumatic.   Neck:      Vascular: No carotid bruit or JVD.   Cardiovascular:      Rate and Rhythm: Normal rate and regular rhythm.      Pulses:           Radial pulses are 2+ on the right side and 2+ on the left side.        Dorsalis pedis pulses are 2+ on the right side and 2+ on the left side.        Posterior tibial pulses are 2+ on the right side and 2+ on the left side.      Heart sounds: Normal heart sounds. No murmur heard.  Pulmonary:      Effort: Pulmonary effort is normal. No respiratory distress.      Breath sounds: Normal breath sounds.   Genitourinary:     Comments: Yeast appearing infection present external genitalia that does not extend to groin  Musculoskeletal:      Right lower leg: No edema.      Left lower leg: No edema.   Skin:     General: Skin is warm.      Comments: Scab with skin redness right lower extremity, no warmth tenderness or drainage noted       Neurological:      Mental Status: She is alert and oriented to person, place, and time.      GCS: GCS eye subscore is 4. GCS verbal subscore is 5. GCS motor subscore is 6.      Gait: Gait is intact.   Psychiatric:         Attention and Perception: Attention normal.         Behavior: Behavior is cooperative.           Assessment / Plan    Elsa Alcaraz is a 85 y.o. female referred to Dr. Dimas for mitral valve regurgitation. PMH: severe mitral valve regurgitation, chronic systolic heart failure, persistent atrial fibrillation, complete heart block s/p leadless ppm insertion, HTN, HLD, COPD, chronic respiratory failure (on chronic O2), DAVID (noncompliant with CPAP), GERD, iron deficiency anemia, bilateral cataracts, obesity, anxiety, and depression.     Assessment / Plan:  1. Mitral regurgitation   Worsening shortness of breath and fatigue over the last few years. Unable to complete her ADLs as she previously had due to symptoms  REBECA severe mitral regurge valve area 4.9 cm2  TTE MR max velocity  540 cm/s MV mean peak gradient 2.26 mmHg and max peak gradient 7 mmHg  Cardiac cath normal coronary arteries  Reports needing two dental extractions; clearance from dentist given to undergo procedure per patient, no abscess or infection present  Wound present right lower extremity, reports improving slowly. Pulses palpable on extremity and redness noted around scabbing without warmth, tenderness, or drainage. RX abx completed from PCP  Reports vaginal yeast infection present. Upon exam, no rash noted to groin area. Advised patient to continue following up with PCP regarding yeast infection and to notify us if it spreads to groin where access site would be for procedure  Discussed mitraclip procedure, risks, and expected post-op recovery. Discussed future dental prophylaxis requirement.  Patient is agreeable to proceeding and meeting Dr. Dimas in preop         Patient Education:   Risks of surgery were discussed with the patient including: pain, bleeding, infection, blood clots,  kidney damage, conversion to open chest procedure, stroke, heart attack, or death.  Patient understands risks and agrees to proceed.  Due to your valvular heart disease, you will require antibiotics prior to any dental procedure, for life, to reduce your risk of a heart infection called endocarditis. You should carry your endocarditis heart valve card in your wallet for reference.       Follow Up:   Return for after Mitraclip .   Or sooner for any further concerns or worsening sign and symptoms. If unable to reach us in the office please dial 911 or go to the nearest emergency department.      BALDOMERO Mendez  [facility] Cardiothoracic Surgery    Time Spent: I spent 47 minutes caring for Elsa on this date of service. This time includes time spent by me in the following activities: preparing for the visit, reviewing tests, obtaining and/or reviewing a separately obtained history, performing a medically appropriate examination  and/or evaluation, counseling and educating the patient/family/caregiver, ordering medications, tests, or procedures, referring and communicating with other health care professionals, documenting information in the medical record, and care coordination.

## 2025-03-31 ENCOUNTER — CLINICAL SUPPORT NO REQUIREMENTS (OUTPATIENT)
Dept: CARDIOLOGY | Facility: CLINIC | Age: 85
End: 2025-03-31
Payer: MEDICARE

## 2025-03-31 ENCOUNTER — OFFICE VISIT (OUTPATIENT)
Dept: CARDIAC SURGERY | Facility: CLINIC | Age: 85
End: 2025-03-31
Payer: MEDICARE

## 2025-03-31 VITALS
DIASTOLIC BLOOD PRESSURE: 70 MMHG | TEMPERATURE: 96.8 F | HEIGHT: 63 IN | WEIGHT: 144.8 LBS | OXYGEN SATURATION: 98 % | BODY MASS INDEX: 25.66 KG/M2 | SYSTOLIC BLOOD PRESSURE: 104 MMHG | HEART RATE: 85 BPM

## 2025-03-31 DIAGNOSIS — Z95.0 PRESENCE OF CARDIAC PACEMAKER: Primary | ICD-10-CM

## 2025-03-31 DIAGNOSIS — I34.0 MODERATE MITRAL REGURGITATION: Primary | Chronic | ICD-10-CM

## 2025-03-31 PROCEDURE — 1159F MED LIST DOCD IN RCRD: CPT

## 2025-03-31 PROCEDURE — 99214 OFFICE O/P EST MOD 30 MIN: CPT

## 2025-03-31 PROCEDURE — 1160F RVW MEDS BY RX/DR IN RCRD: CPT

## 2025-03-31 PROCEDURE — 3078F DIAST BP <80 MM HG: CPT

## 2025-03-31 PROCEDURE — 3074F SYST BP LT 130 MM HG: CPT

## 2025-04-01 ENCOUNTER — DOCUMENTATION (OUTPATIENT)
Dept: CARDIOLOGY | Facility: HOSPITAL | Age: 85
End: 2025-04-01
Payer: MEDICARE

## 2025-04-01 NOTE — PROGRESS NOTES
Spoke with patient about upcoming MitraClip date 4/23 with PAT likely on 4/21 or 4/22. Cardiology scheduling will call with apt details. She has my contact information for any future questions or concerns.

## 2025-04-01 NOTE — PROGRESS NOTES
Pt has a [employer] Micra VR. Pt does not participate in remote cardiac device monitoring because her home has a metal roof. Pt had requested an in office device check.     Manual interrogation performed. Normal device function.

## 2025-04-09 ENCOUNTER — PREP FOR SURGERY (OUTPATIENT)
Dept: OTHER | Facility: HOSPITAL | Age: 85
End: 2025-04-09
Payer: MEDICARE

## 2025-04-09 ENCOUNTER — OFFICE VISIT (OUTPATIENT)
Dept: PULMONOLOGY | Facility: CLINIC | Age: 85
End: 2025-04-09
Payer: MEDICARE

## 2025-04-09 VITALS
RESPIRATION RATE: 18 BRPM | OXYGEN SATURATION: 95 % | HEART RATE: 88 BPM | WEIGHT: 147 LBS | BODY MASS INDEX: 26.05 KG/M2 | HEIGHT: 63 IN | DIASTOLIC BLOOD PRESSURE: 72 MMHG | SYSTOLIC BLOOD PRESSURE: 118 MMHG

## 2025-04-09 DIAGNOSIS — I34.0 MODERATE MITRAL REGURGITATION: Primary | Chronic | ICD-10-CM

## 2025-04-09 DIAGNOSIS — R06.02 SOB (SHORTNESS OF BREATH): ICD-10-CM

## 2025-04-09 DIAGNOSIS — J45.30 MILD PERSISTENT ASTHMA WITHOUT COMPLICATION: Primary | ICD-10-CM

## 2025-04-09 DIAGNOSIS — G47.34 NOCTURNAL HYPOXIA: ICD-10-CM

## 2025-04-09 DIAGNOSIS — R09.02 EXERCISE HYPOXEMIA: ICD-10-CM

## 2025-04-09 DIAGNOSIS — I50.22 CHRONIC SYSTOLIC CHF (CONGESTIVE HEART FAILURE): ICD-10-CM

## 2025-04-09 RX ORDER — FLUCONAZOLE 150 MG/1
150 TABLET ORAL DAILY
COMMUNITY
Start: 2025-04-03

## 2025-04-09 RX ORDER — DOXYCYCLINE HYCLATE 100 MG
100 TABLET ORAL
COMMUNITY
Start: 2025-04-03

## 2025-04-09 RX ORDER — SODIUM CHLORIDE 0.9 % (FLUSH) 0.9 %
10 SYRINGE (ML) INJECTION AS NEEDED
OUTPATIENT
Start: 2025-04-09

## 2025-04-09 RX ORDER — BUPIVACAINE HCL/0.9 % NACL/PF 0.1 %
2000 PLASTIC BAG, INJECTION (ML) EPIDURAL ONCE
OUTPATIENT
Start: 2025-04-09 | End: 2025-04-09

## 2025-04-09 RX ORDER — SODIUM CHLORIDE 9 MG/ML
40 INJECTION, SOLUTION INTRAVENOUS AS NEEDED
OUTPATIENT
Start: 2025-04-09

## 2025-04-09 RX ORDER — SODIUM CHLORIDE 0.9 % (FLUSH) 0.9 %
10 SYRINGE (ML) INJECTION EVERY 12 HOURS SCHEDULED
OUTPATIENT
Start: 2025-04-09

## 2025-04-09 RX ORDER — BUDESONIDE AND FORMOTEROL FUMARATE DIHYDRATE 160; 4.5 UG/1; UG/1
2 AEROSOL RESPIRATORY (INHALATION)
Qty: 10.2 G | Refills: 5 | Status: SHIPPED | OUTPATIENT
Start: 2025-04-09

## 2025-04-09 NOTE — PROGRESS NOTES
"Chief Complaint   Patient presents with    Breathing Problem    Follow-up         Subjective   Elsa Alcaraz is a 85 y.o. female.   Patient is here today for follow up of asthma and shortness of breath.     Patient says that since the last office visit she has had no recent exacerbations. she denies any emergency room visits or hospitalizations, due to her asthma.     She states she was cleaning out a closet yesterday and has had some cough and wheezing since then. She states she has cough and wheezing intermittently.     The patient says that she is compliant with pulmonary medicines, as prescribed. She she does not use Symbicort regularly.     She initially states she uses albuterol often, then states she uses it once a day when I asked how many times a day she uses.     She states she is going to have cardiac surgery to repair valve later this month.    She is on doxycycline due to wound on right shin.     She does not use PAP therapy.    She states she tries to walk on a regular basis.  When she cannot walk outside due to weather, [place].      The following portions of the patient's history were reviewed and updated as appropriate: allergies, current medications, past family history, past medical history, past social history, and past surgical history.      Review of Systems   HENT:  Negative for sinus pressure, sneezing and sore throat.    Respiratory:  Positive for cough, chest tightness, shortness of breath and wheezing.    Psychiatric/Behavioral:  Negative for sleep disturbance.        Objective   Visit Vitals  /72   Pulse 88   Resp 18   Ht 160 cm (62.99\")   Wt 66.7 kg (147 lb)   SpO2 95% Comment: on RA at rest   BMI 26.05 kg/m²     ============================  ============================    6 MINUTE WALK TEST    Elsa Alcaraz   1940             BASELINE   SpO2%: 95 % RA    Heart Rate 88   Blood Pressure 118/72     EXERCISE SpO2% HEART RATE RA or O2 @ LPM   1 " MINUTE 93 101 RA   2 MINUTES 93 103 RA   3 MINUTES 92 104 RA   4 MINUTES 89 100 RA   5 MINUTES 88 76 RA ADD 2LPM   6 MINUTES 92 71 2LPM   (Number of laps: 7 X 36 meters + Final partial lap:  meters = 252 meters)            Distance Walked:  252 Meters   SpO2% Post Exercise:  95 %   HR Post Exercise:  82     Reason to stop (if applicable):   ____ Chest Pain   ____ Light Headedness   ____ Dyspnea Unrelieved by Rest   ____ Abnormal Gait Pattern   ____ Severe Fatigue   ____ Other (Specify: __________________________)    Tech Comments (if any):      Test performed by: SP    ============================  ============================    Physical Exam  Vitals reviewed.   HENT:      Head: Atraumatic.      Mouth/Throat:      Mouth: Mucous membranes are moist.   Eyes:      Extraocular Movements: Extraocular movements intact.   Cardiovascular:      Rate and Rhythm: Normal rate and regular rhythm.   Pulmonary:      Effort: Pulmonary effort is normal. No respiratory distress.      Breath sounds: No wheezing.      Comments: Good A/E.   Musculoskeletal:      Comments: Gait with rolling walker.    Skin:     General: Skin is warm.   Neurological:      Mental Status: She is alert and oriented to person, place, and time.         Assessment & Plan   Diagnoses and all orders for this visit:    1. Mild persistent asthma without complication (Primary)    2. Exercise hypoxemia    3. Chronic systolic CHF (congestive heart failure)    4. Nocturnal hypoxia    5. SOB (shortness of breath)  -     Converted Six Minute Walk    Other orders  -     budesonide-formoterol (SYMBICORT) 160-4.5 MCG/ACT inhaler; Inhale 2 puffs 2 (Two) Times a Day. Rinse mouth with water after use  Dispense: 10.2 g; Refill: 5           Return in about 6 months (around 10/9/2025) for Recheck, For Dr. Bautista.    DISCUSSION (if any):  PFT today consistent with moderate obstruction.  Restriction without suggestion of air trapping.  Decreased diffusion capacity.  FEV1  postbronchodilator has improved compared to PFT from 2022.  TLC has decreased from 71% in 2022 to 65% today.    On 6 MWT, she required 2 L of oxygen with activity.    She uses oxygen at night.  I am not sure if this was ordered by cardiology but does not appear to have been ordered by us.  She should continue using oxygen at night as ordered.  She may benefit from overnight pulse oximetry in the future but I will not do at this time due to her upcoming surgery this month.    I have discussed the importance of using Symbicort twice a day, especially since she c/o intermittent wheezing and coughing.  She cleaned out a closet yesterday which has caused some increased coughing I have explained that using Symbicort 2 puffs in the morning and 2 puffs in the evening should help.  I have told her she will need to rinse her mouth after using the inhaler.    Continue NATHAN as needed for SOB and wheezing.     I have discussed the importance of being compliant with her inhalers.  She verbalizes understanding.    Patient's medications for underlying asthma were reviewed with her in great detail.    Any needed adjustments to her pulmonary medications, either for clinical or insurance coverage reasons, have been made and are reflected in the orders.    Side effects of prescribed medications discussed with the patient.    Asthma action plan with discussed with her.    The patient was asked to call this office if the symptoms worsen.     Cardiac catheterization from March 2025 reviewed.    Angiographic Findings:      RCA: The right coronary is nondominant for the posterior circulation and gives rise to 2 RV marginal branches.  The right coronary artery and its branches contain no disease.     LMCA: The left main coronary artery gives rise to the LAD and circumflex vessels and is short vessel free of disease.     Circumflex: The circumflex coronary artery is dominant for the posterior circulation and gives rise to a large multiply  branching first obtuse marginal a moderate second obtuse marginal, the PDA and a small posterolateral branch.  The circumflex and its branches contain no disease.     LAD: The LAD gives rise to a large first diagonal branch several small diagonals and terminates as a small apical recurrent branch.  The LAD and its branches contain no disease.    REBECA reviewed from January 2025.  Interpretation Summary         No transgastric views could be obtained as the probe would not pass beyond the GE junction.    Left ventricular systolic function is moderately decreased. Estimated left ventricular EF = 35%    The left atrial cavity is moderate to severely dilated.    The mitral annulus is dilated.  There is poor central leaflet coaptation.    The mitral valve chordal apparatus is calcified. The posterior leaflet appears tethered. The posterior leaflet appears to measure between 7 and 8 mm in length.    There is severe mitral regurgitation which appears to emanate from A2-P2. The mitral valve area on planimetry by 3D is 4.9 cm².    Note previous history of elevated RVSP at 61 mmHg in October 2024.  This was not noted on today's echo.         Dictated utilizing Dragon dictation.    This document was electronically signed by BALDOMERO Pickering April 9, 2025  12:07 EDT

## 2025-04-21 ENCOUNTER — PRE-ADMISSION TESTING (OUTPATIENT)
Dept: PREADMISSION TESTING | Facility: HOSPITAL | Age: 85
DRG: 266 | End: 2025-04-21
Payer: MEDICARE

## 2025-04-21 DIAGNOSIS — I34.0 MODERATE MITRAL REGURGITATION: Chronic | ICD-10-CM

## 2025-04-21 LAB
ANION GAP SERPL CALCULATED.3IONS-SCNC: 11 MMOL/L (ref 5–15)
BUN SERPL-MCNC: 28 MG/DL (ref 8–23)
BUN/CREAT SERPL: 23.5 (ref 7–25)
CALCIUM SPEC-SCNC: 9.4 MG/DL (ref 8.6–10.5)
CHLORIDE SERPL-SCNC: 101 MMOL/L (ref 98–107)
CO2 SERPL-SCNC: 25 MMOL/L (ref 22–29)
CREAT SERPL-MCNC: 1.19 MG/DL (ref 0.57–1)
DEPRECATED RDW RBC AUTO: 48.7 FL (ref 37–54)
EGFRCR SERPLBLD CKD-EPI 2021: 45.2 ML/MIN/1.73
ERYTHROCYTE [DISTWIDTH] IN BLOOD BY AUTOMATED COUNT: 14.9 % (ref 12.3–15.4)
GLUCOSE SERPL-MCNC: 90 MG/DL (ref 65–99)
HCT VFR BLD AUTO: 40 % (ref 34–46.6)
HGB BLD-MCNC: 12.2 G/DL (ref 12–15.9)
MCH RBC QN AUTO: 27.2 PG (ref 26.6–33)
MCHC RBC AUTO-ENTMCNC: 30.5 G/DL (ref 31.5–35.7)
MCV RBC AUTO: 89.3 FL (ref 79–97)
PLATELET # BLD AUTO: 299 10*3/MM3 (ref 140–450)
PMV BLD AUTO: 9.7 FL (ref 6–12)
POTASSIUM SERPL-SCNC: 4.4 MMOL/L (ref 3.5–5.2)
RBC # BLD AUTO: 4.48 10*6/MM3 (ref 3.77–5.28)
SODIUM SERPL-SCNC: 137 MMOL/L (ref 136–145)
WBC NRBC COR # BLD AUTO: 8.53 10*3/MM3 (ref 3.4–10.8)

## 2025-04-21 PROCEDURE — 85027 COMPLETE CBC AUTOMATED: CPT

## 2025-04-21 PROCEDURE — 80048 BASIC METABOLIC PNL TOTAL CA: CPT

## 2025-04-21 PROCEDURE — 36415 COLL VENOUS BLD VENIPUNCTURE: CPT

## 2025-04-21 NOTE — DISCHARGE INSTRUCTIONS
Dear Patient,    Do NOT eat, drink, or smoke after midnight the night before your procedure.   Take your medications as instructed by your doctor.    Glasses and jewelry may be worn, but dentures must be removed prior to your procedure.    Leave any items you consider valuable at home.      MORNING of your Procedure, please bring the following:     -Photo ID and insurance card(s)    -ALL medications in their ORIGINAL CONTAINERS or current medication list.    -Co-pay and/or deductible required by your insurance   -Copy of living will or power of  document (if not brought to Pre-Admission Testing department)   -CPAP mask and tubing, not your machine (if applicable)   -Relaxation aids (music, books, magazines)   -Skin Prep Instruction Sheet (if applicable)       Check in is on the 2nd floor of the [address] building.  Your procedure will be performed in the cath lab or EP lab.  During your procedure, your family will wait in the cath lab waiting area where you checked in.      Please make arrangements for transportation home prior to discharge time.      Please note:  If you are scheduled to have one of the following procedures: Pulmonary Vein Ablation, Lead Extraction, MitraClip, Cerebral Coilings or Embolization, please let your family know that after your procedure you will be going to recovery unit on the 2nd floor of the [address] building.  When the physician is finished speaking with your family after your procedure is completed, your family will be directed or escorted to the surgery waiting area in the [address] [facility].  This is where your family will wait until you are given a room assignment and then your family will be directed to the appropriate unit.

## 2025-04-21 NOTE — PAT
An arrival time for procedure was not provided during PAT visit. If patient had any questions or concerns about their arrival time, they were instructed to contact their surgeon/physician. Additionally, if the patient referred to an arrival time that was acquired from their my chart account, patient was encouraged to verify that time with their surgeon/physician. Arrival times are NOT provided in Pre Admission Testing Department.    Pt presents to PAT today with RLE cellulitis. Pt stated that she has had a sore on her right shin for around one (1) month. Pt stated that she has completed two rounds of antibiotics, but the sore remains red, swollen, and very painful. Per note from office visit with Kemi Hdz MD on 4/10/25, pt to return in two (2) weeks for follow-up (around 4/24/25). Pt also stated that her systolic BP has been 84-90 for the past week. Reported above to BALDOMERO Pittman via voicemail.     No blood pressures or sticks to right arm due to history of breast cancer. Patient given a pink bracelet and advised to bring on the day of procedure. Note placed in FYI tab.

## 2025-04-22 ENCOUNTER — DOCUMENTATION (OUTPATIENT)
Dept: CARDIOLOGY | Facility: HOSPITAL | Age: 85
End: 2025-04-22
Payer: MEDICARE

## 2025-04-22 ENCOUNTER — ANESTHESIA EVENT (OUTPATIENT)
Dept: CARDIOLOGY | Facility: HOSPITAL | Age: 85
End: 2025-04-22
Payer: MEDICARE

## 2025-04-22 RX ORDER — ONDANSETRON 2 MG/ML
4 INJECTION INTRAMUSCULAR; INTRAVENOUS ONCE AS NEEDED
Status: CANCELLED | OUTPATIENT
Start: 2025-04-22

## 2025-04-22 RX ORDER — FENTANYL CITRATE 50 UG/ML
50 INJECTION, SOLUTION INTRAMUSCULAR; INTRAVENOUS
Status: CANCELLED | OUTPATIENT
Start: 2025-04-22

## 2025-04-22 RX ORDER — HYDROMORPHONE HYDROCHLORIDE 1 MG/ML
0.5 INJECTION, SOLUTION INTRAMUSCULAR; INTRAVENOUS; SUBCUTANEOUS
Refills: 0 | Status: CANCELLED | OUTPATIENT
Start: 2025-04-22

## 2025-04-22 NOTE — PROGRESS NOTES
Patient called with questions before MitraClip/SHANNA procedure tomorrow. She is primarily concerned about LE wound that has been healing slowly, but healing appropriately. She denies N/V/D, F/C. Erythema is improving, she is not currently taking ABX and PAT lab result WBC WNL. We reviewed MitraClip procedure, hospitalization and recovery expectations and cardiac rehab.     Will follow up after MitraClip

## 2025-04-23 ENCOUNTER — ANESTHESIA (OUTPATIENT)
Dept: CARDIOLOGY | Facility: HOSPITAL | Age: 85
End: 2025-04-23
Payer: MEDICARE

## 2025-04-23 ENCOUNTER — HOSPITAL ENCOUNTER (INPATIENT)
Facility: HOSPITAL | Age: 85
LOS: 2 days | Discharge: HOME | DRG: 266 | End: 2025-04-25
Attending: INTERNAL MEDICINE | Admitting: INTERNAL MEDICINE
Payer: MEDICARE

## 2025-04-23 ENCOUNTER — ANCILLARY PROCEDURE (OUTPATIENT)
Dept: PERIOP | Facility: HOSPITAL | Age: 85
DRG: 266 | End: 2025-04-23
Payer: MEDICARE

## 2025-04-23 ENCOUNTER — ANESTHESIA EVENT CONVERTED (OUTPATIENT)
Dept: ANESTHESIOLOGY | Facility: HOSPITAL | Age: 85
DRG: 266 | End: 2025-04-23
Payer: MEDICARE

## 2025-04-23 DIAGNOSIS — I07.1 MODERATE TRICUSPID REGURGITATION: ICD-10-CM

## 2025-04-23 DIAGNOSIS — I34.0 MODERATE MITRAL REGURGITATION: ICD-10-CM

## 2025-04-23 DIAGNOSIS — Z98.890 S/P MITRAL VALVE CLIP IMPLANTATION: ICD-10-CM

## 2025-04-23 DIAGNOSIS — I34.0 MODERATE MITRAL REGURGITATION: Primary | Chronic | ICD-10-CM

## 2025-04-23 DIAGNOSIS — Z95.818 S/P MITRAL VALVE CLIP IMPLANTATION: ICD-10-CM

## 2025-04-23 LAB
ACT BLD: 158 SECONDS (ref 82–152)
ACT BLD: 251 SECONDS (ref 82–152)
ACT BLD: 308 SECONDS (ref 82–152)
ACT BLD: 326 SECONDS (ref 82–152)
DEPRECATED RDW RBC AUTO: 48.5 FL (ref 37–54)
ERYTHROCYTE [DISTWIDTH] IN BLOOD BY AUTOMATED COUNT: 14.9 % (ref 12.3–15.4)
GLUCOSE BLDC GLUCOMTR-MCNC: 81 MG/DL (ref 70–130)
HCT VFR BLD AUTO: 35.4 % (ref 34–46.6)
HGB BLD-MCNC: 10.9 G/DL (ref 12–15.9)
MCH RBC QN AUTO: 27.3 PG (ref 26.6–33)
MCHC RBC AUTO-ENTMCNC: 30.8 G/DL (ref 31.5–35.7)
MCV RBC AUTO: 88.5 FL (ref 79–97)
PLATELET # BLD AUTO: 268 10*3/MM3 (ref 140–450)
PMV BLD AUTO: 9.7 FL (ref 6–12)
POTASSIUM SERPL-SCNC: 3.7 MMOL/L (ref 3.5–5.2)
QT INTERVAL: 462 MS
QTC INTERVAL: 472 MS
RBC # BLD AUTO: 4 10*6/MM3 (ref 3.77–5.28)
WBC NRBC COR # BLD AUTO: 15.09 10*3/MM3 (ref 3.4–10.8)

## 2025-04-23 PROCEDURE — 33419 REPAIR TCAT MITRAL VALVE: CPT | Performed by: INTERNAL MEDICINE

## 2025-04-23 PROCEDURE — 25010000002 DEXAMETHASONE PER 1 MG

## 2025-04-23 PROCEDURE — 93005 ELECTROCARDIOGRAM TRACING: CPT | Performed by: ANESTHESIOLOGY

## 2025-04-23 PROCEDURE — C1889 IMPLANT/INSERT DEVICE, NOC: HCPCS | Performed by: INTERNAL MEDICINE

## 2025-04-23 PROCEDURE — 94640 AIRWAY INHALATION TREATMENT: CPT

## 2025-04-23 PROCEDURE — C1894 INTRO/SHEATH, NON-LASER: HCPCS | Performed by: INTERNAL MEDICINE

## 2025-04-23 PROCEDURE — 25810000003 SODIUM CHLORIDE 0.9 % SOLUTION 250 ML FLEX CONT

## 2025-04-23 PROCEDURE — 33418 REPAIR TCAT MITRAL VALVE: CPT | Performed by: THORACIC SURGERY (CARDIOTHORACIC VASCULAR SURGERY)

## 2025-04-23 PROCEDURE — 93355 ECHO TRANSESOPHAGEAL (TEE): CPT

## 2025-04-23 PROCEDURE — B246ZZ4 ULTRASONOGRAPHY OF RIGHT AND LEFT HEART, TRANSESOPHAGEAL: ICD-10-PCS | Performed by: INTERNAL MEDICINE

## 2025-04-23 PROCEDURE — 25010000002 SUGAMMADEX 200 MG/2ML SOLUTION

## 2025-04-23 PROCEDURE — 99222 1ST HOSP IP/OBS MODERATE 55: CPT | Performed by: INTERNAL MEDICINE

## 2025-04-23 PROCEDURE — 94799 UNLISTED PULMONARY SVC/PX: CPT

## 2025-04-23 PROCEDURE — 25010000002 CEFAZOLIN PER 500 MG: Performed by: PHYSICIAN ASSISTANT

## 2025-04-23 PROCEDURE — 25010000002 LIDOCAINE PF 1% 1 % SOLUTION

## 2025-04-23 PROCEDURE — 25010000002 HEPARIN (PORCINE) PER 1000 UNITS

## 2025-04-23 PROCEDURE — 33418 REPAIR TCAT MITRAL VALVE: CPT | Performed by: INTERNAL MEDICINE

## 2025-04-23 PROCEDURE — 84132 ASSAY OF SERUM POTASSIUM: CPT | Performed by: ANESTHESIOLOGY

## 2025-04-23 PROCEDURE — 25010000002 PHENYLEPHRINE 10 MG/ML SOLUTION 5 ML VIAL

## 2025-04-23 PROCEDURE — 33419 REPAIR TCAT MITRAL VALVE: CPT | Performed by: THORACIC SURGERY (CARDIOTHORACIC VASCULAR SURGERY)

## 2025-04-23 PROCEDURE — 25010000002 ONDANSETRON PER 1 MG

## 2025-04-23 PROCEDURE — 82948 REAGENT STRIP/BLOOD GLUCOSE: CPT

## 2025-04-23 PROCEDURE — 25010000002 PROPOFOL 10 MG/ML EMULSION

## 2025-04-23 PROCEDURE — 25810000003 LACTATED RINGERS PER 1000 ML: Performed by: ANESTHESIOLOGY

## 2025-04-23 PROCEDURE — 02UG3JZ SUPPLEMENT MITRAL VALVE WITH SYNTHETIC SUBSTITUTE, PERCUTANEOUS APPROACH: ICD-10-PCS | Performed by: THORACIC SURGERY (CARDIOTHORACIC VASCULAR SURGERY)

## 2025-04-23 PROCEDURE — 25010000002 FENTANYL CITRATE (PF) 100 MCG/2ML SOLUTION

## 2025-04-23 PROCEDURE — 85027 COMPLETE CBC AUTOMATED: CPT | Performed by: THORACIC SURGERY (CARDIOTHORACIC VASCULAR SURGERY)

## 2025-04-23 PROCEDURE — 85347 COAGULATION TIME ACTIVATED: CPT

## 2025-04-23 DEVICE — DEV REPR VLV MITRACLIP/G4 DS NTW: Type: IMPLANTABLE DEVICE | Status: FUNCTIONAL

## 2025-04-23 DEVICE — BUNDLE REPR MITRACLIP/G4 NT/NTW/XT/XTW 3CLIP W/CATH/SGC0701: Type: IMPLANTABLE DEVICE | Status: FUNCTIONAL

## 2025-04-23 RX ORDER — SODIUM CHLORIDE 0.9 % (FLUSH) 0.9 %
10 SYRINGE (ML) INJECTION AS NEEDED
Status: DISCONTINUED | OUTPATIENT
Start: 2025-04-23 | End: 2025-04-23 | Stop reason: HOSPADM

## 2025-04-23 RX ORDER — METOPROLOL SUCCINATE 50 MG/1
50 TABLET, EXTENDED RELEASE ORAL DAILY
Status: DISCONTINUED | OUTPATIENT
Start: 2025-04-23 | End: 2025-04-25

## 2025-04-23 RX ORDER — SPIRONOLACTONE 25 MG/1
12.5 TABLET ORAL DAILY
Status: DISCONTINUED | OUTPATIENT
Start: 2025-04-23 | End: 2025-04-25 | Stop reason: HOSPADM

## 2025-04-23 RX ORDER — LIDOCAINE HYDROCHLORIDE 10 MG/ML
0.5 INJECTION, SOLUTION EPIDURAL; INFILTRATION; INTRACAUDAL; PERINEURAL ONCE AS NEEDED
Status: DISCONTINUED | OUTPATIENT
Start: 2025-04-23 | End: 2025-04-25 | Stop reason: HOSPADM

## 2025-04-23 RX ORDER — PANTOPRAZOLE SODIUM 40 MG/1
40 TABLET, DELAYED RELEASE ORAL EVERY MORNING
Status: DISCONTINUED | OUTPATIENT
Start: 2025-04-24 | End: 2025-04-25 | Stop reason: HOSPADM

## 2025-04-23 RX ORDER — FAMOTIDINE 20 MG/1
20 TABLET, FILM COATED ORAL ONCE
Status: COMPLETED | OUTPATIENT
Start: 2025-04-23 | End: 2025-04-23

## 2025-04-23 RX ORDER — ONDANSETRON 2 MG/ML
4 INJECTION INTRAMUSCULAR; INTRAVENOUS EVERY 6 HOURS PRN
Status: DISCONTINUED | OUTPATIENT
Start: 2025-04-23 | End: 2025-04-25 | Stop reason: HOSPADM

## 2025-04-23 RX ORDER — LIDOCAINE HYDROCHLORIDE 10 MG/ML
INJECTION, SOLUTION EPIDURAL; INFILTRATION; INTRACAUDAL; PERINEURAL AS NEEDED
Status: DISCONTINUED | OUTPATIENT
Start: 2025-04-23 | End: 2025-04-23 | Stop reason: SURG

## 2025-04-23 RX ORDER — FAMOTIDINE 10 MG/ML
20 INJECTION, SOLUTION INTRAVENOUS ONCE
Status: DISCONTINUED | OUTPATIENT
Start: 2025-04-23 | End: 2025-04-25

## 2025-04-23 RX ORDER — SODIUM CHLORIDE 0.9 % (FLUSH) 0.9 %
10 SYRINGE (ML) INJECTION EVERY 12 HOURS SCHEDULED
Status: DISCONTINUED | OUTPATIENT
Start: 2025-04-23 | End: 2025-04-25 | Stop reason: HOSPADM

## 2025-04-23 RX ORDER — ACETAMINOPHEN 325 MG/1
650 TABLET ORAL EVERY 4 HOURS PRN
Status: DISCONTINUED | OUTPATIENT
Start: 2025-04-23 | End: 2025-04-25 | Stop reason: HOSPADM

## 2025-04-23 RX ORDER — ROCURONIUM BROMIDE 10 MG/ML
INJECTION, SOLUTION INTRAVENOUS AS NEEDED
Status: DISCONTINUED | OUTPATIENT
Start: 2025-04-23 | End: 2025-04-23 | Stop reason: SURG

## 2025-04-23 RX ORDER — SODIUM CHLORIDE 0.9 % (FLUSH) 0.9 %
10 SYRINGE (ML) INJECTION EVERY 12 HOURS SCHEDULED
Status: DISCONTINUED | OUTPATIENT
Start: 2025-04-23 | End: 2025-04-23 | Stop reason: HOSPADM

## 2025-04-23 RX ORDER — FENTANYL CITRATE 50 UG/ML
INJECTION, SOLUTION INTRAMUSCULAR; INTRAVENOUS AS NEEDED
Status: DISCONTINUED | OUTPATIENT
Start: 2025-04-23 | End: 2025-04-23 | Stop reason: SURG

## 2025-04-23 RX ORDER — DEXAMETHASONE SODIUM PHOSPHATE 4 MG/ML
INJECTION, SOLUTION INTRA-ARTICULAR; INTRALESIONAL; INTRAMUSCULAR; INTRAVENOUS; SOFT TISSUE AS NEEDED
Status: DISCONTINUED | OUTPATIENT
Start: 2025-04-23 | End: 2025-04-23 | Stop reason: SURG

## 2025-04-23 RX ORDER — BUDESONIDE AND FORMOTEROL FUMARATE DIHYDRATE 160; 4.5 UG/1; UG/1
2 AEROSOL RESPIRATORY (INHALATION)
Status: DISCONTINUED | OUTPATIENT
Start: 2025-04-23 | End: 2025-04-25 | Stop reason: HOSPADM

## 2025-04-23 RX ORDER — SODIUM CHLORIDE, SODIUM LACTATE, POTASSIUM CHLORIDE, CALCIUM CHLORIDE 600; 310; 30; 20 MG/100ML; MG/100ML; MG/100ML; MG/100ML
9 INJECTION, SOLUTION INTRAVENOUS CONTINUOUS
Status: ACTIVE | OUTPATIENT
Start: 2025-04-24 | End: 2025-04-24

## 2025-04-23 RX ORDER — SODIUM CHLORIDE 0.9 % (FLUSH) 0.9 %
10 SYRINGE (ML) INJECTION AS NEEDED
Status: DISCONTINUED | OUTPATIENT
Start: 2025-04-23 | End: 2025-04-25 | Stop reason: HOSPADM

## 2025-04-23 RX ORDER — ONDANSETRON 4 MG/1
4 TABLET, ORALLY DISINTEGRATING ORAL EVERY 6 HOURS PRN
Status: DISCONTINUED | OUTPATIENT
Start: 2025-04-23 | End: 2025-04-25 | Stop reason: HOSPADM

## 2025-04-23 RX ORDER — EPHEDRINE SULFATE 50 MG/ML
INJECTION INTRAVENOUS AS NEEDED
Status: DISCONTINUED | OUTPATIENT
Start: 2025-04-23 | End: 2025-04-23 | Stop reason: SURG

## 2025-04-23 RX ORDER — UBIDECARENONE 75 MG
100 CAPSULE ORAL DAILY
Status: DISCONTINUED | OUTPATIENT
Start: 2025-04-23 | End: 2025-04-25 | Stop reason: HOSPADM

## 2025-04-23 RX ORDER — FUROSEMIDE 20 MG/1
20 TABLET ORAL DAILY
Status: DISCONTINUED | OUTPATIENT
Start: 2025-04-23 | End: 2025-04-25

## 2025-04-23 RX ORDER — CITALOPRAM HYDROBROMIDE 20 MG/1
20 TABLET ORAL DAILY
Status: DISCONTINUED | OUTPATIENT
Start: 2025-04-23 | End: 2025-04-25 | Stop reason: HOSPADM

## 2025-04-23 RX ORDER — HYDROCODONE BITARTRATE AND ACETAMINOPHEN 7.5; 325 MG/1; MG/1
1 TABLET ORAL EVERY 4 HOURS PRN
Refills: 0 | Status: DISCONTINUED | OUTPATIENT
Start: 2025-04-23 | End: 2025-04-25 | Stop reason: HOSPADM

## 2025-04-23 RX ORDER — PROPOFOL 10 MG/ML
VIAL (ML) INTRAVENOUS AS NEEDED
Status: DISCONTINUED | OUTPATIENT
Start: 2025-04-23 | End: 2025-04-23 | Stop reason: SURG

## 2025-04-23 RX ORDER — SODIUM CHLORIDE 9 MG/ML
40 INJECTION, SOLUTION INTRAVENOUS AS NEEDED
Status: DISCONTINUED | OUTPATIENT
Start: 2025-04-23 | End: 2025-04-23 | Stop reason: HOSPADM

## 2025-04-23 RX ORDER — FAMOTIDINE 20 MG/1
40 TABLET, FILM COATED ORAL NIGHTLY
Status: DISCONTINUED | OUTPATIENT
Start: 2025-04-24 | End: 2025-04-25 | Stop reason: HOSPADM

## 2025-04-23 RX ORDER — ONDANSETRON 2 MG/ML
INJECTION INTRAMUSCULAR; INTRAVENOUS AS NEEDED
Status: DISCONTINUED | OUTPATIENT
Start: 2025-04-23 | End: 2025-04-23 | Stop reason: SURG

## 2025-04-23 RX ORDER — ALBUTEROL SULFATE 90 UG/1
2 INHALANT RESPIRATORY (INHALATION) EVERY 4 HOURS PRN
Status: DISCONTINUED | OUTPATIENT
Start: 2025-04-23 | End: 2025-04-25 | Stop reason: HOSPADM

## 2025-04-23 RX ORDER — NALOXONE HCL 0.4 MG/ML
0.4 VIAL (ML) INJECTION
Status: DISCONTINUED | OUTPATIENT
Start: 2025-04-23 | End: 2025-04-25 | Stop reason: HOSPADM

## 2025-04-23 RX ORDER — HEPARIN SODIUM 1000 [USP'U]/ML
INJECTION, SOLUTION INTRAVENOUS; SUBCUTANEOUS AS NEEDED
Status: DISCONTINUED | OUTPATIENT
Start: 2025-04-23 | End: 2025-04-23 | Stop reason: SURG

## 2025-04-23 RX ORDER — MORPHINE SULFATE 2 MG/ML
1 INJECTION, SOLUTION INTRAMUSCULAR; INTRAVENOUS EVERY 4 HOURS PRN
Status: DISCONTINUED | OUTPATIENT
Start: 2025-04-23 | End: 2025-04-25 | Stop reason: HOSPADM

## 2025-04-23 RX ORDER — NITROGLYCERIN 0.4 MG/1
0.4 TABLET SUBLINGUAL
Status: DISCONTINUED | OUTPATIENT
Start: 2025-04-23 | End: 2025-04-25 | Stop reason: HOSPADM

## 2025-04-23 RX ORDER — MIDAZOLAM HYDROCHLORIDE 1 MG/ML
0.5 INJECTION, SOLUTION INTRAMUSCULAR; INTRAVENOUS
Status: DISCONTINUED | OUTPATIENT
Start: 2025-04-23 | End: 2025-04-25 | Stop reason: HOSPADM

## 2025-04-23 RX ADMIN — VITAM B12 100 MCG: 100 TAB at 21:23

## 2025-04-23 RX ADMIN — METOPROLOL SUCCINATE 50 MG: 50 TABLET, EXTENDED RELEASE ORAL at 20:07

## 2025-04-23 RX ADMIN — HEPARIN SODIUM 6000 UNITS: 1000 INJECTION INTRAVENOUS; SUBCUTANEOUS at 16:58

## 2025-04-23 RX ADMIN — FAMOTIDINE 20 MG: 20 TABLET, FILM COATED ORAL at 11:06

## 2025-04-23 RX ADMIN — HEPARIN SODIUM 10000 UNITS: 1000 INJECTION INTRAVENOUS; SUBCUTANEOUS at 16:04

## 2025-04-23 RX ADMIN — DEXAMETHASONE SODIUM PHOSPHATE 4 MG: 4 INJECTION INTRA-ARTICULAR; INTRALESIONAL; INTRAMUSCULAR; INTRAVENOUS; SOFT TISSUE at 15:29

## 2025-04-23 RX ADMIN — BUDESONIDE AND FORMOTEROL FUMARATE DIHYDRATE 2 PUFF: 160; 4.5 AEROSOL RESPIRATORY (INHALATION) at 20:33

## 2025-04-23 RX ADMIN — SPIRONOLACTONE 12.5 MG: 25 TABLET ORAL at 20:07

## 2025-04-23 RX ADMIN — FUROSEMIDE 20 MG: 20 TABLET ORAL at 20:07

## 2025-04-23 RX ADMIN — ONDANSETRON 4 MG: 2 INJECTION INTRAMUSCULAR; INTRAVENOUS at 17:43

## 2025-04-23 RX ADMIN — LIDOCAINE HYDROCHLORIDE 50 MG: 10 INJECTION, SOLUTION EPIDURAL; INFILTRATION; INTRACAUDAL; PERINEURAL at 15:29

## 2025-04-23 RX ADMIN — SODIUM CHLORIDE 2000 MG: 900 INJECTION INTRAVENOUS at 15:34

## 2025-04-23 RX ADMIN — ROCURONIUM BROMIDE 15 MG: 10 INJECTION INTRAVENOUS at 16:54

## 2025-04-23 RX ADMIN — Medication 10 ML: at 20:11

## 2025-04-23 RX ADMIN — ROCURONIUM BROMIDE 50 MG: 10 INJECTION INTRAVENOUS at 15:29

## 2025-04-23 RX ADMIN — HEPARIN SODIUM 3000 UNITS: 1000 INJECTION INTRAVENOUS; SUBCUTANEOUS at 15:51

## 2025-04-23 RX ADMIN — EMPAGLIFLOZIN 10 MG: 10 TABLET, FILM COATED ORAL at 20:07

## 2025-04-23 RX ADMIN — PHENYLEPHRINE HYDROCHLORIDE 20 MCG/MIN: 10 INJECTION INTRAVENOUS at 16:51

## 2025-04-23 RX ADMIN — CITALOPRAM HYDROBROMIDE 20 MG: 20 TABLET ORAL at 20:07

## 2025-04-23 RX ADMIN — PROPOFOL 100 MG: 10 INJECTION, EMULSION INTRAVENOUS at 15:29

## 2025-04-23 RX ADMIN — EPHEDRINE SULFATE 5 MG: 50 INJECTION INTRAVENOUS at 16:19

## 2025-04-23 RX ADMIN — SODIUM CHLORIDE, POTASSIUM CHLORIDE, SODIUM LACTATE AND CALCIUM CHLORIDE: 600; 310; 30; 20 INJECTION, SOLUTION INTRAVENOUS at 15:28

## 2025-04-23 RX ADMIN — SACUBITRIL AND VALSARTAN 1 TABLET: 24; 26 TABLET, FILM COATED ORAL at 20:07

## 2025-04-23 RX ADMIN — FENTANYL CITRATE 100 MCG: 50 INJECTION, SOLUTION INTRAMUSCULAR; INTRAVENOUS at 15:29

## 2025-04-23 RX ADMIN — SUGAMMADEX 200 MG: 100 INJECTION, SOLUTION INTRAVENOUS at 17:43

## 2025-04-23 NOTE — PROGRESS NOTES
MitraClip Heart Team Meeting Summary     Patient name: Elsa Alcaraz          : [birth year]  Referring Provider: Dr. Germain  Primary Cardiologist: Jeannie Zabala MD     Attendees:Jeannie Zabala MD, Ortega Dimas MD, [employer] Clinical Specialist- Devan Valladares, Victoria Davis, Elyssa Balderas MD       Type of MR: Secondary   Heart Failure:   HFrEF             NYHA Functional Class: IV     LVEF:  35%  MV Area: 4.9  MV Gradient: 1  Posterior Leaflet Length: 7-8mm  MV Defect: A2P2     Major Organ Compromise: HTN, HLD, SHD, HFrEF, COPD, chronic bronchitis, DAVID, H/O CHB with pacemaker, ZAYNAB      STS Mortality Risk Score MVR Replacement: 18.4%  STS Mortality Risk Score MV Repair: 12.5%       Mitraclip Rationale: Frail, elderly female with severe symptomatic MR and high STS risk score for MitraClip. Patient is high risk due to age, frailty and comorbid health conditions including HTN, HLD, SHD, HFrEF, COPD, chronic bronchitis, DAVID, H/O CHB with pacemaker, ZAYNAB. Her worsening fatigue and LLAMAS are progressively and significantly interfering with her ability to complete ADLs. D has discussed his case and plan with patient, all are in agreement to proceed with MitraClip.      Diagnostic Discussed/Reviewed: TTE, REBECA, LHC, EKG-NSR with frequent ectopy & RBBB     Procedure Planning Details: MAC noticed posteriorly. Free leaflet measures 7-8mm. Recommend NTW central to slight lateral A2P2     Post Procedure Considerations/ precautions: Bleeding at groin site, monitor for arrhythmias and s/s of CVA, encourage early ambulation as tolerated and aggressive pulmonary hygiene          Vanessa Tena, APRNARCISA   2025          2:09 EDT

## 2025-04-23 NOTE — ANESTHESIA PROCEDURE NOTES
Structural Heart REBECA    Procedure Performed: Structural Heart REBECA       Start Time:        End Time:      Preanesthesia Checklist:  Patient identified, IV assessed, risks and benefits discussed, monitors and equipment assessed, procedure being performed at surgeon's request and anesthesia consent obtained.    General Procedure Information  Diagnostic Indications for Echo:  defect repair evaluation and hemodynamic monitoring  Physician Requesting Echo: Ortega Dimas MD  Location performed:  OR  Intubated  Bite block placed  Heart visualized  Probe Insertion:  Easy  Probe Type:  Multiplane  Modalities:  2D only, color flow mapping, continuous wave Doppler and pulse wave Doppler        Anesthesia Information      Echocardiogram Comments:       Abbreviated REBECA for MC    Team: Flora Dimas & Sagrario    Pre:  LVEF ~30% by visualization with global hypokinesis; moderate to severe RV dilation with mild dysfunction.  Mild-mod TR, no PI.  Severe MR (VC >0.7), mean MV gradient 2mmMg; calcified posterior leaflet.  No PFO, nor PARESH clot, +LA smoke.   No effusions; no dissections noted in visualized portions of aorta.  TxGastric views avoided due to resistance met with first gentle pass of probe.      Post:  S/p deployment of 2 mitraclip;  first clip placed between A2/P2 and appeared to have successful leaflet grasp, however was noted to have lost posterior grasp after deployment; clip appears well-adhered to AMVL. Second MC placed just laterally and with successful bileaflet grasp. Final mean gradient 5mmhg with atleast moderate MR (wall hugging jet noted).  LVEF 25-30, RV severely dilated, and PFO with L to R shunting.  No new effusions, aorta intact; all findings shared with intra-op team in real time.  REBECA removed without blood on probe at conclusion of case.

## 2025-04-23 NOTE — OP NOTE
DATE OF PROCEDURE: 04/23/25     PREOPERATIVE DIAGNOSES:  1. Severe mitral regurgitation  2. Chronic systolic heart failure  3. Persistent atrial fibrillation     POSTOPERATIVE DIAGNOSES:    1. Severe mitral regurgitation  2. Chronic systolic heart failure  3. Persistent atrial fibrillation     PROCEDURES PERFORMED:    1. Mitraclip NTw Insertion x 2    SURGEON: Ortega Dimas MD       CARDIOLOGIST: Jeannie Zabala MD      ANESTHESIA: General endotracheal anesthesia with Dr. Elyssa Balderas DO     ESTIMATED BLOOD LOSS: Less than 25 mL      TOTAL FLUOROSCOPY TIME: 22:00     EXPOSURE: 595 mGy     INDICATIONS: 85-year-old  female with a history of hypertension, hyperlipidemia, COPD, chronic respiratory failure, obstructive sleep apnea, chronic systolic heart failure, complete heart block status post Medtronic Micra leadless pacemaker insertion and atrial fibrillation who presented with worsening shortness of breath.  She was found to have severe mitral regurgitation and was felt to warrant Mitraclip insertion.  Her calculated STS risk of mortality with mitral valve replacement was 18.4%, placing her well within the high/prohibitive risk category.  The risks and benefits of Mitraclip placement were discussed with the patient including pain, bleeding, infection, renal failure, stroke and death. The patient understood these risks and wished to proceed with Mitraclip.      DESCRIPTION OF PROCEDURE:  The patient was taken to the operating room and placed under general endotracheal anesthesia.  She was prepped and draped in the usual sterile fashion.  A timeout was performed including the patient's name, procedure and antibiotic administration.  Transesophageal echocardiogram was performed.  Needle access of the right common femoral vein was obtained and a 6 FR sheath placed.  3000 units of heparin was administered.  The wire was then advanced into the superior vena cava.  The fossa ovalis was extremely small in  diameter.  The Escalante sheath and wire were then pulled down into position along the fossa and tenting was at approximately 3.1 cm.  This unfortunately was the best hide that could be obtained through the transseptal puncture and a very small fossa..  A transeptal puncture was then made and the Escalante wire placed in the left atrium.  Heparin was given for a target ACT over 250.  The septum was then dilated using modified Seldinger technique and the guide and clip were safely inserted.  Clip position was then directed using fluoroscopy and echocardiogram.  An excellent grasp was made at A2/P2 and regurgitation was reduced with plans for a second clip and a lateral orientation.  Insertion was verified and a tissue bridge was identified on 3D views.  The clip was deployed under fluoroscopy.  After several seconds, the clip developed significant motion and appeared to be dislodged from the posterior leaflet.  It was felt that the clip had graft to a calcified portion of the leaflet and could not maintain its hold.  The clip was still well attached to the anterior leaflet and did not embolize.  Decision was made to proceed with the previously planned second clip.  Another NTw clip was inserted and grasp performed just lateral to the previously placed clip and just medial to the significant calcification on the posterior leaflet.  The grasp obtained had confirmed insertion and tissue bridge as previously identified with the first clip.  This reduced the regurgitation to moderate.  The gradients were 4 to 5 mmHg.  This was felt to be the best possible grasp and the clip was released.  The system and guide were removed and hemostasis in the groin achieved with a figure of eight silk suture.  The patient tolerated the procedure well and was transported to the recovery room in stable condition.

## 2025-04-23 NOTE — ANESTHESIA PROCEDURE NOTES
Airway  Reason: elective    Date/Time: 4/23/2025 3:32 PM  Airway not difficult    General Information and Staff    Patient location during procedure: OR  CRNA/CAA: Eddie Boothe CRNA    Indications and Patient Condition  Indications for airway management: airway protection    Preoxygenated: yes  MILS not maintained throughout    Mask difficulty assessment: 2 - vent by mask + OA or adjuvant +/- NMBA    Final Airway Details    Final airway type: endotracheal airway      Successful airway: ETT  Cuffed: yes   Successful intubation technique: video laryngoscopy  Endotracheal tube insertion site: oral  Blade: Lu  Blade size: 3  ETT size (mm): 7.0  Cormack-Lehane Classification: grade I - full view of glottis  Placement verified by: chest auscultation and capnometry   Cuff volume (mL): 8  Measured from: lips  ETT/EBT  to lips (cm): 21  Number of attempts at approach: 1  Assessment: lips, teeth, and gum same as pre-op and atraumatic intubation    Additional Comments  Negative epigastric sounds, Breath sound equal bilaterally with symmetric chest rise and fall

## 2025-04-23 NOTE — ANESTHESIA PROCEDURE NOTES
Arterial Line      Patient reassessed immediately prior to procedure    Patient location during procedure: pre-op   Line placed for hemodynamic monitoring.  Performed By   Anesthesiologist: Elyssa Balderas DO   Preanesthetic Checklist  Completed: patient identified, IV checked, site marked, risks and benefits discussed, surgical consent, monitors and equipment checked, pre-op evaluation and timeout performed  Arterial Line Prep    Sterile Tech: cap, gloves and sterile barriers  Prep: ChloraPrep  Patient monitoring: blood pressure monitoring, continuous pulse oximetry and EKG  Arterial Line Procedure   Laterality:left  Location:  radial artery  Catheter size: 20 G   Guidance: ultrasound guided and palpation technique  Number of attempts: 2  Successful placement: yes   Post Assessment   Dressing Type: line sutured, occlusive dressing applied, secured with tape and wrist guard applied.   Complications no  Circ/Move/Sens Assessment: normal and unchanged.   Patient Tolerance: patient tolerated the procedure well with no apparent complications  Additional Notes  Multiple attempts at left radial artery - all under USG;  vessel cannulated, unable to pass wire; ultimately abandoned and planned for intra-op fem art line after discussion with team.  Procedure well tolerated without complication.

## 2025-04-23 NOTE — ANESTHESIA POSTPROCEDURE EVALUATION
Patient: Elsa Alcaraz    Procedure Summary       Date: 04/23/25 Room / Location: [facility] /  [facility]ASIVE LOCATION    Anesthesia Start: 1510 Anesthesia Stop: 1809    Procedure: Transcatheter Mitral Valve Repair Diagnosis:       Moderate mitral regurgitation      (MR)    Providers: Jeannie Zabala MD Provider: Elyssa Balderas DO    Anesthesia Type: general, Luisa ASA Status: 4            Anesthesia Type: general, Luisa    Vitals  No vitals data found for the desired time range.          Post Anesthesia Care and Evaluation    Patient location during evaluation: ICU  Patient participation: complete - patient participated  Level of consciousness: awake  PONV Status: NA  Cardiovascular status: acceptable  Respiratory status: acceptable

## 2025-04-23 NOTE — INTERVAL H&P NOTE
H&P reviewed. The patient was examined and there are no changes to the H&P.    Plan: MitraClip secondary to severe mitral valve regurgitation with worsening LLAMAS and HFrEF with chronic 02 use at home.  Risks and benefits have been discussed with the patient understands she will undergo general anesthetics and will require an overnight stay in the hospital with anticipated discharge home tomorrow.  Patient understands she will likely be left with mild mitral valve regurgitation, patient wishes to proceed forward with MitraClip today.    BALDOMERO Arteaga MD, FACC

## 2025-04-23 NOTE — OP NOTE
Preoperative diagnosis: Severe mitral regurgitation    Postoperative diagnosis: Severe mitral regurgitation    Procedure: MitraClip placement x 2 using an NT W with a second NTW between the A2-P2 segments of the mitral valve    Indication: Symptomatic severe mitral regurgitation in a patient who is felt to be very high risk for surgery following cardiology and surgical evaluation.    Operators:  Interventional cardiologist: Lise with MD  Surgeon:  Ortega Dimas MD  Echocardiographer:  Elyssa Newsome MD     Anesthesia: Eddie Boothe CRNA    Procedure: Informed consent obtained.  The patient was brought to the catheterization lab where they were intubated by anesthesia.  Ancef was given on call to the lab as well as 8 mg of Decadron.  The patient was then prepped and draped in the usual sterile fashion over both femoral veins.    Transesophageal echocardiographic images of the mitral valve and the remaining cardiac structures were then obtained.    Using the modified Seldinger technique access was obtained in the right femoral vein and a 6 French hemostasis sheath was placed.  3000 units of intravenous heparin was given.  Transseptal puncture was then performed under transesophageal guidance a little more than 4 cm above the mitral valve.  Full heparinization was then performed.  The ACT remained greater than 250 during the entire procedure.  The Troy wire was then placed in the left atrium.  The atrial septum was then dilated using a 20 French long dilator.    The MitraClip guide was then placed across the interatrial septum under fluoroscopic and echocardiographic guidance.  The wire and dilator were removed.  The NT W MitraClip was then placed through the guide and positioned appropriately above the jet between A2-P2.  The clip was then placed below the leaflets and the leaflets were grasp successfully.  The clip was then closed to 60° with a good reduction in mitral regurgitation and then closed to 20° at  which time only mild to moderate mitral regurgitation remained.  The mean gradient across the mitral valve was 3.  A tissue bridge was documented by echocardiography.  Immediately following release of the clip the clip appeared stable.  The clip and began to rock and it was apparent that the posterior leaflet had dislodged.  The clip appeared to be well attached to the anterior leaflet.    A second NT W clip was then placed lateral to the first clip.  Posterior leaflet tissue was very limited however a good grasp was finally obtained.  Again a tissue bridge was documented.  The mean mitral valve gradient was 4 and 5 mmHg.  The second clip was released and appeared stable.  It also appeared to stabilize the first clip.  Moderate mitral regurgitation was noted.      No pericardial effusion was present at the conclusion of the case.    LV systolic function was remained unchanged throughout the case.    The venous access access site was closed using 0 silk figure-of-eight suture.    Estimated blood loss less than 25 mL      Jeannie Zabala MD, FACC

## 2025-04-23 NOTE — CONSULTS
"  Intensive Care Admission Note     Moderate mitral regurgitation    History of Present Illness     Elsa Alcaraz is an 83 y/o female with medical history significant for COPD, chronic respiratory failure on home oxygen, type 2 diabetes mellitus, complete heart block status post pacemaker placement, history of peptic ulcer disease, acute on chronic systolic heart failure, gastroesophageal reflux disease, persistent A-fib on anticoagulation, hypertension, sleep apnea diverticulosis.  Patient is known to have severe mitral regurgitation with EF of 35%.  He was also having worsening shortness despite and was considered for MitraClip which was done today.  He was admitted to ICU for further evaluation and treatment    Patient seen and examined at bedside, all the labs and diagnostic imaging studies were extensively reviewed by me.  Most recent labs shows serum creatinine of 1.19.  CBC is within normal limit.    Problem List, Surgical History, Family, Social History, and ROS     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/11/2017    Arthritis     Atrial fibrillation 01/11/2017    Body piercing     BOTH EARS    Borderline diabetes     Breast cancer 2003    right-radiation and a lumpectomy    Broken tooth     Cataract 01/11/2017    Left eye surgery 11/19    Chronic bronchitis 01/11/2017    Colon cancer 11/30/1998    had surgery and chemo    COPD (chronic obstructive pulmonary disease)     Cyanocobalamine deficiency (non anemic)     Depression 01/11/2017    Diverticulosis     Elevated cholesterol     Esophageal reflux 01/11/2017    Hiatal hernia 01/11/2017    History of bladder infections     History of echocardiogram 07/26/2021    Hx of exercise stress test     \"several years ago by Dr. Mercado\".      Hx of radiation therapy     Hyperlipidemia     Hypertension 01/11/2017    Impaired functional mobility and activity " tolerance     Impaired functional mobility, balance, gait, and endurance     Osteoporosis     Palpitations 2017    Prediabetes     Problems with swallowing     meat at times per pt report    Shortness of breath     WITH EXERTION     Sleep apnea 2017    NO CPAP    Tricuspid valve disorder 2017    Patient doesn't know anything about this    Vitamin D deficiency     Wears glasses       Past Surgical History:   Procedure Laterality Date    ANAL FISTULOTOMY      APPENDECTOMY      1998    BREAST BIOPSY      BREAST LUMPECTOMY Right 2006    CARDIAC CATHETERIZATION N/A 2025    Procedure: Left Heart Cath - Femoral access;  Surgeon: Jeannie Zabala MD;  Location:  [facility] INVASIVE LOCATION;  Service: Cardiovascular;  Laterality: N/A;    CARDIAC ELECTROPHYSIOLOGY PROCEDURE N/A 2023    Procedure: Micra;  Surgeon: Keven Willis DO;  Location: [facility] EP INVASIVE LOCATION;  Service: Cardiology;  Laterality: N/A;    CATARACT EXTRACTION      both eyes     CATARACT EXTRACTION W/ INTRAOCULAR LENS IMPLANT Left 2019    Procedure: CATARACT PHACO EXTRACTION WITH INTRAOCULAR LENS IMPLANT LEFT;  Surgeon: Masoud David MD;  Location: [facility] OR;  Service: Ophthalmology    CATARACT EXTRACTION W/ INTRAOCULAR LENS IMPLANT Right 2019    Procedure: CATARACT PHACO EXTRACTION WITH INTRAOCULAR LENS IMPLANT RIGHT;  Surgeon: Masoud David MD;  Location: [facility] OR;  Service: Ophthalmology     SECTION  1981    CHOLECYSTECTOMY  2009    COLECTOMY PARTIAL / TOTAL  1998    COLON CANCER    COLONOSCOPY  2016    COLONOSCOPY N/A 2021    Procedure: COLONOSCOPY WITH BIOPSY;  Surgeon: Iona Sanders MD;  Location: [facility] ENDOSCOPY;  Service: Gastroenterology;  Laterality: N/A;    ENDOSCOPY      ENDOSCOPY N/A 2018    Procedure: ESOPHAGOGASTRODUODENOSCOPY WITH COLD FORCEP BIOPSY;  Surgeon: Vasquez Fagan MD;  Location: [facility] ENDOSCOPY;   Service: Gastroenterology    ENDOSCOPY N/A 08/12/2019    Procedure: ESOPHAGOGASTRODUODENOSCOPY WITH BIOPSY;  Surgeon: Vasquez Fagan MD;  Location: [facility] ENDOSCOPY;  Service: Gastroenterology    ENDOSCOPY N/A 09/13/2022    Procedure: ESOPHAGOGASTRODUODENOSCOPY with biopsy and polypectomy  ;  Surgeon: Iona Sanders MD;  Location: [facility] ENDOSCOPY;  Service: Gastroenterology;  Laterality: N/A;    ENDOSCOPY N/A 06/28/2024    Procedure: ESOPHAGOGASTRODUODENOSCOPY WITH BIOPSY and dilatation;  Surgeon: Iona Sanders MD;  Location: [facility] ENDOSCOPY;  Service: Gastroenterology;  Laterality: N/A;    HYSTERECTOMY      1998    INSERT / REPLACE / REMOVE PACEMAKER      SKIN BIOPSY Left     arm    SKIN LESION EXCISION N/A     VENTRAL HERNIA REPAIR  10/28/2012       Allergies   Allergen Reactions    Penicillins Itching    Other GI Intolerance     Spicy foods       No current facility-administered medications on file prior to encounter.     Current Outpatient Medications on File Prior to Encounter   Medication Sig    albuterol sulfate  (90 Base) MCG/ACT inhaler Inhale 2 puffs Every 4 (Four) Hours As Needed for Wheezing.    budesonide-formoterol (SYMBICORT) 160-4.5 MCG/ACT inhaler Inhale 2 puffs 2 (Two) Times a Day. Rinse mouth with water after use    cholecalciferol (VITAMIN D3) 25 MCG (1000 UT) tablet Take 1 tablet by mouth Daily.    citalopram (CeleXA) 20 MG tablet Take 1 tablet by mouth Daily.    empagliflozin (Jardiance) 10 MG tablet tablet Take 1 tablet by mouth Daily.    famotidine (PEPCID) 40 MG tablet TAKE 1 TABLET BY MOUTH ONCE DAILY AT NIGHT    furosemide (Lasix) 20 MG tablet Take 1 tablet by mouth Daily.    metoprolol succinate XL (TOPROL-XL) 50 MG 24 hr tablet Take 1 tablet by mouth Daily.    O2 (OXYGEN) Inhale 3-4 L/min Daily As Needed.    pantoprazole (PROTONIX) 40 MG EC tablet Take 1 tablet by mouth Every Morning.    sacubitril-valsartan (ENTRESTO) 24-26 MG tablet Take 1 tablet by mouth  "Every 12 (Twelve) Hours.    spironolactone (ALDACTONE) 25 MG tablet Take 0.5 tablets by mouth Daily.    vitamin B-12 (CYANOCOBALAMIN) 100 MCG tablet Take 1 tablet by mouth Daily.    Xarelto 20 MG tablet Take 1 tablet by mouth Daily. *Resume on 3/27/25*     MEDICATION LIST AND ALLERGIES REVIEWED.    Family History   Problem Relation Age of Onset    Hypertension Mother     Asthma Mother     COPD Mother     Osteoarthritis Mother     Stomach cancer Father     Cancer Father     Cancer Sister     Diabetes Maternal Grandmother     Colon cancer Neg Hx      Social History     Tobacco Use    Smoking status: Never     Passive exposure: Past    Smokeless tobacco: Never   Vaping Use    Vaping status: Never Used   Substance Use Topics    Alcohol use: Not Currently     Alcohol/week: 1.0 - 2.0 standard drink of alcohol     Types: 1 - 2 Glasses of wine per week     Comment: rarely    Drug use: Never     Social History     Social History Narrative    Not on file     FAMILY AND SOCIAL HISTORY REVIEWED.    Review of Systems  ALL OTHER SYSTEMS REVIEWED AND ARE NEGATIVE.    Physical Exam and Clinical Information   /49 (BP Location: Left arm, Patient Position: Lying)   Pulse 64   Temp 97.2 °F (36.2 °C) (Temporal)   Resp 16   Ht 160 cm (63\")   Wt 65 kg (143 lb 3.2 oz)   SpO2 99%   BMI 25.37 kg/m²       Physical Exam    General:  HEENT: Pupil equal and reactive to light   Respiratory: Air entry is diminished bilaterally  Cardiovascular: First and Second heart sounds heared   Neurological: Awake, alert and fully oriented. No focal neurologic deficit   Gastrointestinal: Abdomen is soft, non-distended. No palpable organomegally  Extremities: No pedal edema   Skin: intact  Results from last 7 days   Lab Units 04/21/25  0826   WBC 10*3/mm3 8.53   HEMOGLOBIN g/dL 12.2   PLATELETS 10*3/mm3 299     Results from last 7 days   Lab Units 04/23/25  1032 04/21/25  0826   SODIUM mmol/L  --  137   POTASSIUM mmol/L 3.7 4.4   CO2 mmol/L  --  " 25.0   BUN mg/dL  --  28*   CREATININE mg/dL  --  1.19*   GLUCOSE mg/dL  --  90     Estimated Creatinine Clearance: 31.9 mL/min (A) (by C-G formula based on SCr of 1.19 mg/dL (H)).          Lab Results   Component Value Date    LACTATE 1.4 02/08/2025        Images: Reviewed    I reviewed the patient's results and images.     Impression     Severe mitral regurgitation status post MitraClip  Chronic respiratory failure on home O2  COPD  Diabetes mellitus type 2  Persistent A-fib on anticoagulation  Complete heart block status post pacemaker placement  Gastroesophageal flux disease  Acute on chronic systolic heart failure  Obstructive sleep apnea  Plan/Recommendations   On Symbicort 2 puff-9 twice daily  Metoprolol XL 50 mg p.o. daily  Entresto 1 tab p.o. every 12 hourly  Spironolactone 12.5 mg p.o. daily  Protonix EC 40 mg p.o. daily  Monitor blood pressure closely with parameters  Reconcile and restart home meds  Check hemoglobin A1c  Accu-Chek with sliding scale insulin coverage    Diet: Low HydroControl diet when okay by thoracic surgery  DVT prophylaxis: Resume anticoagulation when okay by thoracic surgery      Time spent Critical care 45 min (exclusive of procedure time)  including high complexity decision making to assess, manipulate, and support vital organ system failure in this individual who has impairment of one or more vital organ systems such that there is a high probability of imminent or life threatening deterioration in the patient’s condition.      Diane Kaufman MD, [facility]  Pulmonary and Critical Care Medicine  04/23/25 18:39 EDT

## 2025-04-23 NOTE — ANESTHESIA PREPROCEDURE EVALUATION
Anesthesia Evaluation     Patient summary reviewed and Nursing notes reviewed   no history of anesthetic complications:   NPO Solid Status: > 8 hours  NPO Liquid Status: > 2 hours           Airway   Mallampati: II  TM distance: >3 FB  Neck ROM: full  No difficulty expected  Dental - normal exam     Pulmonary    (+) COPD,home oxygen, shortness of breath, sleep apnea (no cpap), decreased breath sounds  (-) rhonchi, wheezes, not a smoker  Cardiovascular   Exercise tolerance: poor (<4 METS)    ECG reviewed    (+) pacemaker pacemaker, hypertension, valvular problems/murmurs MR, dysrhythmias (CHB) Atrial Fib, CHF , murmur, hyperlipidemia    ROS comment: 3/2025-nl coronaries    10/7/24-·  Lvef 36 - 40%.  Severe mr, av max pg 13, rvsp 61     7/2023- ppm    01/30/2025 stephanie - no tg views 2/2 not passing beyond GEJ, lvef 35, ·  The mitral annulus is dilated.  There is poor central leaflet coaptation. ·  The mitral valve chordal apparatus is calcified. The posterior leaflet appears tethered. The posterior leaflet appears to measure between 7 and 8 mm in length. ·  There is severe mitral regurgitation which appears to emanate from A2-P2. The mitral valve area on planimetry by 3D is 4.9 cm².  ·  Note previous history of elevated RVSP at 61 mmHg in October 2024.  This was not noted on today's echo.      Neuro/Psych  (+) psychiatric history  (-) seizures, CVA  GI/Hepatic/Renal/Endo    (+) obesity, hiatal hernia, GERD well controlled    Musculoskeletal     Abdominal    Substance History - negative use     OB/GYN          Other      history of cancer (breast ca, colon ca)    ROS/Med Hx Other: Hgb 12.2 k 4.4 cr 1.19   Xarelto  Hx egd with dilation  No n/v/gerd/glp1                 Anesthesia Plan    ASA 4     general and [place]     (Risks and benefits of general anesthesia discussed with patient (including MI, CVA, death, recall, aspiration, oropharyngeal/dental damage), questions answered, agreeable to proceed.    Risks of STEPHANIE  discussed with patient (Op/dental damage, dysphagia, perforation,etc); questions answered, agreeable to proceed. )  intravenous induction     Anesthetic plan, risks, benefits, and alternatives have been provided, discussed and informed consent has been obtained with: patient.    Plan discussed with CRNA.    CODE STATUS:

## 2025-04-24 ENCOUNTER — APPOINTMENT (OUTPATIENT)
Dept: CARDIOLOGY | Facility: HOSPITAL | Age: 85
DRG: 266 | End: 2025-04-24
Payer: MEDICARE

## 2025-04-24 ENCOUNTER — TRANSCRIBE ORDERS (OUTPATIENT)
Dept: CARDIAC REHAB | Facility: HOSPITAL | Age: 85
End: 2025-04-24
Payer: MEDICARE

## 2025-04-24 DIAGNOSIS — Z98.890 S/P MVR (MITRAL VALVE REPAIR): Primary | ICD-10-CM

## 2025-04-24 LAB
ANION GAP SERPL CALCULATED.3IONS-SCNC: 12 MMOL/L (ref 5–15)
BUN SERPL-MCNC: 19 MG/DL (ref 8–23)
BUN/CREAT SERPL: 21.8 (ref 7–25)
CALCIUM SPEC-SCNC: 8 MG/DL (ref 8.6–10.5)
CHLORIDE SERPL-SCNC: 103 MMOL/L (ref 98–107)
CO2 SERPL-SCNC: 26 MMOL/L (ref 22–29)
CREAT SERPL-MCNC: 0.87 MG/DL (ref 0.57–1)
DEPRECATED RDW RBC AUTO: 49 FL (ref 37–54)
DEPRECATED RDW RBC AUTO: 51 FL (ref 37–54)
EGFRCR SERPLBLD CKD-EPI 2021: 65.8 ML/MIN/1.73
ERYTHROCYTE [DISTWIDTH] IN BLOOD BY AUTOMATED COUNT: 15 % (ref 12.3–15.4)
ERYTHROCYTE [DISTWIDTH] IN BLOOD BY AUTOMATED COUNT: 15.2 % (ref 12.3–15.4)
GLUCOSE SERPL-MCNC: 161 MG/DL (ref 65–99)
HCT VFR BLD AUTO: 29.6 % (ref 34–46.6)
HCT VFR BLD AUTO: 34.4 % (ref 34–46.6)
HGB BLD-MCNC: 10.3 G/DL (ref 12–15.9)
HGB BLD-MCNC: 8.9 G/DL (ref 12–15.9)
MCH RBC QN AUTO: 26.8 PG (ref 26.6–33)
MCH RBC QN AUTO: 27.5 PG (ref 26.6–33)
MCHC RBC AUTO-ENTMCNC: 29.9 G/DL (ref 31.5–35.7)
MCHC RBC AUTO-ENTMCNC: 30.1 G/DL (ref 31.5–35.7)
MCV RBC AUTO: 89.4 FL (ref 79–97)
MCV RBC AUTO: 91.4 FL (ref 79–97)
PLATELET # BLD AUTO: 215 10*3/MM3 (ref 140–450)
PLATELET # BLD AUTO: 286 10*3/MM3 (ref 140–450)
PMV BLD AUTO: 9.3 FL (ref 6–12)
PMV BLD AUTO: 9.4 FL (ref 6–12)
POTASSIUM SERPL-SCNC: 3.5 MMOL/L (ref 3.5–5.2)
POTASSIUM SERPL-SCNC: 4.1 MMOL/L (ref 3.5–5.2)
RBC # BLD AUTO: 3.24 10*6/MM3 (ref 3.77–5.28)
RBC # BLD AUTO: 3.85 10*6/MM3 (ref 3.77–5.28)
SODIUM SERPL-SCNC: 141 MMOL/L (ref 136–145)
WBC NRBC COR # BLD AUTO: 14.02 10*3/MM3 (ref 3.4–10.8)
WBC NRBC COR # BLD AUTO: 14.5 10*3/MM3 (ref 3.4–10.8)

## 2025-04-24 PROCEDURE — 94799 UNLISTED PULMONARY SVC/PX: CPT

## 2025-04-24 PROCEDURE — 80048 BASIC METABOLIC PNL TOTAL CA: CPT | Performed by: INTERNAL MEDICINE

## 2025-04-24 PROCEDURE — P9041 ALBUMIN (HUMAN),5%, 50ML: HCPCS | Performed by: INTERNAL MEDICINE

## 2025-04-24 PROCEDURE — 25010000002 SULFUR HEXAFLUORIDE MICROSPH 60.7-25 MG RECONSTITUTED SUSPENSION: Performed by: INTERNAL MEDICINE

## 2025-04-24 PROCEDURE — 25010000002 PHENYLEPHRINE 10 MG/ML SOLUTION 5 ML VIAL

## 2025-04-24 PROCEDURE — 94664 DEMO&/EVAL PT USE INHALER: CPT

## 2025-04-24 PROCEDURE — 93306 TTE W/DOPPLER COMPLETE: CPT | Performed by: INTERNAL MEDICINE

## 2025-04-24 PROCEDURE — 93306 TTE W/DOPPLER COMPLETE: CPT

## 2025-04-24 PROCEDURE — 25010000002 ALBUMIN HUMAN 5% PER 50 ML: Performed by: INTERNAL MEDICINE

## 2025-04-24 PROCEDURE — 25810000003 SODIUM CHLORIDE 0.9 % SOLUTION 250 ML FLEX CONT

## 2025-04-24 PROCEDURE — 85027 COMPLETE CBC AUTOMATED: CPT | Performed by: INTERNAL MEDICINE

## 2025-04-24 PROCEDURE — 85027 COMPLETE CBC AUTOMATED: CPT | Performed by: PHYSICIAN ASSISTANT

## 2025-04-24 PROCEDURE — 99232 SBSQ HOSP IP/OBS MODERATE 35: CPT | Performed by: INTERNAL MEDICINE

## 2025-04-24 PROCEDURE — 84132 ASSAY OF SERUM POTASSIUM: CPT | Performed by: INTERNAL MEDICINE

## 2025-04-24 RX ORDER — ALBUMIN HUMAN 50 G/1000ML
250 SOLUTION INTRAVENOUS ONCE
Status: COMPLETED | OUTPATIENT
Start: 2025-04-24 | End: 2025-04-24

## 2025-04-24 RX ORDER — POTASSIUM CHLORIDE 1500 MG/1
40 TABLET, EXTENDED RELEASE ORAL EVERY 4 HOURS
Status: COMPLETED | OUTPATIENT
Start: 2025-04-24 | End: 2025-04-24

## 2025-04-24 RX ORDER — MIDODRINE HYDROCHLORIDE 10 MG/1
10 TABLET ORAL
Status: DISCONTINUED | OUTPATIENT
Start: 2025-04-24 | End: 2025-04-25 | Stop reason: HOSPADM

## 2025-04-24 RX ADMIN — Medication 10 ML: at 08:31

## 2025-04-24 RX ADMIN — SULFUR HEXAFLUORIDE 2 ML: KIT at 16:27

## 2025-04-24 RX ADMIN — VITAM B12 100 MCG: 100 TAB at 08:31

## 2025-04-24 RX ADMIN — PANTOPRAZOLE SODIUM 40 MG: 40 TABLET, DELAYED RELEASE ORAL at 06:27

## 2025-04-24 RX ADMIN — BUDESONIDE AND FORMOTEROL FUMARATE DIHYDRATE 2 PUFF: 160; 4.5 AEROSOL RESPIRATORY (INHALATION) at 08:54

## 2025-04-24 RX ADMIN — BUDESONIDE AND FORMOTEROL FUMARATE DIHYDRATE 2 PUFF: 160; 4.5 AEROSOL RESPIRATORY (INHALATION) at 18:37

## 2025-04-24 RX ADMIN — Medication 10 ML: at 20:09

## 2025-04-24 RX ADMIN — PHENYLEPHRINE HYDROCHLORIDE 1.1 MCG/KG/MIN: 10 INJECTION INTRAVENOUS at 23:52

## 2025-04-24 RX ADMIN — MIDODRINE HYDROCHLORIDE 10 MG: 10 TABLET ORAL at 17:27

## 2025-04-24 RX ADMIN — POTASSIUM CHLORIDE 40 MEQ: 1500 TABLET, EXTENDED RELEASE ORAL at 09:31

## 2025-04-24 RX ADMIN — ALBUMIN (HUMAN) 250 ML: 12.5 INJECTION, SOLUTION INTRAVENOUS at 12:45

## 2025-04-24 RX ADMIN — PHENYLEPHRINE HYDROCHLORIDE 1.5 MCG/KG/MIN: 10 INJECTION INTRAVENOUS at 15:35

## 2025-04-24 RX ADMIN — CITALOPRAM HYDROBROMIDE 20 MG: 20 TABLET ORAL at 08:31

## 2025-04-24 RX ADMIN — EMPAGLIFLOZIN 10 MG: 10 TABLET, FILM COATED ORAL at 08:30

## 2025-04-24 RX ADMIN — SPIRONOLACTONE 12.5 MG: 25 TABLET ORAL at 08:31

## 2025-04-24 RX ADMIN — FAMOTIDINE 40 MG: 20 TABLET, FILM COATED ORAL at 20:08

## 2025-04-24 RX ADMIN — POTASSIUM CHLORIDE 40 MEQ: 1500 TABLET, EXTENDED RELEASE ORAL at 06:27

## 2025-04-24 NOTE — CONSULTS
Diabetes Education    Patient Name:  Elsa Alcaraz  YOB: 1940  MRN: [id number]  Admit Date:  4/23/2025        Chart reviewed, there is no A1c is ordered, no meds exclusively for hyperglycemia management on the patients chart and no hx of DM noted.  Please re-consult should patient needs change       Electronically signed by:  Kelly Sood RN  04/24/25 08:26 EDT

## 2025-04-24 NOTE — PROGRESS NOTES
Remains on Byron-Synephrine this afternoon.  Has not received any metoprolol or Entresto or Lasix.    Will begin midodrine 10 mg p.o. 3 times daily.  Hopefully we can wean byron off by tomorrow.    Jeannie Zabala MD, FACC

## 2025-04-24 NOTE — NURSING NOTE
Pt. Referred for Phase II Cardiac Rehab. Staff discussed benefits of exercise, program protocol, and educational material provided. Teach back verified.  Permission granted from patient for staff to fax referral information to outlying program at this time.  Staff faxed referral info to [facility].

## 2025-04-24 NOTE — PROGRESS NOTES
[facility] Cardiology Daily Note       LOS: 1 day   Patient Care Team:  Kemi Hdz MD as PCP - General  Vasquez Fagan MD as Consulting Physician (General Surgery)  Schuyler Early DMD as Consulting Physician (Oral Surgery)  Beth Robertson PA-C as Physician Assistant (Physician Assistant)  Fidencio Germain MD as Consulting Physician (Cardiology)  Blanca Warner APRN as Nurse Practitioner (Cardiology)  Keven Willis DO as Consulting Physician (Cardiology)  Jeannie Zabala MD as Consulting Physician (Cardiology)    Chief Complaint: Day 1 status post MitraClip    Subjective     Subjective: Left groin hematoma overnight when the 4 Serbian sheath were pulled.  Blood pressures have been low overnight.  She did receive her metoprolol and her Entresto last evening.  She is currently on Byron-Synephrine at 0.5 mcg/kg/min.  99% on 1 L nasal cannula.  Heart rates in the 60s.    States that at home her blood pressure systolic has been in the 80s.  She lives in [place].  She has an appointment to come back and have a knee injection next Wednesday at 230 over at [facility].  I told her that we can restart her Xarelto probably tomorrow since she developed the hematoma last night and I will remove the stitch next week.  She will come to my office on Wednesday at 1130 for stitch removal.    Review of Systems:   As above.    Medications:  budesonide-formoterol, 2 puff, Inhalation, BID - RT  citalopram, 20 mg, Oral, Daily  empagliflozin, 10 mg, Oral, Daily  famotidine, 20 mg, Intravenous, Once  famotidine, 40 mg, Oral, Nightly  furosemide, 20 mg, Oral, Daily  metoprolol succinate XL, 50 mg, Oral, Daily  pantoprazole, 40 mg, Oral, QAM  potassium chloride ER, 40 mEq, Oral, Q4H  sacubitril-valsartan, 1 tablet, Oral, Q12H  sodium chloride, 10 mL, Intravenous, Q12H  spironolactone, 12.5 mg, Oral, Daily  vitamin B-12, 100 mcg, Oral, Daily        Objective     Vital Sign Min/Max for  "last 24 hours  Temp  Min: 97.2 °F (36.2 °C)  Max: 98 °F (36.7 °C)   BP  Min: 69/44  Max: 111/64   Pulse  Min: 64  Max: 87   Resp  Min: 16  Max: 20   SpO2  Min: 93 %  Max: 99 %   Flow (L/min) (Oxygen Therapy)  Min: 1  Max: 4   Weight  Min: 65 kg (143 lb 3.2 oz)  Max: 65 kg (143 lb 3.2 oz)      Intake/Output Summary (Last 24 hours) at 4/24/2025 0752  Last data filed at 4/24/2025 0600  Gross per 24 hour   Intake 545.99 ml   Output 1135 ml   Net -589.01 ml        Flowsheet Rows      Flowsheet Row First Filed Value   Admission Height 160 cm (63\") Documented at 04/23/2025 1026   Admission Weight 65 kg (143 lb 3.2 oz) Documented at 04/23/2025 1026            Physical Exam:    General: Alert and oriented.   Cardiovascular: Heart has a nondisplaced focal PMI. Regular rate and rhythm without murmur, gallop or rub.  Lungs: Clear without rales or wheezes. Equal expansion is noted.   Abdomen: Soft, nontender.  Extremities: Show no edema. Soft bruising over the left groin.  No hematomas present.  The right groin looks good.  Skin: warm and dry.     Results Review:    I reviewed the patient's new clinical results.  EKG:  Tele: A-fib, V paced    Labs:    Results from last 7 days   Lab Units 04/24/25  0314 04/23/25  1032 04/21/25  0826   SODIUM mmol/L 141  --  137   POTASSIUM mmol/L 3.5 3.7 4.4   CHLORIDE mmol/L 103  --  101   CO2 mmol/L 26.0  --  25.0   BUN mg/dL 19  --  28*   CREATININE mg/dL 0.87  --  1.19*   CALCIUM mg/dL 8.0*  --  9.4   GLUCOSE mg/dL 161*  --  90     Results from last 7 days   Lab Units 04/24/25  0314 04/23/25  2219 04/21/25  0826   WBC 10*3/mm3 14.50* 15.09* 8.53   HEMOGLOBIN g/dL 10.3* 10.9* 12.2   HEMATOCRIT % 34.4 35.4 40.0   PLATELETS 10*3/mm3 286 268 299     Lab Results   Component Value Date    TROPONINI 0.014 11/06/2018    TROPONINI <0.012 11/06/2018    TROPONINT 24 (H) 02/08/2025    TROPONINT 24 (H) 02/08/2025    TROPONINT 18 (H) 08/26/2024     Lab Results   Component Value Date    CHOL 124 07/04/2023 " "   CHOL 128 05/12/2021     Lab Results   Component Value Date    TRIG 54 07/04/2023    TRIG 62 05/12/2021     Lab Results   Component Value Date    HDL 64 (H) 07/04/2023    HDL 49 05/12/2021     No components found for: \"LDLCALC\"  Lab Results   Component Value Date    INR 1.24 (H) 07/05/2023    INR 1.22 (H) 06/02/2021    INR 1.10 04/05/2021    PROTIME 15.7 (H) 07/05/2023    PROTIME 16.0 (H) 06/02/2021    PROTIME 14.7 04/05/2021         Ejection Fraction:    Assessment   Assessment:    Mitral regurgitation  Echo 10/10/2022: EF 40%, moderate MR, RVSP 23  Echo 7/14/2023: EF 36-40%, moderate to severe MR, mild TR with RVSP 36  Echo 10/7/2024: EF 36-40%, severe MR mild to moderate TR, RVSP 61.  REBECA 1/2025: EF 35%, severe mitral regurgitation, mitral chordae apparatus is calcified, posterior leaflet appears tethered   Status post NT W MitraClip x 2 4/23/2025.  Complicated by posterior leaflet detachment of the first clip stabilized with a second clip.  Moderate mitral regurgitation.  Chronic systolic heart failure  Normal EF 2015, reduced EF 45% in 2020 and 35 to 40% 2021  Longstanding persistent atrial fibrillation  [id number] = 5, on Xarelto  Complete heart block  S/p Medtronic Micra 7/5/2023  Hypertension  Hyperlipidemia  COPD  Severe obstruction with mild restriction by most recent PFTs.  DAVID nonadherent to CPAP    Plan:    Up in chair  Ambulate in halls  Wean Byron-Synephrine  Hold metoprolol Entresto and furosemide for now  May give Jardiance and spironolactone  Repeat H&H this afternoon  Home later today or tomorrow depending on her blood pressure, hemoglobin and groin.    Jeannie Zabala MD  04/24/25  07:52 EDT          "

## 2025-04-24 NOTE — PLAN OF CARE
Goal Outcome Evaluation:         Neuro: A/O x 4, ZUNIGA, FC, 1 assist  Resp: 1-2 L/NC  Cardiac: A-fib, on CLAIRE, gave 250 mL Albumin, added midodrine  GI/: [place] - 400 mL. [place] d./c'd, patient has voided since  Skin: bruising bilateral groins, tender and soft to palpation.   No pain meds needed.    CBC redrawn per Dr. Dimas orders.

## 2025-04-24 NOTE — PROGRESS NOTES
Pulmonary / Critical Care Progress Note      Patient Name: Elsa Alcaraz  : 1940  MRN: [id number]  Attending:  Jeannie Zabala, *   Date of admission: 2025    HPI:  Elsa Alcaraz is an 83 y/o female with medical history significant for COPD, chronic respiratory failure on home oxygen, type 2 diabetes mellitus, complete heart block status post pacemaker placement, history of peptic ulcer disease, acute on chronic systolic heart failure, gastroesophageal reflux disease, persistent A-fib on anticoagulation, hypertension, sleep apnea diverticulosis.  Patient is known to have severe mitral regurgitation with EF of 35%.  He was also having worsening shortness despite and was considered for MitraClip which was done today.  He was admitted to ICU for further evaluation and treatment     Patient seen and examined at bedside, all the labs and diagnostic imaging studies were extensively reviewed by me.  Most recent labs shows serum creatinine of 1.19.  CBC is within normal limit.    Subjective   Subjective   Patient seen and examined bedside.  Overnight no acute event.  When her access sheet was pulled, she developed hematoma.  Repeat hemoglobin heart rate better this afternoon.  Otherwise patient remains on minimal dose of Byron-Synephrine.  He is also on 2 L of oxygen but that was uses at home as well.  Cardiology recommended for discharge home later today or tomorrow if everything works out.  Otherwise she is awake alert and fully oriented.  Does not seem to be in pain.  Hemoglobin this morning is 10.3 from 10.9 yesterday.  BMP is unremarkable      Objective   Objective     Vitals:   Vital signs for last 24 hours:  Temp:  [97.2 °F (36.2 °C)-98 °F (36.7 °C)] 98 °F (36.7 °C)  Heart Rate:  [64-87] 69  Resp:  [16-20] 18  BP: ()/(33-73) 97/61  Arterial Line BP: ()/() 261/260    Intake/Output last 3 shifts:  I/O last 3 completed shifts:  In: 546 [I.V.:446; IV Piggyback:100]  Out: 1135  [Urine:1135]  Intake/Output this shift:  I/O this shift:  In: -   Out: 125 [Urine:125]    Vent settings for last 24 hours:  S RR:  [10-18] 14    Hemodynamic parameters for last 24 hours:       Physical Exam     HEENT: Pupil equal and reactive to light   Respiratory: Air entry is diminished bilaterally  Cardiovascular: First and Second heart sounds heared   Neurological: Awake, alert and fully oriented. No focal neurologic deficit   Gastrointestinal: Abdomen is soft, non-distended. No palpable organomegally  Extremities: No pedal edema   Skin: intact    Result Review    Result Review:  I have personally reviewed the results from the time of this admission to 4/24/2025 09:52 EDT and agree with these findings:  [x]  Laboratory  [x]  Microbiology  []  Radiology  []  EKG/Telemetry   []  Cardiology/Vascular   []  Pathology  []  Old records  []  Other:      Assessment & Plan   Assessment / Plan     Active Hospital Problems:  Active Hospital Problems    Diagnosis     **Moderate mitral regurgitation     S/P mitral valve clip implantation          Impression:    Severe mitral regurgitation status post MitraClip  Chronic respiratory failure on home O2  COPD  Diabetes mellitus type 2  Persistent A-fib on anticoagulation  Complete heart block status post pacemaker placement  Gastroesophageal flux disease  Acute on chronic systolic heart failure  Obstructive sleep apnea    Plan:    Repeat CBC at noon  On Symbicort 2 puff-9 twice daily  Metoprolol XL 50 mg p.o. daily  Entresto 1 tab p.o. every 12 hourly  Spironolactone 12.5 mg p.o. daily  Protonix EC 40 mg p.o. daily  Monitor blood pressure closely with parameters  Accu-Chek with sliding scale insulin coverage     Diet: Carbohydrate controlled diet  DVT prophylaxis: Resume anticoagulation when okay by thoracic surgery      CODE STATUS:   Code Status (Patient has no pulse and is not breathing): CPR (Attempt to Resuscitate)  Medical Interventions (Patient has pulse or is breathing):  Full Support          The patient is critically ill in the ICU. Multidisciplinary bedside critical care rounds were performed with nursing staff, respiratory therapy, pharmacy, nutritional services, social work. I have personally reviewed the chart, labs and any pertinent imaging available.  I have spent 35 minutes of critical care time, excluding procedures, in the care of this patient.

## 2025-04-24 NOTE — SIGNIFICANT NOTE
Called regarding hematoma formed around left femoral sheath.  Evaluated patient bedside.  Patient hemodynamically stable.  Hemoglobin 10.9, last hemoglobin 4/21 12.2.  CT surgery PA as well as cardiology attending contacted.  Cardiology recommended pulling the sheath.  Pressure was held by RN for 1 hour.  Small hematoma does not appear to be increase in size.  Hematoma outlined.  Will repeat hemoglobin again to ensure stability.  Patient pressor requirement unchanged, minimal phenylephrine.  RN monitoring hematoma closely.      Kristy Figueroa MD  Pulmonary critical care medicine

## 2025-04-24 NOTE — PROGRESS NOTES
CTS Progress Note      POD # 1 s/p Mitraclip     LOS: 1 day     Subjective  Hematoma formed around femoral sheath overnight.  Sheath pulled with manual pressure applied. On 1L NC. Byron gtt at 0.5.    Objective    Vital Signs  Temp:  [97.2 °F (36.2 °C)-98 °F (36.7 °C)] 98 °F (36.7 °C)  Heart Rate:  [64-87] 64  Resp:  [16-20] 18  BP: ()/(42-64) 106/55  Arterial Line BP: ()/() 261/260    Physical Exam:   General Appearance: alert, appears stated age and cooperative   Lungs: clear to auscultation    Heart: regular rhythm & normal rate, normal S1, S2 and no murmur, no gallop, no rub   Skin: Left groin soft with ecchymosis     Results     Results from last 7 days   Lab Units 04/24/25  0314   WBC 10*3/mm3 14.50*   HEMOGLOBIN g/dL 10.3*   HEMATOCRIT % 34.4   PLATELETS 10*3/mm3 286     Results from last 7 days   Lab Units 04/24/25  0314   SODIUM mmol/L 141   POTASSIUM mmol/L 3.5   CHLORIDE mmol/L 103   CO2 mmol/L 26.0   BUN mg/dL 19   CREATININE mg/dL 0.87   GLUCOSE mg/dL 161*   CALCIUM mg/dL 8.0*       Imaging Results (Last 24 Hours)       ** No results found for the last 24 hours. **            Assessment  POD # 1 s/p Mitraclip    Moderate mitral regurgitation    S/P mitral valve clip implantation      Plan   Wean off Byron gtt  D/C [place]  Ambulate  Pulm toilet  Monitor hematoma-H&H stable  Repeat CBC at noon  Possible discharge later today if labs remain stable versus tomorrow    Ortega Dimas MD  04/24/25  07:30 EDT

## 2025-04-25 ENCOUNTER — READMISSION MANAGEMENT (OUTPATIENT)
Dept: CALL CENTER | Facility: HOSPITAL | Age: 85
End: 2025-04-25
Payer: MEDICARE

## 2025-04-25 ENCOUNTER — TELEPHONE (OUTPATIENT)
Dept: CARDIOLOGY | Facility: CLINIC | Age: 85
End: 2025-04-25

## 2025-04-25 VITALS
WEIGHT: 143.3 LBS | OXYGEN SATURATION: 100 % | SYSTOLIC BLOOD PRESSURE: 109 MMHG | HEIGHT: 63 IN | TEMPERATURE: 97.5 F | HEART RATE: 68 BPM | DIASTOLIC BLOOD PRESSURE: 56 MMHG | RESPIRATION RATE: 18 BRPM | BODY MASS INDEX: 25.39 KG/M2

## 2025-04-25 LAB
ABO GROUP BLD: NORMAL
ANION GAP SERPL CALCULATED.3IONS-SCNC: 7 MMOL/L (ref 5–15)
AORTIC DIMENSIONLESS INDEX: 0.34 (DI)
AV MEAN PRESS GRAD SYS DOP V1V2: 10.5 MMHG
AV VMAX SYS DOP: 227.8 CM/SEC
BH CV ECHO MEAS - AO MAX PG: 20.8 MMHG
BH CV ECHO MEAS - AO ROOT DIAM: 3.5 CM
BH CV ECHO MEAS - AO V2 VTI: 44.8 CM
BH CV ECHO MEAS - AVA(I,D): 1.06 CM2
BH CV ECHO MEAS - EDV(CUBED): 175.6 ML
BH CV ECHO MEAS - EDV(MOD-SP2): 137 ML
BH CV ECHO MEAS - EDV(MOD-SP4): 116 ML
BH CV ECHO MEAS - EF(MOD-SP2): 27 %
BH CV ECHO MEAS - EF(MOD-SP4): 33.4 %
BH CV ECHO MEAS - ESV(CUBED): 39.3 ML
BH CV ECHO MEAS - ESV(MOD-SP2): 100 ML
BH CV ECHO MEAS - ESV(MOD-SP4): 77.2 ML
BH CV ECHO MEAS - FS: 39.3 %
BH CV ECHO MEAS - IVS/LVPW: 1 CM
BH CV ECHO MEAS - IVSD: 1.1 CM
BH CV ECHO MEAS - LA DIMENSION: 5.4 CM
BH CV ECHO MEAS - LAT PEAK E' VEL: 10 CM/SEC
BH CV ECHO MEAS - LV DIASTOLIC VOL/BSA (35-75): 69.2 CM2
BH CV ECHO MEAS - LV MASS(C)D: 249.3 GRAMS
BH CV ECHO MEAS - LV MAX PG: 2.5 MMHG
BH CV ECHO MEAS - LV MEAN PG: 1 MMHG
BH CV ECHO MEAS - LV SYSTOLIC VOL/BSA (12-30): 46 CM2
BH CV ECHO MEAS - LV V1 MAX: 79.2 CM/SEC
BH CV ECHO MEAS - LV V1 VTI: 15.1 CM
BH CV ECHO MEAS - LVIDD: 5.6 CM
BH CV ECHO MEAS - LVIDS: 3.4 CM
BH CV ECHO MEAS - LVOT AREA: 3.1 CM2
BH CV ECHO MEAS - LVOT DIAM: 2 CM
BH CV ECHO MEAS - LVPWD: 1.1 CM
BH CV ECHO MEAS - MED PEAK E' VEL: 6.6 CM/SEC
BH CV ECHO MEAS - MR MAX PG: 71.4 MMHG
BH CV ECHO MEAS - MR MAX VEL: 421.7 CM/SEC
BH CV ECHO MEAS - MR MEAN PG: 42.3 MMHG
BH CV ECHO MEAS - MR MEAN VEL: 304 CM/SEC
BH CV ECHO MEAS - MR VTI: 128 CM
BH CV ECHO MEAS - MV DEC SLOPE: 415.5 CM/SEC2
BH CV ECHO MEAS - MV DEC TIME: 0.4 SEC
BH CV ECHO MEAS - MV E MAX VEL: 182 CM/SEC
BH CV ECHO MEAS - MV MAX PG: 15 MMHG
BH CV ECHO MEAS - MV MEAN PG: 4.8 MMHG
BH CV ECHO MEAS - MV P1/2T: 117 MSEC
BH CV ECHO MEAS - MV V2 VTI: 56 CM
BH CV ECHO MEAS - MVA(P1/2T): 1.88 CM2
BH CV ECHO MEAS - MVA(VTI): 0.85 CM2
BH CV ECHO MEAS - PA ACC TIME: 0.09 SEC
BH CV ECHO MEAS - RAP SYSTOLE: 3 MMHG
BH CV ECHO MEAS - RVSP: 27 MMHG
BH CV ECHO MEAS - SV(LVOT): 47.4 ML
BH CV ECHO MEAS - SV(MOD-SP2): 37 ML
BH CV ECHO MEAS - SV(MOD-SP4): 38.8 ML
BH CV ECHO MEAS - SVI(LVOT): 28.3 ML/M2
BH CV ECHO MEAS - SVI(MOD-SP2): 22.1 ML/M2
BH CV ECHO MEAS - SVI(MOD-SP4): 23.1 ML/M2
BH CV ECHO MEAS - TAPSE (>1.6): 1.83 CM
BH CV ECHO MEAS - TR MAX PG: 24.1 MMHG
BH CV ECHO MEAS - TR MAX VEL: 245.3 CM/SEC
BH CV ECHO MEASUREMENTS AVERAGE E/E' RATIO: 21.93
BH CV VAS BP RIGHT ARM: NORMAL MMHG
BH CV XLRA - RV BASE: 4.2 CM
BH CV XLRA - RV LENGTH: 7.4 CM
BH CV XLRA - RV MID: 3.1 CM
BH CV XLRA - TDI S': 8.9 CM/SEC
BLD GP AB SCN SERPL QL: NEGATIVE
BUN SERPL-MCNC: 18 MG/DL (ref 8–23)
BUN/CREAT SERPL: 23.7 (ref 7–25)
CALCIUM SPEC-SCNC: 8.5 MG/DL (ref 8.6–10.5)
CHLORIDE SERPL-SCNC: 108 MMOL/L (ref 98–107)
CO2 SERPL-SCNC: 26 MMOL/L (ref 22–29)
CREAT SERPL-MCNC: 0.76 MG/DL (ref 0.57–1)
DEPRECATED RDW RBC AUTO: 52.1 FL (ref 37–54)
EGFRCR SERPLBLD CKD-EPI 2021: 77.4 ML/MIN/1.73
ERYTHROCYTE [DISTWIDTH] IN BLOOD BY AUTOMATED COUNT: 15.5 % (ref 12.3–15.4)
GLUCOSE SERPL-MCNC: 110 MG/DL (ref 65–99)
HCT VFR BLD AUTO: 33.1 % (ref 34–46.6)
HGB BLD-MCNC: 9.7 G/DL (ref 12–15.9)
IVRT: 95 MS
LEFT ATRIUM VOLUME INDEX: 110.7 ML/M2
LV EF 2D ECHO EST: 30 %
LV EF BIPLANE MOD: 30.1 %
MCH RBC QN AUTO: 27.1 PG (ref 26.6–33)
MCHC RBC AUTO-ENTMCNC: 29.3 G/DL (ref 31.5–35.7)
MCV RBC AUTO: 92.5 FL (ref 79–97)
PLATELET # BLD AUTO: 217 10*3/MM3 (ref 140–450)
PMV BLD AUTO: 9.4 FL (ref 6–12)
POTASSIUM SERPL-SCNC: 4.7 MMOL/L (ref 3.5–5.2)
RBC # BLD AUTO: 3.58 10*6/MM3 (ref 3.77–5.28)
RH BLD: POSITIVE
SODIUM SERPL-SCNC: 141 MMOL/L (ref 136–145)
T&S EXPIRATION DATE: NORMAL
WBC NRBC COR # BLD AUTO: 16.83 10*3/MM3 (ref 3.4–10.8)

## 2025-04-25 PROCEDURE — 94799 UNLISTED PULMONARY SVC/PX: CPT

## 2025-04-25 PROCEDURE — 86901 BLOOD TYPING SEROLOGIC RH(D): CPT | Performed by: INTERNAL MEDICINE

## 2025-04-25 PROCEDURE — 86900 BLOOD TYPING SEROLOGIC ABO: CPT | Performed by: INTERNAL MEDICINE

## 2025-04-25 PROCEDURE — 85027 COMPLETE CBC AUTOMATED: CPT | Performed by: INTERNAL MEDICINE

## 2025-04-25 PROCEDURE — 94664 DEMO&/EVAL PT USE INHALER: CPT

## 2025-04-25 PROCEDURE — 86850 RBC ANTIBODY SCREEN: CPT | Performed by: INTERNAL MEDICINE

## 2025-04-25 PROCEDURE — 99232 SBSQ HOSP IP/OBS MODERATE 35: CPT | Performed by: INTERNAL MEDICINE

## 2025-04-25 PROCEDURE — 94761 N-INVAS EAR/PLS OXIMETRY MLT: CPT

## 2025-04-25 PROCEDURE — 80048 BASIC METABOLIC PNL TOTAL CA: CPT | Performed by: INTERNAL MEDICINE

## 2025-04-25 RX ORDER — MIDODRINE HYDROCHLORIDE 10 MG/1
10 TABLET ORAL
Qty: 90 TABLET | Refills: 0 | Status: SHIPPED | OUTPATIENT
Start: 2025-04-25

## 2025-04-25 RX ORDER — METOPROLOL TARTRATE 25 MG/1
12.5 TABLET, FILM COATED ORAL EVERY 12 HOURS SCHEDULED
Qty: 30 TABLET | Refills: 0 | Status: SHIPPED | OUTPATIENT
Start: 2025-04-25

## 2025-04-25 RX ADMIN — PANTOPRAZOLE SODIUM 40 MG: 40 TABLET, DELAYED RELEASE ORAL at 06:44

## 2025-04-25 RX ADMIN — Medication 12.5 MG: at 10:15

## 2025-04-25 RX ADMIN — CITALOPRAM HYDROBROMIDE 20 MG: 20 TABLET ORAL at 08:13

## 2025-04-25 RX ADMIN — MIDODRINE HYDROCHLORIDE 10 MG: 10 TABLET ORAL at 07:33

## 2025-04-25 RX ADMIN — MIDODRINE HYDROCHLORIDE 10 MG: 10 TABLET ORAL at 11:42

## 2025-04-25 RX ADMIN — BUDESONIDE AND FORMOTEROL FUMARATE DIHYDRATE 2 PUFF: 160; 4.5 AEROSOL RESPIRATORY (INHALATION) at 08:16

## 2025-04-25 RX ADMIN — EMPAGLIFLOZIN 10 MG: 10 TABLET, FILM COATED ORAL at 08:13

## 2025-04-25 RX ADMIN — Medication 10 ML: at 08:47

## 2025-04-25 RX ADMIN — VITAM B12 100 MCG: 100 TAB at 08:13

## 2025-04-25 NOTE — PROGRESS NOTES
[facility] Cardiology Daily Note       LOS: 2 days   Patient Care Team:  Kemi Hdz MD as PCP - General  Vasquez Fagan MD as Consulting Physician (General Surgery)  Schuyler Early DMD as Consulting Physician (Oral Surgery)  Beth Robertson PA-C as Physician Assistant (Physician Assistant)  Fidencio Germain MD as Consulting Physician (Cardiology)  Blanca Warner APRN as Nurse Practitioner (Cardiology)  Keven Willis DO as Consulting Physician (Cardiology)  Jeannie Zabala MD as Consulting Physician (Cardiology)    Chief Complaint: Day 2 status post MitraClip    Subjective     Subjective:   Patient has minimal pain to her left groin.  Still has bruising but was able to get up and move around yesterday.  Has not gotten out of bed today.  Byron-Synephrine has stopped this morning and patient is currently on midodrine with a blood pressure of 110.    Review of Systems:   As above.    Medications:  budesonide-formoterol, 2 puff, Inhalation, BID - RT  citalopram, 20 mg, Oral, Daily  empagliflozin, 10 mg, Oral, Daily  famotidine, 40 mg, Oral, Nightly  furosemide, 20 mg, Oral, Daily  metoprolol tartrate, 12.5 mg, Oral, Q12H  midodrine, 10 mg, Oral, TID AC  pantoprazole, 40 mg, Oral, QAM  sacubitril-valsartan, 1 tablet, Oral, Q12H  sodium chloride, 10 mL, Intravenous, Q12H  spironolactone, 12.5 mg, Oral, Daily  vitamin B-12, 100 mcg, Oral, Daily        Objective     Vital Sign Min/Max for last 24 hours  Temp  Min: 97.2 °F (36.2 °C)  Max: 98.5 °F (36.9 °C)   BP  Min: 71/51  Max: 129/72   Pulse  Min: 62  Max: 93   Resp  Min: 16  Max: 22   SpO2  Min: 81 %  Max: 100 %   Flow (L/min) (Oxygen Therapy)  Min: 1  Max: 3   Weight  Min: 65 kg (143 lb 4.8 oz)  Max: 65 kg (143 lb 4.8 oz)      Intake/Output Summary (Last 24 hours) at 4/25/2025 5648  Last data filed at 4/25/2025 0800  Gross per 24 hour   Intake 700.17 ml   Output 925 ml   Net -224.83 ml        Flowsheet Rows   "    Flowsheet Row First Filed Value   Admission Height 160 cm (63\") Documented at 04/23/2025 1026   Admission Weight 65 kg (143 lb 3.2 oz) Documented at 04/23/2025 1026            Physical Exam:    General: Alert and oriented.   Cardiovascular: Heart has a nondisplaced focal PMI. Regular rate and rhythm without murmur, gallop or rub.  Lungs: Clear without rales or wheezes. Equal expansion is noted.   Abdomen: Soft, nontender.  Extremities: Show no edema. Soft bruising over the left groin and above pubic region.  Skin: warm and dry.     Results Review:    I reviewed the patient's new clinical results.  EKG:  Tele: A-fib, V paced    Labs:    Results from last 7 days   Lab Units 04/25/25  0407 04/24/25  1554 04/24/25  0314 04/23/25  1032 04/21/25  0826   SODIUM mmol/L 141  --  141  --  137   POTASSIUM mmol/L 4.7 4.1 3.5   < > 4.4   CHLORIDE mmol/L 108*  --  103  --  101   CO2 mmol/L 26.0  --  26.0  --  25.0   BUN mg/dL 18  --  19  --  28*   CREATININE mg/dL 0.76  --  0.87  --  1.19*   CALCIUM mg/dL 8.5*  --  8.0*  --  9.4   GLUCOSE mg/dL 110*  --  161*  --  90    < > = values in this interval not displayed.     Results from last 7 days   Lab Units 04/25/25  0407 04/24/25  1554 04/24/25  0314   WBC 10*3/mm3 16.83* 14.02* 14.50*   HEMOGLOBIN g/dL 9.7* 8.9* 10.3*   HEMATOCRIT % 33.1* 29.6* 34.4   PLATELETS 10*3/mm3 217 215 286     Lab Results   Component Value Date    TROPONINI 0.014 11/06/2018    TROPONINI <0.012 11/06/2018    TROPONINT 24 (H) 02/08/2025    TROPONINT 24 (H) 02/08/2025    TROPONINT 18 (H) 08/26/2024     Lab Results   Component Value Date    CHOL 124 07/04/2023    CHOL 128 05/12/2021     Lab Results   Component Value Date    TRIG 54 07/04/2023    TRIG 62 05/12/2021     Lab Results   Component Value Date    HDL 64 (H) 07/04/2023    HDL 49 05/12/2021     No components found for: \"LDLCALC\"  Lab Results   Component Value Date    INR 1.24 (H) 07/05/2023    INR 1.22 (H) 06/02/2021    INR 1.10 04/05/2021    " PROTIME 15.7 (H) 07/05/2023    PROTIME 16.0 (H) 06/02/2021    PROTIME 14.7 04/05/2021         Ejection Fraction:    Assessment   Assessment:    Mitral regurgitation  Echo 10/10/2022: EF 40%, moderate MR, RVSP 23  Echo 7/14/2023: EF 36-40%, moderate to severe MR, mild TR with RVSP 36  Echo 10/7/2024: EF 36-40%, severe MR mild to moderate TR, RVSP 61.  REBECA 1/2025: EF 35%, severe mitral regurgitation, mitral chordae apparatus is calcified, posterior leaflet appears tethered   Status post NT W MitraClip x 2 4/23/2025.  Complicated by posterior leaflet detachment of the first clip stabilized with a second clip.  Moderate mitral regurgitation.  Chronic systolic heart failure  Normal EF 2015, reduced EF 45% in 2020 and 35 to 40% 2021  Longstanding persistent atrial fibrillation  [id number]-[id number] = 5, on Xarelto  Complete heart block  S/p Med[employer]onic Micra 7/5/2023  Hypertension  Hyperlipidemia  COPD  Severe obstruction with mild restriction by most recent PFTs.  DAVID nonadherent to CPAP    Plan:  Continue midodrine 10 mg 3 times daily as Byron-Synephrine is off.  Will restart metoprolol at 12.5 mg twice daily, reduced dose to make sure that her blood pressure stays above 100 while on midodrine.  Okay with holding Entresto and spironolactone and Lasix again.  Patient does not appear to be in fluid overload.  Will restart Xarelto today.  Patient will likely be okay to be discharged this afternoon but will recheck on her to make sure she is getting up and moving around with no orthostasis.  Christiane aVlenzuela PA-C  04/25/25  09:29 EDT

## 2025-04-25 NOTE — PROGRESS NOTES
CTS Progress Note    POD # 2 s/p Mitraclip     LOS: 2 days     Subjective  Off Byron gtt.  No acute events overnight. On 1L NC.    Objective    Vital Signs  Temp:  [97.9 °F (36.6 °C)-98.5 °F (36.9 °C)] 98.5 °F (36.9 °C)  Heart Rate:  [62-93] 75  Resp:  [16-22] 20  BP: ()/(23-99) 109/64    Physical Exam:   General Appearance: alert, appears stated age and cooperative   Lungs: clear to auscultation    Heart: regular rhythm & normal rate, normal S1, S2 and no murmur, no gallop, no rub   Skin: Left groin soft with ecchymosis     Results     Results from last 7 days   Lab Units 04/25/25  0407   WBC 10*3/mm3 16.83*   HEMOGLOBIN g/dL 9.7*   HEMATOCRIT % 33.1*   PLATELETS 10*3/mm3 217     Results from last 7 days   Lab Units 04/25/25  0407   SODIUM mmol/L 141   POTASSIUM mmol/L 4.7   CHLORIDE mmol/L 108*   CO2 mmol/L 26.0   BUN mg/dL 18   CREATININE mg/dL 0.76   GLUCOSE mg/dL 110*   CALCIUM mg/dL 8.5*       Imaging Results (Last 24 Hours)       ** No results found for the last 24 hours. **            Assessment  POD # 1 s/p Mitraclip    Moderate mitral regurgitation    S/P mitral valve clip implantation    Plan   Ambulate  Pulm toilet  Monitor hematoma-H&H stable  Discharge home later today    Ortega Dimas MD  04/25/25  07:31 EDT

## 2025-04-25 NOTE — CASE MANAGEMENT/SOCIAL WORK
Discharge Planning Assessment  [facility]     Patient Name: Elsa Alcaraz  MRN: [id number]  Today's Date: 4/24/2025    Admit Date: 4/23/2025    Plan: Home with family   Discharge Needs Assessment       Row Name 04/24/25 2011       Living Environment    People in Home grandchild(michael);spouse    Name(s) of People in Home Lives with spouse, Logan Alcaraz and adult grandson in [facility]    Current Living Arrangements home    Potentially Unsafe Housing Conditions none    In the past 12 months has the electric, gas, oil, or water company threatened to shut off services in your home? No    Primary Care Provided by self    Family Caregiver if Needed spouse;child(michael), adult    Family Caregiver Names Spouse, Logan Alcaraz @ [phone]; Daughter, Lizbeth Kaye @ [phone]    Quality of Family Relationships helpful;involved;supportive    Able to Return to Prior Arrangements yes    Living Arrangement Comments Resides in private residence with family       Resource/Environmental Concerns    Resource/Environmental Concerns none    Transportation Concerns none       Transportation Needs    In the past 12 months, has lack of transportation kept you from medical appointments or from getting medications? no    In the past 12 months, has lack of transportation kept you from meetings, work, or from getting things needed for daily living? No       Food Insecurity    Within the past 12 months, you worried that your food would run out before you got the money to buy more. Never true    Within the past 12 months, the food you bought just didn't last and you didn't have money to get more. Never true       Transition Planning    Patient/Family Anticipates Transition to home with family    Patient/Family Anticipated Services at Transition none    Transportation Anticipated family or friend will provide       Discharge Needs Assessment    Readmission Within the Last 30 Days no previous admission in last 30 days    Equipment  Currently Used at Home oxygen;rollator;shower chair;grab bar    Concerns to be Addressed discharge planning    Do you want help finding or keeping work or a job? --  NA    Do you want help with school or training? For example, starting or completing job training or getting a high school diploma, GED or equivalent --  NA    Concerns Comments Case Management following for all needs    Anticipated Changes Related to Illness none    Equipment Needed After Discharge none    Patient's Choice of Community Agency(s) [employer] provides home 02    Discharge Coordination/Progress Anticipate home with family at discharge                   Discharge Plan       Row Name 04/24/25 2015       Plan    Plan Home with family    Plan Comments Resides in [facility] with spouse and grandson, independent with DME use,(walker); 02 at night through [employer].  Anticipate patient will return home at hospital discharge, Case Management following for all needs.    Final Discharge Disposition Code 01 - home or self-care                    Expected Discharge Date and Time       Expected Discharge Date Expected Discharge Time    Apr 25, 2025            Demographic Summary       Row Name 04/24/25 2010       General Information    Admission Type inpatient    Arrived From home    Referral Source admission list;interdisciplinary rounds    Reason for Consult discharge planning    Preferred Language English                   Functional Status    No documentation.                  Psychosocial    No documentation.                  Abuse/Neglect    No documentation.                  Legal    No documentation.                  Substance Abuse    No documentation.                  Patient Forms    No documentation.                     JAVIER Nye

## 2025-04-25 NOTE — TELEPHONE ENCOUNTER
Caller: Elsa Alcaraz    Relationship to patient: Self    Best call back number: [phone]     Type of visit: HOSP F/U APPT    Requested date: WEEK OF 06-02-25       Additional notes:PLEASE CONTACT PATIENT WITH A SUITABLE DATE.

## 2025-04-25 NOTE — CASE MANAGEMENT/SOCIAL WORK
Case Management Discharge Note      Final Note: Plan is home with family. Family will transport. No CM discharge needs identified.         Selected Continued Care - Admitted Since 4/23/2025       Destination    No services have been selected for the patient.                Durable Medical Equipment    No services have been selected for the patient.                Dialysis/Infusion    No services have been selected for the patient.                Home Medical Care    No services have been selected for the patient.                Therapy    No services have been selected for the patient.                Community Resources    No services have been selected for the patient.                Community & DME    No services have been selected for the patient.                         Final Discharge Disposition Code: 01 - home or self-care

## 2025-04-25 NOTE — PROGRESS NOTES
Pulmonary / Critical Care Progress Note      Patient Name: Elsa Alcaraz  : 1940  MRN: [id number]  Attending:  Jeannie Zabala, *   Date of admission: 2025    HPI:  Elsa Alcaraz is an 85 y/o female with medical history significant for COPD, chronic respiratory failure on home oxygen, type 2 diabetes mellitus, complete heart block status post pacemaker placement, history of peptic ulcer disease, acute on chronic systolic heart failure, gastroesophageal reflux disease, persistent A-fib on anticoagulation, hypertension, sleep apnea diverticulosis.  Patient is known to have severe mitral regurgitation with EF of 35%.  He was also having worsening shortness despite and was considered for MitraClip which was done today.  He was admitted to ICU for further evaluation and treatment     Patient seen and examined at bedside, all the labs and diagnostic imaging studies were extensively reviewed by me.  Most recent labs shows serum creatinine of 1.19.  CBC is within normal limit.    Subjective   Subjective   Patient seen and examined bedside.  Overnight no acute event.  Patient is awake alert and fully oriented.  Does not seem to be in any distress.  Hemoglobin hematocrit remained stable and there is plan to discharge home today per thoracic surgery.    Objective   Objective     Vitals:   Vital signs for last 24 hours:  Temp:  [97.2 °F (36.2 °C)-98.5 °F (36.9 °C)] 97.2 °F (36.2 °C)  Heart Rate:  [62-93] 66  Resp:  [16-22] 18  BP: ()/(23-99) 90/62    Intake/Output last 3 shifts:  I/O last 3 completed shifts:  In: 1146.2 [I.V.:1146.2]  Out: 1810 [Urine:1810]  Intake/Output this shift:  No intake/output data recorded.    Vent settings for last 24 hours:       Hemodynamic parameters for last 24 hours:       Physical Exam     HEENT: Pupil equal and reactive to light   Respiratory: Air entry is diminished bilaterally  Cardiovascular: First and Second heart sounds heared   Neurological: Awake, alert and fully  oriented. No focal neurologic deficit   Gastrointestinal: Abdomen is soft, non-distended. No palpable organomegally  Extremities: No pedal edema   Skin: intact    Result Review    Result Review:  I have personally reviewed the results from the time of this admission to 4/25/2025 08:33 EDT and agree with these findings:  [x]  Laboratory  [x]  Microbiology  []  Radiology  []  EKG/Telemetry   []  Cardiology/Vascular   []  Pathology  []  Old records  []  Other:      Assessment & Plan   Assessment / Plan     Active Hospital Problems:  Active Hospital Problems    Diagnosis     **Moderate mitral regurgitation     S/P mitral valve clip implantation          Impression:    Severe mitral regurgitation status post MitraClip  Chronic respiratory failure on home O2  COPD  Diabetes mellitus type 2  Persistent A-fib on anticoagulation  Complete heart block status post pacemaker placement  Gastroesophageal flux disease  Acute on chronic systolic heart failure  Obstructive sleep apnea    Plan:    Continue PT.  Incentive spirometry  On Symbicort 2 puff-9 twice daily  Metoprolol XL 50 mg p.o. daily  Entresto 1 tab p.o. every 12 hourly  Spironolactone 12.5 mg p.o. daily  Protonix EC 40 mg p.o. daily  Monitor blood pressure closely with parameters  Accu-Chek with sliding scale insulin coverage     Diet: Carbohydrate controlled diet  DVT prophylaxis: Resume anticoagulation when okay by thoracic surgery      CODE STATUS:   Code Status (Patient has no pulse and is not breathing): CPR (Attempt to Resuscitate)  Medical Interventions (Patient has pulse or is breathing): Full Support          The patient is critically ill in the ICU. Multidisciplinary bedside critical care rounds were performed with nursing staff, respiratory therapy, pharmacy, nutritional services, social work. I have personally reviewed the chart, labs and any pertinent imaging available.  I have spent 33 minutes of critical care time, excluding procedures, in the care of  this patient.

## 2025-04-25 NOTE — DISCHARGE SUMMARY
Date of Discharge:  4/25/2025  Date of Admit: 4/23/2025    Kemi Hdz MD      Discharge Diagnosis:  Moderate mitral regurgitation    S/P mitral valve clip implantation    Hospital Course:   Patient is an 85-year-old history of mitral regurgitation who presented on 4/23 as an outpatient for MitraClip placement.  Patient underwent MitraClip placement X2 with NT W clips.  In the postop phase patient was hypotensive and required Byron-Synephrine and then the initiation of midodrine.  Patient also had significant bruising to her left groin with no significant hematoma.  On day of discharge patient had bruising with no palpable hematoma and no pain.  Stitch was left in and patient will return on Wednesday to have it removed in our office.  At time of discharge patient will be sent home on midodrine 10 mg 3 times daily for her continued hypotension and will not be discharged home on all guideline directed medical therapy given her  hypotension.  When she returns for suture removal may need to adjust medications if patient's blood pressure has improved on midodrine.    Procedures Performed  Procedure(s):  Transcatheter Mitral Valve Repair         Preoperative diagnosis: Severe mitral regurgitation    Postoperative diagnosis: Severe mitral regurgitation     Procedure: MitraClip placement x 2 using an NT W with a second NTW between the A2-P2 segments of the mitral valve     Indication: Symptomatic severe mitral regurgitation in a patient who is felt to be very high risk for surgery following cardiology and surgical evaluation.     Operators:  Interventional cardiologist: Lise with MD  Surgeon:         Ortega Dimas MD  Echocardiographer:    Elyssa Newsome MD         Anesthesia: Eddie Boothe CRNA     Procedure: Informed consent obtained.  The patient was brought to the catheterization lab where they were intubated by anesthesia.  Ancef was given on call to the lab as well as 8 mg of Decadron.  The patient was  then prepped and draped in the usual sterile fashion over both femoral veins.     Transesophageal echocardiographic images of the mitral valve and the remaining cardiac structures were then obtained.     Using the modified Seldinger technique access was obtained in the right femoral vein and a 6 Macedonian hemostasis sheath was placed.  3000 units of intravenous heparin was given.  Transseptal puncture was then performed under transesophageal guidance a little more than 4 cm above the mitral valve.  Full heparinization was then performed.  The ACT remained greater than 250 during the entire procedure.  The FlameStower wire was then placed in the left atrium.  The atrial septum was then dilated using a 20 Macedonian long dilator.     The MitraClip guide was then placed across the interatrial septum under fluoroscopic and echocardiographic guidance.  The wire and dilator were removed.  The NT W MitraClip was then placed through the guide and positioned appropriately above the jet between A2-P2.  The clip was then placed below the leaflets and the leaflets were grasp successfully.  The clip was then closed to 60° with a good reduction in mitral regurgitation and then closed to 20° at which time only mild to moderate mitral regurgitation remained.  The mean gradient across the mitral valve was 3.  A tissue bridge was documented by echocardiography.  Immediately following release of the clip the clip appeared stable.  The clip and began to rock and it was apparent that the posterior leaflet had dislodged.  The clip appeared to be well attached to the anterior leaflet.     A second NT W clip was then placed lateral to the first clip.  Posterior leaflet tissue was very limited however a good grasp was finally obtained.  Again a tissue bridge was documented.  The mean mitral valve gradient was 4 and 5 mmHg.  The second clip was released and appeared stable.  It also appeared to stabilize the first clip.  Moderate mitral regurgitation  "was noted.        No pericardial effusion was present at the conclusion of the case.    LV systolic function was remained unchanged throughout the case.     The venous access access site was closed using 0 silk figure-of-eight suture.    Estimated blood loss less than 25 mL             Consults       No orders found from 3/25/2025 to 4/24/2025.            Pertinent Test Results:     BMP          4/23/2025    10:32 4/24/2025    03:14 4/24/2025    15:54 4/25/2025    04:07   BMP   BUN  19   18    Creatinine  0.87   0.76    Sodium  141   141    Potassium 3.7  3.5  4.1  4.7    Chloride  103   108    CO2  26.0   26.0    Calcium  8.0   8.5           Discharge Physical Exam:  BP 99/53 (BP Location: Left arm, Patient Position: Sitting)   Pulse 76   Temp 97.5 °F (36.4 °C) (Axillary)   Resp 18   Ht 160 cm (62.99\")   Wt 65 kg (143 lb 4.8 oz)   SpO2 90%   BMI 25.39 kg/m²     Constitutional:       Appearance: Not in distress.   Pulmonary:      Comments: Mildly diminished bases otherwise clear  Cardiovascular:      Normal rate. Intermittent V paced   Edema:     Peripheral edema absent.   Skin:     General: Skin is warm and dry.      Comments: Bruising noted to left groin extending over pelvic region.  No palpable hematoma.   Neurological:      Mental Status: Alert.           Discharge Medications     Discharge Medications        New Medications        Instructions Start Date   metoprolol tartrate 25 MG tablet  Commonly known as: LOPRESSOR   12.5 mg, Oral, Every 12 Hours Scheduled      midodrine 10 MG tablet  Commonly known as: PROAMATINE   10 mg, Oral, 3 Times Daily Before Meals             Continue These Medications        Instructions Start Date   albuterol sulfate  (90 Base) MCG/ACT inhaler  Commonly known as: PROVENTIL HFA;VENTOLIN HFA;PROAIR HFA   2 puffs, Inhalation, Every 4 Hours PRN      budesonide-formoterol 160-4.5 MCG/ACT inhaler  Commonly known as: SYMBICORT   2 puffs, Inhalation, 2 Times Daily - RT, " Rinse mouth with water after use      cholecalciferol 25 MCG (1000 UT) tablet  Commonly known as: VITAMIN D3   1,000 Units, Daily      citalopram 20 MG tablet  Commonly known as: CeleXA   20 mg, Daily      empagliflozin 10 MG tablet tablet  Commonly known as: Jardiance   10 mg, Oral, Daily      famotidine 40 MG tablet  Commonly known as: PEPCID   40 mg, Oral, Nightly      O2  Commonly known as: OXYGEN   3-4 L/min, Daily PRN      pantoprazole 40 MG EC tablet  Commonly known as: PROTONIX   1 tablet, Every Morning      spironolactone 25 MG tablet  Commonly known as: ALDACTONE   12.5 mg, Oral, Daily      vitamin B-12 100 MCG tablet  Commonly known as: CYANOCOBALAMIN   100 mcg, Daily      Xarelto 20 MG tablet  Generic drug: rivaroxaban   20 mg, Oral, Daily, *Resume on 3/27/25*             Stop These Medications      furosemide 20 MG tablet  Commonly known as: Lasix     metoprolol succinate XL 50 MG 24 hr tablet  Commonly known as: TOPROL-XL     sacubitril-valsartan 24-26 MG tablet  Commonly known as: ENTRESTO              Discharge Diet: Cardiac     Activity at Discharge: up as tolerated    Discharge disposition: home    Condition on Discharge: stable    Follow-up Appointments  Future Appointments   Date Time Provider Department Center   4/30/2025 11:30 AM Jeannie Zabala MD [facility]   10/7/2025 10:00 AM Brittney Bautista MD [facility]   12/1/2025  1:30 PM Keven Willis DO [facility]   1/19/2026  3:45 PM Fidencio Germain MD [facility]     Patient will return to the office for suture removal 4/30.    Test Results Pending at Discharge  Pending Labs       Order Current Status    Type & Screen In process             Christiane Valenzuela PA-C  04/25/25  13:05 EDT    35 min spent in reviewing records, discussion and examination of the patient and discussion with other members of the patient's medical team.     Dictated Utilizing Dragon Dictation

## 2025-04-25 NOTE — DISCHARGE INSTRUCTIONS
We have adjusted your medications at discharge due to your low blood pressure.  You will remain on midodrine 10 mg 3 times a day, this keeps your blood pressure elevated.  Please check your blood pressure at home before taking each dosage.  If you are systolic blood pressure, top number, is 120 or above do not take the midodrine.  We have also adjusted your dose of metoprolol to a short acting formulation because of your low blood pressure.  You will take half a tablet twice a day.  We may adjust your medications when you come in for your stitch removal on Wednesday.

## 2025-04-26 NOTE — OUTREACH NOTE
Prep Survey      Flowsheet Row Responses   [facility] facility patient discharged from? [facility]   Is LACE score < 7 ? No   Eligibility Readm Mgmt   Discharge diagnosis Mitral regurgitation s/p mitraclip insertion   Does the patient have one of the following disease processes/diagnoses(primary or secondary)? Cardiothoracic surgery   Does the patient have Home health ordered? No   Is there a DME ordered? No   Prep survey completed? Yes            RANDI A - Registered Nurse

## 2025-04-29 ENCOUNTER — READMISSION MANAGEMENT (OUTPATIENT)
Dept: CALL CENTER | Facility: HOSPITAL | Age: 85
End: 2025-04-29
Payer: MEDICARE

## 2025-04-29 LAB
QT INTERVAL: 462 MS
QTC INTERVAL: 472 MS

## 2025-04-29 NOTE — OUTREACH NOTE
CT Surgery Week 1 Survey      Flowsheet Row Responses   [facility] patient discharged from? [facility]   Does the patient have one of the following disease processes/diagnoses(primary or secondary)? Cardiothoracic surgery   Week 1 attempt successful? Yes   Call start time 1016   Call end time 1031   Discharge diagnosis Mitral regurgitation s/p mitraclip insertion   Person spoke with today (if not patient) and relationship Patient   Meds reviewed with patient/caregiver? Yes   Does the patient have all medications related to this admission filled (includes all antibiotics, pain medications, cardiac medications, etc.) Yes   Is the patient taking all medications as directed (includes completed medication regime)? Yes   Does the patient have a primary care provider?  Yes   Does the patient have an appointment scheduled with their C/T surgeon? Yes  [DR Zabala 4/30, Dr Willis 12/1]   Comments regarding PCP Follow up with Kemi Hdz PCP   Has the patient kept scheduled appointments due by today? Yes   Has home health visited the patient within 72 hours of discharge? N/A   Psychosocial issues? No   Did the patient receive a copy of their discharge instructions? Yes   Nursing interventions Reviewed instructions with patient   What is the patient's perception of their health status since discharge? Improving   Is the patient/caregiver able to teach back normal signs of recovery? Constipation   Nursing interventions Reassured on normal signs of recovery   Is the patient/caregiver able to teach back signs and symptoms of incisional infection? Increased redness, swelling or pain at the incisonal site, Increased drainage or bleeding  [groin site bruised]   Is the patient/caregiver able to teach back steps to recovery at home? Set small, achievable goals for return to baseline health, Rest and rebuild strength, gradually increase activity, Eat a well-balance diet   If the patient is a current smoker, are they  able to teach back resources for cessation? Not a smoker   Is the patient/caregiver able to teach back the hierarchy of who to call/visit for symptoms/problems? PCP, Specialist, Home health nurse, Urgent Care, ED, 911 Yes   Week 1 call completed? Yes   Is the patient interested in additional calls from an ambulatory ? No   Would this patient benefit from a Referral to Amb Social Work? No   Call end time 1031              JUAN GAGNON - Registered Nurse

## 2025-04-30 ENCOUNTER — OFFICE VISIT (OUTPATIENT)
Dept: CARDIOLOGY | Facility: CLINIC | Age: 85
End: 2025-04-30
Payer: MEDICARE

## 2025-04-30 VITALS
SYSTOLIC BLOOD PRESSURE: 126 MMHG | HEIGHT: 63 IN | DIASTOLIC BLOOD PRESSURE: 64 MMHG | HEART RATE: 62 BPM | BODY MASS INDEX: 26.58 KG/M2 | OXYGEN SATURATION: 93 % | WEIGHT: 150 LBS

## 2025-04-30 DIAGNOSIS — I48.20 CHRONIC ATRIAL FIBRILLATION: ICD-10-CM

## 2025-04-30 DIAGNOSIS — J44.9 CHRONIC OBSTRUCTIVE PULMONARY DISEASE, UNSPECIFIED COPD TYPE: ICD-10-CM

## 2025-04-30 DIAGNOSIS — I34.0 SEVERE MITRAL REGURGITATION: Primary | ICD-10-CM

## 2025-04-30 PROCEDURE — 1160F RVW MEDS BY RX/DR IN RCRD: CPT | Performed by: INTERNAL MEDICINE

## 2025-04-30 PROCEDURE — 1159F MED LIST DOCD IN RCRD: CPT | Performed by: INTERNAL MEDICINE

## 2025-04-30 PROCEDURE — 3078F DIAST BP <80 MM HG: CPT | Performed by: INTERNAL MEDICINE

## 2025-04-30 PROCEDURE — 99024 POSTOP FOLLOW-UP VISIT: CPT | Performed by: INTERNAL MEDICINE

## 2025-04-30 PROCEDURE — 3074F SYST BP LT 130 MM HG: CPT | Performed by: INTERNAL MEDICINE

## 2025-04-30 NOTE — PROGRESS NOTES
[facility] Cardiology    Patient ID: Elsa Alcaraz is a 85 y.o. female.  : 1940   Contact: [phone]    Encounter date: 2025    PCP: Kemi Hdz MD      Chief complaint:   Chief Complaint   Patient presents with    Wound Check    Severe mitral regurgitation       Problem List:  Mitral regurgitation  Echo 10/10/2022: EF 40%, moderate MR, RVSP 23  Echo 2023: EF 36-40%, moderate to severe MR, mild TR with RVSP 36  Echo 10/7/2024: EF 36-40%, severe MR mild to moderate TR, RVSP 61.  Echo 2025: EF 35%, LV systolic function moderately decreased. LAC moderately to severe dilated. Mitral annulus dilated. Poor central leaflet coaptation. MV chordal apparatus calcified. Posterior leaflet appears tethered. Posterior leaflet measures 7 and 8 mm in length. Severe MR.   [facility] 2025: Moderate MR. Chronic systolic HF. Moderate TR.  [facility]BECA 2025:  [facility] 2025: Moderate MR.  Status post MitraClip x 2 2025.  SLDA first clip.  Echo 2025: EF 25-30%. LVC is borderline dilated. LV is consistent with mild concentric hypertrophy. Mild aortic valve stenosis present. Aortic valve area is 1.1 cm2. Aortic valve mean pressure is 11 mmHg. Two clips are seen. Moderate MR present. MV mean gradient 5 mmHg. Mild TR. RV systolic pressure from TR is 27 mmHg.  Chronic systolic heart failure  Normal EF , reduced EF 45% in  and 35 to 40%   Longstanding persistent atrial fibrillation  [id number] = 5, on Xarelto  Complete heart block  S/p Medtronic Micra 2023  Hypertension  Hyperlipidemia  COPD  Severe obstruction with mild restriction by most recent PFTs.  DAVID nonadherent to CPAP    Allergies   Allergen Reactions    Penicillins Itching    Other GI Intolerance     Spicy foods         Current Medications:    Current Outpatient Medications:     albuterol sulfate  (90 Base) MCG/ACT inhaler, Inhale 2 puffs Every 4 (Four) Hours As Needed for  Wheezing., Disp: 18 g, Rfl: 5    budesonide-formoterol (SYMBICORT) 160-4.5 MCG/ACT inhaler, Inhale 2 puffs 2 (Two) Times a Day. Rinse mouth with water after use, Disp: 10.2 g, Rfl: 5    cholecalciferol (VITAMIN D3) 25 MCG (1000 UT) tablet, Take 1 tablet by mouth Daily., Disp: , Rfl:     citalopram (CeleXA) 20 MG tablet, Take 1 tablet by mouth Daily., Disp: , Rfl:     empagliflozin (Jardiance) 10 MG tablet tablet, Take 1 tablet by mouth Daily., Disp: 90 tablet, Rfl: 3    famotidine (PEPCID) 40 MG tablet, TAKE 1 TABLET BY MOUTH ONCE DAILY AT NIGHT, Disp: 90 tablet, Rfl: 3    metoprolol tartrate (LOPRESSOR) 25 MG tablet, Take 0.5 (one-half) tablet by mouth Every 12 (Twelve) Hours., Disp: 30 tablet, Rfl: 0    midodrine (PROAMATINE) 10 MG tablet, Take 1 tablet by mouth 3 (Three) Times a Day Before Meals., Disp: 90 tablet, Rfl: 0    O2 (OXYGEN), Inhale 3-4 L/min Daily As Needed., Disp: , Rfl:     pantoprazole (PROTONIX) 40 MG EC tablet, Take 1 tablet by mouth Every Morning., Disp: , Rfl:     spironolactone (ALDACTONE) 25 MG tablet, Take 0.5 tablets by mouth Daily., Disp: 15 tablet, Rfl: 0    vitamin B-12 (CYANOCOBALAMIN) 100 MCG tablet, Take 1 tablet by mouth Daily., Disp: , Rfl:     Xarelto 20 MG tablet, Take 1 tablet by mouth Daily. *Resume on 3/27/25*, Disp: 90 tablet, Rfl: 3    HPI    Elsa Alcaraz is a 85 y.o. female who presents today for a follow up of severe mitral regurgitation, Afib, and COPD. Since last visit, Elsa is felt like she was a little volume overloaded.  She is actually here just for stitch removal in her right groin.  She has been on spironolactone and is taking Lasix as needed.  She also has questions about her metoprolol and her midodrine.   She was taking her metoprolol at 12.5 mg 3 times a day.  We discussed that it may be easier to take 25 mg twice a day.  Her blood pressure has been stable on midodrine.  She is no longer on Entresto.  She is using 20 mg of Lasix as needed.  She states that she  "gained 10 pounds in the hospital.      The following portions of the patient's history were reviewed and updated as appropriate: allergies, current medications and problem list.    Pertinent positives as listed in the HPI.  All other systems reviewed are negative.         Vitals:    04/30/25 1132   BP: 126/64   BP Location: Left arm   Patient Position: Sitting   Pulse: 62   SpO2: 93%   Weight: 68 kg (150 lb)   Height: 160 cm (63\")       Physical Exam:  General: Alert and oriented.  The right groin stitch was removed.  No evidence of erythema or hematoma.  Bruising was noted.    Diagnostic Data (reviewed with patient):  Lab Results   Component Value Date    GLUCOSE 110 (H) 04/25/2025    BUN 18 04/25/2025    CREATININE 0.76 04/25/2025    BCR 23.7 04/25/2025     04/25/2025    K 4.7 04/25/2025     (H) 04/25/2025    CO2 26.0 04/25/2025    CALCIUM 8.5 (L) 04/25/2025    ALBUMIN 4.2 02/08/2025    ALKPHOS 96 02/08/2025    AST 19 02/08/2025    ALT 9 02/08/2025     Lab Results   Component Value Date    WBC 16.83 (H) 04/25/2025    RBC 3.58 (L) 04/25/2025    HGB 9.7 (L) 04/25/2025    HCT 33.1 (L) 04/25/2025    MCV 92.5 04/25/2025     04/25/2025      Lab Results   Component Value Date    TSH 3.660 04/29/2024        Procedures             Assessment:    ICD-10-CM ICD-9-CM   1. Severe mitral regurgitation  I34.0 424.0   2. Chronic atrial fibrillation  I48.20 427.31   3. Chronic obstructive pulmonary disease, unspecified COPD type  J44.9 496         Plan:  Use Lasix 20 mg as needed for weight gain of greater than 2 pounds on any day.  Daily weights are recommended.  Change metoprolol to 25 mg twice daily  Continue to take midodrine at least twice daily.  3 times daily is acceptable as well.  Continue all other current medications.  F/up in 6 weeks with Dr. Jessa Zabala MD, FACC                "

## 2025-05-07 ENCOUNTER — APPOINTMENT (OUTPATIENT)
Dept: CT IMAGING | Facility: HOSPITAL | Age: 85
End: 2025-05-07
Payer: MEDICARE

## 2025-05-07 ENCOUNTER — APPOINTMENT (OUTPATIENT)
Dept: GENERAL RADIOLOGY | Facility: HOSPITAL | Age: 85
End: 2025-05-07
Payer: MEDICARE

## 2025-05-07 ENCOUNTER — HOSPITAL ENCOUNTER (EMERGENCY)
Facility: HOSPITAL | Age: 85
Discharge: HOME | End: 2025-05-07
Attending: STUDENT IN AN ORGANIZED HEALTH CARE EDUCATION/TRAINING PROGRAM | Admitting: STUDENT IN AN ORGANIZED HEALTH CARE EDUCATION/TRAINING PROGRAM
Payer: MEDICARE

## 2025-05-07 VITALS
OXYGEN SATURATION: 97 % | DIASTOLIC BLOOD PRESSURE: 89 MMHG | HEIGHT: 63 IN | HEART RATE: 89 BPM | WEIGHT: 149.91 LBS | RESPIRATION RATE: 18 BRPM | BODY MASS INDEX: 26.56 KG/M2 | TEMPERATURE: 97.8 F | SYSTOLIC BLOOD PRESSURE: 105 MMHG

## 2025-05-07 DIAGNOSIS — I50.9 ACUTE ON CHRONIC CONGESTIVE HEART FAILURE, UNSPECIFIED HEART FAILURE TYPE: Primary | ICD-10-CM

## 2025-05-07 LAB
ALBUMIN SERPL-MCNC: 3.7 G/DL (ref 3.5–5.2)
ALBUMIN/GLOB SERPL: 1.7 G/DL
ALP SERPL-CCNC: 76 U/L (ref 39–117)
ALT SERPL W P-5'-P-CCNC: 85 U/L (ref 1–33)
ANION GAP SERPL CALCULATED.3IONS-SCNC: 13.5 MMOL/L (ref 5–15)
AST SERPL-CCNC: 78 U/L (ref 1–32)
BASOPHILS # BLD AUTO: 0.05 10*3/MM3 (ref 0–0.2)
BASOPHILS NFR BLD AUTO: 0.3 % (ref 0–1.5)
BILIRUB SERPL-MCNC: 1.6 MG/DL (ref 0–1.2)
BUN SERPL-MCNC: 48 MG/DL (ref 8–23)
BUN/CREAT SERPL: 36.6 (ref 7–25)
CALCIUM SPEC-SCNC: 8.4 MG/DL (ref 8.6–10.5)
CHLORIDE SERPL-SCNC: 99 MMOL/L (ref 98–107)
CO2 SERPL-SCNC: 25.5 MMOL/L (ref 22–29)
CREAT SERPL-MCNC: 1.31 MG/DL (ref 0.57–1)
CRP SERPL-MCNC: <0.3 MG/DL (ref 0–0.5)
DEPRECATED RDW RBC AUTO: 57.1 FL (ref 37–54)
EGFRCR SERPLBLD CKD-EPI 2021: 40.3 ML/MIN/1.73
EOSINOPHIL # BLD AUTO: 0.03 10*3/MM3 (ref 0–0.4)
EOSINOPHIL NFR BLD AUTO: 0.2 % (ref 0.3–6.2)
ERYTHROCYTE [DISTWIDTH] IN BLOOD BY AUTOMATED COUNT: 17.1 % (ref 12.3–15.4)
FLUAV RNA RESP QL NAA+PROBE: NOT DETECTED
FLUBV RNA RESP QL NAA+PROBE: NOT DETECTED
GEN 5 1HR TROPONIN T REFLEX: 44 NG/L
GLOBULIN UR ELPH-MCNC: 2.2 GM/DL
GLUCOSE SERPL-MCNC: 111 MG/DL (ref 65–99)
HCT VFR BLD AUTO: 36 % (ref 34–46.6)
HGB BLD-MCNC: 10.7 G/DL (ref 12–15.9)
IMM GRANULOCYTES # BLD AUTO: 0.21 10*3/MM3 (ref 0–0.05)
IMM GRANULOCYTES NFR BLD AUTO: 1.3 % (ref 0–0.5)
LYMPHOCYTES # BLD AUTO: 0.78 10*3/MM3 (ref 0.7–3.1)
LYMPHOCYTES NFR BLD AUTO: 4.8 % (ref 19.6–45.3)
MAGNESIUM SERPL-MCNC: 2.3 MG/DL (ref 1.6–2.4)
MCH RBC QN AUTO: 28.1 PG (ref 26.6–33)
MCHC RBC AUTO-ENTMCNC: 29.7 G/DL (ref 31.5–35.7)
MCV RBC AUTO: 94.5 FL (ref 79–97)
MONOCYTES # BLD AUTO: 1.89 10*3/MM3 (ref 0.1–0.9)
MONOCYTES NFR BLD AUTO: 11.5 % (ref 5–12)
NEUTROPHILS NFR BLD AUTO: 13.42 10*3/MM3 (ref 1.7–7)
NEUTROPHILS NFR BLD AUTO: 81.9 % (ref 42.7–76)
NRBC BLD AUTO-RTO: 0.9 /100 WBC (ref 0–0.2)
NT-PROBNP SERPL-MCNC: ABNORMAL PG/ML (ref 0–1800)
PLATELET # BLD AUTO: 267 10*3/MM3 (ref 140–450)
PMV BLD AUTO: 9.8 FL (ref 6–12)
POTASSIUM SERPL-SCNC: 3.9 MMOL/L (ref 3.5–5.2)
PROCALCITONIN SERPL-MCNC: 0.11 NG/ML (ref 0–0.25)
PROT SERPL-MCNC: 5.9 G/DL (ref 6–8.5)
RBC # BLD AUTO: 3.81 10*6/MM3 (ref 3.77–5.28)
SARS-COV-2 RNA RESP QL NAA+PROBE: NOT DETECTED
SODIUM SERPL-SCNC: 138 MMOL/L (ref 136–145)
TROPONIN T % DELTA: -4
TROPONIN T NUMERIC DELTA: -2 NG/L
TROPONIN T SERPL HS-MCNC: 46 NG/L
WBC NRBC COR # BLD AUTO: 16.38 10*3/MM3 (ref 3.4–10.8)

## 2025-05-07 PROCEDURE — 83735 ASSAY OF MAGNESIUM: CPT | Performed by: STUDENT IN AN ORGANIZED HEALTH CARE EDUCATION/TRAINING PROGRAM

## 2025-05-07 PROCEDURE — 96374 THER/PROPH/DIAG INJ IV PUSH: CPT

## 2025-05-07 PROCEDURE — 71045 X-RAY EXAM CHEST 1 VIEW: CPT

## 2025-05-07 PROCEDURE — 87636 SARSCOV2 & INF A&B AMP PRB: CPT | Performed by: STUDENT IN AN ORGANIZED HEALTH CARE EDUCATION/TRAINING PROGRAM

## 2025-05-07 PROCEDURE — 36415 COLL VENOUS BLD VENIPUNCTURE: CPT

## 2025-05-07 PROCEDURE — 99285 EMERGENCY DEPT VISIT HI MDM: CPT | Performed by: STUDENT IN AN ORGANIZED HEALTH CARE EDUCATION/TRAINING PROGRAM

## 2025-05-07 PROCEDURE — 25510000001 IOPAMIDOL 61 % SOLUTION: Performed by: STUDENT IN AN ORGANIZED HEALTH CARE EDUCATION/TRAINING PROGRAM

## 2025-05-07 PROCEDURE — 83880 ASSAY OF NATRIURETIC PEPTIDE: CPT | Performed by: STUDENT IN AN ORGANIZED HEALTH CARE EDUCATION/TRAINING PROGRAM

## 2025-05-07 PROCEDURE — 84484 ASSAY OF TROPONIN QUANT: CPT | Performed by: STUDENT IN AN ORGANIZED HEALTH CARE EDUCATION/TRAINING PROGRAM

## 2025-05-07 PROCEDURE — 71275 CT ANGIOGRAPHY CHEST: CPT

## 2025-05-07 PROCEDURE — 93005 ELECTROCARDIOGRAM TRACING: CPT | Performed by: STUDENT IN AN ORGANIZED HEALTH CARE EDUCATION/TRAINING PROGRAM

## 2025-05-07 PROCEDURE — 25010000002 FUROSEMIDE PER 20 MG: Performed by: STUDENT IN AN ORGANIZED HEALTH CARE EDUCATION/TRAINING PROGRAM

## 2025-05-07 PROCEDURE — 74177 CT ABD & PELVIS W/CONTRAST: CPT

## 2025-05-07 PROCEDURE — 86140 C-REACTIVE PROTEIN: CPT | Performed by: STUDENT IN AN ORGANIZED HEALTH CARE EDUCATION/TRAINING PROGRAM

## 2025-05-07 PROCEDURE — 73706 CT ANGIO LWR EXTR W/O&W/DYE: CPT

## 2025-05-07 PROCEDURE — 80053 COMPREHEN METABOLIC PANEL: CPT | Performed by: STUDENT IN AN ORGANIZED HEALTH CARE EDUCATION/TRAINING PROGRAM

## 2025-05-07 PROCEDURE — 85025 COMPLETE CBC W/AUTO DIFF WBC: CPT | Performed by: STUDENT IN AN ORGANIZED HEALTH CARE EDUCATION/TRAINING PROGRAM

## 2025-05-07 PROCEDURE — 84145 PROCALCITONIN (PCT): CPT | Performed by: STUDENT IN AN ORGANIZED HEALTH CARE EDUCATION/TRAINING PROGRAM

## 2025-05-07 RX ORDER — IOPAMIDOL 612 MG/ML
100 INJECTION, SOLUTION INTRAVASCULAR
Status: COMPLETED | OUTPATIENT
Start: 2025-05-07 | End: 2025-05-07

## 2025-05-07 RX ORDER — FUROSEMIDE 10 MG/ML
40 INJECTION INTRAMUSCULAR; INTRAVENOUS ONCE
Status: COMPLETED | OUTPATIENT
Start: 2025-05-07 | End: 2025-05-07

## 2025-05-07 RX ORDER — FUROSEMIDE 20 MG/1
40 TABLET ORAL DAILY
Qty: 6 TABLET | Refills: 0 | Status: SHIPPED | OUTPATIENT
Start: 2025-05-07 | End: 2025-05-12

## 2025-05-07 RX ORDER — IOPAMIDOL 612 MG/ML
50 INJECTION, SOLUTION INTRAVASCULAR
Status: COMPLETED | OUTPATIENT
Start: 2025-05-07 | End: 2025-05-07

## 2025-05-07 RX ADMIN — IOPAMIDOL 100 ML: 612 INJECTION, SOLUTION INTRAVENOUS at 16:48

## 2025-05-07 RX ADMIN — IOPAMIDOL 50 ML: 612 INJECTION, SOLUTION INTRAVENOUS at 16:48

## 2025-05-07 RX ADMIN — FUROSEMIDE 40 MG: 10 INJECTION, SOLUTION INTRAMUSCULAR; INTRAVENOUS at 18:34

## 2025-05-07 NOTE — DISCHARGE INSTRUCTIONS
You were evaluated for shortness of breath and bruising after recent procedure.  We got lab work and a CT scan of your chest and abdomen that showed concerns for fluid overload but no concerns for any active bleeding.  You are now stable for discharge.  We have given you an IV diuretic in the emergency department for this increased fluid and believe this is likely from your heart failure.  We recommend calling your cardiologist and following closely with them for further evaluation.  We have also placed a referral for you to follow-up in our heart failure clinic to ensure that you are improving appropriately.  We have ordered 3 days worth of diuretic in the outpatient setting and recommend you take this as well.  If you begin to have low blood pressures, chest pain or worsening shortness of breath please come back to the emergency department for further evaluation.  You are now stable for discharge.

## 2025-05-07 NOTE — Clinical Note
[facility] EMERGENCY DEPARTMENT  [address] [address]  Phone: [phone]    Elsa Alcaraz was seen and treated in our emergency department on 5/7/2025.  She may return to work on 05/10/2025.         Thank you for choosing [facility].    Ricky Matthew MD

## 2025-05-07 NOTE — ED PROVIDER NOTES
Subjective:  History of Present Illness:    Patient is a 85-year-old female with history of breast cancer, atrial fibrillation on Eliquis, diabetes, colon cancer, COPD, hyperlipidemia, hypertension, sleep apnea who presents today with increasing shortness of breath and ecchymosis across her abdomen.  Reports increasing shortness of breath since recent cardiac catheterization 2 weeks ago.  Reports that she had a significant amount of swelling from her femoral access site and has significant ecchymosis to the area.  Was concern for possible vascular injury now presents to our emergency department.  She denies any chest pain.  No fevers.  Denies any abdominal pain nausea vomiting or diarrhea.  No new leg swelling or leg pain.  Patient stable on her baseline O2.      Nurses Notes reviewed and agree, including vitals, allergies, social history and prior medical history.     REVIEW OF SYSTEMS: All systems reviewed and not pertinent unless noted.  Review of Systems   Constitutional:  Positive for activity change and fatigue. Negative for appetite change, chills and fever.   HENT:  Negative for congestion, rhinorrhea, sinus pressure and sinus pain.    Eyes:  Negative for discharge and itching.   Respiratory:  Positive for cough and shortness of breath.    Cardiovascular:  Positive for chest pain. Negative for leg swelling.   Gastrointestinal:  Positive for abdominal pain. Negative for abdominal distention, diarrhea, nausea and vomiting.   Endocrine: Negative for cold intolerance and heat intolerance.   Genitourinary:  Negative for decreased urine volume, difficulty urinating, flank pain, frequency, urgency, vaginal bleeding, vaginal discharge and vaginal pain.   Musculoskeletal:  Negative for gait problem, neck pain and neck stiffness.   Skin:  Positive for color change and wound.   Allergic/Immunologic: Negative for environmental allergies.   Neurological:  Negative for seizures, syncope, facial asymmetry and speech  "difficulty.   Psychiatric/Behavioral:  Negative for self-injury and suicidal ideas.        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/11/2017    Arthritis     Atrial fibrillation 01/11/2017    Body piercing     BOTH EARS    Borderline diabetes     Breast cancer 2003    right-radiation and a lumpectomy    Broken tooth     Cataract 01/11/2017    Left eye surgery 11/19    Chronic bronchitis 01/11/2017    Colon cancer 11/30/1998    had surgery and chemo    COPD (chronic obstructive pulmonary disease)     Cyanocobalamine deficiency (non anemic)     Depression 01/11/2017    Diverticulosis     Elevated cholesterol     Esophageal reflux 01/11/2017    Hiatal hernia 01/11/2017    History of bladder infections     History of echocardiogram 07/26/2021    Hx of exercise stress test     \"several years ago by Dr. Mercado\".      Hx of radiation therapy     Hyperlipidemia     Hypertension 01/11/2017    Impaired functional mobility and activity tolerance     Impaired functional mobility, balance, gait, and endurance     Osteoporosis     Palpitations 01/11/2017    Prediabetes     Problems with swallowing     meat at times per pt report    Shortness of breath     WITH EXERTION     Sleep apnea 01/11/2017    NO CPAP    Tricuspid valve disorder 01/11/2017    Patient doesn't know anything about this    Vitamin D deficiency     Wears glasses        Allergies:    Penicillins and Other      Past Surgical History:   Procedure Laterality Date    ANAL FISTULOTOMY      APPENDECTOMY      1998    BREAST BIOPSY      BREAST LUMPECTOMY Right 03/06/2006    CARDIAC CATHETERIZATION N/A 03/24/2025    Procedure: Left Heart Cath - Femoral access;  Surgeon: Jeannie Zabala MD;  Location: [facility] INVASIVE LOCATION;  Service: Cardiovascular;  Laterality: N/A;    CARDIAC ELECTROPHYSIOLOGY PROCEDURE N/A 07/05/2023    Procedure: Micra;  " Surgeon: Keven Willis DO;  Location: [facility] EP INVASIVE LOCATION;  Service: Cardiology;  Laterality: N/A;    CATARACT EXTRACTION      both eyes     CATARACT EXTRACTION W/ INTRAOCULAR LENS IMPLANT Left 2019    Procedure: CATARACT PHACO EXTRACTION WITH INTRAOCULAR LENS IMPLANT LEFT;  Surgeon: Masoud David MD;  Location: [facility] OR;  Service: Ophthalmology    CATARACT EXTRACTION W/ INTRAOCULAR LENS IMPLANT Right 2019    Procedure: CATARACT PHACO EXTRACTION WITH INTRAOCULAR LENS IMPLANT RIGHT;  Surgeon: Masoud David MD;  Location: [facility] OR;  Service: Ophthalmology     SECTION  1981    CHOLECYSTECTOMY  2009    COLECTOMY PARTIAL / TOTAL  1998    COLON CANCER    COLONOSCOPY      COLONOSCOPY N/A 2021    Procedure: COLONOSCOPY WITH BIOPSY;  Surgeon: Iona Sanders MD;  Location: [facility] ENDOSCOPY;  Service: Gastroenterology;  Laterality: N/A;    ENDOSCOPY      ENDOSCOPY N/A 2018    Procedure: ESOPHAGOGASTRODUODENOSCOPY WITH COLD FORCEP BIOPSY;  Surgeon: Vasquez Fagan MD;  Location: [facility] ENDOSCOPY;  Service: Gastroenterology    ENDOSCOPY N/A 2019    Procedure: ESOPHAGOGASTRODUODENOSCOPY WITH BIOPSY;  Surgeon: Vasquez Fagan MD;  Location: [facility] ENDOSCOPY;  Service: Gastroenterology    ENDOSCOPY N/A 2022    Procedure: ESOPHAGOGASTRODUODENOSCOPY with biopsy and polypectomy  ;  Surgeon: Iona Sanders MD;  Location: [facility] ENDOSCOPY;  Service: Gastroenterology;  Laterality: N/A;    ENDOSCOPY N/A 2024    Procedure: ESOPHAGOGASTRODUODENOSCOPY WITH BIOPSY and dilatation;  Surgeon: Iona Sanders MD;  Location: [facility] ENDOSCOPY;  Service: Gastroenterology;  Laterality: N/A;    HYSTERECTOMY      1998    INSERT / REPLACE / REMOVE PACEMAKER      MITRAL VALVE REPAIR/REPLACEMENT  2025    Procedure: Transcatheter Mitral Valve Repair;  Surgeon: Jeannie Zabala MD;  Location: [facility] CATH INVASIVE  "LOCATION;  Service: Cardiovascular;;    SKIN BIOPSY Left     arm    SKIN LESION EXCISION N/A     VENTRAL HERNIA REPAIR  10/28/2012         Social History     Socioeconomic History    Marital status:    Tobacco Use    Smoking status: Never     Passive exposure: Past    Smokeless tobacco: Never   Vaping Use    Vaping status: Never Used   Substance and Sexual Activity    Alcohol use: Not Currently     Alcohol/week: 1.0 - 2.0 standard drink of alcohol     Types: 1 - 2 Glasses of wine per week     Comment: rarely    Drug use: Never    Sexual activity: Defer         Family History   Problem Relation Age of Onset    Hypertension Mother     Asthma Mother     COPD Mother     Osteoarthritis Mother     Stomach cancer Father     Cancer Father     Cancer Sister     Diabetes Maternal Grandmother     Colon cancer Neg Hx        Objective  Physical Exam:  /89   Pulse 89   Temp 97.8 °F (36.6 °C) (Oral)   Resp 18   Ht 160 cm (62.99\")   Wt 68 kg (149 lb 14.6 oz)   SpO2 97%   BMI 26.56 kg/m²      Physical Exam  Constitutional:       General: She is not in acute distress.     Appearance: Normal appearance. She is obese. She is not ill-appearing.   HENT:      Head: Normocephalic and atraumatic.      Nose: Nose normal. No congestion or rhinorrhea.      Mouth/Throat:      Mouth: Mucous membranes are dry.      Pharynx: Oropharynx is clear. No oropharyngeal exudate or posterior oropharyngeal erythema.   Eyes:      Extraocular Movements: Extraocular movements intact.      Conjunctiva/sclera: Conjunctivae normal.      Pupils: Pupils are equal, round, and reactive to light.   Cardiovascular:      Rate and Rhythm: Normal rate and regular rhythm.      Pulses: Normal pulses.      Heart sounds: Normal heart sounds.   Pulmonary:      Effort: Pulmonary effort is normal. No tachypnea, accessory muscle usage or respiratory distress.      Breath sounds: Normal breath sounds.   Abdominal:      General: Abdomen is flat. Bowel sounds are " normal. There is no distension.      Palpations: Abdomen is soft.      Tenderness: There is abdominal tenderness.      Comments: Diffuse left-sided ecchymosis on exam   Musculoskeletal:         General: No swelling or tenderness. Normal range of motion.      Cervical back: Normal range of motion and neck supple.      Comments: Ecchymosis to the left lower extremity   Skin:     General: Skin is warm and dry.      Capillary Refill: Capillary refill takes less than 2 seconds.   Neurological:      General: No focal deficit present.      Mental Status: She is alert and oriented to person, place, and time. Mental status is at baseline.      Cranial Nerves: No cranial nerve deficit.      Sensory: No sensory deficit.      Motor: No weakness.      Coordination: Coordination normal.   Psychiatric:         Mood and Affect: Mood normal.         Behavior: Behavior normal.         Thought Content: Thought content normal.         Judgment: Judgment normal.         Procedures    ED Course:    ED Course as of 05/07/25 2244   Wed May 07, 2025   1448 EKG interpreted by me, atrial fibrillation with right ventricular conduction delay, rate of 88, no concerning ST changes noted, abnormal EKG [JE]   1511 Chest x-ray independently interpreted by me showed no acute process [JE]      ED Course User Index  [JE] Ricky Matthew MD       Lab Results (last 24 hours)       Procedure Component Value Units Date/Time    COVID-19 and FLU A/B PCR, 1 HR TAT - Swab, Nasopharynx [[id number]]  (Normal) Collected: 05/07/25 1434    Specimen: Swab from Nasopharynx Updated: 05/07/25 1459     COVID19 Not Detected     Influenza A PCR Not Detected     Influenza B PCR Not Detected    Narrative:      Fact sheet for providers: https://www.fda.gov/media/368382/download    Fact sheet for patients: https://www.fda.gov/media/614267/download    Test performed by PCR.    CBC & Differential [[id number]]  (Abnormal) Collected: 05/07/25 1442    Specimen: Blood Updated:  05/07/25 1503    Narrative:      The following orders were created for panel order CBC & Differential.  Procedure                               Abnormality         Status                     ---------                               -----------         ------                     CBC Auto Differential[[id number]]        Abnormal            Final result               Scan Slide[[id number]]                                                                    Please view results for these tests on the individual orders.    Comprehensive Metabolic Panel [[id number]]  (Abnormal) Collected: 05/07/25 1442    Specimen: Blood Updated: 05/07/25 1525     Glucose 111 mg/dL      BUN 48 mg/dL      Creatinine 1.31 mg/dL      Sodium 138 mmol/L      Potassium 3.9 mmol/L      Chloride 99 mmol/L      CO2 25.5 mmol/L      Calcium 8.4 mg/dL      Total Protein 5.9 g/dL      Albumin 3.7 g/dL      ALT (SGPT) 85 U/L      AST (SGOT) 78 U/L      Alkaline Phosphatase 76 U/L      Total Bilirubin 1.6 mg/dL      Globulin 2.2 gm/dL      A/G Ratio 1.7 g/dL      BUN/Creatinine Ratio 36.6     Anion Gap 13.5 mmol/L      eGFR 40.3 mL/min/1.73     Narrative:      GFR Categories in Chronic Kidney Disease (CKD)              GFR Category          GFR (mL/min/1.73)    Interpretation  G1                    90 or greater        Normal or high (1)  G2                    60-89                Mild decrease (1)  G3a                   45-59                Mild to moderate decrease  G3b                   30-44                Moderate to severe decrease  G4                    15-29                Severe decrease  G5                    14 or less           Kidney failure    (1)In the absence of evidence of kidney disease, neither GFR category G1 or G2 fulfill the criteria for CKD.    eGFR calculation 2021 CKD-EPI creatinine equation, which does not include race as a factor    High Sensitivity Troponin T [[id number]]  (Abnormal) Collected: 05/07/25 1442    Specimen: Blood  Updated: 05/07/25 1525     HS Troponin T 46 ng/L     Narrative:      High Sensitive Troponin T Reference Range:  <14.0 ng/L- Negative Female for AMI  <22.0 ng/L- Negative Male for AMI  >=14 - Abnormal Female indicating possible myocardial injury.  >=22 - Abnormal Male indicating possible myocardial injury.   Clinicians would have to utilize clinical acumen, EKG, Troponin, and serial changes to determine if it is an Acute Myocardial Infarction or myocardial injury due to an underlying chronic condition.         BNP [[id number]]  (Abnormal) Collected: 05/07/25 1442    Specimen: Blood Updated: 05/07/25 1525     proBNP 31,970.0 pg/mL     Narrative:      This assay is used as an aid in the diagnosis of individuals suspected of having heart failure. It can be used as an aid in the diagnosis of acute decompensated heart failure (ADHF) in patients presenting with signs and symptoms of ADHF to the emergency department (ED). In addition, NT-proBNP of <300 pg/mL indicates ADHF is not likely.    Age Range Result Interpretation  NT-proBNP Concentration (pg/mL:      <50             Positive            >450                   Gray                 300-450                    Negative             <300    50-75           Positive            >900                  Gray                300-900                  Negative            <300      >75             Positive            >1800                  Gray                300-1800                  Negative            <300    C-reactive Protein [[id number]]  (Normal) Collected: 05/07/25 1442    Specimen: Blood Updated: 05/07/25 1525     C-Reactive Protein <0.30 mg/dL     Procalcitonin [[id number]]  (Normal) Collected: 05/07/25 1442    Specimen: Blood Updated: 05/07/25 1530     Procalcitonin 0.11 ng/mL     Narrative:      As a Marker for Sepsis (Non-Neonates):    1. <0.5 ng/mL represents a low risk of severe sepsis and/or septic shock.  2. >2 ng/mL represents a high risk of severe sepsis and/or  "septic shock.    As a Marker for Lower Respiratory Tract Infections that require antibiotic therapy:    PCT on Admission    Antibiotic Therapy       6-12 Hrs later    >0.5                Strongly Recommended  >0.25 - <0.5        Recommended   0.1 - 0.25          Discouraged              Remeasure/reassess PCT  <0.1                Strongly Discouraged     Remeasure/reassess PCT    As 28 day mortality risk marker: \"Change in Procalcitonin Result\" (>80% or <=80%) if Day 0 (or Day 1) and Day 4 values are available. Refer to http://www.[facility]-pct-calculator.com    Change in PCT <=80%  A decrease of PCT levels below or equal to 80% defines a positive change in PCT test result representing a higher risk for 28-day all-cause mortality of patients diagnosed with severe sepsis for septic shock.    Change in PCT >80%  A decrease of PCT levels of more than 80% defines a negative change in PCT result representing a lower risk for 28-day all-cause mortality of patients diagnosed with severe sepsis or septic shock.       Magnesium [[id number]]  (Normal) Collected: 05/07/25 1442    Specimen: Blood Updated: 05/07/25 1525     Magnesium 2.3 mg/dL     CBC Auto Differential [[id number]]  (Abnormal) Collected: 05/07/25 1442    Specimen: Blood Updated: 05/07/25 1503     WBC 16.38 10*3/mm3      RBC 3.81 10*6/mm3      Hemoglobin 10.7 g/dL      Hematocrit 36.0 %      MCV 94.5 fL      MCH 28.1 pg      MCHC 29.7 g/dL      RDW 17.1 %      RDW-SD 57.1 fl      MPV 9.8 fL      Platelets 267 10*3/mm3      Neutrophil % 81.9 %      Lymphocyte % 4.8 %      Monocyte % 11.5 %      Eosinophil % 0.2 %      Basophil % 0.3 %      Immature Grans % 1.3 %      Neutrophils, Absolute 13.42 10*3/mm3      Lymphocytes, Absolute 0.78 10*3/mm3      Monocytes, Absolute 1.89 10*3/mm3      Eosinophils, Absolute 0.03 10*3/mm3      Basophils, Absolute 0.05 10*3/mm3      Immature Grans, Absolute 0.21 10*3/mm3      nRBC 0.9 /100 WBC     High Sensitivity Troponin T 1Hr " [[id number]]  (Abnormal) Collected: 05/07/25 1617    Specimen: Blood Updated: 05/07/25 1641     HS Troponin T 44 ng/L      Troponin T Numeric Delta -2 ng/L      Troponin T % Delta -4    Narrative:      High Sensitive Troponin T Reference Range:  <14.0 ng/L- Negative Female for AMI  <22.0 ng/L- Negative Male for AMI  >=14 - Abnormal Female indicating possible myocardial injury.  >=22 - Abnormal Male indicating possible myocardial injury.   Clinicians would have to utilize clinical acumen, EKG, Troponin, and serial changes to determine if it is an Acute Myocardial Infarction or myocardial injury due to an underlying chronic condition.                  CT Angiogram Lower Extremity Left  Result Date: 5/7/2025  FINAL REPORT TECHNIQUE: null CLINICAL HISTORY: Increased ecchymosis and swelling to the left groin and femoral area after rec COMPARISON: null FINDINGS: CT ANGIOGRAPHY LEFT LOWER EXTREMITY WITH IV CONTRAST. 3-D POSTPROCESSING Comparison: None Findings: Irregular fluid collection in the left groin extending approximately 6 cm in length measures 2.4 cm in greatest AP diameter by 5.1 cm transversely. Intraluminal density measurements = 26 HU. There is surrounding edema. No adjacent pseudoaneurysm formation. Calcific plaque throughout the iliofemoral, popliteal and calf arteries. The femoral, popliteal and visualized iliac arteries are patent with no occlusion or flow-limiting stenosis. The posterior tibial artery is seen to the level of the ankle. The anterior tibial and peroneal arteries are seen to the level of the distal calf. No acute fracture or dislocation. No erosive or resorptive bony changes. Degenerative changes in the left hip, knee and midfoot. Lipomatous changes in the posterior compartment of the proximal calf. No foreign body.     Impression: Impression: 1. Irregular fluid collection in the left groin may represent a seroma or liquefying hematoma measuring approximately 6.0 x 2.4 x 5.1 cm (Length x  Height x Width). 2. No evidence for left groin pseudoaneurysm. 3. Patent iliofemoral and popliteal arteries with no occlusion or flow-limiting stenosis. 4. Patent PTA. Unopacified distal peroneal artery and JONO could be related to occlusion or vasospasm. Authenticated and Electronically Signed by Elizabeth Staples DO on 05/07/2025 06:27:57 PM    CT Abdomen Pelvis With Contrast  Result Date: 5/7/2025  PROCEDURE: CT ABDOMEN PELVIS W CONTRAST-  HISTORY: Recent cardiac catheterization with increased swelling and pain to access site in the left fem, significant ecchymosis across the groin and abdomen, eval active extravasation, vascular injury  COMPARISON: 06/30/2022..  PROCEDURE: The patient was injected with IV contrast. Axial images were obtained from the lung bases to the pubic symphysis by computed tomography. This study was performed with techniques to keep radiation doses as low as reasonably achievable, (ALARA). Individualized dose reduction techniques using automated exposure control or adjustment of mA and/or kV according to the patient size were employed.  FINDINGS:  ABDOMEN: The lung bases demonstrate bilateral pleural effusions as well as interlobular septal thickening. The heart is enlarged, increased from prior exam. Since the prior exam a small metallic device has been implanted in the apex of the right ventricle, likely micro device for pacing. There are also 2 new metallic devices attached to the mitral valve, new from prior. The liver demonstrates diffuse fatty infiltration without focal mass. Liver is mildly enlarged at 16 cm. There are metallic surgical clips in the right upper quadrant consistent with previous cholecystectomy. Common bile duct is mildly dilated at 13 mm in this postcholecystectomy patient; this is stable from the prior exam. The spleen is unremarkable. No adrenal mass is present.  The pancreas is unremarkable. The kidneys demonstrate left nephrolithiasis without hydronephrosis. Mild  cortical thinning noted bilaterally. No right hydronephrosis seen. The aorta is proper caliber. There is no free fluid or adenopathy. Vascular calcifications noted with mildly dilated common iliac arteries bilaterally. No abdominal aortic aneurysm formation seen. There is diverticulosis without evidence of diverticulitis. Mild perihepatic and minimal perisplenic ascites identified. This extends into the paracolic gutters bilaterally as well as the pelvis.  PELVIS: The GI tract demonstrates ileocolic anastomosis in the right upper quadrant. No bony destructive lesion seen. The appendix is not identified. The urinary bladder is almost completely collapsed. Very small amount of pelvic ascites noted. Subcutaneous edema noted fairly diffusely new from prior study. Uterus is midline.      Impression: 1. Left nephrolithiasis without hydronephrosis.  2. Diverticulosis.  3. Fatty liver mildly enlarged with mild ascites; recommend correlation with liver function test.  4. Cardiomegaly bilateral pleural effusions, subcutaneous edema and interlobular septal thickening suggesting fluid overload/CHF.  CTDI: 6.54 mGy DLP:293.68 mGy.cm  This report was signed and finalized on 5/7/2025 5:32 PM by Susan Obregon MD.      CT Angiogram Chest Pulmonary Embolism  Result Date: 5/7/2025  PROCEDURE: CT ANGIOGRAM CHEST PULMONARY EMBOLISM-  HISTORY: Shortness of breath, history of A-fib, eval PE, bacterial pneumonia  COMPARISON: 08/26/2024.  TECHNIQUE: The patient was injected with  IV contrast. Axial images were obtained through the chest in a CTA/ PE protocol. 3 D Reconstruction images were also performed. This study was performed with techniques to keep radiation doses as low as reasonably achievable, (ALARA). Individualized dose reduction techniques using automated exposure control or adjustment of mA and/or kV according to the patient size were employed.  FINDINGS: There is no axillary adenopathy. There is no hilar or mediastinal  adenopathy.  The heart is moderately enlarged. No pericardial effusion seen but there are bilateral pleural effusions, right greater than left. Interlobular septal thickening identified bilaterally. Consider CHF. Main pulmonary artery is dilated to 33 mm suggesting pulmonary arterial hypertension. Vascular calcifications noted. There is no evidence of aortic aneurysm. Dissection cannot be excluded; there is essentially no contrast in the aorta. Limited images of the upper abdomen are unremarkable. No suspicious infiltrate or nodule is identified. Moderately large hiatal hernia noted. Significant reflux of contrast into the IVC and hepatic veins noted.      Impression: No evidence of pulmonary embolism.  Cardiomegaly, bilateral pleural effusions and interlobular septal thickening suggesting CHF.  Dilated main pulmonary artery, consider pulmonary arterial hypertension.   CTDI: 6.54 mGy DLP:293.68 mGy.cm  This report was signed and finalized on 5/7/2025 5:20 PM by Susan Obregon MD.      XR Chest 1 View  Result Date: 5/7/2025  PROCEDURE: XR CHEST 1 VW-    HISTORY: Shortness of breath  COMPARISON: February 8, 2025.  FINDINGS: The heart is enlarged, but stable. There is stable aortic mural calcifications. There are stable interstitial changes. There is a new small left pleural effusion. There is worsening left basilar airspace disease, atelectasis versus pneumonia. 2 small metallic objects project over the base of the heart, not seen previously. A loop recorder is seen adjacent to this. Right axillary metallic surgical clips are stable from the prior exam. There is no pneumothorax. There are no acute osseous abnormalities.      Impression: Cardiomegaly with new small left pleural effusion and left basilar airspace disease, CHF versus pneumonia. Recommend follow-up to document resolution.   Images were reviewed, interpreted, and dictated by Dr. Susan Obregon MD Transcribed by Nafisa Rincon PA-C.  This report was signed  and finalized on 5/7/2025 3:45 PM by Susan Obregon MD.           MDM     Amount and/or Complexity of Data Reviewed  Decide to obtain previous medical records or to obtain history from someone other than the patient: yes  Independent visualization of images, tracings, or specimens: yes        Initial impression of presenting illness: Shortness of breath, abdominal ecchymosis    DDX: includes but is not limited to: Femoral arterial injury, active bleeding, blood loss anemia, PE, bacterial pneumonia, viral URI    Patient arrives stable with vitals interpreted by myself.     Pertinent features from physical exam: Clear to auscultation, regular rate and rhythm, no murmur, nontender to abdominal palpation of time my exam the patient does have extensive ecchymosis over the left abdomen and left groin and into the left lower extremity.    Initial diagnostic plan: CBC, CMP, troponin, BNP, EKG, chest x-ray, CRP, Pro-Beto, magnesium, CT PE, CT abdomen pelvis, CT left lower extremity    Results from initial plan were reviewed and interpreted by me revealing no concern for vascular injury, no concern for intra-abdominal process.  Patient does have increased pulmonary edema and subcu edema concerning for CHF exacerbation    Diagnostic information from other sources: Discussed with patient's  at bedside reviewed past medical records    Interventions / Re-evaluation: Given IV Lasix in emergency department due to concerns for fluid overload    Results/clinical rationale were discussed with patient at bedside    Consultations/Discussion of results with other physicians: Niki my concern for CHF exacerbation as etiology of patient's presentation today.  Patient stable on home oxygen.  Given IV Lasix here and prescribed Lasix over the next 3 days and encouraged her to follow-up with her cardiologist to discuss her outpatient regimen moving forward.  Referral sent for patient to follow-up in disease management clinic to ensure that  she improves appropriately.  Strict turn precaution for worsening dyspnea, chest pain in spite of this therapy.    Disposition plan: Discharge  -----        Final diagnoses:   Acute on chronic congestive heart failure, unspecified heart failure type          Ricky Matthew MD  05/07/25 3997

## 2025-05-08 ENCOUNTER — READMISSION MANAGEMENT (OUTPATIENT)
Dept: CALL CENTER | Facility: HOSPITAL | Age: 85
End: 2025-05-08
Payer: MEDICARE

## 2025-05-08 NOTE — OUTREACH NOTE
CT Surgery Week 2 Survey      Flowsheet Row Responses   [facility] patient discharged from? [place]   Does the patient have one of the following disease processes/diagnoses(primary or secondary)? Cardiothoracic surgery   Week 2 attempt successful? Yes   Call start time 1440   Call end time 1454   Medication alerts for this patient Lasix.   Meds reviewed with patient/caregiver? Yes   Is the patient taking all medications as directed (includes completed medication regime)? Yes   Comments regarding appointments Pt returned to ED yesterday.   Has the patient kept scheduled appointments due by today? Yes   Psychosocial issues? No   What is the patient's perception of their health status since discharge? Improving   Week 2 call completed? Yes   Wrap up additional comments Pt reports improvng today. Pt was in the ED yesterday with fluid overload.Lasix on board now.Pt describes multiple medical issues. Pt has multiple f/u appointments.   Call end time 1454            Shelby LUCIO - Registered Nurse

## 2025-05-12 ENCOUNTER — RESULTS FOLLOW-UP (OUTPATIENT)
Dept: PHARMACY | Facility: HOSPITAL | Age: 85
End: 2025-05-12
Payer: MEDICARE

## 2025-05-12 ENCOUNTER — TREATMENT (OUTPATIENT)
Dept: CARDIAC REHAB | Facility: HOSPITAL | Age: 85
End: 2025-05-12
Payer: MEDICARE

## 2025-05-12 ENCOUNTER — DISEASE STATE MANAGEMENT VISIT (OUTPATIENT)
Dept: PHARMACY | Facility: HOSPITAL | Age: 85
End: 2025-05-12
Payer: MEDICARE

## 2025-05-12 VITALS
BODY MASS INDEX: 26.05 KG/M2 | DIASTOLIC BLOOD PRESSURE: 57 MMHG | RESPIRATION RATE: 20 BRPM | WEIGHT: 147 LBS | OXYGEN SATURATION: 94 % | HEART RATE: 92 BPM | HEIGHT: 63 IN | SYSTOLIC BLOOD PRESSURE: 90 MMHG | TEMPERATURE: 96.6 F

## 2025-05-12 DIAGNOSIS — I50.22 CHRONIC SYSTOLIC HEART FAILURE: Primary | Chronic | ICD-10-CM

## 2025-05-12 DIAGNOSIS — R06.02 SOB (SHORTNESS OF BREATH): ICD-10-CM

## 2025-05-12 DIAGNOSIS — Z95.818 S/P MITRAL VALVE CLIP IMPLANTATION: ICD-10-CM

## 2025-05-12 DIAGNOSIS — I10 ESSENTIAL HYPERTENSION: Chronic | ICD-10-CM

## 2025-05-12 DIAGNOSIS — I48.20 CHRONIC ATRIAL FIBRILLATION: ICD-10-CM

## 2025-05-12 DIAGNOSIS — Z98.890 S/P MVR (MITRAL VALVE REPAIR): Primary | ICD-10-CM

## 2025-05-12 DIAGNOSIS — Z98.890 S/P MITRAL VALVE CLIP IMPLANTATION: ICD-10-CM

## 2025-05-12 DIAGNOSIS — I50.21 ACUTE HFREF (HEART FAILURE WITH REDUCED EJECTION FRACTION): Primary | ICD-10-CM

## 2025-05-12 LAB
ANION GAP SERPL CALCULATED.3IONS-SCNC: 12.5 MMOL/L (ref 5–15)
BUN SERPL-MCNC: 32 MG/DL (ref 8–23)
BUN/CREAT SERPL: 24.6 (ref 7–25)
CALCIUM SPEC-SCNC: 8.6 MG/DL (ref 8.6–10.5)
CHLORIDE SERPL-SCNC: 99 MMOL/L (ref 98–107)
CO2 SERPL-SCNC: 27.5 MMOL/L (ref 22–29)
CREAT SERPL-MCNC: 1.3 MG/DL (ref 0.57–1)
EGFRCR SERPLBLD CKD-EPI 2021: 40.6 ML/MIN/1.73
GLUCOSE SERPL-MCNC: 102 MG/DL (ref 65–99)
NT-PROBNP SERPL-MCNC: ABNORMAL PG/ML (ref 0–1800)
POTASSIUM SERPL-SCNC: 3.4 MMOL/L (ref 3.5–5.2)
SODIUM SERPL-SCNC: 139 MMOL/L (ref 136–145)

## 2025-05-12 PROCEDURE — 99214 OFFICE O/P EST MOD 30 MIN: CPT | Performed by: NURSE PRACTITIONER

## 2025-05-12 PROCEDURE — G0463 HOSPITAL OUTPT CLINIC VISIT: HCPCS

## 2025-05-12 PROCEDURE — 80048 BASIC METABOLIC PNL TOTAL CA: CPT | Performed by: NURSE PRACTITIONER

## 2025-05-12 PROCEDURE — 36415 COLL VENOUS BLD VENIPUNCTURE: CPT | Performed by: NURSE PRACTITIONER

## 2025-05-12 PROCEDURE — 83880 ASSAY OF NATRIURETIC PEPTIDE: CPT | Performed by: NURSE PRACTITIONER

## 2025-05-12 PROCEDURE — 93798 PHYS/QHP OP CAR RHAB W/ECG: CPT

## 2025-05-12 RX ORDER — POTASSIUM CHLORIDE 1500 MG/1
20 TABLET, EXTENDED RELEASE ORAL 2 TIMES DAILY
Qty: 6 TABLET | Refills: 0 | Status: SHIPPED | OUTPATIENT
Start: 2025-05-12

## 2025-05-12 RX ORDER — FUROSEMIDE 40 MG/1
40 TABLET ORAL 2 TIMES DAILY
Qty: 6 TABLET | Refills: 0 | Status: SHIPPED | OUTPATIENT
Start: 2025-05-12

## 2025-05-12 NOTE — PROGRESS NOTES
Ambulatory Care Clinic  Heart Failure Pharmacist Note     Patient Name: Elsa Alcaraz  :1940  Age: 85 y.o.  Sex: female  Referring Provider: Ricky Matthew MD   Primary Cardiologist: Dr. Germain  Encounter Provider:  BALDOMERO Luis    HPI: Patient presents for initial evaluation in heart failure clinic for management of HFrEF, (EF = 25-30%). PMH otherwise significant for breast cancer, atrial fibrillation on Eliquis, diabetes, colon cancer, COPD, hyperlipidemia, hypertension, sleep apnea.    She endorses that she followed the Furosemide 40mg once daily instructions from the ED. Patient is unsure what strength of Lasix she has at home now, however, will double check that she has Lasix 20mg and plans to take that as needed for 2lb weight gain overnight. She has not been taking her Midodrine 10mg TID. She reports that she is taking spironolactone 12.5mg daily and Jardiance 10mg daily. She mentioned that she had black stools starting last night and this morning. Advised her to call her PCP immediately today to discuss black stools and her elevated liver enzymes. She voices understanding and will get an appointment as soon as possible. Patient notes that she is tired of having doctors visits. Patient is taking Metoprolol Tartrate 25mg daily, could consider moving to Succinate and once daily dosing. She will discuss at her appointment with Dr. Germain in 2025.  Patient reports that she is not sleeping well and does sleep on a bed that she can adjust the incline. She is at her baseline and has not had to increase it more. She discusses a new cough that started today, but attributes it to allergies. Reviewed heart failure zones and s/sx of exacerbation. Offered for referral to dietician but she declined at this time. Provided paperwork for living will.     Heart Failure GDMT:    Drug Class   Drug   Dose Last Dose Adjustment   Notes   ACEi/ARB/ARNI Not taking   Pt not taking due to hypotension    Beta Blocker Metoprolol Tartrate 25mg BID     MRA Spironolactone 12.5mg     SGLT2i Jardiance 10mg       Other CV Meds:  Midodrine 10mg TID  Lasix 20mg PRN daily     Medication Use:  Adherence:   Past hx of medication use/intolerance:  Affordability: (PAP? Copay Card? Low income subsidy information provided?) non provided.    Social Determinants:  Social Drivers of Health     Tobacco Use: Low Risk  (5/7/2025)    Patient History     Smoking Tobacco Use: Never     Smokeless Tobacco Use: Never     Passive Exposure: Past   Alcohol Use: Not At Risk (4/23/2025)    AUDIT-C     Frequency of Alcohol Consumption: Never     Average Number of Drinks: Patient does not drink     Frequency of Binge Drinking: Never   Financial Resource Strain: Low Risk  (2/9/2025)    Overall Financial Resource Strain (CARDIA)     Difficulty of Paying Living Expenses: Not very hard   Food Insecurity: No Food Insecurity (4/24/2025)    Hunger Vital Sign     Worried About Running Out of Food in the Last Year: Never true     Ran Out of Food in the Last Year: Never true   Transportation Needs: No Transportation Needs (4/24/2025)    PRAPARE - Transportation     Lack of Transportation (Medical): No     Lack of Transportation (Non-Medical): No   Physical Activity: Inactive (2/9/2025)    Exercise Vital Sign     Days of Exercise per Week: 0 days     Minutes of Exercise per Session: 0 min   Stress: Stress Concern Present (2/9/2025)    [employer] [place] [employer] - Occupational Stress Questionnaire     Feeling of Stress : To some extent   Social Connections: Not At Risk (2/9/2025)    Family and Community Support     Help with Day-to-Day Activities: I don't need any help     Lonely or Isolated: Never   Interpersonal Safety: Not At Risk (5/7/2025)    Abuse Screen     Unsafe at Home or Work/School: no     Feels Threatened by Someone?: no     Does Anyone Keep You from Contacting Others or Doint Things Outside the Home?: no     Physical Sign of Abuse  Present: no   Depression: Not at risk (2/9/2025)    PHQ-2     PHQ-2 Score: 2   Housing Stability: Not At Risk (4/24/2025)    Housing Stability     Current Living Arrangements: home     Potentially Unsafe Housing Conditions: none   Utilities: Not At Risk (4/24/2025)    [facility] Utilities     Threatened with loss of utilities: No   Health Literacy: Unknown (4/24/2025)    Education     Help with school or training?: Not on file     Preferred Language: English   Employment: Not At Risk (2/9/2025)    Employment     Do you want help finding or keeping work or a job?: I do not need or want help   Disabilities: Not At Risk (4/23/2025)    Disabilities     Concentrating, Remembering, or Making Decisions Difficulty: no     Doing Errands Independently Difficulty: no   Recent Concern: Disabilities - At Risk (2/9/2025)    Disabilities     Concentrating, Remembering, or Making Decisions Difficulty: yes     Doing Errands Independently Difficulty: yes       Immunization Status:  Pneumococcal:   Influenza:   COVID-19:     OBJECTIVE:  There were no vitals taken for this visit.    There is no height or weight on file to calculate BMI.  Wt Readings from Last 1 Encounters:   05/07/25 68 kg (149 lb 14.6 oz)       10-yr ASCVD risk: The ASCVD Risk score (Brewster DK, et al., 2019) failed to calculate for the following reasons:    The 2019 ASCVD risk score is only valid for ages 40 to 79    Lab Review:  Renal Function: Estimated Creatinine Clearance: 29.6 mL/min (A) (by C-G formula based on SCr of 1.31 mg/dL (H)).    Lab Results   Component Value Date    PROBNP 31,970.0 (H) 05/07/2025       Results for orders placed during the hospital encounter of 04/23/25    Transthoracic Echo Complete With Contrast if Necessary Per Protocol    Interpretation Summary    Estimated left ventricular EF = 25-30%    The left ventricular cavity is borderline dilated.    Left ventricular wall thickness is consistent with mild concentric hypertrophy.    Mild aortic valve  stenosis is present. Aortic valve area is 1.1 cm2.    Aortic valve mean pressure gradient is 11 mmHg.    S/p 2 NTW MitraClips on 4/23/25.  Two clips are seen.    Moderate mitral valve regurgitation is present.    The mitral valve mean gradient is 5 mmHg.    Mild tricusoid regurgitation.  Calculated right ventricular systolic pressure from tricuspid regurgitation is 27 mmHg.    Iatrogenic ASD noted with color flow Doppler with left to right flow.        6 MINUTE WALK                      Does patient have a home BP monitor? Yes  Does patient have a home scale? Yes    Patient Education:  Elsa Alcaraz was provided written and verbal education during their clinic visit today. Topics reviewed include:  The basics of heart failure (defining heart failure, ejection fraction, stages of disease)  Signs and symptoms of heart failure, self track zones, and when to contact clinic or seek emergency care  Overview of heart failure treatment plan (pharmacologic and non-pharmacologic) and goals of treatment  Tips for success with managing heart failure medications  List of medications that may be used to treat heart failure and how they work in the body    Patient was given a heart failure tracker calendar to document weight, blood pressure, and symptoms/overall feeling each day. Patient was encouraged to complete this daily in order to help guide therapy adjustments. Elsa Alcaraz expressed understanding.    ASSESSMENT/PLAN:  HFrEF (EF = 25-30%)  Continue Metoprolol Tartrate 25mg BID. Patient will discuss possible move to succinate with Dr. Germain  Patient is not taking Entresto 24/26mg due to hypotension  Continue Spironolactone 12.5mg QD  Continue Jardiance 10mg daily  Repeat BMP today following increase in Lasix to 40mg x 3 days s/p ED visit  Addendum following lab results. Given BNP of ~16,000 down from 31,000, will increase furosemide to 40mg BID with potassium 20mEq supplement with each dose. Patient to start today and will  follow up in clinic on 5/15/2025.  Maryann LVM with information.   Patient to continue to monitor BP, HR and weight log  Patient to follow up with PCP asap given self reported black stools x2 and elevated liver enzymes  Patient to follow up in heart failure clinic on Thursday given additional diuresis needed.  Advised patient to call clinic with 2-3 lb weight gain in 24 hrs or >5 lb weight gain in 1 week.     Meg Baxter, PharmD  [facility]  05/12/25  13:03 EDT

## 2025-05-12 NOTE — PATIENT INSTRUCTIONS
TAKE MIDODRINE AS PRESCRIBED: 10 MG THREE TIMES A DAY (BREAKFAST, LUNCH, AND DINNER).    TAKE FUROSEMIDE 20MG AS NEEDED EVERYDAY FOR 2LB WEIGHT GAIN OVERNIGHT.    MAKE AN APPOINTMENT WITH PRIMARY CARE AS SOON AS POSSIBLE TO DISCUSS BLACK STOOLS AND LIVER ENZYMES.

## 2025-05-12 NOTE — PROGRESS NOTES
Pt was seen today in [facility] for a Phase II visit. Vital signs and session notes recorded in [employer] and will be scanned into Epic by HIM.

## 2025-05-12 NOTE — PROGRESS NOTES
[facility] Heart Failure Clinic Consultation Note    Elsa Alcaraz  [id number]  05/12/2025    Primary Care Provider: Kemi Hdz MD   Referring Provider: Ricky Matthew MD  Primary cardiologist: Dr. Fidencio Germain    Chief Complaint: Initial evaluation    History of Present Illness:   Mrs. Elsa Alcaraz is a 85 y.o. female who presents to the HF Clinic for initial evaluation.  The patient has a past medical history of longstanding persistent A-fib on Xarelto, status post complete heart block with a PPM in 7/23, mitral clip x 2/ongoing follow-up with CT surgery CHF dating back to 2020, hypertension, hyperlipidemia, COPD with portable oxygen (followed by Dr. Bautista), and DAVID with CPAP intolerance.  Last week, the patient presented to the ED with shortness of breath and abdominal bruising.  ECG showed atrial fibrillation with a rate of 88.  proBNP was elevated at >31,000.  CT of abdomen and pelvis showed bilateral pleural effusions.  Troponins were chronically elevated but in a plateau pattern x 2.  Patient was ultimately given Lasix 40 mg IV discharged with p.o. Lasix x 3 days.  She presents today for evaluation.  The patient reports taking daily Lasix with good UOP.  She does report difficulty sleeping.  She feels like her breathing is worse over the past couple months and notes she requires help with ADL's (showering, housework).  She continues to wear O2 at 3 L a minute while at home and 4 L a minute when she is exerting herself (i.e. going to healthcare appointments).  She reports having an adjustable bed with no new requirements for additional raising HOB.  She does report new onset of cough this morning with clear phlegm.  She reports being a little wheezy this morning.  She reports abdominal fullness but also notes she has some irregularities with bowel movements.  She also reports that she has a hiatal hernia that has grown over the years.  She is concerned her heart  rate is too high.  She notes that recent SBP at home was in the 80s.  She has not been taking midodrine and expresses concern/frustration regarding her medications/changes to medications.  She reports waking up suddenly this morning with a blood pressure of 100/84 and a heart rate of 144.  She reports having tried mask therapy for DAVID 3 different times with ongoing mask intolerance.  She reports having dark stool x 1 last night but denies any other blood or bleeding issues.  She continues to take Jardiance without UTI symptoms.  The patient states that she has meant to see her PCP for hospital follow-up, but states she has not had time to do this yet.    Past Cardiac Testin. Last Coronary Angio: 3/24/2025   Clear coronaries per [facility] prior to mitral clip placement  2. Prior Stress Testing: None  3. Last Echo: 2025    Estimated left ventricular EF = 25-30%    The left ventricular cavity is borderline dilated.    Left ventricular wall thickness is consistent with mild concentric hypertrophy.    Mild aortic valve stenosis is present. Aortic valve area is 1.1 cm2.    Aortic valve mean pressure gradient is 11 mmHg.    S/p 2 NTW MitraClips on 25.  Two clips are seen.    Moderate mitral valve regurgitation is present.    The mitral valve mean gradient is 5 mmHg.    Mild tricusoid regurgitation.  Calculated right ventricular systolic pressure from tricuspid regurgitation is 27 mmHg.    Iatrogenic ASD noted with color flow Doppler with left to right flow.  4. Prior Holter Monitor: None      Ref Range & Units 5 d ago 3 mo ago 8 mo ago   proBNP  0.0 - 1,800.0 pg/mL 31,970.0 3,308.0 2,214.0   Resulting Agency [facility]  [facility]     Component  Ref Range & Units (hover) 5 d ago  (25) 5 d ago  (25) 3 mo ago  (25) 3 mo ago  (25) 8 mo ago  (24) 8 mo ago  (24) 1 yr ago  (24)   HS Troponin T 44 High  46 High  24 High  24 High  18 High  17 High  24 High        Component  Ref  Range & Units (hover) 5 d ago  (5/7/25) 2 wk ago  (4/25/25) 2 wk ago  (4/24/25) 2 wk ago  (4/24/25) 2 wk ago  (4/23/25) 2 wk ago  (4/23/25) 3 wk ago  (4/21/25)   Glucose 111 High  110 High   161 High  81 R  90   BUN 48 High  18  19   28 High    Creatinine 1.31 High  0.76  0.87   1.19 High    Sodium 138 141  141   137   Potassium 3.9 4.7 4.1 CM 3.5  3.7 CM 4.4   Chloride 99 108 High   103   101   CO2 25.5 26.0  26.0   25.0   Calcium 8.4 Low  8.5 Low   8.0 Low    9.4   Total Protein 5.9 Low          Albumin 3.7         ALT (SGPT) 85 High          AST (SGOT) 78 High          Alkaline Phosphatase 76         Total Bilirubin 1.6 High          Globulin 2.2         A/G Ratio 1.7         BUN/Creatinine Ratio 36.6 High  23.7  21.8   23.5   Anion Gap 13.5 7.0  12.0   11.0   eGFR 40.3 Low  77.4  65.8   45.2 Low        Review of Systems:   Review of Systems  As Noted in HPI.   I have reviewed and confirmed the accuracy of the ROS as documented by the MA/LPN/RN BALDOMERO Luis    I have reviewed and/or updated the patient's past medical, past surgical, family, social history, problem list and allergies as appropriate.     Medications:     Current Outpatient Medications:     albuterol sulfate  (90 Base) MCG/ACT inhaler, Inhale 2 puffs Every 4 (Four) Hours As Needed for Wheezing., Disp: 18 g, Rfl: 5    budesonide-formoterol (SYMBICORT) 160-4.5 MCG/ACT inhaler, Inhale 2 puffs 2 (Two) Times a Day. Rinse mouth with water after use, Disp: 10.2 g, Rfl: 5    cholecalciferol (VITAMIN D3) 25 MCG (1000 UT) tablet, Take 1 tablet by mouth Daily., Disp: , Rfl:     citalopram (CeleXA) 20 MG tablet, Take 1 tablet by mouth Daily., Disp: , Rfl:     empagliflozin (Jardiance) 10 MG tablet tablet, Take 1 tablet by mouth Daily., Disp: 90 tablet, Rfl: 3    famotidine (PEPCID) 40 MG tablet, TAKE 1 TABLET BY MOUTH ONCE DAILY AT NIGHT, Disp: 90 tablet, Rfl: 3    furosemide (LASIX) 20 MG tablet, Take 2 tablets by mouth Daily for 3 days.,  "Disp: 6 tablet, Rfl: 0    metoprolol tartrate (LOPRESSOR) 25 MG tablet, Take 0.5 (one-half) tablet by mouth Every 12 (Twelve) Hours., Disp: 30 tablet, Rfl: 0    midodrine (PROAMATINE) 10 MG tablet, Take 1 tablet by mouth 3 (Three) Times a Day Before Meals., Disp: 90 tablet, Rfl: 0    O2 (OXYGEN), Inhale 3-4 L/min Daily As Needed., Disp: , Rfl:     pantoprazole (PROTONIX) 40 MG EC tablet, Take 1 tablet by mouth Every Morning., Disp: , Rfl:     spironolactone (ALDACTONE) 25 MG tablet, Take 0.5 tablets by mouth Daily., Disp: 15 tablet, Rfl: 0    vitamin B-12 (CYANOCOBALAMIN) 100 MCG tablet, Take 1 tablet by mouth Daily., Disp: , Rfl:     Xarelto 20 MG tablet, Take 1 tablet by mouth Daily. *Resume on 3/27/25*, Disp: 90 tablet, Rfl: 3    Physical Exam:       5/7/25 4/30/25 4/23/25 4/9/25   /89 126/64 109/56 118/72   Heart Rate 89 62 68 88   Resp 18 -- 18 18   Temp 97.8 °F (36.6 °C) -- 97.5 °F (36.4 °C) --   Temp src Oral -- Axillary --   SpO2 97 % 93 % [4L O2] 100 % 95 % [on RA at rest]   Weight 68 kg (149 lb 14.6 oz) 68 kg (150 lb) 65 kg (143 lb 4.8 oz) 66.7 kg (147 lb)   Height 160 cm (62.99\") 160 cm (63\") 160 cm (62.99\") 160 cm (62.99\")     Vital Signs:   Vitals:    05/12/25 1306   BP: 90/57   BP Location: Left arm   Patient Position: Sitting   Cuff Size: Adult   Pulse: 92   Resp: 20   Temp: 96.6 °F (35.9 °C)   TempSrc: Infrared   SpO2: 94%  Comment: 4L NC   Weight: 66.7 kg (147 lb)   Height: 160 cm (63\")     Body mass index is 26.04 kg/m².    Physical Exam  Vitals and nursing note reviewed.   Constitutional:       General: She is not in acute distress.     Appearance: She is ill-appearing.      Comments: Patient appears chronically ill.  She is on portable O2 at 2 L today.   HENT:      Head: Normocephalic and atraumatic.   Neck:      Trachea: Trachea normal.   Cardiovascular:      Rate and Rhythm: Normal rate and regular rhythm.      Pulses: Normal pulses.      Heart sounds: Normal heart sounds. No murmur " heard.     No friction rub. No gallop.   Pulmonary:      Effort: Pulmonary effort is normal.      Breath sounds: Normal breath sounds.   Musculoskeletal:      Cervical back: Neck supple.      Right lower leg: No edema.      Left lower leg: No edema.   Skin:     General: Skin is warm and dry.   Neurological:      Mental Status: She is alert and oriented to person, place, and time.   Psychiatric:         Mood and Affect: Mood normal.         Behavior: Behavior normal. Behavior is cooperative.         Thought Content: Thought content does not include suicidal ideation.         Results Review:   I reviewed the patient's new clinical results.    Procedures    Assessment / Plan:   Diagnoses and all orders for this visit:    1. Chronic systolic heart failure (Primary)  4/25, LVEF 25-30%; currently no ICD in place.  Given the patient is not taken midodrine, she is on max tolerated GDMT.  Follow-up with Dr. Germain as scheduled.  Stage C  NYHA functional class II  Ideally, could change metoprolol tartrate to succinate for CHF: Dr. Zabala just increased metoprolol tartrate to 25 mg BID. DEFER TO DR. GERMAIN, WITH WHOM PATIENT HAS AN APPT IN 1 MONTH.  Patient is not currently on ACE/ARB/ARNI.  Given she is not taking midodrine, her blood pressure would not tolerate additional antihypertensives at this time.  Continue low-dose spironolactone  Continue Jardiance  Continue PRN daily Lasix for weight gain of >2 lbs or > 5lbs in a week's time  Patient declines referral for registered dietician.    2. Chronic atrial fibrillation  Continue metoprolol for rate control  ADVISE PATIENT TO CALL PCP ASAP FOR DARK, TARRY, STOOL    3. Essential hypertension  Advised patient to take midodrine as prescribed  She has been encouraged to record daily weight, blood pressure, HR log and submit this to Dr. Germain next month for review      The patient expressed concern about when her device was last checked.  The patient's last pacemaker check  was in 3/2025 (no home monitoring patient's home has a metal roof).  Device has greater than 8 years left of battery voltage.  No events at the time of this device interrogation.    Preventative Cardiology:   Tobacco Cessation: N/A   Advance Care Planning: ACP discussion was declined by the patient. Patient does not have an advance directive, information provided.     Follow Up:   Patient is currently on max tolerated GDMT.  Follow-up with Dr. Germain next month as previously scheduled.  Can follow-up with our office on an as-needed basis.      Thank you for allowing me to participate in the care of your patient. Please to not hesitate to contact me with additional questions or concerns.     BALDOMERO Mcneal

## 2025-05-12 NOTE — TELEPHONE ENCOUNTER
You can take Lasix 40 mg twice a day with potassium x's 3 days.     FOLLOW-UP ON THURSDAY IN THE HEART FAILURE CLINIC AT 2 PM.    NO ANSWER ON HOME PHONE.    Left vmail on cell phone.

## 2025-05-13 ENCOUNTER — TELEPHONE (OUTPATIENT)
Dept: PHARMACY | Facility: HOSPITAL | Age: 85
End: 2025-05-13
Payer: MEDICARE

## 2025-05-13 NOTE — TELEPHONE ENCOUNTER
Was unable to reach patient. Contacted patient's daughter who is on the verbal release, and she was able to get patient to return phone call to the clinic.     Discussed lab results with the patient. Patient verbalized understanding. Patient did not receive voicemail prior so will begin lasix 40mg BID today with Potassium 20mEq replacement with each dose. Patient's  is going to  Rx from the pharmacy. She has no further questions at this time, and plans to return to the clinic on 5/15 to assess fluid overload.     TORRES Arizmendi

## 2025-05-13 NOTE — TELEPHONE ENCOUNTER
The patient's labs yesterday indicated acute heart failure as well as low potassium.  I left a message with the information below on her phone after I was unable to reach anyone on the home/cell phone listed.    Please call patient today to make sure she has received/understands the directions and advise her of follow-up appointment on Thursday.    MARTINEZ Weaver

## 2025-05-14 ENCOUNTER — HOSPITAL ENCOUNTER (INPATIENT)
Facility: HOSPITAL | Age: 85
LOS: 2 days | Discharge: HOME | DRG: 291 | End: 2025-05-16
Attending: EMERGENCY MEDICINE | Admitting: INTERNAL MEDICINE
Payer: MEDICARE

## 2025-05-14 ENCOUNTER — APPOINTMENT (OUTPATIENT)
Dept: GENERAL RADIOLOGY | Facility: HOSPITAL | Age: 85
DRG: 291 | End: 2025-05-14
Payer: MEDICARE

## 2025-05-14 DIAGNOSIS — I48.91 ATRIAL FIBRILLATION WITH RVR: Primary | ICD-10-CM

## 2025-05-14 DIAGNOSIS — I50.9 ACUTE EXACERBATION OF CHRONIC HEART FAILURE: ICD-10-CM

## 2025-05-14 DIAGNOSIS — J44.1 COPD EXACERBATION: ICD-10-CM

## 2025-05-14 PROBLEM — I50.23 ACUTE ON CHRONIC HFREF (HEART FAILURE WITH REDUCED EJECTION FRACTION): Status: ACTIVE | Noted: 2025-05-14

## 2025-05-14 LAB
ALBUMIN SERPL-MCNC: 3.7 G/DL (ref 3.5–5.2)
ALBUMIN/GLOB SERPL: 1.7 G/DL
ALP SERPL-CCNC: 92 U/L (ref 39–117)
ALT SERPL W P-5'-P-CCNC: 44 U/L (ref 1–33)
ANION GAP SERPL CALCULATED.3IONS-SCNC: 11.3 MMOL/L (ref 5–15)
AST SERPL-CCNC: 16 U/L (ref 1–32)
ATMOSPHERIC PRESS: 728 MMHG
BASE EXCESS BLDV CALC-SCNC: 8.8 MMOL/L (ref 0–2)
BASOPHILS # BLD AUTO: 0.05 10*3/MM3 (ref 0–0.2)
BASOPHILS NFR BLD AUTO: 0.3 % (ref 0–1.5)
BDY SITE: ABNORMAL
BILIRUB SERPL-MCNC: 1.5 MG/DL (ref 0–1.2)
BUN SERPL-MCNC: 36 MG/DL (ref 8–23)
BUN/CREAT SERPL: 28.3 (ref 7–25)
CALCIUM SPEC-SCNC: 8.6 MG/DL (ref 8.6–10.5)
CHLORIDE SERPL-SCNC: 101 MMOL/L (ref 98–107)
CO2 SERPL-SCNC: 29.7 MMOL/L (ref 22–29)
COHGB MFR BLD: 1.3 % (ref 0–5)
CREAT SERPL-MCNC: 1.27 MG/DL (ref 0.57–1)
DEPRECATED RDW RBC AUTO: 56.3 FL (ref 37–54)
EGFRCR SERPLBLD CKD-EPI 2021: 41.8 ML/MIN/1.73
EOSINOPHIL # BLD AUTO: 0.2 10*3/MM3 (ref 0–0.4)
EOSINOPHIL NFR BLD AUTO: 1.3 % (ref 0.3–6.2)
ERYTHROCYTE [DISTWIDTH] IN BLOOD BY AUTOMATED COUNT: 17.1 % (ref 12.3–15.4)
GLOBULIN UR ELPH-MCNC: 2.2 GM/DL
GLUCOSE SERPL-MCNC: 157 MG/DL (ref 65–99)
HCO3 BLDV-SCNC: 36.6 MMOL/L (ref 22–28)
HCT VFR BLD AUTO: 35.5 % (ref 34–46.6)
HGB BLD-MCNC: 10.6 G/DL (ref 12–15.9)
HOLD SPECIMEN: NORMAL
IMM GRANULOCYTES # BLD AUTO: 0.08 10*3/MM3 (ref 0–0.05)
IMM GRANULOCYTES NFR BLD AUTO: 0.5 % (ref 0–0.5)
INHALED O2 CONCENTRATION: 50 %
LYMPHOCYTES # BLD AUTO: 0.49 10*3/MM3 (ref 0.7–3.1)
LYMPHOCYTES NFR BLD AUTO: 3.3 % (ref 19.6–45.3)
Lab: ABNORMAL
MCH RBC QN AUTO: 27.7 PG (ref 26.6–33)
MCHC RBC AUTO-ENTMCNC: 29.9 G/DL (ref 31.5–35.7)
MCV RBC AUTO: 92.7 FL (ref 79–97)
METHGB BLD QL: 0.7 % (ref 0–3)
MODALITY: ABNORMAL
MONOCYTES # BLD AUTO: 1.3 10*3/MM3 (ref 0.1–0.9)
MONOCYTES NFR BLD AUTO: 8.8 % (ref 5–12)
NEUTROPHILS NFR BLD AUTO: 12.72 10*3/MM3 (ref 1.7–7)
NEUTROPHILS NFR BLD AUTO: 85.8 % (ref 42.7–76)
NRBC BLD AUTO-RTO: 0 /100 WBC (ref 0–0.2)
NT-PROBNP SERPL-MCNC: ABNORMAL PG/ML (ref 0–1800)
OXYHGB MFR BLDV: 51.1 % (ref 40–70)
PCO2 BLDV: 65.9 MM HG (ref 40–50)
PH BLDV: 7.35 PH UNITS (ref 7.32–7.42)
PLATELET # BLD AUTO: 245 10*3/MM3 (ref 140–450)
PMV BLD AUTO: 9.6 FL (ref 6–12)
PO2 BLDV: 32.1 MM HG (ref 30–50)
POTASSIUM SERPL-SCNC: 3.6 MMOL/L (ref 3.5–5.2)
PROCALCITONIN SERPL-MCNC: 0.12 NG/ML (ref 0–0.25)
PROT SERPL-MCNC: 5.9 G/DL (ref 6–8.5)
RBC # BLD AUTO: 3.83 10*6/MM3 (ref 3.77–5.28)
SAO2 % BLDCOV: 52.2 % (ref 45–75)
SODIUM SERPL-SCNC: 142 MMOL/L (ref 136–145)
TROPONIN T SERPL HS-MCNC: 58 NG/L
VENTILATOR MODE: ABNORMAL
WBC NRBC COR # BLD AUTO: 14.84 10*3/MM3 (ref 3.4–10.8)
WHOLE BLOOD HOLD COAG: NORMAL
WHOLE BLOOD HOLD SPECIMEN: NORMAL

## 2025-05-14 PROCEDURE — 84484 ASSAY OF TROPONIN QUANT: CPT | Performed by: EMERGENCY MEDICINE

## 2025-05-14 PROCEDURE — 93005 ELECTROCARDIOGRAM TRACING: CPT | Performed by: EMERGENCY MEDICINE

## 2025-05-14 PROCEDURE — 25010000002 FUROSEMIDE PER 20 MG: Performed by: EMERGENCY MEDICINE

## 2025-05-14 PROCEDURE — 94799 UNLISTED PULMONARY SVC/PX: CPT

## 2025-05-14 PROCEDURE — 94660 CPAP INITIATION&MGMT: CPT

## 2025-05-14 PROCEDURE — 82805 BLOOD GASES W/O2 SATURATION: CPT

## 2025-05-14 PROCEDURE — 80053 COMPREHEN METABOLIC PANEL: CPT | Performed by: EMERGENCY MEDICINE

## 2025-05-14 PROCEDURE — 99285 EMERGENCY DEPT VISIT HI MDM: CPT | Performed by: EMERGENCY MEDICINE

## 2025-05-14 PROCEDURE — 83880 ASSAY OF NATRIURETIC PEPTIDE: CPT | Performed by: EMERGENCY MEDICINE

## 2025-05-14 PROCEDURE — 99223 1ST HOSP IP/OBS HIGH 75: CPT | Performed by: INTERNAL MEDICINE

## 2025-05-14 PROCEDURE — 82820 HEMOGLOBIN-OXYGEN AFFINITY: CPT

## 2025-05-14 PROCEDURE — 71045 X-RAY EXAM CHEST 1 VIEW: CPT

## 2025-05-14 PROCEDURE — 25010000002 ONDANSETRON PER 1 MG

## 2025-05-14 PROCEDURE — 25010000002 METHYLPREDNISOLONE PER 125 MG: Performed by: EMERGENCY MEDICINE

## 2025-05-14 PROCEDURE — 94640 AIRWAY INHALATION TREATMENT: CPT

## 2025-05-14 PROCEDURE — 85025 COMPLETE CBC W/AUTO DIFF WBC: CPT | Performed by: EMERGENCY MEDICINE

## 2025-05-14 PROCEDURE — 36415 COLL VENOUS BLD VENIPUNCTURE: CPT

## 2025-05-14 PROCEDURE — 84145 PROCALCITONIN (PCT): CPT | Performed by: EMERGENCY MEDICINE

## 2025-05-14 RX ORDER — ASPIRIN 81 MG/1
324 TABLET, CHEWABLE ORAL ONCE
Status: DISCONTINUED | OUTPATIENT
Start: 2025-05-14 | End: 2025-05-16 | Stop reason: HOSPADM

## 2025-05-14 RX ORDER — ONDANSETRON 2 MG/ML
8 INJECTION INTRAMUSCULAR; INTRAVENOUS ONCE
Status: COMPLETED | OUTPATIENT
Start: 2025-05-14 | End: 2025-05-14

## 2025-05-14 RX ORDER — ONDANSETRON 2 MG/ML
INJECTION INTRAMUSCULAR; INTRAVENOUS
Status: COMPLETED
Start: 2025-05-14 | End: 2025-05-14

## 2025-05-14 RX ORDER — SODIUM CHLORIDE 9 MG/ML
40 INJECTION, SOLUTION INTRAVENOUS AS NEEDED
Status: DISCONTINUED | OUTPATIENT
Start: 2025-05-14 | End: 2025-05-16 | Stop reason: HOSPADM

## 2025-05-14 RX ORDER — SODIUM CHLORIDE 0.9 % (FLUSH) 0.9 %
10 SYRINGE (ML) INJECTION EVERY 12 HOURS SCHEDULED
Status: DISCONTINUED | OUTPATIENT
Start: 2025-05-15 | End: 2025-05-16 | Stop reason: HOSPADM

## 2025-05-14 RX ORDER — FUROSEMIDE 10 MG/ML
60 INJECTION INTRAMUSCULAR; INTRAVENOUS 2 TIMES DAILY
Status: DISCONTINUED | OUTPATIENT
Start: 2025-05-15 | End: 2025-05-16

## 2025-05-14 RX ORDER — METOPROLOL TARTRATE 1 MG/ML
2.5 INJECTION, SOLUTION INTRAVENOUS ONCE
Status: COMPLETED | OUTPATIENT
Start: 2025-05-14 | End: 2025-05-14

## 2025-05-14 RX ORDER — IPRATROPIUM BROMIDE AND ALBUTEROL SULFATE 2.5; .5 MG/3ML; MG/3ML
9 SOLUTION RESPIRATORY (INHALATION) ONCE
Status: COMPLETED | OUTPATIENT
Start: 2025-05-14 | End: 2025-05-14

## 2025-05-14 RX ORDER — ACETAMINOPHEN 650 MG/1
650 SUPPOSITORY RECTAL EVERY 4 HOURS PRN
Status: DISCONTINUED | OUTPATIENT
Start: 2025-05-14 | End: 2025-05-16 | Stop reason: HOSPADM

## 2025-05-14 RX ORDER — BISACODYL 5 MG/1
5 TABLET, DELAYED RELEASE ORAL DAILY PRN
Status: DISCONTINUED | OUTPATIENT
Start: 2025-05-14 | End: 2025-05-16 | Stop reason: HOSPADM

## 2025-05-14 RX ORDER — BISACODYL 10 MG
10 SUPPOSITORY, RECTAL RECTAL DAILY PRN
Status: DISCONTINUED | OUTPATIENT
Start: 2025-05-14 | End: 2025-05-16 | Stop reason: HOSPADM

## 2025-05-14 RX ORDER — SODIUM CHLORIDE 0.9 % (FLUSH) 0.9 %
10 SYRINGE (ML) INJECTION AS NEEDED
Status: DISCONTINUED | OUTPATIENT
Start: 2025-05-14 | End: 2025-05-16 | Stop reason: HOSPADM

## 2025-05-14 RX ORDER — AMOXICILLIN 250 MG
2 CAPSULE ORAL 2 TIMES DAILY
Status: DISCONTINUED | OUTPATIENT
Start: 2025-05-15 | End: 2025-05-16 | Stop reason: HOSPADM

## 2025-05-14 RX ORDER — ACETAMINOPHEN 325 MG/1
650 TABLET ORAL EVERY 4 HOURS PRN
Status: DISCONTINUED | OUTPATIENT
Start: 2025-05-14 | End: 2025-05-16 | Stop reason: HOSPADM

## 2025-05-14 RX ORDER — METHYLPREDNISOLONE SODIUM SUCCINATE 125 MG/2ML
125 INJECTION, POWDER, LYOPHILIZED, FOR SOLUTION INTRAMUSCULAR; INTRAVENOUS ONCE
Status: COMPLETED | OUTPATIENT
Start: 2025-05-14 | End: 2025-05-14

## 2025-05-14 RX ORDER — FUROSEMIDE 10 MG/ML
40 INJECTION INTRAMUSCULAR; INTRAVENOUS ONCE
Status: COMPLETED | OUTPATIENT
Start: 2025-05-14 | End: 2025-05-14

## 2025-05-14 RX ORDER — POLYETHYLENE GLYCOL 3350 17 G/17G
17 POWDER, FOR SOLUTION ORAL DAILY PRN
Status: DISCONTINUED | OUTPATIENT
Start: 2025-05-14 | End: 2025-05-16 | Stop reason: HOSPADM

## 2025-05-14 RX ORDER — ONDANSETRON 2 MG/ML
4 INJECTION INTRAMUSCULAR; INTRAVENOUS EVERY 6 HOURS PRN
Status: DISCONTINUED | OUTPATIENT
Start: 2025-05-14 | End: 2025-05-16 | Stop reason: HOSPADM

## 2025-05-14 RX ORDER — ACETAMINOPHEN 160 MG/5ML
650 SOLUTION ORAL EVERY 4 HOURS PRN
Status: DISCONTINUED | OUTPATIENT
Start: 2025-05-14 | End: 2025-05-16 | Stop reason: HOSPADM

## 2025-05-14 RX ADMIN — ONDANSETRON 8 MG: 2 INJECTION INTRAMUSCULAR; INTRAVENOUS at 23:38

## 2025-05-14 RX ADMIN — FUROSEMIDE 40 MG: 10 INJECTION, SOLUTION INTRAMUSCULAR; INTRAVENOUS at 22:59

## 2025-05-14 RX ADMIN — METOPROLOL TARTRATE 2.5 MG: 1 INJECTION, SOLUTION INTRAVENOUS at 22:51

## 2025-05-14 RX ADMIN — METHYLPREDNISOLONE SODIUM SUCCINATE 125 MG: 125 INJECTION, POWDER, FOR SOLUTION INTRAMUSCULAR; INTRAVENOUS at 23:37

## 2025-05-14 RX ADMIN — IPRATROPIUM BROMIDE AND ALBUTEROL SULFATE 9 ML: 2.5; .5 SOLUTION RESPIRATORY (INHALATION) at 23:32

## 2025-05-14 NOTE — Clinical Note
Level of Care: Telemetry [5]   Diagnosis: Acute on chronic HFrEF (heart failure with reduced ejection fraction) [[id number]]   Admitting Physician: JACQUELINE POZO [[id number]]   Certification: I Certify That Inpatient Hospital Services Are Medically Necessary For Greater Than 2 Midnights

## 2025-05-15 ENCOUNTER — READMISSION MANAGEMENT (OUTPATIENT)
Dept: CALL CENTER | Facility: HOSPITAL | Age: 85
End: 2025-05-15
Payer: MEDICARE

## 2025-05-15 ENCOUNTER — APPOINTMENT (OUTPATIENT)
Dept: CARDIAC REHAB | Facility: HOSPITAL | Age: 85
End: 2025-05-15
Payer: MEDICARE

## 2025-05-15 LAB
ANION GAP SERPL CALCULATED.3IONS-SCNC: 13 MMOL/L (ref 5–15)
B PARAPERT DNA SPEC QL NAA+PROBE: NOT DETECTED
B PERT DNA SPEC QL NAA+PROBE: NOT DETECTED
BUN SERPL-MCNC: 37 MG/DL (ref 8–23)
BUN/CREAT SERPL: 30.3 (ref 7–25)
C PNEUM DNA NPH QL NAA+NON-PROBE: NOT DETECTED
CALCIUM SPEC-SCNC: 8.6 MG/DL (ref 8.6–10.5)
CHLORIDE SERPL-SCNC: 98 MMOL/L (ref 98–107)
CO2 SERPL-SCNC: 30 MMOL/L (ref 22–29)
CREAT SERPL-MCNC: 1.22 MG/DL (ref 0.57–1)
DEPRECATED RDW RBC AUTO: 55.3 FL (ref 37–54)
EGFRCR SERPLBLD CKD-EPI 2021: 43.8 ML/MIN/1.73
ERYTHROCYTE [DISTWIDTH] IN BLOOD BY AUTOMATED COUNT: 16.8 % (ref 12.3–15.4)
FLUAV SUBTYP SPEC NAA+PROBE: NOT DETECTED
FLUBV RNA ISLT QL NAA+PROBE: NOT DETECTED
GEN 5 1HR TROPONIN T REFLEX: 61 NG/L
GLUCOSE SERPL-MCNC: 136 MG/DL (ref 65–99)
HADV DNA SPEC NAA+PROBE: NOT DETECTED
HCOV 229E RNA SPEC QL NAA+PROBE: NOT DETECTED
HCOV HKU1 RNA SPEC QL NAA+PROBE: NOT DETECTED
HCOV NL63 RNA SPEC QL NAA+PROBE: NOT DETECTED
HCOV OC43 RNA SPEC QL NAA+PROBE: NOT DETECTED
HCT VFR BLD AUTO: 35.9 % (ref 34–46.6)
HGB BLD-MCNC: 10.9 G/DL (ref 12–15.9)
HMPV RNA NPH QL NAA+NON-PROBE: NOT DETECTED
HPIV1 RNA ISLT QL NAA+PROBE: NOT DETECTED
HPIV2 RNA SPEC QL NAA+PROBE: NOT DETECTED
HPIV3 RNA NPH QL NAA+PROBE: NOT DETECTED
HPIV4 P GENE NPH QL NAA+PROBE: NOT DETECTED
M PNEUMO IGG SER IA-ACNC: NOT DETECTED
MCH RBC QN AUTO: 27.8 PG (ref 26.6–33)
MCHC RBC AUTO-ENTMCNC: 30.4 G/DL (ref 31.5–35.7)
MCV RBC AUTO: 91.6 FL (ref 79–97)
PLATELET # BLD AUTO: 220 10*3/MM3 (ref 140–450)
PMV BLD AUTO: 9.6 FL (ref 6–12)
POTASSIUM SERPL-SCNC: 4 MMOL/L (ref 3.5–5.2)
RBC # BLD AUTO: 3.92 10*6/MM3 (ref 3.77–5.28)
RHINOVIRUS RNA SPEC NAA+PROBE: DETECTED
RSV RNA NPH QL NAA+NON-PROBE: NOT DETECTED
SARS-COV-2 RNA RESP QL NAA+PROBE: NOT DETECTED
SODIUM SERPL-SCNC: 141 MMOL/L (ref 136–145)
TROPONIN T % DELTA: 5
TROPONIN T NUMERIC DELTA: 3 NG/L
WBC NRBC COR # BLD AUTO: 16.22 10*3/MM3 (ref 3.4–10.8)

## 2025-05-15 PROCEDURE — 92610 EVALUATE SWALLOWING FUNCTION: CPT

## 2025-05-15 PROCEDURE — 85027 COMPLETE CBC AUTOMATED: CPT | Performed by: INTERNAL MEDICINE

## 2025-05-15 PROCEDURE — 0202U NFCT DS 22 TRGT SARS-COV-2: CPT | Performed by: INTERNAL MEDICINE

## 2025-05-15 PROCEDURE — 25010000002 FUROSEMIDE PER 20 MG: Performed by: INTERNAL MEDICINE

## 2025-05-15 PROCEDURE — 99221 1ST HOSP IP/OBS SF/LOW 40: CPT | Performed by: STUDENT IN AN ORGANIZED HEALTH CARE EDUCATION/TRAINING PROGRAM

## 2025-05-15 PROCEDURE — 80048 BASIC METABOLIC PNL TOTAL CA: CPT | Performed by: INTERNAL MEDICINE

## 2025-05-15 PROCEDURE — 99232 SBSQ HOSP IP/OBS MODERATE 35: CPT | Performed by: STUDENT IN AN ORGANIZED HEALTH CARE EDUCATION/TRAINING PROGRAM

## 2025-05-15 RX ORDER — UBIDECARENONE 75 MG
100 CAPSULE ORAL DAILY
Status: DISCONTINUED | OUTPATIENT
Start: 2025-05-15 | End: 2025-05-16 | Stop reason: HOSPADM

## 2025-05-15 RX ORDER — POTASSIUM CHLORIDE 1500 MG/1
20 TABLET, EXTENDED RELEASE ORAL 2 TIMES DAILY
Status: DISCONTINUED | OUTPATIENT
Start: 2025-05-15 | End: 2025-05-16 | Stop reason: HOSPADM

## 2025-05-15 RX ORDER — PANTOPRAZOLE SODIUM 40 MG/1
40 TABLET, DELAYED RELEASE ORAL EVERY MORNING
Status: DISCONTINUED | OUTPATIENT
Start: 2025-05-15 | End: 2025-05-16 | Stop reason: HOSPADM

## 2025-05-15 RX ORDER — SPIRONOLACTONE 25 MG/1
12.5 TABLET ORAL DAILY
Status: DISCONTINUED | OUTPATIENT
Start: 2025-05-15 | End: 2025-05-16 | Stop reason: HOSPADM

## 2025-05-15 RX ORDER — METOPROLOL TARTRATE 25 MG/1
12.5 TABLET, FILM COATED ORAL EVERY 12 HOURS SCHEDULED
Status: DISCONTINUED | OUTPATIENT
Start: 2025-05-15 | End: 2025-05-16

## 2025-05-15 RX ORDER — CITALOPRAM HYDROBROMIDE 20 MG/1
20 TABLET ORAL DAILY
Status: DISCONTINUED | OUTPATIENT
Start: 2025-05-15 | End: 2025-05-16 | Stop reason: HOSPADM

## 2025-05-15 RX ADMIN — CITALOPRAM HYDROBROMIDE 20 MG: 20 TABLET ORAL at 08:04

## 2025-05-15 RX ADMIN — FUROSEMIDE 60 MG: 10 INJECTION, SOLUTION INTRAMUSCULAR; INTRAVENOUS at 21:09

## 2025-05-15 RX ADMIN — METOPROLOL TARTRATE 12.5 MG: 25 TABLET, FILM COATED ORAL at 21:09

## 2025-05-15 RX ADMIN — SACUBITRIL AND VALSARTAN 1 TABLET: 24; 26 TABLET, FILM COATED ORAL at 08:05

## 2025-05-15 RX ADMIN — METOPROLOL TARTRATE 12.5 MG: 25 TABLET, FILM COATED ORAL at 08:04

## 2025-05-15 RX ADMIN — SACUBITRIL AND VALSARTAN 1 TABLET: 24; 26 TABLET, FILM COATED ORAL at 21:09

## 2025-05-15 RX ADMIN — SENNOSIDES AND DOCUSATE SODIUM 2 TABLET: 50; 8.6 TABLET ORAL at 21:09

## 2025-05-15 RX ADMIN — VITAM B12 100 MCG: 100 TAB at 08:05

## 2025-05-15 RX ADMIN — Medication 10 ML: at 08:09

## 2025-05-15 RX ADMIN — PANTOPRAZOLE SODIUM 40 MG: 40 TABLET, DELAYED RELEASE ORAL at 08:05

## 2025-05-15 RX ADMIN — Medication 10 ML: at 21:09

## 2025-05-15 RX ADMIN — SPIRONOLACTONE 12.5 MG: 25 TABLET, FILM COATED ORAL at 08:05

## 2025-05-15 RX ADMIN — EMPAGLIFLOZIN 10 MG: 10 TABLET, FILM COATED ORAL at 08:04

## 2025-05-15 RX ADMIN — ACETAMINOPHEN 650 MG: 325 TABLET, FILM COATED ORAL at 19:44

## 2025-05-15 RX ADMIN — POTASSIUM CHLORIDE 20 MEQ: 1500 TABLET, EXTENDED RELEASE ORAL at 08:10

## 2025-05-15 RX ADMIN — Medication 10 ML: at 08:08

## 2025-05-15 RX ADMIN — POTASSIUM CHLORIDE 20 MEQ: 1500 TABLET, EXTENDED RELEASE ORAL at 21:09

## 2025-05-15 RX ADMIN — RIVAROXABAN 15 MG: 15 TABLET, FILM COATED ORAL at 08:04

## 2025-05-15 NOTE — ED PROVIDER NOTES
HOSPITALIST PROGRESS NOTE:      Patient: Ileana Marroquin Date: 12/28/2017   female, 57 year old  Admit Date: 12/27/2017   Attending: Ollie Eisenberg*      Subjective:  Ileana Marroquin is a 57 year old year old female who is being seen in follow up for Abdominal pain and constipation. The patient states the pain is better controlled and would like to try some food. Had bowel movement throughout the night.        ALLERGIES:   Allergies as of 12/27/2017   • (No Known Allergies)         Objective/Physical Exam     Vital 24 Hour Range Last Value  Temperature Temp  Min: 98.3 °F (36.8 °C)  Max: 98.7 °F (37.1 °C) 98.3 °F (36.8 °C) (12/28/17 1138)  Pulse Pulse  Min: 73  Max: 117 89 (12/28/17 1138)  Respiratory Resp  Min: 16  Max: 16 16 (12/28/17 1138)  Non-Invasive  Blood Pressure BP  Min: 111/53  Max: 133/61 133/61 (12/28/17 1138)  Pulse Oximetry SpO2  Min: 94 %  Max: 99 % 98 % (12/28/17 1138)    General appearance: alert  Head: Normocephalic, without obvious abnormality, atraumatic  Neck: no adenopathy, no carotid bruit, no JVD, supple, symmetrical, trachea midline and thyroid not enlarged, symmetric, no tenderness/mass/nodules  Lungs: clear to auscultation bilaterally  Heart: regular rate and rhythm, S1, S2 normal, no murmur, click, rub or gallop  Abdomen: Soft, tender to palpation diffusely.  Extremities: extremities normal, atraumatic, no cyanosis or edema     Laboratory Results:      Labs personally reviewed     Pertinent:       Recent Labs  Lab 12/28/17  0816 12/27/17  0553 12/22/17  1305   WBC 33.5* 22.8* 5.4   RBC 2.75* 4.26 2.52*   HGB 10.0* 15.6* 9.6*   HCT 28.5* 44.4 27.0*    306 228   SEG  --   --  83   LYMP  --  15  --          Recent Labs  Lab 12/28/17  0816 12/27/17  2140 12/27/17  0553 12/22/17  1305   SODIUM 135  --  135 136   POTASSIUM 4.5 3.6 3.3* 3.6   CHLORIDE 99  --  99 100   CO2 27  --  26 26   BUN 26*  --  28* 14   CREATININE 0.90  --  1.07* 1.10*   GFRNA 71  --  58 56   GLUCOSE  " EMERGENCY DEPARTMENT ENCOUNTER    Pt Name: Elsa Alcaraz  MRN: [id number]  Pt :   1940  Room Number:    Date of encounter:  2025  PCP: Kemi Hdz MD  ED Provider: Logan Cheung MD    Historian: Patient      HPI:  Chief Complaint: Dyspnea        Context: Elsa Alcaraz is a 85 y.o. female who presents to the ED c/o dyspnea..  Patient has a past medical history significant for severe mitral regurgitation s/p MitraClip in April of this year at [facility] by cardiothoracic surgery, HFrEF, atrial fibrillation on Xarelto, complete heart block with pacemaker, hypertension, hyperlipidemia, COPD and obstructive sleep apnea.  Patient says that over the past couple days she has had progressive worsening dyspnea.  Patient says that she has been taking Lasix for the past couple days and has had good urinary output but continues to have progressive worsening shortness of breath.  She denies having any associated fever or chest pain.      PAST MEDICAL HISTORY  Past Medical History:   Diagnosis Date    Anemia     Anesthesia complication     \"woke up early\"    Anxiety disorder due to general medical condition 2017    Arthritis     Atrial fibrillation 2017    Body piercing     BOTH EARS    Borderline diabetes     Breast cancer     right-radiation and a lumpectomy    Broken tooth     Cataract 2017    Left eye surgery     Chronic bronchitis 2017    Colon cancer 1998    had surgery and chemo    COPD (chronic obstructive pulmonary disease)     Cyanocobalamine deficiency (non anemic)     Depression 2017    Diverticulosis     Elevated cholesterol     Esophageal reflux 2017    Hiatal hernia 2017    History of bladder infections     History of echocardiogram 2021    Hx of exercise stress test     \"several years ago by Dr. Mercado\".      Hx of radiation therapy     Hyperlipidemia     Hypertension 2017    Impaired functional " 175*  --  119* 117*   CALCIUM 8.4  --  8.9 8.9   ALBUMIN 3.0*  --  3.6 3.4*   AST 29  --  17 23   GPT 14  --  19 18   BILIRUBIN 1.0  --  0.7 0.6   ALKPT 273*  --  137* 117   INR 2.9  --  2.2  --        Results for orders placed or performed during the hospital encounter of 12/27/17   Blood Culture   Result Value Ref Range    Specimen Description BLOOD RIGHT CHEST     CULTURE NO GROWTH 1 DAY     REPORT STATUS PENDING              Diagnostics: Reviewed     Results for orders placed during the hospital encounter of 12/27/17   CT Abdomen Pelvis w/ IV contrast only    Impression IMPRESSION:   1. No evidence of bowel obstruction. Extensive stool throughout the colon  consistent with constipation.  2. Progression of metastases including liver lesions and peritoneal  implants.  3. Left hydronephrosis mildly increased.    This case was discussed with Dr. Rider at the time it was performed.          Medications/Infusions:    Reviewed         Assessment & Plan:     · Abdominal pain: Possibly related to opioid induce constipation, the patient is afebrile, with  Elevated lactic acid ad leukocytosis on admission. Started aggressive bowel regimen. Now having bowel movements. Will advance her diet. Remains afebrile. However given her catabolic condition and hypoalbuminemia may not be able to mount a full inflammatory reaction.  · Stage IV Adenocarcinoma of the gallbladder - Following with oncology as outpatient, DNR DNI status since today.      · Hydronephrosis- Renal function is preserved, will continue to monitor.  · Anemia - No evidence of active bleeding, will continue to monitor.  · Hypokalemia - Will continue replacement  · Hypermagnesemia: Will continue to monitor QT.    Very complex patient with multiple comorbidities, poor prognosis in the short term, will clinically reassess and depending on her response may consider a palliative care consult.    DVT Propylaxis - TEDs SCD, warfarin    Code status: No Resuscitation with  mobility and activity tolerance     Impaired functional mobility, balance, gait, and endurance     Osteoporosis     Palpitations 2017    Prediabetes     Problems with swallowing     meat at times per pt report    Shortness of breath     WITH EXERTION     Sleep apnea 2017    NO CPAP    Tricuspid valve disorder 2017    Patient doesn't know anything about this    Vitamin D deficiency     Wears glasses          PAST SURGICAL HISTORY  Past Surgical History:   Procedure Laterality Date    ANAL FISTULOTOMY      APPENDECTOMY      1998    BREAST BIOPSY      BREAST LUMPECTOMY Right 2006    CARDIAC CATHETERIZATION N/A 2025    Procedure: Left Heart Cath - Femoral access;  Surgeon: Jeannie Zabala MD;  Location:  [facility];  Service: Cardiovascular;  Laterality: N/A;    CARDIAC ELECTROPHYSIOLOGY PROCEDURE N/A 2023    Procedure: Micra;  Surgeon: Keven Willis DO;  Location: [facility] INVASIVE LOCATION;  Service: Cardiology;  Laterality: N/A;    CATARACT EXTRACTION      both eyes     CATARACT EXTRACTION W/ INTRAOCULAR LENS IMPLANT Left 2019    Procedure: CATARACT PHACO EXTRACTION WITH INTRAOCULAR LENS IMPLANT LEFT;  Surgeon: Masoud David MD;  Location: [facility] OR;  Service: Ophthalmology    CATARACT EXTRACTION W/ INTRAOCULAR LENS IMPLANT Right 2019    Procedure: CATARACT PHACO EXTRACTION WITH INTRAOCULAR LENS IMPLANT RIGHT;  Surgeon: Masoud David MD;  Location: [facility] OR;  Service: Ophthalmology     SECTION  1981    CHOLECYSTECTOMY  2009    COLECTOMY PARTIAL / TOTAL  1998    COLON CANCER    COLONOSCOPY      COLONOSCOPY N/A 2021    Procedure: COLONOSCOPY WITH BIOPSY;  Surgeon: Iona Sanders MD;  Location: [facility] ENDOSCOPY;  Service: Gastroenterology;  Laterality: N/A;    ENDOSCOPY      ENDOSCOPY N/A 2018    Procedure: ESOPHAGOGASTRODUODENOSCOPY WITH COLD FORCEP BIOPSY;  Surgeon:  Maximal Medical Support      Central Line- NAFoley Catheter- NA       -------------------------------------------------------------------------------------------------------------------      Hospital Day #: Hospital Day: 2    Tentative discharge Disposition: To be determined     GOALS OF CARE :     Goals of care were discussed with the patient and family which included but not limited to anticipated treatment course during the current hospitalization, recovery from current event, discharge planning and transitions of care responsibilities and expected outcomes. Code Status was addressed on admission as well.  End of life care discussion done, code status changed to DNR        Signed:  Ollie Otto MD  12/28/2017  3:07 PM     Vasquez Fagan MD;  Location: [facility] ENDOSCOPY;  Service: Gastroenterology    ENDOSCOPY N/A 08/12/2019    Procedure: ESOPHAGOGASTRODUODENOSCOPY WITH BIOPSY;  Surgeon: Vasquez Fagan MD;  Location: [facility] ENDOSCOPY;  Service: Gastroenterology    ENDOSCOPY N/A 09/13/2022    Procedure: ESOPHAGOGASTRODUODENOSCOPY with biopsy and polypectomy  ;  Surgeon: Iona Sanders MD;  Location: [facility] ENDOSCOPY;  Service: Gastroenterology;  Laterality: N/A;    ENDOSCOPY N/A 06/28/2024    Procedure: ESOPHAGOGASTRODUODENOSCOPY WITH BIOPSY and dilatation;  Surgeon: Iona Sanders MD;  Location: [facility] ENDOSCOPY;  Service: Gastroenterology;  Laterality: N/A;    HYSTERECTOMY      1998    INSERT / REPLACE / REMOVE PACEMAKER      MITRAL VALVE REPAIR/REPLACEMENT  4/23/2025    Procedure: Transcatheter Mitral Valve Repair;  Surgeon: Jeannie Zabala MD;  Location: [facility] INVASIVE LOCATION;  Service: Cardiovascular;;    SKIN BIOPSY Left     arm    SKIN LESION EXCISION N/A     VENTRAL HERNIA REPAIR  10/28/2012         FAMILY HISTORY  Family History   Problem Relation Age of Onset    Hypertension Mother     Asthma Mother     COPD Mother     Osteoarthritis Mother     Stomach cancer Father     Cancer Father     Cancer Sister     Diabetes Maternal Grandmother     Colon cancer Neg Hx          SOCIAL HISTORY  Social History     Socioeconomic History    Marital status:    Tobacco Use    Smoking status: Never     Passive exposure: Past    Smokeless tobacco: Never   Vaping Use    Vaping status: Never Used   Substance and Sexual Activity    Alcohol use: Not Currently     Alcohol/week: 1.0 - 2.0 standard drink of alcohol     Types: 1 - 2 Glasses of wine per week     Comment: rarely    Drug use: Never    Sexual activity: Defer         ALLERGIES  Penicillins and Other        REVIEW OF SYSTEMS    All systems reviewed and negative except for those discussed in HPI.       PHYSICAL EXAM    I have reviewed the triage vital  signs and nursing notes.    ED Triage Vitals [05/14/25 2134]   Temp Heart Rate Resp BP SpO2   97.8 °F (36.6 °C) 107 20 101/77 98 %      Temp src Heart Rate Source Patient Position BP Location FiO2 (%)   Oral Monitor Sitting Left arm --         General: Moderate acute distress, tachypneic.  Rhonchi without stethoscope.  Skin: normal color, warm and dry  Head: normocephalic, atraumatic  Nose: normal nasal mucosa, no visible deformity.  Mouth: moist mucous membranes.  Neck: supple.  Cardiovascular: Tachycardic, irregular rhythm  Lungs: Rhonchi throughout all lung fields bilaterally  Abdomen: soft, non-tender, non-distended. No rebound tenderness, no guarding.  No peritonitis.  Extremities: 1+ edema about the bilateral distal lower extremities. Palpable radial pulses bilaterally.  Neuro:  alert and oriented x3, no focal neurological deficits.  Psych:  appropriate mood and behavior.        LAB RESULTS  Recent Results (from the past 24 hours)   Comprehensive Metabolic Panel    Collection Time: 05/14/25 10:14 PM    Specimen: Blood   Result Value Ref Range    Glucose 157 (H) 65 - 99 mg/dL    BUN 36 (H) 8 - 23 mg/dL    Creatinine 1.27 (H) 0.57 - 1.00 mg/dL    Sodium 142 136 - 145 mmol/L    Potassium 3.6 3.5 - 5.2 mmol/L    Chloride 101 98 - 107 mmol/L    CO2 29.7 (H) 22.0 - 29.0 mmol/L    Calcium 8.6 8.6 - 10.5 mg/dL    Total Protein 5.9 (L) 6.0 - 8.5 g/dL    Albumin 3.7 3.5 - 5.2 g/dL    ALT (SGPT) 44 (H) 1 - 33 U/L    AST (SGOT) 16 1 - 32 U/L    Alkaline Phosphatase 92 39 - 117 U/L    Total Bilirubin 1.5 (H) 0.0 - 1.2 mg/dL    Globulin 2.2 gm/dL    A/G Ratio 1.7 g/dL    BUN/Creatinine Ratio 28.3 (H) 7.0 - 25.0    Anion Gap 11.3 5.0 - 15.0 mmol/L    eGFR 41.8 (L) >60.0 mL/min/1.73   BNP    Collection Time: 05/14/25 10:14 PM    Specimen: Blood   Result Value Ref Range    proBNP 14,216.0 (H) 0.0 - 1,800.0 pg/mL   High Sensitivity Troponin T    Collection Time: 05/14/25 10:14 PM    Specimen: Blood   Result Value Ref Range     HS Troponin T 58 (C) <14 ng/L   Green Top (Gel)    Collection Time: 05/14/25 10:14 PM   Result Value Ref Range    Extra Tube Hold for add-ons.    Lavender Top    Collection Time: 05/14/25 10:14 PM   Result Value Ref Range    Extra Tube hold for add-on    Light Blue Top    Collection Time: 05/14/25 10:14 PM   Result Value Ref Range    Extra Tube Hold for add-ons.    CBC Auto Differential    Collection Time: 05/14/25 10:14 PM    Specimen: Blood   Result Value Ref Range    WBC 14.84 (H) 3.40 - 10.80 10*3/mm3    RBC 3.83 3.77 - 5.28 10*6/mm3    Hemoglobin 10.6 (L) 12.0 - 15.9 g/dL    Hematocrit 35.5 34.0 - 46.6 %    MCV 92.7 79.0 - 97.0 fL    MCH 27.7 26.6 - 33.0 pg    MCHC 29.9 (L) 31.5 - 35.7 g/dL    RDW 17.1 (H) 12.3 - 15.4 %    RDW-SD 56.3 (H) 37.0 - 54.0 fl    MPV 9.6 6.0 - 12.0 fL    Platelets 245 140 - 450 10*3/mm3    Neutrophil % 85.8 (H) 42.7 - 76.0 %    Lymphocyte % 3.3 (L) 19.6 - 45.3 %    Monocyte % 8.8 5.0 - 12.0 %    Eosinophil % 1.3 0.3 - 6.2 %    Basophil % 0.3 0.0 - 1.5 %    Immature Grans % 0.5 0.0 - 0.5 %    Neutrophils, Absolute 12.72 (H) 1.70 - 7.00 10*3/mm3    Lymphocytes, Absolute 0.49 (L) 0.70 - 3.10 10*3/mm3    Monocytes, Absolute 1.30 (H) 0.10 - 0.90 10*3/mm3    Eosinophils, Absolute 0.20 0.00 - 0.40 10*3/mm3    Basophils, Absolute 0.05 0.00 - 0.20 10*3/mm3    Immature Grans, Absolute 0.08 (H) 0.00 - 0.05 10*3/mm3    nRBC 0.0 0.0 - 0.2 /100 WBC   Procalcitonin    Collection Time: 05/14/25 10:14 PM    Specimen: Blood   Result Value Ref Range    Procalcitonin 0.12 0.00 - 0.25 ng/mL   High Sensitivity Troponin T 1Hr    Collection Time: 05/14/25 11:22 PM    Specimen: Blood   Result Value Ref Range    HS Troponin T 61 (C) <14 ng/L    Troponin T Numeric Delta 3 ng/L    Troponin T % Delta 5 Abnormal if >/= 20%   Blood Gas, Venous With Co-Ox    Collection Time: 05/14/25 11:32 PM    Specimen: Venous Blood   Result Value Ref Range    Site OTHER     pH, Venous 7.353 7.320 - 7.420 pH Units    pCO2, Venous  65.9 (H) 40.0 - 50.0 mm Hg    pO2, Venous 32.1 30.0 - 50.0 mm Hg    HCO3, Venous 36.6 (H) 22.0 - 28.0 mmol/L    Base Excess, Venous 8.8 (H) 0.0 - 2.0 mmol/L    O2 Saturation, Venous 52.2 45.0 - 75.0 %    Oxyhemoglobin Venous 51.1 40.0 - 70.0 %    Methemoglobin Venous 0.7 0.0 - 3.0 %    Carboxyhemoglobin Venous 1.3 0.0 - 5.0 %    Barometric Pressure for Blood Gas 728 mmHg    Modality BiPap     FIO2 50 %    Ventilator Mode BiPAP     Collected by LAB        If labs were ordered, I independently reviewed the results and considered them in treating the patient.  See medical decision making discussion section for my interpretation of lab results.        RADIOLOGY  No Radiology Exams Resulted Within Past 24 Hours    I ordered and independently reviewed the above noted radiographic studies.  See radiologist's dictation for official interpretation.    Per my independent reading:      Chest radiograph obtained and based on my independent review shows cardiomegaly and pulmonary vascular congestion.            PROCEDURES    Procedures    ECG 12 Lead Dyspnea   Final Result          MEDICATIONS GIVEN IN ER    Medications   sodium chloride 0.9 % flush 10 mL (has no administration in time range)   aspirin chewable tablet 324 mg (0 mg Oral Hold 5/14/25 4943)   sodium chloride 0.9 % flush 10 mL (has no administration in time range)   sodium chloride 0.9 % flush 10 mL (has no administration in time range)   sodium chloride 0.9 % infusion 40 mL (has no administration in time range)   acetaminophen (TYLENOL) tablet 650 mg (has no administration in time range)     Or   acetaminophen (TYLENOL) 160 MG/5ML oral solution 650 mg (has no administration in time range)     Or   acetaminophen (TYLENOL) suppository 650 mg (has no administration in time range)   ondansetron (ZOFRAN) injection 4 mg (has no administration in time range)   sennosides-docusate (PERICOLACE) 8.6-50 MG per tablet 2 tablet (0 tablets Oral Hold 5/15/25 0025)     And    polyethylene glycol (MIRALAX) packet 17 g (has no administration in time range)     And   bisacodyl (DULCOLAX) EC tablet 5 mg (has no administration in time range)     And   bisacodyl (DULCOLAX) suppository 10 mg (has no administration in time range)   furosemide (LASIX) injection 60 mg (has no administration in time range)   citalopram (CeleXA) tablet 20 mg (has no administration in time range)   empagliflozin (JARDIANCE) tablet 10 mg (has no administration in time range)   metoprolol tartrate (LOPRESSOR) tablet 12.5 mg (0 mg Oral Hold 5/15/25 0014)   pantoprazole (PROTONIX) EC tablet 40 mg (has no administration in time range)   potassium chloride (KLOR-CON M20) CR tablet 20 mEq (0 mEq Oral Hold 5/15/25 0025)   spironolactone (ALDACTONE) tablet 12.5 mg (has no administration in time range)   vitamin B-12 (CYANOCOBALAMIN) tablet 100 mcg (has no administration in time range)   rivaroxaban (XARELTO) tablet 15 mg (has no administration in time range)   sacubitril-valsartan (ENTRESTO) 24-26 MG tablet 1 tablet (has no administration in time range)   metoprolol tartrate (LOPRESSOR) injection 2.5 mg (2.5 mg Intravenous Given 5/14/25 2251)   furosemide (LASIX) injection 40 mg (40 mg Intravenous Given 5/14/25 2259)   ipratropium-albuterol (DUO-NEB) nebulizer solution 9 mL (9 mL Nebulization Given 5/14/25 2332)   methylPREDNISolone sodium succinate (SOLU-Medrol) injection 125 mg (125 mg Intravenous Given 5/14/25 2337)   ondansetron (ZOFRAN) injection 8 mg (8 mg Intravenous Given 5/14/25 2338)         MEDICAL DECISION MAKING, PROGRESS, and CONSULTS    All labs, if obtained, have been independently reviewed by me.  All radiology studies, if obtained, have been reviewed by me and the radiologist dictating the report.  All EKG's, if obtained, have been independently viewed and interpreted by me/my attending physician.      Discussion below represents my analysis of pertinent findings related to patient's condition,  differential diagnosis, treatment plan and final disposition.                         Differential diagnosis:    Differential diagnosis for this patient includes heart failure exacerbation, COPD exacerbation, acute coronary syndrome, pneumothorax, pulmonary embolism, pericarditis, myocarditis, cardiac tamponade, pericardial effusion, aortic dissection, Boerhaave syndrome, other acute emergency.    Medical Decision Making Discussion:    Vitals reviewed and demonstrate tachycardia and borderline hypotension but otherwise are normal.    EKG obtained and based on my independent review shows atrial fibrillation with rapid ventricular sponsor.  Right bundle branch block.  Appropriate discordance.  This EKG does not appear to be significantly changed in comparison to previous from 5/7/2025.    Clinically, patient is having a heart failure exacerbation.  She was given only 40 mg of IV Lasix given borderline hypotension.  Due to her A-fib with RVR was also given a small dose of IV Lopressor with 2.5 mg given her associated heart failure with reduced ejection fraction.    Given that she also has COPD she was also given IV Solu-Medrol and 3 back-to-back DuoNeb's.    Patient has had increasing work of breathing and was placed on BiPAP.  Following this and 3 back-to-back DuoNeb's patient has had significant improvement in work of breathing.    Labs reviewed which demonstrate near baseline troponin of 58.  Repeat troponin shows no significant delta rise.  Venous gas shows compensated respiratory acidosis.  proBNP significantly elevated.  BUN and creatinine near baseline.  Leukocytosis of 14.    Case discussed with Dr. Hicks accepted the patient for hospitalization    60 minutes of critical care provided. This time excludes other billable procedures. Time does include preparation of documents, medical consultations, review of old records, and direct bedside care. Patient is at high risk for life-threatening deterioration due to  hypercarbic respiratory failure, heart failure dysplasia, COPD exacerbation.      Additional sources:    - External (non-ED) record review: Cardiology note from March 2025 documenting reduced ejection fraction of 35% with severe mitral regurgitation, atrial fibrillation on Xarelto, complete heart block with pacemaker, hypertension, hyperlipidemia, COPD and obstructive sleep apnea.    - Chronic or social conditions impacting care: COPD, atrial fibrillation, heart failure with reduced ejection fraction    Shared Decision Making:  After my consideration of clinical presentation and any laboratory/radiology studies obtained, I discussed the findings with the patient/patient representative who is in agreement with the treatment plan and the final disposition.   Risks and benefits of discharge and/or observation/admission were discussed.    Orders placed during this visit:  Orders Placed This Encounter   Procedures    Respiratory Panel PCR w/COVID-19(SARS-CoV-2) [facility] In-House, NP Swab in UTM/VTM, 2 HR TAT - Swab, Nasopharynx    XR Chest 1 View    [place] Draw    Comprehensive Metabolic Panel    BNP    High Sensitivity Troponin T    CBC Auto Differential    Procalcitonin    High Sensitivity Troponin T 1Hr    Blood Gas, Venous -With Co-Ox Panel: Yes    Blood Gas, Venous With Co-Ox    Basic Metabolic Panel    CBC (No Diff)    Diet: Cardiac; Healthy Heart (2-3 Na+); Fluid Consistency: Thin (IDDSI 0)    Undress & Gown    Vital Signs    Vital Signs    Up With Assistance    Intake & Output    Weigh patient    Oral Care    Saline Lock & Maintain IV Access    Continuous Pulse Oximetry    Daily Weights    Strict Intake & Output    Code Status and Medical Interventions: CPR (Attempt to Resuscitate); Full Support    Inpatient Cardiology Consult    Oxygen Therapy- Nasal Cannula; Titrate 1-6 LPM Per SpO2; 90 - 95%    NIPPV - Provider Settings    Incentive Spirometry    ECG 12 Lead Dyspnea    Insert Peripheral IV     Insert Peripheral IV    Inpatient Admission    Fall Precautions    CBC & Differential    Green Top (Gel)    Lavender Top    Light Blue Top         Additional orders considered but not ordered:  Considered CT PE protocol but this is thought to be less likely as she is anticoagulated with Xarelto.        AS OF 03:35 EDT VITALS:    BP - 94/74  HR - 109  TEMP - 97.8 °F (36.6 °C) (Oral)  O2 SATS - 98%                  DIAGNOSIS  Final diagnoses:   Atrial fibrillation with RVR   Acute exacerbation of chronic heart failure   COPD exacerbation         DISPOSITION  Admit      Please note that portions of this document were completed with voice recognition software.        oLgan Cheung MD  05/15/25 2897

## 2025-05-15 NOTE — H&P
[facility]      Name:  Elsa Alcaraz   Age:  85 y.o.  Sex:  female  :  1940  MRN:  [id number]   Visit Number:  [id number]  Admission Date:  2025  Date Of Service:  25  Primary Care Physician:  Kemi Hdz MD    Chief Complaint:     Shortness of breath.    History Of Presenting Illness:      Elsa Alcaraz is an 85-year-old female with history of severe mitral regurgitation who recently underwent MitraClip placement at [facility] on 2025, CKD 3, chronic HFrEF, atrial fibrillation on rivaroxaban, sick sinus syndrome status post intracardiac pacemaker, hypertension, COPD on 4 L of home oxygen, obstructive sleep apnea was brought to the emergency room by her  with progressive worsening of shortness of breath over the last couple of days.  Patient states that she has been doing well after her MitraClip procedure but has been having intermittent exertional shortness of breath.  She started having increasing shortness of breath and wheezing since yesterday despite taking her regular medications.  She does have oxygen at home but does not use BiPAP.  She lives with her  and was brought to the emergency room.  Denies any fevers or chest pain.  She follows up with Dr. Germain who is her cardiologist.    In the emergency room, she was afebrile but tachycardic in the 107 and was saturating at 98% on 4 L of nasal cannula oxygen.  She did have significant audible wheezing and was in respiratory distress.  She was initially placed on nasal cannula oxygen and was given bronchodilator nebulizations.  She was also given 40 mg of IV Lasix.  Initial troponin 58, proBNP 14,216.  CMP showed a BUN of 36, creatinine 1.27 (baseline), glucose 157.  Procalcitonin was within normal range.  WBC 14.8, hemoglobin 10.6.  Chest x-ray showed cardiomegaly with evidence of CHF.  EKG showed atrial fibrillation which is chronic.  Patient was  also given IV Solu-Medrol and IV metoprolol while in the emergency room.  Due to progressive worsening of shortness of breath, she was placed on BiPAP therapy.  VBG showed a pH of 7.35, pCO2 66 and bicarb of 37.  Patient was subsequently admitted to the medical floor with telemetry for acute on chronic respiratory failure and acute on chronic HFrEF.    Review Of Systems:    All systems were reviewed and negative except as mentioned in history of presenting illness, assessment and plan.    Past Medical History: Patient's  has a past medical history of Anemia, Anesthesia complication, Anxiety disorder due to general medical condition (2017), Arthritis, Atrial fibrillation (2017), Body piercing, Borderline diabetes, Breast cancer (), Broken tooth, Cataract (2017), Chronic bronchitis (2017), Colon cancer (1998), COPD (chronic obstructive pulmonary disease), Cyanocobalamine deficiency (non anemic), Depression (2017), Diverticulosis, Elevated cholesterol, Esophageal reflux (2017), Hiatal hernia (2017), History of bladder infections, History of echocardiogram (2021), exercise stress test, radiation therapy, Hyperlipidemia, Hypertension (2017), Impaired functional mobility and activity tolerance, Impaired functional mobility, balance, gait, and endurance, Osteoporosis, Palpitations (2017), Prediabetes, Problems with swallowing, Shortness of breath, Sleep apnea (2017), Tricuspid valve disorder (2017), Vitamin D deficiency, and Wears glasses.    Past Surgical History: Patient's  has a past surgical history that includes Anal fistulotomy;  section (1981); Cholecystectomy (2009); Colectomy partial / total (1998); Ventral hernia repair (10/28/2012); Colonoscopy (); Esophagogastroduodenoscopy (); Esophagogastroduodenoscopy (N/A, 2018); Breast lumpectomy (Right, 2006); Esophagogastroduodenoscopy (N/A,  08/12/2019); Breast biopsy; Cataract extraction w/ intraocular lens implant (Left, 11/18/2019); Hysterectomy; Appendectomy; Cataract extraction w/ intraocular lens implant (Right, 12/16/2019); Cataract extraction (2019); Colonoscopy (N/A, 06/02/2021); Esophagogastroduodenoscopy (N/A, 09/13/2022); Cardiac electrophysiology procedure (N/A, 07/05/2023); Insert / replace / remove pacemaker; Skin biopsy (Left); Skin lesion excision (N/A); Esophagogastroduodenoscopy (N/A, 06/28/2024); Cardiac catheterization (N/A, 03/24/2025); and Mitral valve repair (4/23/2025).    Social History: Patient's  reports that she has never smoked. She has been exposed to tobacco smoke. She has never used smokeless tobacco. She reports that she does not currently use alcohol after a past usage of about 1.0 - 2.0 standard drink of alcohol per week. She reports that she does not use drugs.    Family History:  Patient's family history includes Asthma in her mother; COPD in her mother; Cancer in her father and sister; Diabetes in her maternal grandmother; Hypertension in her mother; Osteoarthritis in her mother; Stomach cancer in her father.     Allergies:      Penicillins and Other    Home Medications:    Prior to Admission Medications       Prescriptions Last Dose Informant Patient Reported? Taking?    albuterol sulfate  (90 Base) MCG/ACT inhaler  Self No No    Inhale 2 puffs Every 4 (Four) Hours As Needed for Wheezing.    budesonide-formoterol (SYMBICORT) 160-4.5 MCG/ACT inhaler  Self No No    Inhale 2 puffs 2 (Two) Times a Day. Rinse mouth with water after use    cholecalciferol (VITAMIN D3) 25 MCG (1000 UT) tablet  Self Yes No    Take 1 tablet by mouth Daily.    citalopram (CeleXA) 20 MG tablet  Self Yes No    Take 1 tablet by mouth Daily.    empagliflozin (Jardiance) 10 MG tablet tablet  Self No No    Take 1 tablet by mouth Daily.    famotidine (PEPCID) 40 MG tablet  Self No No    TAKE 1 TABLET BY MOUTH ONCE DAILY AT NIGHT     furosemide (LASIX) 40 MG tablet   No No    Take 1 tablet by mouth 2 (Two) Times a Day.    metoprolol tartrate (LOPRESSOR) 25 MG tablet   No No    Take 0.5 (one-half) tablet by mouth Every 12 (Twelve) Hours.    midodrine (PROAMATINE) 10 MG tablet   No No    Take 1 tablet by mouth 3 (Three) Times a Day Before Meals.    Patient not taking:  Reported on 5/12/2025    O2 (OXYGEN)  Self Yes No    Inhale 3-4 L/min Daily As Needed.    pantoprazole (PROTONIX) 40 MG EC tablet  Self Yes No    Take 1 tablet by mouth Every Morning.    potassium chloride (KLOR-CON M20) 20 MEQ CR tablet   No No    Take 1 tablet by mouth 2 (Two) Times a Day. TAKE WITH LASIX DOSES    Sacubitril-Valsartan (ENTRESTO PO)   Yes No    Take  by mouth.    spironolactone (ALDACTONE) 25 MG tablet  Self No No    Take 0.5 tablets by mouth Daily.    vitamin B-12 (CYANOCOBALAMIN) 100 MCG tablet  Self Yes No    Take 1 tablet by mouth Daily.    Xarelto 20 MG tablet  Self No No    Take 1 tablet by mouth Daily. *Resume on 3/27/25*     ED Medications:    Medications   sodium chloride 0.9 % flush 10 mL (has no administration in time range)   aspirin chewable tablet 324 mg (0 mg Oral Hold 5/14/25 2332)   metoprolol tartrate (LOPRESSOR) injection 2.5 mg (2.5 mg Intravenous Given 5/14/25 2251)   furosemide (LASIX) injection 40 mg (40 mg Intravenous Given 5/14/25 2259)   ipratropium-albuterol (DUO-NEB) nebulizer solution 9 mL (9 mL Nebulization Given 5/14/25 2332)   methylPREDNISolone sodium succinate (SOLU-Medrol) injection 125 mg (125 mg Intravenous Given 5/14/25 2337)   ondansetron (ZOFRAN) injection 8 mg (8 mg Intravenous Given 5/14/25 2338)     Vital Signs:  Temp:  [97.8 °F (36.6 °C)] 97.8 °F (36.6 °C)  Heart Rate:  [] 93  Resp:  [20] 20  BP: ()/(62-82) 97/68        05/14/25 2134   Weight: 66.2 kg (146 lb)     Body mass index is 25.86 kg/m².    Physical Exam:     Most recent vital Signs: BP 97/68 (Patient Position: Lying)   Pulse 93   Temp 97.8 °F (36.6  "°C) (Oral)   Resp 20   Ht 160 cm (63\")   Wt 66.2 kg (146 lb)   SpO2 98%   BMI 25.86 kg/m²     Physical Exam  Constitutional:       General: She is in acute distress.      Appearance: She is ill-appearing.   HENT:      Head: Normocephalic and atraumatic.      Right Ear: External ear normal.      Left Ear: External ear normal.      Nose: Nose normal.      Mouth/Throat:      Mouth: Mucous membranes are moist.   Eyes:      Extraocular Movements: Extraocular movements intact.      Conjunctiva/sclera: Conjunctivae normal.   Cardiovascular:      Rate and Rhythm: Normal rate. Rhythm irregular.      Pulses: Normal pulses.      Heart sounds: Normal heart sounds. No murmur heard.  Pulmonary:      Effort: Respiratory distress present.      Breath sounds: Wheezing and rales present.      Comments: Bilateral scattered wheezing heard.  Bilateral basal crackles heard.  Abdominal:      General: Bowel sounds are normal.      Palpations: Abdomen is soft.      Tenderness: There is no abdominal tenderness. There is no guarding or rebound.      Comments: Midline surgical scar noted in the lower abdomen.   Musculoskeletal:         General: Normal range of motion.      Cervical back: Neck supple.      Right lower leg: No edema.      Left lower leg: No edema.   Skin:     General: Skin is warm.      Findings: No erythema or rash.   Neurological:      General: No focal deficit present.      Mental Status: She is alert and oriented to person, place, and time. Mental status is at baseline.   Psychiatric:         Mood and Affect: Mood normal.         Behavior: Behavior normal.       Laboratory data:    I have reviewed the labs done in the emergency room.    Results from last 7 days   Lab Units 05/14/25  2214 05/12/25  1433   SODIUM mmol/L 142 139   POTASSIUM mmol/L 3.6 3.4*   CHLORIDE mmol/L 101 99   CO2 mmol/L 29.7* 27.5   BUN mg/dL 36* 32*   CREATININE mg/dL 1.27* 1.30*   CALCIUM mg/dL 8.6 8.6   BILIRUBIN mg/dL 1.5*  --    ALK PHOS U/L 92 "  --    ALT (SGPT) U/L 44*  --    AST (SGOT) U/L 16  --    GLUCOSE mg/dL 157* 102*     Results from last 7 days   Lab Units 05/14/25  2214   WBC 10*3/mm3 14.84*   HEMOGLOBIN g/dL 10.6*   HEMATOCRIT % 35.5   PLATELETS 10*3/mm3 245       Results from last 7 days   Lab Units 05/14/25  2214   HSTROP T ng/L 58*     Results from last 7 days   Lab Units 05/14/25  2214   PROBNP pg/mL 14,216.0*     EKG:      EKG done in the emergency room was reviewed by me.  It shows atrial fibrillation with ventricular response of 104 bpm.  Left axis deviation noted with diffuse T inversions and nonspecific ST-T changes noted.    Radiology:    Portable chest x-ray done in the emergency room was reviewed by me.  It shows cardiomegaly with prominent bronchovascular markings especially in the upper zones suggestive of CHF.  Official report is currently pending.    Assessment:    Acute on chronic systolic heart failure, POA.  Acute on chronic hypoxic respiratory failure, POA.  Acute COPD exacerbation, POA.  Severe mitral valve regurgitation status post MitraClip placement on 4/23/2025.  Sick sinus syndrome status post intracardiac pacemaker.  Permanent atrial fibrillation on rivaroxaban.  Chronic kidney disease stage III.  Hiatal hernia.  Gastroesophageal reflux disease.    Plan:    Acute on chronic systolic heart failure.  - Continue BiPAP and oxygen.  - Continue IV Lasix 60 mg twice daily for 3 more doses.  - Heart failure pathway.  - Continue Entresto and spironolactone if blood pressures tolerate.  - Consult Dr. Elias from cardiology in AM.  - At this time I do not suspect acute coronary syndrome.  - She does have chronic elevation of troponin levels.    Acute COPD exacerbation.  - Patient may also have underlying COPD exacerbation with wheezing and we will treat her with bronchodilators, budesonide and prednisone.  - Patient already has home oxygen at 4 L.  - Obtain respiratory panel.    Chronic kidney disease stage III.  - Creatinine  levels at baseline.    Atrial fibrillation.  - Continue rivaroxaban and metoprolol.    Risk Assessment: Moderate  DVT Prophylaxis: Rivaroxaban  Code Status: Full  Diet: Cardiac      Nik Hicks MD  05/14/25  23:40 EDT    Dictated utilizing Dragon dictation.

## 2025-05-15 NOTE — PLAN OF CARE
Problem: Adult Inpatient Plan of Care  Goal: Plan of Care Review  Outcome: Progressing  Goal: Patient-Specific Goal (Individualized)  Outcome: Progressing  Goal: Absence of Hospital-Acquired Illness or Injury  Outcome: Progressing  Intervention: Identify and Manage Fall Risk  Recent Flowsheet Documentation  Taken 5/15/2025 1800 by Emily Little RN  Safety Promotion/Fall Prevention: safety round/check completed  Taken 5/15/2025 1643 by Emily Little RN  Safety Promotion/Fall Prevention: safety round/check completed  Taken 5/15/2025 1417 by Emily Little RN  Safety Promotion/Fall Prevention: safety round/check completed  Intervention: Prevent Skin Injury  Recent Flowsheet Documentation  Taken 5/15/2025 1800 by Emily Little RN  Body Position:   position changed independently   weight shifting  Taken 5/15/2025 1643 by Emily Little RN  Body Position:   position changed independently   weight shifting  Taken 5/15/2025 1417 by Emily Little RN  Body Position:   position changed independently   weight shifting   sitting up in bed  Intervention: Prevent Infection  Recent Flowsheet Documentation  Taken 5/15/2025 1800 by Emily Little RN  Infection Prevention: single patient room provided  Taken 5/15/2025 1643 by Emily Little RN  Infection Prevention: single patient room provided  Taken 5/15/2025 1417 by Emily Little RN  Infection Prevention: single patient room provided  Goal: Optimal Comfort and Wellbeing  Outcome: Progressing  Intervention: Provide Person-Centered Care  Recent Flowsheet Documentation  Taken 5/15/2025 1342 by Emily Little RN  Trust Relationship/Rapport:   care explained   choices provided  Goal: Readiness for Transition of Care  Outcome: Progressing     Problem: Heart Failure  Goal: Optimal Coping  Outcome: Progressing  Goal: Optimal Cardiac Output and Blood Flow  Outcome: Progressing  Goal: Stable Heart Rate and Rhythm  Outcome: Progressing  Goal: Fluid and Electrolyte  Balance  Outcome: Progressing  Goal: Optimal Functional Ability  Outcome: Progressing  Intervention: Optimize Functional Ability  Recent Flowsheet Documentation  Taken 5/15/2025 1800 by Emily Little RN  Activity Management: activity encouraged  Taken 5/15/2025 1643 by Emily Little RN  Activity Management: activity encouraged  Taken 5/15/2025 1417 by Emily Little RN  Activity Management: activity encouraged  Goal: Improved Oral Intake  Outcome: Progressing  Goal: Effective Oxygenation and Ventilation  Outcome: Progressing  Intervention: Promote Airway Secretion Clearance  Recent Flowsheet Documentation  Taken 5/15/2025 1800 by Emily Little RN  Activity Management: activity encouraged  Taken 5/15/2025 1643 by Emily Little RN  Activity Management: activity encouraged  Taken 5/15/2025 1417 by Emily Little RN  Activity Management: activity encouraged  Goal: Effective Breathing Pattern During Sleep  Outcome: Progressing  Intervention: Monitor and Manage Obstructive Sleep Apnea  Recent Flowsheet Documentation  Taken 5/15/2025 1800 by Emily Little RN  Medication Review/Management: medications reviewed  Taken 5/15/2025 1643 by Emily Little RN  Medication Review/Management: medications reviewed  Taken 5/15/2025 1417 by Emily Little RN  Medication Review/Management: medications reviewed     Problem: Noninvasive Ventilation Acute  Goal: Effective Unassisted Ventilation and Oxygenation  Outcome: Progressing     Problem: Skin Injury Risk Increased  Goal: Skin Health and Integrity  Outcome: Progressing  Intervention: Optimize Skin Protection  Recent Flowsheet Documentation  Taken 5/15/2025 1800 by Emily Little RN  Activity Management: activity encouraged  Taken 5/15/2025 1643 by Emily Little RN  Activity Management: activity encouraged  Taken 5/15/2025 1417 by Emily Little RN  Activity Management: activity encouraged   Goal Outcome Evaluation:

## 2025-05-15 NOTE — PROGRESS NOTES
[facility] HOSPITALIST    PROGRESS NOTE    Name:  Elsa Alcaraz   Age:  85 y.o.  Sex:  female  :  1940  MRN:  [id number]   Visit Number:  [id number]  Admission Date:  2025  Date Of Service:  05/15/25  Primary Care Physician:  Kemi Hdz MD     LOS: 1 day :    Chief Complaint:      Follow-up; shortness of breath    Subjective:    Breathing has improved some.  Still having a cough.    Hospital Course:    Elsa Alcaraz is an 85-year-old female with history of severe mitral regurgitation who recently underwent MitraClip placement at [facility] on 2025, CKD 3, chronic HFrEF, atrial fibrillation on rivaroxaban, sick sinus syndrome status post intracardiac pacemaker, hypertension, COPD on 4 L of home oxygen, obstructive sleep apnea was brought to the emergency room by her  with progressive worsening of shortness of breath over the last couple of days.  Patient states that she has been doing well after her MitraClip procedure but has been having intermittent exertional shortness of breath.  She started having increasing shortness of breath and wheezing since day prior to presentation despite taking her regular medications.  She does have oxygen at home but does not use BiPAP.  She lives with her  and was brought to the emergency room.  Denies any fevers or chest pain.  She follows up with Dr. Germain who is her cardiologist.     In the emergency room, she was afebrile but tachycardic in the 107 and was saturating at 98% on 4 L of nasal cannula oxygen.  She did have significant audible wheezing and was in respiratory distress.  She was initially placed on nasal cannula oxygen and was given bronchodilator nebulizations.  She was also given 40 mg of IV Lasix.  Initial troponin 58, proBNP 14,216.  CMP showed a BUN of 36, creatinine 1.27 (baseline), glucose 157.  Procalcitonin was within normal range.  WBC 14.8, hemoglobin 10.6.  Chest x-ray showed  cardiomegaly with evidence of CHF.  EKG showed atrial fibrillation which is chronic.  Patient was also given IV Solu-Medrol and IV metoprolol while in the emergency room.  Due to progressive worsening of shortness of breath, she was placed on BiPAP therapy.  VBG showed a pH of 7.35, pCO2 66 and bicarb of 37.  Patient was subsequently admitted to the medical floor with telemetry for acute on chronic respiratory failure and acute on chronic HFrEF.    Review of Systems:     All systems were reviewed and negative except as mentioned in subjective, assessment and plan.    Vital Signs:    Temp:  [97.6 °F (36.4 °C)-98.2 °F (36.8 °C)] 97.6 °F (36.4 °C)  Heart Rate:  [] 95  Resp:  [18-20] 18  BP: ()/(49-89) 93/58    Intake and output:    No intake/output data recorded.  I/O this shift:  In: 118 [P.O.:118]  Out: -     Physical Examination:    General Appearance:  Alert and cooperative.    Head:  Atraumatic and normocephalic.   Eyes: Conjunctivae and sclerae normal, no icterus. No pallor.   Throat: No oral lesions, no thrush, oral mucosa moist.   Neck: Supple, trachea midline, no thyromegaly.   Lungs:   Breath sounds heard bilaterally equally.  Mildly diminished all fields, some slight wheezing, congestive sounds   Heart:  Normal S1 and S2, no murmur, no gallop, no rub. No JVD.   Abdomen:   Normal bowel sounds, no masses, no organomegaly. Soft, nontender, nondistended, no rebound tenderness.   Extremities: Supple, no edema, no cyanosis, no clubbing.   Skin: Warm.   Neurologic: Alert and oriented x 3. No facial asymmetry. Moves all four limbs. No tremors.      Laboratory results:    Results from last 7 days   Lab Units 05/15/25  0535 05/14/25  2214 05/12/25  1433   SODIUM mmol/L 141 142 139   POTASSIUM mmol/L 4.0 3.6 3.4*   CHLORIDE mmol/L 98 101 99   CO2 mmol/L 30.0* 29.7* 27.5   BUN mg/dL 37* 36* 32*   CREATININE mg/dL 1.22* 1.27* 1.30*   CALCIUM mg/dL 8.6 8.6 8.6   BILIRUBIN mg/dL  --  1.5*  --    ALK PHOS U/L   "--  92  --    ALT (SGPT) U/L  --  44*  --    AST (SGOT) U/L  --  16  --    GLUCOSE mg/dL 136* 157* 102*     Results from last 7 days   Lab Units 05/15/25  0535 05/14/25  2214   WBC 10*3/mm3 16.22* 14.84*   HEMOGLOBIN g/dL 10.9* 10.6*   HEMATOCRIT % 35.9 35.5   PLATELETS 10*3/mm3 220 245         Results from last 7 days   Lab Units 05/14/25  2322 05/14/25  2214   HSTROP T ng/L 61* 58*         No results for input(s): \"PHART\", \"EHT8VDO\", \"PO2ART\", \"RAS9IKQ\", \"BASEEXCESS\" in the last 8760 hours.   I have reviewed the patient's laboratory results.    Radiology results:    XR Chest 1 View  Result Date: 5/15/2025  PROCEDURE: XR CHEST 1 VW-  HISTORY: [facility]A Triage Protocol  COMPARISON: 05/07/2025  FINDINGS:  Portable view of the chest demonstrates increased markings in the left lower lobe. Right lung is clear. There is no evidence of effusion, pneumothorax or other significant pleural disease. The mediastinum is unremarkable.  The heart size is moderately enlarged.      Impression: Stable increased markings left lower lobe favor atelectasis.    This report was signed and finalized on 5/15/2025 8:36 AM by Schuyler Thomas MD.      I have reviewed the patient's radiology reports.    Medication Review:     I have reviewed the patient's active and prn medications.     Problem List:      Acute on chronic HFrEF (heart failure with reduced ejection fraction)    S/P mitral valve clip implantation      Assessment/Plan:    Inpatient general floor admission 5/14/2025 with hypoxia secondary to rhinovirus with elements of heart failure exacerbation and COPD exacerbations.    Hypoxia  Comfortable on 5 L.  Baseline 4 L.  Rhinovirus  Supportive care.  Likely triggered her respiratory symptoms.  Acute exacerbation of heart failure  Lasix.  Cardiology consultation given recent valve intervention.  COPD exacerbation  Bronchodilators, budesonide, prednisone.  DREW  Monitor response to diuresis.    Chronic: history of severe mitral regurgitation who " recently underwent MitraClip placement at [facility] on 4/23/2025, CKD 3, chronic HFrEF, atrial fibrillation on rivaroxaban, sick sinus syndrome status post intracardiac pacemaker, hypertension, COPD on 4 L of home oxygen, obstructive sleep apnea     DVT Prophylaxis: Rivaroxaban  Code Status: Full code  Diet: Cardiac/for restriction  Discharge Plan: Least a couple days depending on clinical status    Brian Joseph Kerley,   05/15/25  16:17 EDT    Dictated utilizing Dragon dictation.    I have reviewed the copied text and it is accurate as of 5/15/2025

## 2025-05-15 NOTE — CONSULTS
"  [facility]   Cardiology Consult Note    Patient Name: Elsa Alcaraz  : 1940  MRN: [id number]  Primary Care Physician:  Kemi Hdz MD  Referring Physician: No ref. provider found    Date of admission: 2025    Subjective   Subjective     Reason for Consultation : Acute heart failure exacerbation    Chief Complaint : Shortness of breath    HPI:  Elsa Alcaraz is a 85 y.o. female with history of severe MR status post MitraClip, CKD 3, chronic heart failure with reduced ejection fraction, A-fib, sick sinus syndrome status post pacemaker, hypertension, and COPD on 4 L of oxygen who presented to the emergency room with worsening shortness of breath.  Her MitraClip was in April and overall had been doing well until recently.  She started having increased shortness of breath over the last 24 hours.  In the emergency room she was tachycardic with some audible wheezing.  She was given 40 mg of IV Lasix.  proBNP elevated to 14,216.  High-sensitivity troponin 58.  Cardiology was consulted for evaluation of heart failure exacerbation.  Since last night, patient states that she is ok but still short of breath. Denies any LE swelling    Review of Systems   All systems were reviewed and negative except for: Shortness of breath    Personal History     Past Medical History:   Diagnosis Date    Anemia     Anesthesia complication     \"woke up early\"    Anxiety disorder due to general medical condition 2017    Arthritis     Atrial fibrillation 2017    Body piercing     BOTH EARS    Borderline diabetes     Breast cancer     right-radiation and a lumpectomy    Broken tooth     Cataract 2017    Left eye surgery     Chronic bronchitis 2017    Colon cancer 1998    had surgery and chemo    COPD (chronic obstructive pulmonary disease)     Cyanocobalamine deficiency (non anemic)     Depression 2017    Diverticulosis     Elevated cholesterol     Esophageal reflux " "01/11/2017    Hiatal hernia 01/11/2017    History of bladder infections     History of echocardiogram 07/26/2021    Hx of exercise stress test     \"several years ago by Dr. Mercado\".      Hx of radiation therapy     Hyperlipidemia     Hypertension 01/11/2017    Impaired functional mobility and activity tolerance     Impaired functional mobility, balance, gait, and endurance     Osteoporosis     Palpitations 01/11/2017    Prediabetes     Problems with swallowing     meat at times per pt report    Shortness of breath     WITH EXERTION     Sleep apnea 01/11/2017    NO CPAP    Tricuspid valve disorder 01/11/2017    Patient doesn't know anything about this    Vitamin D deficiency     Wears glasses             Family History: family history includes Asthma in her mother; COPD in her mother; Cancer in her father and sister; Diabetes in her maternal grandmother; Hypertension in her mother; Osteoarthritis in her mother; Stomach cancer in her father. Otherwise pertinent FHx was reviewed and not pertinent to current issue.    Social History:  reports that she has never smoked. She has been exposed to tobacco smoke. She has never used smokeless tobacco. She reports that she does not currently use alcohol after a past usage of about 1.0 - 2.0 standard drink of alcohol per week. She reports that she does not use drugs.    Home Medications:  O2, Sacubitril-Valsartan, albuterol sulfate HFA, budesonide-formoterol, cholecalciferol, citalopram, empagliflozin, famotidine, furosemide, metoprolol tartrate, midodrine, pantoprazole, potassium chloride, rivaroxaban, spironolactone, and vitamin B-12    Allergies:  Allergies   Allergen Reactions    Penicillins Itching    Other GI Intolerance     Spicy foods         Objective    Objective     Vitals:   Temp:  [97.8 °F (36.6 °C)] 97.8 °F (36.6 °C)  Heart Rate:  [] 110  Resp:  [20] 20  BP: ()/(49-89) 92/56  Flow (L/min) (Oxygen Therapy):  [4] 4      Physical Exam:   Constitutional: " Awake, alert, No acute distress    Eyes: PERRLA, sclerae anicteric, no conjunctival injection   HENT: NCAT, mucous membranes moist   Neck: Supple, no thyromegaly, no lymphadenopathy, trachea midline   Respiratory: Diffuse wheezing with some crackles.   Cardiovascular: Irregularly irregular rhythm, no murmurs, rubs, or gallops, palpable pedal pulses bilaterally   Gastrointestinal: Positive bowel sounds, soft, nontender, nondistended   Musculoskeletal: No bilateral ankle edema, no clubbing or cyanosis to extremities   Psychiatric: Appropriate affect, cooperative   Neurologic: Oriented x 3, strength symmetric in all extremities, Cranial Nerves grossly intact to confrontation, speech clear   Skin: No rashes     Result Review    Result Review:  I have personally reviewed the results from the time of this admission to 5/15/2025 09:13 EDT and agree with these findings:  [x]  Laboratory  []  Microbiology  [x]  Radiology  [x]  EKG/Telemetry   [x]  Cardiology/Vascular   []  Pathology  [x]  Old records  []  Other:  Most notable findings include:     CMP          5/12/2025    14:33 5/14/2025    22:14 5/15/2025    05:35   CMP   Glucose 102  157  136    BUN 32  36  37    Creatinine 1.30  1.27  1.22    EGFR 40.6  41.8  43.8    Sodium 139  142  141    Potassium 3.4  3.6  4.0    Chloride 99  101  98    Calcium 8.6  8.6  8.6    Total Protein  5.9     Albumin  3.7     Globulin  2.2     Total Bilirubin  1.5     Alkaline Phosphatase  92     AST (SGOT)  16     ALT (SGPT)  44     Albumin/Globulin Ratio  1.7     BUN/Creatinine Ratio 24.6  28.3  30.3    Anion Gap 12.5  11.3  13.0       CBC          5/7/2025    14:42 5/14/2025    22:14 5/15/2025    05:35   CBC   WBC 16.38  14.84  16.22    RBC 3.81  3.83  3.92    Hemoglobin 10.7  10.6  10.9    Hematocrit 36.0  35.5  35.9    MCV 94.5  92.7  91.6    MCH 28.1  27.7  27.8    MCHC 29.7  29.9  30.4    RDW 17.1  17.1  16.8    Platelets 267  245  220       Lab Results   Component Value Date     TROPONINT 61 (C) 05/14/2025         Assessment & Plan   Assessment / Plan     Brief Patient Summary:  Elsa Alcaraz is a 85 y.o. female who presents with COPD exacerbation and HF exacerbation    Active Hospital Problems:  Active Hospital Problems    Diagnosis     **Acute on chronic HFrEF (heart failure with reduced ejection fraction)     S/P mitral valve clip implantation      Acute on chronic systolic heart failure: Patient was placed on BiPAP and given IV Lasix 60 mg twice daily.  This morning, she is feeling SOB. She tested positive for enterovirus so while BNP is elevated she doesn't look overtly volume overloaded      Myocardial injury: Troponin elevated likely secondary to COPD and heart failure exacerbation.  Patient not having any specific symptoms concerning for ACS.    Atrial fibrillation: Chronic medical problem that is not currently causing the patient any issue    Plan:   - Continue diuresis until euvolemic and then transition to oral diuretics.  - Continue Entresto and Aldactone  - No further ischemic workup indicated  - Continue rivaroxaban and metoprolol  -Can order echo if there is clinical concern. Right now, her symptoms appear more related to respiratory infection    Thank you for the consult. We will sign off. Please call with any further questions    Electronically signed by Damian Elias MD, 05/15/25, 9:13 AM EDT.

## 2025-05-15 NOTE — THERAPY EVALUATION
"Acute Care - Speech Language Pathology   Swallow Initial Evaluation [facility]     Patient Name: Elsa Alcaraz  : 1940  MRN: [id number]  Today's Date: 5/15/2025               Admit Date: 2025    Visit Dx:     ICD-10-CM ICD-9-CM   1. Atrial fibrillation with RVR  I48.91 427.31   2. Acute exacerbation of chronic heart failure  I50.9 428.9   3. COPD exacerbation  J44.1 491.21     Patient Active Problem List   Diagnosis    Longstanding persistent atrial fibrillation    Hyperlipidemia    Essential hypertension    Anxiety disorder due to general medical condition    Cataract    Chronic bronchitis    Depression    Obesity    Sleep apnea    Iron deficiency anemia    Thrombocytosis    Hiatal hernia with gastroesophageal reflux    Age-related nuclear cataract of left eye    Age-related nuclear cataract of right eye    Severe mitral regurgitation    Moderate tricuspid regurgitation    Chronic systolic heart failure    Diverticulosis of colon    Chronic respiratory failure with hypoxia    COPD    Chronic anticoagulation (Xarelto)    Complete heart block    Esophageal dysphagia    Gastroesophageal reflux disease without esophagitis    Presence of cardiac pacemaker    History of peptic ulcer disease    S/P mitral valve clip implantation    Chronic atrial fibrillation    Acute on chronic HFrEF (heart failure with reduced ejection fraction)     Past Medical History:   Diagnosis Date    Anemia     Anesthesia complication     \"woke up early\"    Anxiety disorder due to general medical condition 2017    Arthritis     Atrial fibrillation 2017    Body piercing     BOTH EARS    Borderline diabetes     Breast cancer     right-radiation and a lumpectomy    Broken tooth     Cataract 2017    Left eye surgery     Chronic bronchitis 2017    Colon cancer 1998    had surgery and chemo    COPD (chronic obstructive pulmonary disease)     Cyanocobalamine deficiency (non anemic)     Depression " "2017    Diverticulosis     Elevated cholesterol     Esophageal reflux 2017    Hiatal hernia 2017    History of bladder infections     History of echocardiogram 2021    Hx of exercise stress test     \"several years ago by Dr. Mercado\".      Hx of radiation therapy     Hyperlipidemia     Hypertension 2017    Impaired functional mobility and activity tolerance     Impaired functional mobility, balance, gait, and endurance     Osteoporosis     Palpitations 2017    Prediabetes     Problems with swallowing     meat at times per pt report    Shortness of breath     WITH EXERTION     Sleep apnea 2017    NO CPAP    Tricuspid valve disorder 2017    Patient doesn't know anything about this    Vitamin D deficiency     Wears glasses      Past Surgical History:   Procedure Laterality Date    ANAL FISTULOTOMY      APPENDECTOMY          BREAST BIOPSY      BREAST LUMPECTOMY Right 2006    CARDIAC CATHETERIZATION N/A 2025    Procedure: Left Heart Cath - Femoral access;  Surgeon: Jeannie Zabala MD;  Location:  [facility];  Service: Cardiovascular;  Laterality: N/A;    CARDIAC ELECTROPHYSIOLOGY PROCEDURE N/A 2023    Procedure: Micra;  Surgeon: Keven Willis DO;  Location:  [facility]VASIVE [facility];  Service: Cardiology;  Laterality: N/A;    CATARACT EXTRACTION  2019    both eyes     CATARACT EXTRACTION W/ INTRAOCULAR LENS IMPLANT Left 2019    Procedure: CATARACT PHACO EXTRACTION WITH INTRAOCULAR LENS IMPLANT LEFT;  Surgeon: Masoud David MD;  Location: [facility] OR;  Service: Ophthalmology    CATARACT EXTRACTION W/ INTRAOCULAR LENS IMPLANT Right 2019    Procedure: CATARACT PHACO EXTRACTION WITH INTRAOCULAR LENS IMPLANT RIGHT;  Surgeon: Masoud David MD;  Location: [facility] OR;  Service: Ophthalmology     SECTION  1981    CHOLECYSTECTOMY  2009    COLECTOMY PARTIAL / TOTAL  1998    COLON " CANCER    COLONOSCOPY  2016    COLONOSCOPY N/A 06/02/2021    Procedure: COLONOSCOPY WITH BIOPSY;  Surgeon: Iona Sanders MD;  Location: [facility] ENDOSCOPY;  Service: Gastroenterology;  Laterality: N/A;    ENDOSCOPY  2016    ENDOSCOPY N/A 05/04/2018    Procedure: ESOPHAGOGASTRODUODENOSCOPY WITH COLD FORCEP BIOPSY;  Surgeon: Vasquez Fagan MD;  Location: [facility] ENDOSCOPY;  Service: Gastroenterology    ENDOSCOPY N/A 08/12/2019    Procedure: ESOPHAGOGASTRODUODENOSCOPY WITH BIOPSY;  Surgeon: Vasquez Fagan MD;  Location: [facility] ENDOSCOPY;  Service: Gastroenterology    ENDOSCOPY N/A 09/13/2022    Procedure: ESOPHAGOGASTRODUODENOSCOPY with biopsy and polypectomy  ;  Surgeon: Iona Sanders MD;  Location: [facility] ENDOSCOPY;  Service: Gastroenterology;  Laterality: N/A;    ENDOSCOPY N/A 06/28/2024    Procedure: ESOPHAGOGASTRODUODENOSCOPY WITH BIOPSY and dilatation;  Surgeon: Iona Sanders MD;  Location: [facility] ENDOSCOPY;  Service: Gastroenterology;  Laterality: N/A;    HYSTERECTOMY      1998    INSERT / REPLACE / REMOVE PACEMAKER      MITRAL VALVE REPAIR/REPLACEMENT  4/23/2025    Procedure: Transcatheter Mitral Valve Repair;  Surgeon: Jeannie Zabala MD;  Location: [facility] INVASIVE LOCATION;  Service: Cardiovascular;;    SKIN BIOPSY Left     arm    SKIN LESION EXCISION N/A     VENTRAL HERNIA REPAIR  10/28/2012       SLP Recommendation and Plan  SLP Swallowing Diagnosis: functional oral phase, functional pharyngeal phase, esophageal dysphagia (05/15/25 1604)  SLP Diet Recommendation: regular textures, thin liquids (05/15/25 1604)  Recommended Precautions and Strategies: upright posture during/after eating, general aspiration precautions, reflux precautions (05/15/25 1604)  SLP Rec. for Method of Medication Administration: meds whole, with puree, as tolerated (05/15/25 1604)     Monitor for Signs of Aspiration: notify SLP if any concerns (05/15/25 1604)  Recommended Diagnostics: No further  SLP services recommended (05/15/25 1604)  Swallow Criteria for Skilled Therapeutic Interventions Met: no problems identified which require skilled intervention (05/15/25 1604)  Anticipated Discharge Disposition (SLP): unknown (05/15/25 1604)     Therapy Frequency (Swallow): evaluation only (05/15/25 1604)     Oral Care Recommendations: Oral Care BID/PRN, Toothbrush (05/15/25 1604)     Swallowing Considerations per Physician Discretion: medical management of suspected esophageal dysphagia, as indicated (05/15/25 1604)                                  Progress: no change  Outcome Evaluation: Bedside eval of swallow completed with pt. seated upright in bed for po trials with spouse present. Pt. reported difficulty with swallowing solids at times but stated that she has a large hiatal hernia and GERD, medically managed and takes reflux precautions. Oral mech exam was WFL. She was given trials of regular solids, puree, and thin liquids. Oral phase was WFL with all trials. No overt s/s aspiration or other pharyngeal phase dysphagia with any consistency trialed. No known hx of oropharyngeal dysphagia, only third stage dysphagia. She stated that she takes meds with applesauce and does well with it. Recommend: 1. regular diet with thin liq as lisa, 2. meds whole with thin liq, 3. aspiration precautions, 4. reflux precautions. No further identified needs or concerns at this time for ST. D/W pt. and RN following eval.      SWALLOW EVALUATION (Last 72 Hours)       SLP Adult Swallow Evaluation       Row Name 05/15/25 1604                   Rehab Evaluation    Document Type evaluation  -TM        Subjective Information no complaints  -TM        Patient Observations alert;cooperative  -TM        Patient/Family/Caregiver Comments/Observations  present  -TM        Patient Effort good  -TM           General Information    Patient Profile Reviewed yes  -TM        Pertinent History Of Current Problem cardiac, GERD, HH, COPD  -TM         Current Method of Nutrition regular textures;thin liquids  -TM        Precautions/Limitations, Vision WFL with corrective lenses  -TM        Precautions/Limitations, Hearing WFL  -TM        Prior Level of Function-Communication WFL  -TM        Prior Level of Function-Swallowing esophageal concerns;no diet consistency restrictions  -TM        Plans/Goals Discussed with patient;other (see comments)  RN  -TM        Barriers to Rehab none identified  -TM        Patient's Goals for Discharge patient did not state  -TM           Pain    Pretreatment Pain Rating 0/10 - no pain  -TM        Posttreatment Pain Rating 0/10 - no pain  -TM           Oral Motor Structure and Function    Oral Lesions or Structural Abnormalities and/or variants none identified  -TM        Dentition Assessment natural, present and adequate  -TM        Secretion Management WNL/WFL  -TM        Mucosal Quality moist, healthy  -TM        Volitional Cough WFL  -TM           Oral Musculature and Cranial Nerve Assessment    Oral Motor General Assessment WFL  -TM           General Eating/Swallowing Observations    Respiratory Support Currently in Use nasal cannula  -TM        O2 Liters 5L  -TM        Eating/Swallowing Skills fed by SLP  -TM        Positioning During Eating upright in bed  -TM        Utensils Used spoon;straw  -TM        Consistencies Trialed regular textures;pureed;thin liquids  -TM        Pre SpO2 (%) 98  -TM        Post SpO2 (%) 98  -TM           Respiratory    Respiratory Status WFL;during swallowing/eating  -TM           Clinical Swallow Eval    Oral Prep Phase WFL  -TM        Oral Transit WFL  -TM        Oral Residue WFL  -TM        Pharyngeal Phase no overt signs/symptoms of pharyngeal impairment  -TM        Esophageal Phase suspected esophageal impairment  dx of HH and GERD  -TM        Clinical Swallow Evaluation Summary Bedside eval of swallow completed with pt. seated upright in bed for po trials with spouse present.  Pt.  reported difficulty with swallowing solids at times but stated that she has a large hiatal hernia and GERD, medically managed and takes reflux precautions.  Oral [facility] exam was WFL.  She was given trials of regular solids, puree, and thin liquids.  Oral phase was WFL with all trials.  No overt s/s aspiration or other pharyngeal phase dysphagia with any consistency trialed. No known hx of oropharyngeal dysphagia, only third stage dysphagia. She stated that she takes meds with applesauce and does well with it.  Recommend:  1. regular diet with thin liq as lisa, 2. meds whole with thin liq, 3. aspiration precautions, 4. reflux precautions.  No further identified needs or concerns at this time for ST. D/W pt. and RN following eval.  -TM           Esophageal Phase Concerns    Esophageal Phase Concerns belching  -TM        Belching thin  -TM           SLP Evaluation Clinical Impression    SLP Swallowing Diagnosis functional oral phase;functional pharyngeal phase;esophageal dysphagia  -TM        Functional Impact no impact on function  -TM        Swallow Criteria for Skilled Therapeutic Interventions Met no problems identified which require skilled intervention  -TM           Recommendations    Therapy Frequency (Swallow) evaluation only  -        SLP Diet Recommendation regular textures;thin liquids  -TM        Recommended Diagnostics No further SLP services recommended  -TM        Recommended Precautions and Strategies upright posture during/after eating;general aspiration precautions;reflux precautions  -TM        Oral Care Recommendations Oral Care BID/PRN;Toothbrush  -TM        SLP Rec. for Method of Medication Administration meds whole;with puree;as tolerated  -TM        Monitor for Signs of Aspiration notify SLP if any concerns  -TM        Anticipated Discharge Disposition (SLP) unknown  -TM        Swallowing Considerations per Physician Discretion medical management of suspected esophageal dysphagia, as indicated   -TM                  User Key  (r) = Recorded By, (t) = Taken By, (c) = Cosigned By      Initials Name Effective Dates    Alis Diaz 06/16/21 -                     EDUCATION  The patient has been educated in the following areas:   Dysphagia (Swallowing Impairment) Oral Care/Hydration.                Time Calculation:    Time Calculation- SLP       Row Name 05/15/25 1630             Time Calculation- SLP    SLP Start Time 1604  -TM      SLP Received On 05/15/25  -TM                User Key  (r) = Recorded By, (t) = Taken By, (c) = Cosigned By      Initials Name Provider Type    Alis Diaz Speech and Language Pathologist                    Therapy Charges for Today       Code Description Service Date Service Provider Modifiers Qty    [id number] HC ST EVAL ORAL PHARYNG SWALLOW 4 5/15/2025 Alis Mancilla GN 1                 Alis Mancilla  5/15/2025

## 2025-05-15 NOTE — PAYOR COMM NOTE
"TO:BC  FROM:SRAVANI MEDINA, RN PHONE [phone]  CLINICALS REF# [id number]    Elsa Caraballo (85 y.o. Female)       Date of Birth   1940    Social Security Number       Address   [address] [place] [address]    Home Phone   [phone]    MRN   [id number]       [employer]    Marital Status                               Admission Date   2025    Admission Type   Emergency    Admitting Provider   Nik Hicks MD    Attending Provider   Kerley, Brian Joseph, DO    Department, Room/Bed   [facility] EMERGENCY DEPARTMENT,        Discharge Date       Discharge Disposition       Discharge Destination                                 Attending Provider: Kerley, Brian Joseph, DO    Allergies: Penicillins, Other    Isolation: None   Infection: None   Code Status: CPR    Ht: 160 cm (63\")   Wt: 66.2 kg (146 lb)    Admission Cmt: None   Principal Problem: Acute on chronic HFrEF (heart failure with reduced ejection fraction) [I50.23]                   Active Insurance as of 2025       Primary Coverage       Payor Plan Insurance Group Employer/Plan Group    ANTHEM MEDICARE REPLACEMENT ANTHEM MEDICARE ADVANTAGE PPO [id number]       Payor Plan Address Payor Plan Phone Number Payor Plan Fax Number Effective Dates    [address] [phone]  2020 - None Entered    [facility] [id number]         Subscriber Name Subscriber Birth Date Member ID       ELSA CARABALLO 1940 [id number]                     Emergency Contacts        (Rel.) Home Phone Work Phone Mobile Phone    Logan Caraballo (Spouse) [phone] -- [phone]    DARIAN CHERRY (Daughter) [phone] -- [phone]                 History & Physical        Nik Hicks MD at 25 2340            [facility] HOSPITALIST   HISTORY AND PHYSICAL      Name:  Elsa Caraballo   Age:  85 y.o.  Sex:  female  :  1940  MRN:  [id number]   Visit Number:  " [id number]  Admission Date:  5/14/2025  Date Of Service:  05/14/25  Primary Care Physician:  Kemi Hdz MD    Chief Complaint:     Shortness of breath.    History Of Presenting Illness:      Elsa Alcaraz is an 85-year-old female with history of severe mitral regurgitation who recently underwent MitraClip placement at [facility] on 4/23/2025, CKD 3, chronic HFrEF, atrial fibrillation on rivaroxaban, sick sinus syndrome status post intracardiac pacemaker, hypertension, COPD on 4 L of home oxygen, obstructive sleep apnea was brought to the emergency room by her  with progressive worsening of shortness of breath over the last couple of days.  Patient states that she has been doing well after her MitraClip procedure but has been having intermittent exertional shortness of breath.  She started having increasing shortness of breath and wheezing since yesterday despite taking her regular medications.  She does have oxygen at home but does not use BiPAP.  She lives with her  and was brought to the emergency room.  Denies any fevers or chest pain.  She follows up with Dr. Germain who is her cardiologist.    In the emergency room, she was afebrile but tachycardic in the 107 and was saturating at 98% on 4 L of nasal cannula oxygen.  She did have significant audible wheezing and was in respiratory distress.  She was initially placed on nasal cannula oxygen and was given bronchodilator nebulizations.  She was also given 40 mg of IV Lasix.  Initial troponin 58, proBNP 14,216.  CMP showed a BUN of 36, creatinine 1.27 (baseline), glucose 157.  Procalcitonin was within normal range.  WBC 14.8, hemoglobin 10.6.  Chest x-ray showed cardiomegaly with evidence of CHF.  EKG showed atrial fibrillation which is chronic.  Patient was also given IV Solu-Medrol and IV metoprolol while in the emergency room.  Due to progressive worsening of shortness of breath, she was placed on BiPAP therapy.  VBG  showed a pH of 7.35, pCO2 66 and bicarb of 37.  Patient was subsequently admitted to the medical floor with telemetry for acute on chronic respiratory failure and acute on chronic HFrEF.    Review Of Systems:    All systems were reviewed and negative except as mentioned in history of presenting illness, assessment and plan.    Past Medical History: Patient's  has a past medical history of Anemia, Anesthesia complication, Anxiety disorder due to general medical condition (2017), Arthritis, Atrial fibrillation (2017), Body piercing, Borderline diabetes, Breast cancer (), Broken tooth, Cataract (2017), Chronic bronchitis (2017), Colon cancer (1998), COPD (chronic obstructive pulmonary disease), Cyanocobalamine deficiency (non anemic), Depression (2017), Diverticulosis, Elevated cholesterol, Esophageal reflux (2017), Hiatal hernia (2017), History of bladder infections, History of echocardiogram (2021), exercise stress test, radiation therapy, Hyperlipidemia, Hypertension (2017), Impaired functional mobility and activity tolerance, Impaired functional mobility, balance, gait, and endurance, Osteoporosis, Palpitations (2017), Prediabetes, Problems with swallowing, Shortness of breath, Sleep apnea (2017), Tricuspid valve disorder (2017), Vitamin D deficiency, and Wears glasses.    Past Surgical History: Patient's  has a past surgical history that includes Anal fistulotomy;  section (1981); Cholecystectomy (2009); Colectomy partial / total (1998); Ventral hernia repair (10/28/2012); Colonoscopy (); Esophagogastroduodenoscopy (); Esophagogastroduodenoscopy (N/A, 2018); Breast lumpectomy (Right, 2006); Esophagogastroduodenoscopy (N/A, 2019); Breast biopsy; Cataract extraction w/ intraocular lens implant (Left, 2019); Hysterectomy; Appendectomy; Cataract extraction w/ intraocular lens  implant (Right, 12/16/2019); Cataract extraction (2019); Colonoscopy (N/A, 06/02/2021); Esophagogastroduodenoscopy (N/A, 09/13/2022); Cardiac electrophysiology procedure (N/A, 07/05/2023); Insert / replace / remove pacemaker; Skin biopsy (Left); Skin lesion excision (N/A); Esophagogastroduodenoscopy (N/A, 06/28/2024); Cardiac catheterization (N/A, 03/24/2025); and Mitral valve repair (4/23/2025).    Social History: Patient's  reports that she has never smoked. She has been exposed to tobacco smoke. She has never used smokeless tobacco. She reports that she does not currently use alcohol after a past usage of about 1.0 - 2.0 standard drink of alcohol per week. She reports that she does not use drugs.    Family History:  Patient's family history includes Asthma in her mother; COPD in her mother; Cancer in her father and sister; Diabetes in her maternal grandmother; Hypertension in her mother; Osteoarthritis in her mother; Stomach cancer in her father.     Allergies:      Penicillins and Other    Home Medications:    Prior to Admission Medications       Prescriptions Last Dose Informant Patient Reported? Taking?    albuterol sulfate  (90 Base) MCG/ACT inhaler  Self No No    Inhale 2 puffs Every 4 (Four) Hours As Needed for Wheezing.    budesonide-formoterol (SYMBICORT) 160-4.5 MCG/ACT inhaler  Self No No    Inhale 2 puffs 2 (Two) Times a Day. Rinse mouth with water after use    cholecalciferol (VITAMIN D3) 25 MCG (1000 UT) tablet  Self Yes No    Take 1 tablet by mouth Daily.    citalopram (CeleXA) 20 MG tablet  Self Yes No    Take 1 tablet by mouth Daily.    empagliflozin (Jardiance) 10 MG tablet tablet  Self No No    Take 1 tablet by mouth Daily.    famotidine (PEPCID) 40 MG tablet  Self No No    TAKE 1 TABLET BY MOUTH ONCE DAILY AT NIGHT    furosemide (LASIX) 40 MG tablet   No No    Take 1 tablet by mouth 2 (Two) Times a Day.    metoprolol tartrate (LOPRESSOR) 25 MG tablet   No No    Take 0.5 (one-half) tablet  "by mouth Every 12 (Twelve) Hours.    midodrine (PROAMATINE) 10 MG tablet   No No    Take 1 tablet by mouth 3 (Three) Times a Day Before Meals.    Patient not taking:  Reported on 5/12/2025    O2 (OXYGEN)  Self Yes No    Inhale 3-4 L/min Daily As Needed.    pantoprazole (PROTONIX) 40 MG EC tablet  Self Yes No    Take 1 tablet by mouth Every Morning.    potassium chloride (KLOR-CON M20) 20 MEQ CR tablet   No No    Take 1 tablet by mouth 2 (Two) Times a Day. TAKE WITH LASIX DOSES    Sacubitril-Valsartan (ENTRESTO PO)   Yes No    Take  by mouth.    spironolactone (ALDACTONE) 25 MG tablet  Self No No    Take 0.5 tablets by mouth Daily.    vitamin B-12 (CYANOCOBALAMIN) 100 MCG tablet  Self Yes No    Take 1 tablet by mouth Daily.    Xarelto 20 MG tablet  Self No No    Take 1 tablet by mouth Daily. *Resume on 3/27/25*     ED Medications:    Medications   sodium chloride 0.9 % flush 10 mL (has no administration in time range)   aspirin chewable tablet 324 mg (0 mg Oral Hold 5/14/25 2332)   metoprolol tartrate (LOPRESSOR) injection 2.5 mg (2.5 mg Intravenous Given 5/14/25 2251)   furosemide (LASIX) injection 40 mg (40 mg Intravenous Given 5/14/25 2259)   ipratropium-albuterol (DUO-NEB) nebulizer solution 9 mL (9 mL Nebulization Given 5/14/25 2332)   methylPREDNISolone sodium succinate (SOLU-Medrol) injection 125 mg (125 mg Intravenous Given 5/14/25 2337)   ondansetron (ZOFRAN) injection 8 mg (8 mg Intravenous Given 5/14/25 2338)     Vital Signs:  Temp:  [97.8 °F (36.6 °C)] 97.8 °F (36.6 °C)  Heart Rate:  [] 93  Resp:  [20] 20  BP: ()/(62-82) 97/68        05/14/25 2134   Weight: 66.2 kg (146 lb)     Body mass index is 25.86 kg/m².    Physical Exam:     Most recent vital Signs: BP 97/68 (Patient Position: Lying)   Pulse 93   Temp 97.8 °F (36.6 °C) (Oral)   Resp 20   Ht 160 cm (63\")   Wt 66.2 kg (146 lb)   SpO2 98%   BMI 25.86 kg/m²     Physical Exam  Constitutional:       General: She is in acute distress. "      Appearance: She is ill-appearing.   HENT:      Head: Normocephalic and atraumatic.      Right Ear: External ear normal.      Left Ear: External ear normal.      Nose: Nose normal.      Mouth/Throat:      Mouth: Mucous membranes are moist.   Eyes:      Extraocular Movements: Extraocular movements intact.      Conjunctiva/sclera: Conjunctivae normal.   Cardiovascular:      Rate and Rhythm: Normal rate. Rhythm irregular.      Pulses: Normal pulses.      Heart sounds: Normal heart sounds. No murmur heard.  Pulmonary:      Effort: Respiratory distress present.      Breath sounds: Wheezing and rales present.      Comments: Bilateral scattered wheezing heard.  Bilateral basal crackles heard.  Abdominal:      General: Bowel sounds are normal.      Palpations: Abdomen is soft.      Tenderness: There is no abdominal tenderness. There is no guarding or rebound.      Comments: Midline surgical scar noted in the lower abdomen.   Musculoskeletal:         General: Normal range of motion.      Cervical back: Neck supple.      Right lower leg: No edema.      Left lower leg: No edema.   Skin:     General: Skin is warm.      Findings: No erythema or rash.   Neurological:      General: No focal deficit present.      Mental Status: She is alert and oriented to person, place, and time. Mental status is at baseline.   Psychiatric:         Mood and Affect: Mood normal.         Behavior: Behavior normal.       Laboratory data:    I have reviewed the labs done in the emergency room.    Results from last 7 days   Lab Units 05/14/25 2214 05/12/25  1433   SODIUM mmol/L 142 139   POTASSIUM mmol/L 3.6 3.4*   CHLORIDE mmol/L 101 99   CO2 mmol/L 29.7* 27.5   BUN mg/dL 36* 32*   CREATININE mg/dL 1.27* 1.30*   CALCIUM mg/dL 8.6 8.6   BILIRUBIN mg/dL 1.5*  --    ALK PHOS U/L 92  --    ALT (SGPT) U/L 44*  --    AST (SGOT) U/L 16  --    GLUCOSE mg/dL 157* 102*     Results from last 7 days   Lab Units 05/14/25 2214   WBC 10*3/mm3 14.84*    HEMOGLOBIN g/dL 10.6*   HEMATOCRIT % 35.5   PLATELETS 10*3/mm3 245       Results from last 7 days   Lab Units 05/14/25  2214   HSTROP T ng/L 58*     Results from last 7 days   Lab Units 05/14/25  2214   PROBNP pg/mL 14,216.0*     EKG:      EKG done in the emergency room was reviewed by me.  It shows atrial fibrillation with ventricular response of 104 bpm.  Left axis deviation noted with diffuse T inversions and nonspecific ST-T changes noted.    Radiology:    Portable chest x-ray done in the emergency room was reviewed by me.  It shows cardiomegaly with prominent bronchovascular markings especially in the upper zones suggestive of CHF.  Official report is currently pending.    Assessment:    Acute on chronic systolic heart failure, POA.  Acute on chronic hypoxic respiratory failure, POA.  Acute COPD exacerbation, POA.  Severe mitral valve regurgitation status post MitraClip placement on 4/23/2025.  Sick sinus syndrome status post intracardiac pacemaker.  Permanent atrial fibrillation on rivaroxaban.  Chronic kidney disease stage III.  Hiatal hernia.  Gastroesophageal reflux disease.    Plan:    Acute on chronic systolic heart failure.  - Continue BiPAP and oxygen.  - Continue IV Lasix 60 mg twice daily for 3 more doses.  - Heart failure pathway.  - Continue Entresto and spironolactone if blood pressures tolerate.  - Consult Dr. Elias from cardiology in AM.  - At this time I do not suspect acute coronary syndrome.  - She does have chronic elevation of troponin levels.    Acute COPD exacerbation.  - Patient may also have underlying COPD exacerbation with wheezing and we will treat her with bronchodilators, budesonide and prednisone.  - Patient already has home oxygen at 4 L.  - Obtain respiratory panel.    Chronic kidney disease stage III.  - Creatinine levels at baseline.    Atrial fibrillation.  - Continue rivaroxaban and metoprolol.    Risk Assessment: Moderate  DVT Prophylaxis: Rivaroxaban  Code Status:  "Full  Diet: Cardiac      Nki Hicks MD  25  23:40 EDT    Dictated utilizing Dragon dictation.    Electronically signed by Nik Hicks MD at 05/15/25 0004          Emergency Department Notes        Logan Cheung MD at 25           EMERGENCY DEPARTMENT ENCOUNTER    Pt Name: Elsa Alcaraz  MRN: [id number]  Pt :   1940  Room Number:    Date of encounter:  2025  PCP: Kemi Hdz MD  ED Provider: Logan Cheung MD    Historian: Patient      HPI:  Chief Complaint: Dyspnea        Context: Elsa Alcaraz is a 85 y.o. female who presents to the ED c/o dyspnea..  Patient has a past medical history significant for severe mitral regurgitation s/p MitraClip in April of this year at [facility] by cardiothoracic surgery, HFrEF, atrial fibrillation on Xarelto, complete heart block with pacemaker, hypertension, hyperlipidemia, COPD and obstructive sleep apnea.  Patient says that over the past couple days she has had progressive worsening dyspnea.  Patient says that she has been taking Lasix for the past couple days and has had good urinary output but continues to have progressive worsening shortness of breath.  She denies having any associated fever or chest pain.      PAST MEDICAL HISTORY  Past Medical History:   Diagnosis Date    Anemia     Anesthesia complication     \"woke up early\"    Anxiety disorder due to general medical condition 2017    Arthritis     Atrial fibrillation 2017    Body piercing     BOTH EARS    Borderline diabetes     Breast cancer     right-radiation and a lumpectomy    Broken tooth     Cataract 2017    Left eye surgery     Chronic bronchitis 2017    Colon cancer 1998    had surgery and chemo    COPD (chronic obstructive pulmonary disease)     Cyanocobalamine deficiency (non anemic)     Depression 2017    Diverticulosis     Elevated cholesterol     Esophageal reflux 2017    Hiatal hernia 2017 " "   History of bladder infections     History of echocardiogram 2021    Hx of exercise stress test     \"several years ago by Dr. Mercado\".      Hx of radiation therapy     Hyperlipidemia     Hypertension 2017    Impaired functional mobility and activity tolerance     Impaired functional mobility, balance, gait, and endurance     Osteoporosis     Palpitations 2017    Prediabetes     Problems with swallowing     meat at times per pt report    Shortness of breath     WITH EXERTION     Sleep apnea 2017    NO CPAP    Tricuspid valve disorder 2017    Patient doesn't know anything about this    Vitamin D deficiency     Wears glasses          PAST SURGICAL HISTORY  Past Surgical History:   Procedure Laterality Date    ANAL FISTULOTOMY      APPENDECTOMY          BREAST BIOPSY      BREAST LUMPECTOMY Right 2006    CARDIAC CATHETERIZATION N/A 2025    Procedure: Left Heart Cath - Femoral access;  Surgeon: Jeannie Zabala MD;  Location:  [facility] IN[facility]AS[facility]ATION;  Service: Cardiovascular;  Laterality: N/A;    CARDIAC ELECTROPHYSIOLOGY PROCEDURE N/A 2023    Procedure: Micra;  Surgeon: Keven Willis DO;  Location: [facility] INVASIVE LOCATION;  Service: Cardiology;  Laterality: N/A;    CATARACT EXTRACTION  2019    both eyes     CATARACT EXTRACTION W/ INTRAOCULAR LENS IMPLANT Left 2019    Procedure: CATARACT PHACO EXTRACTION WITH INTRAOCULAR LENS IMPLANT LEFT;  Surgeon: Masoud David MD;  Location: [facility] OR;  Service: Ophthalmology    CATARACT EXTRACTION W/ INTRAOCULAR LENS IMPLANT Right 2019    Procedure: CATARACT PHACO EXTRACTION WITH INTRAOCULAR LENS IMPLANT RIGHT;  Surgeon: Masoud David MD;  Location: [facility] OR;  Service: Ophthalmology     SECTION  1981    CHOLECYSTECTOMY  2009    COLECTOMY PARTIAL / TOTAL  1998    COLON CANCER    COLONOSCOPY  2016    COLONOSCOPY N/A 2021    Procedure: COLONOSCOPY WITH " BIOPSY;  Surgeon: Iona Sanders MD;  Location: [facility] ENDOSCOPY;  Service: Gastroenterology;  Laterality: N/A;    ENDOSCOPY  2016    ENDOSCOPY N/A 05/04/2018    Procedure: ESOPHAGOGASTRODUODENOSCOPY WITH COLD FORCEP BIOPSY;  Surgeon: Vasquez Fagan MD;  Location: [facility] ENDOSCOPY;  Service: Gastroenterology    ENDOSCOPY N/A 08/12/2019    Procedure: ESOPHAGOGASTRODUODENOSCOPY WITH BIOPSY;  Surgeon: Vasquez Fagan MD;  Location: [facility] ENDOSCOPY;  Service: Gastroenterology    ENDOSCOPY N/A 09/13/2022    Procedure: ESOPHAGOGASTRODUODENOSCOPY with biopsy and polypectomy  ;  Surgeon: Iona Sanders MD;  Location: [facility] ENDOSCOPY;  Service: Gastroenterology;  Laterality: N/A;    ENDOSCOPY N/A 06/28/2024    Procedure: ESOPHAGOGASTRODUODENOSCOPY WITH BIOPSY and dilatation;  Surgeon: Iona Sanders MD;  Location: [facility] ENDOSCOPY;  Service: Gastroenterology;  Laterality: N/A;    HYSTERECTOMY      1998    INSERT / REPLACE / REMOVE PACEMAKER      MITRAL VALVE REPAIR/REPLACEMENT  4/23/2025    Procedure: Transcatheter Mitral Valve Repair;  Surgeon: Jeannie Zabala MD;  Location: [facility] INVASIVE LOCATION;  Service: Cardiovascular;;    SKIN BIOPSY Left     arm    SKIN LESION EXCISION N/A     VENTRAL HERNIA REPAIR  10/28/2012         FAMILY HISTORY  Family History   Problem Relation Age of Onset    Hypertension Mother     Asthma Mother     COPD Mother     Osteoarthritis Mother     Stomach cancer Father     Cancer Father     Cancer Sister     Diabetes Maternal Grandmother     Colon cancer Neg Hx          SOCIAL HISTORY  Social History     Socioeconomic History    Marital status:    Tobacco Use    Smoking status: Never     Passive exposure: Past    Smokeless tobacco: Never   Vaping Use    Vaping status: Never Used   Substance and Sexual Activity    Alcohol use: Not Currently     Alcohol/week: 1.0 - 2.0 standard drink of alcohol     Types: 1 - 2 Glasses of wine per week     Comment:  rarely    Drug use: Never    Sexual activity: Defer         ALLERGIES  Penicillins and Other        REVIEW OF SYSTEMS    All systems reviewed and negative except for those discussed in HPI.       PHYSICAL EXAM    I have reviewed the triage vital signs and nursing notes.    ED Triage Vitals [05/14/25 2134]   Temp Heart Rate Resp BP SpO2   97.8 °F (36.6 °C) 107 20 101/77 98 %      Temp src Heart Rate Source Patient Position BP Location FiO2 (%)   Oral Monitor Sitting Left arm --         General: Moderate acute distress, tachypneic.  Rhonchi without stethoscope.  Skin: normal color, warm and dry  Head: normocephalic, atraumatic  Nose: normal nasal mucosa, no visible deformity.  Mouth: moist mucous membranes.  Neck: supple.  Cardiovascular: Tachycardic, irregular rhythm  Lungs: Rhonchi throughout all lung fields bilaterally  Abdomen: soft, non-tender, non-distended. No rebound tenderness, no guarding.  No peritonitis.  Extremities: 1+ edema about the bilateral distal lower extremities. Palpable radial pulses bilaterally.  Neuro:  alert and oriented x3, no focal neurological deficits.  Psych:  appropriate mood and behavior.        LAB RESULTS  Recent Results (from the past 24 hours)   Comprehensive Metabolic Panel    Collection Time: 05/14/25 10:14 PM    Specimen: Blood   Result Value Ref Range    Glucose 157 (H) 65 - 99 mg/dL    BUN 36 (H) 8 - 23 mg/dL    Creatinine 1.27 (H) 0.57 - 1.00 mg/dL    Sodium 142 136 - 145 mmol/L    Potassium 3.6 3.5 - 5.2 mmol/L    Chloride 101 98 - 107 mmol/L    CO2 29.7 (H) 22.0 - 29.0 mmol/L    Calcium 8.6 8.6 - 10.5 mg/dL    Total Protein 5.9 (L) 6.0 - 8.5 g/dL    Albumin 3.7 3.5 - 5.2 g/dL    ALT (SGPT) 44 (H) 1 - 33 U/L    AST (SGOT) 16 1 - 32 U/L    Alkaline Phosphatase 92 39 - 117 U/L    Total Bilirubin 1.5 (H) 0.0 - 1.2 mg/dL    Globulin 2.2 gm/dL    A/G Ratio 1.7 g/dL    BUN/Creatinine Ratio 28.3 (H) 7.0 - 25.0    Anion Gap 11.3 5.0 - 15.0 mmol/L    eGFR 41.8 (L) >60.0 mL/min/1.73    BNP    Collection Time: 05/14/25 10:14 PM    Specimen: Blood   Result Value Ref Range    proBNP 14,216.0 (H) 0.0 - 1,800.0 pg/mL   High Sensitivity Troponin T    Collection Time: 05/14/25 10:14 PM    Specimen: Blood   Result Value Ref Range    HS Troponin T 58 (C) <14 ng/L   Green Top (Gel)    Collection Time: 05/14/25 10:14 PM   Result Value Ref Range    Extra Tube Hold for add-ons.    Lavender Top    Collection Time: 05/14/25 10:14 PM   Result Value Ref Range    Extra Tube hold for add-on    Light Blue Top    Collection Time: 05/14/25 10:14 PM   Result Value Ref Range    Extra Tube Hold for add-ons.    CBC Auto Differential    Collection Time: 05/14/25 10:14 PM    Specimen: Blood   Result Value Ref Range    WBC 14.84 (H) 3.40 - 10.80 10*3/mm3    RBC 3.83 3.77 - 5.28 10*6/mm3    Hemoglobin 10.6 (L) 12.0 - 15.9 g/dL    Hematocrit 35.5 34.0 - 46.6 %    MCV 92.7 79.0 - 97.0 fL    MCH 27.7 26.6 - 33.0 pg    MCHC 29.9 (L) 31.5 - 35.7 g/dL    RDW 17.1 (H) 12.3 - 15.4 %    RDW-SD 56.3 (H) 37.0 - 54.0 fl    MPV 9.6 6.0 - 12.0 fL    Platelets 245 140 - 450 10*3/mm3    Neutrophil % 85.8 (H) 42.7 - 76.0 %    Lymphocyte % 3.3 (L) 19.6 - 45.3 %    Monocyte % 8.8 5.0 - 12.0 %    Eosinophil % 1.3 0.3 - 6.2 %    Basophil % 0.3 0.0 - 1.5 %    Immature Grans % 0.5 0.0 - 0.5 %    Neutrophils, Absolute 12.72 (H) 1.70 - 7.00 10*3/mm3    Lymphocytes, Absolute 0.49 (L) 0.70 - 3.10 10*3/mm3    Monocytes, Absolute 1.30 (H) 0.10 - 0.90 10*3/mm3    Eosinophils, Absolute 0.20 0.00 - 0.40 10*3/mm3    Basophils, Absolute 0.05 0.00 - 0.20 10*3/mm3    Immature Grans, Absolute 0.08 (H) 0.00 - 0.05 10*3/mm3    nRBC 0.0 0.0 - 0.2 /100 WBC   Procalcitonin    Collection Time: 05/14/25 10:14 PM    Specimen: Blood   Result Value Ref Range    Procalcitonin 0.12 0.00 - 0.25 ng/mL   High Sensitivity Troponin T 1Hr    Collection Time: 05/14/25 11:22 PM    Specimen: Blood   Result Value Ref Range    HS Troponin T 61 (C) <14 ng/L    Troponin T Numeric Delta  3 ng/L    Troponin T % Delta 5 Abnormal if >/= 20%   Blood Gas, Venous With Co-Ox    Collection Time: 05/14/25 11:32 PM    Specimen: Venous Blood   Result Value Ref Range    Site OTHER     pH, Venous 7.353 7.320 - 7.420 pH Units    pCO2, Venous 65.9 (H) 40.0 - 50.0 mm Hg    pO2, Venous 32.1 30.0 - 50.0 mm Hg    HCO3, Venous 36.6 (H) 22.0 - 28.0 mmol/L    Base Excess, Venous 8.8 (H) 0.0 - 2.0 mmol/L    O2 Saturation, Venous 52.2 45.0 - 75.0 %    Oxyhemoglobin Venous 51.1 40.0 - 70.0 %    Methemoglobin Venous 0.7 0.0 - 3.0 %    Carboxyhemoglobin Venous 1.3 0.0 - 5.0 %    Barometric Pressure for Blood Gas 728 mmHg    Modality BiPap     FIO2 50 %    Ventilator Mode BiPAP     Collected by LAB        If labs were ordered, I independently reviewed the results and considered them in treating the patient.  See medical decision making discussion section for my interpretation of lab results.        RADIOLOGY  No Radiology Exams Resulted Within Past 24 Hours    I ordered and independently reviewed the above noted radiographic studies.  See radiologist's dictation for official interpretation.    Per my independent reading:      Chest radiograph obtained and based on my independent review shows cardiomegaly and pulmonary vascular congestion.            PROCEDURES    Procedures    ECG 12 Lead Dyspnea   Final Result          MEDICATIONS GIVEN IN ER    Medications   sodium chloride 0.9 % flush 10 mL (has no administration in time range)   aspirin chewable tablet 324 mg (0 mg Oral Hold 5/14/25 1241)   sodium chloride 0.9 % flush 10 mL (has no administration in time range)   sodium chloride 0.9 % flush 10 mL (has no administration in time range)   sodium chloride 0.9 % infusion 40 mL (has no administration in time range)   acetaminophen (TYLENOL) tablet 650 mg (has no administration in time range)     Or   acetaminophen (TYLENOL) 160 MG/5ML oral solution 650 mg (has no administration in time range)     Or   acetaminophen (TYLENOL)  suppository 650 mg (has no administration in time range)   ondansetron (ZOFRAN) injection 4 mg (has no administration in time range)   sennosides-docusate (PERICOLACE) 8.6-50 MG per tablet 2 tablet (0 tablets Oral Hold 5/15/25 0025)     And   polyethylene glycol (MIRALAX) packet 17 g (has no administration in time range)     And   bisacodyl (DULCOLAX) EC tablet 5 mg (has no administration in time range)     And   bisacodyl (DULCOLAX) suppository 10 mg (has no administration in time range)   furosemide (LASIX) injection 60 mg (has no administration in time range)   citalopram (CeleXA) tablet 20 mg (has no administration in time range)   empagliflozin (JARDIANCE) tablet 10 mg (has no administration in time range)   metoprolol tartrate (LOPRESSOR) tablet 12.5 mg (0 mg Oral Hold 5/15/25 0014)   pantoprazole (PROTONIX) EC tablet 40 mg (has no administration in time range)   potassium chloride (KLOR-CON M20) CR tablet 20 mEq (0 mEq Oral Hold 5/15/25 0025)   spironolactone (ALDACTONE) tablet 12.5 mg (has no administration in time range)   vitamin B-12 (CYANOCOBALAMIN) tablet 100 mcg (has no administration in time range)   rivaroxaban (XARELTO) tablet 15 mg (has no administration in time range)   sacubitril-valsartan (ENTRESTO) 24-26 MG tablet 1 tablet (has no administration in time range)   metoprolol tartrate (LOPRESSOR) injection 2.5 mg (2.5 mg Intravenous Given 5/14/25 2251)   furosemide (LASIX) injection 40 mg (40 mg Intravenous Given 5/14/25 2259)   ipratropium-albuterol (DUO-NEB) nebulizer solution 9 mL (9 mL Nebulization Given 5/14/25 2332)   methylPREDNISolone sodium succinate (SOLU-Medrol) injection 125 mg (125 mg Intravenous Given 5/14/25 2337)   ondansetron (ZOFRAN) injection 8 mg (8 mg Intravenous Given 5/14/25 2338)         MEDICAL DECISION MAKING, PROGRESS, and CONSULTS    All labs, if obtained, have been independently reviewed by me.  All radiology studies, if obtained, have been reviewed by me and the  radiologist dictating the report.  All EKG's, if obtained, have been independently viewed and interpreted by me/my attending physician.      Discussion below represents my analysis of pertinent findings related to patient's condition, differential diagnosis, treatment plan and final disposition.                         Differential diagnosis:    Differential diagnosis for this patient includes heart failure exacerbation, COPD exacerbation, acute coronary syndrome, pneumothorax, pulmonary embolism, pericarditis, myocarditis, cardiac tamponade, pericardial effusion, aortic dissection, Boerhaave syndrome, other acute emergency.    Medical Decision Making Discussion:    Vitals reviewed and demonstrate tachycardia and borderline hypotension but otherwise are normal.    EKG obtained and based on my independent review shows atrial fibrillation with rapid ventricular sponsor.  Right bundle branch block.  Appropriate discordance.  This EKG does not appear to be significantly changed in comparison to previous from 5/7/2025.    Clinically, patient is having a heart failure exacerbation.  She was given only 40 mg of IV Lasix given borderline hypotension.  Due to her A-fib with RVR was also given a small dose of IV Lopressor with 2.5 mg given her associated heart failure with reduced ejection fraction.    Given that she also has COPD she was also given IV Solu-Medrol and 3 back-to-back DuoNeb's.    Patient has had increasing work of breathing and was placed on BiPAP.  Following this and 3 back-to-back DuoNeb's patient has had significant improvement in work of breathing.    Labs reviewed which demonstrate near baseline troponin of 58.  Repeat troponin shows no significant delta rise.  Venous gas shows compensated respiratory acidosis.  proBNP significantly elevated.  BUN and creatinine near baseline.  Leukocytosis of 14.    Case discussed with Dr. Hicks accepted the patient for hospitalization    60 minutes of critical care  provided. This time excludes other billable procedures. Time does include preparation of documents, medical consultations, review of old records, and direct bedside care. Patient is at high risk for life-threatening deterioration due to hypercarbic respiratory failure, heart failure dysplasia, COPD exacerbation.      Additional sources:    - External (non-ED) record review: Cardiology note from March 2025 documenting reduced ejection fraction of 35% with severe mitral regurgitation, atrial fibrillation on Xarelto, complete heart block with pacemaker, hypertension, hyperlipidemia, COPD and obstructive sleep apnea.    - Chronic or social conditions impacting care: COPD, atrial fibrillation, heart failure with reduced ejection fraction    Shared Decision Making:  After my consideration of clinical presentation and any laboratory/radiology studies obtained, I discussed the findings with the patient/patient representative who is in agreement with the treatment plan and the final disposition.   Risks and benefits of discharge and/or observation/admission were discussed.    Orders placed during this visit:  Orders Placed This Encounter   Procedures    Respiratory Panel PCR w/COVID-19(SARS-CoV-2) DOT/ABRIL/RAFA/PAD/COR/NANO In-House, NP Swab in UTM/VTM, 2 HR TAT - Swab, Nasopharynx    XR Chest 1 View    [facility] Draw    Comprehensive Metabolic Panel    BNP    High Sensitivity Troponin T    CBC Auto Differential    Procalcitonin    High Sensitivity Troponin T 1Hr    Blood Gas, Venous -With Co-Ox Panel: Yes    Blood Gas, Venous With Co-Ox    Basic Metabolic Panel    CBC (No Diff)    Diet: Cardiac; Healthy Heart (2-3 Na+); Fluid Consistency: Thin (IDDSI 0)    Undress & Gown    Vital Signs    Vital Signs    Up With Assistance    Intake & Output    Weigh patient    Oral Care    Saline Lock & Maintain IV Access    Continuous Pulse Oximetry    Daily Weights    Strict Intake & Output    Code Status and Medical Interventions: CPR  (Attempt to Resuscitate); Full Support    Inpatient Cardiology Consult    Oxygen Therapy- Nasal Cannula; Titrate 1-6 LPM Per SpO2; 90 - 95%    NIPPV - Provider Settings    Incentive Spirometry    ECG 12 Lead Dyspnea    Insert Peripheral IV    Insert Peripheral IV    Inpatient Admission    Fall Precautions    CBC & Differential    Green Top (Gel)    Lavender Top    Light Blue Top         Additional orders considered but not ordered:  Considered CT PE protocol but this is thought to be less likely as she is anticoagulated with Xarelto.        AS OF 03:35 EDT VITALS:    BP - 94/74  HR - 109  TEMP - 97.8 °F (36.6 °C) (Oral)  O2 SATS - 98%                  DIAGNOSIS  Final diagnoses:   Atrial fibrillation with RVR   Acute exacerbation of chronic heart failure   COPD exacerbation         DISPOSITION  Admit      Please note that portions of this document were completed with voice recognition software.        Logan Cheung MD  05/15/25 0335      Electronically signed by Logan Cheung MD at 05/15/25 0335       Vital Signs (last day)       Date/Time Temp Temp src Pulse Resp BP Patient Position SpO2    05/15/25 0530 -- -- 113 20 89/68 Sitting 93    05/15/25 0500 -- -- 114 -- 97/65 -- 95    05/15/25 0430 -- -- 120 -- 100/68 -- 95    05/15/25 0400 -- -- 108 -- 94/66 -- 97    05/15/25 0338 -- -- 106 -- 93/59 -- --    05/15/25 0300 -- -- 109 -- 94/74 -- --    05/15/25 02:25:42 -- -- 100 -- -- -- --    05/15/25 0200 -- -- 104 -- 99/89 -- --    05/15/25 0130 -- -- 102 -- 94/72 -- 98    05/15/25 0017 -- -- 96 -- 102/61 -- 99    05/15/25 0015 -- -- 103 -- -- -- 98    05/14/25 2315 -- -- 93 -- 97/68 Lying 98    05/14/25 2301 -- -- 87 -- 95/62 -- 98    05/14/25 2249 -- -- 90 -- 84/67 -- 98    05/14/25 2230 -- -- 107 -- 95/73 -- 95    05/14/25 2216 -- -- 98 -- -- -- 97    05/14/25 2214 -- -- 93 -- 94/82 -- 97    05/14/25 2134 97.8 (36.6) Oral 107 20 101/77 Sitting 98          Current Facility-Administered Medications   Medication Dose  Route Frequency Provider Last Rate Last Admin    acetaminophen (TYLENOL) tablet 650 mg  650 mg Oral Q4H PRN Nik Hicks MD        Or    acetaminophen (TYLENOL) 160 MG/5ML oral solution 650 mg  650 mg Oral Q4H PRN Nik Hicks MD        Or    acetaminophen (TYLENOL) suppository 650 mg  650 mg Rectal Q4H PRN Nik Hicks MD        aspirin chewable tablet 324 mg  324 mg Oral Once Logan Cheung MD        sennosides-docusate (PERICOLACE) 8.6-50 MG per tablet 2 tablet  2 tablet Oral BID Nik Hicks MD        And    polyethylene glycol (MIRALAX) packet 17 g  17 g Oral Daily PRN Nik Hicks MD        And    bisacodyl (DULCOLAX) EC tablet 5 mg  5 mg Oral Daily PRN Nik Hicks MD        And    bisacodyl (DULCOLAX) suppository 10 mg  10 mg Rectal Daily PRN Nik Hicks MD        citalopram (CeleXA) tablet 20 mg  20 mg Oral Daily Nik Hicks MD        empagliflozin (JARDIANCE) tablet 10 mg  10 mg Oral Daily Nik Hicks MD        furosemide (LASIX) injection 60 mg  60 mg Intravenous BID Nik Hicks MD        metoprolol tartrate (LOPRESSOR) tablet 12.5 mg  12.5 mg Oral Q12H Nik Hicks MD        ondansetron (ZOFRAN) injection 4 mg  4 mg Intravenous Q6H PRN Nik Hicks MD        pantoprazole (PROTONIX) EC tablet 40 mg  40 mg Oral QAM Nik Hicks MD        potassium chloride (KLOR-CON M20) CR tablet 20 mEq  20 mEq Oral BID Nik Hicks MD        rivaroxaban (XARELTO) tablet 15 mg  15 mg Oral Daily Nik Hicks MD        sacubitril-valsartan (ENTRESTO) 24-26 MG tablet 1 tablet  1 tablet Oral Q12H Nik Hicks MD        sodium chloride 0.9 % flush 10 mL  10 mL Intravenous PRN Logan Cheung MD        sodium chloride 0.9 % flush 10 mL  10 mL Intravenous Q12H Nik Hicks MD        sodium chloride 0.9 % flush 10 mL  10 mL Intravenous PRN Nik Hicks MD        sodium chloride 0.9 % infusion 40 mL  40 mL Intravenous PRN Nik Hicks MD        spironolactone (ALDACTONE) tablet 12.5 mg  12.5 mg Oral Daily  Nik Hicks MD        vitamin B-12 (CYANOCOBALAMIN) tablet 100 mcg  100 mcg Oral Daily Nik Hicks MD         Current Outpatient Medications   Medication Sig Dispense Refill    albuterol sulfate  (90 Base) MCG/ACT inhaler Inhale 2 puffs Every 4 (Four) Hours As Needed for Wheezing. 18 g 5    budesonide-formoterol (SYMBICORT) 160-4.5 MCG/ACT inhaler Inhale 2 puffs 2 (Two) Times a Day. Rinse mouth with water after use 10.2 g 5    cholecalciferol (VITAMIN D3) 25 MCG (1000 UT) tablet Take 1 tablet by mouth Daily.      citalopram (CeleXA) 20 MG tablet Take 1 tablet by mouth Daily.      empagliflozin (Jardiance) 10 MG tablet tablet Take 1 tablet by mouth Daily. 90 tablet 3    famotidine (PEPCID) 40 MG tablet TAKE 1 TABLET BY MOUTH ONCE DAILY AT NIGHT 90 tablet 3    furosemide (LASIX) 40 MG tablet Take 1 tablet by mouth 2 (Two) Times a Day. 6 tablet 0    metoprolol tartrate (LOPRESSOR) 25 MG tablet Take 0.5 (one-half) tablet by mouth Every 12 (Twelve) Hours. 30 tablet 0    midodrine (PROAMATINE) 10 MG tablet Take 1 tablet by mouth 3 (Three) Times a Day Before Meals. (Patient not taking: Reported on 5/12/2025) 90 tablet 0    O2 (OXYGEN) Inhale 3-4 L/min Daily As Needed.      pantoprazole (PROTONIX) 40 MG EC tablet Take 1 tablet by mouth Every Morning.      potassium chloride (KLOR-CON M20) 20 MEQ CR tablet Take 1 tablet by mouth 2 (Two) Times a Day. TAKE WITH LASIX DOSES 6 tablet 0    Sacubitril-Valsartan (ENTRESTO PO) Take  by mouth.      spironolactone (ALDACTONE) 25 MG tablet Take 0.5 tablets by mouth Daily. 15 tablet 0    vitamin B-12 (CYANOCOBALAMIN) 100 MCG tablet Take 1 tablet by mouth Daily.      Xarelto 20 MG tablet Take 1 tablet by mouth Daily. *Resume on 3/27/25* 90 tablet 3     Lab Results (last 24 hours)       Procedure Component Value Units Date/Time    Respiratory Panel PCR w/COVID-19(SARS-CoV-2) DOT/ABRIL/RAFA/PAD/COR/NANO In-House, NP Swab in UTM/VTM, 2 HR TAT - Swab, Nasopharynx [[id number]] Updated:  05/15/25 0622    Specimen: Swab from Nasopharynx     Basic Metabolic Panel [[id number]]  (Abnormal) Collected: 05/15/25 0535    Specimen: Blood Updated: 05/15/25 0618     Glucose 136 mg/dL      BUN 37 mg/dL      Creatinine 1.22 mg/dL      Sodium 141 mmol/L      Potassium 4.0 mmol/L      Chloride 98 mmol/L      CO2 30.0 mmol/L      Calcium 8.6 mg/dL      BUN/Creatinine Ratio 30.3     Anion Gap 13.0 mmol/L      eGFR 43.8 mL/min/1.73     Narrative:      GFR Categories in Chronic Kidney Disease (CKD)              GFR Category          GFR (mL/min/1.73)    Interpretation  G1                    90 or greater        Normal or high (1)  G2                    60-89                Mild decrease (1)  G3a                   45-59                Mild to moderate decrease  G3b                   30-44                Moderate to severe decrease  G4                    15-29                Severe decrease  G5                    14 or less           Kidney failure    (1)In the absence of evidence of kidney disease, neither GFR category G1 or G2 fulfill the criteria for CKD.    eGFR calculation 2021 CKD-EPI creatinine equation, which does not include race as a factor    CBC (No Diff) [[id number]]  (Abnormal) Collected: 05/15/25 0535    Specimen: Blood Updated: 05/15/25 0542     WBC 16.22 10*3/mm3      RBC 3.92 10*6/mm3      Hemoglobin 10.9 g/dL      Hematocrit 35.9 %      MCV 91.6 fL      MCH 27.8 pg      MCHC 30.4 g/dL      RDW 16.8 %      RDW-SD 55.3 fl      MPV 9.6 fL      Platelets 220 10*3/mm3     High Sensitivity Troponin T 1Hr [[id number]]  (Abnormal) Collected: 05/14/25 2322    Specimen: Blood Updated: 05/15/25 0021     HS Troponin T 61 ng/L      Troponin T Numeric Delta 3 ng/L      Troponin T % Delta 5    Narrative:      High Sensitive Troponin T Reference Range:  <14.0 ng/L- Negative Female for AMI  <22.0 ng/L- Negative Male for AMI  >=14 - Abnormal Female indicating possible myocardial injury.  >=22 - Abnormal Male indicating  possible myocardial injury.   Clinicians would have to utilize clinical acumen, EKG, Troponin, and serial changes to determine if it is an Acute Myocardial Infarction or myocardial injury due to an underlying chronic condition.         Blood Gas, Venous With Co-Ox [[id number]]  (Abnormal) Collected: 05/14/25 2332    Specimen: Venous Blood Updated: 05/14/25 2332     Site OTHER     pH, Venous 7.353 pH Units      pCO2, Venous 65.9 mm Hg      Comment: 83 Value above reference range        pO2, Venous 32.1 mm Hg      HCO3, Venous 36.6 mmol/L      Comment: 83 Value above reference range        Base Excess, Venous 8.8 mmol/L      Comment: 83 Value above reference range        O2 Saturation, Venous 52.2 %      Oxyhemoglobin Venous 51.1 %      Comment: 84 Value below reference range        Methemoglobin Venous 0.7 %      Carboxyhemoglobin Venous 1.3 %      Barometric Pressure for Blood Gas 728 mmHg      Modality BiPap     FIO2 50 %      Ventilator Mode BiPAP     Collected by LAB     Comment: Meter: [id number]     :  [id number]       High Sensitivity Troponin T [[id number]]  (Abnormal) Collected: 05/14/25 2214    Specimen: Blood Updated: 05/14/25 2307     HS Troponin T 58 ng/L     Narrative:      High Sensitive Troponin T Reference Range:  <14.0 ng/L- Negative Female for AMI  <22.0 ng/L- Negative Male for AMI  >=14 - Abnormal Female indicating possible myocardial injury.  >=22 - Abnormal Male indicating possible myocardial injury.   Clinicians would have to utilize clinical acumen, EKG, Troponin, and serial changes to determine if it is an Acute Myocardial Infarction or myocardial injury due to an underlying chronic condition.         Procalcitonin [[id number]]  (Normal) Collected: 05/14/25 2214    Specimen: Blood Updated: 05/14/25 2304     Procalcitonin 0.12 ng/mL     Narrative:      As a Marker for Sepsis (Non-Neonates):    1. <0.5 ng/mL represents a low risk of severe sepsis and/or septic shock.  2. >2 ng/mL  "represents a high risk of severe sepsis and/or septic shock.    As a Marker for Lower Respiratory Tract Infections that require antibiotic therapy:    PCT on Admission    Antibiotic Therapy       6-12 Hrs later    >0.5                Strongly Recommended  >0.25 - <0.5        Recommended   0.1 - 0.25          Discouraged              Remeasure/reassess PCT  <0.1                Strongly Discouraged     Remeasure/reassess PCT    As 28 day mortality risk marker: \"Change in Procalcitonin Result\" (>80% or <=80%) if Day 0 (or Day 1) and Day 4 values are available. Refer to http://[email or web address] [facility] [email or web address]    Change in PCT <=80%  A decrease of PCT levels below or equal to 80% defines a positive change in PCT test result representing a higher risk for 28-day all-cause mortality of patients diagnosed with severe sepsis for septic shock.    Change in PCT >80%  A decrease of PCT levels of more than 80% defines a negative change in PCT result representing a lower risk for 28-day all-cause mortality of patients diagnosed with severe sepsis or septic shock.       BNP [[id number]]  (Abnormal) Collected: 05/14/25 2214    Specimen: Blood Updated: 05/14/25 2255     proBNP 14,216.0 pg/mL     Narrative:      This assay is used as an aid in the diagnosis of individuals suspected of having heart failure. It can be used as an aid in the diagnosis of acute decompensated heart failure (ADHF) in patients presenting with signs and symptoms of ADHF to the emergency department (ED). In addition, NT-proBNP of <300 pg/mL indicates ADHF is not likely.    Age Range Result Interpretation  NT-proBNP Concentration (pg/mL:      <50             Positive            >450                   Gray                 300-450                    Negative             <300    50-75           Positive            >900                  Gray                300-900                  Negative            <300      >75             Positive            >1800                  " Frey                300-1800                  Negative            <300    Comprehensive Metabolic Panel [[id number]]  (Abnormal) Collected: 05/14/25 2214    Specimen: Blood Updated: 05/14/25 2246     Glucose 157 mg/dL      BUN 36 mg/dL      Creatinine 1.27 mg/dL      Sodium 142 mmol/L      Potassium 3.6 mmol/L      Chloride 101 mmol/L      CO2 29.7 mmol/L      Calcium 8.6 mg/dL      Total Protein 5.9 g/dL      Albumin 3.7 g/dL      ALT (SGPT) 44 U/L      AST (SGOT) 16 U/L      Alkaline Phosphatase 92 U/L      Total Bilirubin 1.5 mg/dL      Globulin 2.2 gm/dL      A/G Ratio 1.7 g/dL      BUN/Creatinine Ratio 28.3     Anion Gap 11.3 mmol/L      eGFR 41.8 mL/min/1.73     Narrative:      GFR Categories in Chronic Kidney Disease (CKD)              GFR Category          GFR (mL/min/1.73)    Interpretation  G1                    90 or greater        Normal or high (1)  G2                    60-89                Mild decrease (1)  G3a                   45-59                Mild to moderate decrease  G3b                   30-44                Moderate to severe decrease  G4                    15-29                Severe decrease  G5                    14 or less           Kidney failure    (1)In the absence of evidence of kidney disease, neither GFR category G1 or G2 fulfill the criteria for CKD.    eGFR calculation 2021 CKD-EPI creatinine equation, which does not include race as a factor    [facility] Draw [[id number]] Collected: 05/14/25 2214    Specimen: Blood Updated: 05/14/25 2231    Narrative:      The following orders were created for panel order [facility] Draw.  Procedure                               Abnormality         Status                     ---------                               -----------         ------                     Green Top (Gel)[[id number]]                                  Final result               Lavender Top[[id number]]                                     Final result               Gold Top -  SST[[id number]]                                                              Light Blue Top[[id number]]                                   Final result                 Please view results for these tests on the individual orders.    Green Top (Gel) [[id number]] Collected: 05/14/25 2214    Specimen: Blood Updated: 05/14/25 2231     Extra Tube Hold for add-ons.     Comment: Auto resulted.       Lavender Top [[id number]] Collected: 05/14/25 2214    Specimen: Blood Updated: 05/14/25 2231     Extra Tube hold for add-on     Comment: Auto resulted       Light Blue Top [[id number]] Collected: 05/14/25 2214    Specimen: Blood Updated: 05/14/25 2231     Extra Tube Hold for add-ons.     Comment: Auto resulted       CBC & Differential [[id number]]  (Abnormal) Collected: 05/14/25 2214    Specimen: Blood Updated: 05/14/25 2225    Narrative:      The following orders were created for panel order CBC & Differential.  Procedure                               Abnormality         Status                     ---------                               -----------         ------                     CBC Auto Differential[[id number]]        Abnormal            Final result               Scan Slide[[id number]]                                                                    Please view results for these tests on the individual orders.    CBC Auto Differential [[id number]]  (Abnormal) Collected: 05/14/25 2214    Specimen: Blood Updated: 05/14/25 2225     WBC 14.84 10*3/mm3      RBC 3.83 10*6/mm3      Hemoglobin 10.6 g/dL      Hematocrit 35.5 %      MCV 92.7 fL      MCH 27.7 pg      MCHC 29.9 g/dL      RDW 17.1 %      RDW-SD 56.3 fl      MPV 9.6 fL      Platelets 245 10*3/mm3      Neutrophil % 85.8 %      Lymphocyte % 3.3 %      Monocyte % 8.8 %      Eosinophil % 1.3 %      Basophil % 0.3 %      Immature Grans % 0.5 %      Neutrophils, Absolute 12.72 10*3/mm3      Lymphocytes, Absolute 0.49 10*3/mm3      Monocytes, Absolute 1.30 10*3/mm3      Eosinophils,  Absolute 0.20 10*3/mm3      Basophils, Absolute 0.05 10*3/mm3      Immature Grans, Absolute 0.08 10*3/mm3      nRBC 0.0 /100 WBC           Imaging Results (Last 24 Hours)       Procedure Component Value Units Date/Time    XR Chest 1 View [[id number]] Resulted: 05/14/25 2153     Updated: 05/14/25 2153          Physician Progress Notes (last 24 hours)  Notes from 05/14/25 0646 through 05/15/25 0646   No notes of this type exist for this encounter.       Consult Notes (last 24 hours)  Notes from 05/14/25 0646 through 05/15/25 0646   No notes of this type exist for this encounter.

## 2025-05-15 NOTE — PLAN OF CARE
Goal Outcome Evaluation:  Plan of Care Reviewed With: patient        Progress: no change  Outcome Evaluation: Bedside eval of swallow completed with pt. seated upright in bed for po trials with spouse present. Pt. reported difficulty with swallowing solids at times but stated that she has a large hiatal hernia and GERD, medically managed and takes reflux precautions. Oral [facility] exam was WFL. She was given trials of regular solids, puree, and thin liquids. Oral phase was WFL with all trials. No overt s/s aspiration or other pharyngeal phase dysphagia with any consistency trialed. No known hx of oropharyngeal dysphagia, only third stage dysphagia. She stated that she takes meds with applesauce and does well with it. Recommend: 1. regular diet with thin liq as lisa, 2. meds whole with thin liq, 3. aspiration precautions, 4. reflux precautions. No further identified needs or concerns at this time for ST. D/W pt. and RN following eval.    Anticipated Discharge Disposition (SLP): unknown          SLP Swallowing Diagnosis: functional oral phase, functional pharyngeal phase, esophageal dysphagia (05/15/25 6538)

## 2025-05-15 NOTE — CASE MANAGEMENT/SOCIAL WORK
Discharge Planning Assessment  [facility]     Patient Name: Elsa Alcaraz  MRN: [id number]  Today's Date: 5/15/2025    Admit Date: 5/14/2025    Plan: Patient plans to return home with family   Discharge Needs Assessment       Row Name 05/15/25 1420       Living Environment    People in Home grandchild(michael);spouse    Name(s) of People in Home Logan,  and Ortega, Adult grandson    Current Living Arrangements home    Duration at Residence 61 years    Potentially Unsafe Housing Conditions none    In the past 12 months has the electric, gas, oil, or water company threatened to shut off services in your home? No    Primary Care Provided by self;spouse/significant other    Provides Primary Care For no one, unable/limited ability to care for self    Family Caregiver if Needed none    Quality of Family Relationships helpful;involved;supportive    Able to Return to Prior Arrangements yes       Resource/Environmental Concerns    Resource/Environmental Concerns none    Transportation Concerns none       Transportation Needs    In the past 12 months, has lack of transportation kept you from medical appointments or from getting medications? no    In the past 12 months, has lack of transportation kept you from meetings, work, or from getting things needed for daily living? No       Food Insecurity    Within the past 12 months, you worried that your food would run out before you got the money to buy more. Never true    Within the past 12 months, the food you bought just didn't last and you didn't have money to get more. Never true       Transition Planning    Patient/Family Anticipates Transition to home with family    Patient/Family Anticipated Services at Transition none    Transportation Anticipated family or friend will provide       Discharge Needs Assessment    Readmission Within the Last 30 Days previous discharge plan unsuccessful    Equipment Currently Used at Home rollator;oxygen;shower chair;grab bar    Concerns to  be Addressed denies needs/concerns at this time    Do you want help finding or keeping work or a job? I do not need or want help    Do you want help with school or training? For example, starting or completing job training or getting a high school diploma, GED or equivalent No    Anticipated Changes Related to Illness inability to care for self    Equipment Needed After Discharge none    Provided Post Acute Provider List? N/A    Provided Post Acute Provider Quality & Resource List? N/A    Offered/Gave Vendor List no    Current Discharge Risk chronically ill                   Discharge Plan       Row Name 05/15/25 1420       Plan    Plan Patient plans to return home with family    Patient/Family in Agreement with Plan yes    Provided Post Acute Provider List? N/A    Provided Post Acute Provider Quality & Resource List? N/A    Plan Comments Patient is awake and able to answer questions.  She is a current patient of [facility] and gets her medications from [facility]. She opts out of Meds to Bed.  She has the following DME:  rolator, oxygen ([employer]), shower chair, and grab bars.  She denies the need for additional DME or services at DC.  At the time of DC the patient plans to return home with her  and grandson.  Questions and concerns were addressed, IMM delivered, 30 day readmission assessment completed at the time of this conversation. Will provide additional resources and information upon request.    Final Note Plans to return home with family                  Selected Continued Care - Episodes Includes continued care and service providers with selected services from the active episodes listed below             Demographic Summary       Row Name 05/15/25 1410       General Information    Admission Type inpatient    Arrived From emergency department    Required Notices Provided Important Message from Medicare    Referral Source admission list    Reason for Consult discharge planning    Preferred Language  English       Contact Information    Permission Granted to Share Info With                    Functional Status       Row Name 05/15/25 1417       Functional Status    Usual Activity Tolerance good    Current Activity Tolerance fair       Physical Activity    On average, how many days per week do you engage in moderate to strenuous exercise (like a brisk walk)? 0 days    On average, how many minutes do you engage in exercise at this level? 0 min    Number of minutes of exercise per week 0       Assessment of Health Literacy    How often do you have someone help you read hospital materials? Never    How often do you have problems learning about your medical condition because of difficulty understanding written information? Never    How often do you have a problem understanding what is told to you about your medical condition? Never    How confident are you filling out medical forms by yourself? Extremely    Health Literacy Excellent       Functional Status, IADL    Medications independent    Meal Preparation assistive person    Housekeeping assistive person    Laundry assistive person    Shopping assistive person    If for any reason you need help with day-to-day activities such as bathing, preparing meals, shopping, managing finances, etc., do you get the help you need? I get all the help I need       Mental Status    General Appearance WDL WDL       Mental Status Summary    Recent Changes in Mental Status/Cognitive Functioning no changes                   Psychosocial       Row Name 05/15/25 1417       Values/Beliefs    Spiritual, Cultural Beliefs, Yarsani Practices, Values that Affect Care no       Behavior WDL    Behavior WDL WDL       Emotion Mood WDL    Emotion/Mood/Affect WDL WDL       Speech WDL    Speech WDL WDL       Perceptual State WDL    Perceptual State WDL WDL       Thought Process WDL    Thought Process WDL WDL       Intellectual Performance WDL    Intellectual Performance WDL WDL                    Abuse/Neglect       Row Name 05/15/25 1417       Personal Safety    Feels Unsafe at Home or Work/School no    Feels Threatened by Someone no    Does Anyone Try to Keep You From Having Contact with Others or Doing Things Outside Your Home? no    Physical Signs of Abuse Present no                   Legal       Row Name 05/15/25 1417       Financial Resource Strain    How hard is it for you to pay for the very basics like food, housing, medical care, and heating? Not hard                   Substance Abuse       Row Name 05/15/25 1417       Substance Use    Substance Use Status never used                   Patient Forms       Row Name 05/15/25 1436       Patient Forms    Important Message from Medicare (IMM) Delivered    Delivered to Patient    Method of delivery In person                      April Bahena RN

## 2025-05-15 NOTE — OUTREACH NOTE
CT Surgery Week 3 Survey      Flowsheet Row Responses   [facility] facility patient discharged from? [place]   Does the patient have one of the following disease processes/diagnoses(primary or secondary)? Cardiothoracic surgery   Week 3 attempt successful? No   Unsuccessful attempts Attempt 1   Revoke Readmitted            Yamilex WRIGHT - Registered Nurse

## 2025-05-16 ENCOUNTER — READMISSION MANAGEMENT (OUTPATIENT)
Dept: CALL CENTER | Facility: HOSPITAL | Age: 85
End: 2025-05-16
Payer: MEDICARE

## 2025-05-16 VITALS
RESPIRATION RATE: 16 BRPM | TEMPERATURE: 97.1 F | WEIGHT: 146.61 LBS | OXYGEN SATURATION: 92 % | HEART RATE: 120 BPM | SYSTOLIC BLOOD PRESSURE: 122 MMHG | BODY MASS INDEX: 25.98 KG/M2 | DIASTOLIC BLOOD PRESSURE: 67 MMHG | HEIGHT: 63 IN

## 2025-05-16 PROBLEM — B34.8 RHINOVIRUS: Status: ACTIVE | Noted: 2025-05-16

## 2025-05-16 LAB
ANION GAP SERPL CALCULATED.3IONS-SCNC: 15.9 MMOL/L (ref 5–15)
ANISOCYTOSIS BLD QL: NORMAL
BASOPHILS # BLD AUTO: 0.03 10*3/MM3 (ref 0–0.2)
BASOPHILS NFR BLD AUTO: 0.1 % (ref 0–1.5)
BUN SERPL-MCNC: 40 MG/DL (ref 8–23)
BUN/CREAT SERPL: 27.6 (ref 7–25)
CALCIUM SPEC-SCNC: 8.5 MG/DL (ref 8.6–10.5)
CHLORIDE SERPL-SCNC: 98 MMOL/L (ref 98–107)
CO2 SERPL-SCNC: 21.1 MMOL/L (ref 22–29)
CREAT SERPL-MCNC: 1.45 MG/DL (ref 0.57–1)
D-LACTATE SERPL-SCNC: 1.4 MMOL/L (ref 0.5–2)
DEPRECATED RDW RBC AUTO: 55.2 FL (ref 37–54)
EGFRCR SERPLBLD CKD-EPI 2021: 35.6 ML/MIN/1.73
EOSINOPHIL # BLD AUTO: 0.05 10*3/MM3 (ref 0–0.4)
EOSINOPHIL NFR BLD AUTO: 0.2 % (ref 0.3–6.2)
ERYTHROCYTE [DISTWIDTH] IN BLOOD BY AUTOMATED COUNT: 16.6 % (ref 12.3–15.4)
GLUCOSE SERPL-MCNC: 156 MG/DL (ref 65–99)
HCT VFR BLD AUTO: 34.2 % (ref 34–46.6)
HEMOCCULT STL QL: POSITIVE
HGB BLD-MCNC: 10.1 G/DL (ref 12–15.9)
IMM GRANULOCYTES # BLD AUTO: 0.12 10*3/MM3 (ref 0–0.05)
IMM GRANULOCYTES NFR BLD AUTO: 0.6 % (ref 0–0.5)
LYMPHOCYTES # BLD AUTO: 0.7 10*3/MM3 (ref 0.7–3.1)
LYMPHOCYTES NFR BLD AUTO: 3.4 % (ref 19.6–45.3)
MCH RBC QN AUTO: 27.4 PG (ref 26.6–33)
MCHC RBC AUTO-ENTMCNC: 29.5 G/DL (ref 31.5–35.7)
MCV RBC AUTO: 92.7 FL (ref 79–97)
MONOCYTES # BLD AUTO: 1.39 10*3/MM3 (ref 0.1–0.9)
MONOCYTES NFR BLD AUTO: 6.7 % (ref 5–12)
NEUTROPHILS NFR BLD AUTO: 18.46 10*3/MM3 (ref 1.7–7)
NEUTROPHILS NFR BLD AUTO: 89 % (ref 42.7–76)
NRBC BLD AUTO-RTO: 0 /100 WBC (ref 0–0.2)
PLATELET # BLD AUTO: 224 10*3/MM3 (ref 140–450)
PMV BLD AUTO: 10 FL (ref 6–12)
POTASSIUM SERPL-SCNC: 4.4 MMOL/L (ref 3.5–5.2)
RBC # BLD AUTO: 3.69 10*6/MM3 (ref 3.77–5.28)
SMALL PLATELETS BLD QL SMEAR: ADEQUATE
SODIUM SERPL-SCNC: 135 MMOL/L (ref 136–145)
WBC MORPH BLD: NORMAL
WBC NRBC COR # BLD AUTO: 20.75 10*3/MM3 (ref 3.4–10.8)

## 2025-05-16 PROCEDURE — 97161 PT EVAL LOW COMPLEX 20 MIN: CPT

## 2025-05-16 PROCEDURE — 94799 UNLISTED PULMONARY SVC/PX: CPT

## 2025-05-16 PROCEDURE — 83605 ASSAY OF LACTIC ACID: CPT | Performed by: STUDENT IN AN ORGANIZED HEALTH CARE EDUCATION/TRAINING PROGRAM

## 2025-05-16 PROCEDURE — 25010000002 ONDANSETRON PER 1 MG: Performed by: INTERNAL MEDICINE

## 2025-05-16 PROCEDURE — 25810000003 SODIUM CHLORIDE 0.9 % SOLUTION: Performed by: STUDENT IN AN ORGANIZED HEALTH CARE EDUCATION/TRAINING PROGRAM

## 2025-05-16 PROCEDURE — 80048 BASIC METABOLIC PNL TOTAL CA: CPT | Performed by: STUDENT IN AN ORGANIZED HEALTH CARE EDUCATION/TRAINING PROGRAM

## 2025-05-16 PROCEDURE — 85025 COMPLETE CBC W/AUTO DIFF WBC: CPT | Performed by: STUDENT IN AN ORGANIZED HEALTH CARE EDUCATION/TRAINING PROGRAM

## 2025-05-16 PROCEDURE — 85007 BL SMEAR W/DIFF WBC COUNT: CPT | Performed by: STUDENT IN AN ORGANIZED HEALTH CARE EDUCATION/TRAINING PROGRAM

## 2025-05-16 PROCEDURE — 82272 OCCULT BLD FECES 1-3 TESTS: CPT | Performed by: STUDENT IN AN ORGANIZED HEALTH CARE EDUCATION/TRAINING PROGRAM

## 2025-05-16 PROCEDURE — 99239 HOSP IP/OBS DSCHRG MGMT >30: CPT | Performed by: STUDENT IN AN ORGANIZED HEALTH CARE EDUCATION/TRAINING PROGRAM

## 2025-05-16 PROCEDURE — 97165 OT EVAL LOW COMPLEX 30 MIN: CPT

## 2025-05-16 RX ORDER — SACUBITRIL AND VALSARTAN 24; 26 MG/1; MG/1
1 TABLET, FILM COATED ORAL 2 TIMES DAILY
COMMUNITY

## 2025-05-16 RX ORDER — IPRATROPIUM BROMIDE AND ALBUTEROL SULFATE 2.5; .5 MG/3ML; MG/3ML
3 SOLUTION RESPIRATORY (INHALATION) EVERY 4 HOURS PRN
Status: DISCONTINUED | OUTPATIENT
Start: 2025-05-16 | End: 2025-05-16 | Stop reason: HOSPADM

## 2025-05-16 RX ORDER — MIDODRINE HYDROCHLORIDE 5 MG/1
10 TABLET ORAL
Status: DISCONTINUED | OUTPATIENT
Start: 2025-05-16 | End: 2025-05-16 | Stop reason: HOSPADM

## 2025-05-16 RX ORDER — DOXYCYCLINE 100 MG/1
100 CAPSULE ORAL 2 TIMES DAILY
Qty: 10 CAPSULE | Refills: 0 | Status: SHIPPED | OUTPATIENT
Start: 2025-05-16 | End: 2025-05-21

## 2025-05-16 RX ORDER — METOPROLOL TARTRATE 25 MG/1
12.5 TABLET, FILM COATED ORAL EVERY 12 HOURS SCHEDULED
Status: DISCONTINUED | OUTPATIENT
Start: 2025-05-16 | End: 2025-05-16 | Stop reason: HOSPADM

## 2025-05-16 RX ADMIN — EMPAGLIFLOZIN 10 MG: 10 TABLET, FILM COATED ORAL at 08:06

## 2025-05-16 RX ADMIN — ONDANSETRON 4 MG: 2 INJECTION INTRAMUSCULAR; INTRAVENOUS at 06:56

## 2025-05-16 RX ADMIN — POTASSIUM CHLORIDE 20 MEQ: 1500 TABLET, EXTENDED RELEASE ORAL at 08:05

## 2025-05-16 RX ADMIN — SODIUM CHLORIDE 500 ML: 900 INJECTION, SOLUTION INTRAVENOUS at 08:41

## 2025-05-16 RX ADMIN — ONDANSETRON 4 MG: 2 INJECTION INTRAMUSCULAR; INTRAVENOUS at 14:52

## 2025-05-16 RX ADMIN — SODIUM CHLORIDE 500 ML: 900 INJECTION, SOLUTION INTRAVENOUS at 09:47

## 2025-05-16 RX ADMIN — CITALOPRAM HYDROBROMIDE 20 MG: 20 TABLET ORAL at 08:06

## 2025-05-16 RX ADMIN — METOPROLOL TARTRATE 12.5 MG: 25 TABLET, FILM COATED ORAL at 08:39

## 2025-05-16 RX ADMIN — IPRATROPIUM BROMIDE AND ALBUTEROL SULFATE 3 ML: 2.5; .5 SOLUTION RESPIRATORY (INHALATION) at 11:22

## 2025-05-16 RX ADMIN — VITAM B12 100 MCG: 100 TAB at 08:06

## 2025-05-16 RX ADMIN — SENNOSIDES AND DOCUSATE SODIUM 2 TABLET: 50; 8.6 TABLET ORAL at 08:06

## 2025-05-16 RX ADMIN — MIDODRINE HYDROCHLORIDE 10 MG: 5 TABLET ORAL at 10:36

## 2025-05-16 RX ADMIN — Medication 10 ML: at 08:41

## 2025-05-16 RX ADMIN — POLYETHYLENE GLYCOL 3350 17 G: 17 POWDER, FOR SOLUTION ORAL at 08:23

## 2025-05-16 RX ADMIN — RIVAROXABAN 15 MG: 15 TABLET, FILM COATED ORAL at 08:05

## 2025-05-16 RX ADMIN — PANTOPRAZOLE SODIUM 40 MG: 40 TABLET, DELAYED RELEASE ORAL at 06:05

## 2025-05-16 NOTE — PAYOR COMM NOTE
"To:  DILMA  From: Sosa Yee RN  Phone: [phone]  Fax: [phone]  NPI: [id number]  TIN: [id number]  Member ID: [id number]   MRN: [id number]    Elsa Caraballo (85 y.o. Female)       Date of Birth   1940    Social Security Number       Address   [address] [place] [address]    Home Phone   [phone]    MRN   [id number]       Church   [employer]    Marital Status                               Admission Date   5/14/2025    Admission Type   Emergency    Admitting Provider   Nik Hicks MD    Attending Provider   Kerley, Brian Joseph, DO    [facility]       Discharge Date       Discharge Disposition       Discharge Destination                                 Attending Provider: Kerley, Brian Joseph, DO    Allergies: Penicillins, Other    Isolation: Droplet   Infection: Rhinovirus  (05/15/25)   Code Status: CPR    Ht: 160 cm (63\")   Wt: 66.5 kg (146 lb 9.7 oz)    Admission Cmt: None   Principal Problem: Acute on chronic HFrEF (heart failure with reduced ejection fraction) [I50.23]                   Active Insurance as of 5/14/2025       Primary Coverage       Payor Plan Insurance Group Employer/Plan Group    ANTH MEDICARE REPLACEMENT ANTH MEDICARE ADVANTAGE PPO [id number]       Payor Plan Address Payor Plan Phone Number Payor Plan Fax Number Effective Dates    [address] [phone]  1/1/2020 - None Entered    [facility] [id number]         Subscriber Name Subscriber Birth Date Member ID       ELSA CARABALLO 1940 [id number]                     Emergency Contacts        (Rel.) Home Phone Work Phone Mobile Phone    Logan Caraballo (Spouse) [phone] -- [phone]    DARIAN CHERRY (Daughter) [phone] -- [phone]              Vital Signs (last day)       Date/Time Temp Temp src Pulse Resp BP Patient Position SpO2    05/16/25 1122 -- -- -- 18 -- -- 100    05/16/25 1036 -- -- 109 -- 84/52 -- -- "    05/16/25 0839 -- -- 125 -- 82/60 -- --    05/16/25 0720 98.2 (36.8) Oral 122 20 93/61 Sitting 100    05/16/25 0300 98 (36.7) Oral 118 18 112/65 Lying 93    05/15/25 2300 97.8 (36.6) Oral 97 18 95/57 Lying 96    05/15/25 1900 97.6 (36.4) Oral 114 18 119/63 Sitting 96    05/15/25 1532 97.6 (36.4) Oral -- 18 93/58 Lying 97    05/15/25 1342 98.2 (36.8) Oral 95 18 109/66 Lying 95    05/15/25 1200 -- -- 92 -- 104/56 -- 96    05/15/25 1100 -- -- 104 -- 79/59 -- 93    05/15/25 1039 -- -- -- -- 92/54 -- --    05/15/25 1030 -- -- 106 -- 97/64 -- 97    05/15/25 1019 -- -- 98 -- -- -- 96    05/15/25 0943 -- -- 109 -- -- -- 93    05/15/25 0928 -- -- 105 -- 89/50 -- 90    05/15/25 0919 -- -- 114 -- -- -- 95    05/15/25 0914 -- -- -- -- 96/79 -- --    05/15/25 0814 -- -- 110 20 92/56 Lying 96    05/15/25 0730 -- -- 100 -- 93/49 -- 90    05/15/25 0709 -- -- 105 -- -- -- 96    05/15/25 0700 -- -- 98 -- 97/69 -- 91    05/15/25 0530 -- -- 113 20 89/68 Sitting 93    05/15/25 0500 -- -- 114 -- 97/65 -- 95    05/15/25 0430 -- -- 120 -- 100/68 -- 95    05/15/25 0400 -- -- 108 -- 94/66 -- 97    05/15/25 0338 -- -- 106 -- 93/59 -- --    05/15/25 0300 -- -- 109 -- 94/74 -- --    05/15/25 02:25:42 -- -- 100 -- -- -- --    05/15/25 0200 -- -- 104 -- 99/89 -- --    05/15/25 0130 -- -- 102 -- 94/72 -- 98    05/15/25 0017 -- -- 96 -- 102/61 -- 99    05/15/25 0015 -- -- 103 -- -- -- 98          Current Facility-Administered Medications   Medication Dose Route Frequency Provider Last Rate Last Admin    acetaminophen (TYLENOL) tablet 650 mg  650 mg Oral Q4H PRNik Malloy MD   650 mg at 05/15/25 1944    Or    acetaminophen (TYLENOL) 160 MG/5ML oral solution 650 mg  650 mg Oral Q4H Nik Moore MD        Or    acetaminophen (TYLENOL) suppository 650 mg  650 mg Rectal Q4H Nik Moore MD        aspirin chewable tablet 324 mg  324 mg Oral Once Logan Cheung MD        sennosides-docusate (PERICOLACE) 8.6-50 MG per tablet 2 tablet  2  tablet Oral BID Nik Hicks MD   2 tablet at 05/16/25 0806    And    polyethylene glycol (MIRALAX) packet 17 g  17 g Oral Daily PRN Nik Hicks MD   17 g at 05/16/25 0823    And    bisacodyl (DULCOLAX) EC tablet 5 mg  5 mg Oral Daily PRN Nik Hicks MD        And    bisacodyl (DULCOLAX) suppository 10 mg  10 mg Rectal Daily PRN Nik Hicks MD        citalopram (CeleXA) tablet 20 mg  20 mg Oral Daily Nik Hicks MD   20 mg at 05/16/25 0806    empagliflozin (JARDIANCE) tablet 10 mg  10 mg Oral Daily Nik Hicks MD   10 mg at 05/16/25 0806    ipratropium-albuterol (DUO-NEB) nebulizer solution 3 mL  3 mL Nebulization Q4H PRN Kerley, Brian Joseph,    3 mL at 05/16/25 1122    metoprolol tartrate (LOPRESSOR) tablet 12.5 mg  12.5 mg Oral Q12H Kerley, Brian Joseph, DO   12.5 mg at 05/16/25 0839    midodrine (PROAMATINE) tablet 10 mg  10 mg Oral TID AC Kerley, Brian Joseph,    10 mg at 05/16/25 1036    ondansetron (ZOFRAN) injection 4 mg  4 mg Intravenous Q6H PRN Nik Hicks MD   4 mg at 05/16/25 0656    pantoprazole (PROTONIX) EC tablet 40 mg  40 mg Oral QAM Nik Hicks MD   40 mg at 05/16/25 0605    potassium chloride (KLOR-CON M20) CR tablet 20 mEq  20 mEq Oral BID Nik Hicks MD   20 mEq at 05/16/25 0805    [Held by provider] rivaroxaban (XARELTO) tablet 15 mg  15 mg Oral Daily Nik Hicks MD   15 mg at 05/16/25 0805    sacubitril-valsartan (ENTRESTO) 24-26 MG tablet 1 tablet  1 tablet Oral Q12H Nik Hicks MD   1 tablet at 05/15/25 2109    sodium chloride 0.9 % flush 10 mL  10 mL Intravenous PRN Logan Cheung MD        sodium chloride 0.9 % flush 10 mL  10 mL Intravenous Q12H Nik Hicks MD   10 mL at 05/16/25 0841    sodium chloride 0.9 % flush 10 mL  10 mL Intravenous PRN Nik Hicks MD        sodium chloride 0.9 % infusion 40 mL  40 mL Intravenous PRN Nik Hicks MD        spironolactone (ALDACTONE) tablet 12.5 mg  12.5 mg Oral Daily Nik Hicks MD   12.5 mg at 05/15/25 0805     vitamin B-12 (CYANOCOBALAMIN) tablet 100 mcg  100 mcg Oral Daily Nik Hicks MD   100 mcg at 05/16/25 0806     Lab Results (last 24 hours)       Procedure Component Value Units Date/Time    Lactic Acid, Plasma [[id number]] Collected: 05/16/25 1100    Specimen: Blood Updated: 05/16/25 1131    CBC & Differential [[id number]]  (Abnormal) Collected: 05/16/25 0831    Specimen: Blood Updated: 05/16/25 1044    Narrative:      The following orders were created for panel order CBC & Differential.  Procedure                               Abnormality         Status                     ---------                               -----------         ------                     CBC Auto Differential[[id number]]        Abnormal            Final result               Scan Slide[[id number]]                                       Final result                 Please view results for these tests on the individual orders.    Scan Slide [[id number]] Collected: 05/16/25 0831    Specimen: Blood Updated: 05/16/25 1044     Anisocytosis Slight/1+     WBC Morphology Normal     Platelet Estimate Adequate    Basic Metabolic Panel [[id number]]  (Abnormal) Collected: 05/16/25 0831    Specimen: Blood Updated: 05/16/25 1025     Glucose 156 mg/dL      BUN 40 mg/dL      Creatinine 1.45 mg/dL      Sodium 135 mmol/L      Potassium 4.4 mmol/L      Comment: Slight hemolysis detected by analyzer. Result may be falsely elevated.        Chloride 98 mmol/L      CO2 21.1 mmol/L      Calcium 8.5 mg/dL      BUN/Creatinine Ratio 27.6     Anion Gap 15.9 mmol/L      eGFR 35.6 mL/min/1.73     Narrative:      GFR Categories in Chronic Kidney Disease (CKD)              GFR Category          GFR (mL/min/1.73)    Interpretation  G1                    90 or greater        Normal or high (1)  G2                    60-89                Mild decrease (1)  G3a                   45-59                Mild to moderate decrease  G3b                   30-44                Moderate to severe  decrease  G4                    15-29                Severe decrease  G5                    14 or less           Kidney failure    (1)In the absence of evidence of kidney disease, neither GFR category G1 or G2 fulfill the criteria for CKD.    eGFR calculation 2021 CKD-EPI creatinine equation, which does not include race as a factor    CBC Auto Differential [[id number]]  (Abnormal) Collected: 05/16/25 0831    Specimen: Blood Updated: 05/16/25 1015     WBC 20.75 10*3/mm3      RBC 3.69 10*6/mm3      Hemoglobin 10.1 g/dL      Hematocrit 34.2 %      MCV 92.7 fL      MCH 27.4 pg      MCHC 29.5 g/dL      RDW 16.6 %      RDW-SD 55.2 fl      MPV 10.0 fL      Platelets 224 10*3/mm3      Neutrophil % 89.0 %      Lymphocyte % 3.4 %      Monocyte % 6.7 %      Eosinophil % 0.2 %      Basophil % 0.1 %      Immature Grans % 0.6 %      Neutrophils, Absolute 18.46 10*3/mm3      Lymphocytes, Absolute 0.70 10*3/mm3      Monocytes, Absolute 1.39 10*3/mm3      Eosinophils, Absolute 0.05 10*3/mm3      Basophils, Absolute 0.03 10*3/mm3      Immature Grans, Absolute 0.12 10*3/mm3      nRBC 0.0 /100 WBC           Imaging Results (Last 24 Hours)       ** No results found for the last 24 hours. **          Orders (last 24 hrs)        Start     Ordered    05/16/25 1057  ipratropium-albuterol (DUO-NEB) nebulizer solution 3 mL  Every 4 Hours PRN         05/16/25 1057    05/16/25 1045  midodrine (PROAMATINE) tablet 10 mg  3 Times Daily Before Meals         05/16/25 0958    05/16/25 1030  sodium chloride 0.9 % bolus 500 mL  Once         05/16/25 0937    05/16/25 0944  Occult Blood X 1, Stool - Stool, Per Rectum  Once         05/16/25 0943    05/16/25 0943  Scan Slide  Once         05/16/25 0942    05/16/25 0937  Lactic Acid, Plasma  STAT         05/16/25 0936    05/16/25 0930  sodium chloride 0.9 % bolus 500 mL  Once         05/16/25 0834    05/16/25 0845  metoprolol tartrate (LOPRESSOR) tablet 12.5 mg  Every 12 Hours Scheduled         05/16/25 0755     05/16/25 0600  Basic Metabolic Panel  Daily       05/15/25 1208    05/16/25 0600  CBC & Differential  Daily       05/15/25 1208    05/16/25 0600  CBC Auto Differential  PROCEDURE ONCE         05/15/25 2201    05/15/25 1307  Transfer Patient  Once         05/15/25 1306    05/15/25 1209  PT Consult: Eval & Treat Functional Mobility Below Baseline  Once        Comments: Reason Why PT Needed: weakness    05/15/25 1208    05/15/25 1209  OT Consult: Eval & Treat ADL Performance Below Baseline  Once         05/15/25 1208    05/15/25 1209  SLP Consult: Eval & Treat Swallow Disorder  Once         05/15/25 1208    05/15/25 0900  furosemide (LASIX) injection 60 mg  2 Times Daily,   Status:  Discontinued         05/14/25 2358    05/15/25 0900  citalopram (CeleXA) tablet 20 mg  Daily         05/15/25 0006    05/15/25 0900  empagliflozin (JARDIANCE) tablet 10 mg  Daily         05/15/25 0006    05/15/25 0900  spironolactone (ALDACTONE) tablet 12.5 mg  Daily         05/15/25 0006    05/15/25 0900  vitamin B-12 (CYANOCOBALAMIN) tablet 100 mcg  Daily         05/15/25 0006    05/15/25 0900  [Held by provider]  rivaroxaban (XARELTO) tablet 15 mg  Daily        (On hold since today at 1002 until manually unheld; held by Kerley, Brian Joseph, DOHold Reason: Other (Comment Required)Hold Comment: Dark stools?)    05/15/25 0006    05/15/25 0900  sacubitril-valsartan (ENTRESTO) 24-26 MG tablet 1 tablet  Every 12 Hours Scheduled         05/15/25 0006    05/15/25 0800  Oral Care  2 Times Daily       05/14/25 2357    05/15/25 0700  pantoprazole (PROTONIX) EC tablet 40 mg  Every Morning         05/15/25 0006    05/15/25 0600  Incentive Spirometry  Every 4 Hours While Awake       05/14/25 2357    05/15/25 0022  metoprolol tartrate (LOPRESSOR) tablet 12.5 mg  Every 12 Hours Scheduled,   Status:  Discontinued         05/15/25 0006    05/15/25 0022  potassium chloride (KLOR-CON M20) CR tablet 20 mEq  2 Times Daily         05/15/25 0006    05/15/25  "0013  sodium chloride 0.9 % flush 10 mL  Every 12 Hours Scheduled         05/14/25 2357    05/15/25 0013  sennosides-docusate (PERICOLACE) 8.6-50 MG per tablet 2 tablet  2 Times Daily        Placed in \"And\" Linked Group    05/14/25 2357    05/15/25 0000  Vital Signs  Every 4 Hours       05/14/25 2357    05/15/25 0000  Strict Intake & Output  Every 4 Hours       05/14/25 2358    05/14/25 2359  Daily Weights  Daily       05/14/25 2358    05/14/25 2357  polyethylene glycol (MIRALAX) packet 17 g  Daily PRN        Placed in \"And\" Linked Group    05/14/25 2357    05/14/25 2357  bisacodyl (DULCOLAX) EC tablet 5 mg  Daily PRN        Placed in \"And\" Linked Group    05/14/25 2357    05/14/25 2357  bisacodyl (DULCOLAX) suppository 10 mg  Daily PRN        Placed in \"And\" Linked Group    05/14/25 2357    05/14/25 2357  Intake & Output  Every Shift       05/14/25 2357    05/14/25 2356  sodium chloride 0.9 % flush 10 mL  As Needed         05/14/25 2357    05/14/25 2356  sodium chloride 0.9 % infusion 40 mL  As Needed         05/14/25 2357    05/14/25 2356  acetaminophen (TYLENOL) tablet 650 mg  Every 4 Hours PRN        Placed in \"Or\" Linked Group    05/14/25 2357    05/14/25 2356  acetaminophen (TYLENOL) 160 MG/5ML oral solution 650 mg  Every 4 Hours PRN        Placed in \"Or\" Linked Group    05/14/25 2357    05/14/25 2356  acetaminophen (TYLENOL) suppository 650 mg  Every 4 Hours PRN        Placed in \"Or\" Linked Group    05/14/25 2357    05/14/25 2356  ondansetron (ZOFRAN) injection 4 mg  Every 6 Hours PRN         05/14/25 2357    05/14/25 2320  aspirin chewable tablet 324 mg  Once         05/14/25 2304    05/14/25 2143  sodium chloride 0.9 % flush 10 mL  As Needed         05/14/25 2144    Unscheduled  Oxygen Therapy- Nasal Cannula; Titrate 1-6 LPM Per SpO2; 90 - 95%  Continuous PRN       05/14/25 2144    Unscheduled  Up With Assistance  As Needed       05/14/25 0615                     Physician Progress Notes (most recent note) "        Kerley, Brian Joseph, DO at 05/15/25 1617              [facility] HOSPITALIST    PROGRESS NOTE    Name:  Elsa Alcaraz   Age:  85 y.o.  Sex:  female  :  1940  MRN:  [id number]   Visit Number:  [id number]  Admission Date:  2025  Date Of Service:  05/15/25  Primary Care Physician:  Kemi Hdz MD     LOS: 1 day :    Chief Complaint:      Follow-up; shortness of breath    Subjective:    Breathing has improved some.  Still having a cough.    Hospital Course:    Elsa Alcaraz is an 85-year-old female with history of severe mitral regurgitation who recently underwent MitraClip placement at [facility] on 2025, CKD 3, chronic HFrEF, atrial fibrillation on rivaroxaban, sick sinus syndrome status post intracardiac pacemaker, hypertension, COPD on 4 L of home oxygen, obstructive sleep apnea was brought to the emergency room by her  with progressive worsening of shortness of breath over the last couple of days.  Patient states that she has been doing well after her MitraClip procedure but has been having intermittent exertional shortness of breath.  She started having increasing shortness of breath and wheezing since day prior to presentation despite taking her regular medications.  She does have oxygen at home but does not use BiPAP.  She lives with her  and was brought to the emergency room.  Denies any fevers or chest pain.  She follows up with Dr. Germain who is her cardiologist.     In the emergency room, she was afebrile but tachycardic in the 107 and was saturating at 98% on 4 L of nasal cannula oxygen.  She did have significant audible wheezing and was in respiratory distress.  She was initially placed on nasal cannula oxygen and was given bronchodilator nebulizations.  She was also given 40 mg of IV Lasix.  Initial troponin 58, proBNP 14,216.  CMP showed a BUN of 36, creatinine 1.27 (baseline), glucose 157.  Procalcitonin was within normal  range.  WBC 14.8, hemoglobin 10.6.  Chest x-ray showed cardiomegaly with evidence of CHF.  EKG showed atrial fibrillation which is chronic.  Patient was also given IV Solu-Medrol and IV metoprolol while in the emergency room.  Due to progressive worsening of shortness of breath, she was placed on BiPAP therapy.  VBG showed a pH of 7.35, pCO2 66 and bicarb of 37.  Patient was subsequently admitted to the medical floor with telemetry for acute on chronic respiratory failure and acute on chronic HFrEF.    Review of Systems:     All systems were reviewed and negative except as mentioned in subjective, assessment and plan.    Vital Signs:    Temp:  [97.6 °F (36.4 °C)-98.2 °F (36.8 °C)] 97.6 °F (36.4 °C)  Heart Rate:  [] 95  Resp:  [18-20] 18  BP: ()/(49-89) 93/58    Intake and output:    No intake/output data recorded.  I/O this shift:  In: 118 [P.O.:118]  Out: -     Physical Examination:    General Appearance:  Alert and cooperative.    Head:  Atraumatic and normocephalic.   Eyes: Conjunctivae and sclerae normal, no icterus. No pallor.   Throat: No oral lesions, no thrush, oral mucosa moist.   Neck: Supple, trachea midline, no thyromegaly.   Lungs:   Breath sounds heard bilaterally equally.  Mildly diminished all fields, some slight wheezing, congestive sounds   Heart:  Normal S1 and S2, no murmur, no gallop, no rub. No JVD.   Abdomen:   Normal bowel sounds, no masses, no organomegaly. Soft, nontender, nondistended, no rebound tenderness.   Extremities: Supple, no edema, no cyanosis, no clubbing.   Skin: Warm.   Neurologic: Alert and oriented x 3. No facial asymmetry. Moves all four limbs. No tremors.      Laboratory results:    Results from last 7 days   Lab Units 05/15/25  0535 05/14/25  2214 05/12/25  1433   SODIUM mmol/L 141 142 139   POTASSIUM mmol/L 4.0 3.6 3.4*   CHLORIDE mmol/L 98 101 99   CO2 mmol/L 30.0* 29.7* 27.5   BUN mg/dL 37* 36* 32*   CREATININE mg/dL 1.22* 1.27* 1.30*   CALCIUM mg/dL 8.6 8.6  "8.6   BILIRUBIN mg/dL  --  1.5*  --    ALK PHOS U/L  --  92  --    ALT (SGPT) U/L  --  44*  --    AST (SGOT) U/L  --  16  --    GLUCOSE mg/dL 136* 157* 102*     Results from last 7 days   Lab Units 05/15/25  0535 05/14/25  2214   WBC 10*3/mm3 16.22* 14.84*   HEMOGLOBIN g/dL 10.9* 10.6*   HEMATOCRIT % 35.9 35.5   PLATELETS 10*3/mm3 220 245         Results from last 7 days   Lab Units 05/14/25  2322 05/14/25  2214   HSTROP T ng/L 61* 58*         No results for input(s): \"PHART\", \"ESS3TAA\", \"PO2ART\", \"NIL4NMP\", \"BASEEXCESS\" in the last 8760 hours.   I have reviewed the patient's laboratory results.    Radiology results:    XR Chest 1 View  Result Date: 5/15/2025  PROCEDURE: XR CHEST 1 VW-  HISTORY: SOA Triage Protocol  COMPARISON: 05/07/2025  FINDINGS:  Portable view of the chest demonstrates increased markings in the left lower lobe. Right lung is clear. There is no evidence of effusion, pneumothorax or other significant pleural disease. The mediastinum is unremarkable.  The heart size is moderately enlarged.      Impression: Stable increased markings left lower lobe favor atelectasis.    This report was signed and finalized on 5/15/2025 8:36 AM by Schuyler Thomas MD.      I have reviewed the patient's radiology reports.    Medication Review:     I have reviewed the patient's active and prn medications.     Problem List:      Acute on chronic HFrEF (heart failure with reduced ejection fraction)    S/P mitral valve clip implantation      Assessment/Plan:    Inpatient general floor admission 5/14/2025 with hypoxia secondary to rhinovirus with elements of heart failure exacerbation and COPD exacerbations.    Hypoxia  Comfortable on 5 L.  Baseline 4 L.  Rhinovirus  Supportive care.  Likely triggered her respiratory symptoms.  Acute exacerbation of heart failure  Lasix.  Cardiology consultation given recent valve intervention.  COPD exacerbation  Bronchodilators, budesonide, prednisone.  DREW  Monitor response to " diuresis.    Chronic: history of severe mitral regurgitation who recently underwent MitraClip placement at [facility] on 2025, CKD 3, chronic HFrEF, atrial fibrillation on rivaroxaban, sick sinus syndrome status post intracardiac pacemaker, hypertension, COPD on 4 L of home oxygen, obstructive sleep apnea     DVT Prophylaxis: Rivaroxaban  Code Status: Full code  Diet: Cardiac/for restriction  Discharge Plan: Least a couple days depending on clinical status    Brian Joseph Kerley, DO  05/15/25  16:17 EDT    Dictated utilizing Dragon dictation.    I have reviewed the copied text and it is accurate as of 5/15/2025       Electronically signed by Kerley, Brian Joseph, DO at 05/15/25 1624          Consult Notes (most recent note)        Damian Elias MD at 05/15/25 1100        Consult Orders    1. Inpatient Cardiology Consult [[id number]] ordered by Nik Hicks MD at 25 2357                   [facility]   Cardiology Consult Note    Patient Name: Elsa Alcaraz  : 1940  MRN: [id number]  Primary Care Physician:  Kemi Hdz MD  Referring Physician: No ref. provider found    Date of admission: 2025    Subjective   Subjective     Reason for Consultation : Acute heart failure exacerbation    Chief Complaint : Shortness of breath    HPI:  Elsa Alcaraz is a 85 y.o. female with history of severe MR status post MitraClip, CKD 3, chronic heart failure with reduced ejection fraction, A-fib, sick sinus syndrome status post pacemaker, hypertension, and COPD on 4 L of oxygen who presented to the emergency room with worsening shortness of breath.  Her MitraClip was in April and overall had been doing well until recently.  She started having increased shortness of breath over the last 24 hours.  In the emergency room she was tachycardic with some audible wheezing.  She was given 40 mg of IV Lasix.  proBNP elevated to 14,216.  High-sensitivity troponin 58.  Cardiology was consulted  "for evaluation of heart failure exacerbation.  Since last night, patient states that she is ok but still short of breath. Denies any LE swelling    Review of Systems   All systems were reviewed and negative except for: Shortness of breath    Personal History     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/11/2017    Arthritis     Atrial fibrillation 01/11/2017    Body piercing     BOTH EARS    Borderline diabetes     Breast cancer 2003    right-radiation and a lumpectomy    Broken tooth     Cataract 01/11/2017    Left eye surgery 11/19    Chronic bronchitis 01/11/2017    Colon cancer 11/30/1998    had surgery and chemo    COPD (chronic obstructive pulmonary disease)     Cyanocobalamine deficiency (non anemic)     Depression 01/11/2017    Diverticulosis     Elevated cholesterol     Esophageal reflux 01/11/2017    Hiatal hernia 01/11/2017    History of bladder infections     History of echocardiogram 07/26/2021    Hx of exercise stress test     \"several years ago by Dr. Mercado\".      Hx of radiation therapy     Hyperlipidemia     Hypertension 01/11/2017    Impaired functional mobility and activity tolerance     Impaired functional mobility, balance, gait, and endurance     Osteoporosis     Palpitations 01/11/2017    Prediabetes     Problems with swallowing     meat at times per pt report    Shortness of breath     WITH EXERTION     Sleep apnea 01/11/2017    NO CPAP    Tricuspid valve disorder 01/11/2017    Patient doesn't know anything about this    Vitamin D deficiency     Wears glasses             Family History: family history includes Asthma in her mother; COPD in her mother; Cancer in her father and sister; Diabetes in her maternal grandmother; Hypertension in her mother; Osteoarthritis in her mother; Stomach cancer in her father. Otherwise pertinent FHx was reviewed and not pertinent to current " issue.    Social History:  reports that she has never smoked. She has been exposed to tobacco smoke. She has never used smokeless tobacco. She reports that she does not currently use alcohol after a past usage of about 1.0 - 2.0 standard drink of alcohol per week. She reports that she does not use drugs.    Home Medications:  O2, Sacubitril-Valsartan, albuterol sulfate HFA, budesonide-formoterol, cholecalciferol, citalopram, empagliflozin, famotidine, furosemide, metoprolol tartrate, midodrine, pantoprazole, potassium chloride, rivaroxaban, spironolactone, and vitamin B-12    Allergies:  Allergies   Allergen Reactions    Penicillins Itching    Other GI Intolerance     Spicy foods         Objective    Objective     Vitals:   Temp:  [97.8 °F (36.6 °C)] 97.8 °F (36.6 °C)  Heart Rate:  [] 110  Resp:  [20] 20  BP: ()/(49-89) 92/56  Flow (L/min) (Oxygen Therapy):  [4] 4      Physical Exam:   Constitutional: Awake, alert, No acute distress    Eyes: PERRLA, sclerae anicteric, no conjunctival injection   HENT: NCAT, mucous membranes moist   Neck: Supple, no thyromegaly, no lymphadenopathy, trachea midline   Respiratory: Diffuse wheezing with some crackles.   Cardiovascular: Irregularly irregular rhythm, no murmurs, rubs, or gallops, palpable pedal pulses bilaterally   Gastrointestinal: Positive bowel sounds, soft, nontender, nondistended   Musculoskeletal: No bilateral ankle edema, no clubbing or cyanosis to extremities   Psychiatric: Appropriate affect, cooperative   Neurologic: Oriented x 3, strength symmetric in all extremities, Cranial Nerves grossly intact to confrontation, speech clear   Skin: No rashes     Result Review    Result Review:  I have personally reviewed the results from the time of this admission to 5/15/2025 09:13 EDT and agree with these findings:  [x]  Laboratory  []  Microbiology  [x]  Radiology  [x]  EKG/Telemetry   [x]  Cardiology/Vascular   []  Pathology  [x]  Old records  []   Other:  Most notable findings include:     CMP          5/12/2025    14:33 5/14/2025    22:14 5/15/2025    05:35   CMP   Glucose 102  157  136    BUN 32  36  37    Creatinine 1.30  1.27  1.22    EGFR 40.6  41.8  43.8    Sodium 139  142  141    Potassium 3.4  3.6  4.0    Chloride 99  101  98    Calcium 8.6  8.6  8.6    Total Protein  5.9     Albumin  3.7     Globulin  2.2     Total Bilirubin  1.5     Alkaline Phosphatase  92     AST (SGOT)  16     ALT (SGPT)  44     Albumin/Globulin Ratio  1.7     BUN/Creatinine Ratio 24.6  28.3  30.3    Anion Gap 12.5  11.3  13.0       CBC          5/7/2025    14:42 5/14/2025    22:14 5/15/2025    05:35   CBC   WBC 16.38  14.84  16.22    RBC 3.81  3.83  3.92    Hemoglobin 10.7  10.6  10.9    Hematocrit 36.0  35.5  35.9    MCV 94.5  92.7  91.6    MCH 28.1  27.7  27.8    MCHC 29.7  29.9  30.4    RDW 17.1  17.1  16.8    Platelets 267  245  220       Lab Results   Component Value Date    TROPONINT 61 (C) 05/14/2025         Assessment & Plan   Assessment / Plan     Brief Patient Summary:  Elsa Alcaraz is a 85 y.o. female who presents with COPD exacerbation and HF exacerbation    Active Hospital Problems:  Active Hospital Problems    Diagnosis     **Acute on chronic HFrEF (heart failure with reduced ejection fraction)     S/P mitral valve clip implantation      Acute on chronic systolic heart failure: Patient was placed on BiPAP and given IV Lasix 60 mg twice daily.  This morning, she is feeling SOB. She tested positive for enterovirus so while BNP is elevated she doesn't look overtly volume overloaded      Myocardial injury: Troponin elevated likely secondary to COPD and heart failure exacerbation.  Patient not having any specific symptoms concerning for ACS.    Atrial fibrillation: Chronic medical problem that is not currently causing the patient any issue    Plan:   - Continue diuresis until euvolemic and then transition to oral diuretics.  - Continue Entresto and Aldactone  - No  further ischemic workup indicated  - Continue rivaroxaban and metoprolol  -Can order echo if there is clinical concern. Right now, her symptoms appear more related to respiratory infection    Thank you for the consult. We will sign off. Please call with any further questions    Electronically signed by Damian Elias MD, 05/15/25, 9:13 AM EDT.    Electronically signed by Damian Elias MD at 05/16/25 0905

## 2025-05-16 NOTE — PLAN OF CARE
Goal Outcome Evaluation:  Plan of Care Reviewed With: patient        Progress: no change  Outcome Evaluation: VSS, maintaining o2 sats >90% on 4L NC, HR afib ranging from 100-120's, pt continues to c/o SOA with activity.       Daily Care Plan Summary: Heart Failure    Diuretic in use (IV or PO):   IV        Daily weight (up or down):    no change.       Output > Intake (yes/no):Yes      O2 Requirements (current, any change?):  4 liters/min via nasal cannula      Symptoms noted with Activity (Respiratory Tolerance, functional state):     SOA with activity      Anticipated Discharge Plans:     TBD

## 2025-05-16 NOTE — OUTREACH NOTE
Prep Survey      Flowsheet Row Responses   [facility] facility patient discharged from? [facility]   Is LACE score < 7 ? No   Eligibility Readm Mgmt   Discharge diagnosis Acute on chronic HFrEF (heart failure with reduced ejection fraction)   Does the patient have one of the following disease processes/diagnoses(primary or secondary)? CHF   Prep survey completed? Yes            [facility] - Registered Nurse

## 2025-05-16 NOTE — CONSULTS
"[facility]   Clinical Nutrition Assessment    Patient Name: Elsa Alcaraz  YOB: 1940  MRN: [id number]  Admission date: 5/14/2025  Reason for Encounter: MST 2-3 or Nursing Admission Screen    Clinical Nutrition Assessment     Subjective    Trending Encounter/Subjective Information     5/16: Pt with MST=2 d/t unsure on wt loss. No significant wt changes per EMR. One meal documented at 75%. Will hold off on ordering supplement until PO intake is established.      Assessment    H&P and Current Problems      H&P  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/11/2017    Arthritis     Atrial fibrillation 01/11/2017    Body piercing     BOTH EARS    Borderline diabetes     Breast cancer 2003    right-radiation and a lumpectomy    Broken tooth     Cataract 01/11/2017    Left eye surgery 11/19    Chronic bronchitis 01/11/2017    Colon cancer 11/30/1998    had surgery and chemo    COPD (chronic obstructive pulmonary disease)     Cyanocobalamine deficiency (non anemic)     Depression 01/11/2017    Diverticulosis     Elevated cholesterol     Esophageal reflux 01/11/2017    Hiatal hernia 01/11/2017    History of bladder infections     History of echocardiogram 07/26/2021    Hx of exercise stress test     \"several years ago by Dr. Mercado\".      Hx of radiation therapy     Hyperlipidemia     Hypertension 01/11/2017    Impaired functional mobility and activity tolerance     Impaired functional mobility, balance, gait, and endurance     Osteoporosis     Palpitations 01/11/2017    Prediabetes     Problems with swallowing     meat at times per pt report    Shortness of breath     WITH EXERTION     Sleep apnea 01/11/2017    NO CPAP    Tricuspid valve disorder 01/11/2017    Patient doesn't know anything about this    Vitamin D deficiency     Wears glasses       Past Surgical History:   Procedure Laterality Date "    ANAL FISTULOTOMY      APPENDECTOMY          BREAST BIOPSY      BREAST LUMPECTOMY Right 2006    CARDIAC CATHETERIZATION N/A 2025    Procedure: Left Heart Cath - Femoral access;  Surgeon: Jeannie Zabala MD;  Location:  [facility];  Service: Cardiovascular;  Laterality: N/A;    CARDIAC ELECTROPHYSIOLOGY PROCEDURE N/A 2023    Procedure: Micra;  Surgeon: Keven Willis DO;  Location:  [facility];  Service: Cardiology;  Laterality: N/A;    CATARACT EXTRACTION      both eyes     CATARACT EXTRACTION W/ INTRAOCULAR LENS IMPLANT Left 2019    Procedure: CATARACT PHACO EXTRACTION WITH INTRAOCULAR LENS IMPLANT LEFT;  Surgeon: Masoud David MD;  Location: [facility] OR;  Service: Ophthalmology    CATARACT EXTRACTION W/ INTRAOCULAR LENS IMPLANT Right 2019    Procedure: CATARACT PHACO EXTRACTION WITH INTRAOCULAR LENS IMPLANT RIGHT;  Surgeon: Masoud David MD;  Location: [facility] OR;  Service: Ophthalmology     SECTION  1981    CHOLECYSTECTOMY  2009    COLECTOMY PARTIAL / TOTAL  1998    COLON CANCER    COLONOSCOPY      COLONOSCOPY N/A 2021    Procedure: COLONOSCOPY WITH BIOPSY;  Surgeon: Iona Sanders MD;  Location: [facility] ENDOSCOPY;  Service: Gastroenterology;  Laterality: N/A;    ENDOSCOPY  2016    ENDOSCOPY N/A 2018    Procedure: ESOPHAGOGASTRODUODENOSCOPY WITH COLD FORCEP BIOPSY;  Surgeon: Vasquez Fagan MD;  Location: [facility] ENDOSCOPY;  Service: Gastroenterology    ENDOSCOPY N/A 2019    Procedure: ESOPHAGOGASTRODUODENOSCOPY WITH BIOPSY;  Surgeon: Vasquez Fagan MD;  Location: [facility] ENDOSCOPY;  Service: Gastroenterology    ENDOSCOPY N/A 2022    Procedure: ESOPHAGOGASTRODUODENOSCOPY with biopsy and polypectomy  ;  Surgeon: Iona Sanders MD;  Location: [facility] ENDOSCOPY;  Service: Gastroenterology;  Laterality: N/A;    ENDOSCOPY N/A 2024    Procedure:  "ESOPHAGOGASTRODUODENOSCOPY WITH BIOPSY and dilatation;  Surgeon: Iona Sanders MD;  Location: [facility] ENDOSCOPY;  Service: Gastroenterology;  Laterality: N/A;    HYSTERECTOMY      1998    INSERT / REPLACE / REMOVE PACEMAKER      MITRAL VALVE REPAIR/REPLACEMENT  4/23/2025    Procedure: Transcatheter Mitral Valve Repair;  Surgeon: Jeannie Zabala MD;  Location: [facility] INVASIVE LOCATION;  Service: Cardiovascular;;    SKIN BIOPSY Left     arm    SKIN LESION EXCISION N/A     VENTRAL HERNIA REPAIR  10/28/2012      Current Problems   Admission Diagnosis:  Acute on chronic HFrEF (heart failure with reduced ejection fraction) [I50.23]    Problem List:    Acute on chronic HFrEF (heart failure with reduced ejection fraction)    S/P mitral valve clip implantation      Other Applicable Nutrition Information:        Anthropometrics      BMI, Height, Weight Estimated body mass index is 25.97 kg/m² as calculated from the following:    Height as of this encounter: 160 cm (63\").    Weight as of this encounter: 66.5 kg (146 lb 9.7 oz).    Weight Method: Bed scale       Trending Weight Changes No significant changes       Weight History  Wt Readings from Last 20 Encounters:   05/16/25 0456 66.5 kg (146 lb 9.7 oz)   05/15/25 1342 66.4 kg (146 lb 6.2 oz)   05/14/25 2134 66.2 kg (146 lb)   05/12/25 1306 66.7 kg (147 lb)   05/07/25 1403 68 kg (149 lb 14.6 oz)   04/30/25 1132 68 kg (150 lb)   04/24/25 1626 65 kg (143 lb 4.8 oz)   04/23/25 1026 65 kg (143 lb 3.2 oz)   04/09/25 1051 66.7 kg (147 lb)   03/31/25 1020 65.7 kg (144 lb 12.8 oz)   03/24/25 0906 65.5 kg (144 lb 6.4 oz)   02/10/25 0500 65.6 kg (144 lb 10 oz)   02/09/25 0500 67 kg (147 lb 11.3 oz)   02/09/25 0239 67 kg (147 lb 11.3 oz)   02/08/25 1758 68.1 kg (150 lb 2.1 oz)   01/14/25 1402 68.1 kg (150 lb 3.2 oz)   11/25/24 1322 69.1 kg (152 lb 6.4 oz)   10/07/24 1240 69.7 kg (153 lb 10.6 oz)   10/07/24 1357 69.4 kg (153 lb)   09/24/24 0913 69.7 kg (153 lb 9.6 " oz)   08/26/24 1120 70.8 kg (156 lb)   06/20/24 1322 70.8 kg (156 lb)   05/17/24 0926 72 kg (158 lb 12.8 oz)   04/29/24 2012 71.6 kg (157 lb 12.8 oz)   04/15/24 1404 71.9 kg (158 lb 9.6 oz)   03/26/24 1041 72.6 kg (160 lb)        Labs      Results from last 7 days   Lab Units 05/15/25  0535 05/14/25  2214 05/12/25  1433   SODIUM mmol/L 141 142 139   POTASSIUM mmol/L 4.0 3.6 3.4*   GLUCOSE mg/dL 136* 157* 102*   BUN mg/dL 37* 36* 32*   CREATININE mg/dL 1.22* 1.27* 1.30*   CALCIUM mg/dL 8.6 8.6 8.6   TOTAL PROTEIN g/dL  --  5.9*  --    ALBUMIN g/dL  --  3.7  --    BILIRUBIN mg/dL  --  1.5*  --    ALK PHOS U/L  --  92  --    AST (SGOT) U/L  --  16  --    ALT (SGPT) U/L  --  44*  --    PLATELETS 10*3/mm3 220 245  --    HEMOGLOBIN g/dL 10.9* 10.6*  --    HEMATOCRIT % 35.9 35.5  --    PROBNP pg/mL  --  14,216.0* 16,277.0*     Lab Results   Component Value Date    HGBA1C 5.30 07/05/2023          Medications       Scheduled Medications aspirin, 324 mg, Oral, Once  citalopram, 20 mg, Oral, Daily  empagliflozin, 10 mg, Oral, Daily  furosemide, 60 mg, Intravenous, BID  metoprolol tartrate, 12.5 mg, Oral, Q12H  pantoprazole, 40 mg, Oral, QAM  potassium chloride, 20 mEq, Oral, BID  rivaroxaban, 15 mg, Oral, Daily  sacubitril-valsartan, 1 tablet, Oral, Q12H  senna-docusate sodium, 2 tablet, Oral, BID  sodium chloride, 10 mL, Intravenous, Q12H  spironolactone, 12.5 mg, Oral, Daily  vitamin B-12, 100 mcg, Oral, Daily        Infusions      PRN Medications   acetaminophen **OR** acetaminophen **OR** acetaminophen    senna-docusate sodium **AND** polyethylene glycol **AND** bisacodyl **AND** bisacodyl    ondansetron    sodium chloride    sodium chloride    sodium chloride     Physical Findings      Chewing/Swallowing  Teeth Status: Mouth/Teeth WDL: WDL    Chewing/Swallowing Issues: No issues identified at this time   Edema  Generalized Edema: 1+ (Trace)                         Bowel Function  Stool Output  Perineal Care: perineum  cleansed, absorbent brief/pad changed (05/15/25 1235)  Last Bowel Movement: 05/15/25   I/Os  Intake & Output (last 3 days)         05/13 0701  05/14 0700 05/14 0701  05/15 0700 05/15 0701  05/16 0700 05/16 0701  05/17 0700    P.O.   818     Total Intake(mL/kg)   818 (12.3)     Urine (mL/kg/hr)   600 (0.4)     Total Output   600     Net   +218             Urine Unmeasured Occurrence   2 x            Lines, Drains, Airways, & Wounds       Active LDAs       Name Placement date Placement time Site Days Last dressing change    Peripheral IV 05/15/25 1434 22 G Anterior;Distal;Left Forearm 05/15/25  1434  Forearm  less than 1                       Nutrition Focused Physical Exam     Trending NFPE     insert MSA if applicable  Estimated Needs      Energy Requirements    Weight for Calculation 66.5 kg   Method for Estimation  25-30 kcals/kg   Daily Needs (kcal/day) 3136-1377 kcal    Protein Requirements    Weight for Calculation 66.5 kg   Method for Estimation 0.8 gm/kg  and 1.0 gm/kg   Daily Needs (g/day) 53-66 g pro   Fluid Requirements     Method for Estimation 1 mL/kcal    Daily Needs (mL/day) 9175-5111 mL     Current Nutrition Orders & Evaluation of Intake      Oral Nutrition     Food Allergies NKFA   Current PO Diet Diet: Cardiac; Healthy Heart (2-3 Na+); Fluid Consistency: Thin (IDDSI 0)   Oral Nutrition Supplement None    PO Evaluation     Trending % PO Intake 5/16: 75% x 1 meal     Enteral Nutrition     Current EN Order Patient isn't on Tube Feeding  Patient doesn't have any tube feeding modular orders     EN Route     EN Tolerance     EN Observation         Parenteral Nutrition     Current TPN Order    TPN Route    Lipids (mL/%/frequency)     MVI Frequency     Trace Element Frequency     Total # Days on TPN    TPN Observation       Assessment & Plan   Nutrition Diagnosis and Goals       Nutrition Diagnosis 1 No nutrition diagnosis at this time       Nutrition Diagnosis 2          Goal(s) Establish PO Intake      Nutrition Intervention and Prescription       Intervention  Continue with current interventions      Diet Prescription HH    Supplement Prescription      Enteral Prescription        TPN Prescription        Education Provided       Monitoring/Evaluation       Monitor/Evaluation Per Protocol, I&O, PO Intake, Pertinent Labs, Weight, Skin Status, GI Status, Symptoms, and POC/GOC     RD Follow-Up Encounter 3-5 days     Electronically signed by:  Angeles Dubose RD  05/16/25 07:29 EDT

## 2025-05-16 NOTE — CASE MANAGEMENT/SOCIAL WORK
Case Management/Social Work    Patient Name:  Elsa Alcaraz  YOB: 1940  MRN: [id number]  Admit Date:  5/14/2025        13:20 EDT   Discharge Plan       Row Name 05/16/25 5680       Plan    Plan DCP; Home with HH, if needed.    Plan Comments Met with pt at bedside. She states she is feeling better. Open to HH if needed, no preference. IMM delivered at the time of this conversation                        Electronically signed by:  Kady Ruiz RN  05/16/25 14:20 EDT

## 2025-05-16 NOTE — THERAPY DISCHARGE NOTE
"Patient Name: Elsa Alcaraz  : 1940    MRN: [id number]                              Today's Date: 2025       Admit Date: 2025    Visit Dx:     ICD-10-CM ICD-9-CM   1. Atrial fibrillation with RVR  I48.91 427.31   2. Acute exacerbation of chronic heart failure  I50.9 428.9   3. COPD exacerbation  J44.1 491.21     Patient Active Problem List   Diagnosis    Longstanding persistent atrial fibrillation    Hyperlipidemia    Essential hypertension    Anxiety disorder due to general medical condition    Cataract    Chronic bronchitis    Depression    Obesity    Sleep apnea    Iron deficiency anemia    Thrombocytosis    Hiatal hernia with gastroesophageal reflux    Age-related nuclear cataract of left eye    Age-related nuclear cataract of right eye    Severe mitral regurgitation    Moderate tricuspid regurgitation    Chronic systolic heart failure    Diverticulosis of colon    Chronic respiratory failure with hypoxia    COPD    Chronic anticoagulation (Xarelto)    Complete heart block    Esophageal dysphagia    Gastroesophageal reflux disease without esophagitis    Presence of cardiac pacemaker    History of peptic ulcer disease    S/P mitral valve clip implantation    Chronic atrial fibrillation    Acute on chronic HFrEF (heart failure with reduced ejection fraction)    Rhinovirus     Past Medical History:   Diagnosis Date    Anemia     Anesthesia complication     \"woke up early\"    Anxiety disorder due to general medical condition 2017    Arthritis     Atrial fibrillation 2017    Body piercing     BOTH EARS    Borderline diabetes     Breast cancer     right-radiation and a lumpectomy    Broken tooth     Cataract 2017    Left eye surgery     Chronic bronchitis 2017    Colon cancer 1998    had surgery and chemo    COPD (chronic obstructive pulmonary disease)     Cyanocobalamine deficiency (non anemic)     Depression 2017    Diverticulosis     Elevated cholesterol  " "   Esophageal reflux 2017    Hiatal hernia 2017    History of bladder infections     History of echocardiogram 2021    Hx of exercise stress test     \"several years ago by Dr. Mercado\".      Hx of radiation therapy     Hyperlipidemia     Hypertension 2017    Impaired functional mobility and activity tolerance     Impaired functional mobility, balance, gait, and endurance     Osteoporosis     Palpitations 2017    Prediabetes     Problems with swallowing     meat at times per pt report    Shortness of breath     WITH EXERTION     Sleep apnea 2017    NO CPAP    Tricuspid valve disorder 2017    Patient doesn't know anything about this    Vitamin D deficiency     Wears glasses      Past Surgical History:   Procedure Laterality Date    ANAL FISTULOTOMY      APPENDECTOMY          BREAST BIOPSY      BREAST LUMPECTOMY Right 2006    CARDIAC CATHETERIZATION N/A 2025    Procedure: Left Heart Cath - Femoral access;  Surgeon: Jeannie Zabala MD;  Location:  [facility];  Service: Cardiovascular;  Laterality: N/A;    CARDIAC ELECTROPHYSIOLOGY PROCEDURE N/A 2023    Procedure: Micra;  Surgeon: Keven Willis DO;  Location:  [facility] INVASIVE LOCATION;  Service: Cardiology;  Laterality: N/A;    CATARACT EXTRACTION  2019    both eyes     CATARACT EXTRACTION W/ INTRAOCULAR LENS IMPLANT Left 2019    Procedure: CATARACT PHACO EXTRACTION WITH INTRAOCULAR LENS IMPLANT LEFT;  Surgeon: Masoud David MD;  Location: [facility] OR;  Service: Ophthalmology    CATARACT EXTRACTION W/ INTRAOCULAR LENS IMPLANT Right 2019    Procedure: CATARACT PHACO EXTRACTION WITH INTRAOCULAR LENS IMPLANT RIGHT;  Surgeon: Masoud David MD;  Location: [facility] OR;  Service: Ophthalmology     SECTION  1981    CHOLECYSTECTOMY  2009    COLECTOMY PARTIAL / TOTAL  1998    COLON CANCER    COLONOSCOPY  2016    COLONOSCOPY N/A 2021 "    Procedure: COLONOSCOPY WITH BIOPSY;  Surgeon: Iona Sanders MD;  Location: [facility] ENDOSCOPY;  Service: Gastroenterology;  Laterality: N/A;    ENDOSCOPY  2016    ENDOSCOPY N/A 05/04/2018    Procedure: ESOPHAGOGASTRODUODENOSCOPY WITH COLD FORCEP BIOPSY;  Surgeon: Vasquez Fagan MD;  Location: [facility] ENDOSCOPY;  Service: Gastroenterology    ENDOSCOPY N/A 08/12/2019    Procedure: ESOPHAGOGASTRODUODENOSCOPY WITH BIOPSY;  Surgeon: Vasquez Fagan MD;  Location: [facility] ENDOSCOPY;  Service: Gastroenterology    ENDOSCOPY N/A 09/13/2022    Procedure: ESOPHAGOGASTRODUODENOSCOPY with biopsy and polypectomy  ;  Surgeon: Iona Sanders MD;  Location: [facility] ENDOSCOPY;  Service: Gastroenterology;  Laterality: N/A;    ENDOSCOPY N/A 06/28/2024    Procedure: ESOPHAGOGASTRODUODENOSCOPY WITH BIOPSY and dilatation;  Surgeon: Iona Sanders MD;  Location: [facility] ENDOSCOPY;  Service: Gastroenterology;  Laterality: N/A;    HYSTERECTOMY      1998    INSERT / REPLACE / REMOVE PACEMAKER      MITRAL VALVE REPAIR/REPLACEMENT  4/23/2025    Procedure: Transcatheter Mitral Valve Repair;  Surgeon: Jeannie Zabala MD;  Location: [facility] INVASIVE LOCATION;  Service: Cardiovascular;;    SKIN BIOPSY Left     arm    SKIN LESION EXCISION N/A     VENTRAL HERNIA REPAIR  10/28/2012      General Information    No documentation.                  Mobility    No documentation.                  Obj/Interventions    No documentation.                  Goals/Plan    No documentation.                  Clinical Impression       Row Name 05/16/25 1106          Pain    Pretreatment Pain Rating 0/10 - no pain  -JR     Posttreatment Pain Rating 0/10 - no pain  -JR       Row Name 05/16/25 1106          Plan of Care Review    Plan of Care Reviewed With patient  -JR     Progress no change  -JR     Outcome Evaluation Patient participated well in PT evaluation and demonstrated decreased overall mobility.  She performed all mobility  tasks with CGA, including walking 16 fet x 2.  She is eager to go home and is expected to benefit from home health care services.  -JR       Row Name 05/16/25 1106          Therapy Assessment/Plan (PT)    Criteria for Skilled Interventions Met (PT) --  patient is discharged to home  -JR     Therapy Frequency (PT) evaluation only  -JR               User Key  (r) = Recorded By, (t) = Taken By, (c) = Cosigned By      Initials Name Provider Type    Adenike Kuhn, PT Physical Therapist                   Outcome Measures       Row Name 05/16/25 1425          Functional Assessment    Outcome Measure Options AM-PAC 6 Clicks Daily Activity (OT)  -SP               User Key  (r) = Recorded By, (t) = Taken By, (c) = Cosigned By      Initials Name Provider Type    [address] OT Occupational Therapist                  Physical Therapy Education       Title: PT OT SLP Therapies (Resolved)       Topic: Physical Therapy (Resolved)       Point: Mobility training (Resolved)       Learner Progress:  Not documented in this visit.              Point: Home exercise program (Resolved)       Learner Progress:  Not documented in this visit.              Point: Body mechanics (Resolved)       Learner Progress:  Not documented in this visit.              Point: Precautions (Resolved)       Learner Progress:  Not documented in this visit.                                  PT Recommendation and Plan     Progress: no change  Outcome Evaluation: Patient participated well in PT evaluation and demonstrated decreased overall mobility.  She performed all mobility tasks with CGA, including walking 16 fet x 2.  She is eager to go home and is expected to benefit from home health care services.     Time Calculation:   PT Evaluation Complexity  History, PT Evaluation Complexity: 1-2 personal factors and/or comorbidities  Examination of Body Systems (PT Eval Complexity): 1-2 elements  Clinical Presentation (PT Evaluation Complexity):  evolving  Clinical Decision Making (PT Evaluation Complexity): low complexity  Overall Complexity (PT Evaluation Complexity): low complexity     PT Charges       Row Name 05/16/25 1106             Time Calculation    Start Time 1106  -JR      PT Received On 05/16/25  -JR         Untimed Charges    PT Eval/Re-eval Minutes 55  -JR         Total Minutes    Untimed Charges Total Minutes 55  -JR       Total Minutes 55  -JR                User Key  (r) = Recorded By, (t) = Taken By, (c) = Cosigned By      Initials Name Provider Type    JR Adenike Randall, PT Physical Therapist                  Therapy Charges for Today       Code Description Service Date Service Provider Modifiers Qty    53380652564 HC PT EVAL LOW COMPLEXITY 4 5/16/2025 Adenike Randall, PT GP 1            PT G-Codes  Outcome Measure Options: AM-PAC 6 Clicks Daily Activity (OT)  AM-PAC 6 Clicks Score (PT): 17  AM-PAC 6 Clicks Score (OT): 19         Adenike Randall PT  5/16/2025

## 2025-05-16 NOTE — PLAN OF CARE
Goal Outcome Evaluation:  Plan of Care Reviewed With: patient        Progress: no change  Outcome Evaluation: Patient participated well in PT evaluation and demonstrated decreased overall mobility.  She performed all mobility tasks with CGA, including walking 16 fet x 2.  She is eager to go home and is expected to benefit from home health care services.

## 2025-05-16 NOTE — DISCHARGE SUMMARY
[facility] HOSPITALIST   DISCHARGE SUMMARY      Name:  Elsa Alcaraz   Age:  85 y.o.  Sex:  female  :  1940  MRN:  [id number]   Visit Number:  [id number]    Admission Date:  2025  Date of Discharge:  2025  Primary Care Physician:  Kemi Hdz MD      Problem List:     Active Hospital Problems    Diagnosis  POA    **Acute on chronic HFrEF (heart failure with reduced ejection fraction) [I50.23]  Yes    Rhinovirus [B34.8]  Yes    S/P mitral valve clip implantation [Z98.890, Z95.818]  Not Applicable      Resolved Hospital Problems   No resolved problems to display.     Presenting Problem:    Chief Complaint   Patient presents with    Shortness of Breath      Consults:     Consulting Physician(s)                     None                History of presenting illness/Hospital Course:    Elsa Alcaraz is an 85-year-old female with history of severe mitral regurgitation who recently underwent MitraClip placement at [facility] on 2025, CKD 3, chronic HFrEF, atrial fibrillation on rivaroxaban, sick sinus syndrome status post intracardiac pacemaker, hypertension, COPD on 4 L of home oxygen, obstructive sleep apnea was brought to the emergency room by her  with progressive worsening of shortness of breath over the last couple of days.  Patient states that she has been doing well after her MitraClip procedure but has been having intermittent exertional shortness of breath.  She started having increasing shortness of breath and wheezing since day prior to presentation despite taking her regular medications.  She does have oxygen at home but does not use BiPAP.  She lives with her  and was brought to the emergency room.  Denies any fevers or chest pain.  She follows up with Dr. Germain who is her cardiologist.     In the emergency room, she was afebrile but tachycardic in the 107 and was saturating at 98% on 4 L of nasal cannula oxygen.  She did have  significant audible wheezing and was in respiratory distress.  She was initially placed on nasal cannula oxygen and was given bronchodilator nebulizations.  She was also given 40 mg of IV Lasix.  Initial troponin 58, proBNP 14,216.  CMP showed a BUN of 36, creatinine 1.27 (baseline), glucose 157.  Procalcitonin was within normal range.  WBC 14.8, hemoglobin 10.6.  Chest x-ray showed cardiomegaly with evidence of CHF.  EKG showed atrial fibrillation which is chronic.  Patient was also given IV Solu-Medrol and IV metoprolol while in the emergency room.  Due to progressive worsening of shortness of breath, she was placed on BiPAP therapy.  VBG showed a pH of 7.35, pCO2 66 and bicarb of 37.  Patient was subsequently admitted to the medical floor with telemetry for acute on chronic respiratory failure and acute on chronic HFrEF.    Inpatient general floor admission 5/14/2025 with shortness of air related to rhinovirus.  Did initially have some increased oxygen requirement, shortly back down to her 4 L.  Initially was suspected that it was primarily heart failure and COPD exacerbation, upon notifying rhinovirus, this was likely the main etiology of her respiratory symptoms.  During course patient noted recent dark stools, stools during course were not dark although she was FOBT positive.    Shared decision making patient would like to discharge home 5/16/2025 with close follow-up.     FOBT positive  Close follow-up with PCP and gastroenterology.  FOBT positive noted during course after patient reported some dark stools recently.  Shared decision making for discharge.  Discussed return precautions.  Patient on long-term Xarelto, no dark or bloody stools during course, hemoglobin stable.  Chronic respiratory failure  On baseline 4 L.  Rhinovirus  Supportive care.  Likely triggered her respiratory symptoms.  Will provide some doxycycline given significant congestion with elevated white count.  Acute exacerbation of heart  failure  During course provided some Lasix, although her respiratory symptoms likely primarily from rhinovirus.  CKD  Recommend follow-up renal function check.  Reportedly her creatinine baseline is around 1.27.  Hypotension  Continue home midodrine.  During course had systolics as low was in the 70s, asymptomatic, lactic acid not elevated.  Patient notes that at home she is seeing blood pressure with systolic in the 80s.     Chronic: history of severe mitral regurgitation who recently underwent MitraClip placement at [facility] on 4/23/2025, CKD 3, chronic HFrEF, atrial fibrillation on rivaroxaban, sick sinus syndrome status post intracardiac pacemaker, hypertension, COPD on 4 L of home oxygen, obstructive sleep apnea     Vital Signs:    Temp:  [97.1 °F (36.2 °C)-98.2 °F (36.8 °C)] 97.1 °F (36.2 °C)  Heart Rate:  [] 120  Resp:  [16-20] 16  BP: ()/(52-67) 122/67    Physical Exam:    General Appearance:  Alert and cooperative.  Elderly   Head:  Atraumatic and normocephalic.   Eyes: Conjunctivae and sclerae normal, no icterus. No pallor.   Ears:  Ears with no abnormalities noted.   Throat: No oral lesions, no thrush, oral mucosa moist.   Neck: Supple, trachea midline, no thyromegaly.   Back:   No kyphoscoliosis present. No tenderness to palpation.   Lungs:   Breath sounds heard bilaterally equally.  Congested lung sounds.   Heart:  Normal S1 and S2, no murmur, no gallop, no rub. No JVD.   Abdomen:   Normal bowel sounds, no masses, no organomegaly. Soft, nontender, nondistended, no rebound tenderness.   Extremities: Supple, no edema, no cyanosis, no clubbing.   Pulses: Pulses palpable bilaterally.   Skin: Warm.   Neurologic: Alert and oriented x 3. No facial asymmetry. Moves all four limbs. No tremors.     Pertinent Lab Results:     Results from last 7 days   Lab Units 05/16/25  0831 05/15/25  0535 05/14/25  2214   SODIUM mmol/L 135* 141 142   POTASSIUM mmol/L 4.4 4.0 3.6   CHLORIDE mmol/L 98  98 101   CO2 mmol/L 21.1* 30.0* 29.7*   BUN mg/dL 40* 37* 36*   CREATININE mg/dL 1.45* 1.22* 1.27*   CALCIUM mg/dL 8.5* 8.6 8.6   BILIRUBIN mg/dL  --   --  1.5*   ALK PHOS U/L  --   --  92   ALT (SGPT) U/L  --   --  44*   AST (SGOT) U/L  --   --  16   GLUCOSE mg/dL 156* 136* 157*     Results from last 7 days   Lab Units 05/16/25  0831 05/15/25  0535 05/14/25  2214   WBC 10*3/mm3 20.75* 16.22* 14.84*   HEMOGLOBIN g/dL 10.1* 10.9* 10.6*   HEMATOCRIT % 34.2 35.9 35.5   PLATELETS 10*3/mm3 224 220 245         Results from last 7 days   Lab Units 05/14/25  2322 05/14/25  2214   HSTROP T ng/L 61* 58*     Results from last 7 days   Lab Units 05/14/25  2214   PROBNP pg/mL 14,216.0*                       Pertinent Radiology Results:    Imaging Results (All)       Procedure Component Value Units Date/Time    XR Chest 1 View [[id number]] Collected: 05/15/25 0831     Updated: 05/15/25 0838    Narrative:      PROCEDURE: XR CHEST 1 VW-     HISTORY: [facility] Triage Protocol     COMPARISON: 05/07/2025     FINDINGS:  Portable view of the chest demonstrates increased markings in  the left lower lobe. Right lung is clear. There is no evidence of  effusion, pneumothorax or other significant pleural disease. The  mediastinum is unremarkable.     The heart size is moderately enlarged.       Impression:      Stable increased markings left lower lobe favor atelectasis.           This report was signed and finalized on 5/15/2025 8:36 AM by Schuyler Thomas MD.               Echo:    Results for orders placed during the hospital encounter of 04/23/25    Transthoracic Echo Complete With Contrast if Necessary Per Protocol    Interpretation Summary    Estimated left ventricular EF = 25-30%    The left ventricular cavity is borderline dilated.    Left ventricular wall thickness is consistent with mild concentric hypertrophy.    Mild aortic valve stenosis is present. Aortic valve area is 1.1 cm2.    Aortic valve mean pressure gradient is 11 mmHg.    S/p 2  NTW MitraClips on 4/23/25.  Two clips are seen.    Moderate mitral valve regurgitation is present.    The mitral valve mean gradient is 5 mmHg.    Mild tricusoid regurgitation.  Calculated right ventricular systolic pressure from tricuspid regurgitation is 27 mmHg.    Iatrogenic ASD noted with color flow Doppler with left to right flow.    Condition on Discharge:      Stable.    Code status during the hospital stay:    Code Status and Medical Interventions: CPR (Attempt to Resuscitate); Full Support   Ordered at: 05/14/25 3882     Code Status (Patient has no pulse and is not breathing):    CPR (Attempt to Resuscitate)     Medical Interventions (Patient has pulse or is breathing):    Full Support     Discharge Disposition:        Discharge Medications:       Discharge Medications        ASK your doctor about these medications        Instructions Start Date   albuterol sulfate  (90 Base) MCG/ACT inhaler  Commonly known as: PROVENTIL HFA;VENTOLIN HFA;PROAIR HFA   2 puffs, Inhalation, Every 4 Hours PRN      budesonide-formoterol 160-4.5 MCG/ACT inhaler  Commonly known as: SYMBICORT   2 puffs, Inhalation, 2 Times Daily - RT, Rinse mouth with water after use      cholecalciferol 25 MCG (1000 UT) tablet  Commonly known as: VITAMIN D3   1,000 Units, Daily      citalopram 20 MG tablet  Commonly known as: CeleXA   20 mg, Daily      empagliflozin 10 MG tablet tablet  Commonly known as: Jardiance   10 mg, Oral, Daily      ENTRESTO PO   Take  by mouth.      Entresto 24-26 MG tablet  Generic drug: sacubitril-valsartan   1 tablet, Oral, 2 Times Daily      famotidine 40 MG tablet  Commonly known as: PEPCID   40 mg, Oral, Nightly      furosemide 40 MG tablet  Commonly known as: LASIX   40 mg, Oral, 2 Times Daily      metoprolol tartrate 25 MG tablet  Commonly known as: LOPRESSOR   Take 0.5 (one-half) tablet by mouth Every 12 (Twelve) Hours.      midodrine 10 MG tablet  Commonly known as: PROAMATINE   10 mg, Oral, 3 Times  Daily Before Meals      O2  Commonly known as: OXYGEN   3-4 L/min, Daily PRN      pantoprazole 40 MG EC tablet  Commonly known as: PROTONIX   1 tablet, Every Morning      potassium chloride 20 MEQ CR tablet  Commonly known as: KLOR-CON M20   20 mEq, Oral, 2 Times Daily, TAKE WITH LASIX DOSES      spironolactone 25 MG tablet  Commonly known as: ALDACTONE   12.5 mg, Oral, Daily      vitamin B-12 100 MCG tablet  Commonly known as: CYANOCOBALAMIN   100 mcg, Daily      Xarelto 20 MG tablet  Generic drug: rivaroxaban   20 mg, Oral, Daily, *Resume on 3/27/25*             Discharge Diet:       Activity at Discharge:       Follow-up Appointments:     Follow-up Information       Kemi Hdz MD Follow up in 3 day(s).    Specialty: Family Medicine  Contact information:  [address] [facility] [id number] [phone]               Iona Sanders MD Follow up in 5 day(s).    Specialty: Gastroenterology  Contact information:  [address] [facility] [address] [address] [phone]                           Future Appointments   Date Time Provider Department Center   5/20/2025  1:00 PM  [facility] PH[facility] II  [facility]   5/22/2025  1:00 PM  [facility] PH[facility] II  [facility]   5/27/2025  1:00 PM  [facility] PH[facility] II  [facility]   5/29/2025  1:30 PM Le Nelson APRN [facility] CTS [facility]   6/2/2025 10:00 AM [facility] ECHO/VAS 9 BH [facility] CCR [facility]   6/11/2025  1:15 PM Fidencio Germain MD [facility]   10/7/2025 10:00 AM Brittney Bautista MD [facility]   12/1/2025  1:30 PM Keven Willis DO [facility] LCC [facility]   1/19/2026  3:45 PM Fiedncio Germain MD [facility]     Test Results Pending at Discharge:    Pending Results       None                 Brian Joseph Kerley, DO  05/16/25  14:26 EDT    Time: I spent 35 minutes on this discharge activity which included: face-to-face encounter with the patient, reviewing the data in the  system, coordination of the care with the nursing staff as well as consultants, documentation, and entering orders.     Dictated utilizing Dragon dictation.    I have reviewed the copied text and it is accurate as of 5/16/2025

## 2025-05-16 NOTE — THERAPY EVALUATION
"Patient Name: Elsa Alcaraz  : 1940    MRN: [id number]                              Today's Date: 2025       Admit Date: 2025    Visit Dx:     ICD-10-CM ICD-9-CM   1. Atrial fibrillation with RVR  I48.91 427.31   2. Acute exacerbation of chronic heart failure  I50.9 428.9   3. COPD exacerbation  J44.1 491.21     Patient Active Problem List   Diagnosis    Longstanding persistent atrial fibrillation    Hyperlipidemia    Essential hypertension    Anxiety disorder due to general medical condition    Cataract    Chronic bronchitis    Depression    Obesity    Sleep apnea    Iron deficiency anemia    Thrombocytosis    Hiatal hernia with gastroesophageal reflux    Age-related nuclear cataract of left eye    Age-related nuclear cataract of right eye    Severe mitral regurgitation    Moderate tricuspid regurgitation    Chronic systolic heart failure    Diverticulosis of colon    Chronic respiratory failure with hypoxia    COPD    Chronic anticoagulation (Xarelto)    Complete heart block    Esophageal dysphagia    Gastroesophageal reflux disease without esophagitis    Presence of cardiac pacemaker    History of peptic ulcer disease    S/P mitral valve clip implantation    Chronic atrial fibrillation    Acute on chronic HFrEF (heart failure with reduced ejection fraction)    Rhinovirus     Past Medical History:   Diagnosis Date    Anemia     Anesthesia complication     \"woke up early\"    Anxiety disorder due to general medical condition 2017    Arthritis     Atrial fibrillation 2017    Body piercing     BOTH EARS    Borderline diabetes     Breast cancer     right-radiation and a lumpectomy    Broken tooth     Cataract 2017    Left eye surgery     Chronic bronchitis 2017    Colon cancer 1998    had surgery and chemo    COPD (chronic obstructive pulmonary disease)     Cyanocobalamine deficiency (non anemic)     Depression 2017    Diverticulosis     Elevated cholesterol  " "   Esophageal reflux 2017    Hiatal hernia 2017    History of bladder infections     History of echocardiogram 2021    Hx of exercise stress test     \"several years ago by Dr. Mercado\".      Hx of radiation therapy     Hyperlipidemia     Hypertension 2017    Impaired functional mobility and activity tolerance     Impaired functional mobility, balance, gait, and endurance     Osteoporosis     Palpitations 2017    Prediabetes     Problems with swallowing     meat at times per pt report    Shortness of breath     WITH EXERTION     Sleep apnea 2017    NO CPAP    Tricuspid valve disorder 2017    Patient doesn't know anything about this    Vitamin D deficiency     Wears glasses      Past Surgical History:   Procedure Laterality Date    ANAL FISTULOTOMY      APPENDECTOMY          BREAST BIOPSY      BREAST LUMPECTOMY Right 2006    CARDIAC CATHETERIZATION N/A 2025    Procedure: Left Heart Cath - Femoral access;  Surgeon: Jeannie Zabala MD;  Location:  [facility];  Service: Cardiovascular;  Laterality: N/A;    CARDIAC ELECTROPHYSIOLOGY PROCEDURE N/A 2023    Procedure: Micra;  Surgeon: Keven Willis DO;  Location:  [facility];  Service: Cardiology;  Laterality: N/A;    CATARACT EXTRACTION  2019    both eyes     CATARACT EXTRACTION W/ INTRAOCULAR LENS IMPLANT Left 2019    Procedure: CATARACT PHACO EXTRACTION WITH INTRAOCULAR LENS IMPLANT LEFT;  Surgeon: Masoud David MD;  Location: [facility] OR;  Service: Ophthalmology    CATARACT EXTRACTION W/ INTRAOCULAR LENS IMPLANT Right 2019    Procedure: CATARACT PHACO EXTRACTION WITH INTRAOCULAR LENS IMPLANT RIGHT;  Surgeon: Masoud David MD;  Location: [facility] OR;  Service: Ophthalmology     SECTION  1981    CHOLECYSTECTOMY  2009    COLECTOMY PARTIAL / TOTAL  1998    COLON CANCER    COLONOSCOPY  2016    COLONOSCOPY N/A 2021 "    Procedure: COLONOSCOPY WITH BIOPSY;  Surgeon: Iona Sanders MD;  Location: [facility] ENDOSCOPY;  Service: Gastroenterology;  Laterality: N/A;    ENDOSCOPY  2016    ENDOSCOPY N/A 05/04/2018    Procedure: ESOPHAGOGASTRODUODENOSCOPY WITH COLD FORCEP BIOPSY;  Surgeon: Vasquez Fagan MD;  Location: [facility] ENDOSCOPY;  Service: Gastroenterology    ENDOSCOPY N/A 08/12/2019    Procedure: ESOPHAGOGASTRODUODENOSCOPY WITH BIOPSY;  Surgeon: Vasquez Fagan MD;  Location: [facility] ENDOSCOPY;  Service: Gastroenterology    ENDOSCOPY N/A 09/13/2022    Procedure: ESOPHAGOGASTRODUODENOSCOPY with biopsy and polypectomy  ;  Surgeon: Iona Sanders MD;  Location: [facility] ENDOSCOPY;  Service: Gastroenterology;  Laterality: N/A;    ENDOSCOPY N/A 06/28/2024    Procedure: ESOPHAGOGASTRODUODENOSCOPY WITH BIOPSY and dilatation;  Surgeon: Iona Sanders MD;  Location: [facility] ENDOSCOPY;  Service: Gastroenterology;  Laterality: N/A;    HYSTERECTOMY      1998    INSERT / REPLACE / REMOVE PACEMAKER      MITRAL VALVE REPAIR/REPLACEMENT  4/23/2025    Procedure: Transcatheter Mitral Valve Repair;  Surgeon: Jeannie Zabala MD;  Location: [facility];  Service: Cardiovascular;;    SKIN BIOPSY Left     arm    SKIN LESION EXCISION N/A     VENTRAL HERNIA REPAIR  10/28/2012      General Information       Row Name 05/16/25 1412          OT Time and Intention    Subjective Information no complaints  -SP     Document Type evaluation  -SP     Mode of Treatment occupational therapy  -SP     Patient Effort good  -SP     Symptoms Noted During/After Treatment shortness of breath  -SP       Row Name 05/16/25 1412          General Information    Patient Profile Reviewed yes  -SP     Prior Level of Function independent:;all household mobility;ADL's  pt uses a rollator for household mobility  -SP     Existing Precautions/Restrictions oxygen therapy device and L/min  4L baseline wear  -SP     Barriers to Rehab medically  complex  -SP       Row Name 05/16/25 1412          Living Environment    Current Living Arrangements home  -SP     People in Home spouse;grandchild(michael)  -SP       Row Name 05/16/25 1412          Home Main Entrance    Number of Stairs, Main Entrance one  -SP     Stair Railings, Main Entrance none  -SP       Row Name 05/16/25 1412          Stairs Within Home, Primary    Number of Stairs, Within Home, Primary other (see comments)  -SP     Stairs Comment, Within Home, Primary multi-lvl home pt reports she is able to live on the primary lvl  -SP       Row Name 05/16/25 1412          Cognition    Orientation Status (Cognition) oriented x 4  -SP       Row Name 05/16/25 1412          Safety Issues/Impairments Affecting Functional Mobility    Safety Issues Affecting Function (Mobility) insight into deficits/self-awareness;safety precautions follow-through/compliance  -SP     Impairments Affecting Function (Mobility) endurance/activity tolerance;strength;shortness of breath  -SP               User Key  (r) = Recorded By, (t) = Taken By, (c) = Cosigned By      Initials Name Provider Type    SP Angeles Amador OT Occupational Therapist                     Mobility/ADL's       Row Name 05/16/25 1416          Bed Mobility    Bed Mobility supine-sit  -SP     Supine-Sit [place] (Bed Mobility) standby assist  -SP     Assistive Device (Bed Mobility) head of bed elevated  -SP       Row Name 05/16/25 1416          Transfers    Transfers sit-stand transfer;toilet transfer  -SP       Row Name 05/16/25 1416          Sit-Stand Transfer    Sit-Stand [place] (Transfers) contact guard  -SP     Assistive Device (Sit-Stand Transfers) walker, front-wheeled  -SP       Row Name 05/16/25 1416          Toilet Transfer    Type (Toilet Transfer) sit-stand;stand-sit  -SP     [place] Level (Toilet Transfer) contact guard  -SP     Assistive Device (Toilet Transfer) walker, front-wheeled  -SP       Row Name 05/16/25 1416          Functional  Mobility    Functional Mobility- Ind. Level contact guard assist  -SP     Functional Mobility- Device walker, front-wheeled  -SP     Functional Mobility-Distance (Feet) --  14ft x2  -SP     Functional Mobility- Safety Issues supplemental O2  -SP     Patient was able to Ambulate yes  -SP       Row Name 05/16/25 1416          Activities of Daily Living    BADL Assessment/Intervention bathing;upper body dressing;lower body dressing;grooming;feeding;toileting  -SP       Row Name 05/16/25 1416          Bathing Assessment/Intervention    [place] Level (Bathing) bathing skills;minimum assist (75% patient effort)  -SP       Row Name 05/16/25 1416          Upper Body Dressing Assessment/Training    [place] Level (Upper Body Dressing) upper body dressing skills;set up;standby assist  -SP       Row Name 05/16/25 1416          Lower Body Dressing Assessment/Training    [place] Level (Lower Body Dressing) lower body dressing skills;set up;minimum assist (75% patient effort)  -SP       Row Name 05/16/25 1416          Grooming Assessment/Training    [place] Level (Grooming) grooming skills;contact guard assist  -SP     Position (Grooming) sink side;supported standing  -SP       Row Name 05/16/25 1416          Self-Feeding Assessment/Training    [place] Level (Feeding) feeding skills;independent  -SP       Row Name 05/16/25 1416          Toileting Assessment/Training    [place] Level (Toileting) toileting skills;minimum assist (75% patient effort)  -SP               User Key  (r) = Recorded By, (t) = Taken By, (c) = Cosigned By      Initials Name Provider Type    Angeles St OT Occupational Therapist                   Obj/Interventions       Row Name 05/16/25 1417          Range of Motion Comprehensive    General Range of Motion bilateral upper extremity ROM WFL  -SP       Row Name 05/16/25 1417          Strength Comprehensive (MMT)    General Manual Muscle Testing (MMT) Assessment upper extremity  strength deficits identified  -SP       Row Name 05/16/25 1417          Upper Extremity (Manual Muscle Testing)    Comment, MMT: Upper Extremity B UE grossly 3+/5 to 4-/5  -SP       Row Name 05/16/25 1417          Balance    Balance Assessment sitting static balance;sitting dynamic balance;standing static balance;standing dynamic balance  -SP     Static Sitting Balance standby assist  -SP     Dynamic Sitting Balance standby assist  -SP     Position, Sitting Balance unsupported;sitting edge of bed  -SP     Static Standing Balance contact guard  -SP     Dynamic Standing Balance contact guard  -SP     Position/Device Used, Standing Balance supported;walker, front-wheeled  -SP               User Key  (r) = Recorded By, (t) = Taken By, (c) = Cosigned By      Initials Name Provider Type    Angeles St OT Occupational Therapist                   Goals/Plan       Row Name 05/16/25 1424          Transfer Goal 1 (OT)    Activity/Assistive Device (Transfer Goal 1, OT) commode  -SP     [place] Level/Cues Needed (Transfer Goal 1, OT) modified independence  -SP     Time Frame (Transfer Goal 1, OT) long term goal (LTG);10 days  -SP     Progress/Outcome (Transfer Goal 1, OT) goal ongoing  -SP       Row Name 05/16/25 1424          Bathing Goal 1 (OT)    Activity/Device (Bathing Goal 1, OT) bathing skills, all  -SP     [place] Level/Cues Needed (Bathing Goal 1, OT) standby assist  -SP     Time Frame (Bathing Goal 1, OT) long term goal (LTG);10 days  -SP     Progress/Outcomes (Bathing Goal 1, OT) goal ongoing  -SP       Row Name 05/16/25 1424          Toileting Goal 1 (OT)    Activity/Device (Toileting Goal 1, OT) toileting skills, all  -SP     [place] Level/Cues Needed (Toileting Goal 1, OT) modified independence  -SP     Time Frame (Toileting Goal 1, OT) long term goal (LTG);10 days  -SP     Progress/Outcome (Toileting Goal 1, OT) goal ongoing  -SP       Row Name 05/16/25 1424          Strength Goal 1 (OT)     Strength Goal 1 (OT) Pt will demonstrate independence with an UE and endurance based HEP to maximize her functional strength and endurance for ADL and functional mobility engagement.  -SP     Time Frame (Strength Goal 1, OT) short term goal (STG);5 days  -SP     Progress/Outcome (Strength Goal 1, OT) goal ongoing  -SP       Row Name 05/16/25 1424          Problem Specific Goal 1 (OT)    Problem Specific Goal 1 (OT) Pt will verbalize three energy conservation techniques to maximize her activity tolerance for ADL and functional mobility participation.  -SP     Time Frame (Problem Specific Goal 1, OT) short term goal (STG);5 days  -SP     Progress/Outcome (Problem Specific Goal 1, OT) goal ongoing  -SP       Row Name 05/16/25 1424          Therapy Assessment/Plan (OT)    Planned Therapy Interventions (OT) activity tolerance training;BADL retraining;IADL retraining;occupation/activity based interventions;strengthening exercise;transfer/mobility retraining;patient/caregiver education/training;ROM/therapeutic exercise  -SP               User Key  (r) = Recorded By, (t) = Taken By, (c) = Cosigned By      Initials Name Provider Type    Angeles St, OT Occupational Therapist                   Clinical Impression       Row Name 05/16/25 1418          Pain Assessment    Pretreatment Pain Rating 0/10 - no pain  -SP     Posttreatment Pain Rating 0/10 - no pain  -SP       Row Name 05/16/25 1418          Plan of Care Review    Plan of Care Reviewed With patient  -SP     Progress no change  -SP     Outcome Evaluation OT eval completed. Pt received in the bed on 5L nc (4L baseline wear AAT). She is A&O x4 and denies pain at rest. She reports living with her spouse and grandson in a multi-lvl home with [facility]. She is able to live on the primary lvl and uses a rollator at baseline for household mobility. Up until this past week when she became progressively weaker with acute illness she had been mod I for household mobility and  ADLs with no recent hx of falls. BP has been low this am and per MD it is chronically low. Monitored closely during the eval with onset of dizziness with movement. She moved to eob with SBA, transferred to standing with CGA using a Rwx, ambulated 14ft to the commode with CGA and min cues for AD positioning and she completed toileting with CGA and LBD with min A to don a clean brief. She ambulated to the sink to complete grooming with CGA in standing and returned x14ft to the eob where she requested to stay seated eob for respiratory to complete a breathing treatment. She was positioned with all needs in reach, with respiratory staff and the bed alarm activated. Her O2 was monitoreeed and was >90% on 5L. She is expected to benefit from skilled OT services to address ADL, functional mobility, strength, endurance and activity tolerance deficits. Recommend she dc home with family assistance and HH PT/OT when medically ready for dc.  -SP       Row Name 05/16/25 1418          Therapy Assessment/Plan (OT)    Rehab Potential (OT) good  -SP     Criteria for Skilled Therapeutic Interventions Met (OT) yes;meets criteria  -SP     Therapy Frequency (OT) 3 times/wk  -SP       Row Name 05/16/25 1418          Therapy Plan Review/Discharge Plan (OT)    Anticipated Discharge Disposition (OT) home with assist;home with home health  -SP       Row Name 05/16/25 1418          Vital Signs    Pre Systolic BP Rehab 87  -SP     Pre Treatment Diastolic BP 59  -SP     Intra Systolic BP Rehab 97  -SP     Intra Treatment Diastolic BP 64  -SP     Post Systolic BP Rehab 99  -SP     Post Treatment Diastolic BP 68  -SP     Pre SpO2 (%) 98  -SP     O2 Delivery Pre Treatment supplemental O2  5L  -SP     Intra SpO2 (%) 92  -SP     O2 Delivery Intra Treatment supplemental O2  -SP     Post SpO2 (%) 98  -SP     O2 Delivery Post Treatment supplemental O2  -SP     Pre Patient Position Supine  -SP     Intra Patient Position Standing  -SP     Post Patient  Position Sitting  -SP       Row Name 05/16/25 1418          Positioning and Restraints    Pre-Treatment Position in bed  -SP     Post Treatment Position bed  -SP     In Bed notified nsg;sitting EOB;call light within reach;encouraged to call for assist;exit alarm on;with other staff  -SP               User Key  (r) = Recorded By, (t) = Taken By, (c) = Cosigned By      Initials Name Provider Type    SP Angeles Amador OT Occupational Therapist                   Outcome Measures       Row Name 05/16/25 1425          How much help from another is currently needed...    Putting on and taking off regular lower body clothing? 3  -SP     Bathing (including washing, rinsing, and drying) 3  -SP     Toileting (which includes using toilet bed pan or urinal) 3  -SP     Putting on and taking off regular upper body clothing 3  -SP     Taking care of personal grooming (such as brushing teeth) 3  -SP     Eating meals 4  -SP     AM-PAC 6 Clicks Score (OT) 19  -SP       Row Name 05/16/25 1425          Functional Assessment    Outcome Measure Options AM-PAC 6 Clicks Daily Activity (OT)  -SP               User Key  (r) = Recorded By, (t) = Taken By, (c) = Cosigned By      Initials Name Provider Type    SP Angeles Amador OT Occupational Therapist                    Occupational Therapy Education       Title: PT OT SLP Therapies (In Progress)       Topic: Occupational Therapy (In Progress)       Point: ADL training (Done)       Learning Progress Summary            Patient Acceptance, E, VU by SP at 5/16/2025 1425    Comment: Pt was educated on the purpose of the OT eval and POC. Pt verbalized understanding.                                      User Key       Initials Effective Dates Name Provider Type Discipline    SP 09/08/22 -  Angeles Amador OT Occupational Therapist OT                  OT Recommendation and Plan  Planned Therapy Interventions (OT): activity tolerance training, BADL retraining, IADL retraining, occupation/activity based  interventions, strengthening exercise, transfer/mobility retraining, patient/caregiver education/training, ROM/therapeutic exercise  Therapy Frequency (OT): 3 times/wk  Plan of Care Review  Plan of Care Reviewed With: patient  Progress: no change  Outcome Evaluation: OT eval completed. Pt received in the bed on 5L nc (4L baseline wear AAT). She is A&O x4 and denies pain at rest. She reports living with her spouse and grandson in a multi-lvl home with 1 [place]. She is able to live on the primary lvl and uses a rollator at baseline for household mobility. Up until this past week when she became progressively weaker with acute illness she had been mod I for household mobility and ADLs with no recent hx of falls. BP has been low this am and per MD it is chronically low. Monitored closely during the eval with onset of dizziness with movement. She moved to eob with SBA, transferred to standing with CGA using a Rwx, ambulated 14ft to the commode with CGA and min cues for AD positioning and she completed toileting with CGA and LBD with min A to don a clean brief. She ambulated to the sink to complete grooming with CGA in standing and returned x14ft to the eob where she requested to stay seated eob for respiratory to complete a breathing treatment. She was positioned with all needs in reach, with respiratory staff and the bed alarm activated. Her O2 was monitoreeed and was >90% on 5L. She is expected to benefit from skilled OT services to address ADL, functional mobility, strength, endurance and activity tolerance deficits. Recommend she dc home with family assistance and HH PT/OT when medically ready for dc.     Time Calculation:   Evaluation Complexity (OT)  Review Occupational Profile/Medical/Therapy History Complexity: expanded/moderate complexity  Assessment, Occupational Performance/Identification of Deficit Complexity: 3-5 performance deficits  Clinical Decision Making Complexity (OT): problem focused assessment/low  complexity  Overall Complexity of Evaluation (OT): low complexity     Time Calculation- OT       Row Name 05/16/25 1427             Time Calculation- OT    OT Start Time 1105  -SP      OT Received On 05/16/25  -SP      OT Goal Re-Cert Due Date 05/26/25  -SP         Untimed Charges    OT Eval/Re-eval Minutes 39  -SP         Total Minutes    Untimed Charges Total Minutes 39  -SP       Total Minutes 39  -SP                User Key  (r) = Recorded By, (t) = Taken By, (c) = Cosigned By      Initials Name Provider Type    SP Angeles Amador OT Occupational Therapist                  Therapy Charges for Today       Code Description Service Date Service Provider Modifiers Qty    [id number] HC OT EVAL LOW COMPLEXITY 3 5/16/2025 Angeles Amador OT GO 1                 Angeles Amador OT  5/16/2025

## 2025-05-16 NOTE — PLAN OF CARE
Goal Outcome Evaluation:  Plan of Care Reviewed With: patient        Progress: no change  Outcome Evaluation: OT eval completed. Pt received in the bed on 5L nc (4L baseline wear AAT). She is A&O x4 and denies pain at rest. She reports living with her spouse and grandson in a multi-lvl home with 1 [place]. She is able to live on the primary lvl and uses a rollator at baseline for household mobility. Up until this past week when she became progressively weaker with acute illness she had been mod I for household mobility and ADLs with no recent hx of falls. BP has been low this am and per MD it is chronically low. Monitored closely during the eval with onset of dizziness with movement. She moved to eob with SBA, transferred to standing with CGA using a Rwx, ambulated 14ft to the commode with CGA and min cues for AD positioning and she completed toileting with CGA and LBD with min A to don a clean brief. She ambulated to the sink to complete grooming with CGA in standing and returned x14ft to the eob where she requested to stay seated eob for respiratory to complete a breathing treatment. She was positioned with all needs in reach, with respiratory staff and the bed alarm activated. Her O2 was monitored and was >90% on 5L. She is expected to benefit from skilled OT services to address ADL, functional mobility, strength, endurance and activity tolerance deficits. Recommend she dc home with family assistance and HH PT/OT when medically ready for dc.    Anticipated Discharge Disposition (OT): home with assist, home with home health

## 2025-05-16 NOTE — CASE MANAGEMENT/SOCIAL WORK
Case Management Discharge Note      Final Note: Pt discharging home with family via private car.    Provided Post Acute Provider List?: N/A  Provided Post Acute Provider Quality & Resource List?: N/A    Selected Continued Care - Admitted Since 5/14/2025       Destination    No services have been selected for the patient.                Durable Medical Equipment    No services have been selected for the patient.                Dialysis/Infusion    No services have been selected for the patient.                Home Medical Care    No services have been selected for the patient.                Therapy    No services have been selected for the patient.                Community Resources    No services have been selected for the patient.                Community & DME    No services have been selected for the patient.                    Selected Continued Care - Episodes Includes continued care and service providers with selected services from the active episodes listed below          Transportation Services  Private: Car    Final Discharge Disposition Code: 01 - home or self-care

## 2025-05-17 NOTE — PAYOR COMM NOTE
"To:  Edin  From: Sosa Yee RN  Phone: [phone]  Fax: [phone]  NPI: [id number]  TIN: [id number]  Member ID: [id number]   MRN: [id number]    Elsa Caraballo (85 y.o. Female)       Date of Birth   1940    Social Security Number       Address   [address] [place] [address]    Home Phone   [phone]    MRN   [id number]       Hoahaoism   Congregation    Marital Status                               Admission Date   5/14/2025    Admission Type   Emergency    Admitting Provider   Nik Hicks MD    Attending Provider       Department, Room/Bed   [facility] TELEMETRY 4, 429/1       Discharge Date   5/16/2025    Discharge Disposition   Home or Self Care    Discharge Destination                                 Attending Provider: (none)   Allergies: Penicillins, Other    Isolation: None   Infection: Rhinovirus  (05/15/25)   Code Status: Prior    Ht: 160 cm (63\")   Wt: 66.5 kg (146 lb 9.7 oz)    Admission Cmt: None   Principal Problem: Acute on chronic HFrEF (heart failure with reduced ejection fraction) [I50.23]                   Active Insurance as of 5/14/2025       Primary Coverage       Payor Plan Insurance Group Employer/Plan Group    ANTH MEDICARE REPLACEMENT ANTHEM MEDICARE ADVANTAGE PPO [id number]       Payor Plan Address Payor Plan Phone Number Payor Plan Fax Number Effective Dates    [address] [phone]  1/1/2020 - None Entered    [facility] [address]         Subscriber Name Subscriber Birth Date Member ID       ELSA CARABALLO 1940 [id number]                     Emergency Contacts        (Rel.) Home Phone Work Phone Mobile Phone    Logan Caraballo (Spouse) [phone] -- [phone]    DARIAN CHERRY (Daughter) [phone] -- [phone]                 Discharge Summary        Kerley, Brian Joseph, DO at 05/16/25 1426              [facility] HOSPITALIST   DISCHARGE SUMMARY      Name:  Elsa Caraballo   Age:  85 y.o.  Sex:  " female  :  1940  MRN:  [id number]   Visit Number:  [id number]    Admission Date:  2025  Date of Discharge:  2025  Primary Care Physician:  Kemi Hdz MD      Problem List:     Active Hospital Problems    Diagnosis  POA    **Acute on chronic HFrEF (heart failure with reduced ejection fraction) [I50.23]  Yes    Rhinovirus [B34.8]  Yes    S/P mitral valve clip implantation [Z98.890, Z95.818]  Not Applicable      Resolved Hospital Problems   No resolved problems to display.     Presenting Problem:    Chief Complaint   Patient presents with    Shortness of Breath      Consults:     Consulting Physician(s)                     None                History of presenting illness/Hospital Course:    Elsa Alcaraz is an 85-year-old female with history of severe mitral regurgitation who recently underwent MitraClip placement at [facility] on 2025, CKD 3, chronic HFrEF, atrial fibrillation on rivaroxaban, sick sinus syndrome status post intracardiac pacemaker, hypertension, COPD on 4 L of home oxygen, obstructive sleep apnea was brought to the emergency room by her  with progressive worsening of shortness of breath over the last couple of days.  Patient states that she has been doing well after her MitraClip procedure but has been having intermittent exertional shortness of breath.  She started having increasing shortness of breath and wheezing since day prior to presentation despite taking her regular medications.  She does have oxygen at home but does not use BiPAP.  She lives with her  and was brought to the emergency room.  Denies any fevers or chest pain.  She follows up with Dr. Germain who is her cardiologist.     In the emergency room, she was afebrile but tachycardic in the 107 and was saturating at 98% on 4 L of nasal cannula oxygen.  She did have significant audible wheezing and was in respiratory distress.  She was initially placed on nasal cannula  oxygen and was given bronchodilator nebulizations.  She was also given 40 mg of IV Lasix.  Initial troponin 58, proBNP 14,216.  CMP showed a BUN of 36, creatinine 1.27 (baseline), glucose 157.  Procalcitonin was within normal range.  WBC 14.8, hemoglobin 10.6.  Chest x-ray showed cardiomegaly with evidence of CHF.  EKG showed atrial fibrillation which is chronic.  Patient was also given IV Solu-Medrol and IV metoprolol while in the emergency room.  Due to progressive worsening of shortness of breath, she was placed on BiPAP therapy.  VBG showed a pH of 7.35, pCO2 66 and bicarb of 37.  Patient was subsequently admitted to the medical floor with telemetry for acute on chronic respiratory failure and acute on chronic HFrEF.    Inpatient general floor admission 5/14/2025 with shortness of air related to rhinovirus.  Did initially have some increased oxygen requirement, shortly back down to her 4 L.  Initially was suspected that it was primarily heart failure and COPD exacerbation, upon notifying rhinovirus, this was likely the main etiology of her respiratory symptoms.  During course patient noted recent dark stools, stools during course were not dark although she was FOBT positive.    Shared decision making patient would like to discharge home 5/16/2025 with close follow-up.     FOBT positive  Close follow-up with PCP and gastroenterology.  FOBT positive noted during course after patient reported some dark stools recently.  Shared decision making for discharge.  Discussed return precautions.  Patient on long-term Xarelto, no dark or bloody stools during course, hemoglobin stable.  Chronic respiratory failure  On baseline 4 L.  Rhinovirus  Supportive care.  Likely triggered her respiratory symptoms.  Will provide some doxycycline given significant congestion with elevated white count.  Acute exacerbation of heart failure  During course provided some Lasix, although her respiratory symptoms likely primarily from  rhinovirus.  CKD  Recommend follow-up renal function check.  Reportedly her creatinine baseline is around 1.27.  Hypotension  Continue home midodrine.  During course had systolics as low was in the 70s, asymptomatic, lactic acid not elevated.  Patient notes that at home she is seeing blood pressure with systolic in the 80s.     Chronic: history of severe mitral regurgitation who recently underwent MitraClip placement at [facility] on 4/23/2025, CKD 3, chronic HFrEF, atrial fibrillation on rivaroxaban, sick sinus syndrome status post intracardiac pacemaker, hypertension, COPD on 4 L of home oxygen, obstructive sleep apnea     Vital Signs:    Temp:  [97.1 °F (36.2 °C)-98.2 °F (36.8 °C)] 97.1 °F (36.2 °C)  Heart Rate:  [] 120  Resp:  [16-20] 16  BP: ()/(52-67) 122/67    Physical Exam:    General Appearance:  Alert and cooperative.  Elderly   Head:  Atraumatic and normocephalic.   Eyes: Conjunctivae and sclerae normal, no icterus. No pallor.   Ears:  Ears with no abnormalities noted.   Throat: No oral lesions, no thrush, oral mucosa moist.   Neck: Supple, trachea midline, no thyromegaly.   Back:   No kyphoscoliosis present. No tenderness to palpation.   Lungs:   Breath sounds heard bilaterally equally.  Congested lung sounds.   Heart:  Normal S1 and S2, no murmur, no gallop, no rub. No JVD.   Abdomen:   Normal bowel sounds, no masses, no organomegaly. Soft, nontender, nondistended, no rebound tenderness.   Extremities: Supple, no edema, no cyanosis, no clubbing.   Pulses: Pulses palpable bilaterally.   Skin: Warm.   Neurologic: Alert and oriented x 3. No facial asymmetry. Moves all four limbs. No tremors.     Pertinent Lab Results:     Results from last 7 days   Lab Units 05/16/25  0831 05/15/25  0535 05/14/25  2214   SODIUM mmol/L 135* 141 142   POTASSIUM mmol/L 4.4 4.0 3.6   CHLORIDE mmol/L 98 98 101   CO2 mmol/L 21.1* 30.0* 29.7*   BUN mg/dL 40* 37* 36*   CREATININE mg/dL 1.45* 1.22* 1.27*    CALCIUM mg/dL 8.5* 8.6 8.6   BILIRUBIN mg/dL  --   --  1.5*   ALK PHOS U/L  --   --  92   ALT (SGPT) U/L  --   --  44*   AST (SGOT) U/L  --   --  16   GLUCOSE mg/dL 156* 136* 157*     Results from last 7 days   Lab Units 05/16/25  0831 05/15/25  0535 05/14/25  2214   WBC 10*3/mm3 20.75* 16.22* 14.84*   HEMOGLOBIN g/dL 10.1* 10.9* 10.6*   HEMATOCRIT % 34.2 35.9 35.5   PLATELETS 10*3/mm3 224 220 245         Results from last 7 days   Lab Units 05/14/25  2322 05/14/25  2214   HSTROP T ng/L 61* 58*     Results from last 7 days   Lab Units 05/14/25  2214   PROBNP pg/mL 14,216.0*                       Pertinent Radiology Results:    Imaging Results (All)       Procedure Component Value Units Date/Time    XR Chest 1 View [[id number]] Collected: 05/15/25 0831     Updated: 05/15/25 0838    Narrative:      PROCEDURE: XR CHEST 1 VW-     HISTORY: SOA Triage Protocol     COMPARISON: 05/07/2025     FINDINGS:  Portable view of the chest demonstrates increased markings in  the left lower lobe. Right lung is clear. There is no evidence of  effusion, pneumothorax or other significant pleural disease. The  mediastinum is unremarkable.     The heart size is moderately enlarged.       Impression:      Stable increased markings left lower lobe favor atelectasis.           This report was signed and finalized on 5/15/2025 8:36 AM by Schuyler Thomas MD.               Echo:    Results for orders placed during the hospital encounter of 04/23/25    Transthoracic Echo Complete With Contrast if Necessary Per Protocol    Interpretation Summary    Estimated left ventricular EF = 25-30%    The left ventricular cavity is borderline dilated.    Left ventricular wall thickness is consistent with mild concentric hypertrophy.    Mild aortic valve stenosis is present. Aortic valve area is 1.1 cm2.    Aortic valve mean pressure gradient is 11 mmHg.    S/p 2 NTW MitraClips on 4/23/25.  Two clips are seen.    Moderate mitral valve regurgitation is  present.    The mitral valve mean gradient is 5 mmHg.    Mild tricusoid regurgitation.  Calculated right ventricular systolic pressure from tricuspid regurgitation is 27 mmHg.    Iatrogenic ASD noted with color flow Doppler with left to right flow.    Condition on Discharge:      Stable.    Code status during the hospital stay:    Code Status and Medical Interventions: CPR (Attempt to Resuscitate); Full Support   Ordered at: 05/14/25 7788     Code Status (Patient has no pulse and is not breathing):    CPR (Attempt to Resuscitate)     Medical Interventions (Patient has pulse or is breathing):    Full Support     Discharge Disposition:        Discharge Medications:       Discharge Medications        ASK your doctor about these medications        Instructions Start Date   albuterol sulfate  (90 Base) MCG/ACT inhaler  Commonly known as: PROVENTIL HFA;VENTOLIN HFA;PROAIR HFA   2 puffs, Inhalation, Every 4 Hours PRN      budesonide-formoterol 160-4.5 MCG/ACT inhaler  Commonly known as: SYMBICORT   2 puffs, Inhalation, 2 Times Daily - RT, Rinse mouth with water after use      cholecalciferol 25 MCG (1000 UT) tablet  Commonly known as: VITAMIN D3   1,000 Units, Daily      citalopram 20 MG tablet  Commonly known as: CeleXA   20 mg, Daily      empagliflozin 10 MG tablet tablet  Commonly known as: Jardiance   10 mg, Oral, Daily      ENTRESTO PO   Take  by mouth.      Entresto 24-26 MG tablet  Generic drug: sacubitril-valsartan   1 tablet, Oral, 2 Times Daily      famotidine 40 MG tablet  Commonly known as: PEPCID   40 mg, Oral, Nightly      furosemide 40 MG tablet  Commonly known as: LASIX   40 mg, Oral, 2 Times Daily      metoprolol tartrate 25 MG tablet  Commonly known as: LOPRESSOR   Take 0.5 (one-half) tablet by mouth Every 12 (Twelve) Hours.      midodrine 10 MG tablet  Commonly known as: PROAMATINE   10 mg, Oral, 3 Times Daily Before Meals      O2  Commonly known as: OXYGEN   3-4 L/min, Daily PRN      pantoprazole  40 MG EC tablet  Commonly known as: PROTONIX   1 tablet, Every Morning      potassium chloride 20 MEQ CR tablet  Commonly known as: KLOR-CON M20   20 mEq, Oral, 2 Times Daily, TAKE WITH LASIX DOSES      spironolactone 25 MG tablet  Commonly known as: ALDACTONE   12.5 mg, Oral, Daily      vitamin B-12 100 MCG tablet  Commonly known as: CYANOCOBALAMIN   100 mcg, Daily      Xarelto 20 MG tablet  Generic drug: rivaroxaban   20 mg, Oral, Daily, *Resume on 3/27/25*             Discharge Diet:       Activity at Discharge:       Follow-up Appointments:     Follow-up Information       Kemi Hdz MD Follow up in 3 day(s).    Specialty: Family Medicine  Contact information:  [address] [facility] [id number] [phone]               Iona Sanders MD Follow up in 5 day(s).    Specialty: Gastroenterology  Contact information:  [address] [facility] [address], Medical [address] [facility] [address]  [facility] [id number] [phone]                           Future Appointments   Date Time Provider Department Center   5/20/2025  1:00 PM  [facility] PHASE II  [facility]   5/22/2025  1:00 PM  [facility] PHASE II  [facility]   5/27/2025  1:00 PM  [facility] REHAB PHASE II  [facility]   5/29/2025  1:30 PM Le Nelson APRN [facility] CTS [facility] A[facility]IL   6/2/2025 10:00 AM [facility] ECHO/VAS 9 BH ABRIL CCR ABRIL   6/11/2025  1:15 PM Fidencio Germain MD [facility] HAMB ABRIL   10/7/2025 10:00 AM Brittney Bautista MD [facility] PCC NANO NANO   12/1/2025  1:30 PM Keven Willis DO [facility] LCC ABRIL ABRIL   1/19/2026  3:45 PM Fidencio Germain MD [facility] NANO NANO     Test Results Pending at Discharge:    Pending Results       None                 Brian Joseph Kerley, DO  05/16/25  14:26 EDT    Time: I spent 35 minutes on this discharge activity which included: face-to-face encounter with the patient, reviewing the data in the system, coordination of the care with the nursing staff as well as consultants, documentation, and  entering orders.     Dictated utilizing Dragon dictation.    I have reviewed the copied text and it is accurate as of 5/16/2025       Electronically signed by Kerley, Brian Joseph, DO at 05/16/25 1308

## 2025-05-19 ENCOUNTER — READMISSION MANAGEMENT (OUTPATIENT)
Dept: CALL CENTER | Facility: HOSPITAL | Age: 85
End: 2025-05-19
Payer: MEDICARE

## 2025-05-19 NOTE — OUTREACH NOTE
CHF Week 1 Survey      Flowsheet Row Responses   [facility] patient discharged from? [facility]   Does the patient have one of the following disease processes/diagnoses(primary or secondary)? CHF   CHF Week 1 attempt successful? Yes   Call start time 1710   Call end time 1716   Discharge diagnosis Acute on chronic HFrEF (heart failure with reduced ejection fraction)   Meds reviewed with patient/caregiver? Yes   Is the patient having any side effects they believe may be caused by any medication additions or changes? No   Does the patient have all medications ordered at discharge? Yes   Is the patient taking all medications as directed (includes completed medication regime)? Yes   Does the patient have a primary care provider?  Yes   Does the patient have an appointment with their PCP within 7 days of discharge? No   Has the patient kept scheduled appointments due by today? N/A   Comments Has appt with cardiology 6/2   Pulse Ox monitoring Intermittent   Psychosocial issues? No   Did the patient receive a copy of their discharge instructions? Yes   Nursing interventions Reviewed instructions with patient   What is the patient's perception of their health status since discharge? Improving   Is the patient able to teach back signs and symptoms of worsening condition? (i.e. weight gain, shortness of air, etc.) Yes   If the patient is a current smoker, are they able to teach back resources for cessation? Not a smoker   Is the patient/caregiver able to teach back the hierarchy of who to call/visit for symptoms/problems? PCP, Specialist, Home health nurse, Urgent Care, ED, 911 Yes   CHF Zone this Call Green Zone   Green Zone No new or worsening shortness of breath, Patient reports doing well, Physical activity level is normal for you, No chest pain, No new swelling -  feet, ankles and legs look normal for you, Weight check stable   Green Zone Interventions Daily weight check, Low sodium diet, Meds as directed, Follow up  visits planned    CHF Week 1 call completed? Yes   Wrap up additional comments States she has been weighing herself for 4 years now and is aware of when to contact cardiology.  She states her bp has been low and hr rate of 103.  Cardiology is aware.  She will try to increase her fluids to see if this helps.   Call end time [phone]            Selena HESTER - Licensed Nurse

## 2025-05-20 ENCOUNTER — APPOINTMENT (OUTPATIENT)
Dept: CARDIAC REHAB | Facility: HOSPITAL | Age: 85
End: 2025-05-20
Payer: MEDICARE

## 2025-05-22 ENCOUNTER — APPOINTMENT (OUTPATIENT)
Dept: CARDIAC REHAB | Facility: HOSPITAL | Age: 85
End: 2025-05-22
Payer: MEDICARE

## 2025-05-23 DIAGNOSIS — I50.21 ACUTE HFREF (HEART FAILURE WITH REDUCED EJECTION FRACTION): ICD-10-CM

## 2025-05-23 RX ORDER — FUROSEMIDE 40 MG/1
40 TABLET ORAL 2 TIMES DAILY
Qty: 30 TABLET | Refills: 0 | Status: SHIPPED | OUTPATIENT
Start: 2025-05-23

## 2025-05-23 NOTE — TELEPHONE ENCOUNTER
[facility] [place]  Patient was recently discharged from hospital on Lasix twice a day.  She does not currently have a follow-up with the heart failure clinic.  She can either make an appointment to follow-up with the heart failure clinic or additional refills can be deferred to PCP or primary cardiologist so someone can be monitoring her renal function and electrolyte status.  Please call and advise her of this.       If she would like to follow-up with the heart failure clinic, have her call [phone].    HF Clinic Staff, MARTINEZ

## 2025-05-27 ENCOUNTER — TREATMENT (OUTPATIENT)
Dept: CARDIAC REHAB | Facility: HOSPITAL | Age: 85
End: 2025-05-27
Payer: MEDICARE

## 2025-05-27 DIAGNOSIS — Z98.890 S/P MVR (MITRAL VALVE REPAIR): Primary | ICD-10-CM

## 2025-05-27 PROCEDURE — 93798 PHYS/QHP OP CAR RHAB W/ECG: CPT

## 2025-05-27 NOTE — PROGRESS NOTES
Pt was seen today in CR for a Phase 2 visit.  Vital signs and session notes recorded in [employer] and will be scanned into Epic by HIM.

## 2025-05-29 ENCOUNTER — OFFICE VISIT (OUTPATIENT)
Dept: CARDIAC SURGERY | Facility: CLINIC | Age: 85
End: 2025-05-29
Payer: MEDICARE

## 2025-05-29 VITALS
SYSTOLIC BLOOD PRESSURE: 98 MMHG | OXYGEN SATURATION: 99 % | WEIGHT: 145 LBS | TEMPERATURE: 97.6 F | HEIGHT: 63 IN | HEART RATE: 100 BPM | DIASTOLIC BLOOD PRESSURE: 50 MMHG | BODY MASS INDEX: 25.69 KG/M2

## 2025-05-29 DIAGNOSIS — Z95.818 S/P MITRAL VALVE CLIP IMPLANTATION: Primary | ICD-10-CM

## 2025-05-29 DIAGNOSIS — I50.22 CHRONIC SYSTOLIC HEART FAILURE: Chronic | ICD-10-CM

## 2025-05-29 DIAGNOSIS — Z98.890 S/P MITRAL VALVE CLIP IMPLANTATION: Primary | ICD-10-CM

## 2025-05-29 RX ORDER — LEVOFLOXACIN 500 MG/1
250 TABLET, FILM COATED ORAL EVERY OTHER DAY
COMMUNITY

## 2025-05-29 NOTE — PROGRESS NOTES
[facility] Cardiothoracic Surgery Office Follow Up Note     Date of Encounter: 2025     Name: Elsa Alcaraz  : 1940     Referred By: No ref. provider found  PCP: Kemi Hdz MD    Chief Complaint:    Chief Complaint   Patient presents with    Atrial Fibrillation     Hospital follow up s/p mitraclip insertion 25.       Subjective      History of Present Illness:    Elsa Alcaraz is a 85 y.o. female s/p Mitraclip NTw insertion x 2 per Dr. Dimas/Dr. Zabala 25.  PMH: Severe MR s/p MitraClip 2025, persistent AF (Xarelto), hypertension, CKD, COPD (4 L home oxygen), DAVID noncompliant with CPAP/BiPAP, sick sinus syndrome s/p PPM, and HFrEF.  Following transcatheter mitral valve repair, patient recovered in ICU.  She was hypotensive requiring Byron-Synephrine and transitioning to oral midodrine.  DC home POD #2.  Seen back in cardiology clinic by Dr. Zabala 2025 with removal of right groin venous stitch.  Readmitted to [facility] 2025 to acute on chronic HFrEF and rhinovirus.  Discharged home within 48 hours.  She presents to CT surgery for 4-week postop check.  States groin hematoma much improved and she feels good today.      Review of Systems:  Review of Systems   Constitutional: Positive for malaise/fatigue. Negative for chills, decreased appetite, diaphoresis, fever, night sweats and weight loss.   HENT:  Positive for hoarse voice and nosebleeds. Negative for congestion and sore throat.    Cardiovascular:  Positive for dyspnea on exertion and leg swelling. Negative for chest pain, irregular heartbeat, near-syncope, orthopnea, palpitations, paroxysmal nocturnal dyspnea and syncope.   Respiratory:  Positive for cough, shortness of breath, sleep disturbances due to breathing and sputum production. Negative for hemoptysis, snoring and wheezing.    Hematologic/Lymphatic: Negative for adenopathy and bleeding problem. Bruises/bleeds easily.   Skin:  Negative.  Negative for color change, dry skin, itching, poor wound healing and rash.   Musculoskeletal: Negative.  Negative for falls and muscle weakness.   Gastrointestinal:  Positive for dysphagia and heartburn. Negative for abdominal pain, anorexia, constipation, diarrhea, nausea and vomiting.   Genitourinary:  Positive for hematuria. Negative for dysuria and frequency.   Neurological:  Positive for dizziness, light-headedness and loss of balance. Negative for difficulty with concentration, headaches, numbness, paresthesias, seizures and weakness.   Psychiatric/Behavioral:  Negative for altered mental status, depression and memory loss. The patient is nervous/anxious. The patient does not have insomnia.    Allergic/Immunologic: Negative.  Negative for persistent infections.       I have reviewed the following portions of the patient's history: problem list, current medications, allergies, past surgical history, past medical history, past social history, past family history, and ROS and confirm it's accurate.    Allergies:  Allergies   Allergen Reactions    Other GI Intolerance     Spicy foods      Penicillins Itching       Medications:      Current Outpatient Medications:     albuterol sulfate  (90 Base) MCG/ACT inhaler, Inhale 2 puffs Every 4 (Four) Hours As Needed for Wheezing., Disp: 18 g, Rfl: 5    budesonide-formoterol (SYMBICORT) 160-4.5 MCG/ACT inhaler, Inhale 2 puffs 2 (Two) Times a Day. Rinse mouth with water after use, Disp: 10.2 g, Rfl: 5    cholecalciferol (VITAMIN D3) 25 MCG (1000 UT) tablet, Take 1 tablet by mouth Daily., Disp: , Rfl:     citalopram (CeleXA) 20 MG tablet, Take 1 tablet by mouth Daily., Disp: , Rfl:     empagliflozin (Jardiance) 10 MG tablet tablet, Take 1 tablet by mouth Daily., Disp: 90 tablet, Rfl: 3    famotidine (PEPCID) 40 MG tablet, TAKE 1 TABLET BY MOUTH ONCE DAILY AT NIGHT, Disp: 90 tablet, Rfl: 3    furosemide (LASIX) 40 MG tablet, Take 1 tablet by mouth twice  "daily, Disp: 30 tablet, Rfl: 0    levoFLOXacin (LEVAQUIN) 500 MG tablet, Take 0.5 tablets by mouth Every Other Day., Disp: , Rfl:     metoprolol tartrate (LOPRESSOR) 25 MG tablet, Take 0.5 (one-half) tablet by mouth Every 12 (Twelve) Hours., Disp: 30 tablet, Rfl: 0    midodrine (PROAMATINE) 10 MG tablet, Take 1 tablet by mouth 3 (Three) Times a Day Before Meals., Disp: 90 tablet, Rfl: 0    O2 (OXYGEN), Inhale 3-4 L/min Daily As Needed., Disp: , Rfl:     pantoprazole (PROTONIX) 40 MG EC tablet, Take 1 tablet by mouth Every Morning., Disp: , Rfl:     potassium chloride (KLOR-CON M20) 20 MEQ CR tablet, Take 1 tablet by mouth 2 (Two) Times a Day. TAKE WITH LASIX DOSES, Disp: 6 tablet, Rfl: 0    sacubitril-valsartan (Entresto) 24-26 MG tablet, Take 1 tablet by mouth 2 (Two) Times a Day., Disp: , Rfl:     spironolactone (ALDACTONE) 25 MG tablet, Take 0.5 tablets by mouth Daily., Disp: 15 tablet, Rfl: 0    vitamin B-12 (CYANOCOBALAMIN) 100 MCG tablet, Take 1 tablet by mouth Daily., Disp: , Rfl:     Xarelto 20 MG tablet, Take 1 tablet by mouth Daily. *Resume on 3/27/25*, Disp: 90 tablet, Rfl: 3    History:   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/11/2017    Arthritis     Atrial fibrillation 01/11/2017    Body piercing     BOTH EARS    Borderline diabetes     Breast cancer 2003    right-radiation and a lumpectomy    Broken tooth     Cataract 01/11/2017    Left eye surgery 11/19    Chronic bronchitis 01/11/2017    Colon cancer 11/30/1998    had surgery and chemo    COPD (chronic obstructive pulmonary disease)     Cyanocobalamine deficiency (non anemic)     Depression 01/11/2017    Diverticulosis     Elevated cholesterol     Esophageal reflux 01/11/2017    Hiatal hernia 01/11/2017    History of bladder infections     History of echocardiogram 07/26/2021    Hx of exercise stress test     \"several years ago by " "Jess\".      Hx of radiation therapy     Hyperlipidemia     Hypertension 2017    Impaired functional mobility and activity tolerance     Impaired functional mobility, balance, gait, and endurance     Osteoporosis     Palpitations 2017    Prediabetes     Problems with swallowing     meat at times per pt report    Shortness of breath     WITH EXERTION     Sleep apnea 2017    NO CPAP    Tricuspid valve disorder 2017    Patient doesn't know anything about this    Vitamin D deficiency     Wears glasses        Past Surgical History:   Procedure Laterality Date    ANAL FISTULOTOMY      APPENDECTOMY          BREAST BIOPSY      BREAST LUMPECTOMY Right 2006    CARDIAC CATHETERIZATION N/A 2025    Procedure: Left Heart Cath - Femoral access;  Surgeon: Jeannie Zabala MD;  Location:  [facility];  Service: Cardiovascular;  Laterality: N/A;    CARDIAC ELECTROPHYSIOLOGY PROCEDURE N/A 2023    Procedure: Micra;  Surgeon: Keven Willis DO;  Location:  [facility] INVASIVE LOCATION;  Service: Cardiology;  Laterality: N/A;    CATARACT EXTRACTION      both eyes     CATARACT EXTRACTION W/ INTRAOCULAR LENS IMPLANT Left 2019    Procedure: CATARACT PHACO EXTRACTION WITH INTRAOCULAR LENS IMPLANT LEFT;  Surgeon: Masoud David MD;  Location: [facility] OR;  Service: Ophthalmology    CATARACT EXTRACTION W/ INTRAOCULAR LENS IMPLANT Right 2019    Procedure: CATARACT PHACO EXTRACTION WITH INTRAOCULAR LENS IMPLANT RIGHT;  Surgeon: Masoud David MD;  Location: [facility] OR;  Service: Ophthalmology     SECTION  1981    CHOLECYSTECTOMY  2009    COLECTOMY PARTIAL / TOTAL  1998    COLON CANCER    COLONOSCOPY  2016    COLONOSCOPY N/A 2021    Procedure: COLONOSCOPY WITH BIOPSY;  Surgeon: Iona Sanders MD;  Location: [facility] ENDOSCOPY;  Service: Gastroenterology;  Laterality: N/A;    ENDOSCOPY  2016    ENDOSCOPY N/A " 05/04/2018    Procedure: ESOPHAGOGASTRODUODENOSCOPY WITH COLD FORCEP BIOPSY;  Surgeon: Vasquez Fagan MD;  Location: [facility] ENDOSCOPY;  Service: Gastroenterology    ENDOSCOPY N/A 08/12/2019    Procedure: ESOPHAGOGASTRODUODENOSCOPY WITH BIOPSY;  Surgeon: Vasquez Fagan MD;  Location: [facility] ENDOSCOPY;  Service: Gastroenterology    ENDOSCOPY N/A 09/13/2022    Procedure: ESOPHAGOGASTRODUODENOSCOPY with biopsy and polypectomy  ;  Surgeon: Iona Sanders MD;  Location: [facility] ENDOSCOPY;  Service: Gastroenterology;  Laterality: N/A;    ENDOSCOPY N/A 06/28/2024    Procedure: ESOPHAGOGASTRODUODENOSCOPY WITH BIOPSY and dilatation;  Surgeon: Iona Sanders MD;  Location: [facility] ENDOSCOPY;  Service: Gastroenterology;  Laterality: N/A;    HYSTERECTOMY      1998    INSERT / REPLACE / REMOVE PACEMAKER      MITRAL VALVE REPAIR/REPLACEMENT  4/23/2025    Procedure: Transcatheter Mitral Valve Repair;  Surgeon: Jeannie Zabala MD;  Location: [facility] INVASIVE LOCATION;  Service: Cardiovascular;;    SKIN BIOPSY Left     arm    SKIN LESION EXCISION N/A     VENTRAL HERNIA REPAIR  10/28/2012       Social History     Socioeconomic History    Marital status:     Number of children: 5   Tobacco Use    Smoking status: Never     Passive exposure: Past    Smokeless tobacco: Never   Vaping Use    Vaping status: Never Used   Substance and Sexual Activity    Alcohol use: Not Currently     Alcohol/week: 1.0 - 2.0 standard drink of alcohol     Types: 1 - 2 Glasses of wine per week     Comment: rarely    Drug use: Never    Sexual activity: Defer        Family History   Problem Relation Age of Onset    Hypertension Mother     Asthma Mother     COPD Mother     Osteoarthritis Mother     Stomach cancer Father     Cancer Father     Cancer Sister     Diabetes Maternal Grandmother     Colon cancer Neg Hx        Objective   Physical Exam:  Vitals:    05/29/25 1332   BP: 98/50   BP Location: Left arm   Patient Position:  "Sitting   Pulse: 100   Temp: 97.6 °F (36.4 °C)   SpO2: 99%   Weight: 65.8 kg (145 lb)   Height: 160 cm (63\")  Comment: patient reports      Body mass index is 25.69 kg/m².    Physical Exam  Vitals reviewed.   Constitutional:       General: She is not in acute distress.     Appearance: She is not toxic-appearing.      Comments: Appears chronically ill.  Seated in WC   HENT:      Head: Normocephalic and atraumatic.   Eyes:      General: Lids are normal.      Conjunctiva/sclera: Conjunctivae normal.      Pupils: Pupils are equal, round, and reactive to light.   Neck:      Vascular: No JVD.   Cardiovascular:      Rate and Rhythm: Tachycardia present. Rhythm irregularly irregular.      Pulses:           Dorsalis pedis pulses are 1+ on the right side and 1+ on the left side.        Posterior tibial pulses are 1+ on the right side and 1+ on the left side.      Heart sounds: S1 normal and S2 normal. Murmur heard.      Diastolic murmur is present with a grade of 1/4.   Pulmonary:      Effort: Pulmonary effort is normal. No respiratory distress.      Breath sounds: Examination of the right-lower field reveals decreased breath sounds. Examination of the left-lower field reveals decreased breath sounds. Decreased breath sounds present. No wheezing, rhonchi or rales.      Comments: Supplemental oxygen   Musculoskeletal:         General: Normal range of motion.      Cervical back: Normal range of motion and neck supple.      Right lower leg: No edema.      Left lower leg: No edema.   Skin:     General: Skin is warm and dry.      Capillary Refill: Capillary refill takes less than 2 seconds.   Neurological:      General: No focal deficit present.      Mental Status: She is alert and oriented to person, place, and time.   Psychiatric:         Attention and Perception: Attention normal.         Mood and Affect: Mood normal.         Speech: Speech normal.         Behavior: Behavior is cooperative.         Imaging/Labs:  CXR 5/29/25: " personally reviewed  Trace left pleural effusion with mild left basilar atelectasis.  Stable cardiomegaly.  No PTX.  MitraClip visualized in appropriate position.  Radiology review pending    XR Chest 1 View  Result Date: 5/15/2025  Stable increased markings left lower lobe favor atelectasis.    This report was signed and finalized on 5/15/2025 8:36 AM by Schuyler Thomas MD.      CT Angiogram Lower Extremity Left  Result Date: 5/7/2025  Impression: 1. Irregular fluid collection in the left groin may represent a seroma or liquefying hematoma measuring approximately 6.0 x 2.4 x 5.1 cm (Length x Height x Width). 2. No evidence for left groin pseudoaneurysm. 3. Patent iliofemoral and popliteal arteries with no occlusion or flow-limiting stenosis. 4. Patent PTA. Unopacified distal peroneal artery and JONO could be related to occlusion or vasospasm. Authenticated and Electronically Signed by Elizabeth Staples DO on 05/07/2025 06:27:57 PM    Post Mitraclip TTE 4/25/25     Estimated left ventricular EF = 25-30%    The left ventricular cavity is borderline dilated.    Left ventricular wall thickness is consistent with mild concentric hypertrophy.    Mild aortic valve stenosis is present. Aortic valve area is 1.1 cm2.    Aortic valve mean pressure gradient is 11 mmHg.    S/p 2 NTW MitraClips on 4/23/25.  Two clips are seen.    Moderate mitral valve regurgitation is present.    The mitral valve mean gradient is 5 mmHg.    Mild tricusoid regurgitation.  Calculated right ventricular systolic pressure from tricuspid regurgitation is 27 mmHg.    Iatrogenic ASD noted with color flow Doppler with left to right flow.      Assessment / Plan      Assessment / Plan:  1. S/P mitral valve clip implantation (Primary)  2. Chronic systolic heart failure  - 85 y.o. female s/p Mitraclip NTw insertion x 2 per Dr. Dimas/Dr. Zabala 4/23/25.   - PMH: Severe MR s/p MitraClip 4/23/2025, persistent AF (Xarelto), hypertension, CKD, COPD (4 L home  oxygen), DAVID noncompliant with CPAP/BiPAP, sick sinus syndrome s/p PPM, and HFrEF.    - Following transcatheter mitral valve repair, patient recovered in ICU: hypotensive requiring Byron-Synephrine and transitioning to oral midodrine.  DC home POD #2.   - Seen Cardiology Clinic by Dr. Zabala 4/30/2025 with removal of right groin venous stitch.   - Post procedure TTE 4/25/25:  MV mean gradient 5 mmHg with moderate MR and LVEF 25-30%  - Re-admitted to [facility] 5/14/2025 to acute on chronic HFrEF and rhinovirus.  Discharged home within 48 hours.    - Presents to CT surgery for 4-week postop check: Groin access site well healed without complication.   CXR w/ trace left pleural effusion/ stable cardiomegaly.  - Follow up with Cardiology as scheuled  - Continue medical management for HFrEF and Afib  - Return to CT/Vascular Surgery PRN per Cardiology request      Follow Up:   Return PRN per Cardiology request.   Or sooner for any further concerns or worsening sign and symptoms. If unable to reach us in the office please dial 911 or go to the nearest emergency department.      BALDOMERO Gaspar  Psychiatric Cardiothoracic Surgery    Time Spent: I spent 31 minutes caring for Elsa on this date of service. This time includes time spent by me in the following activities: preparing for the visit, reviewing tests, obtaining and/or reviewing a separately obtained history, performing a medically appropriate examination and/or evaluation, counseling and educating the patient/family/caregiver, documenting information in the medical record, independently interpreting results and communicating that information with the patient/family/caregiver, and care coordination.

## 2025-05-30 ENCOUNTER — READMISSION MANAGEMENT (OUTPATIENT)
Dept: CALL CENTER | Facility: HOSPITAL | Age: 85
End: 2025-05-30
Payer: MEDICARE

## 2025-05-30 ENCOUNTER — APPOINTMENT (OUTPATIENT)
Dept: CARDIAC REHAB | Facility: HOSPITAL | Age: 85
End: 2025-05-30
Payer: MEDICARE

## 2025-05-30 NOTE — OUTREACH NOTE
CHF Week 2 Survey      Flowsheet Row Responses   [facility] patient discharged from? [facility]   Does the patient have one of the following disease processes/diagnoses(primary or secondary)? CHF   Week 2 attempt successful? Yes   Call start time 1014   Call end time 1020   Discharge diagnosis Acute on chronic HFrEF (heart failure with reduced ejection fraction)   Meds reviewed with patient/caregiver? Yes   Is the patient taking all medications as directed (includes completed medication regime)? Yes   Does the patient have a primary care provider?  Yes   Does the patient have an appointment with their PCP within 7 days of discharge? Yes   Has the patient kept scheduled appointments due by today? Yes   Has home health visited the patient within 72 hours of discharge? N/A   Psychosocial issues? No   Did the patient receive a copy of their discharge instructions? Yes   Nursing interventions Reviewed instructions with patient   What is the patient's perception of their health status since discharge? Improving   Nursing interventions Nurse provided patient education   Is the patient able to teach back signs and symptoms of worsening condition? (i.e. weight gain, shortness of air, etc.) Yes   Is the patient/caregiver able to teach back the hierarchy of who to call/visit for symptoms/problems? PCP, Specialist, Home health nurse, Urgent Care, ED, 911 Yes   CHF Week 2 call completed? Yes   Wrap up additional comments pt reports that she went to her appt yesterday and was tired so she went to bed early and forgot night time meds. Pt states she is getting up at this time and will take meds. Denies any s/s of CHF and is awre of s/s to monitor for.   Call end time 1020            SUE WRIGHT - Registered Nurse

## 2025-06-02 ENCOUNTER — OFFICE VISIT (OUTPATIENT)
Dept: CARDIOLOGY | Facility: CLINIC | Age: 85
End: 2025-06-02
Payer: MEDICARE

## 2025-06-02 ENCOUNTER — HOSPITAL ENCOUNTER (OUTPATIENT)
Dept: CARDIOLOGY | Facility: HOSPITAL | Age: 85
Discharge: HOME | End: 2025-06-02
Payer: MEDICARE

## 2025-06-02 VITALS
WEIGHT: 141 LBS | OXYGEN SATURATION: 100 % | HEART RATE: 82 BPM | DIASTOLIC BLOOD PRESSURE: 60 MMHG | HEIGHT: 63 IN | SYSTOLIC BLOOD PRESSURE: 92 MMHG | BODY MASS INDEX: 24.98 KG/M2

## 2025-06-02 VITALS
HEIGHT: 63 IN | SYSTOLIC BLOOD PRESSURE: 98 MMHG | DIASTOLIC BLOOD PRESSURE: 50 MMHG | BODY MASS INDEX: 25.69 KG/M2 | WEIGHT: 145 LBS

## 2025-06-02 DIAGNOSIS — I07.1 MODERATE TRICUSPID REGURGITATION: ICD-10-CM

## 2025-06-02 DIAGNOSIS — Z95.818 S/P MITRAL VALVE CLIP IMPLANTATION: ICD-10-CM

## 2025-06-02 DIAGNOSIS — I34.0 MODERATE MITRAL REGURGITATION: Chronic | ICD-10-CM

## 2025-06-02 DIAGNOSIS — I50.22 CHRONIC SYSTOLIC HEART FAILURE: Primary | ICD-10-CM

## 2025-06-02 DIAGNOSIS — Z98.890 S/P MITRAL VALVE CLIP IMPLANTATION: ICD-10-CM

## 2025-06-02 DIAGNOSIS — I34.0 SEVERE MITRAL VALVE REGURGITATION: ICD-10-CM

## 2025-06-02 DIAGNOSIS — I48.20 CHRONIC ATRIAL FIBRILLATION: ICD-10-CM

## 2025-06-02 LAB
AORTIC DIMENSIONLESS INDEX: 0.37 (DI)
AV MEAN PRESS GRAD SYS DOP V1V2: 11 MMHG
AV VMAX SYS DOP: 242.5 CM/SEC
BH CV ECHO MEAS - AI P1/2T: 423.7 MSEC
BH CV ECHO MEAS - AO MAX PG: 23.5 MMHG
BH CV ECHO MEAS - AO ROOT AREA (BSA CORRECTED): 1.9 CM2
BH CV ECHO MEAS - AO ROOT DIAM: 3.1 CM
BH CV ECHO MEAS - AO V2 VTI: 34.2 CM
BH CV ECHO MEAS - AVA(I,D): 1.17 CM2
BH CV ECHO MEAS - EDV(CUBED): 216 ML
BH CV ECHO MEAS - EDV(MOD-SP2): 147 ML
BH CV ECHO MEAS - EDV(MOD-SP4): 144 ML
BH CV ECHO MEAS - EF(MOD-SP2): 40.3 %
BH CV ECHO MEAS - EF(MOD-SP4): 51.2 %
BH CV ECHO MEAS - ESV(CUBED): 86.7 ML
BH CV ECHO MEAS - ESV(MOD-SP2): 87.7 ML
BH CV ECHO MEAS - ESV(MOD-SP4): 70.3 ML
BH CV ECHO MEAS - FS: 26.2 %
BH CV ECHO MEAS - IVS/LVPW: 1 CM
BH CV ECHO MEAS - IVSD: 1.2 CM
BH CV ECHO MEAS - LA DIMENSION: 5.2 CM
BH CV ECHO MEAS - LAT PEAK E' VEL: 7.6 CM/SEC
BH CV ECHO MEAS - LV DIASTOLIC VOL/BSA (35-75): 85.4 CM2
BH CV ECHO MEAS - LV MASS(C)D: 314 GRAMS
BH CV ECHO MEAS - LV MAX PG: 2.06 MMHG
BH CV ECHO MEAS - LV MEAN PG: 1 MMHG
BH CV ECHO MEAS - LV SYSTOLIC VOL/BSA (12-30): 41.7 CM2
BH CV ECHO MEAS - LV V1 MAX: 71.8 CM/SEC
BH CV ECHO MEAS - LV V1 VTI: 12.8 CM
BH CV ECHO MEAS - LVIDD: 6 CM
BH CV ECHO MEAS - LVIDS: 4.4 CM
BH CV ECHO MEAS - LVOT AREA: 3.1 CM2
BH CV ECHO MEAS - LVOT DIAM: 1.99 CM
BH CV ECHO MEAS - LVPWD: 1.2 CM
BH CV ECHO MEAS - MED PEAK E' VEL: 4.5 CM/SEC
BH CV ECHO MEAS - MV DEC SLOPE: 959.7 CM/SEC2
BH CV ECHO MEAS - MV DEC TIME: 0.26 SEC
BH CV ECHO MEAS - MV E MAX VEL: 183 CM/SEC
BH CV ECHO MEAS - MV MAX PG: 16.2 MMHG
BH CV ECHO MEAS - MV MEAN PG: 6.5 MMHG
BH CV ECHO MEAS - MV P1/2T: 60.1 MSEC
BH CV ECHO MEAS - MV V2 VTI: 37.1 CM
BH CV ECHO MEAS - MVA(P1/2T): 3.7 CM2
BH CV ECHO MEAS - MVA(VTI): 1.08 CM2
BH CV ECHO MEAS - RAP SYSTOLE: 15 MMHG
BH CV ECHO MEAS - RVSP: 69 MMHG
BH CV ECHO MEAS - SV(LVOT): 39.9 ML
BH CV ECHO MEAS - SV(MOD-SP2): 59.3 ML
BH CV ECHO MEAS - SV(MOD-SP4): 73.7 ML
BH CV ECHO MEAS - SVI(LVOT): 23.7 ML/M2
BH CV ECHO MEAS - SVI(MOD-SP2): 35.2 ML/M2
BH CV ECHO MEAS - SVI(MOD-SP4): 43.7 ML/M2
BH CV ECHO MEAS - TAPSE (>1.6): 1.51 CM
BH CV ECHO MEAS - TR MAX PG: 54.1 MMHG
BH CV ECHO MEAS - TR MAX VEL: 367.7 CM/SEC
BH CV ECHO MEASUREMENTS AVERAGE E/E' RATIO: 30.25
BH CV XLRA - RV BASE: 5.2 CM
BH CV XLRA - RV LENGTH: 6.6 CM
BH CV XLRA - RV MID: 4.4 CM
BH CV XLRA - TDI S': 6.6 CM/SEC
LEFT ATRIUM VOLUME INDEX: 117.2 ML/M2
LV EF 2D ECHO EST: 30 %
LV EF BIPLANE MOD: 45.8 %

## 2025-06-02 PROCEDURE — 93306 TTE W/DOPPLER COMPLETE: CPT | Performed by: INTERNAL MEDICINE

## 2025-06-02 PROCEDURE — 3078F DIAST BP <80 MM HG: CPT | Performed by: INTERNAL MEDICINE

## 2025-06-02 PROCEDURE — 93306 TTE W/DOPPLER COMPLETE: CPT

## 2025-06-02 PROCEDURE — 3074F SYST BP LT 130 MM HG: CPT | Performed by: INTERNAL MEDICINE

## 2025-06-02 PROCEDURE — 99214 OFFICE O/P EST MOD 30 MIN: CPT | Performed by: INTERNAL MEDICINE

## 2025-06-02 NOTE — PROGRESS NOTES
[facility] Cardiology  Office Progress Note  Elsa Alcaraz  1940  [address] [place] [address]       Visit Date: 25    PCP: Kemi Hdz MD  [address] [place] [address]    IDENTIFICATION: A 85 y.o. female  from [place], [place]    PROBLEM LIST:   VHD    10/24 EF 38% sev MR     REBECA EF 38% sev MR     MitraclipX2(one lost post leaflet) Judie/Sagrario , groin hematoma     echo EF <35% sev MR, mild MS   A. Fib- chronic   [id number] 4, a/c w Coumadin   23 high grade AV block -MDT leadless pacer Dr Willis  Dyspnea   MPS WNL  proBNP [id number] (<1800)  3/25 [facility] nl cors  HTN  HLD    TG 1:30 HDL 60 LDL 17  3/27/18  TG 80 HDL 49 LDL 73  DAVID  COPD/asthma  Iron deficiency anemia/bone marrow deficiency   Per Dr. Harding, Dr. Fagan  DAVID-CPAP non-adherent  GERD  Hiatal hernia  GIBleed  Lower GI ulcer  Surgical history  Anal fistulotomy  Breast lumpectomy   next line cholecystectomy  Partial colectomy  Ventral hernia repair        CC:   Chief Complaint   Patient presents with    Acute on chronic HFrEF (heart failure with reduced ejection     6 week hospital follow up        Allergies  Allergies   Allergen Reactions    Other GI Intolerance     Spicy foods      Penicillins Itching       Current Medications    Current Outpatient Medications:     albuterol sulfate  (90 Base) MCG/ACT inhaler, Inhale 2 puffs Every 4 (Four) Hours As Needed for Wheezing., Disp: 18 g, Rfl: 5    budesonide-formoterol (SYMBICORT) 160-4.5 MCG/ACT inhaler, Inhale 2 puffs 2 (Two) Times a Day. Rinse mouth with water after use, Disp: 10.2 g, Rfl: 5    cholecalciferol (VITAMIN D3) 25 MCG (1000 UT) tablet, Take 1 tablet by mouth Daily., Disp: , Rfl:     citalopram (CeleXA) 20 MG tablet, Take 1 tablet by mouth Daily., Disp: , Rfl:     empagliflozin (Jardiance) 10 MG tablet tablet, Take 1 tablet by mouth Daily., Disp: 90 tablet, Rfl: 3     "famotidine (PEPCID) 40 MG tablet, TAKE 1 TABLET BY MOUTH ONCE DAILY AT NIGHT, Disp: 90 tablet, Rfl: 3    furosemide (LASIX) 40 MG tablet, Take 1 tablet by mouth twice daily, Disp: 30 tablet, Rfl: 0    levoFLOXacin (LEVAQUIN) 500 MG tablet, Take 0.5 tablets by mouth Every Other Day., Disp: , Rfl:     metoprolol tartrate (LOPRESSOR) 25 MG tablet, Take 0.5 (one-half) tablet by mouth Every 12 (Twelve) Hours., Disp: 30 tablet, Rfl: 0    midodrine (PROAMATINE) 10 MG tablet, Take 1 tablet by mouth 3 (Three) Times a Day Before Meals., Disp: 90 tablet, Rfl: 0    O2 (OXYGEN), Inhale 3-4 L/min Daily As Needed., Disp: , Rfl:     pantoprazole (PROTONIX) 40 MG EC tablet, Take 1 tablet by mouth Every Morning., Disp: , Rfl:     potassium chloride (KLOR-CON M20) 20 MEQ CR tablet, Take 1 tablet by mouth 2 (Two) Times a Day. TAKE WITH LASIX DOSES, Disp: 6 tablet, Rfl: 0    sacubitril-valsartan (Entresto) 24-26 MG tablet, Take 1 tablet by mouth 2 (Two) Times a Day., Disp: , Rfl:     spironolactone (ALDACTONE) 25 MG tablet, Take 0.5 tablets by mouth Daily., Disp: 15 tablet, Rfl: 0    vitamin B-12 (CYANOCOBALAMIN) 100 MCG tablet, Take 1 tablet by mouth Daily., Disp: , Rfl:     Xarelto 20 MG tablet, Take 1 tablet by mouth Daily. *Resume on 3/27/25*, Disp: 90 tablet, Rfl: 3      History of Present Illness   Elsa Alcaraz is a 85 y.o. year old female here for follow up.  Debilitated post attempted MitraClip.  Had perioperative marked groin hematoma.  Limited functional capacity on chronic 24/7 oxygen.  Patient attests to limited quality of life    OBJECTIVE:  Vitals:    06/02/25 1050   BP: 92/60   BP Location: Left arm   Patient Position: Sitting   Cuff Size: Adult   Pulse: 82   SpO2: 100%   Weight: 64 kg (141 lb)   Height: 160 cm (63\")           Body mass index is 24.98 kg/m².    Constitutional:       Appearance: Not in distress. Chronically ill-appearing.   Neck:      Vascular: No JVR. JVD normal.   Pulmonary:      Effort: Pulmonary effort " is normal.      Breath sounds: Normal breath sounds. No wheezing. No rhonchi. No rales.   Chest:      Chest wall: Not tender to palpatation.   Cardiovascular:      PMI at left midclavicular line. Normal rate. Regular rhythm. Normal S1. Normal S2.       Murmurs: There is a systolic murmur.      No gallop.  No click. No rub.   Pulses:     Intact distal pulses.   Edema:     Peripheral edema absent.   Abdominal:      General: Bowel sounds are normal.      Palpations: Abdomen is soft.      Tenderness: There is no abdominal tenderness.   Musculoskeletal: Normal range of motion.         General: No tenderness. Skin:     General: Skin is warm and dry.   Neurological:      General: No focal deficit present.      Mental Status: Alert and oriented to person, place and time.         Diagnostic Data:  Procedures      ASSESSMENT:   Diagnosis Plan   1. Chronic systolic heart failure        2. Severe mitral valve regurgitation        3. Chronic atrial fibrillation        4. Moderate tricuspid regurgitation                    PLAN:  CHF chronic systolic with combined valvular heart issues continued medical management.      Hypertension controlled Entresto spironolactone metoprolol    Mitral regurgitation severe w MitraClip unsuccessful    A. fib chronic anticoagulated rate controlled post leadless pacer.  Patient is electing to have dental extraction will need to be off anticoagulation least 48 hours        Fidencio Germain MD, [facility]

## 2025-06-09 ENCOUNTER — READMISSION MANAGEMENT (OUTPATIENT)
Dept: CALL CENTER | Facility: HOSPITAL | Age: 85
End: 2025-06-09
Payer: MEDICARE

## 2025-06-09 NOTE — OUTREACH NOTE
CHF Week 3 Survey      Flowsheet Row Responses   [facility]   Does the patient have one of the following disease processes/diagnoses(primary or secondary)? CHF   Week 3 attempt successful? No   Unsuccessful attempts Attempt 2            Yamilex WRIGHT - Registered Nurse

## 2025-06-20 RX ORDER — MIDODRINE HYDROCHLORIDE 10 MG/1
10 TABLET ORAL
Qty: 90 TABLET | Refills: 0 | Status: SHIPPED | OUTPATIENT
Start: 2025-06-20

## 2025-07-09 DIAGNOSIS — I50.21 ACUTE HFREF (HEART FAILURE WITH REDUCED EJECTION FRACTION): ICD-10-CM

## 2025-07-09 RX ORDER — FUROSEMIDE 40 MG/1
40 TABLET ORAL 2 TIMES DAILY
Qty: 30 TABLET | Refills: 0 | OUTPATIENT
Start: 2025-07-09

## 2025-07-09 NOTE — TELEPHONE ENCOUNTER
Appears to be following with Dr. Germain currently.    Electronically signed by BALDOMERO Arechiga, 07/09/25, 11:45 AM EDT.

## 2025-07-14 RX ORDER — FUROSEMIDE 40 MG/1
40 TABLET ORAL 2 TIMES DAILY
Qty: 30 TABLET | Refills: 0 | Status: SHIPPED | OUTPATIENT
Start: 2025-07-14

## 2025-08-17 ENCOUNTER — APPOINTMENT (OUTPATIENT)
Dept: CARDIOLOGY | Facility: HOSPITAL | Age: 85
End: 2025-08-17
Payer: MEDICARE

## 2025-08-17 ENCOUNTER — HOSPITAL ENCOUNTER (INPATIENT)
Facility: HOSPITAL | Age: 85
LOS: 1 days | Discharge: HOSPICE | End: 2025-08-19
Attending: STUDENT IN AN ORGANIZED HEALTH CARE EDUCATION/TRAINING PROGRAM | Admitting: PEDIATRICS
Payer: MEDICARE

## 2025-08-17 ENCOUNTER — APPOINTMENT (OUTPATIENT)
Dept: CT IMAGING | Facility: HOSPITAL | Age: 85
End: 2025-08-17
Payer: MEDICARE

## 2025-08-17 ENCOUNTER — APPOINTMENT (OUTPATIENT)
Dept: GENERAL RADIOLOGY | Facility: HOSPITAL | Age: 85
End: 2025-08-17
Payer: MEDICARE

## 2025-08-17 PROBLEM — I50.9 CHF EXACERBATION: Status: ACTIVE | Noted: 2025-08-17

## 2025-08-19 PROBLEM — E43 SEVERE PROTEIN-CALORIE MALNUTRITION: Status: ACTIVE | Noted: 2025-08-19

## 2025-08-19 PROBLEM — I50.22 CHRONIC HFREF (HEART FAILURE WITH REDUCED EJECTION FRACTION): Status: ACTIVE | Noted: 2025-08-19

## 2025-08-19 PROBLEM — I50.9 CHF EXACERBATION: Status: RESOLVED | Noted: 2025-08-17 | Resolved: 2025-08-19

## 2025-08-19 PROBLEM — B34.8 RHINOVIRUS: Status: RESOLVED | Noted: 2025-05-16 | Resolved: 2025-08-19

## (undated) DEVICE — ESOPHAGEAL BALLOON DILATATION CATHETER: Brand: CRE FIXED WIRE

## (undated) DEVICE — ENDOSCOPY PORT CONNECTOR FOR OLYMPUS® SCOPES: Brand: ERBE

## (undated) DEVICE — GW PERIPH VASC ADX J/TP SS .035 150CM 3MM

## (undated) DEVICE — SYR LUERLOK 5CC

## (undated) DEVICE — JELLY,LUBE,STERILE,FLIP TOP,TUBE,2-OZ: Brand: MEDLINE

## (undated) DEVICE — Device

## (undated) DEVICE — PINNACLE INTRODUCER SHEATH: Brand: PINNACLE

## (undated) DEVICE — RADIFOCUS GLIDEWIRE: Brand: GLIDEWIRE

## (undated) DEVICE — LN INJ CONTRST FLXCIL HP F/M LL 1200PSI48

## (undated) DEVICE — SYR LUER SLPTP 50ML

## (undated) DEVICE — VLV SXN AIR/H2O ORCAPOD3 1P/U STRL

## (undated) DEVICE — INTRO CHECKFLO/XL .035IN 20F65CM

## (undated) DEVICE — CLEARCUT® HP2 SLIT KNIFE INTREPID MICRO-COAXIAL SYSTEM 2.4 DB: Brand: CLEARCUT®; INTREPID

## (undated) DEVICE — HYBRID TUBING/CAP SET FOR OLYMPUS® SCOPES: Brand: ERBE

## (undated) DEVICE — CATH DIAG EXPO M/ PK 6FR FL4/FR4 PIG 3PK

## (undated) DEVICE — HP CONCL INTREPID COAX I/A CRV .3MM

## (undated) DEVICE — FRCP BX RADJAW4 NDL 2.8 240 STD OG

## (undated) DEVICE — FRCP BIOP COLD ENDOJAW ALLGTR W/NDL 2.8X2300MM BLU

## (undated) DEVICE — PK CATH CARD 10

## (undated) DEVICE — SOL IRRIG H2O 1000ML STRL

## (undated) DEVICE — EYE PAK* 1033 NON-WOVEN OPHTHALMIC DRAPE APERTURE POUCHES: Brand: ALCON EYE-PAK

## (undated) DEVICE — TP SXN YANKR BULB STRL

## (undated) DEVICE — CANN IRR/INJ AIR ANT CHAMBER 6MM BEND 27G

## (undated) DEVICE — MEDI-VAC NON-CONDUCTIVE SUCTION TUBING: Brand: CARDINAL HEALTH

## (undated) DEVICE — NDL PERC 1PRT THNWALL W/BASEPLT 18G 7CM

## (undated) DEVICE — SUCTION CANISTER, 1500CC, RIGID: Brand: DEROYAL

## (undated) DEVICE — DRSNG SURESITE123 2.4X2.8IN

## (undated) DEVICE — CONMED SCOPE SAVER BITE BLOCK, 20X27 MM: Brand: SCOPE SAVER

## (undated) DEVICE — 15 DEG. MICROKNIFE - 3MM: Brand: SHARPOINT

## (undated) DEVICE — ENDOGATOR AUXILIARY WATER JET CONNECTOR: Brand: ENDOGATOR

## (undated) DEVICE — GLV SURG SENSICARE W/ALOE PF LF 8 STRL

## (undated) DEVICE — ELECTRD RETRN/GRND MEGADYNE SGL/PLT W/CORD 9FT DISP

## (undated) DEVICE — LHK- LEFT HEART KIT BAPTIST: Brand: MEDLINE INDUSTRIES, INC.

## (undated) DEVICE — GLV SURG TRIUMPH MICRO PF LTX 7.5 STRL

## (undated) DEVICE — STPCK 3/WY HP M/RA W/OFF/HNDL 1050PSI STRL

## (undated) DEVICE — POST OP EYE CARE KIT: Brand: MEDLINE

## (undated) DEVICE — Device: Brand: DEFENDO AIR/WATER/SUCTION AND BIOPSY VALVE

## (undated) DEVICE — DEV INFL ALLIANCE2 SYS

## (undated) DEVICE — 1 X VERSACROSS LARGE ACCESS TRANSSEPTAL DILATOR (INCLUDING 1 X J-TIP MECHANICAL GUIDEWIRE); 1 X VERSACROSS RF WIRE (INCLUDING 1 X CONNECTOR CABLE (SINGLE USE)); 1 X DISPERSIVE ELECTRODE: Brand: VERSACROSS LARGE ACCESS SOLUTION

## (undated) DEVICE — LUBE JELLY PK/2.75GM STRL BX/144

## (undated) DEVICE — HYBRID CO2 TUBING/CAP SET FOR OLYMPUS® SCOPES & CO2 SOURCE: Brand: ERBE

## (undated) DEVICE — ADULT, W/LG. BACK PAD, RADIOTRANSPARENT ELEMENT AND LEAD WIRE COMPATIBLE W/: Brand: DEFIBRILLATION ELECTRODES

## (undated) DEVICE — PAD GRND REM POLYHESIVE A/ DISP

## (undated) DEVICE — BLANKT WARM UNDER/BDY FUL/ACC A/ 90X206CM

## (undated) DEVICE — TOTAL TRAY, 16FR 10ML SIL FOLEY, URN: Brand: MEDLINE